# Patient Record
Sex: MALE | Race: WHITE | Employment: OTHER | URBAN - NONMETROPOLITAN AREA
[De-identification: names, ages, dates, MRNs, and addresses within clinical notes are randomized per-mention and may not be internally consistent; named-entity substitution may affect disease eponyms.]

---

## 2018-05-11 ENCOUNTER — OFFICE VISIT (OUTPATIENT)
Dept: GASTROENTEROLOGY | Age: 63
End: 2018-05-11
Payer: MEDICAID

## 2018-05-11 VITALS
DIASTOLIC BLOOD PRESSURE: 78 MMHG | BODY MASS INDEX: 27.06 KG/M2 | SYSTOLIC BLOOD PRESSURE: 138 MMHG | HEIGHT: 72 IN | HEART RATE: 59 BPM | WEIGHT: 199.8 LBS | OXYGEN SATURATION: 98 %

## 2018-05-11 DIAGNOSIS — Z12.11 ENCOUNTER FOR SCREENING COLONOSCOPY: Primary | ICD-10-CM

## 2018-05-11 PROCEDURE — 1036F TOBACCO NON-USER: CPT | Performed by: NURSE PRACTITIONER

## 2018-05-11 PROCEDURE — 3017F COLORECTAL CA SCREEN DOC REV: CPT | Performed by: NURSE PRACTITIONER

## 2018-05-11 PROCEDURE — G8427 DOCREV CUR MEDS BY ELIG CLIN: HCPCS | Performed by: NURSE PRACTITIONER

## 2018-05-11 PROCEDURE — 99203 OFFICE O/P NEW LOW 30 MIN: CPT | Performed by: NURSE PRACTITIONER

## 2018-05-11 PROCEDURE — G8419 CALC BMI OUT NRM PARAM NOF/U: HCPCS | Performed by: NURSE PRACTITIONER

## 2018-05-11 ASSESSMENT — ENCOUNTER SYMPTOMS
SHORTNESS OF BREATH: 0
CONSTIPATION: 0
BACK PAIN: 0
ABDOMINAL PAIN: 0
RECTAL PAIN: 0
TROUBLE SWALLOWING: 0
DIARRHEA: 0
BLOOD IN STOOL: 0
NAUSEA: 0
VOICE CHANGE: 0
ANAL BLEEDING: 0
VOMITING: 0
COUGH: 0
SORE THROAT: 0
ABDOMINAL DISTENTION: 0

## 2018-06-10 PROBLEM — Z12.11 ENCOUNTER FOR SCREENING COLONOSCOPY: Status: RESOLVED | Noted: 2018-05-11 | Resolved: 2018-06-10

## 2018-06-26 ENCOUNTER — ANESTHESIA EVENT (OUTPATIENT)
Dept: ENDOSCOPY | Age: 63
End: 2018-06-26
Payer: MEDICAID

## 2018-06-26 ENCOUNTER — ANESTHESIA (OUTPATIENT)
Dept: ENDOSCOPY | Age: 63
End: 2018-06-26
Payer: MEDICAID

## 2018-06-26 ENCOUNTER — HOSPITAL ENCOUNTER (OUTPATIENT)
Age: 63
Setting detail: OUTPATIENT SURGERY
Discharge: HOME | End: 2018-06-26
Attending: INTERNAL MEDICINE | Admitting: INTERNAL MEDICINE
Payer: MEDICAID

## 2018-06-26 VITALS
HEART RATE: 42 BPM | OXYGEN SATURATION: 100 % | SYSTOLIC BLOOD PRESSURE: 100 MMHG | DIASTOLIC BLOOD PRESSURE: 71 MMHG | RESPIRATION RATE: 16 BRPM | TEMPERATURE: 98.4 F | WEIGHT: 215 LBS | BODY MASS INDEX: 28.49 KG/M2 | HEIGHT: 73 IN

## 2018-06-26 VITALS
SYSTOLIC BLOOD PRESSURE: 89 MMHG | RESPIRATION RATE: 19 BRPM | OXYGEN SATURATION: 98 % | DIASTOLIC BLOOD PRESSURE: 45 MMHG

## 2018-06-26 LAB
GLUCOSE BLD-MCNC: 99 MG/DL (ref 70–99)
PERFORMED ON: NORMAL

## 2018-06-26 PROCEDURE — 3700000001 HC ADD 15 MINUTES (ANESTHESIA): Performed by: INTERNAL MEDICINE

## 2018-06-26 PROCEDURE — 7100000011 HC PHASE II RECOVERY - ADDTL 15 MIN: Performed by: INTERNAL MEDICINE

## 2018-06-26 PROCEDURE — 3609009500 HC COLONOSCOPY DIAGNOSTIC OR SCREENING: Performed by: INTERNAL MEDICINE

## 2018-06-26 PROCEDURE — 6360000002 HC RX W HCPCS: Performed by: NURSE ANESTHETIST, CERTIFIED REGISTERED

## 2018-06-26 PROCEDURE — 2500000003 HC RX 250 WO HCPCS: Performed by: NURSE ANESTHETIST, CERTIFIED REGISTERED

## 2018-06-26 PROCEDURE — 2580000003 HC RX 258: Performed by: INTERNAL MEDICINE

## 2018-06-26 PROCEDURE — 7100000010 HC PHASE II RECOVERY - FIRST 15 MIN: Performed by: INTERNAL MEDICINE

## 2018-06-26 PROCEDURE — 45378 DIAGNOSTIC COLONOSCOPY: CPT | Performed by: INTERNAL MEDICINE

## 2018-06-26 PROCEDURE — 93005 ELECTROCARDIOGRAM TRACING: CPT

## 2018-06-26 PROCEDURE — 3700000000 HC ANESTHESIA ATTENDED CARE: Performed by: INTERNAL MEDICINE

## 2018-06-26 PROCEDURE — 82948 REAGENT STRIP/BLOOD GLUCOSE: CPT

## 2018-06-26 RX ORDER — LIDOCAINE HYDROCHLORIDE 10 MG/ML
1 INJECTION, SOLUTION EPIDURAL; INFILTRATION; INTRACAUDAL; PERINEURAL ONCE
Status: DISCONTINUED | OUTPATIENT
Start: 2018-06-26 | End: 2018-06-26 | Stop reason: HOSPADM

## 2018-06-26 RX ORDER — PROMETHAZINE HYDROCHLORIDE 25 MG/ML
6.25 INJECTION, SOLUTION INTRAMUSCULAR; INTRAVENOUS
Status: DISCONTINUED | OUTPATIENT
Start: 2018-06-26 | End: 2018-06-26 | Stop reason: HOSPADM

## 2018-06-26 RX ORDER — DIPHENHYDRAMINE HYDROCHLORIDE 50 MG/ML
12.5 INJECTION INTRAMUSCULAR; INTRAVENOUS
Status: DISCONTINUED | OUTPATIENT
Start: 2018-06-26 | End: 2018-06-26 | Stop reason: HOSPADM

## 2018-06-26 RX ORDER — BISOPROLOL FUMARATE AND HYDROCHLOROTHIAZIDE 5; 6.25 MG/1; MG/1
1 TABLET ORAL DAILY
Status: ON HOLD | COMMUNITY
End: 2020-01-21

## 2018-06-26 RX ORDER — ONDANSETRON 2 MG/ML
4 INJECTION INTRAMUSCULAR; INTRAVENOUS
Status: DISCONTINUED | OUTPATIENT
Start: 2018-06-26 | End: 2018-06-26 | Stop reason: HOSPADM

## 2018-06-26 RX ORDER — LIDOCAINE HYDROCHLORIDE 10 MG/ML
INJECTION, SOLUTION INFILTRATION; PERINEURAL PRN
Status: DISCONTINUED | OUTPATIENT
Start: 2018-06-26 | End: 2018-06-26 | Stop reason: SDUPTHER

## 2018-06-26 RX ORDER — SODIUM CHLORIDE, SODIUM LACTATE, POTASSIUM CHLORIDE, CALCIUM CHLORIDE 600; 310; 30; 20 MG/100ML; MG/100ML; MG/100ML; MG/100ML
INJECTION, SOLUTION INTRAVENOUS CONTINUOUS
Status: DISCONTINUED | OUTPATIENT
Start: 2018-06-26 | End: 2018-06-26 | Stop reason: HOSPADM

## 2018-06-26 RX ORDER — PROPOFOL 10 MG/ML
INJECTION, EMULSION INTRAVENOUS PRN
Status: DISCONTINUED | OUTPATIENT
Start: 2018-06-26 | End: 2018-06-26 | Stop reason: SDUPTHER

## 2018-06-26 RX ADMIN — LIDOCAINE HYDROCHLORIDE 50 MG: 10 INJECTION, SOLUTION INFILTRATION; PERINEURAL at 11:44

## 2018-06-26 RX ADMIN — PROPOFOL 50 MG: 10 INJECTION, EMULSION INTRAVENOUS at 11:48

## 2018-06-26 RX ADMIN — PROPOFOL 100 MG: 10 INJECTION, EMULSION INTRAVENOUS at 11:44

## 2018-06-26 RX ADMIN — PROPOFOL 50 MG: 10 INJECTION, EMULSION INTRAVENOUS at 11:59

## 2018-06-26 RX ADMIN — SODIUM CHLORIDE, SODIUM LACTATE, POTASSIUM CHLORIDE, AND CALCIUM CHLORIDE: 600; 310; 30; 20 INJECTION, SOLUTION INTRAVENOUS at 11:40

## 2018-06-26 ASSESSMENT — COPD QUESTIONNAIRES: CAT_SEVERITY: SEVERE

## 2018-06-26 ASSESSMENT — ENCOUNTER SYMPTOMS: SHORTNESS OF BREATH: 1

## 2018-06-27 LAB
EKG P AXIS: 54 DEGREES
EKG P-R INTERVAL: 154 MS
EKG Q-T INTERVAL: 478 MS
EKG QRS DURATION: 90 MS
EKG QTC CALCULATION (BAZETT): 457 MS
EKG T AXIS: 53 DEGREES

## 2018-07-26 PROBLEM — Z12.11 SCREENING FOR COLON CANCER: Status: RESOLVED | Noted: 2018-05-11 | Resolved: 2018-07-26

## 2018-08-15 ENCOUNTER — HOSPITAL ENCOUNTER (INPATIENT)
Facility: HOSPITAL | Age: 63
LOS: 6 days | Discharge: HOME | End: 2018-08-21
Attending: EMERGENCY MEDICINE | Admitting: FAMILY MEDICINE

## 2018-08-15 ENCOUNTER — APPOINTMENT (OUTPATIENT)
Dept: GENERAL RADIOLOGY | Facility: HOSPITAL | Age: 63
End: 2018-08-15

## 2018-08-15 DIAGNOSIS — Z74.09 IMPAIRED MOBILITY AND ADLS: ICD-10-CM

## 2018-08-15 DIAGNOSIS — J18.9 PNEUMONIA OF RIGHT UPPER LOBE DUE TO INFECTIOUS ORGANISM: Primary | ICD-10-CM

## 2018-08-15 DIAGNOSIS — Z78.9 IMPAIRED MOBILITY AND ADLS: ICD-10-CM

## 2018-08-15 DIAGNOSIS — J44.1 CHRONIC OBSTRUCTIVE PULMONARY DISEASE WITH ACUTE EXACERBATION (HCC): ICD-10-CM

## 2018-08-15 LAB
ALBUMIN SERPL-MCNC: 4.8 G/DL (ref 3.5–5)
ALBUMIN/GLOB SERPL: 1.3 G/DL (ref 1.1–2.5)
ALP SERPL-CCNC: 61 U/L (ref 24–120)
ALT SERPL W P-5'-P-CCNC: 53 U/L (ref 0–54)
ANION GAP SERPL CALCULATED.3IONS-SCNC: 16 MMOL/L (ref 4–13)
ARTERIAL PATENCY WRIST A: POSITIVE
ARTERIAL PATENCY WRIST A: POSITIVE
AST SERPL-CCNC: 54 U/L (ref 7–45)
ATMOSPHERIC PRESS: 749 MMHG
ATMOSPHERIC PRESS: 750 MMHG
BASE EXCESS BLDA CALC-SCNC: -3.7 MMOL/L (ref 0–2)
BASE EXCESS BLDA CALC-SCNC: -4 MMOL/L (ref 0–2)
BASOPHILS # BLD AUTO: 0.11 10*3/MM3 (ref 0–0.2)
BASOPHILS NFR BLD AUTO: 1.1 % (ref 0–2)
BDY SITE: ABNORMAL
BDY SITE: ABNORMAL
BILIRUB SERPL-MCNC: 0.6 MG/DL (ref 0.1–1)
BODY TEMPERATURE: 37 C
BODY TEMPERATURE: 37 C
BUN BLD-MCNC: 17 MG/DL (ref 5–21)
BUN/CREAT SERPL: 13.8 (ref 7–25)
CALCIUM SPEC-SCNC: 9.2 MG/DL (ref 8.4–10.4)
CHLORIDE SERPL-SCNC: 106 MMOL/L (ref 98–110)
CO2 SERPL-SCNC: 21 MMOL/L (ref 24–31)
COHGB MFR BLD: 1 % (ref 0–5)
CREAT BLD-MCNC: 1.23 MG/DL (ref 0.5–1.4)
D DIMER PPP FEU-MCNC: 0.39 MG/L (FEU) (ref 0–0.5)
D-LACTATE SERPL-SCNC: 1.5 MMOL/L (ref 0.5–2)
D-LACTATE SERPL-SCNC: 2.1 MMOL/L (ref 0.5–2)
DEPRECATED RDW RBC AUTO: 47.6 FL (ref 40–54)
EOSINOPHIL # BLD AUTO: 0.29 10*3/MM3 (ref 0–0.7)
EOSINOPHIL NFR BLD AUTO: 2.8 % (ref 0–4)
ERYTHROCYTE [DISTWIDTH] IN BLOOD BY AUTOMATED COUNT: 14.5 % (ref 12–15)
GAS FLOW AIRWAY: 5 LPM
GFR SERPL CREATININE-BSD FRML MDRD: 60 ML/MIN/1.73
GLOBULIN UR ELPH-MCNC: 3.8 GM/DL
GLUCOSE BLD-MCNC: 84 MG/DL (ref 70–100)
HCO3 BLDA-SCNC: 20.2 MMOL/L (ref 20–26)
HCO3 BLDA-SCNC: 20.4 MMOL/L (ref 20–26)
HCT VFR BLD AUTO: 53.6 % (ref 40–52)
HCT VFR BLD CALC: 56.2 % (ref 38–51)
HGB BLD-MCNC: 18.3 G/DL (ref 14–18)
HGB BLDA-MCNC: 18.3 G/DL (ref 14–18)
HOLD SPECIMEN: NORMAL
IMM GRANULOCYTES # BLD: 0.04 10*3/MM3 (ref 0–0.03)
IMM GRANULOCYTES NFR BLD: 0.4 % (ref 0–5)
LYMPHOCYTES # BLD AUTO: 2.45 10*3/MM3 (ref 0.72–4.86)
LYMPHOCYTES NFR BLD AUTO: 23.7 % (ref 15–45)
Lab: ABNORMAL
Lab: ABNORMAL
MCH RBC QN AUTO: 30.8 PG (ref 28–32)
MCHC RBC AUTO-ENTMCNC: 34.1 G/DL (ref 33–36)
MCV RBC AUTO: 90.2 FL (ref 82–95)
METHGB BLD QL: 0.6 % (ref 0–3)
MODALITY: ABNORMAL
MODALITY: ABNORMAL
MONOCYTES # BLD AUTO: 1.87 10*3/MM3 (ref 0.19–1.3)
MONOCYTES NFR BLD AUTO: 18.1 % (ref 4–12)
NEUTROPHILS # BLD AUTO: 5.59 10*3/MM3 (ref 1.87–8.4)
NEUTROPHILS NFR BLD AUTO: 53.9 % (ref 39–78)
NRBC BLD MANUAL-RTO: 0 /100 WBC (ref 0–0)
NT-PROBNP SERPL-MCNC: 76.9 PG/ML (ref 0–900)
OXYHGB MFR BLDV: 91.7 % (ref 94–99)
PCO2 BLDA: 33.6 MM HG (ref 35–45)
PCO2 BLDA: 35.1 MM HG (ref 35–45)
PCO2 TEMP ADJ BLD: ABNORMAL MM HG (ref 35–45)
PH BLDA: 7.37 PH UNITS (ref 7.35–7.45)
PH BLDA: 7.39 PH UNITS (ref 7.35–7.45)
PH, TEMP CORRECTED: ABNORMAL PH UNITS (ref 7.35–7.45)
PLATELET # BLD AUTO: 324 10*3/MM3 (ref 130–400)
PMV BLD AUTO: 9.7 FL (ref 6–12)
PO2 BLDA: 64.4 MM HG (ref 83–108)
PO2 BLDA: 77.4 MM HG (ref 83–108)
PO2 TEMP ADJ BLD: ABNORMAL MM HG (ref 83–108)
POTASSIUM BLD-SCNC: 4.2 MMOL/L (ref 3.5–5.3)
POTASSIUM BLDA-SCNC: 4 MMOL/L (ref 3.5–5.2)
PROT SERPL-MCNC: 8.6 G/DL (ref 6.3–8.7)
RBC # BLD AUTO: 5.94 10*6/MM3 (ref 4.8–5.9)
SAO2 % BLDCOA: 93.2 % (ref 94–99)
SAO2 % BLDCOA: 95.7 % (ref 94–99)
SODIUM BLD-SCNC: 143 MMOL/L (ref 135–145)
SODIUM BLDA-SCNC: 142 MMOL/L (ref 136–145)
TROPONIN I SERPL-MCNC: <0.012 NG/ML (ref 0–0.03)
VENTILATOR MODE: ABNORMAL
VENTILATOR MODE: ABNORMAL
WBC NRBC COR # BLD: 10.35 10*3/MM3 (ref 4.8–10.8)
WHOLE BLOOD HOLD SPECIMEN: NORMAL
WHOLE BLOOD HOLD SPECIMEN: NORMAL

## 2018-08-15 PROCEDURE — 94799 UNLISTED PULMONARY SVC/PX: CPT

## 2018-08-15 PROCEDURE — 25010000002 MORPHINE PER 10 MG: Performed by: EMERGENCY MEDICINE

## 2018-08-15 PROCEDURE — 36600 WITHDRAWAL OF ARTERIAL BLOOD: CPT

## 2018-08-15 PROCEDURE — 71045 X-RAY EXAM CHEST 1 VIEW: CPT

## 2018-08-15 PROCEDURE — 80053 COMPREHEN METABOLIC PANEL: CPT | Performed by: EMERGENCY MEDICINE

## 2018-08-15 PROCEDURE — 83880 ASSAY OF NATRIURETIC PEPTIDE: CPT | Performed by: EMERGENCY MEDICINE

## 2018-08-15 PROCEDURE — 82375 ASSAY CARBOXYHB QUANT: CPT

## 2018-08-15 PROCEDURE — G0480 DRUG TEST DEF 1-7 CLASSES: HCPCS | Performed by: FAMILY MEDICINE

## 2018-08-15 PROCEDURE — 25010000002 METHYLPREDNISOLONE PER 125 MG: Performed by: EMERGENCY MEDICINE

## 2018-08-15 PROCEDURE — 84484 ASSAY OF TROPONIN QUANT: CPT | Performed by: EMERGENCY MEDICINE

## 2018-08-15 PROCEDURE — 25010000002 ENOXAPARIN PER 10 MG: Performed by: FAMILY MEDICINE

## 2018-08-15 PROCEDURE — 25010000002 METHYLPREDNISOLONE PER 125 MG: Performed by: FAMILY MEDICINE

## 2018-08-15 PROCEDURE — 87899 AGENT NOS ASSAY W/OPTIC: CPT | Performed by: FAMILY MEDICINE

## 2018-08-15 PROCEDURE — 85379 FIBRIN DEGRADATION QUANT: CPT | Performed by: EMERGENCY MEDICINE

## 2018-08-15 PROCEDURE — 83050 HGB METHEMOGLOBIN QUAN: CPT

## 2018-08-15 PROCEDURE — 85025 COMPLETE CBC W/AUTO DIFF WBC: CPT | Performed by: EMERGENCY MEDICINE

## 2018-08-15 PROCEDURE — 25010000002 LORAZEPAM PER 2 MG: Performed by: EMERGENCY MEDICINE

## 2018-08-15 PROCEDURE — 25010000002 CEFTRIAXONE PER 250 MG: Performed by: EMERGENCY MEDICINE

## 2018-08-15 PROCEDURE — 83605 ASSAY OF LACTIC ACID: CPT | Performed by: EMERGENCY MEDICINE

## 2018-08-15 PROCEDURE — 87040 BLOOD CULTURE FOR BACTERIA: CPT | Performed by: EMERGENCY MEDICINE

## 2018-08-15 PROCEDURE — 94660 CPAP INITIATION&MGMT: CPT

## 2018-08-15 PROCEDURE — 99285 EMERGENCY DEPT VISIT HI MDM: CPT

## 2018-08-15 PROCEDURE — 25010000002 ONDANSETRON PER 1 MG: Performed by: EMERGENCY MEDICINE

## 2018-08-15 PROCEDURE — 82805 BLOOD GASES W/O2 SATURATION: CPT

## 2018-08-15 PROCEDURE — 25010000002 MAGNESIUM SULFATE IN D5W 1G/100ML (PREMIX) 1-5 GM/100ML-% SOLUTION: Performed by: FAMILY MEDICINE

## 2018-08-15 PROCEDURE — 82803 BLOOD GASES ANY COMBINATION: CPT

## 2018-08-15 PROCEDURE — 25010000002 HYDRALAZINE PER 20 MG: Performed by: FAMILY MEDICINE

## 2018-08-15 PROCEDURE — 93005 ELECTROCARDIOGRAM TRACING: CPT | Performed by: EMERGENCY MEDICINE

## 2018-08-15 PROCEDURE — 94640 AIRWAY INHALATION TREATMENT: CPT

## 2018-08-15 PROCEDURE — 93010 ELECTROCARDIOGRAM REPORT: CPT | Performed by: INTERNAL MEDICINE

## 2018-08-15 PROCEDURE — 25010000002 AZITHROMYCIN PER 500 MG: Performed by: EMERGENCY MEDICINE

## 2018-08-15 RX ORDER — PREDNISONE 10 MG/1
10 TABLET ORAL DAILY
COMMUNITY
End: 2018-12-11 | Stop reason: SDUPTHER

## 2018-08-15 RX ORDER — SODIUM CHLORIDE 0.9 % (FLUSH) 0.9 %
1-10 SYRINGE (ML) INJECTION AS NEEDED
Status: DISCONTINUED | OUTPATIENT
Start: 2018-08-15 | End: 2018-08-21 | Stop reason: HOSPADM

## 2018-08-15 RX ORDER — IPRATROPIUM BROMIDE AND ALBUTEROL SULFATE 2.5; .5 MG/3ML; MG/3ML
3 SOLUTION RESPIRATORY (INHALATION)
Status: DISCONTINUED | OUTPATIENT
Start: 2018-08-15 | End: 2018-08-18

## 2018-08-15 RX ORDER — ONDANSETRON 2 MG/ML
4 INJECTION INTRAMUSCULAR; INTRAVENOUS EVERY 6 HOURS PRN
Status: DISCONTINUED | OUTPATIENT
Start: 2018-08-15 | End: 2018-08-21 | Stop reason: HOSPADM

## 2018-08-15 RX ORDER — LORAZEPAM 2 MG/ML
1 INJECTION INTRAMUSCULAR ONCE
Status: COMPLETED | OUTPATIENT
Start: 2018-08-15 | End: 2018-08-15

## 2018-08-15 RX ORDER — MAGNESIUM SULFATE 1 G/100ML
1 INJECTION INTRAVENOUS ONCE
Status: COMPLETED | OUTPATIENT
Start: 2018-08-15 | End: 2018-08-15

## 2018-08-15 RX ORDER — LEVOTHYROXINE SODIUM 0.12 MG/1
250 TABLET ORAL DAILY
COMMUNITY
End: 2020-01-21 | Stop reason: HOSPADM

## 2018-08-15 RX ORDER — HYDRALAZINE HYDROCHLORIDE 20 MG/ML
20 INJECTION INTRAMUSCULAR; INTRAVENOUS ONCE
Status: COMPLETED | OUTPATIENT
Start: 2018-08-15 | End: 2018-08-15

## 2018-08-15 RX ORDER — IPRATROPIUM BROMIDE AND ALBUTEROL SULFATE 2.5; .5 MG/3ML; MG/3ML
SOLUTION RESPIRATORY (INHALATION)
Status: COMPLETED
Start: 2018-08-15 | End: 2018-08-15

## 2018-08-15 RX ORDER — BENZONATATE 100 MG/1
100 CAPSULE ORAL 3 TIMES DAILY
COMMUNITY
End: 2018-08-21 | Stop reason: HOSPADM

## 2018-08-15 RX ORDER — HYDROCODONE BITARTRATE AND ACETAMINOPHEN 5; 325 MG/1; MG/1
1 TABLET ORAL EVERY 6 HOURS PRN
Status: DISCONTINUED | OUTPATIENT
Start: 2018-08-15 | End: 2018-08-20

## 2018-08-15 RX ORDER — MONTELUKAST SODIUM 10 MG/1
10 TABLET ORAL DAILY
Status: ON HOLD | COMMUNITY
End: 2020-01-15

## 2018-08-15 RX ORDER — BISOPROLOL FUMARATE AND HYDROCHLOROTHIAZIDE 5; 6.25 MG/1; MG/1
1 TABLET ORAL DAILY
COMMUNITY
End: 2020-01-21 | Stop reason: HOSPADM

## 2018-08-15 RX ORDER — METHYLPREDNISOLONE SODIUM SUCCINATE 125 MG/2ML
125 INJECTION, POWDER, LYOPHILIZED, FOR SOLUTION INTRAMUSCULAR; INTRAVENOUS ONCE
Status: COMPLETED | OUTPATIENT
Start: 2018-08-15 | End: 2018-08-15

## 2018-08-15 RX ORDER — METHYLPREDNISOLONE SODIUM SUCCINATE 125 MG/2ML
125 INJECTION, POWDER, LYOPHILIZED, FOR SOLUTION INTRAMUSCULAR; INTRAVENOUS ONCE
Status: DISCONTINUED | OUTPATIENT
Start: 2018-08-15 | End: 2018-08-15

## 2018-08-15 RX ORDER — SODIUM CHLORIDE 0.9 % (FLUSH) 0.9 %
10 SYRINGE (ML) INJECTION AS NEEDED
Status: DISCONTINUED | OUTPATIENT
Start: 2018-08-15 | End: 2018-08-21 | Stop reason: HOSPADM

## 2018-08-15 RX ORDER — IPRATROPIUM BROMIDE AND ALBUTEROL SULFATE 2.5; .5 MG/3ML; MG/3ML
3 SOLUTION RESPIRATORY (INHALATION) ONCE
Status: DISCONTINUED | OUTPATIENT
Start: 2018-08-15 | End: 2018-08-15

## 2018-08-15 RX ORDER — ONDANSETRON 2 MG/ML
4 INJECTION INTRAMUSCULAR; INTRAVENOUS ONCE
Status: COMPLETED | OUTPATIENT
Start: 2018-08-15 | End: 2018-08-15

## 2018-08-15 RX ORDER — AMLODIPINE BESYLATE 5 MG/1
5 TABLET ORAL DAILY
COMMUNITY
End: 2020-01-21 | Stop reason: HOSPADM

## 2018-08-15 RX ORDER — BUDESONIDE AND FORMOTEROL FUMARATE DIHYDRATE 160; 4.5 UG/1; UG/1
2 AEROSOL RESPIRATORY (INHALATION)
COMMUNITY
End: 2019-04-29 | Stop reason: SDUPTHER

## 2018-08-15 RX ORDER — GUAIFENESIN 600 MG/1
1200 TABLET, EXTENDED RELEASE ORAL EVERY 12 HOURS SCHEDULED
Status: DISCONTINUED | OUTPATIENT
Start: 2018-08-15 | End: 2018-08-21 | Stop reason: HOSPADM

## 2018-08-15 RX ORDER — ALBUTEROL SULFATE 90 UG/1
2 AEROSOL, METERED RESPIRATORY (INHALATION) 4 TIMES DAILY PRN
COMMUNITY
End: 2019-04-29 | Stop reason: SDUPTHER

## 2018-08-15 RX ORDER — METHYLPREDNISOLONE SODIUM SUCCINATE 125 MG/2ML
80 INJECTION, POWDER, LYOPHILIZED, FOR SOLUTION INTRAMUSCULAR; INTRAVENOUS EVERY 6 HOURS
Status: DISCONTINUED | OUTPATIENT
Start: 2018-08-15 | End: 2018-08-17

## 2018-08-15 RX ORDER — IPRATROPIUM BROMIDE AND ALBUTEROL SULFATE 2.5; .5 MG/3ML; MG/3ML
3 SOLUTION RESPIRATORY (INHALATION) ONCE
Status: COMPLETED | OUTPATIENT
Start: 2018-08-15 | End: 2018-08-15

## 2018-08-15 RX ORDER — TAMSULOSIN HYDROCHLORIDE 0.4 MG/1
1 CAPSULE ORAL DAILY
COMMUNITY
End: 2020-01-21 | Stop reason: HOSPADM

## 2018-08-15 RX ORDER — MORPHINE SULFATE 4 MG/ML
4 INJECTION, SOLUTION INTRAMUSCULAR; INTRAVENOUS ONCE
Status: COMPLETED | OUTPATIENT
Start: 2018-08-15 | End: 2018-08-15

## 2018-08-15 RX ADMIN — IPRATROPIUM BROMIDE AND ALBUTEROL SULFATE 3 ML: 2.5; .5 SOLUTION RESPIRATORY (INHALATION) at 15:48

## 2018-08-15 RX ADMIN — HYDRALAZINE HYDROCHLORIDE 20 MG: 20 INJECTION INTRAMUSCULAR; INTRAVENOUS at 16:20

## 2018-08-15 RX ADMIN — Medication 10 ML: at 21:52

## 2018-08-15 RX ADMIN — IPRATROPIUM BROMIDE AND ALBUTEROL SULFATE: 2.5; .5 SOLUTION RESPIRATORY (INHALATION) at 15:19

## 2018-08-15 RX ADMIN — METHYLPREDNISOLONE SODIUM SUCCINATE 125 MG: 125 INJECTION, POWDER, FOR SOLUTION INTRAMUSCULAR; INTRAVENOUS at 15:53

## 2018-08-15 RX ADMIN — ONDANSETRON 4 MG: 2 INJECTION INTRAMUSCULAR; INTRAVENOUS at 16:11

## 2018-08-15 RX ADMIN — LORAZEPAM 1 MG: 2 INJECTION INTRAMUSCULAR; INTRAVENOUS at 15:41

## 2018-08-15 RX ADMIN — CEFTRIAXONE SODIUM 1 G: 1 INJECTION, POWDER, FOR SOLUTION INTRAMUSCULAR; INTRAVENOUS at 16:48

## 2018-08-15 RX ADMIN — MAGNESIUM SULFATE HEPTAHYDRATE 1 G: 1 INJECTION, SOLUTION INTRAVENOUS at 20:06

## 2018-08-15 RX ADMIN — ENOXAPARIN SODIUM 40 MG: 40 INJECTION SUBCUTANEOUS at 18:33

## 2018-08-15 RX ADMIN — METHYLPREDNISOLONE SODIUM SUCCINATE 125 MG: 125 INJECTION, POWDER, FOR SOLUTION INTRAMUSCULAR; INTRAVENOUS at 15:26

## 2018-08-15 RX ADMIN — HYDROCODONE BITARTRATE AND ACETAMINOPHEN 1 TABLET: 5; 325 TABLET ORAL at 19:35

## 2018-08-15 RX ADMIN — IPRATROPIUM BROMIDE AND ALBUTEROL SULFATE 3 ML: 2.5; .5 SOLUTION RESPIRATORY (INHALATION) at 20:30

## 2018-08-15 RX ADMIN — GUAIFENESIN 1200 MG: 600 TABLET, EXTENDED RELEASE ORAL at 20:06

## 2018-08-15 RX ADMIN — AZITHROMYCIN MONOHYDRATE 500 MG: 500 INJECTION, POWDER, LYOPHILIZED, FOR SOLUTION INTRAVENOUS at 16:48

## 2018-08-15 RX ADMIN — METHYLPREDNISOLONE SODIUM SUCCINATE 80 MG: 125 INJECTION, POWDER, FOR SOLUTION INTRAMUSCULAR; INTRAVENOUS at 21:51

## 2018-08-15 RX ADMIN — MORPHINE SULFATE 4 MG: 4 INJECTION INTRAVENOUS at 16:11

## 2018-08-15 NOTE — H&P
[facility] Medicine Services  HISTORY AND PHYSICAL    Date of Admission: 8/15/2018  Primary Care Physician: Lobito Trevino Jr., MD    Subjective     Chief Complaint: SOA    History of Present Illness  The patient is a 63 year old gentleman with a history of COPD and tobacco abuse.  He presents with 3-4 days of worsening coughing, wheezing and shortness of breath.  His cough is non-productive.  He feels like he has sputum that won't come up.  He is no longer smoking.  He denies fevers or chills.  He says that this finally got to the point he couldn't stand it.        Review of Systems   Constitutional: Positive for fatigue. Negative for fever.   HENT: Negative for congestion and ear pain.    Eyes: Negative for redness and visual disturbance.   Respiratory: Positive for cough, shortness of breath and wheezing.    Cardiovascular: Negative for chest pain and palpitations.   Gastrointestinal: Negative for abdominal pain, diarrhea, nausea and vomiting.   Endocrine: Negative for cold intolerance and heat intolerance.   Genitourinary: Negative for dysuria and frequency.   Musculoskeletal: Negative for arthralgias and back pain.   Skin: Negative for rash and wound.   Neurological: Negative for dizziness and headaches.   Psychiatric/Behavioral: Negative for confusion. The patient is not nervous/anxious.         Otherwise complete ROS reviewed and negative except as mentioned in the HPI.    Past Medical History: COPD  Past Surgical History:No prior surgeries  Social History:  Former smoker, no alcohol or drug use    Family History: Cancer in his mother and father  Allergies:  Allergies   Allergen Reactions   • Codeine Hives     Medications:  Prior to Admission medications    Not on File     Objective     Vital Signs: BP (!) 215/114   Pulse 107   Temp 98.1 °F (36.7 °C) (Oral)   Resp (!) 44   Wt 83 kg (183 lb)   SpO2 95%   Physical Exam   Constitutional: He is oriented to person,  place, and time. He appears well-developed and well-nourished. He appears distressed. Nasal cannula in place.   HENT:   Head: Normocephalic and atraumatic.   Right Ear: External ear normal.   Left Ear: External ear normal.   Nose: Nose normal.   Mouth/Throat: Oropharynx is clear and moist.   Eyes: Pupils are equal, round, and reactive to light. Conjunctivae and EOM are normal. Right eye exhibits no discharge. Left eye exhibits no discharge. No scleral icterus.   Neck: Normal range of motion. Neck supple. No tracheal deviation present. No thyromegaly present.   Cardiovascular: Normal rate, regular rhythm, normal heart sounds and intact distal pulses.  Exam reveals no gallop and no friction rub.    No murmur heard.  Pulmonary/Chest: Accessory muscle usage present. No stridor. Tachypnea noted. He is in respiratory distress. He has decreased breath sounds. He has wheezes. He has rhonchi. He has no rales. He exhibits no tenderness.   Abdominal: Soft. Bowel sounds are normal. He exhibits no distension and no mass. There is no tenderness. There is no rebound and no guarding. No hernia.   Musculoskeletal: Normal range of motion. He exhibits no edema or deformity.   Lymphadenopathy:     He has no cervical adenopathy.   Neurological: He is alert and oriented to person, place, and time. He has normal reflexes. He displays normal reflexes. No cranial nerve deficit. He exhibits normal muscle tone. Coordination normal.   Skin: Skin is warm and dry. No rash noted. No erythema. No pallor.   Psychiatric: He has a normal mood and affect. His behavior is normal. Judgment and thought content normal.   Vitals reviewed.        Results Reviewed:  Lab Results (last 24 hours)     Procedure Component Value Units Date/Time    BNP [[id number]]  (Normal) Collected:  08/15/18 1527    Specimen:  Blood Updated:  08/15/18 1559     proBNP 76.9 pg/mL     Troponin [[id number]]  (Normal) Collected:  08/15/18 1527    Specimen:  Blood Updated:  08/15/18  1559     Troponin I <0.012 ng/mL     Lactic Acid, Plasma [[id number]]  (Abnormal) Collected:  08/15/18 1527    Specimen:  Blood Updated:  08/15/18 1550     Lactate 2.1 (C) mmol/L     Lactic Acid, Reflex Timer (This will reflex a repeat order 3-3:15 hours after ordered.) [[id number]] Collected:  08/15/18 1527    Specimen:  Blood Updated:  08/15/18 1550    Comprehensive Metabolic Panel [[id number]]  (Abnormal) Collected:  08/15/18 1527    Specimen:  Blood Updated:  08/15/18 1547     Glucose 84 mg/dL      BUN 17 mg/dL      Creatinine 1.23 mg/dL      Sodium 143 mmol/L      Potassium 4.2 mmol/L      Chloride 106 mmol/L      CO2 21.0 (L) mmol/L      Calcium 9.2 mg/dL      Total Protein 8.6 g/dL      Albumin 4.80 g/dL      ALT (SGPT) 53 U/L      AST (SGOT) 54 (H) U/L      Alkaline Phosphatase 61 U/L      Total Bilirubin 0.6 mg/dL      eGFR Non African Amer 60 (L) mL/min/1.73      Globulin 3.8 gm/dL      A/G Ratio 1.3 g/dL      BUN/Creatinine Ratio 13.8     Anion Gap 16.0 (H) mmol/L     D-dimer, Quantitative [[id number]]  (Normal) Collected:  08/15/18 1527    Specimen:  Blood Updated:  08/15/18 1545     D-Dimer, Quantitative 0.39 mg/L (FEU)     Narrative:       Reference Range is 0-0.50 mg/L FEU. However, results <0.50 mg/L FEU tends to rule out DVT or PE. Results >0.50 mg/L FEU are not useful in predicting absence or presence of DVT or PE.    Blood Culture - Blood, [[id number]] Collected:  08/15/18 1527    Specimen:  Blood from Arm, Left Updated:  08/15/18 1537    CBC & Differential [[id number]] Collected:  08/15/18 1527    Specimen:  Blood Updated:  08/15/18 1536    Narrative:       The following orders were created for panel order CBC & Differential.  Procedure                               Abnormality         Status                     ---------                               -----------         ------                     CBC Auto Differential[[id number]]        Abnormal            Final result                 Please view results  for these tests on the individual orders.    CBC Auto Differential [[id number]]  (Abnormal) Collected:  08/15/18 1527    Specimen:  Blood Updated:  08/15/18 1536     WBC 10.35 10*3/mm3      RBC 5.94 (H) 10*6/mm3      Hemoglobin 18.3 (H) g/dL      Hematocrit 53.6 (H) %      MCV 90.2 fL      MCH 30.8 pg      MCHC 34.1 g/dL      RDW 14.5 %      RDW-SD 47.6 fl      MPV 9.7 fL      Platelets 324 10*3/mm3      Neutrophil % 53.9 %      Lymphocyte % 23.7 %      Monocyte % 18.1 (H) %      Eosinophil % 2.8 %      Basophil % 1.1 %      Immature Grans % 0.4 %      Neutrophils, Absolute 5.59 10*3/mm3      Lymphocytes, Absolute 2.45 10*3/mm3      Monocytes, Absolute 1.87 (H) 10*3/mm3      Eosinophils, Absolute 0.29 10*3/mm3      Basophils, Absolute 0.11 10*3/mm3      Immature Grans, Absolute 0.04 (H) 10*3/mm3      nRBC 0.0 /100 WBC     Blood Gas, Arterial With Co-Ox [[id number]]  (Abnormal) Collected:  08/15/18 1510    Specimen:  Arterial Blood Updated:  08/15/18 1533     Site Left Radial     Luke's Test Positive     pH, Arterial 7.388 pH units      pCO2, Arterial 33.6 (L) mm Hg      pO2, Arterial 64.4 (L) mm Hg      HCO3, Arterial 20.2 mmol/L      Base Excess, Arterial -3.7 (L) mmol/L      O2 Saturation, Arterial 93.2 (L) %      Hemoglobin, Blood Gas 18.3 (H) g/dL      Hematocrit, Blood Gas 56.2 (H) %      Oxyhemoglobin 91.7 (L) %      Methemoglobin 0.60 %      Carboxyhemoglobin 1.0 %      Temperature 37.0 C      Sodium, Arterial 142 mmol/L      Potassium, Arterial 4.0 mmol/L      Barometric Pressure for Blood Gas 750 mmHg      Modality Room Air     Ventilator Mode NA     Collected by [id number]     pH, Temp Corrected -- pH Units      pCO2, Temperature Corrected -- mm Hg      pO2, Temperature Corrected -- mm Hg     Lavender Top [[id number]] Collected:  08/15/18 1527    Specimen:  Blood Updated:  08/15/18 1532    Light Blue Top [[id number]] Collected:  08/15/18 1527    Specimen:  Blood Updated:  08/15/18 1532    Red Top [[id number]]  Collected:  08/15/18 1527    Specimen:  Blood Updated:  08/15/18 1532    [place] Draw [[id number]] Collected:  08/15/18 1527    Specimen:  Blood Updated:  08/15/18 1531    Narrative:       The following orders were created for panel order [place] Draw.  Procedure                               Abnormality         Status                     ---------                               -----------         ------                     Light Blue Top[[id number]]                                   In process                 Green Top (Gel)[[id number]]                                  In process                 Lavender Top[[id number]]                                     In process                 Red Top[[id number]]                                          In process                   Please view results for these tests on the individual orders.    Green Top (Gel) [[id number]] Collected:  08/15/18 1527    Specimen:  Blood Updated:  08/15/18 1531        Imaging Results (last 24 hours)     Procedure Component Value Units Date/Time    XR Chest 1 View [[id number]] Collected:  08/15/18 1644     Updated:  08/15/18 1550    Narrative:       EXAMINATION: XR CHEST 1 VW-     8/15/2018 3:30 PM CDT     HISTORY: SOB, cough.     One view chest x-ray compared to 06/27/2016.     Nodular opacity at the right upper lobe measures approximately 2 cm.  Nodular infiltrate/pneumonia is favored though short-term follow-up  after treatment will be necessary to rule out an underlying neoplasm.     Lungs are otherwise fully expanded and clear.     Normal heart and mediastinum.     Incidental note is made of left eighth and ninth rib fractures which are  healed or partially healed.     Summary:  1. Right upper lobe infiltrate compatible with pneumonia.  2. Follow-up to document complete resolution will be needed.  This report was finalized on [id number] by Dr. Saeid Singh MD.        I have personally reviewed and interpreted the radiology studies and ECG  obtained at time of admission.     Assessment / Plan     Assessment:   Hospital Problem List     Pneumonia of right upper lobe due to infectious organism (CMS/HCC)          1.  COPD AE  -IV Steroids  -IV Mg  -Nebs  -Bipap  -Mucinex  -Flutter    2. Sepsis  -Rocephin/Zithromax  -Blood/sputum cultures    3.  Pneumonia  -Rocephin/Zithromax  -Blood/sputum cultures    4.  Tobacco abuse  -Quit years ago    5.  Hypertensive Urgency  -IV hydralazine      Code Status: Full     I discussed the patient's findings and my recommendations with the patient, ED Physician    Estimated length of stay 3-4 days    Vinicius Ellsworth MD   08/15/18   4:22 PM

## 2018-08-16 LAB
ALBUMIN SERPL-MCNC: 3.9 G/DL (ref 3.5–5)
ALBUMIN/GLOB SERPL: 1.3 G/DL (ref 1.1–2.5)
ALP SERPL-CCNC: 45 U/L (ref 24–120)
ALT SERPL W P-5'-P-CCNC: 47 U/L (ref 0–54)
ANION GAP SERPL CALCULATED.3IONS-SCNC: 11 MMOL/L (ref 4–13)
ARTERIAL PATENCY WRIST A: POSITIVE
AST SERPL-CCNC: 32 U/L (ref 7–45)
ATMOSPHERIC PRESS: 749 MMHG
BASE EXCESS BLDA CALC-SCNC: -2.5 MMOL/L (ref 0–2)
BASOPHILS # BLD AUTO: 0.01 10*3/MM3 (ref 0–0.2)
BASOPHILS NFR BLD AUTO: 0.1 % (ref 0–2)
BDY SITE: ABNORMAL
BILIRUB SERPL-MCNC: 0.4 MG/DL (ref 0.1–1)
BODY TEMPERATURE: 37 C
BUN BLD-MCNC: 21 MG/DL (ref 5–21)
BUN/CREAT SERPL: 20.8 (ref 7–25)
CALCIUM SPEC-SCNC: 9 MG/DL (ref 8.4–10.4)
CHLORIDE SERPL-SCNC: 103 MMOL/L (ref 98–110)
CO2 SERPL-SCNC: 23 MMOL/L (ref 24–31)
CREAT BLD-MCNC: 1.01 MG/DL (ref 0.5–1.4)
DEPRECATED RDW RBC AUTO: 47.7 FL (ref 40–54)
EOSINOPHIL # BLD AUTO: 0 10*3/MM3 (ref 0–0.7)
EOSINOPHIL NFR BLD AUTO: 0 % (ref 0–4)
ERYTHROCYTE [DISTWIDTH] IN BLOOD BY AUTOMATED COUNT: 14.3 % (ref 12–15)
GAS FLOW AIRWAY: 5 LPM
GFR SERPL CREATININE-BSD FRML MDRD: 75 ML/MIN/1.73
GLOBULIN UR ELPH-MCNC: 3 GM/DL
GLUCOSE BLD-MCNC: 183 MG/DL (ref 70–100)
HCO3 BLDA-SCNC: 21.6 MMOL/L (ref 20–26)
HCT VFR BLD AUTO: 48.8 % (ref 40–52)
HGB BLD-MCNC: 16.8 G/DL (ref 14–18)
IMM GRANULOCYTES # BLD: 0.04 10*3/MM3 (ref 0–0.03)
IMM GRANULOCYTES NFR BLD: 0.4 % (ref 0–5)
L PNEUMO1 AG UR QL IA: NEGATIVE
LYMPHOCYTES # BLD AUTO: 0.56 10*3/MM3 (ref 0.72–4.86)
LYMPHOCYTES NFR BLD AUTO: 6.1 % (ref 15–45)
Lab: ABNORMAL
MCH RBC QN AUTO: 31.3 PG (ref 28–32)
MCHC RBC AUTO-ENTMCNC: 34.4 G/DL (ref 33–36)
MCV RBC AUTO: 91 FL (ref 82–95)
MODALITY: ABNORMAL
MONOCYTES # BLD AUTO: 0.48 10*3/MM3 (ref 0.19–1.3)
MONOCYTES NFR BLD AUTO: 5.2 % (ref 4–12)
NEUTROPHILS # BLD AUTO: 8.14 10*3/MM3 (ref 1.87–8.4)
NEUTROPHILS NFR BLD AUTO: 88.2 % (ref 39–78)
NRBC BLD MANUAL-RTO: 0 /100 WBC (ref 0–0)
PCO2 BLDA: 35.2 MM HG (ref 35–45)
PH BLDA: 7.4 PH UNITS (ref 7.35–7.45)
PLATELET # BLD AUTO: 306 10*3/MM3 (ref 130–400)
PMV BLD AUTO: 9.9 FL (ref 6–12)
PO2 BLDA: 117 MM HG (ref 83–108)
POTASSIUM BLD-SCNC: 4.6 MMOL/L (ref 3.5–5.3)
PROT SERPL-MCNC: 6.9 G/DL (ref 6.3–8.7)
RBC # BLD AUTO: 5.36 10*6/MM3 (ref 4.8–5.9)
S PNEUM AG SPEC QL LA: NEGATIVE
SAO2 % BLDCOA: 98.8 % (ref 94–99)
SODIUM BLD-SCNC: 137 MMOL/L (ref 135–145)
VENTILATOR MODE: ABNORMAL
WBC NRBC COR # BLD: 9.23 10*3/MM3 (ref 4.8–10.8)

## 2018-08-16 PROCEDURE — 85025 COMPLETE CBC W/AUTO DIFF WBC: CPT | Performed by: FAMILY MEDICINE

## 2018-08-16 PROCEDURE — 25010000002 METHYLPREDNISOLONE PER 125 MG: Performed by: FAMILY MEDICINE

## 2018-08-16 PROCEDURE — 25010000002 CEFTRIAXONE PER 250 MG: Performed by: FAMILY MEDICINE

## 2018-08-16 PROCEDURE — 94760 N-INVAS EAR/PLS OXIMETRY 1: CPT

## 2018-08-16 PROCEDURE — 94799 UNLISTED PULMONARY SVC/PX: CPT

## 2018-08-16 PROCEDURE — 94660 CPAP INITIATION&MGMT: CPT

## 2018-08-16 PROCEDURE — 25010000002 AZITHROMYCIN PER 500 MG: Performed by: FAMILY MEDICINE

## 2018-08-16 PROCEDURE — 25010000002 ENOXAPARIN PER 10 MG: Performed by: FAMILY MEDICINE

## 2018-08-16 PROCEDURE — 36415 COLL VENOUS BLD VENIPUNCTURE: CPT | Performed by: FAMILY MEDICINE

## 2018-08-16 PROCEDURE — 80053 COMPREHEN METABOLIC PANEL: CPT | Performed by: FAMILY MEDICINE

## 2018-08-16 PROCEDURE — 82803 BLOOD GASES ANY COMBINATION: CPT

## 2018-08-16 PROCEDURE — 36600 WITHDRAWAL OF ARTERIAL BLOOD: CPT

## 2018-08-16 RX ADMIN — METHYLPREDNISOLONE SODIUM SUCCINATE 80 MG: 125 INJECTION, POWDER, FOR SOLUTION INTRAMUSCULAR; INTRAVENOUS at 16:41

## 2018-08-16 RX ADMIN — HYDROCODONE BITARTRATE AND ACETAMINOPHEN 1 TABLET: 5; 325 TABLET ORAL at 14:07

## 2018-08-16 RX ADMIN — GUAIFENESIN 1200 MG: 600 TABLET, EXTENDED RELEASE ORAL at 20:01

## 2018-08-16 RX ADMIN — IPRATROPIUM BROMIDE AND ALBUTEROL SULFATE 3 ML: 2.5; .5 SOLUTION RESPIRATORY (INHALATION) at 07:29

## 2018-08-16 RX ADMIN — IPRATROPIUM BROMIDE AND ALBUTEROL SULFATE 3 ML: 2.5; .5 SOLUTION RESPIRATORY (INHALATION) at 19:37

## 2018-08-16 RX ADMIN — IPRATROPIUM BROMIDE AND ALBUTEROL SULFATE 3 ML: 2.5; .5 SOLUTION RESPIRATORY (INHALATION) at 04:29

## 2018-08-16 RX ADMIN — HYDROCODONE BITARTRATE AND ACETAMINOPHEN 1 TABLET: 5; 325 TABLET ORAL at 01:34

## 2018-08-16 RX ADMIN — IPRATROPIUM BROMIDE AND ALBUTEROL SULFATE 3 ML: 2.5; .5 SOLUTION RESPIRATORY (INHALATION) at 22:31

## 2018-08-16 RX ADMIN — GUAIFENESIN 1200 MG: 600 TABLET, EXTENDED RELEASE ORAL at 09:47

## 2018-08-16 RX ADMIN — AZITHROMYCIN MONOHYDRATE 500 MG: 500 INJECTION, POWDER, LYOPHILIZED, FOR SOLUTION INTRAVENOUS at 16:42

## 2018-08-16 RX ADMIN — IPRATROPIUM BROMIDE AND ALBUTEROL SULFATE 3 ML: 2.5; .5 SOLUTION RESPIRATORY (INHALATION) at 11:19

## 2018-08-16 RX ADMIN — METHYLPREDNISOLONE SODIUM SUCCINATE 80 MG: 125 INJECTION, POWDER, FOR SOLUTION INTRAMUSCULAR; INTRAVENOUS at 09:47

## 2018-08-16 RX ADMIN — ENOXAPARIN SODIUM 40 MG: 40 INJECTION SUBCUTANEOUS at 17:03

## 2018-08-16 RX ADMIN — HYDROCODONE BITARTRATE AND ACETAMINOPHEN 1 TABLET: 5; 325 TABLET ORAL at 20:01

## 2018-08-16 RX ADMIN — IPRATROPIUM BROMIDE AND ALBUTEROL SULFATE 3 ML: 2.5; .5 SOLUTION RESPIRATORY (INHALATION) at 14:56

## 2018-08-16 RX ADMIN — CEFTRIAXONE SODIUM 1 G: 1 INJECTION, POWDER, FOR SOLUTION INTRAMUSCULAR; INTRAVENOUS at 16:42

## 2018-08-16 RX ADMIN — METHYLPREDNISOLONE SODIUM SUCCINATE 80 MG: 125 INJECTION, POWDER, FOR SOLUTION INTRAMUSCULAR; INTRAVENOUS at 03:42

## 2018-08-16 RX ADMIN — HYDROCODONE BITARTRATE AND ACETAMINOPHEN 1 TABLET: 5; 325 TABLET ORAL at 07:44

## 2018-08-16 RX ADMIN — METHYLPREDNISOLONE SODIUM SUCCINATE 80 MG: 125 INJECTION, POWDER, FOR SOLUTION INTRAMUSCULAR; INTRAVENOUS at 22:16

## 2018-08-16 NOTE — PAYOR COMM NOTE
"FROM: CARL MORAN  PHONE: [phone]  FAX: [phone]    ID: [id number]    Reza Cardenas  (63 y.o. Male)     Date of Birth Social Security Number Address Home Phone MRN    1955  [address] [place] [address]  [address]  [facility] [address] [phone] [id number]    [employer] Marital Status          Evangelical        Admission Date Admission Type Admitting Provider Attending Provider Department, Room/Bed    8/15/18 Emergency Vinicius Ellsworth MD Moore, Jonathan Scott, MD [facility] CARDIAC CARE, C008/1    Discharge Date Discharge Disposition Discharge Destination                       Attending Provider:  Vinicius Ellsworth MD    Allergies:  Codeine    Isolation:  None   Infection:  None   Code Status:  CPR    Ht:  182.9 cm (72\")   Wt:  84.1 kg (185 lb 5 oz)    Admission Cmt:  None   Principal Problem:  None                Active Insurance as of 8/15/2018     Primary Coverage     Payor Plan Insurance Group Employer/Plan Group    [employer] [place] [employer] [place]      Payor Plan Address Payor Plan Phone Number Effective From Effective To    [address] [address] [phone] 8/15/2018     [facility] [id number]       Subscriber Name Subscriber Birth Date Member ID       REZA CARDENAS 1955 [id number]                 Emergency Contacts      (Rel.) Home Phone Work Phone Mobile Phone    Rufina Urbina (Sister) -- -- [phone]               History & Physical      Vinicius Ellsworth MD at 8/15/2018  4:22 PM              [facility] Medicine Services  HISTORY AND PHYSICAL    Date of Admission: 8/15/2018  Primary Care Physician: Lobito Trevino Jr., MD    Subjective     Chief Complaint: SOA    History of Present Illness  The patient is a 63 year old gentleman with a history of COPD and tobacco abuse.  He presents with 3-4 days of worsening coughing, wheezing and shortness of breath.  His cough is non-productive.  He feels like he has sputum that won't come up.  " He is no longer smoking.  He denies fevers or chills.  He says that this finally got to the point he couldn't stand it.        Review of Systems   Constitutional: Positive for fatigue. Negative for fever.   HENT: Negative for congestion and ear pain.    Eyes: Negative for redness and visual disturbance.   Respiratory: Positive for cough, shortness of breath and wheezing.    Cardiovascular: Negative for chest pain and palpitations.   Gastrointestinal: Negative for abdominal pain, diarrhea, nausea and vomiting.   Endocrine: Negative for cold intolerance and heat intolerance.   Genitourinary: Negative for dysuria and frequency.   Musculoskeletal: Negative for arthralgias and back pain.   Skin: Negative for rash and wound.   Neurological: Negative for dizziness and headaches.   Psychiatric/Behavioral: Negative for confusion. The patient is not nervous/anxious.         Otherwise complete ROS reviewed and negative except as mentioned in the HPI.    Past Medical History: COPD  Past Surgical History:No prior surgeries  Social History:  Former smoker, no alcohol or drug use    Family History: Cancer in his mother and father  Allergies:  Allergies   Allergen Reactions   • Codeine Hives     Medications:  Prior to Admission medications    Not on File     Objective     Vital Signs: BP (!) 215/114   Pulse 107   Temp 98.1 °F (36.7 °C) (Oral)   Resp (!) 44   Wt 83 kg (183 lb)   SpO2 95%   Physical Exam   Constitutional: He is oriented to person, place, and time. He appears well-developed and well-nourished. He appears distressed. Nasal cannula in place.   HENT:   Head: Normocephalic and atraumatic.   Right Ear: External ear normal.   Left Ear: External ear normal.   Nose: Nose normal.   Mouth/Throat: Oropharynx is clear and moist.   Eyes: Pupils are equal, round, and reactive to light. Conjunctivae and EOM are normal. Right eye exhibits no discharge. Left eye exhibits no discharge. No scleral icterus.   Neck: Normal range of  motion. Neck supple. No tracheal deviation present. No thyromegaly present.   Cardiovascular: Normal rate, regular rhythm, normal heart sounds and intact distal pulses.  Exam reveals no gallop and no friction rub.    No murmur heard.  Pulmonary/Chest: Accessory muscle usage present. No stridor. Tachypnea noted. He is in respiratory distress. He has decreased breath sounds. He has wheezes. He has rhonchi. He has no rales. He exhibits no tenderness.   Abdominal: Soft. Bowel sounds are normal. He exhibits no distension and no mass. There is no tenderness. There is no rebound and no guarding. No hernia.   Musculoskeletal: Normal range of motion. He exhibits no edema or deformity.   Lymphadenopathy:     He has no cervical adenopathy.   Neurological: He is alert and oriented to person, place, and time. He has normal reflexes. He displays normal reflexes. No cranial nerve deficit. He exhibits normal muscle tone. Coordination normal.   Skin: Skin is warm and dry. No rash noted. No erythema. No pallor.   Psychiatric: He has a normal mood and affect. His behavior is normal. Judgment and thought content normal.   Vitals reviewed.        Results Reviewed:  Lab Results (last 24 hours)     Procedure Component Value Units Date/Time    BNP [[id number]]  (Normal) Collected:  08/15/18 1527    Specimen:  Blood Updated:  08/15/18 1559     proBNP 76.9 pg/mL     Troponin [[id number]]  (Normal) Collected:  08/15/18 1527    Specimen:  Blood Updated:  08/15/18 1559     Troponin I <0.012 ng/mL     Lactic Acid, Plasma [[id number]]  (Abnormal) Collected:  08/15/18 1527    Specimen:  Blood Updated:  08/15/18 1550     Lactate 2.1 (C) mmol/L     Lactic Acid, Reflex Timer (This will reflex a repeat order 3-3:15 hours after ordered.) [[id number]] Collected:  08/15/18 1527    Specimen:  Blood Updated:  08/15/18 1550    Comprehensive Metabolic Panel [[id number]]  (Abnormal) Collected:  08/15/18 1527    Specimen:  Blood Updated:  08/15/18 1547     Glucose 84  mg/dL      BUN 17 mg/dL      Creatinine 1.23 mg/dL      Sodium 143 mmol/L      Potassium 4.2 mmol/L      Chloride 106 mmol/L      CO2 21.0 (L) mmol/L      Calcium 9.2 mg/dL      Total Protein 8.6 g/dL      Albumin 4.80 g/dL      ALT (SGPT) 53 U/L      AST (SGOT) 54 (H) U/L      Alkaline Phosphatase 61 U/L      Total Bilirubin 0.6 mg/dL      eGFR Non African Amer 60 (L) mL/min/1.73      Globulin 3.8 gm/dL      A/G Ratio 1.3 g/dL      BUN/Creatinine Ratio 13.8     Anion Gap 16.0 (H) mmol/L     D-dimer, Quantitative [[id number]]  (Normal) Collected:  08/15/18 1527    Specimen:  Blood Updated:  08/15/18 1545     D-Dimer, Quantitative 0.39 mg/L (FEU)     Narrative:       Reference Range is 0-0.50 mg/L FEU. However, results <0.50 mg/L FEU tends to rule out DVT or PE. Results >0.50 mg/L FEU are not useful in predicting absence or presence of DVT or PE.    Blood Culture - Blood, [[id number]] Collected:  08/15/18 1527    Specimen:  Blood from Arm, Left Updated:  08/15/18 1537    CBC & Differential [[id number]] Collected:  08/15/18 1527    Specimen:  Blood Updated:  08/15/18 1536    Narrative:       The following orders were created for panel order CBC & Differential.  Procedure                               Abnormality         Status                     ---------                               -----------         ------                     CBC Auto Differential[[id number]]        Abnormal            Final result                 Please view results for these tests on the individual orders.    CBC Auto Differential [[id number]]  (Abnormal) Collected:  08/15/18 1527    Specimen:  Blood Updated:  08/15/18 1536     WBC 10.35 10*3/mm3      RBC 5.94 (H) 10*6/mm3      Hemoglobin 18.3 (H) g/dL      Hematocrit 53.6 (H) %      MCV 90.2 fL      MCH 30.8 pg      MCHC 34.1 g/dL      RDW 14.5 %      RDW-SD 47.6 fl      MPV 9.7 fL      Platelets 324 10*3/mm3      Neutrophil % 53.9 %      Lymphocyte % 23.7 %      Monocyte % 18.1 (H) %       Eosinophil % 2.8 %      Basophil % 1.1 %      Immature Grans % 0.4 %      Neutrophils, Absolute 5.59 10*3/mm3      Lymphocytes, Absolute 2.45 10*3/mm3      Monocytes, Absolute 1.87 (H) 10*3/mm3      Eosinophils, Absolute 0.29 10*3/mm3      Basophils, Absolute 0.11 10*3/mm3      Immature Grans, Absolute 0.04 (H) 10*3/mm3      nRBC 0.0 /100 WBC     Blood Gas, Arterial With Co-Ox [[id number]]  (Abnormal) Collected:  08/15/18 1510    Specimen:  Arterial Blood Updated:  08/15/18 1533     Site Left Radial     Luke's Test Positive     pH, Arterial 7.388 pH units      pCO2, Arterial 33.6 (L) mm Hg      pO2, Arterial 64.4 (L) mm Hg      HCO3, Arterial 20.2 mmol/L      Base Excess, Arterial -3.7 (L) mmol/L      O2 Saturation, Arterial 93.2 (L) %      Hemoglobin, Blood Gas 18.3 (H) g/dL      Hematocrit, Blood Gas 56.2 (H) %      Oxyhemoglobin 91.7 (L) %      Methemoglobin 0.60 %      Carboxyhemoglobin 1.0 %      Temperature 37.0 C      Sodium, Arterial 142 mmol/L      Potassium, Arterial 4.0 mmol/L      Barometric Pressure for Blood Gas 750 mmHg      Modality Room Air     Ventilator Mode NA     Collected by [id number]     pH, Temp Corrected -- pH Units      pCO2, Temperature Corrected -- mm Hg      pO2, Temperature Corrected -- mm Hg     Lavender Top [[id number]] Collected:  08/15/18 1527    Specimen:  Blood Updated:  08/15/18 1532    Light Blue Top [[id number]] Collected:  08/15/18 1527    Specimen:  Blood Updated:  08/15/18 1532    Red Top [[id number]] Collected:  08/15/18 1527    Specimen:  Blood Updated:  08/15/18 1532    Loving Draw [[id number]] Collected:  08/15/18 1527    Specimen:  Blood Updated:  08/15/18 1531    Narrative:       The following orders were created for panel order Loving Draw.  Procedure                               Abnormality         Status                     ---------                               -----------         ------                     Light Blue Top[[id number]]                                   In  process                 Green Top (Gel)[[id number]]                                  In process                 Lavender Top[[id number]]                                     In process                 Red Top[[id number]]                                          In process                   Please view results for these tests on the individual orders.    Green Top (Gel) [[id number]] Collected:  08/15/18 1527    Specimen:  Blood Updated:  08/15/18 1531        Imaging Results (last 24 hours)     Procedure Component Value Units Date/Time    XR Chest 1 View [[id number]] Collected:  08/15/18 1644     Updated:  08/15/18 1550    Narrative:       EXAMINATION: XR CHEST 1 VW-     8/15/2018 3:30 PM CDT     HISTORY: SOB, cough.     One view chest x-ray compared to 06/27/2016.     Nodular opacity at the right upper lobe measures approximately 2 cm.  Nodular infiltrate/pneumonia is favored though short-term follow-up  after treatment will be necessary to rule out an underlying neoplasm.     Lungs are otherwise fully expanded and clear.     Normal heart and mediastinum.     Incidental note is made of left eighth and ninth rib fractures which are  healed or partially healed.     Summary:  1. Right upper lobe infiltrate compatible with pneumonia.  2. Follow-up to document complete resolution will be needed.  This report was finalized on [id number] by Dr. Saeid Singh MD.        I have personally reviewed and interpreted the radiology studies and ECG obtained at time of admission.     Assessment / Plan     Assessment:   Hospital Problem List     Pneumonia of right upper lobe due to infectious organism (CMS/HCC)          1.  COPD AE  -IV Steroids  -IV Mg  -Nebs  -Bipap  -Mucinex  -Flutter    2. Sepsis  -Rocephin/Zithromax  -Blood/sputum cultures    3.  Pneumonia  -Rocephin/Zithromax  -Blood/sputum cultures    4.  Tobacco abuse  -Quit years ago    5.  Hypertensive Urgency  -IV hydralazine      Code Status: Full     I discussed the  patient's findings and my recommendations with the patient, ED Physician    Estimated length of stay 3-4 days    Vinicius Ellsworth MD   08/15/18   4:22 PM              Electronically signed by Vinicius Ellsworth MD at 8/15/2018  4:33 PM          Emergency Department Notes      Ronald Quick Jr., MD at 8/15/2018  3:46 PM          Subjective   Patient complains of extreme shortness of breath that he says is been getting worse for the past 3 days but much worse this afternoon.  He was picked up by ambulance service.  Distress and brought here.  He has had some increased cough and hurts very deeply in his left lower lateral ribs whenever he breathes or coughs.  He does have history of COPD but does not normally use oxygen.        History provided by:  Patient   used: No    Shortness of Breath   Severity:  Severe  Onset quality:  Gradual  Duration:  3 days  Timing:  Constant  Progression:  Worsening  Chronicity:  Recurrent  Context: not activity, not animal exposure, not emotional upset, not fumes, not known allergens, not occupational exposure, not pollens, not smoke exposure, not strong odors, not URI and not weather changes    Relieved by:  Nothing  Worsened by:  Nothing  Ineffective treatments:  None tried  Associated symptoms: chest pain, cough and wheezing    Associated symptoms: no abdominal pain, no claudication, no diaphoresis, no ear pain, no fever, no headaches, no hemoptysis, no neck pain, no PND, no rash, no sore throat, no sputum production, no syncope, no swollen glands and no vomiting    Risk factors: no recent alcohol use, no family hx of DVT, no hx of cancer, no hx of PE/DVT, no obesity, no oral contraceptive use, no prolonged immobilization, no recent surgery and no tobacco use        Review of Systems   Constitutional: Negative.  Negative for diaphoresis and fever.   HENT: Negative.  Negative for ear pain and sore throat.    Respiratory: Positive for cough, shortness of  breath and wheezing. Negative for hemoptysis and sputum production.    Cardiovascular: Positive for chest pain. Negative for claudication, syncope and PND.   Gastrointestinal: Negative.  Negative for abdominal pain and vomiting.   Genitourinary: Negative.    Musculoskeletal: Negative.  Negative for neck pain.   Skin: Negative.  Negative for rash.   Neurological: Negative.  Negative for headaches.   Hematological: Negative.    Psychiatric/Behavioral: Negative.    All other systems reviewed and are negative.      No past medical history on file.    Allergies   Allergen Reactions   • Codeine Hives       No past surgical history on file.    No family history on file.    Social History     Social History   • Marital status:      Social History Main Topics   • Drug use: Unknown     Other Topics Concern   • Not on file       Prior to Admission medications    Not on File       Medications   sodium chloride 0.9 % flush 10 mL (not administered)   sodium chloride 0.9 % flush 10 mL (not administered)   cefTRIAXone (ROCEPHIN) 1 g/10mL IV PUSH syringe (not administered)   AZITHROMYCIN 500 MG/250 ML 0.9% NS IVPB (vial-mate) (not administered)   ipratropium-albuterol (DUO-NEB) nebulizer solution 3 mL ( Nebulization Given 8/15/18 1519)   methylPREDNISolone sodium succinate (SOLU-Medrol) injection 125 mg (125 mg Intravenous Given 8/15/18 1526)   LORazepam (ATIVAN) injection 1 mg (1 mg Intravenous Given 8/15/18 1541)   ipratropium-albuterol (DUO-NEB) 0.5-2.5 mg/3 ml nebulizer solution  - ADS Override Pull (3 mL  Given 8/15/18 1548)   methylPREDNISolone sodium succinate (SOLU-Medrol) injection 125 mg (125 mg Intravenous Given 8/15/18 1553)   ondansetron (ZOFRAN) injection 4 mg (4 mg Intravenous Given 8/15/18 1611)   Morphine sulfate (PF) injection 4 mg (4 mg Intravenous Given 8/15/18 1611)       Vitals:    08/15/18 1550   BP:    Pulse: 113   Resp:    Temp:    SpO2: 95%         Objective   Physical Exam   Constitutional: He is  oriented to person, place, and time. He appears well-developed and well-nourished.   HENT:   Head: Normocephalic and atraumatic.   Eyes: EOM are normal.   Neck: Normal range of motion. Neck supple.   Cardiovascular: Regular rhythm.    Patient is tachycardic but has a regular rhythm.   Pulmonary/Chest: He is in respiratory distress. He has wheezes.   Patient is in marked respiratory distress with diffuse wheezes and rhonchi bilaterally.  He has marked tachypnea and retractions.   Abdominal: Soft. Bowel sounds are normal.   Musculoskeletal: Normal range of motion.   Neurological: He is alert and oriented to person, place, and time.   Skin: Skin is warm.   Patient is diaphoretic.   Psychiatric: He has a normal mood and affect. His behavior is normal.   Nursing note and vitals reviewed.      Procedures        Lab Results (last 24 hours)     Procedure Component Value Units Date/Time    Blood Gas, Arterial With Co-Ox [[id number]]  (Abnormal) Collected:  08/15/18 1510    Specimen:  Arterial Blood Updated:  08/15/18 1533     Site Left Radial     Luke's Test Positive     pH, Arterial 7.388 pH units      pCO2, Arterial 33.6 (L) mm Hg      pO2, Arterial 64.4 (L) mm Hg      HCO3, Arterial 20.2 mmol/L      Base Excess, Arterial -3.7 (L) mmol/L      O2 Saturation, Arterial 93.2 (L) %      Hemoglobin, Blood Gas 18.3 (H) g/dL      Hematocrit, Blood Gas 56.2 (H) %      Oxyhemoglobin 91.7 (L) %      Methemoglobin 0.60 %      Carboxyhemoglobin 1.0 %      Temperature 37.0 C      Sodium, Arterial 142 mmol/L      Potassium, Arterial 4.0 mmol/L      Barometric Pressure for Blood Gas 750 mmHg      Modality Room Air     Ventilator Mode NA     Collected by [id number]     pH, Temp Corrected -- pH Units      pCO2, Temperature Corrected -- mm Hg      pO2, Temperature Corrected -- mm Hg     CBC & Differential [[id number]] Collected:  08/15/18 1527    Specimen:  Blood Updated:  08/15/18 1536    Narrative:       The following orders were created for  panel order CBC & Differential.  Procedure                               Abnormality         Status                     ---------                               -----------         ------                     CBC Auto Differential[[id number]]        Abnormal            Final result                 Please view results for these tests on the individual orders.    Comprehensive Metabolic Panel [[id number]]  (Abnormal) Collected:  08/15/18 1527    Specimen:  Blood Updated:  08/15/18 1547     Glucose 84 mg/dL      BUN 17 mg/dL      Creatinine 1.23 mg/dL      Sodium 143 mmol/L      Potassium 4.2 mmol/L      Chloride 106 mmol/L      CO2 21.0 (L) mmol/L      Calcium 9.2 mg/dL      Total Protein 8.6 g/dL      Albumin 4.80 g/dL      ALT (SGPT) 53 U/L      AST (SGOT) 54 (H) U/L      Alkaline Phosphatase 61 U/L      Total Bilirubin 0.6 mg/dL      eGFR Non African Amer 60 (L) mL/min/1.73      Globulin 3.8 gm/dL      A/G Ratio 1.3 g/dL      BUN/Creatinine Ratio 13.8     Anion Gap 16.0 (H) mmol/L     Lactic Acid, Plasma [[id number]]  (Abnormal) Collected:  08/15/18 1527    Specimen:  Blood Updated:  08/15/18 1550     Lactate 2.1 (C) mmol/L     Blood Culture - Blood, [[id number]] Collected:  08/15/18 1527    Specimen:  Blood from Arm, Left Updated:  08/15/18 1537    BNP [[id number]]  (Normal) Collected:  08/15/18 1527    Specimen:  Blood Updated:  08/15/18 1559     proBNP 76.9 pg/mL     D-dimer, Quantitative [[id number]]  (Normal) Collected:  08/15/18 1527    Specimen:  Blood Updated:  08/15/18 1545     D-Dimer, Quantitative 0.39 mg/L (FEU)     Narrative:       Reference Range is 0-0.50 mg/L FEU. However, results <0.50 mg/L FEU tends to rule out DVT or PE. Results >0.50 mg/L FEU are not useful in predicting absence or presence of DVT or PE.    Troponin [[id number]]  (Normal) Collected:  08/15/18 1527    Specimen:  Blood Updated:  08/15/18 1559     Troponin I <0.012 ng/mL     CBC Auto Differential [[id number]]  (Abnormal) Collected:  08/15/18  1527    Specimen:  Blood Updated:  08/15/18 1536     WBC 10.35 10*3/mm3      RBC 5.94 (H) 10*6/mm3      Hemoglobin 18.3 (H) g/dL      Hematocrit 53.6 (H) %      MCV 90.2 fL      MCH 30.8 pg      MCHC 34.1 g/dL      RDW 14.5 %      RDW-SD 47.6 fl      MPV 9.7 fL      Platelets 324 10*3/mm3      Neutrophil % 53.9 %      Lymphocyte % 23.7 %      Monocyte % 18.1 (H) %      Eosinophil % 2.8 %      Basophil % 1.1 %      Immature Grans % 0.4 %      Neutrophils, Absolute 5.59 10*3/mm3      Lymphocytes, Absolute 2.45 10*3/mm3      Monocytes, Absolute 1.87 (H) 10*3/mm3      Eosinophils, Absolute 0.29 10*3/mm3      Basophils, Absolute 0.11 10*3/mm3      Immature Grans, Absolute 0.04 (H) 10*3/mm3      nRBC 0.0 /100 WBC     Lactic Acid, Reflex Timer (This will reflex a repeat order 3-3:15 hours after ordered.) [[id number]] Collected:  08/15/18 1527    Specimen:  Blood Updated:  08/15/18 1550          XR Chest 1 View   Final Result          ED Course  ED Course as of Aug 15 1612   Wed Aug 15, 2018   1611 I told the patient the findings on chest x-ray.  He appears to be some little better but still working pretty hard with his breathing say has a long way to clear.  I spoke with Dr. Ellswroth will admit for further treatment.  [TR]      ED Course User Index  [TR] Ronald Quick Jr., MD          MDM  Number of Diagnoses or Management Options  Chronic obstructive pulmonary disease with acute exacerbation (CMS/HCC): established and worsening  Pneumonia of right upper lobe due to infectious organism (CMS/HCC): new and requires workup     Amount and/or Complexity of Data Reviewed  Clinical lab tests: ordered and reviewed  Tests in the radiology section of CPT®:  reviewed and ordered  Tests in the medicine section of CPT®:  ordered and reviewed  Discuss the patient with other providers: yes    Risk of Complications, Morbidity, and/or Mortality  Presenting problems: high  Diagnostic procedures: moderate  Management options:  moderate    Patient Progress  Patient progress: stable      Final diagnoses:   Pneumonia of right upper lobe due to infectious organism (CMS/HCC)   Chronic obstructive pulmonary disease with acute exacerbation (CMS/HCC)          Ronald Quick Jr., MD  08/15/18 1612      Electronically signed by Ronald Quick Jr., MD at 8/15/2018  4:12 PM             ICU Vital Signs     Row Name 08/16/18 0735 08/16/18 0729 08/16/18 0728 08/16/18 0600 08/16/18 0535       Vitals    Pulse 54 60 54  -- (!)  44    Heart Rate Source Monitor  -- Monitor Monitor Monitor    Resp 20  -- 12 18 18    Resp Rate Source  --  -- Monitor Monitor Monitor    /96  --  -- 152/95 140/83    Noninvasive MAP (mmHg) 110  --  --  -- 101    BP Location  --  --  -- Right arm Right arm    BP Method  --  --  -- Automatic Automatic    Patient Position  --  --  -- Lying Lying       Oxygen Therapy    SpO2 100 % (!)  89 % 97 %  -- 100 %    Pulse Oximetry Type Continuous  -- Continuous Continuous Continuous    Device (Oxygen Therapy) humidified;nasal cannula  -- nasal cannula;humidified nasal cannula nasal cannula    Flow (L/min) 5  -- 5 5 5    Row Name 08/16/18 0505 08/16/18 0455 08/16/18 0439 08/16/18 0435 08/16/18 0430       Vitals    Temp  --  --  --  -- 98.2 °F (36.8 °C)    Temp src  --  --  --  -- Temporal Artery     Pulse  --  -- 54 (!)  49  --    Heart Rate Source  -- Monitor Monitor  --  --    Resp  -- 18 15  --  --    Resp Rate Source  -- Monitor Monitor  --  --    BP  --  --  -- (!)  148/102  --    Noninvasive MAP (mmHg)  --  --  -- 116  --    BP Location Right arm Right arm  --  --  --    BP Method Automatic Automatic  --  --  --       Oxygen Therapy    SpO2  --  -- 99 % 100 %  --    Pulse Oximetry Type Continuous Continuous Continuous  --  --    Device (Oxygen Therapy) nasal cannula nasal cannula nasal cannula;humidified  --  --    Flow (L/min) 5 5 5  --  --    Row Name 08/16/18 0429 08/16/18 0405 08/16/18 0305 08/16/18 0205 08/16/18 0135        Vitals    Pulse (!)  42  --  --  -- 52    Heart Rate Source Monitor Monitor Monitor Monitor  --    Resp 15 20 20 20  --    Resp Rate Source Monitor Monitor Monitor Monitor  --    BP  --  --  --  -- 129/85    Noninvasive MAP (mmHg)  --  --  --  -- 99    BP Location  -- Right arm Right arm Right arm  --    BP Method  -- Automatic Automatic Automatic  --    Patient Position  -- Lying Lying Lying  --       Oxygen Therapy    SpO2 100 %  --  --  -- 96 %    Pulse Oximetry Type Continuous Continuous Continuous Continuous  --    Device (Oxygen Therapy) nasal cannula;humidified nasal cannula nasal cannula nasal cannula  --    Flow (L/min) 5 5 5 5  --    Row Name 08/16/18 0100 08/16/18 0035 08/15/18 2335 08/15/18 2310 08/15/18 2300       Vitals    Temp  --  -- 98.1 °F (36.7 °C)  --  --    Temp src  --  -- Oral  --  --    Pulse (!)  46 52  -- 61  --    Heart Rate Source Monitor  -- Monitor  -- Monitor    Resp 18  -- 18  -- 20    Resp Rate Source Monitor  -- Monitor  -- Monitor    /75 123/81  --  --  --    Noninvasive MAP (mmHg) 91 93  --  --  --    BP Location Right arm  -- Right arm  -- Right leg    BP Method Automatic  -- Automatic  -- Automatic    Patient Position Lying  -- Lying  -- Lying       Oxygen Therapy    SpO2 95 % 95 % 93 % 93 %  --    Pulse Oximetry Type Continuous  -- Continuous  -- Continuous    Device (Oxygen Therapy) nasal cannula  -- nasal cannula  -- nasal cannula    Flow (L/min) 5  -- 5  -- 5    Row Name 08/15/18 2155 08/15/18 2042 08/15/18 2030 08/15/18 2001 08/15/18 1948       Vitals    Temp  --  --  --  -- 99 °F (37.2 °C)    [facility]  --  --  --  -- Axillary    Pulse  -- 67 78 74 81    Heart Rate Source Monitor Monitor Monitor Monitor  --    Resp 18 22 20 20  --    Resp Rate Source Monitor Visual Visual Visual  --    BP  --  --  -- 129/78 130/74    Noninvasive MAP (mmHg)  --  --  -- 92 88    BP Location Right arm  --  --  --  --    BP Method Automatic  --  --  --  --    Patient Position Lying  --  " --  --  --       Oxygen Therapy    SpO2  -- 97 % 97 % 94 % 95 %    Pulse Oximetry Type Continuous  -- Continuous Continuous  --    Device (Oxygen Therapy) nasal cannula  --  -- nasal cannula nasal cannula    Flow (L/min) 5  --  -- 5 4    Row Name 08/15/18 1901 08/15/18 1850 08/15/18 1835 08/15/18 1820 08/15/18 1810       Height and Weight    Height  --  --  --  -- 182.9 cm (72\")    Height Method  --  --  --  -- Actual    Weight  --  --  --  -- 84.1 kg (185 lb 5 oz)    Weight Method  --  --  --  -- Bed scale    Ideal Body Weight (IBW) (kg)  --  --  --  -- 82.07    BSA (Calculated - sq m)  --  --  --  -- 2.06 sq meters    BMI (Calculated)  --  --  --  -- 25.1    Weight in (lb) to have BMI = 25  --  --  --  -- 183.9       Vitals    Pulse 88 89 92 94  --    /75 136/80 139/74 145/86  --    Noninvasive MAP (mmHg) 90 94 90 103  --       Oxygen Therapy    SpO2 93 % 93 % 95 % 93 %  --    Row Name 08/15/18 1804 08/15/18 1754 08/15/18 1655 08/15/18 1637 08/15/18 1620       Height and Weight    Height  --  -- 182.9 cm (72\")  --  --    Ideal Body Weight (IBW) (kg)  --  -- 82.07  --  --    Weight in (lb) to have BMI = 25  --  -- 183.9  --  --       Vitals    Temp 97.5 °F (36.4 °C)  --  --  --  --    Temp src Axillary  --  --  --  --    Pulse 101 101  --  -- 107    Heart Rate Source  -- Monitor  -- Monitor  --    Resp (!)  32 26  -- (!)  37  --    Resp Rate Source Monitor Monitor  -- Monitor  --    Resp Rate (Observed) Vent  --  --  -- 37  --    /94  --  --  -- (!)  215/114    BP Location Right arm  --  --  --  --       Oxygen Therapy    SpO2 95 % 96 %  --  --  --    Pulse Oximetry Type  -- Continuous  -- Continuous  --    Device (Oxygen Therapy) nasal cannula  --  -- NPPV/NIV  --    Flow (L/min) 4  --  --  --  --    Row Name 08/15/18 1550 08/15/18 1540 08/15/18 1525 08/15/18 1516          Height and Weight    Weight  --  --  -- 83 kg (183 lb)     Weight Method  --  --  -- Bed scale        Vitals    Temp  --  --  -- " "98.1 °F (36.7 °C)     Temp src  --  --  -- Oral     Pulse 113 109 100 102     Heart Rate Source  --  --  -- Monitor     Resp  --  --  -- (!)  44     Resp Rate Source  --  --  -- Monitor     BP  --  -- (!)  196/121  --     Noninvasive MAP (mmHg)  --  -- 137  --        Oxygen Therapy    SpO2 95 % 94 % 97 % 93 %     Device (Oxygen Therapy)  --  --  -- room air         Hospital Medications (all)       Dose Frequency Start End    AZITHROMYCIN 500 MG/250 ML 0.9% NS IVPB (vial-mate) 500 mg Once 8/15/2018 8/15/2018    Sig - Route: Infuse 500 mg into a venous catheter 1 (One) Time. - Intravenous    AZITHROMYCIN 500 MG/250 ML 0.9% NS IVPB (vial-mate) 500 mg Every 24 Hours 8/16/2018 8/20/2018    Sig - Route: Infuse 500 mg into a venous catheter Daily. - Intravenous    Linked Group 1:  \"And\" Linked Group Details        cefTRIAXone (ROCEPHIN) 1 g/10mL IV PUSH syringe 1 g Once 8/15/2018 8/15/2018    Sig - Route: Infuse 10 mL into a venous catheter 1 (One) Time. - Intravenous    cefTRIAXone (ROCEPHIN) 1 g/10mL IV PUSH syringe 1 g Every 24 Hours 8/16/2018 8/22/2018    Sig - Route: Infuse 10 mL into a venous catheter Daily. - Intravenous    Linked Group 1:  \"And\" Linked Group Details        enoxaparin (LOVENOX) syringe 40 mg 40 mg Every 24 Hours 8/15/2018     Sig - Route: Inject 0.4 mL under the skin into the appropriate area as directed Daily. - Subcutaneous    guaiFENesin (MUCINEX) 12 hr tablet 1,200 mg 1,200 mg Every 12 Hours Scheduled 8/15/2018     Sig - Route: Take 2 tablets by mouth Every 12 (Twelve) Hours. - Oral    hydrALAZINE (APRESOLINE) injection 20 mg 20 mg Once 8/15/2018 8/15/2018    Sig - Route: Infuse 1 mL into a venous catheter 1 (One) Time. - Intravenous    Cosign for Ordering: Accepted by Vinicius Ellsworth MD on 8/15/2018  4:45 PM    HYDROcodone-acetaminophen (NORCO) 5-325 MG per tablet 1 tablet 1 tablet Every 6 Hours PRN 8/15/2018 8/25/2018    Sig - Route: Take 1 tablet by mouth Every 6 (Six) Hours As Needed " for Moderate Pain . - Oral    ipratropium-albuterol (DUO-NEB) 0.5-2.5 mg/3 ml nebulizer solution  - ADS Override Pull   8/15/2018 8/15/2018    Notes to Pharmacy: Created by cabinet override    ipratropium-albuterol (DUO-NEB) nebulizer solution 3 mL 3 mL Once 8/15/2018 8/15/2018    Sig - Route: Take 3 mL by nebulization 1 (One) Time. - Nebulization    ipratropium-albuterol (DUO-NEB) nebulizer solution 3 mL 3 mL Every 4 Hours - RT 8/15/2018     Sig - Route: Take 3 mL by nebulization Every 4 (Four) Hours. - Nebulization    LORazepam (ATIVAN) injection 1 mg 1 mg Once 8/15/2018 8/15/2018    Sig - Route: Infuse 0.5 mL into a venous catheter 1 (One) Time. - Intravenous    magnesium sulfate in D5W 1g/100mL (PREMIX) 1 g Once 8/15/2018 8/15/2018    Sig - Route: Infuse 100 mL into a venous catheter 1 (One) Time. - Intravenous    methylPREDNISolone sodium succinate (SOLU-Medrol) injection 125 mg 125 mg Once 8/15/2018 8/15/2018    Sig - Route: Infuse 2 mL into a venous catheter 1 (One) Time. - Intravenous    methylPREDNISolone sodium succinate (SOLU-Medrol) injection 125 mg 125 mg Once 8/15/2018 8/15/2018    Sig - Route: Infuse 2 mL into a venous catheter 1 (One) Time. - Intravenous    methylPREDNISolone sodium succinate (SOLU-Medrol) injection 80 mg 80 mg Every 6 Hours 8/15/2018     Sig - Route: Infuse 1.28 mL into a venous catheter Every 6 (Six) Hours. - Intravenous    Morphine sulfate (PF) injection 4 mg 4 mg Once 8/15/2018 8/15/2018    Sig - Route: Infuse 1 mL into a venous catheter 1 (One) Time. - Intravenous    ondansetron (ZOFRAN) injection 4 mg 4 mg Once 8/15/2018 8/15/2018    Sig - Route: Infuse 2 mL into a venous catheter 1 (One) Time. - Intravenous    ondansetron (ZOFRAN) injection 4 mg 4 mg Every 6 Hours PRN 8/15/2018     Sig - Route: Infuse 2 mL into a venous catheter Every 6 (Six) Hours As Needed for Nausea or Vomiting. - Intravenous    sodium chloride 0.9 % flush 1-10 mL 1-10 mL As Needed 8/15/2018     Sig -  "Route: Infuse 1-10 mL into a venous catheter As Needed for Line Care. - Intravenous    sodium chloride 0.9 % flush 10 mL 10 mL As Needed 8/15/2018     Sig - Route: Infuse 10 mL into a venous catheter As Needed for Line Care. - Intravenous    Cosign for Ordering: Accepted by Ronald Quick Jr., MD on 8/15/2018  3:56 PM    sodium chloride 0.9 % flush 10 mL 10 mL As Needed 8/15/2018     Sig - Route: Infuse 10 mL into a venous catheter As Needed for Line Care. - Intravenous    Linked Group 2:  \"And\" Linked Group Details        ipratropium-albuterol (DUO-NEB) nebulizer solution 3 mL (Discontinued) 3 mL Once 8/15/2018 8/15/2018    Sig - Route: Take 3 mL by nebulization 1 (One) Time. - Nebulization    methylPREDNISolone sodium succinate (SOLU-Medrol) injection 125 mg (Discontinued) 125 mg Once 8/15/2018 8/15/2018    Sig - Route: Infuse 2 mL into a venous catheter 1 (One) Time. - Intravenous            Lab Results (last 24 hours)     Procedure Component Value Units Date/Time    Comprehensive Metabolic Panel [[id number]]  (Abnormal) Collected:  08/16/18 0713    Specimen:  Blood Updated:  08/16/18 0731     Glucose 183 (H) mg/dL      BUN 21 mg/dL      Creatinine 1.01 mg/dL      Sodium 137 mmol/L      Potassium 4.6 mmol/L      Chloride 103 mmol/L      CO2 23.0 (L) mmol/L      Calcium 9.0 mg/dL      Total Protein 6.9 g/dL      Albumin 3.90 g/dL      ALT (SGPT) 47 U/L      AST (SGOT) 32 U/L      Alkaline Phosphatase 45 U/L      Total Bilirubin 0.4 mg/dL      eGFR Non African Amer 75 mL/min/1.73      Globulin 3.0 gm/dL      A/G Ratio 1.3 g/dL      BUN/Creatinine Ratio 20.8     Anion Gap 11.0 mmol/L     CBC & Differential [[id number]] Collected:  08/16/18 0713    Specimen:  Blood Updated:  08/16/18 0720    Narrative:       The following orders were created for panel order CBC & Differential.  Procedure                               Abnormality         Status                     ---------                               -----------   "       ------                     CBC Auto Differential[[id number]]        Abnormal            Final result                 Please view results for these tests on the individual orders.    CBC Auto Differential [[id number]]  (Abnormal) Collected:  08/16/18 0713    Specimen:  Blood Updated:  08/16/18 0720     WBC 9.23 10*3/mm3      RBC 5.36 10*6/mm3      Hemoglobin 16.8 g/dL      Hematocrit 48.8 %      MCV 91.0 fL      MCH 31.3 pg      MCHC 34.4 g/dL      RDW 14.3 %      RDW-SD 47.7 fl      MPV 9.9 fL      Platelets 306 10*3/mm3      Neutrophil % 88.2 (H) %      Lymphocyte % 6.1 (L) %      Monocyte % 5.2 %      Eosinophil % 0.0 %      Basophil % 0.1 %      Immature Grans % 0.4 %      Neutrophils, Absolute 8.14 10*3/mm3      Lymphocytes, Absolute 0.56 (L) 10*3/mm3      Monocytes, Absolute 0.48 10*3/mm3      Eosinophils, Absolute 0.00 10*3/mm3      Basophils, Absolute 0.01 10*3/mm3      Immature Grans, Absolute 0.04 (H) 10*3/mm3      nRBC 0.0 /100 WBC     Blood Culture - Blood, [[id number]]  (Normal) Collected:  08/15/18 1643    Specimen:  Blood from Hand, Left Updated:  08/16/18 0500     Blood Culture No growth at less than 24 hours    Blood Culture - Blood, [[id number]]  (Normal) Collected:  08/15/18 1527    Specimen:  Blood from Arm, Left Updated:  08/16/18 0345     Blood Culture No growth at less than 24 hours    Blood Gas, Arterial [[id number]]  (Abnormal) Collected:  08/16/18 0210    Specimen:  Arterial Blood Updated:  08/16/18 0220     Site Left Radial     Luke's Test Positive     pH, Arterial 7.397 pH units      pCO2, Arterial 35.2 mm Hg      pO2, Arterial 117.0 (H) mm Hg      HCO3, Arterial 21.6 mmol/L      Base Excess, Arterial -2.5 (L) mmol/L      O2 Saturation, Arterial 98.8 %      Temperature 37.0 C      Barometric Pressure for Blood Gas 749 mmHg      Modality Nasal Cannula     Flow Rate 5.0 lpm      Ventilator Mode NA     Collected by [id number]    Legionella Antigen, Urine - Urine, Urine, Clean Catch [[id number]]   (Normal) Collected:  08/15/18 2345    Specimen:  Urine from Urine, Clean Catch Updated:  08/16/18 0023     LEGIONELLA ANTIGEN, URINE Negative    S. Pneumo Ag Urine or CSF - Urine, Urine, Clean Catch [[id number]]  (Normal) Collected:  08/15/18 2345    Specimen:  Urine from Urine, Clean Catch Updated:  08/16/18 0021     Strep Pneumo Ag Negative    Lactic Acid, Reflex [[id number]]  (Normal) Collected:  08/15/18 1924    Specimen:  Blood Updated:  08/15/18 1943     Lactate 1.5 mmol/L     Clonazepam Level [[id number]] Collected:  08/15/18 1924    Specimen:  Blood Updated:  08/15/18 1928    Lactic Acid, Reflex Timer (This will reflex a repeat order 3-3:15 hours after ordered.) [[id number]] Collected:  08/15/18 1527    Specimen:  Blood Updated:  08/15/18 1900     Extra Tube Hold for add-ons.     Comment: Auto resulted.       Blood Gas, Arterial [[id number]]  (Abnormal) Collected:  08/15/18 1836    Specimen:  Arterial Blood Updated:  08/15/18 1844     Site Right Radial     Luke's Test Positive     pH, Arterial 7.372 pH units      pCO2, Arterial 35.1 mm Hg      pO2, Arterial 77.4 (L) mm Hg      HCO3, Arterial 20.4 mmol/L      Base Excess, Arterial -4.0 (L) mmol/L      O2 Saturation, Arterial 95.7 %      Temperature 37.0 C      Barometric Pressure for Blood Gas 749 mmHg      Modality Nasal Cannula     Flow Rate 5.0 lpm      Ventilator Mode NA     Collected by [id number]    [place] Draw [[id number]] Collected:  08/15/18 1527    Specimen:  Blood Updated:  08/15/18 1631    Narrative:       The following orders were created for panel order [place] Draw.  Procedure                               Abnormality         Status                     ---------                               -----------         ------                     Light Blue Top[[id number]]                                   Final result               Green Top (Gel)[[id number]]                                  Final result               Lavender Top[[id number]]                                      Final result               Red Top[[id number]]                                          Final result                 Please view results for these tests on the individual orders.    Light Blue Top [[id number]] Collected:  08/15/18 1527    Specimen:  Blood Updated:  08/15/18 1631     Extra Tube hold for add-on     Comment: Auto resulted       Green Top (Gel) [[id number]] Collected:  08/15/18 1527    Specimen:  Blood Updated:  08/15/18 1631     Extra Tube Hold for add-ons.     Comment: Auto resulted.       Lavender Top [[id number]] Collected:  08/15/18 1527    Specimen:  Blood Updated:  08/15/18 1631     Extra Tube hold for add-on     Comment: Auto resulted       Red Top [[id number]] Collected:  08/15/18 1527    Specimen:  Blood Updated:  08/15/18 1631     Extra Tube Hold for add-ons.     Comment: Auto resulted.       BNP [[id number]]  (Normal) Collected:  08/15/18 1527    Specimen:  Blood Updated:  08/15/18 1559     proBNP 76.9 pg/mL     Troponin [[id number]]  (Normal) Collected:  08/15/18 1527    Specimen:  Blood Updated:  08/15/18 1559     Troponin I <0.012 ng/mL     Lactic Acid, Plasma [[id number]]  (Abnormal) Collected:  08/15/18 1527    Specimen:  Blood Updated:  08/15/18 1550     Lactate 2.1 (C) mmol/L     Comprehensive Metabolic Panel [[id number]]  (Abnormal) Collected:  08/15/18 1527    Specimen:  Blood Updated:  08/15/18 1547     Glucose 84 mg/dL      BUN 17 mg/dL      Creatinine 1.23 mg/dL      Sodium 143 mmol/L      Potassium 4.2 mmol/L      Chloride 106 mmol/L      CO2 21.0 (L) mmol/L      Calcium 9.2 mg/dL      Total Protein 8.6 g/dL      Albumin 4.80 g/dL      ALT (SGPT) 53 U/L      AST (SGOT) 54 (H) U/L      Alkaline Phosphatase 61 U/L      Total Bilirubin 0.6 mg/dL      eGFR Non African Amer 60 (L) mL/min/1.73      Globulin 3.8 gm/dL      A/G Ratio 1.3 g/dL      BUN/Creatinine Ratio 13.8     Anion Gap 16.0 (H) mmol/L     D-dimer, Quantitative [[id number]]  (Normal) Collected:  08/15/18 1525     Specimen:  Blood Updated:  08/15/18 1545     D-Dimer, Quantitative 0.39 mg/L (FEU)     Narrative:       Reference Range is 0-0.50 mg/L FEU. However, results <0.50 mg/L FEU tends to rule out DVT or PE. Results >0.50 mg/L FEU are not useful in predicting absence or presence of DVT or PE.    CBC & Differential [[id number]] Collected:  08/15/18 1527    Specimen:  Blood Updated:  08/15/18 1536    Narrative:       The following orders were created for panel order CBC & Differential.  Procedure                               Abnormality         Status                     ---------                               -----------         ------                     CBC Auto Differential[[id number]]        Abnormal            Final result                 Please view results for these tests on the individual orders.    CBC Auto Differential [[id number]]  (Abnormal) Collected:  08/15/18 1527    Specimen:  Blood Updated:  08/15/18 1536     WBC 10.35 10*3/mm3      RBC 5.94 (H) 10*6/mm3      Hemoglobin 18.3 (H) g/dL      Hematocrit 53.6 (H) %      MCV 90.2 fL      MCH 30.8 pg      MCHC 34.1 g/dL      RDW 14.5 %      RDW-SD 47.6 fl      MPV 9.7 fL      Platelets 324 10*3/mm3      Neutrophil % 53.9 %      Lymphocyte % 23.7 %      Monocyte % 18.1 (H) %      Eosinophil % 2.8 %      Basophil % 1.1 %      Immature Grans % 0.4 %      Neutrophils, Absolute 5.59 10*3/mm3      Lymphocytes, Absolute 2.45 10*3/mm3      Monocytes, Absolute 1.87 (H) 10*3/mm3      Eosinophils, Absolute 0.29 10*3/mm3      Basophils, Absolute 0.11 10*3/mm3      Immature Grans, Absolute 0.04 (H) 10*3/mm3      nRBC 0.0 /100 WBC     Blood Gas, Arterial With Co-Ox [[id number]]  (Abnormal) Collected:  08/15/18 1510    Specimen:  Arterial Blood Updated:  08/15/18 1533     Site Left Radial     Luke's Test Positive     pH, Arterial 7.388 pH units      pCO2, Arterial 33.6 (L) mm Hg      pO2, Arterial 64.4 (L) mm Hg      HCO3, Arterial 20.2 mmol/L      Base Excess, Arterial -3.7 (L)  mmol/L      O2 Saturation, Arterial 93.2 (L) %      Hemoglobin, Blood Gas 18.3 (H) g/dL      Hematocrit, Blood Gas 56.2 (H) %      Oxyhemoglobin 91.7 (L) %      Methemoglobin 0.60 %      Carboxyhemoglobin 1.0 %      Temperature 37.0 C      Sodium, Arterial 142 mmol/L      Potassium, Arterial 4.0 mmol/L      Barometric Pressure for Blood Gas 750 mmHg      Modality Room Air     Ventilator Mode NA     Collected by [id number]     pH, Temp Corrected -- pH Units      pCO2, Temperature Corrected -- mm Hg      pO2, Temperature Corrected -- mm Hg         Imaging Results (last 24 hours)     Procedure Component Value Units Date/Time    XR Chest 1 View [[id number]] Collected:  08/15/18 1644     Updated:  08/15/18 1550    Narrative:       EXAMINATION: XR CHEST 1 VW-     8/15/2018 3:30 PM CDT     HISTORY: SOB, cough.     One view chest x-ray compared to 06/27/2016.     Nodular opacity at the right upper lobe measures approximately 2 cm.  Nodular infiltrate/pneumonia is favored though short-term follow-up  after treatment will be necessary to rule out an underlying neoplasm.     Lungs are otherwise fully expanded and clear.     Normal heart and mediastinum.     Incidental note is made of left eighth and ninth rib fractures which are  healed or partially healed.     Summary:  1. Right upper lobe infiltrate compatible with pneumonia.  2. Follow-up to document complete resolution will be needed.  This report was finalized on [id number] by Dr. Saeid Singh MD.        ECG/EMG Results (last 24 hours)     Procedure Component Value Units Date/Time    ECG 12 Lead [[id number]] Collected:  08/15/18 1518     Updated:  08/15/18 1518          Orders (last 24 hrs)     Start     Ordered    08/16/18 1700  cefTRIAXone (ROCEPHIN) 1 g/10mL IV PUSH syringe  Every 24 Hours      08/15/18 1802 08/16/18 1700  AZITHROMYCIN 500 MG/250 ML 0.9% NS IVPB (vial-mate)  Every 24 Hours      08/15/18 1802    08/16/18 0600  CBC & Differential  Daily      08/15/18  1802    08/16/18 0600  Comprehensive Metabolic Panel  Daily      08/15/18 1802    08/16/18 0600  CBC Auto Differential  PROCEDURE ONCE      08/16/18 0001    08/16/18 0221  Blood Gas, Arterial  Once      08/16/18 0220    08/15/18 2200  methylPREDNISolone sodium succinate (SOLU-Medrol) injection 80 mg  Every 6 Hours      08/15/18 1802    08/15/18 2101  Diet Regular  Diet Effective Now      08/15/18 2101    08/15/18 2100  guaiFENesin (MUCINEX) 12 hr tablet 1,200 mg  Every 12 Hours Scheduled      08/15/18 1802    08/15/18 2000  Vital Signs  Every 4 Hours      08/15/18 1802    08/15/18 2000  magnesium sulfate in D5W 1g/100mL (PREMIX)  Once      08/15/18 1903    08/15/18 1930  ipratropium-albuterol (DUO-NEB) nebulizer solution 3 mL  Every 4 Hours - RT      08/15/18 1843    08/15/18 1904  Clonazepam Level  Once      08/15/18 1903    08/15/18 1902  HYDROcodone-acetaminophen (NORCO) 5-325 MG per tablet 1 tablet  Every 6 Hours PRN      08/15/18 1902    08/15/18 1901  Lactic Acid, Reflex  STAT      08/15/18 1900    08/15/18 1845  Blood Gas, Arterial  Once      08/15/18 1844    08/15/18 1844  Oscillating Positive Expiratory Pressure (OPEP)  Once      08/15/18 1843    08/15/18 1803  enoxaparin (LOVENOX) syringe 40 mg  Every 24 Hours      08/15/18 1802    08/15/18 1803  Blood Gas, Arterial  Daily      08/15/18 1802    08/15/18 1802  Intake & Output  Every Shift      08/15/18 1802    08/15/18 1802  Weigh Patient  Once      08/15/18 1802    08/15/18 1802  Pulse Oximetry on Admit  Once      08/15/18 1802    08/15/18 1802  Tobacco Cessation Education  Once     Comments:  As needed    08/15/18 1802    08/15/18 1802  Pneumonia Education  Once     Comments:  As needed    08/15/18 1802    08/15/18 1802  Notify Physician  Until Discontinued      08/15/18 1802    08/15/18 1802  Blood Culture - Blood,  Once,   Status:  Canceled      08/15/18 1802    08/15/18 1802  Blood Culture - Blood,  Once,   Status:  Canceled     Comments:  Minimum 30  Minutes After 1st Blood Culture or From Different Site      08/15/18 1802    08/15/18 1802  Insert Peripheral IV  Once      08/15/18 1802    08/15/18 1802  Saline Lock & Maintain IV Access  Continuous      08/15/18 1802    08/15/18 1802  NIPPV (CPAP or BIPAP)  Until Discontinued      08/15/18 1802    08/15/18 1802  NPO Diet  Diet Effective Now,   Status:  Canceled      08/15/18 1802    08/15/18 1802  Respiratory Culture - Sputum, Cough  Once      08/15/18 1802    08/15/18 1802  Mycoplasma Pneumoniae PCR - Sputum, Nasopharynx  Once      08/15/18 1802    08/15/18 1802  S. Pneumo Ag Urine or CSF - Urine, Urine, Clean Catch  Once      08/15/18 1802    08/15/18 1802  Legionella Antigen, Urine - Urine, Urine, Clean Catch  Once      08/15/18 1802    08/15/18 1801  sodium chloride 0.9 % flush 1-10 mL  As Needed      08/15/18 1802    08/15/18 1801  ondansetron (ZOFRAN) injection 4 mg  Every 6 Hours PRN      08/15/18 1802    08/15/18 1630  Code Status and Medical Interventions:  Continuous      08/15/18 1633    08/15/18 1620  hydrALAZINE (APRESOLINE) injection 20 mg  Once      08/15/18 1618    08/15/18 1619  ICU / CCU Bed Request  Once      08/15/18 1618    08/15/18 1612  Inpatient Admission  Once      08/15/18 1611    08/15/18 1607  ondansetron (ZOFRAN) injection 4 mg  Once      08/15/18 1605    08/15/18 1607  Morphine sulfate (PF) injection 4 mg  Once      08/15/18 1605    08/15/18 1605  cefTRIAXone (ROCEPHIN) 1 g/10mL IV PUSH syringe  Once      08/15/18 1603    08/15/18 1605  AZITHROMYCIN 500 MG/250 ML 0.9% NS IVPB (vial-mate)  Once      08/15/18 1603    08/15/18 1554  methylPREDNISolone sodium succinate (SOLU-Medrol) injection 125 mg  Once      08/15/18 1552    08/15/18 1551  Lactic Acid, Reflex Timer (This will reflex a repeat order 3-3:15 hours after ordered.)  Once      08/15/18 1550    08/15/18 1541  ipratropium-albuterol (DUO-NEB) 0.5-2.5 mg/3 ml nebulizer solution  - ADS Override Pull     Comments:  Created by  cabinet override    08/15/18 1541    08/15/18 1540  LORazepam (ATIVAN) injection 1 mg  Once      08/15/18 1538    08/15/18 1534  Blood Gas, Arterial With Co-Ox  Once      08/15/18 1533    08/15/18 1523  ipratropium-albuterol (DUO-NEB) nebulizer solution 3 mL  Once,   Status:  Discontinued      08/15/18 1521    08/15/18 1523  methylPREDNISolone sodium succinate (SOLU-Medrol) injection 125 mg  Once,   Status:  Discontinued      08/15/18 1521    08/15/18 1523  ipratropium-albuterol (DUO-NEB) nebulizer solution 3 mL  Once      08/15/18 1521    08/15/18 1523  methylPREDNISolone sodium succinate (SOLU-Medrol) injection 125 mg  Once      08/15/18 1521    08/15/18 1522  Blood Gas, Arterial With Co-Ox  STAT      08/15/18 1521    08/15/18 1521  Undress and Gown  Once      08/15/18 1520    08/15/18 1521  Continuous Pulse Oximetry  Per Hospital Policy,   Status:  Canceled      08/15/18 1520    08/15/18 1521  Vital Signs  Every 30 Minutes      08/15/18 1520    08/15/18 1521  Oxygen Therapy- Nasal Cannula; 2 LPM; Titrate for SPO2: 92%, Greater Than or Equal To  Continuous      08/15/18 1520    08/15/18 1521  Insert peripheral IV  Once,   Status:  Canceled      08/15/18 1520    08/15/18 1521  CBC & Differential  Once      08/15/18 1520    08/15/18 1521  Comprehensive Metabolic Panel  Once      08/15/18 1520    08/15/18 1521  Lactic Acid, Plasma  Once      08/15/18 1520    08/15/18 1521  [place] Draw  Once      08/15/18 1520    08/15/18 1521  Blood Culture - Blood,  Once      08/15/18 1520    08/15/18 1521  Blood Culture - Blood,  Once      08/15/18 1520    08/15/18 1521  Insert peripheral IV  Once      08/15/18 1521    08/15/18 1521  CBC Auto Differential  PROCEDURE ONCE      08/15/18 1521    08/15/18 1521  Light Blue Top  PROCEDURE ONCE      08/15/18 1521    08/15/18 1521  Green Top (Gel)  PROCEDURE ONCE      08/15/18 1521    08/15/18 1521  Lavender Top  PROCEDURE ONCE      08/15/18 1521    08/15/18 1521  Red Top  PROCEDURE ONCE       08/15/18 1521    08/15/18 1520  sodium chloride 0.9 % flush 10 mL  As Needed      08/15/18 1520    08/15/18 1520  sodium chloride 0.9 % flush 10 mL  As Needed      08/15/18 1521    08/15/18 1520  XR Chest 1 View  1 Time Imaging      08/15/18 1521    08/15/18 1520  Blood Gas, Arterial  Once,   Status:  Canceled      08/15/18 1521    08/15/18 1520  BNP  Once      08/15/18 1521    08/15/18 1520  D-dimer, Quantitative  Once      08/15/18 1521    08/15/18 1520  Troponin  Once      08/15/18 1521    08/15/18 1520  Pulse Oximetry, Continuous  Per Hospital Policy      08/15/18 1521    08/15/18 1520  Cardiac Monitoring  Once      08/15/18 1521    08/15/18 1516  ECG 12 Lead  Once      08/15/18 1516    Unscheduled  Blood Gas, Arterial  As Needed     Comments:  Respiratory Distress      08/15/18 1802    --  ipratropium-albuterol (COMBIVENT RESPIMAT)  MCG/ACT inhaler  4 Times Daily - RT      08/15/18 1733    --  budesonide-formoterol (SYMBICORT) 160-4.5 MCG/ACT inhaler  2 Times Daily - RT      08/15/18 1733    --  albuterol (PROVENTIL HFA;VENTOLIN HFA) 108 (90 Base) MCG/ACT inhaler  4 Times Daily PRN      08/15/18 1733    --  benzonatate (TESSALON) 100 MG capsule  3 Times Daily      08/15/18 1733    --  predniSONE (DELTASONE) 10 MG tablet  Daily      08/15/18 1733    --  amLODIPine (NORVASC) 5 MG tablet  Daily      08/15/18 1733    --  bisoprolol-hydrochlorothiazide (ZIAC) 5-6.25 MG per tablet  Daily      08/15/18 1733    --  tamsulosin (FLOMAX) 0.4 MG capsule 24 hr capsule  Daily      08/15/18 1733    --  montelukast (SINGULAIR) 10 MG tablet  Daily      08/15/18 1733    --  levothyroxine (SYNTHROID, LEVOTHROID) 175 MCG tablet  Daily      08/15/18 1733          Physician Progress Notes (last 24 hours) (Notes from 8/15/2018  8:42 AM through 8/16/2018  8:42 AM)     No notes of this type exist for this encounter.        Consult Notes (last 24 hours) (Notes from 8/15/2018  8:42 AM through 8/16/2018  8:42 AM)     No notes of  this type exist for this encounter.

## 2018-08-16 NOTE — PLAN OF CARE
Problem: Fall Risk (Adult)  Goal: Identify Related Risk Factors and Signs and Symptoms  Outcome: Ongoing (interventions implemented as appropriate)    Goal: Absence of Fall  Outcome: Ongoing (interventions implemented as appropriate)      Problem: Patient Care Overview  Goal: Plan of Care Review  Outcome: Ongoing (interventions implemented as appropriate)   08/16/18 2423   OTHER   Outcome Summary Pt has not rested well tonight. Complaints of left upper chest discomfort with movement or cough. Pt has frequent, loose cough and becomes SOA quickly during episodes. O2 at 4 liters per nasal cannula. Sat 93-95%. Pt has been sinus felix on monitor mid 40's-50 bpm .      Goal: Individualization and Mutuality  Outcome: Ongoing (interventions implemented as appropriate)    Goal: Discharge Needs Assessment  Outcome: Ongoing (interventions implemented as appropriate)    Goal: Interprofessional Rounds/Family Conf  Outcome: Ongoing (interventions implemented as appropriate)

## 2018-08-16 NOTE — PLAN OF CARE
Problem: Fall Risk (Adult)  Goal: Identify Related Risk Factors and Signs and Symptoms  Outcome: Ongoing (interventions implemented as appropriate)    Goal: Absence of Fall  Outcome: Ongoing (interventions implemented as appropriate)      Problem: Patient Care Overview  Goal: Plan of Care Review  Outcome: Ongoing (interventions implemented as appropriate)   08/16/18 0762   OTHER   Outcome Summary Pt transfer to  this shift from unit. VSS. 02 stable on 5L. IV abx continue. Safety maintaned. Will continue to monitor.    Plan of Care Review   Progress no change   Coping/Psychosocial   Plan of Care Reviewed With patient     Goal: Individualization and Mutuality  Outcome: Ongoing (interventions implemented as appropriate)    Goal: Discharge Needs Assessment  Outcome: Ongoing (interventions implemented as appropriate)    Goal: Interprofessional Rounds/Family Conf  Outcome: Ongoing (interventions implemented as appropriate)

## 2018-08-16 NOTE — NURSING NOTE
Call placed to Dr. Garcia regarding chest pain and nausea. No EKG changes noted. Orders received to titrate Tridil.

## 2018-08-16 NOTE — PLAN OF CARE
Problem: Patient Care Overview  Goal: Plan of Care Review  Outcome: Ongoing (interventions implemented as appropriate)   08/16/18 1522   OTHER   Outcome Summary RD nutrition assessment completed. Pt with average po intake of 100% thus far. Pt remains in the unit at this time, therefore d/c needs are unknown at this time. Will continue to monitor po intake and d/c needs   Plan of Care Review   Progress no change

## 2018-08-16 NOTE — PROGRESS NOTES
[facility] Medicine Services  INPATIENT PROGRESS NOTE    Patient Name: Reza Cruz  Date of Admission: 8/15/2018  Today's Date: 08/16/18  Length of Stay: 1  Primary Care Physician: Lobito Trevino Jr., MD    Subjective   Chief Complaint: SOA  HPI   Doing much better.  His breathing is now much less labored.  He is still coughing an wheezing a lot.  He is not coughing anything up.  He denies fevers.  He has been bradycardic at times, however he denies light-headedness or dizziness.        Review of Systems   Constitutional: Negative for fatigue and fever.   HENT: Negative for congestion and ear pain.    Eyes: Negative for redness and visual disturbance.   Respiratory: Positive for cough, shortness of breath and wheezing.    Cardiovascular: Negative for chest pain and palpitations.   Gastrointestinal: Negative for abdominal pain, diarrhea, nausea and vomiting.   Endocrine: Negative for cold intolerance and heat intolerance.   Genitourinary: Negative for dysuria and frequency.   Musculoskeletal: Negative for arthralgias and back pain.   Skin: Negative for rash and wound.   Neurological: Negative for dizziness and headaches.   Psychiatric/Behavioral: Negative for confusion. The patient is not nervous/anxious.       All pertinent negatives and positives are as above. All other systems have been reviewed and are negative unless otherwise stated.     Objective    Temp:  [97.5 °F (36.4 °C)-99 °F (37.2 °C)] 98.2 °F (36.8 °C)  Heart Rate:  [] 46  Resp:  [11-44] 11  BP: (123-215)/() 131/86  FiO2 (%):  [40 %] 40 %  Physical Exam   Constitutional: He is oriented to person, place, and time. He appears well-developed and well-nourished.   HENT:   Head: Normocephalic and atraumatic.   Right Ear: External ear normal.   Left Ear: External ear normal.   Nose: Nose normal.   Mouth/Throat: Oropharynx is clear and moist.   Eyes: Pupils are equal, round, and reactive to light. Conjunctivae  and EOM are normal. Right eye exhibits no discharge. Left eye exhibits no discharge. No scleral icterus.   Neck: Normal range of motion. Neck supple. No tracheal deviation present. No thyromegaly present.   Cardiovascular: Regular rhythm, normal heart sounds and intact distal pulses.  Bradycardia present.  Exam reveals no gallop and no friction rub.    No murmur heard.  Pulmonary/Chest: Effort normal. No stridor. No respiratory distress. He has decreased breath sounds. He has wheezes. He has rhonchi. He has no rales. He exhibits no tenderness.   Abdominal: Soft. Bowel sounds are normal. He exhibits no distension and no mass. There is no tenderness. There is no rebound and no guarding. No hernia.   Musculoskeletal: Normal range of motion. He exhibits no edema or deformity.   Lymphadenopathy:     He has no cervical adenopathy.   Neurological: He is alert and oriented to person, place, and time. He has normal reflexes. He displays normal reflexes. No cranial nerve deficit. He exhibits normal muscle tone. Coordination normal.   Skin: Skin is warm and dry. No rash noted. No erythema. No pallor.   Psychiatric: He has a normal mood and affect. His behavior is normal. Judgment and thought content normal.   Vitals reviewed.          Results Review:  I have reviewed the labs, radiology results, and diagnostic studies.    Laboratory Data:     Results from last 7 days  Lab Units 08/16/18  0713 08/15/18  1527   WBC 10*3/mm3 9.23 10.35   HEMOGLOBIN g/dL 16.8 18.3*   HEMATOCRIT % 48.8 53.6*   PLATELETS 10*3/mm3 306 324          Results from last 7 days  Lab Units 08/16/18  0713 08/15/18  1527 08/15/18  1510   SODIUM mmol/L 137 143  --    SODIUM, ARTERIAL mmol/L  --   --  142   POTASSIUM mmol/L 4.6 4.2  --    CHLORIDE mmol/L 103 106  --    CO2 mmol/L 23.0* 21.0*  --    BUN mg/dL 21 17  --    CREATININE mg/dL 1.01 1.23  --    CALCIUM mg/dL 9.0 9.2  --    BILIRUBIN mg/dL 0.4 0.6  --    ALK PHOS U/L 45 61  --    ALT (SGPT) U/L 47 53   --    AST (SGOT) U/L 32 54*  --    GLUCOSE mg/dL 183* 84  --        Culture Data:   Blood Culture   Date Value Ref Range Status   08/15/2018 No growth at less than 24 hours  Preliminary   08/15/2018 No growth at less than 24 hours  Preliminary       Radiology Data:   Imaging Results (last 24 hours)     Procedure Component Value Units Date/Time    XR Chest 1 View [[id number]] Collected:  08/15/18 1644     Updated:  08/15/18 1550    Narrative:       EXAMINATION: XR CHEST 1 VW-     8/15/2018 3:30 PM CDT     HISTORY: SOB, cough.     One view chest x-ray compared to 06/27/2016.     Nodular opacity at the right upper lobe measures approximately 2 cm.  Nodular infiltrate/pneumonia is favored though short-term follow-up  after treatment will be necessary to rule out an underlying neoplasm.     Lungs are otherwise fully expanded and clear.     Normal heart and mediastinum.     Incidental note is made of left eighth and ninth rib fractures which are  healed or partially healed.     Summary:  1. Right upper lobe infiltrate compatible with pneumonia.  2. Follow-up to document complete resolution will be needed.  This report was finalized on [id number] by Dr. Saeid Singh MD.          I have reviewed the patient's current medications.     Assessment/Plan     Hospital Problem List     Pneumonia of right upper lobe due to infectious organism (CMS/HCC)        1.  COPD AE  -IV Steroids  -IV Mg  -Nebs  -Mucinex  -Flutter  -supplemental oxygen, wean as tolerated     2. Sepsis  -Rocephin/Zithromax  -Blood/sputum cultures     3.  Pneumonia  -Rocephin/Zithromax  -Blood/sputum cultures     4.  Tobacco abuse  -Quit years ago     5.  Hypertensive Urgency  -IV hydralazine              Discharge Planning: I expect the patient to be discharged to home in 2-3 days    Vinicius Ellsworth MD   08/16/18   12:35 PM

## 2018-08-16 NOTE — PROGRESS NOTES
Discharge Planning Assessment   [place]     Patient Name: Reza Cruz  MRN: [id number]  Today's Date: 8/16/2018    Admit Date: 8/15/2018          Discharge Needs Assessment     Row Name 08/16/18 0950       Living Environment    Lives With alone    Current Living Arrangements home/apartment/condo    Primary Care Provided by self    Provides Primary Care For no one    Family Caregiver if Needed sibling(s)    Quality of Family Relationships supportive    Able to Return to Prior Arrangements yes       Resource/Environmental Concerns    Resource/Environmental Concerns none    Transportation Concerns other (see comments)   patient does not drive; utilizes public transportation and states his sisters will also help him with rides; patient does not indicate transportation is an issue       Transition Planning    Patient/Family Anticipates Transition to home    Patient/Family Anticipated Services at Transition none    Transportation Anticipated family or friend will provide       Discharge Needs Assessment    Readmission Within the Last 30 Days no previous admission in last 30 days    Concerns to be Addressed denies needs/concerns at this time;no discharge needs identified    Equipment Currently Used at Home nebulizer    Anticipated Changes Related to Illness none    Equipment Needed After Discharge none    Current Discharge Risk chronically ill;substance use/abuse    Discharge Coordination/Progress Spoke to patient regarding discharge plan/needs.  Patient resides alone.  Patient does not drive but states he utilizes public transportation and his sisters take him places.  Patient states transportation is not an issue for him.  Community Health program provided by [facility] department offered to patient as he would qualify and patient declined.  Patient is not homebound and would not be eligible for .  Patient has PCP and RX coverage and denies discharge needs.            Discharge Plan    No documentation.       Destination      No service coordination in this encounter.      Durable Medical Equipment     No service coordination in this encounter.      Dialysis/Infusion     No service coordination in this encounter.      Home Medical Care     No service coordination in this encounter.      Social Care     No service coordination in this encounter.                Demographic Summary    No documentation.           Functional Status    No documentation.           Psychosocial    No documentation.           Abuse/Neglect    No documentation.           Legal    No documentation.           Substance Abuse    No documentation.           Patient Forms    No documentation.         ASHER Massey

## 2018-08-17 LAB
ALBUMIN SERPL-MCNC: 4.1 G/DL (ref 3.5–5)
ALBUMIN/GLOB SERPL: 1.3 G/DL (ref 1.1–2.5)
ALP SERPL-CCNC: 44 U/L (ref 24–120)
ALT SERPL W P-5'-P-CCNC: 37 U/L (ref 0–54)
ANION GAP SERPL CALCULATED.3IONS-SCNC: 11 MMOL/L (ref 4–13)
ARTERIAL PATENCY WRIST A: POSITIVE
AST SERPL-CCNC: 31 U/L (ref 7–45)
ATMOSPHERIC PRESS: 750 MMHG
BASE EXCESS BLDA CALC-SCNC: -1.2 MMOL/L (ref 0–2)
BASOPHILS # BLD AUTO: 0.03 10*3/MM3 (ref 0–0.2)
BASOPHILS NFR BLD AUTO: 0.1 % (ref 0–2)
BDY SITE: ABNORMAL
BILIRUB SERPL-MCNC: 0.5 MG/DL (ref 0.1–1)
BODY TEMPERATURE: 37 C
BUN BLD-MCNC: 21 MG/DL (ref 5–21)
BUN/CREAT SERPL: 20.6 (ref 7–25)
CA-I BLD-MCNC: 4.9 MG/DL (ref 4.6–5.4)
CALCIUM SPEC-SCNC: 9.3 MG/DL (ref 8.4–10.4)
CHLORIDE SERPL-SCNC: 103 MMOL/L (ref 98–110)
CO2 SERPL-SCNC: 26 MMOL/L (ref 24–31)
COHGB MFR BLD: 0.6 % (ref 0–5)
CREAT BLD-MCNC: 1.02 MG/DL (ref 0.5–1.4)
DEPRECATED RDW RBC AUTO: 49 FL (ref 40–54)
EOSINOPHIL # BLD AUTO: 0 10*3/MM3 (ref 0–0.7)
EOSINOPHIL NFR BLD AUTO: 0 % (ref 0–4)
ERYTHROCYTE [DISTWIDTH] IN BLOOD BY AUTOMATED COUNT: 14.4 % (ref 12–15)
GAS FLOW AIRWAY: 5 LPM
GFR SERPL CREATININE-BSD FRML MDRD: 74 ML/MIN/1.73
GLOBULIN UR ELPH-MCNC: 3.2 GM/DL
GLUCOSE BLD-MCNC: 120 MG/DL (ref 70–100)
HCO3 BLDA-SCNC: 23.2 MMOL/L (ref 20–26)
HCT VFR BLD AUTO: 47.6 % (ref 40–52)
HCT VFR BLD CALC: 52.3 % (ref 38–51)
HGB BLD-MCNC: 16.5 G/DL (ref 14–18)
HGB BLDA-MCNC: 17.1 G/DL (ref 14–18)
HOROWITZ INDEX BLD+IHG-RTO: 40 %
IMM GRANULOCYTES # BLD: 0.18 10*3/MM3 (ref 0–0.03)
IMM GRANULOCYTES NFR BLD: 0.8 % (ref 0–5)
LYMPHOCYTES # BLD AUTO: 0.72 10*3/MM3 (ref 0.72–4.86)
LYMPHOCYTES NFR BLD AUTO: 3.1 % (ref 15–45)
Lab: ABNORMAL
MCH RBC QN AUTO: 32 PG (ref 28–32)
MCHC RBC AUTO-ENTMCNC: 34.7 G/DL (ref 33–36)
MCV RBC AUTO: 92.4 FL (ref 82–95)
METHGB BLD QL: 1.1 % (ref 0–3)
MODALITY: ABNORMAL
MONOCYTES # BLD AUTO: 1.18 10*3/MM3 (ref 0.19–1.3)
MONOCYTES NFR BLD AUTO: 5 % (ref 4–12)
NEUTROPHILS # BLD AUTO: 21.37 10*3/MM3 (ref 1.87–8.4)
NEUTROPHILS NFR BLD AUTO: 91 % (ref 39–78)
NRBC BLD MANUAL-RTO: 0 /100 WBC (ref 0–0)
OXYHGB MFR BLDV: 95.5 % (ref 94–99)
PCO2 BLDA: 37.5 MM HG (ref 35–45)
PCO2 TEMP ADJ BLD: ABNORMAL MM HG (ref 35–45)
PH BLDA: 7.4 PH UNITS (ref 7.35–7.45)
PH, TEMP CORRECTED: ABNORMAL PH UNITS (ref 7.35–7.45)
PLATELET # BLD AUTO: 319 10*3/MM3 (ref 130–400)
PMV BLD AUTO: 11.1 FL (ref 6–12)
PO2 BLDA: 87.8 MM HG (ref 83–108)
PO2 TEMP ADJ BLD: ABNORMAL MM HG (ref 83–108)
POTASSIUM BLD-SCNC: 4.2 MMOL/L (ref 3.5–5.3)
POTASSIUM BLDA-SCNC: 4.4 MMOL/L (ref 3.5–5.2)
PROT SERPL-MCNC: 7.3 G/DL (ref 6.3–8.7)
RBC # BLD AUTO: 5.15 10*6/MM3 (ref 4.8–5.9)
SAO2 % BLDCOA: 97.2 % (ref 94–99)
SODIUM BLD-SCNC: 140 MMOL/L (ref 135–145)
SODIUM BLDA-SCNC: 136 MMOL/L (ref 136–145)
VENTILATOR MODE: ABNORMAL
WBC NRBC COR # BLD: 23.48 10*3/MM3 (ref 4.8–10.8)

## 2018-08-17 PROCEDURE — G8989 SELF CARE D/C STATUS: HCPCS

## 2018-08-17 PROCEDURE — 94799 UNLISTED PULMONARY SVC/PX: CPT

## 2018-08-17 PROCEDURE — 80053 COMPREHEN METABOLIC PANEL: CPT | Performed by: FAMILY MEDICINE

## 2018-08-17 PROCEDURE — 36600 WITHDRAWAL OF ARTERIAL BLOOD: CPT

## 2018-08-17 PROCEDURE — 85025 COMPLETE CBC W/AUTO DIFF WBC: CPT | Performed by: FAMILY MEDICINE

## 2018-08-17 PROCEDURE — G8988 SELF CARE GOAL STATUS: HCPCS

## 2018-08-17 PROCEDURE — 25010000002 METHYLPREDNISOLONE PER 125 MG: Performed by: FAMILY MEDICINE

## 2018-08-17 PROCEDURE — 83050 HGB METHEMOGLOBIN QUAN: CPT

## 2018-08-17 PROCEDURE — 25010000002 ENOXAPARIN PER 10 MG: Performed by: FAMILY MEDICINE

## 2018-08-17 PROCEDURE — 82805 BLOOD GASES W/O2 SATURATION: CPT

## 2018-08-17 PROCEDURE — 94760 N-INVAS EAR/PLS OXIMETRY 1: CPT

## 2018-08-17 PROCEDURE — 97165 OT EVAL LOW COMPLEX 30 MIN: CPT

## 2018-08-17 PROCEDURE — 94669 MECHANICAL CHEST WALL OSCILL: CPT

## 2018-08-17 PROCEDURE — 82375 ASSAY CARBOXYHB QUANT: CPT

## 2018-08-17 PROCEDURE — G8987 SELF CARE CURRENT STATUS: HCPCS

## 2018-08-17 PROCEDURE — 25010000002 LEVOFLOXACIN PER 250 MG: Performed by: FAMILY MEDICINE

## 2018-08-17 RX ORDER — LEVOFLOXACIN 5 MG/ML
750 INJECTION, SOLUTION INTRAVENOUS EVERY 24 HOURS
Status: DISCONTINUED | OUTPATIENT
Start: 2018-08-17 | End: 2018-08-20

## 2018-08-17 RX ORDER — METHYLPREDNISOLONE SODIUM SUCCINATE 125 MG/2ML
80 INJECTION, POWDER, LYOPHILIZED, FOR SOLUTION INTRAMUSCULAR; INTRAVENOUS EVERY 8 HOURS
Status: DISCONTINUED | OUTPATIENT
Start: 2018-08-17 | End: 2018-08-18

## 2018-08-17 RX ORDER — CLONAZEPAM 0.5 MG/1
0.5 TABLET ORAL 2 TIMES DAILY
Status: DISCONTINUED | OUTPATIENT
Start: 2018-08-17 | End: 2018-08-21 | Stop reason: HOSPADM

## 2018-08-17 RX ADMIN — IPRATROPIUM BROMIDE AND ALBUTEROL SULFATE 3 ML: 2.5; .5 SOLUTION RESPIRATORY (INHALATION) at 06:49

## 2018-08-17 RX ADMIN — IPRATROPIUM BROMIDE AND ALBUTEROL SULFATE 3 ML: 2.5; .5 SOLUTION RESPIRATORY (INHALATION) at 19:08

## 2018-08-17 RX ADMIN — HYDROCODONE BITARTRATE AND ACETAMINOPHEN 1 TABLET: 5; 325 TABLET ORAL at 09:05

## 2018-08-17 RX ADMIN — IPRATROPIUM BROMIDE AND ALBUTEROL SULFATE 3 ML: 2.5; .5 SOLUTION RESPIRATORY (INHALATION) at 11:13

## 2018-08-17 RX ADMIN — HYDROCODONE BITARTRATE AND ACETAMINOPHEN 1 TABLET: 5; 325 TABLET ORAL at 02:34

## 2018-08-17 RX ADMIN — ENOXAPARIN SODIUM 40 MG: 40 INJECTION SUBCUTANEOUS at 19:24

## 2018-08-17 RX ADMIN — IPRATROPIUM BROMIDE AND ALBUTEROL SULFATE 3 ML: 2.5; .5 SOLUTION RESPIRATORY (INHALATION) at 14:40

## 2018-08-17 RX ADMIN — HYDROCODONE BITARTRATE AND ACETAMINOPHEN 1 TABLET: 5; 325 TABLET ORAL at 15:22

## 2018-08-17 RX ADMIN — METHYLPREDNISOLONE SODIUM SUCCINATE 80 MG: 125 INJECTION, POWDER, FOR SOLUTION INTRAMUSCULAR; INTRAVENOUS at 04:16

## 2018-08-17 RX ADMIN — IPRATROPIUM BROMIDE AND ALBUTEROL SULFATE 3 ML: 2.5; .5 SOLUTION RESPIRATORY (INHALATION) at 23:22

## 2018-08-17 RX ADMIN — HYDROCODONE BITARTRATE AND ACETAMINOPHEN 1 TABLET: 5; 325 TABLET ORAL at 21:26

## 2018-08-17 RX ADMIN — METHYLPREDNISOLONE SODIUM SUCCINATE 80 MG: 125 INJECTION, POWDER, FOR SOLUTION INTRAMUSCULAR; INTRAVENOUS at 21:26

## 2018-08-17 RX ADMIN — GUAIFENESIN 1200 MG: 600 TABLET, EXTENDED RELEASE ORAL at 21:26

## 2018-08-17 RX ADMIN — GUAIFENESIN 1200 MG: 600 TABLET, EXTENDED RELEASE ORAL at 09:05

## 2018-08-17 RX ADMIN — CLONAZEPAM 0.5 MG: 0.5 TABLET ORAL at 21:26

## 2018-08-17 RX ADMIN — IPRATROPIUM BROMIDE AND ALBUTEROL SULFATE 3 ML: 2.5; .5 SOLUTION RESPIRATORY (INHALATION) at 03:42

## 2018-08-17 RX ADMIN — CLONAZEPAM 0.5 MG: 0.5 TABLET ORAL at 11:51

## 2018-08-17 RX ADMIN — LEVOFLOXACIN 750 MG: 5 INJECTION, SOLUTION INTRAVENOUS at 11:29

## 2018-08-17 NOTE — THERAPY DISCHARGE NOTE
Acute Care - Occupational Therapy Initial Eval/Discharge  [facility]     Patient Name: Reza Cruz  : 1955  MRN: [id number]  Today's Date: 2018  Onset of Illness/Injury or Date of Surgery: 08/15/18  Date of Referral to OT: 18  Referring Physician: Dr. Ellsworth      Admit Date: 8/15/2018       ICD-10-CM ICD-9-CM   1. Pneumonia of right upper lobe due to infectious organism (CMS/HCC) J18.1 486   2. Chronic obstructive pulmonary disease with acute exacerbation (CMS/HCC) J44.1 491.21   3. Impaired mobility and ADLs Z74.09 799.89     Patient Active Problem List   Diagnosis   • Pneumonia of right upper lobe due to infectious organism (CMS/HCC)     Past Medical History:   Diagnosis Date   • COPD (chronic obstructive pulmonary disease) (CMS/[facility])    • Hyperlipidemia    • Hypertension      Past Surgical History:   Procedure Laterality Date   • EYE SURGERY Right    • KNEE SURGERY Right           OT ASSESSMENT FLOWSHEET (last 72 hours)      Occupational Therapy Evaluation     Row Name 18 1300                   OT Evaluation Time/Intention    Subjective Information complains of;dyspnea;pain;weakness   L side pain  -        Document Type evaluation  -        Mode of Treatment occupational therapy  -        Patient Effort good  -           General Information    Patient Profile Reviewed? yes  -MK        Onset of Illness/Injury or Date of Surgery 08/15/18  -        Referring Physician Dr. Ellsworth  -        Patient Observations alert;cooperative;agree to therapy  -        Patient/Family Observations no family present  -        General Observations of Patient awake and alert in fowlers, continuous pulse ox to forehead  -MK        Prior Level of Function independent:;all household mobility;community mobility;ADL's  -        Equipment Currently Used at Home none  -MK        Pertinent History of Current Functional Problem presented to ED w increased shortening of breath, worsening cough, and pain in  L side of chest, dx: pneumonia and COPD exacerbation  -        Existing Precautions/Restrictions fall  -MK        Risks Reviewed patient:;LOB;nausea/vomiting;dizziness;increased discomfort;change in vital signs;increased drainage;lines disloged  -        Benefits Reviewed patient:;improve function;increase independence;increase strength;decrease pain;increase balance;decrease risk of DVT;improve skin integrity;increase knowledge  -        Barriers to Rehab none identified  -           Relationship/Environment    Primary Source of Support/Comfort sibling(s)  -        Lives With alone  -           Resource/Environmental Concerns    Current Living Arrangements home/apartment/condo  -           Cognitive Assessment/Interventions    Additional Documentation Cognitive Assessment/Intervention (Group)  -           Cognitive Assessment/Intervention- PT/OT    Affect/Mental Status (Cognitive) WFL  -        Orientation Status (Cognition) oriented x 4  -        Follows Commands (Cognition) WFL  -        Cognitive Function (Cognitive) [facility]  -        Personal Safety Interventions fall prevention program maintained;gait belt;nonskid shoes/slippers when out of bed;supervised activity  -           Safety Issues, Functional Mobility    Impairments Affecting Function (Mobility) shortness of breath  -           Bed Mobility Assessment/Treatment    Bed Mobility Assessment/Treatment bed mobility (all) activities  -        [place] Level (Bed Mobility) independent  -        Assistive Device (Bed Mobility) head of bed elevated  -           Functional Mobility    Functional Mobility- Ind. Level supervision required  -        Functional Mobility- Comment pt amb to nurses station and back to bed, minimally increased WOB w return to bed, no desat w activity  -           Transfer Assessment/Treatment    Transfer Assessment/Treatment sit-stand transfer;stand-sit transfer  -           Sit-Stand Transfer     Sit-Stand [place] (Transfers) supervision  -           Stand-Sit Transfer    Stand-Sit [place] (Transfers) supervision  -           ADL Assessment/Intervention    BADL Assessment/Intervention lower body dressing  -           Lower Body Dressing Assessment/Training    Lower Body Dressing [place] Level don;doff;socks;independent  -        Lower Body Dressing Position edge of bed sitting  -           BADL Safety/Performance    Impairments, BADL Safety/Performance shortness of breath  -           General ROM    GENERAL ROM COMMENTS AROM WFL BUE  -           General Assessment (Manual Muscle Testing)    Comment, General Manual Muscle Testing (MMT) Assessment BU 5/5  -           Motor Assessment/Interventions    Additional Documentation Balance (Group)  -           Balance    Balance static sitting balance;static standing balance;dynamic sitting balance;dynamic standing balance  -           Static Sitting Balance    Level of [place] (Unsupported Sitting, Static Balance) independent  -        Sitting Position (Unsupported Sitting, Static Balance) sitting on edge of bed  -           Dynamic Sitting Balance    Level of [place], Reaches Outside Midline (Sitting, Dynamic Balance) independent  -        Sitting Position, Reaches Outside Midline (Sitting, Dynamic Balance) sitting on edge of bed  -           Static Standing Balance    Level of [place] (Supported Standing, Static Balance) independent  -           Dynamic Standing Balance    Level of [place], Reaches Outside Midline (Standing, Dynamic Balance) independent  -           Sensory Assessment/Intervention    Sensory General Assessment no sensation deficits identified   per pt  -           Positioning and Restraints    Pre-Treatment Position in bed  -        Post Treatment Position bed  -        In Bed fowlers;call light within reach;encouraged to call for assist;side rails up x2  -            Pain Assessment    Additional Documentation Pain Scale: Numbers Pre/Post-Treatment (Group)  -           Pain Scale: Numbers Pre/Post-Treatment    Pain Scale: Numbers, Pretreatment 10/10  -        Pain Scale: Numbers, Post-Treatment 10/10  -MK        Pain Location - Side Left  -        Pain Location chest  -        Pre/Post Treatment Pain Comment pt rating of pain does not match obs   -        Pain Intervention(s) Repositioned;Ambulation/increased activity  -           Plan of Care Review    Plan of Care Reviewed With patient  -           Clinical Impression (OT)    Date of Referral to OT 08/17/18  -        OT Diagnosis decreased ADL  -        Prognosis (OT Eval) good  -        Patient/Family Goals Statement (OT Eval) return home  -        Criteria for Skilled Therapeutic Interventions Met (OT Eval) yes;treatment indicated  -        Therapy Frequency (OT Eval) evaluation only  -        Care Plan Review (OT) evaluation/treatment results reviewed;care plan/treatment goals reviewed;risks/benefits reviewed;current/potential barriers reviewed;patient/other agree to care plan  -        Anticipated Discharge Disposition (OT) home;home with assist  -           Vital Signs    Pre SpO2 (%) 100  -MK        O2 Delivery Pre Treatment supplemental O2   2L  -MK        Intra SpO2 (%) 98  -MK        O2 Delivery Intra Treatment room air  -MK        Post SpO2 (%) 100  -MK        O2 Delivery Post Treatment supplemental O2  -MK        Pre Patient Position Supine  -MK        Intra Patient Position Standing  -MK        Post Patient Position Supine  -MK           Living Environment    Home Accessibility wheelchair accessible;tub/shower is not walk in  -          User Key  (r) = Recorded By, (t) = Taken By, (c) = Cosigned By    Initials Name Effective Dates    Maribeth Cabrera OTR/MELANIE 08/10/18 -           Occupational Therapy Education     Title: PT OT SLP Therapies (Done)     Topic: Occupational Therapy (Done)      Point: ADL training (Done)     Description: Instruct learner(s) on proper safety adaptation and remediation techniques during self care or transfers.   Instruct in proper use of assistive devices.   Learning Progress Summary     Learner Status Readiness Method Response Comment Documented by    Patient Done Acceptance E VU energy conservation and benefit of continued activity, benefit of up in chair, ADL, OT POC  08/17/18 1347                      User Key     Initials Effective Dates Name Provider Type Discipline     08/10/18 -  Maribeth Wilson, OTR/L Occupational Therapist OT                OT Recommendation and Plan  Outcome Summary/Treatment Plan (OT)  Anticipated Discharge Disposition (OT): home, home with assist  Therapy Frequency (OT Eval): evaluation only  Plan of Care Review  Plan of Care Reviewed With: patient  Plan of Care Reviewed With: patient  Outcome Summary: OT eval completed. Pt on 2L nc w O2 sat 100%. Pt completes all bed mobility and transfer I. Completes LB dressing at EOB I. Amb in shi w S, minimally increased WOB on RA, O2 sat 98%. Pt educated to walk in halls w nursing and benefit of sitting up in chair. He reports he is at baseline function and has no obs impairments affecting ADL at this time. OT recommends d/c home w assist.               Outcome Measures     Row Name 08/17/18 1300             How much help from another is currently needed...    Putting on and taking off regular lower body clothing? 4  -MK      Bathing (including washing, rinsing, and drying) 4  -MK      Toileting (which includes using toilet bed pan or urinal) 4  -MK      Putting on and taking off regular upper body clothing 4  -MK      Taking care of personal grooming (such as brushing teeth) 4  -MK      Eating meals 4  -MK      Score 24  -MK         Functional Assessment    Outcome Measure Options AM-PAC 6 Clicks Daily Activity (OT)  -MK        User Key  (r) = Recorded By, (t) = Taken By, (c) = Cosigned By    Initials  Name Provider Type    ZO Maribeth Wilson OTR/MELANIE Occupational Therapist          Time Calculation:         Time Calculation- OT     Row Name 08/17/18 1348             Time Calculation- OT    OT Start Time 1300  -MK      OT Stop Time 1340  -      OT Time Calculation (min) 40 min  -      OT Received On 08/17/18  -        User Key  (r) = Recorded By, (t) = Taken By, (c) = Cosigned By    Initials Name Provider Type     Maribeth Wilson OTR/MELANIE Occupational Therapist        Therapy Suggested Charges     Code   Minutes Charges    None           Therapy Charges for Today     Code Description Service Date Service Provider Modifiers Qty    10225198126 HC OT SELFCARE CURRENT 8/17/2018 Maribeth Wilson OTR/L GO, CH 1    64363636376 HC OT SELFCARE PROJECTED 8/17/2018 Maribeth Wilson OTR/L GO, CH 1    93435359486 HC OT SELFCARE DISCHARGE 8/17/2018 Maribeth Wilson OTR/L GO, CH 1    26693679696 HC OT EVAL LOW COMPLEXITY 3 8/17/2018 Maribeth Wilson OTR/L GO, KX 1          OT G-codes  OT Professional Judgement Used?: Yes  OT Functional Scales Options: AM-PAC 6 Clicks Daily Activity (OT)  Score: 24  Functional Limitation: Self care  Self Care Current Status (): 0 percent impaired, limited or restricted  Self Care Goal Status (): 0 percent impaired, limited or restricted  Self Care Discharge Status (): 0 percent impaired, limited or restricted    OT Discharge Summary  Anticipated Discharge Disposition (OT): home, home with assist    RACIEL Vazquez/MELANIE  8/17/2018

## 2018-08-17 NOTE — PROGRESS NOTES
Continued Stay Note  [facility]     Patient Name: Reza Cruz  MRN: [id number]  Today's Date: 8/17/2018    Admit Date: 8/15/2018          Discharge Plan     Row Name 08/17/18 0937       Plan    Plan Home    Patient/Family in Agreement with Plan yes    Plan Comments Pt lives alone and plans same.  Pt denied needs when visited with SW yesterday.  Will follow.               Discharge Codes    No documentation.           TAMIKA Rodriguez

## 2018-08-17 NOTE — PROGRESS NOTES
[facility] Medicine Services  INPATIENT PROGRESS NOTE    Patient Name: Reza Cruz  Date of Admission: 8/15/2018  Today's Date: 08/17/18  Length of Stay: 2  Primary Care Physician: Lobito Trevino Jr., MD    Subjective   Chief Complaint: SOA  HPI   Doing well, still very SOA, still wheezing and coughing.  He says he isn't coughing anything up.    His white count has shot up, but he has been on high dose IV steroids.    He is ambulating ok, but gets short of breath quickly with it.    He is very anxious.        Review of Systems   Constitutional: Negative for fatigue and fever.   HENT: Negative for congestion and ear pain.    Eyes: Negative for redness and visual disturbance.   Respiratory: Positive for cough, shortness of breath and wheezing.    Cardiovascular: Negative for chest pain and palpitations.   Gastrointestinal: Negative for abdominal pain, diarrhea, nausea and vomiting.   Endocrine: Negative for cold intolerance and heat intolerance.   Genitourinary: Negative for dysuria and frequency.   Musculoskeletal: Negative for arthralgias and back pain.   Skin: Negative for rash and wound.   Neurological: Negative for dizziness and headaches.   Psychiatric/Behavioral: Negative for confusion. The patient is not nervous/anxious.         All pertinent negatives and positives are as above. All other systems have been reviewed and are negative unless otherwise stated.     Objective    Temp:  [96.9 °F (36.1 °C)-98.6 °F (37 °C)] 97.1 °F (36.2 °C)  Heart Rate:  [45-66] 52  Resp:  [11-24] 16  BP: (130-151)/(83-91) 148/89  Physical Exam   Constitutional: He is oriented to person, place, and time. He appears well-developed and well-nourished.   HENT:   Head: Normocephalic and atraumatic.   Right Ear: External ear normal.   Left Ear: External ear normal.   Nose: Nose normal.   Mouth/Throat: Oropharynx is clear and moist.   Eyes: Pupils are equal, round, and reactive to light. Conjunctivae  and EOM are normal. Right eye exhibits no discharge. Left eye exhibits no discharge. No scleral icterus.   Neck: Normal range of motion. Neck supple. No tracheal deviation present. No thyromegaly present.   Cardiovascular: Normal rate, regular rhythm, normal heart sounds and intact distal pulses.  Exam reveals no gallop and no friction rub.    No murmur heard.  Pulmonary/Chest: Effort normal. No stridor. No respiratory distress. He has decreased breath sounds. He has wheezes. He has rhonchi. He has no rales. He exhibits no tenderness.   Abdominal: Soft. Bowel sounds are normal. He exhibits no distension and no mass. There is no tenderness. There is no rebound and no guarding. No hernia.   Musculoskeletal: Normal range of motion. He exhibits no edema or deformity.   Lymphadenopathy:     He has no cervical adenopathy.   Neurological: He is alert and oriented to person, place, and time. He has normal reflexes. He displays normal reflexes. No cranial nerve deficit. He exhibits normal muscle tone. Coordination normal.   Skin: Skin is warm and dry. No rash noted. No erythema. No pallor.   Psychiatric: He has a normal mood and affect. His behavior is normal. Judgment and thought content normal.   Vitals reviewed.        Results Review:  I have reviewed the labs, radiology results, and diagnostic studies.    Laboratory Data:     Results from last 7 days  Lab Units 08/17/18  0500 08/16/18  0713 08/15/18  1527   WBC 10*3/mm3 23.48* 9.23 10.35   HEMOGLOBIN g/dL 16.5 16.8 18.3*   HEMATOCRIT % 47.6 48.8 53.6*   PLATELETS 10*3/mm3 319 306 324          Results from last 7 days  Lab Units 08/17/18  0500 08/17/18  0402 08/16/18  0713 08/15/18  1527   SODIUM mmol/L 140  --  137 143   SODIUM, ARTERIAL mmol/L  --  136  --   --    POTASSIUM mmol/L 4.2  --  4.6 4.2   CHLORIDE mmol/L 103  --  103 106   CO2 mmol/L 26.0  --  23.0* 21.0*   BUN mg/dL 21  --  21 17   CREATININE mg/dL 1.02  --  1.01 1.23   CALCIUM mg/dL 9.3  --  9.0 9.2    BILIRUBIN mg/dL 0.5  --  0.4 0.6   ALK PHOS U/L 44  --  45 61   ALT (SGPT) U/L 37  --  47 53   AST (SGOT) U/L 31  --  32 54*   GLUCOSE mg/dL 120*  --  183* 84       Culture Data:   Blood Culture   Date Value Ref Range Status   08/15/2018 No growth at 24 hours  Preliminary   08/15/2018 No growth at 24 hours  Preliminary       Radiology Data:   Imaging Results (last 24 hours)     ** No results found for the last 24 hours. **          I have reviewed the patient's current medications.     Assessment/Plan     Hospital Problem List     Pneumonia of right upper lobe due to infectious organism (CMS/[facility])              1.  COPD AE  -IV Steroids  -IV Mg  -Nebs  -Mucinex  -Flutter  -will add CPT  -supplemental oxygen, wean as tolerated  -IV rocephin/zithromax changed to Levaquin     2. Sepsis  -Levaquin  -Blood/sputum cultures     3.  Pneumonia  -Rocephin/Zithromax changed to Levaquin  -Blood/sputum cultures     4.  Tobacco abuse  -Quit years ago     5.  Hypertensive Urgency  -IV hydralazine PRN    6.  Anxiety  -Klonopin prn                   Discharge Planning: I expect the patient to be discharged to home in 3-4 days    Vinicius Ellsworth MD   08/17/18   10:26 AM

## 2018-08-17 NOTE — PLAN OF CARE
Problem: Fall Risk (Adult)  Goal: Identify Related Risk Factors and Signs and Symptoms  Outcome: Ongoing (interventions implemented as appropriate)    Goal: Absence of Fall  Outcome: Ongoing (interventions implemented as appropriate)      Problem: Patient Care Overview  Goal: Plan of Care Review  Outcome: Ongoing (interventions implemented as appropriate)   08/17/18 1366   OTHER   Outcome Summary pt c/o pain. meds admin with relief. HR in 40's. In 30's for a few seconds at one point during shift. cont to monitor   Plan of Care Review   Progress no change   Coping/Psychosocial   Plan of Care Reviewed With patient     Goal: Individualization and Mutuality  Outcome: Ongoing (interventions implemented as appropriate)

## 2018-08-17 NOTE — PLAN OF CARE
Problem: Patient Care Overview  Goal: Plan of Care Review  Outcome: Ongoing (interventions implemented as appropriate)   08/17/18 1344   OTHER   Outcome Summary OT eval completed. Pt on 2L nc w O2 sat 100%. Pt completes all bed mobility and transfer I. Completes LB dressing at EOB I. Amb in shi w S, minimally increased WOB on RA, O2 sat 98%. Pt educated to walk in halls w nursing and benefit of sitting up in chair. He reports he is at baseline function and has no obs impairments affecting ADL at this time. OT recommends d/c home w assist.   Plan of Care Review   Progress no change   Coping/Psychosocial   Plan of Care Reviewed With patient

## 2018-08-18 LAB
ALBUMIN SERPL-MCNC: 3.4 G/DL (ref 3.5–5)
ALBUMIN/GLOB SERPL: 1.2 G/DL (ref 1.1–2.5)
ALP SERPL-CCNC: 40 U/L (ref 24–120)
ALT SERPL W P-5'-P-CCNC: 57 U/L (ref 0–54)
ANION GAP SERPL CALCULATED.3IONS-SCNC: 7 MMOL/L (ref 4–13)
ARTERIAL PATENCY WRIST A: POSITIVE
AST SERPL-CCNC: 61 U/L (ref 7–45)
ATMOSPHERIC PRESS: 750 MMHG
BASE EXCESS BLDA CALC-SCNC: 0.2 MMOL/L (ref 0–2)
BASOPHILS # BLD AUTO: 0.04 10*3/MM3 (ref 0–0.2)
BASOPHILS NFR BLD AUTO: 0.2 % (ref 0–2)
BDY SITE: ABNORMAL
BILIRUB SERPL-MCNC: 0.3 MG/DL (ref 0.1–1)
BODY TEMPERATURE: 37 C
BUN BLD-MCNC: 23 MG/DL (ref 5–21)
BUN/CREAT SERPL: 23.2 (ref 7–25)
CALCIUM SPEC-SCNC: 9.6 MG/DL (ref 8.4–10.4)
CHLORIDE SERPL-SCNC: 107 MMOL/L (ref 98–110)
CO2 SERPL-SCNC: 26 MMOL/L (ref 24–31)
CREAT BLD-MCNC: 0.99 MG/DL (ref 0.5–1.4)
DEPRECATED RDW RBC AUTO: 49.4 FL (ref 40–54)
EOSINOPHIL # BLD AUTO: 0 10*3/MM3 (ref 0–0.7)
EOSINOPHIL NFR BLD AUTO: 0 % (ref 0–4)
ERYTHROCYTE [DISTWIDTH] IN BLOOD BY AUTOMATED COUNT: 14.6 % (ref 12–15)
GAS FLOW AIRWAY: 2 LPM
GFR SERPL CREATININE-BSD FRML MDRD: 77 ML/MIN/1.73
GLOBULIN UR ELPH-MCNC: 2.8 GM/DL
GLUCOSE BLD-MCNC: 136 MG/DL (ref 70–100)
HCO3 BLDA-SCNC: 24.3 MMOL/L (ref 20–26)
HCT VFR BLD AUTO: 48.4 % (ref 40–52)
HGB BLD-MCNC: 16.3 G/DL (ref 14–18)
HOROWITZ INDEX BLD+IHG-RTO: 28 %
IMM GRANULOCYTES # BLD: 0.23 10*3/MM3 (ref 0–0.03)
IMM GRANULOCYTES NFR BLD: 1.4 % (ref 0–5)
LYMPHOCYTES # BLD AUTO: 0.48 10*3/MM3 (ref 0.72–4.86)
LYMPHOCYTES NFR BLD AUTO: 2.9 % (ref 15–45)
Lab: ABNORMAL
MCH RBC QN AUTO: 31 PG (ref 28–32)
MCHC RBC AUTO-ENTMCNC: 33.7 G/DL (ref 33–36)
MCV RBC AUTO: 92 FL (ref 82–95)
MODALITY: ABNORMAL
MONOCYTES # BLD AUTO: 0.53 10*3/MM3 (ref 0.19–1.3)
MONOCYTES NFR BLD AUTO: 3.2 % (ref 4–12)
NEUTROPHILS # BLD AUTO: 15.24 10*3/MM3 (ref 1.87–8.4)
NEUTROPHILS NFR BLD AUTO: 92.3 % (ref 39–78)
NRBC BLD MANUAL-RTO: 0 /100 WBC (ref 0–0)
PCO2 BLDA: 37.1 MM HG (ref 35–45)
PH BLDA: 7.42 PH UNITS (ref 7.35–7.45)
PLATELET # BLD AUTO: 376 10*3/MM3 (ref 130–400)
PMV BLD AUTO: 10.8 FL (ref 6–12)
PO2 BLDA: 68.9 MM HG (ref 83–108)
POTASSIUM BLD-SCNC: 5.3 MMOL/L (ref 3.5–5.3)
PROT SERPL-MCNC: 6.2 G/DL (ref 6.3–8.7)
RBC # BLD AUTO: 5.26 10*6/MM3 (ref 4.8–5.9)
SAO2 % BLDCOA: 95.1 % (ref 94–99)
SODIUM BLD-SCNC: 140 MMOL/L (ref 135–145)
VENTILATOR MODE: ABNORMAL
WBC NRBC COR # BLD: 16.52 10*3/MM3 (ref 4.8–10.8)

## 2018-08-18 PROCEDURE — 94799 UNLISTED PULMONARY SVC/PX: CPT

## 2018-08-18 PROCEDURE — 63710000001 PREDNISONE PER 1 MG: Performed by: FAMILY MEDICINE

## 2018-08-18 PROCEDURE — 85025 COMPLETE CBC W/AUTO DIFF WBC: CPT | Performed by: FAMILY MEDICINE

## 2018-08-18 PROCEDURE — 82803 BLOOD GASES ANY COMBINATION: CPT

## 2018-08-18 PROCEDURE — 25010000002 LEVOFLOXACIN PER 250 MG: Performed by: FAMILY MEDICINE

## 2018-08-18 PROCEDURE — 94669 MECHANICAL CHEST WALL OSCILL: CPT

## 2018-08-18 PROCEDURE — 36600 WITHDRAWAL OF ARTERIAL BLOOD: CPT

## 2018-08-18 PROCEDURE — 94760 N-INVAS EAR/PLS OXIMETRY 1: CPT

## 2018-08-18 PROCEDURE — 25010000002 ENOXAPARIN PER 10 MG: Performed by: FAMILY MEDICINE

## 2018-08-18 PROCEDURE — 25010000002 METHYLPREDNISOLONE PER 125 MG: Performed by: FAMILY MEDICINE

## 2018-08-18 PROCEDURE — 80053 COMPREHEN METABOLIC PANEL: CPT | Performed by: FAMILY MEDICINE

## 2018-08-18 RX ORDER — IPRATROPIUM BROMIDE AND ALBUTEROL SULFATE 2.5; .5 MG/3ML; MG/3ML
3 SOLUTION RESPIRATORY (INHALATION)
Status: DISCONTINUED | OUTPATIENT
Start: 2018-08-19 | End: 2018-08-21 | Stop reason: HOSPADM

## 2018-08-18 RX ORDER — PREDNISONE 20 MG/1
20 TABLET ORAL 2 TIMES DAILY WITH MEALS
Status: DISCONTINUED | OUTPATIENT
Start: 2018-08-18 | End: 2018-08-19

## 2018-08-18 RX ORDER — IPRATROPIUM BROMIDE AND ALBUTEROL SULFATE 2.5; .5 MG/3ML; MG/3ML
3 SOLUTION RESPIRATORY (INHALATION) EVERY 4 HOURS PRN
Status: DISCONTINUED | OUTPATIENT
Start: 2018-08-18 | End: 2018-08-21 | Stop reason: HOSPADM

## 2018-08-18 RX ORDER — HYDRALAZINE HYDROCHLORIDE 20 MG/ML
20 INJECTION INTRAMUSCULAR; INTRAVENOUS EVERY 6 HOURS PRN
Status: DISCONTINUED | OUTPATIENT
Start: 2018-08-18 | End: 2018-08-21 | Stop reason: HOSPADM

## 2018-08-18 RX ADMIN — IPRATROPIUM BROMIDE AND ALBUTEROL SULFATE 3 ML: 2.5; .5 SOLUTION RESPIRATORY (INHALATION) at 11:50

## 2018-08-18 RX ADMIN — HYDROCODONE BITARTRATE AND ACETAMINOPHEN 1 TABLET: 5; 325 TABLET ORAL at 10:20

## 2018-08-18 RX ADMIN — PREDNISONE 20 MG: 20 TABLET ORAL at 11:00

## 2018-08-18 RX ADMIN — HYDROCODONE BITARTRATE AND ACETAMINOPHEN 1 TABLET: 5; 325 TABLET ORAL at 04:09

## 2018-08-18 RX ADMIN — CLONAZEPAM 0.5 MG: 0.5 TABLET ORAL at 08:47

## 2018-08-18 RX ADMIN — CLONAZEPAM 0.5 MG: 0.5 TABLET ORAL at 21:47

## 2018-08-18 RX ADMIN — PREDNISONE 20 MG: 20 TABLET ORAL at 16:59

## 2018-08-18 RX ADMIN — GUAIFENESIN 1200 MG: 600 TABLET, EXTENDED RELEASE ORAL at 08:47

## 2018-08-18 RX ADMIN — ENOXAPARIN SODIUM 40 MG: 40 INJECTION SUBCUTANEOUS at 16:59

## 2018-08-18 RX ADMIN — LEVOFLOXACIN 750 MG: 5 INJECTION, SOLUTION INTRAVENOUS at 08:48

## 2018-08-18 RX ADMIN — METHYLPREDNISOLONE SODIUM SUCCINATE 80 MG: 125 INJECTION, POWDER, FOR SOLUTION INTRAMUSCULAR; INTRAVENOUS at 04:09

## 2018-08-18 RX ADMIN — IPRATROPIUM BROMIDE AND ALBUTEROL SULFATE 3 ML: 2.5; .5 SOLUTION RESPIRATORY (INHALATION) at 18:50

## 2018-08-18 RX ADMIN — IPRATROPIUM BROMIDE AND ALBUTEROL SULFATE 3 ML: 2.5; .5 SOLUTION RESPIRATORY (INHALATION) at 15:21

## 2018-08-18 RX ADMIN — GUAIFENESIN 1200 MG: 600 TABLET, EXTENDED RELEASE ORAL at 21:47

## 2018-08-18 RX ADMIN — IPRATROPIUM BROMIDE AND ALBUTEROL SULFATE 3 ML: 2.5; .5 SOLUTION RESPIRATORY (INHALATION) at 08:08

## 2018-08-18 RX ADMIN — HYDROCODONE BITARTRATE AND ACETAMINOPHEN 1 TABLET: 5; 325 TABLET ORAL at 16:59

## 2018-08-18 NOTE — PLAN OF CARE
Problem: Fall Risk (Adult)  Goal: Absence of Fall  Outcome: Ongoing (interventions implemented as appropriate)      Problem: Patient Care Overview  Goal: Plan of Care Review  Outcome: Ongoing (interventions implemented as appropriate)   08/18/18 1001   Plan of Care Review   Progress improving   Coping/Psychosocial   Plan of Care Reviewed With patient       Problem: Pneumonia (Adult)  Goal: Signs and Symptoms of Listed Potential Problems Will be Absent, Minimized or Managed (Pneumonia)  Outcome: Outcome(s) achieved Date Met: 08/18/18

## 2018-08-18 NOTE — PLAN OF CARE
Problem: Fall Risk (Adult)  Goal: Identify Related Risk Factors and Signs and Symptoms  Outcome: Ongoing (interventions implemented as appropriate)    Goal: Absence of Fall  Outcome: Ongoing (interventions implemented as appropriate)      Problem: Patient Care Overview  Goal: Plan of Care Review  Outcome: Ongoing (interventions implemented as appropriate)   08/18/18 8500   OTHER   Outcome Summary C/o of pain x2, prn given with relief. VSS. Will continue to monitor.    Plan of Care Review   Progress no change   Coping/Psychosocial   Plan of Care Reviewed With patient     Goal: Individualization and Mutuality  Outcome: Ongoing (interventions implemented as appropriate)    Goal: Discharge Needs Assessment  Outcome: Ongoing (interventions implemented as appropriate)      Problem: Pneumonia (Adult)  Goal: Signs and Symptoms of Listed Potential Problems Will be Absent, Minimized or Managed (Pneumonia)  Outcome: Ongoing (interventions implemented as appropriate)

## 2018-08-18 NOTE — PROGRESS NOTES
[facility] Medicine Services  INPATIENT PROGRESS NOTE    Patient Name: Reza Cruz  Date of Admission: 8/15/2018  Today's Date: 08/18/18  Length of Stay: 3  Primary Care Physician: Lobito Trevino Jr., MD    Subjective   Chief Complaint: NANCY EDOUARD   Feels much better.  He is wheezing much less.  He is now coughing up phlegm.  He is tolerating room air.  He has been walking.  He is eating and drinking well.          Review of Systems   Constitutional: Positive for fatigue. Negative for fever.   HENT: Negative for congestion and ear pain.    Eyes: Negative for redness and visual disturbance.   Respiratory: Positive for cough and wheezing.    Cardiovascular: Negative for chest pain and palpitations.   Gastrointestinal: Negative for abdominal pain, diarrhea, nausea and vomiting.   Endocrine: Negative for cold intolerance and heat intolerance.   Genitourinary: Negative for dysuria and frequency.   Musculoskeletal: Negative for arthralgias and back pain.   Skin: Negative for rash and wound.   Neurological: Negative for dizziness and headaches.   Psychiatric/Behavioral: Negative for confusion. The patient is not nervous/anxious.         All pertinent negatives and positives are as above. All other systems have been reviewed and are negative unless otherwise stated.     Objective    Temp:  [97 °F (36.1 °C)-98.6 °F (37 °C)] 98.4 °F (36.9 °C)  Heart Rate:  [51-62] 62  Resp:  [12-18] 12  BP: (130-159)/(79-97) 159/97  Physical Exam   Constitutional: He is oriented to person, place, and time. He appears well-developed and well-nourished.   HENT:   Head: Normocephalic and atraumatic.   Right Ear: External ear normal.   Left Ear: External ear normal.   Nose: Nose normal.   Mouth/Throat: Oropharynx is clear and moist.   Eyes: Pupils are equal, round, and reactive to light. Conjunctivae and EOM are normal. Right eye exhibits no discharge. Left eye exhibits no discharge. No scleral icterus.    Neck: Normal range of motion. Neck supple. No tracheal deviation present. No thyromegaly present.   Cardiovascular: Normal rate, regular rhythm, normal heart sounds and intact distal pulses.  Exam reveals no gallop and no friction rub.    No murmur heard.  Pulmonary/Chest: Effort normal. No stridor. No respiratory distress. He has decreased breath sounds. He has no wheezes. He has rhonchi. He has rales. He exhibits no tenderness.   Abdominal: Soft. Bowel sounds are normal. He exhibits no distension and no mass. There is no tenderness. There is no rebound and no guarding. No hernia.   Musculoskeletal: Normal range of motion. He exhibits no edema or deformity.   Lymphadenopathy:     He has no cervical adenopathy.   Neurological: He is alert and oriented to person, place, and time. He has normal reflexes. He displays normal reflexes. No cranial nerve deficit. He exhibits normal muscle tone. Coordination normal.   Skin: Skin is warm and dry. No rash noted. No erythema. No pallor.   Psychiatric: He has a normal mood and affect. His behavior is normal. Judgment and thought content normal.   Vitals reviewed.          Results Review:  I have reviewed the labs, radiology results, and diagnostic studies.    Laboratory Data:     Results from last 7 days  Lab Units 08/18/18  0553 08/17/18  0500 08/16/18  0713   WBC 10*3/mm3 16.52* 23.48* 9.23   HEMOGLOBIN g/dL 16.3 16.5 16.8   HEMATOCRIT % 48.4 47.6 48.8   PLATELETS 10*3/mm3 376 319 306          Results from last 7 days  Lab Units 08/18/18  0553 08/17/18  0500 08/17/18  0402 08/16/18  0713   SODIUM mmol/L 140 140  --  137   SODIUM, ARTERIAL mmol/L  --   --  136  --    POTASSIUM mmol/L 5.3 4.2  --  4.6   CHLORIDE mmol/L 107 103  --  103   CO2 mmol/L 26.0 26.0  --  23.0*   BUN mg/dL 23* 21  --  21   CREATININE mg/dL 0.99 1.02  --  1.01   CALCIUM mg/dL 9.6 9.3  --  9.0   BILIRUBIN mg/dL 0.3 0.5  --  0.4   ALK PHOS U/L 40 44  --  45   ALT (SGPT) U/L 57* 37  --  47   AST (SGOT)  U/L 61* 31  --  32   GLUCOSE mg/dL 136* 120*  --  183*       Culture Data:   Blood Culture   Date Value Ref Range Status   08/15/2018 No growth at 2 days  Preliminary   08/15/2018 No growth at 2 days  Preliminary       Radiology Data:   Imaging Results (last 24 hours)     ** No results found for the last 24 hours. **          I have reviewed the patient's current medications.     Assessment/Plan     Hospital Problem List     Pneumonia of right upper lobe due to infectious organism (CMS/[facility])                 1.  COPD AE  -IV Steroids weaned to PO  -Nebs  -Mucinex  -Flutter  -will add CPT  -supplemental oxygen, wean as tolerated  -IV rocephin/zithromax changed to Levaquin     2. Sepsis  -Levaquin  -Blood/sputum cultures     3.  Pneumonia  -Rocephin/Zithromax changed to Levaquin  -Blood/sputum cultures     4.  Tobacco abuse  -Quit years ago     5.  Hypertensive Urgency  -IV hydralazine PRN     6.  Anxiety  -Klonopin prn                             Discharge Planning: I expect the patient to be discharged to home in 1 day     Vinicius Ellsworth MD   08/18/18   9:59 AM

## 2018-08-19 ENCOUNTER — APPOINTMENT (OUTPATIENT)
Dept: GENERAL RADIOLOGY | Facility: HOSPITAL | Age: 63
End: 2018-08-19

## 2018-08-19 ENCOUNTER — APPOINTMENT (OUTPATIENT)
Dept: CT IMAGING | Facility: HOSPITAL | Age: 63
End: 2018-08-19

## 2018-08-19 LAB
ALBUMIN SERPL-MCNC: 3.3 G/DL (ref 3.5–5)
ALBUMIN/GLOB SERPL: 1.1 G/DL (ref 1.1–2.5)
ALP SERPL-CCNC: 38 U/L (ref 24–120)
ALT SERPL W P-5'-P-CCNC: 79 U/L (ref 0–54)
ANION GAP SERPL CALCULATED.3IONS-SCNC: 8 MMOL/L (ref 4–13)
ARTERIAL PATENCY WRIST A: POSITIVE
AST SERPL-CCNC: 70 U/L (ref 7–45)
ATMOSPHERIC PRESS: 749 MMHG
BASE EXCESS BLDA CALC-SCNC: 0.9 MMOL/L (ref 0–2)
BASOPHILS # BLD AUTO: 0.06 10*3/MM3 (ref 0–0.2)
BASOPHILS NFR BLD AUTO: 0.4 % (ref 0–2)
BDY SITE: ABNORMAL
BILIRUB SERPL-MCNC: 0.3 MG/DL (ref 0.1–1)
BODY TEMPERATURE: 37 C
BUN BLD-MCNC: 22 MG/DL (ref 5–21)
BUN/CREAT SERPL: 22 (ref 7–25)
CALCIUM SPEC-SCNC: 9 MG/DL (ref 8.4–10.4)
CHLORIDE SERPL-SCNC: 104 MMOL/L (ref 98–110)
CO2 SERPL-SCNC: 28 MMOL/L (ref 24–31)
CREAT BLD-MCNC: 1 MG/DL (ref 0.5–1.4)
DEPRECATED RDW RBC AUTO: 50.1 FL (ref 40–54)
EOSINOPHIL # BLD AUTO: 0 10*3/MM3 (ref 0–0.7)
EOSINOPHIL NFR BLD AUTO: 0 % (ref 0–4)
ERYTHROCYTE [DISTWIDTH] IN BLOOD BY AUTOMATED COUNT: 14.6 % (ref 12–15)
GAS FLOW AIRWAY: 3 LPM
GFR SERPL CREATININE-BSD FRML MDRD: 76 ML/MIN/1.73
GLOBULIN UR ELPH-MCNC: 2.9 GM/DL
GLUCOSE BLD-MCNC: 96 MG/DL (ref 70–100)
HCO3 BLDA-SCNC: 25.2 MMOL/L (ref 20–26)
HCT VFR BLD AUTO: 49.1 % (ref 40–52)
HGB BLD-MCNC: 16.4 G/DL (ref 14–18)
IMM GRANULOCYTES # BLD: 0.3 10*3/MM3 (ref 0–0.03)
IMM GRANULOCYTES NFR BLD: 1.8 % (ref 0–5)
LYMPHOCYTES # BLD AUTO: 1 10*3/MM3 (ref 0.72–4.86)
LYMPHOCYTES NFR BLD AUTO: 6 % (ref 15–45)
Lab: ABNORMAL
MCH RBC QN AUTO: 30.9 PG (ref 28–32)
MCHC RBC AUTO-ENTMCNC: 33.4 G/DL (ref 33–36)
MCV RBC AUTO: 92.6 FL (ref 82–95)
MODALITY: ABNORMAL
MONOCYTES # BLD AUTO: 1.34 10*3/MM3 (ref 0.19–1.3)
MONOCYTES NFR BLD AUTO: 8 % (ref 4–12)
NEUTROPHILS # BLD AUTO: 13.96 10*3/MM3 (ref 1.87–8.4)
NEUTROPHILS NFR BLD AUTO: 83.8 % (ref 39–78)
NRBC BLD MANUAL-RTO: 0 /100 WBC (ref 0–0)
PCO2 BLDA: 38.2 MM HG (ref 35–45)
PH BLDA: 7.43 PH UNITS (ref 7.35–7.45)
PLATELET # BLD AUTO: 392 10*3/MM3 (ref 130–400)
PMV BLD AUTO: 10.7 FL (ref 6–12)
PO2 BLDA: 68.2 MM HG (ref 83–108)
POTASSIUM BLD-SCNC: 4.2 MMOL/L (ref 3.5–5.3)
PROT SERPL-MCNC: 6.2 G/DL (ref 6.3–8.7)
RBC # BLD AUTO: 5.3 10*6/MM3 (ref 4.8–5.9)
SAO2 % BLDCOA: 94.8 % (ref 94–99)
SODIUM BLD-SCNC: 140 MMOL/L (ref 135–145)
VENTILATOR MODE: ABNORMAL
WBC NRBC COR # BLD: 16.66 10*3/MM3 (ref 4.8–10.8)

## 2018-08-19 PROCEDURE — 80053 COMPREHEN METABOLIC PANEL: CPT | Performed by: FAMILY MEDICINE

## 2018-08-19 PROCEDURE — 82803 BLOOD GASES ANY COMBINATION: CPT

## 2018-08-19 PROCEDURE — 25010000002 ENOXAPARIN PER 10 MG: Performed by: FAMILY MEDICINE

## 2018-08-19 PROCEDURE — 36600 WITHDRAWAL OF ARTERIAL BLOOD: CPT

## 2018-08-19 PROCEDURE — 70450 CT HEAD/BRAIN W/O DYE: CPT

## 2018-08-19 PROCEDURE — 94799 UNLISTED PULMONARY SVC/PX: CPT

## 2018-08-19 PROCEDURE — 36415 COLL VENOUS BLD VENIPUNCTURE: CPT | Performed by: FAMILY MEDICINE

## 2018-08-19 PROCEDURE — 94669 MECHANICAL CHEST WALL OSCILL: CPT

## 2018-08-19 PROCEDURE — 71046 X-RAY EXAM CHEST 2 VIEWS: CPT

## 2018-08-19 PROCEDURE — 25010000002 LEVOFLOXACIN PER 250 MG: Performed by: FAMILY MEDICINE

## 2018-08-19 PROCEDURE — 94760 N-INVAS EAR/PLS OXIMETRY 1: CPT

## 2018-08-19 PROCEDURE — 25010000002 METHYLPREDNISOLONE PER 40 MG: Performed by: FAMILY MEDICINE

## 2018-08-19 PROCEDURE — 63710000001 PREDNISONE PER 1 MG: Performed by: FAMILY MEDICINE

## 2018-08-19 PROCEDURE — 85025 COMPLETE CBC W/AUTO DIFF WBC: CPT | Performed by: FAMILY MEDICINE

## 2018-08-19 PROCEDURE — 25010000002 HYDRALAZINE PER 20 MG: Performed by: FAMILY MEDICINE

## 2018-08-19 RX ORDER — METHYLPREDNISOLONE SODIUM SUCCINATE 40 MG/ML
40 INJECTION, POWDER, LYOPHILIZED, FOR SOLUTION INTRAMUSCULAR; INTRAVENOUS EVERY 8 HOURS
Status: DISCONTINUED | OUTPATIENT
Start: 2018-08-19 | End: 2018-08-21 | Stop reason: HOSPADM

## 2018-08-19 RX ORDER — HYDRALAZINE HYDROCHLORIDE 20 MG/ML
10 INJECTION INTRAMUSCULAR; INTRAVENOUS ONCE
Status: COMPLETED | OUTPATIENT
Start: 2018-08-19 | End: 2018-08-19

## 2018-08-19 RX ADMIN — HYDRALAZINE HYDROCHLORIDE 20 MG: 20 INJECTION INTRAMUSCULAR; INTRAVENOUS at 08:16

## 2018-08-19 RX ADMIN — CLONAZEPAM 0.5 MG: 0.5 TABLET ORAL at 20:55

## 2018-08-19 RX ADMIN — LEVOFLOXACIN 750 MG: 5 INJECTION, SOLUTION INTRAVENOUS at 08:17

## 2018-08-19 RX ADMIN — IPRATROPIUM BROMIDE AND ALBUTEROL SULFATE 3 ML: 2.5; .5 SOLUTION RESPIRATORY (INHALATION) at 07:35

## 2018-08-19 RX ADMIN — HYDROCODONE BITARTRATE AND ACETAMINOPHEN 1 TABLET: 5; 325 TABLET ORAL at 08:16

## 2018-08-19 RX ADMIN — ENOXAPARIN SODIUM 40 MG: 40 INJECTION SUBCUTANEOUS at 15:48

## 2018-08-19 RX ADMIN — PREDNISONE 20 MG: 20 TABLET ORAL at 08:17

## 2018-08-19 RX ADMIN — METHYLPREDNISOLONE SODIUM SUCCINATE 40 MG: 40 INJECTION, POWDER, FOR SOLUTION INTRAMUSCULAR; INTRAVENOUS at 15:47

## 2018-08-19 RX ADMIN — HYDROCODONE BITARTRATE AND ACETAMINOPHEN 1 TABLET: 5; 325 TABLET ORAL at 15:47

## 2018-08-19 RX ADMIN — GUAIFENESIN 1200 MG: 600 TABLET, EXTENDED RELEASE ORAL at 08:17

## 2018-08-19 RX ADMIN — GUAIFENESIN 1200 MG: 600 TABLET, EXTENDED RELEASE ORAL at 20:55

## 2018-08-19 RX ADMIN — IPRATROPIUM BROMIDE AND ALBUTEROL SULFATE 3 ML: 2.5; .5 SOLUTION RESPIRATORY (INHALATION) at 15:23

## 2018-08-19 RX ADMIN — HYDROCODONE BITARTRATE AND ACETAMINOPHEN 1 TABLET: 5; 325 TABLET ORAL at 21:49

## 2018-08-19 RX ADMIN — CLONAZEPAM 0.5 MG: 0.5 TABLET ORAL at 08:17

## 2018-08-19 RX ADMIN — IPRATROPIUM BROMIDE AND ALBUTEROL SULFATE 3 ML: 2.5; .5 SOLUTION RESPIRATORY (INHALATION) at 20:40

## 2018-08-19 RX ADMIN — HYDRALAZINE HYDROCHLORIDE 10 MG: 20 INJECTION INTRAMUSCULAR; INTRAVENOUS at 20:55

## 2018-08-19 RX ADMIN — HYDROCODONE BITARTRATE AND ACETAMINOPHEN 1 TABLET: 5; 325 TABLET ORAL at 00:19

## 2018-08-19 NOTE — PLAN OF CARE
Problem: Fall Risk (Adult)  Goal: Absence of Fall  Outcome: Ongoing (interventions implemented as appropriate)      Problem: Patient Care Overview  Goal: Plan of Care Review  Outcome: Ongoing (interventions implemented as appropriate)   08/19/18 9939   Plan of Care Review   Progress improving   Coping/Psychosocial   Plan of Care Reviewed With patient

## 2018-08-19 NOTE — PLAN OF CARE
Pt up to chair for breakfast, up and ambulating in the room. Ambulated out to the shi. Went down for chest xray today. Started back on IV stds,

## 2018-08-19 NOTE — PROGRESS NOTES
[facility] Medicine Services  INPATIENT PROGRESS NOTE    Patient Name: Reza Cruz  Date of Admission: 8/15/2018  Today's Date: 08/19/18  Length of Stay: 4  Primary Care Physician: Lobito Trevino Jr., MD    Subjective   Chief Complaint: SOA  HPI   [place] more SOA overnight  Coughing and wheezing more today  Afebrile        Review of Systems   Constitutional: Negative for fatigue and fever.   HENT: Negative for congestion and ear pain.    Eyes: Negative for redness and visual disturbance.   Respiratory: Positive for cough, shortness of breath and wheezing.    Cardiovascular: Negative for chest pain and palpitations.   Gastrointestinal: Negative for abdominal pain, diarrhea, nausea and vomiting.   Endocrine: Negative for cold intolerance and heat intolerance.   Genitourinary: Negative for dysuria and frequency.   Musculoskeletal: Negative for arthralgias and back pain.   Skin: Negative for rash and wound.   Neurological: Negative for dizziness and headaches.   Psychiatric/Behavioral: Negative for confusion. The patient is not nervous/anxious.       All pertinent negatives and positives are as above. All other systems have been reviewed and are negative unless otherwise stated.     Objective    Temp:  [97.1 °F (36.2 °C)-98.2 °F (36.8 °C)] 97.6 °F (36.4 °C)  Heart Rate:  [51-73] 73  Resp:  [12-16] 16  BP: (124-176)/(68-98) 124/68  Physical Exam   Constitutional: He is oriented to person, place, and time. He appears well-developed and well-nourished.   HENT:   Head: Normocephalic and atraumatic.   Right Ear: External ear normal.   Left Ear: External ear normal.   Nose: Nose normal.   Mouth/Throat: Oropharynx is clear and moist.   Eyes: Pupils are equal, round, and reactive to light. Conjunctivae and EOM are normal. Right eye exhibits no discharge. Left eye exhibits no discharge. No scleral icterus.   Neck: Normal range of motion. Neck supple. No tracheal deviation present. No  thyromegaly present.   Cardiovascular: Normal rate, regular rhythm, normal heart sounds and intact distal pulses.  Exam reveals no gallop and no friction rub.    No murmur heard.  Pulmonary/Chest: Effort normal. No stridor. No respiratory distress. He has decreased breath sounds. He has wheezes. He has rhonchi. He has no rales. He exhibits no tenderness.   Abdominal: Soft. Bowel sounds are normal. He exhibits no distension and no mass. There is no tenderness. There is no rebound and no guarding. No hernia.   Musculoskeletal: Normal range of motion. He exhibits no edema or deformity.   Lymphadenopathy:     He has no cervical adenopathy.   Neurological: He is alert and oriented to person, place, and time. He has normal reflexes. He displays normal reflexes. No cranial nerve deficit. He exhibits normal muscle tone. Coordination normal.   Skin: Skin is warm and dry. No rash noted. No erythema. No pallor.   Psychiatric: He has a normal mood and affect. His behavior is normal. Judgment and thought content normal.   Vitals reviewed.          Results Review:  I have reviewed the labs, radiology results, and diagnostic studies.    Laboratory Data:     Results from last 7 days  Lab Units 08/19/18  0551 08/18/18  0553 08/17/18  0500   WBC 10*3/mm3 16.66* 16.52* 23.48*   HEMOGLOBIN g/dL 16.4 16.3 16.5   HEMATOCRIT % 49.1 48.4 47.6   PLATELETS 10*3/mm3 392 376 319          Results from last 7 days  Lab Units 08/19/18  0551 08/18/18  0553 08/17/18  0500   SODIUM mmol/L 140 140 140   POTASSIUM mmol/L 4.2 5.3 4.2   CHLORIDE mmol/L 104 107 103   CO2 mmol/L 28.0 26.0 26.0   BUN mg/dL 22* 23* 21   CREATININE mg/dL 1.00 0.99 1.02   CALCIUM mg/dL 9.0 9.6 9.3   BILIRUBIN mg/dL 0.3 0.3 0.5   ALK PHOS U/L 38 40 44   ALT (SGPT) U/L 79* 57* 37   AST (SGOT) U/L 70* 61* 31   GLUCOSE mg/dL 96 136* 120*       Culture Data:   Blood Culture   Date Value Ref Range Status   08/15/2018 No growth at 3 days  Preliminary   08/15/2018 No growth at 3 days   Preliminary       Radiology Data:   Imaging Results (last 24 hours)     ** No results found for the last 24 hours. **          I have reviewed the patient's current medications.     Assessment/Plan     Hospital Problem List     Pneumonia of right upper lobe due to infectious organism (CMS/[facility])          1.  COPD AE  -IV Steroids  -Nebs  -Mucinex  -Flutter  -will add CPT  -supplemental oxygen, wean as tolerated  -IV rocephin/zithromax changed to Levaquin     2. Sepsis  -Levaquin  -Blood/sputum cultures     3.  Pneumonia  -Rocephin/Zithromax changed to Levaquin  -Blood/sputum cultures     4.  Tobacco abuse  -Quit years ago     5.  Hypertensive Urgency  -IV hydralazine PRN     6.  Anxiety  -Klonopin prn                 Discharge Planning: I expect the patient to be discharged to home in 1-2 days    Vinicius Ellsworth MD   08/19/18   11:14 AM

## 2018-08-19 NOTE — PLAN OF CARE
Problem: Patient Care Overview  Goal: Plan of Care Review  Outcome: Ongoing (interventions implemented as appropriate)   08/19/18 8453   OTHER   Outcome Summary C/o of pain x1, prn given with relief. VSS. Will continue to monitor.    Plan of Care Review   Progress improving   Coping/Psychosocial   Plan of Care Reviewed With patient     Goal: Individualization and Mutuality  Outcome: Ongoing (interventions implemented as appropriate)    Goal: Discharge Needs Assessment  Outcome: Ongoing (interventions implemented as appropriate)

## 2018-08-20 LAB
ALBUMIN SERPL-MCNC: 3.8 G/DL (ref 3.5–5)
ALBUMIN/GLOB SERPL: 1.2 G/DL (ref 1.1–2.5)
ALP SERPL-CCNC: 41 U/L (ref 24–120)
ALT SERPL W P-5'-P-CCNC: 200 U/L (ref 0–54)
ANION GAP SERPL CALCULATED.3IONS-SCNC: 7 MMOL/L (ref 4–13)
AST SERPL-CCNC: 154 U/L (ref 7–45)
BACTERIA SPEC AEROBE CULT: NORMAL
BACTERIA SPEC AEROBE CULT: NORMAL
BASOPHILS # BLD AUTO: 0.09 10*3/MM3 (ref 0–0.2)
BASOPHILS NFR BLD AUTO: 0.5 % (ref 0–2)
BILIRUB SERPL-MCNC: 0.5 MG/DL (ref 0.1–1)
BUN BLD-MCNC: 23 MG/DL (ref 5–21)
BUN/CREAT SERPL: 23.7 (ref 7–25)
CALCIUM SPEC-SCNC: 9.7 MG/DL (ref 8.4–10.4)
CHLORIDE SERPL-SCNC: 105 MMOL/L (ref 98–110)
CLONAZEPAM SERPL-MCNC: NORMAL NG/ML (ref 20–70)
CO2 SERPL-SCNC: 25 MMOL/L (ref 24–31)
CREAT BLD-MCNC: 0.97 MG/DL (ref 0.5–1.4)
DEPRECATED RDW RBC AUTO: 48 FL (ref 40–54)
EOSINOPHIL # BLD AUTO: 0 10*3/MM3 (ref 0–0.7)
EOSINOPHIL NFR BLD AUTO: 0 % (ref 0–4)
ERYTHROCYTE [DISTWIDTH] IN BLOOD BY AUTOMATED COUNT: 14.4 % (ref 12–15)
GFR SERPL CREATININE-BSD FRML MDRD: 79 ML/MIN/1.73
GLOBULIN UR ELPH-MCNC: 3.1 GM/DL
GLUCOSE BLD-MCNC: 132 MG/DL (ref 70–100)
HAV IGM SERPL QL IA: NEGATIVE
HBV CORE IGM SERPL QL IA: NEGATIVE
HBV SURFACE AG SERPL QL IA: NEGATIVE
HCT VFR BLD AUTO: 50.1 % (ref 40–52)
HCV AB SER DONR QL: REACTIVE
HCV S/C RATIO: 31.7 (ref 0–0.99)
HGB BLD-MCNC: 17.6 G/DL (ref 14–18)
IMM GRANULOCYTES # BLD: 0.75 10*3/MM3 (ref 0–0.03)
IMM GRANULOCYTES NFR BLD: 4.2 % (ref 0–5)
LYMPHOCYTES # BLD AUTO: 0.98 10*3/MM3 (ref 0.72–4.86)
LYMPHOCYTES NFR BLD AUTO: 5.5 % (ref 15–45)
MCH RBC QN AUTO: 31.9 PG (ref 28–32)
MCHC RBC AUTO-ENTMCNC: 35.1 G/DL (ref 33–36)
MCV RBC AUTO: 90.8 FL (ref 82–95)
MONOCYTES # BLD AUTO: 1.16 10*3/MM3 (ref 0.19–1.3)
MONOCYTES NFR BLD AUTO: 6.5 % (ref 4–12)
NEUTROPHILS # BLD AUTO: 14.88 10*3/MM3 (ref 1.87–8.4)
NEUTROPHILS NFR BLD AUTO: 83.3 % (ref 39–78)
NRBC BLD MANUAL-RTO: 0 /100 WBC (ref 0–0)
PLATELET # BLD AUTO: 387 10*3/MM3 (ref 130–400)
PMV BLD AUTO: 10.3 FL (ref 6–12)
POTASSIUM BLD-SCNC: 4.1 MMOL/L (ref 3.5–5.3)
PROT SERPL-MCNC: 6.9 G/DL (ref 6.3–8.7)
RBC # BLD AUTO: 5.52 10*6/MM3 (ref 4.8–5.9)
SODIUM BLD-SCNC: 137 MMOL/L (ref 135–145)
WBC NRBC COR # BLD: 17.86 10*3/MM3 (ref 4.8–10.8)

## 2018-08-20 PROCEDURE — 25010000002 ENOXAPARIN PER 10 MG: Performed by: FAMILY MEDICINE

## 2018-08-20 PROCEDURE — 94618 PULMONARY STRESS TESTING: CPT

## 2018-08-20 PROCEDURE — 25010000002 METHYLPREDNISOLONE PER 40 MG: Performed by: FAMILY MEDICINE

## 2018-08-20 PROCEDURE — 80053 COMPREHEN METABOLIC PANEL: CPT | Performed by: FAMILY MEDICINE

## 2018-08-20 PROCEDURE — 94760 N-INVAS EAR/PLS OXIMETRY 1: CPT

## 2018-08-20 PROCEDURE — 25010000002 LEVOFLOXACIN PER 250 MG: Performed by: FAMILY MEDICINE

## 2018-08-20 PROCEDURE — 80074 ACUTE HEPATITIS PANEL: CPT | Performed by: FAMILY MEDICINE

## 2018-08-20 PROCEDURE — 94669 MECHANICAL CHEST WALL OSCILL: CPT

## 2018-08-20 PROCEDURE — 85025 COMPLETE CBC W/AUTO DIFF WBC: CPT | Performed by: FAMILY MEDICINE

## 2018-08-20 PROCEDURE — 94799 UNLISTED PULMONARY SVC/PX: CPT

## 2018-08-20 RX ORDER — AMLODIPINE BESYLATE 10 MG/1
10 TABLET ORAL DAILY
Status: DISCONTINUED | OUTPATIENT
Start: 2018-08-20 | End: 2018-08-21 | Stop reason: HOSPADM

## 2018-08-20 RX ORDER — LEVOFLOXACIN 5 MG/ML
500 INJECTION, SOLUTION INTRAVENOUS EVERY 24 HOURS
Status: DISCONTINUED | OUTPATIENT
Start: 2018-08-21 | End: 2018-08-21 | Stop reason: HOSPADM

## 2018-08-20 RX ADMIN — GUAIFENESIN 1200 MG: 600 TABLET, EXTENDED RELEASE ORAL at 21:19

## 2018-08-20 RX ADMIN — CLONAZEPAM 0.5 MG: 0.5 TABLET ORAL at 07:59

## 2018-08-20 RX ADMIN — IPRATROPIUM BROMIDE AND ALBUTEROL SULFATE 3 ML: 2.5; .5 SOLUTION RESPIRATORY (INHALATION) at 18:53

## 2018-08-20 RX ADMIN — LEVOFLOXACIN 750 MG: 5 INJECTION, SOLUTION INTRAVENOUS at 10:02

## 2018-08-20 RX ADMIN — IPRATROPIUM BROMIDE AND ALBUTEROL SULFATE 3 ML: 2.5; .5 SOLUTION RESPIRATORY (INHALATION) at 14:31

## 2018-08-20 RX ADMIN — AMLODIPINE BESYLATE 10 MG: 10 TABLET ORAL at 11:38

## 2018-08-20 RX ADMIN — BISOPROLOL FUMARATE: 5 TABLET ORAL at 11:38

## 2018-08-20 RX ADMIN — METHYLPREDNISOLONE SODIUM SUCCINATE 40 MG: 40 INJECTION, POWDER, FOR SOLUTION INTRAMUSCULAR; INTRAVENOUS at 00:36

## 2018-08-20 RX ADMIN — ENOXAPARIN SODIUM 40 MG: 40 INJECTION SUBCUTANEOUS at 17:19

## 2018-08-20 RX ADMIN — CLONAZEPAM 0.5 MG: 0.5 TABLET ORAL at 21:19

## 2018-08-20 RX ADMIN — METHYLPREDNISOLONE SODIUM SUCCINATE 40 MG: 40 INJECTION, POWDER, FOR SOLUTION INTRAMUSCULAR; INTRAVENOUS at 16:03

## 2018-08-20 RX ADMIN — GUAIFENESIN 1200 MG: 600 TABLET, EXTENDED RELEASE ORAL at 07:59

## 2018-08-20 RX ADMIN — METHYLPREDNISOLONE SODIUM SUCCINATE 40 MG: 40 INJECTION, POWDER, FOR SOLUTION INTRAMUSCULAR; INTRAVENOUS at 23:52

## 2018-08-20 RX ADMIN — METHYLPREDNISOLONE SODIUM SUCCINATE 40 MG: 40 INJECTION, POWDER, FOR SOLUTION INTRAMUSCULAR; INTRAVENOUS at 07:59

## 2018-08-20 RX ADMIN — IPRATROPIUM BROMIDE AND ALBUTEROL SULFATE 3 ML: 2.5; .5 SOLUTION RESPIRATORY (INHALATION) at 07:50

## 2018-08-20 RX ADMIN — HYDROCODONE BITARTRATE AND ACETAMINOPHEN 1 TABLET: 5; 325 TABLET ORAL at 06:11

## 2018-08-20 NOTE — PLAN OF CARE
Problem: Fall Risk (Adult)  Goal: Identify Related Risk Factors and Signs and Symptoms  Outcome: Ongoing (interventions implemented as appropriate)    Goal: Absence of Fall  Outcome: Ongoing (interventions implemented as appropriate)      Problem: Patient Care Overview  Goal: Plan of Care Review   08/20/18 0500   OTHER   Outcome Summary C/o of pain x1, prn given with relief. VSS. Will continue to monitor.    Plan of Care Review   Progress improving   Coping/Psychosocial   Plan of Care Reviewed With patient     Goal: Individualization and Mutuality  Outcome: Ongoing (interventions implemented as appropriate)    Goal: Discharge Needs Assessment  Outcome: Ongoing (interventions implemented as appropriate)

## 2018-08-20 NOTE — PROGRESS NOTES
[facility] Medicine Services  INPATIENT PROGRESS NOTE    Length of Stay: 5  Date of Admission: 8/15/2018  Primary Care Physician: Lobito Trevino Jr., MD    Subjective   Chief Complaint: Shortness of breath.    HPI   Patient breathing better.  Patient denies any chest pain.  Patient denies any abdomen pain.  Liver enzyme has been increasing elevated.    Review of Systems   Constitutional: Positive for activity change and fatigue. Negative for appetite change, chills and fever.   HENT: Negative for hearing loss, nosebleeds, tinnitus and trouble swallowing.    Eyes: Negative for visual disturbance.   Respiratory: Positive for shortness of breath and wheezing. Negative for cough and chest tightness.    Cardiovascular: Negative for chest pain, palpitations and leg swelling.   Gastrointestinal: Negative for abdominal distention, abdominal pain, blood in stool, constipation, diarrhea, nausea and vomiting.   Endocrine: Negative for cold intolerance, heat intolerance, polydipsia, polyphagia and polyuria.   Genitourinary: Negative for decreased urine volume, difficulty urinating, dysuria, flank pain, frequency and hematuria.   Musculoskeletal: Negative for arthralgias, joint swelling and myalgias.   Skin: Negative for rash.   Allergic/Immunologic: Negative for immunocompromised state.   Neurological: Positive for weakness. Negative for dizziness, syncope, light-headedness and headaches.   Hematological: Negative for adenopathy. Does not bruise/bleed easily.   Psychiatric/Behavioral: Negative for confusion and sleep disturbance. The patient is not nervous/anxious.           All pertinent negatives and positives are as above. All other systems have been reviewed and are negative unless otherwise stated.     Objective    Temp:  [97.3 °F (36.3 °C)-98.7 °F (37.1 °C)] 97.4 °F (36.3 °C)  Heart Rate:  [54-92] 92  Resp:  [16-18] 18  BP: (117-158)/() 158/99    Intake/Output Summary (Last 24  hours) at 08/20/18 1220  Last data filed at 08/20/18 1159   Gross per 24 hour   Intake              630 ml   Output                0 ml   Net              630 ml     Physical Exam   Constitutional: He is oriented to person, place, and time. He appears well-developed and well-nourished.   HENT:   Head: Normocephalic and atraumatic.   Eyes: Pupils are equal, round, and reactive to light. Conjunctivae and EOM are normal. No scleral icterus.   Neck: Normal range of motion. Neck supple. No JVD present. Carotid bruit is not present. No tracheal deviation present. No thyromegaly present.   Cardiovascular: Normal rate, regular rhythm, normal heart sounds and intact distal pulses.  Exam reveals no gallop and no friction rub.    No murmur heard.  Pulmonary/Chest: Effort normal. No respiratory distress. He has wheezes. He has no rales. He exhibits no tenderness.   Rhonchi bilateral   Abdominal: Soft. Bowel sounds are normal. He exhibits no distension. There is no tenderness. There is no rebound and no guarding.   Musculoskeletal: Normal range of motion. He exhibits no edema, tenderness or deformity.   Lymphadenopathy:     He has no cervical adenopathy.   Neurological: He is alert and oriented to person, place, and time. He displays normal reflexes. No cranial nerve deficit. He exhibits normal muscle tone.   Skin: Skin is warm and dry. No rash noted.   Psychiatric: He has a normal mood and affect. His behavior is normal. Judgment and thought content normal.   Nursing note and vitals reviewed.      Results Review:  Lab Results (last 24 hours)     Procedure Component Value Units Date/Time    Comprehensive Metabolic Panel [[id number]]  (Abnormal) Collected:  08/20/18 0410    Specimen:  Blood Updated:  08/20/18 0439     Glucose 132 (H) mg/dL      BUN 23 (H) mg/dL      Creatinine 0.97 mg/dL      Sodium 137 mmol/L      Potassium 4.1 mmol/L      Chloride 105 mmol/L      CO2 25.0 mmol/L      Calcium 9.7 mg/dL      Total Protein 6.9 g/dL       Albumin 3.80 g/dL      ALT (SGPT) 200 (H) U/L      AST (SGOT) 154 (H) U/L      Alkaline Phosphatase 41 U/L      Total Bilirubin 0.5 mg/dL      eGFR Non African Amer 79 mL/min/1.73      Globulin 3.1 gm/dL      A/G Ratio 1.2 g/dL      BUN/Creatinine Ratio 23.7     Anion Gap 7.0 mmol/L     CBC & Differential [[id number]] Collected:  08/20/18 0410    Specimen:  Blood Updated:  08/20/18 0429    Narrative:       The following orders were created for panel order CBC & Differential.  Procedure                               Abnormality         Status                     ---------                               -----------         ------                     CBC Auto Differential[[id number]]        Abnormal            Final result                 Please view results for these tests on the individual orders.    CBC Auto Differential [[id number]]  (Abnormal) Collected:  08/20/18 0410    Specimen:  Blood Updated:  08/20/18 0429     WBC 17.86 (H) 10*3/mm3      RBC 5.52 10*6/mm3      Hemoglobin 17.6 g/dL      Hematocrit 50.1 %      MCV 90.8 fL      MCH 31.9 pg      MCHC 35.1 g/dL      RDW 14.4 %      RDW-SD 48.0 fl      MPV 10.3 fL      Platelets 387 10*3/mm3      Neutrophil % 83.3 (H) %      Lymphocyte % 5.5 (L) %      Monocyte % 6.5 %      Eosinophil % 0.0 %      Basophil % 0.5 %      Immature Grans % 4.2 %      Neutrophils, Absolute 14.88 (H) 10*3/mm3      Lymphocytes, Absolute 0.98 10*3/mm3      Monocytes, Absolute 1.16 10*3/mm3      Eosinophils, Absolute 0.00 10*3/mm3      Basophils, Absolute 0.09 10*3/mm3      Immature Grans, Absolute 0.75 (H) 10*3/mm3      nRBC 0.0 /100 WBC     Blood Culture - Blood, [[id number]]  (Normal) Collected:  08/15/18 1643    Specimen:  Blood from Hand, Left Updated:  08/19/18 1700     Blood Culture No growth at 4 days    Blood Culture - Blood, [[id number]]  (Normal) Collected:  08/15/18 1527    Specimen:  Blood from Arm, Left Updated:  08/19/18 1545     Blood Culture No growth at 4 days            Cultures:  Blood Culture   Date Value Ref Range Status   08/15/2018 No growth at 4 days  Preliminary   08/15/2018 No growth at 4 days  Preliminary       Radiology Data:    Imaging Results (last 24 hours)     Procedure Component Value Units Date/Time    CT Head Without Contrast [[id number]] Collected:  08/19/18 1539     Updated:  08/19/18 1544    Narrative:       EXAMINATION: CT HEAD WO CONTRAST- 8/19/2018 3:39 PM CDT     HISTORY: Dilated pupil, elevated BP; J18.1-Lobar pneumonia, unspecified  organism; J44.1-Chronic obstructive pulmonary disease with (acute)  exacerbation; Z74.09-Other reduced mobility. Headaches with dizziness,  hypertension.     Axial CT of the head was performed without contrast, reconstructed  coronal and sagittal images are also reviewed.     Comparison: CT of the head without contrast 6/27/2016.     No intracranial hemorrhage, mass or mass effect is identified. The  ventricles and basal cisterns are within normal limits. There is mildly  decreased attenuation in the periventricular white matter tracts  compatible with chronic small vessel white matter ischemic disease. The  extracranial structures appear within normal limits.       Impression:       No acute intracranial abnormality.  This report was finalized on 08/19/2018 15:41 by Dr. Justin Bruner MD.    XR Chest PA & Lateral [[id number]] Collected:  08/19/18 1413     Updated:  08/19/18 1419    Narrative:       EXAMINATION: XR CHEST PA AND LATERAL- 8/19/2018 2:13 PM CDT     HISTORY: SOA; J18.1-Lobar pneumonia, unspecified organism; J44.1-Chronic  obstructive pulmonary disease with (acute) exacerbation; Z74.09-Other  reduced mobility.     REPORT: Comparison is made with the study from 8/15/2018.     The right upper lobe infiltrate has nearly completely resolved, there is  mild interstitial prominence as before. Heart size is normal. No  pneumothorax or effusion is identified. The osseous structures show  nothing acute.       Impression:        Near complete resolution of right upper lobe pneumonia.  This report was finalized on 2018 14:16 by Dr. Justin Bruner MD.          Allergies   Allergen Reactions   • Codeine Itching and Rash       Scheduled meds:     amLODIPine 10 mg Oral Daily   bisoprolol-hydroCHLOROthiazide (ZIAC) 5-6.25 mg combo tab  Oral Q24H   clonazePAM 0.5 mg Oral BID   enoxaparin 40 mg Subcutaneous Q24H   guaiFENesin 1,200 mg Oral Q12H   ipratropium-albuterol 3 mL Nebulization TID - RT   levoFLOXacin 750 mg Intravenous Q24H   methylPREDNISolone sodium succinate 40 mg Intravenous Q8H       PRN meds:  hydrALAZINE  •  HYDROcodone-acetaminophen  •  ipratropium-albuterol  •  ondansetron  •  sodium chloride  •  sodium chloride  •  Insert peripheral IV **AND** sodium chloride    Assessment/Plan     Active Problems:    Pneumonia of right upper lobe due to infectious organism (CMS/HCC)      Plan:  Sepsis/pneumonia.  Sepsis resolved.  Rocephin and azithromycin has been changed to Levaquin.  Chest x-ray- near complete resolution of right upper lobe pneumonia.    CBC exacerbation.  Solu-Medrol.  Duo nebs.  Mucomyst.  Flutter.  Chest PT.  Supplementary oxygen.    Elevated liver enzyme.  Ultrasound the liver.  Acute hepatitis panel.  Lipase and amylase.  Lipid profile.  Hemoglobin A1c.    History of tobacco abuse.  Quit a year ago.    Hypertension urgency.  IV hydralazine when necessary.  Start patient back on home medication of Ziac and Norvasc.    Anxiety. Klonopin when necessary.    Blood culture no growth in 4 days.    Discharge Plannin-2 days.    Hipolito Casey MD   18   12:20 PM

## 2018-08-20 NOTE — PROGRESS NOTES
Exercise Oximetry    Patient Name:Reza Cruz   MRN: [id number]   Date: 08/20/18             ROOM AIR BASELINE   SpO2% 97   Heart Rate 78   Blood Pressure n/a     EXERCISE ON ROOM AIR SpO2% EXERCISE ON O2 @  LPM SpO2%   1 MINUTE 97 1 MINUTE    2 MINUTES 97 2 MINUTES    3 MINUTES 97 3 MINUTES    4 MINUTES 97 4 MINUTES    5 MINUTES 97 5 MINUTES    6 MINUTES 97 6 MINUTES               Distance Walked n/a Distance Walked   Dyspnea (Vicki Scale)  20 Dyspnea (Vicki Scale)   Fatigue (Vicki Scale)  20 Fatigue (Vicki Scale)   SpO2% Post Exercise  98 SpO2% Post Exercise   HR Post Exercise  78 HR Post Exercise   Time to Recovery n/a Time to Recovery     Comments: Patient passed walk-ox. Safety measures were taken via R.T. Assist and use of non-slip socks. Patient tolerated test very well. No c/o  S.O.A.. No c/o weakness. No c/o dizziness.

## 2018-08-20 NOTE — PROGRESS NOTES
Continued Stay Note  FRANK Rand     Patient Name: Reza Cruz  MRN: [id number]  Today's Date: 8/20/2018    Admit Date: 8/15/2018          Discharge Plan     Row Name 08/20/18 0948       Plan    Plan Home    Patient/Family in Agreement with Plan yes    Plan Comments Pt plans to return home alone as before.  No home needs identified. Will follow.               Discharge Codes    No documentation.           TAMIKA Rodriguez

## 2018-08-20 NOTE — PLAN OF CARE
Problem: Fall Risk (Adult)  Goal: Identify Related Risk Factors and Signs and Symptoms  Outcome: Ongoing (interventions implemented as appropriate)    Goal: Absence of Fall  Outcome: Ongoing (interventions implemented as appropriate)      Problem: Patient Care Overview  Goal: Plan of Care Review  Outcome: Ongoing (interventions implemented as appropriate)   08/20/18 8189   Plan of Care Review   Progress improving   Coping/Psychosocial   Plan of Care Reviewed With patient

## 2018-08-21 ENCOUNTER — APPOINTMENT (OUTPATIENT)
Dept: ULTRASOUND IMAGING | Facility: HOSPITAL | Age: 63
End: 2018-08-21

## 2018-08-21 VITALS
SYSTOLIC BLOOD PRESSURE: 128 MMHG | HEIGHT: 72 IN | WEIGHT: 196.4 LBS | BODY MASS INDEX: 26.6 KG/M2 | RESPIRATION RATE: 20 BRPM | TEMPERATURE: 97.6 F | HEART RATE: 55 BPM | OXYGEN SATURATION: 97 % | DIASTOLIC BLOOD PRESSURE: 81 MMHG

## 2018-08-21 LAB
ALBUMIN SERPL-MCNC: 3.4 G/DL (ref 3.5–5)
ALBUMIN/GLOB SERPL: 1.2 G/DL (ref 1.1–2.5)
ALP SERPL-CCNC: 43 U/L (ref 24–120)
ALT SERPL W P-5'-P-CCNC: 242 U/L (ref 0–54)
AMYLASE SERPL-CCNC: 85 U/L (ref 30–110)
ANION GAP SERPL CALCULATED.3IONS-SCNC: 8 MMOL/L (ref 4–13)
ARTICHOKE IGE QN: 85 MG/DL (ref 0–99)
AST SERPL-CCNC: 141 U/L (ref 7–45)
BASOPHILS # BLD AUTO: 0.08 10*3/MM3 (ref 0–0.2)
BASOPHILS NFR BLD AUTO: 0.5 % (ref 0–2)
BILIRUB SERPL-MCNC: 0.4 MG/DL (ref 0.1–1)
BUN BLD-MCNC: 30 MG/DL (ref 5–21)
BUN/CREAT SERPL: 28.8 (ref 7–25)
CALCIUM SPEC-SCNC: 9.5 MG/DL (ref 8.4–10.4)
CHLORIDE SERPL-SCNC: 101 MMOL/L (ref 98–110)
CHOLEST SERPL-MCNC: 195 MG/DL (ref 130–200)
CO2 SERPL-SCNC: 29 MMOL/L (ref 24–31)
CREAT BLD-MCNC: 1.04 MG/DL (ref 0.5–1.4)
DEPRECATED RDW RBC AUTO: 48.5 FL (ref 40–54)
EOSINOPHIL # BLD AUTO: 0 10*3/MM3 (ref 0–0.7)
EOSINOPHIL NFR BLD AUTO: 0 % (ref 0–4)
ERYTHROCYTE [DISTWIDTH] IN BLOOD BY AUTOMATED COUNT: 14.5 % (ref 12–15)
GFR SERPL CREATININE-BSD FRML MDRD: 73 ML/MIN/1.73
GLOBULIN UR ELPH-MCNC: 2.9 GM/DL
GLUCOSE BLD-MCNC: 163 MG/DL (ref 70–100)
HBA1C MFR BLD: 5.4 %
HCT VFR BLD AUTO: 48.1 % (ref 40–52)
HDLC SERPL-MCNC: 79 MG/DL
HGB BLD-MCNC: 16.8 G/DL (ref 14–18)
IMM GRANULOCYTES # BLD: 0.68 10*3/MM3 (ref 0–0.03)
IMM GRANULOCYTES NFR BLD: 4 % (ref 0–5)
LDLC/HDLC SERPL: 1.11 {RATIO}
LIPASE SERPL-CCNC: 163 U/L (ref 23–203)
LYMPHOCYTES # BLD AUTO: 0.89 10*3/MM3 (ref 0.72–4.86)
LYMPHOCYTES NFR BLD AUTO: 5.3 % (ref 15–45)
MCH RBC QN AUTO: 31.9 PG (ref 28–32)
MCHC RBC AUTO-ENTMCNC: 34.9 G/DL (ref 33–36)
MCV RBC AUTO: 91.4 FL (ref 82–95)
MONOCYTES # BLD AUTO: 1.26 10*3/MM3 (ref 0.19–1.3)
MONOCYTES NFR BLD AUTO: 7.5 % (ref 4–12)
NEUTROPHILS # BLD AUTO: 13.99 10*3/MM3 (ref 1.87–8.4)
NEUTROPHILS NFR BLD AUTO: 82.7 % (ref 39–78)
NRBC BLD MANUAL-RTO: 0 /100 WBC (ref 0–0)
PLATELET # BLD AUTO: 377 10*3/MM3 (ref 130–400)
PMV BLD AUTO: 10.6 FL (ref 6–12)
POTASSIUM BLD-SCNC: 4.3 MMOL/L (ref 3.5–5.3)
PROT SERPL-MCNC: 6.3 G/DL (ref 6.3–8.7)
RBC # BLD AUTO: 5.26 10*6/MM3 (ref 4.8–5.9)
SODIUM BLD-SCNC: 138 MMOL/L (ref 135–145)
TRIGL SERPL-MCNC: 141 MG/DL (ref 0–149)
TSH SERPL DL<=0.05 MIU/L-ACNC: 48.2 MIU/ML (ref 0.47–4.68)
WBC NRBC COR # BLD: 16.9 10*3/MM3 (ref 4.8–10.8)

## 2018-08-21 PROCEDURE — 94669 MECHANICAL CHEST WALL OSCILL: CPT

## 2018-08-21 PROCEDURE — 80050 GENERAL HEALTH PANEL: CPT | Performed by: FAMILY MEDICINE

## 2018-08-21 PROCEDURE — 80061 LIPID PANEL: CPT | Performed by: FAMILY MEDICINE

## 2018-08-21 PROCEDURE — 94799 UNLISTED PULMONARY SVC/PX: CPT

## 2018-08-21 PROCEDURE — 82150 ASSAY OF AMYLASE: CPT | Performed by: FAMILY MEDICINE

## 2018-08-21 PROCEDURE — 76705 ECHO EXAM OF ABDOMEN: CPT

## 2018-08-21 PROCEDURE — 25010000002 METHYLPREDNISOLONE PER 40 MG: Performed by: FAMILY MEDICINE

## 2018-08-21 PROCEDURE — 25010000002 LEVOFLOXACIN PER 250 MG: Performed by: FAMILY MEDICINE

## 2018-08-21 PROCEDURE — 83036 HEMOGLOBIN GLYCOSYLATED A1C: CPT | Performed by: FAMILY MEDICINE

## 2018-08-21 PROCEDURE — 36415 COLL VENOUS BLD VENIPUNCTURE: CPT | Performed by: FAMILY MEDICINE

## 2018-08-21 PROCEDURE — 83690 ASSAY OF LIPASE: CPT | Performed by: FAMILY MEDICINE

## 2018-08-21 PROCEDURE — 94760 N-INVAS EAR/PLS OXIMETRY 1: CPT

## 2018-08-21 RX ORDER — LEVOFLOXACIN 500 MG/1
500 TABLET, FILM COATED ORAL DAILY
Qty: 4 TABLET | Refills: 0 | Status: SHIPPED | OUTPATIENT
Start: 2018-08-21 | End: 2018-08-25

## 2018-08-21 RX ORDER — IPRATROPIUM BROMIDE AND ALBUTEROL SULFATE 2.5; .5 MG/3ML; MG/3ML
3 SOLUTION RESPIRATORY (INHALATION)
Qty: 360 ML | Refills: 2 | Status: SHIPPED | OUTPATIENT
Start: 2018-08-21 | End: 2019-04-29 | Stop reason: SDUPTHER

## 2018-08-21 RX ADMIN — LEVOFLOXACIN 500 MG: 5 INJECTION, SOLUTION INTRAVENOUS at 09:58

## 2018-08-21 RX ADMIN — GUAIFENESIN 1200 MG: 600 TABLET, EXTENDED RELEASE ORAL at 09:49

## 2018-08-21 RX ADMIN — AMLODIPINE BESYLATE 10 MG: 10 TABLET ORAL at 09:49

## 2018-08-21 RX ADMIN — CLONAZEPAM 0.5 MG: 0.5 TABLET ORAL at 09:49

## 2018-08-21 RX ADMIN — IPRATROPIUM BROMIDE AND ALBUTEROL SULFATE 3 ML: 2.5; .5 SOLUTION RESPIRATORY (INHALATION) at 07:16

## 2018-08-21 RX ADMIN — METHYLPREDNISOLONE SODIUM SUCCINATE 40 MG: 40 INJECTION, POWDER, FOR SOLUTION INTRAMUSCULAR; INTRAVENOUS at 09:49

## 2018-08-21 RX ADMIN — BISOPROLOL FUMARATE: 5 TABLET ORAL at 09:48

## 2018-08-21 NOTE — PLAN OF CARE
Problem: Fall Risk (Adult)  Goal: Identify Related Risk Factors and Signs and Symptoms  Outcome: Outcome(s) achieved Date Met: 08/21/18    Goal: Absence of Fall  Outcome: Ongoing (interventions implemented as appropriate)      Problem: Patient Care Overview  Goal: Plan of Care Review  Outcome: Ongoing (interventions implemented as appropriate)   08/21/18 0328   OTHER   Outcome Summary PT denies SOA. Lung sounds coarse with wheezes, RA. NPO for liver US today.    Plan of Care Review   Progress no change   Coping/Psychosocial   Plan of Care Reviewed With patient       Problem: Pneumonia (Adult)  Goal: Signs and Symptoms of Listed Potential Problems Will be Absent, Minimized or Managed (Pneumonia)  Outcome: Ongoing (interventions implemented as appropriate)

## 2018-08-21 NOTE — PROGRESS NOTES
Continued Stay Note   [place]     Patient Name: Reza Cruz  MRN: [id number]  Today's Date: 8/21/2018    Admit Date: 8/15/2018          Discharge Plan     Row Name 08/21/18 1402       Plan    Plan Home    Patient/Family in Agreement with Plan yes    Final Discharge Disposition Code 01 - home or self-care    Final Note Pt is discharged home.  Pt has nebulizer ordered, pt already has one at home.  No needs identified.               Discharge Codes    No documentation.       Expected Discharge Date and Time     Expected Discharge Date Expected Discharge Time    Aug 21, 2018             TAMIKA Rodriguez

## 2018-08-21 NOTE — DISCHARGE SUMMARY
[facility] Medicine [facility]  DISCHARGE SUMMARY       Date of Admission: 8/15/2018  Date of Discharge:  8/21/2018  Primary Care Physician: Lobito Trevino Jr., MD    Presenting Problem/History of Present Illness:  Pneumonia of right upper lobe due to infectious organism (CMS/HCC) [J18.1]     Final Discharge Diagnoses:  Hospital Problem List     Pneumonia of right upper lobe due to infectious organism (CMS/HCC)          Pertinent Test Results:   IMPRESSION: Ultrasound the liver  Questionable mild fatty liver. Otherwise unremarkable.    IMPRESSION: CT scan ahead  No acute intracranial abnormality.    IMPRESSION: Chest x-ray  Near complete resolution of right upper lobe pneumonia.    Chief Complaint on Day of Discharge: none    History of Present Illness on Day of Discharge:   The patient is a 63 year old gentleman with a history of COPD and tobacco abuse.  He presents with 3-4 days of worsening coughing, wheezing and shortness of breath.  His cough is non-productive.  He feels like he has sputum that won't come up.  He is no longer smoking.  He denies fevers or chills.  He says that this finally got to the point he couldn't stand it.    Hospital Course:  The patient is a 63 y.o. male who presented to [facility] with sepsis/pneumonia/COPD exacerbation.  Patient initially was starting Rocephin and azithromycin.  This is later changed to Levaquin antibiotics.  Patient was also started Solu-Medrol and duo nebs.  The most recent chest x-ray shows near complete resolution of right upper lobe pneumonia.  Patient continue duo nebs and Levaquin at home.  Patient was started back on his by mouth steroid medication at home.    COPD.  Continue duo nebs.  Patient states instructed continue steroid from cigarette.     Hypertension urgency.  Patient was initially started  IV hydralazine when necessary.   Once blood pressure stabilized, patient back on home medication of Ziac and  "Norvasc.  Patient blood pressure is been stable.    Hepatitis C/elevated liver enzyme.  Patient would eat follow-up appointment with GI as outpatient for hepatitis C.     Vital signs stable, afebrile.  Patient follow up primary care doctor 1 week.  Patient will follow with GI specialist for hepatitis C, newly diagnosed.  Patient follow up with pulmonary, Dr. Kowalski    Condition on Discharge:  stable    Physical Exam on Discharge:  /81 (BP Location: Right arm, Patient Position: Lying)   Pulse 55   Temp 97.6 °F (36.4 °C) (Temporal Artery )   Resp 20   Ht 182.9 cm (72\")   Wt 89.1 kg (196 lb 6.4 oz)   SpO2 97%   BMI 26.64 kg/m²   Physical Exam   Constitutional: He is oriented to person, place, and time. He appears well-developed and well-nourished.   HENT:   Head: Normocephalic and atraumatic.   Eyes: Pupils are equal, round, and reactive to light. Conjunctivae and EOM are normal.   Neck: Neck supple. No JVD present. No thyromegaly present.   Cardiovascular: Normal rate, regular rhythm, normal heart sounds and intact distal pulses.  Exam reveals no gallop and no friction rub.    No murmur heard.  Pulmonary/Chest: Effort normal. No respiratory distress. He has wheezes. He has no rales. He exhibits no tenderness.   Abdominal: Soft. Bowel sounds are normal. He exhibits no distension. There is no tenderness. There is no rebound and no guarding.   Musculoskeletal: Normal range of motion. He exhibits no edema, tenderness or deformity.   Lymphadenopathy:     He has no cervical adenopathy.   Neurological: He is alert and oriented to person, place, and time. He displays normal reflexes. No cranial nerve deficit. He exhibits normal muscle tone.   Skin: Skin is warm and dry. No rash noted.   Psychiatric: He has a normal mood and affect. His behavior is normal. Judgment and thought content normal.   Nursing note and vitals reviewed.        Discharge Disposition:  Home or Self Care    Discharge Medications:     Discharge " Medications      New Medications      Instructions Start Date   ipratropium-albuterol 0.5-2.5 mg/3 ml nebulizer  Commonly known as:  DUO-NEB  Replaces:  ipratropium-albuterol  MCG/ACT inhaler   3 mL, Nebulization, 4 Times Daily - RT      levoFLOXacin 500 MG tablet  Commonly known as:  LEVAQUIN   500 mg, Oral, Daily         Continue These Medications      Instructions Start Date   albuterol 108 (90 Base) MCG/ACT inhaler  Commonly known as:  PROVENTIL HFA;VENTOLIN HFA   2 puffs, Inhalation, 4 Times Daily PRN      amLODIPine 5 MG tablet  Commonly known as:  NORVASC   10 mg, Oral, Daily      bisoprolol-hydrochlorothiazide 5-6.25 MG per tablet  Commonly known as:  ZIAC   1 tablet, Oral, Daily      budesonide-formoterol 160-4.5 MCG/ACT inhaler  Commonly known as:  SYMBICORT   2 puffs, Inhalation, 2 Times Daily - RT      levothyroxine 175 MCG tablet  Commonly known as:  SYNTHROID, LEVOTHROID   175 mcg, Oral, Daily      montelukast 10 MG tablet  Commonly known as:  SINGULAIR   10 mg, Oral, Daily      predniSONE 10 MG tablet  Commonly known as:  DELTASONE   10 mg, Oral, Daily      tamsulosin 0.4 MG capsule 24 hr capsule  Commonly known as:  FLOMAX   1 capsule, Oral, Daily         Stop These Medications    benzonatate 100 MG capsule  Commonly known as:  TESSALON     ipratropium-albuterol  MCG/ACT inhaler  Commonly known as:  COMBIVENT RESPIMAT  Replaced by:  ipratropium-albuterol 0.5-2.5 mg/3 ml nebulizer            Discharge Diet:   Diet Instructions     Advance Diet As Tolerated             Activity at Discharge:   Activity Instructions     Activity as Tolerated             Discharge Care Plan/Instructions:     Follow-up Appointments:   Follow-up with PCP 1 week.  Patient to keep his appointment see Dr. Kowalski, pulmonary.  Outpatient follow-up with GI for newly diagnosed hepatitis C.    Hipolito Casey MD  08/21/18  1:30 PM    Time: Greater than 30 minutes

## 2018-08-22 NOTE — PAYOR COMM NOTE
"DC HOME 8-21-18  [id number]    Reza Cardenas  (63 y.o. Male)     Date of Birth Social Security Number Address Home Phone MRN    1955  [address] [place] [id number] [phone] [id number]    [employer] Marital Status          [employer]        Admission Date Admission Type Admitting Provider Attending Provider Department, Room/Bed    8/15/18 Emergency Hipolito Casey MD  [facility] 4C, 494/1    Discharge Date Discharge Disposition Discharge Destination        8/21/2018 Home or Self Care              Attending Provider:  (none)   Allergies:  Codeine    Isolation:  None   Infection:  None   Code Status:  Prior    Ht:  182.9 cm (72\")   Wt:  89.1 kg (196 lb 6.4 oz)    Admission Cmt:  None   Principal Problem:  None                Active Insurance as of 8/15/2018     Primary Coverage     Payor Plan Insurance Group Employer/Plan Group    [employer] [place] [employer]      Payor Plan Address Payor Plan Phone Number Effective From Effective To    [address] [address] [phone] 8/15/2018     [facility] [id number]       Subscriber Name Subscriber Birth Date Member ID       REZA CARDENAS 1955 [id number]                 Emergency Contacts      (Rel.) Home Phone Work Phone Mobile Phone    Rufina Urbina (Sister) -- -- [phone]               Discharge Summary      Hipolito Casey MD at 8/21/2018  1:30 PM              [facility] Medicine Services  DISCHARGE SUMMARY       Date of Admission: 8/15/2018  Date of Discharge:  8/21/2018  Primary Care Physician: Lobito Trevino Jr., MD    Presenting Problem/History of Present Illness:  Pneumonia of right upper lobe due to infectious organism (CMS/HCC) [J18.1]     Final Discharge Diagnoses:  Hospital Problem List     Pneumonia of right upper lobe due to infectious organism (CMS/HCC)          Pertinent Test Results:   IMPRESSION: Ultrasound the liver  Questionable mild fatty liver. Otherwise " unremarkable.    IMPRESSION: CT scan ahead  No acute intracranial abnormality.    IMPRESSION: Chest x-ray  Near complete resolution of right upper lobe pneumonia.    Chief Complaint on Day of Discharge: none    History of Present Illness on Day of Discharge:   The patient is a 63 year old gentleman with a history of COPD and tobacco abuse.  He presents with 3-4 days of worsening coughing, wheezing and shortness of breath.  His cough is non-productive.  He feels like he has sputum that won't come up.  He is no longer smoking.  He denies fevers or chills.  He says that this finally got to the point he couldn't stand it.    Hospital Course:  The patient is a 63 y.o. male who presented to [facility] with sepsis/pneumonia/COPD exacerbation.  Patient initially was starting Rocephin and azithromycin.  This is later changed to Levaquin antibiotics.  Patient was also started Solu-Medrol and duo nebs.  The most recent chest x-ray shows near complete resolution of right upper lobe pneumonia.  Patient continue duo nebs and Levaquin at home.  Patient was started back on his by mouth steroid medication at home.    COPD.  Continue duo nebs.  Patient states instructed continue steroid from cigarette.     Hypertension urgency.  Patient was initially started  IV hydralazine when necessary.   Once blood pressure stabilized, patient back on home medication of Ziac and Norvasc.  Patient blood pressure is been stable.    Hepatitis C/elevated liver enzyme.  Patient would eat follow-up appointment with GI as outpatient for hepatitis C.     Vital signs stable, afebrile.  Patient follow up primary care doctor 1 week.  Patient will follow with GI specialist for hepatitis C, newly diagnosed.  Patient follow up with pulmonary, Dr. Kowalski    Condition on Discharge:  stable    Physical Exam on Discharge:  /81 (BP Location: Right arm, Patient Position: Lying)   Pulse 55   Temp 97.6 °F (36.4 °C) (Temporal Artery )   Resp 20    "Ht 182.9 cm (72\")   Wt 89.1 kg (196 lb 6.4 oz)   SpO2 97%   BMI 26.64 kg/m²    Physical Exam   Constitutional: He is oriented to person, place, and time. He appears well-developed and well-nourished.   HENT:   Head: Normocephalic and atraumatic.   Eyes: Pupils are equal, round, and reactive to light. Conjunctivae and EOM are normal.   Neck: Neck supple. No JVD present. No thyromegaly present.   Cardiovascular: Normal rate, regular rhythm, normal heart sounds and intact distal pulses.  Exam reveals no gallop and no friction rub.    No murmur heard.  Pulmonary/Chest: Effort normal. No respiratory distress. He has wheezes. He has no rales. He exhibits no tenderness.   Abdominal: Soft. Bowel sounds are normal. He exhibits no distension. There is no tenderness. There is no rebound and no guarding.   Musculoskeletal: Normal range of motion. He exhibits no edema, tenderness or deformity.   Lymphadenopathy:     He has no cervical adenopathy.   Neurological: He is alert and oriented to person, place, and time. He displays normal reflexes. No cranial nerve deficit. He exhibits normal muscle tone.   Skin: Skin is warm and dry. No rash noted.   Psychiatric: He has a normal mood and affect. His behavior is normal. Judgment and thought content normal.   Nursing note and vitals reviewed.        Discharge Disposition:  Home or Self Care    Discharge Medications:     Discharge Medications      New Medications      Instructions Start Date   ipratropium-albuterol 0.5-2.5 mg/3 ml nebulizer  Commonly known as:  DUO-NEB  Replaces:  ipratropium-albuterol  MCG/ACT inhaler   3 mL, Nebulization, 4 Times Daily - RT      levoFLOXacin 500 MG tablet  Commonly known as:  LEVAQUIN   500 mg, Oral, Daily         Continue These Medications      Instructions Start Date   albuterol 108 (90 Base) MCG/ACT inhaler  Commonly known as:  PROVENTIL HFA;VENTOLIN HFA   2 puffs, Inhalation, 4 Times Daily PRN      amLODIPine 5 MG tablet  Commonly known " as:  NORVASC   10 mg, Oral, Daily      bisoprolol-hydrochlorothiazide 5-6.25 MG per tablet  Commonly known as:  ZIAC   1 tablet, Oral, Daily      budesonide-formoterol 160-4.5 MCG/ACT inhaler  Commonly known as:  SYMBICORT   2 puffs, Inhalation, 2 Times Daily - RT      levothyroxine 175 MCG tablet  Commonly known as:  SYNTHROID, LEVOTHROID   175 mcg, Oral, Daily      montelukast 10 MG tablet  Commonly known as:  SINGULAIR   10 mg, Oral, Daily      predniSONE 10 MG tablet  Commonly known as:  DELTASONE   10 mg, Oral, Daily      tamsulosin 0.4 MG capsule 24 hr capsule  Commonly known as:  FLOMAX   1 capsule, Oral, Daily         Stop These Medications    benzonatate 100 MG capsule  Commonly known as:  TESSALON     ipratropium-albuterol  MCG/ACT inhaler  Commonly known as:  COMBIVENT RESPIMAT  Replaced by:  ipratropium-albuterol 0.5-2.5 mg/3 ml nebulizer            Discharge Diet:   Diet Instructions     Advance Diet As Tolerated             Activity at Discharge:   Activity Instructions     Activity as Tolerated             Discharge Care Plan/Instructions:     Follow-up Appointments:   Follow-up with PCP 1 week.  Patient to keep his appointment see Dr. Kowalski, pulmonary.  Outpatient follow-up with GI for newly diagnosed hepatitis C.    Hipolito Casey MD  08/21/18  1:30 PM    Time: Greater than 30 minutes        Electronically signed by Hipolito Casey MD at 8/21/2018  1:38 PM

## 2018-10-08 ENCOUNTER — HOSPITAL ENCOUNTER (OUTPATIENT)
Dept: GENERAL RADIOLOGY | Facility: HOSPITAL | Age: 63
Discharge: HOME | End: 2018-10-08
Attending: INTERNAL MEDICINE | Admitting: INTERNAL MEDICINE

## 2018-10-08 ENCOUNTER — TRANSCRIBE ORDERS (OUTPATIENT)
Dept: ADMINISTRATIVE | Facility: HOSPITAL | Age: 63
End: 2018-10-08

## 2018-10-08 DIAGNOSIS — J18.9 PNEUMONIA DUE TO INFECTIOUS ORGANISM, UNSPECIFIED LATERALITY, UNSPECIFIED PART OF LUNG: Primary | ICD-10-CM

## 2018-10-08 PROCEDURE — 71046 X-RAY EXAM CHEST 2 VIEWS: CPT

## 2018-12-11 RX ORDER — PREDNISONE 10 MG/1
TABLET ORAL
Qty: 30 TABLET | Refills: 2 | Status: SHIPPED | OUTPATIENT
Start: 2018-12-11 | End: 2019-06-26 | Stop reason: SDUPTHER

## 2019-04-15 RX ORDER — MONTELUKAST SODIUM 10 MG/1
TABLET ORAL
Qty: 1 TABLET | Refills: 11 | OUTPATIENT
Start: 2019-04-15

## 2019-04-15 NOTE — TELEPHONE ENCOUNTER
I do not see that he has a follow-up appointment and he has not been seen since last September and missed some subsequent appointments..  He will need to make a follow-up appointment if he wants us to send in any prescriptions.  Otherwise he can go through his family doctor.

## 2019-04-29 DIAGNOSIS — J44.9 CHRONIC OBSTRUCTIVE PULMONARY DISEASE, UNSPECIFIED COPD TYPE (HCC): Primary | ICD-10-CM

## 2019-05-05 NOTE — TELEPHONE ENCOUNTER
He really does not need to be using albuterol and Combivent both and if the patient is using his DuoNeb's routinely then he should only be using either albuterol or Combivent as needed and not routinely as well.  I will check with him and see how he is utilizing these medications before we do any refills.

## 2019-05-07 RX ORDER — BUDESONIDE AND FORMOTEROL FUMARATE DIHYDRATE 160; 4.5 UG/1; UG/1
2 AEROSOL RESPIRATORY (INHALATION)
Qty: 1 INHALER | Refills: 3 | Status: SHIPPED | OUTPATIENT
Start: 2019-05-07 | End: 2019-08-12 | Stop reason: SDUPTHER

## 2019-05-07 RX ORDER — ALBUTEROL SULFATE 90 UG/1
2 AEROSOL, METERED RESPIRATORY (INHALATION) 4 TIMES DAILY PRN
Qty: 1 INHALER | Refills: 3 | Status: SHIPPED | OUTPATIENT
Start: 2019-05-07 | End: 2020-01-21 | Stop reason: HOSPADM

## 2019-05-07 RX ORDER — IPRATROPIUM BROMIDE AND ALBUTEROL SULFATE 2.5; .5 MG/3ML; MG/3ML
3 SOLUTION RESPIRATORY (INHALATION)
Qty: 360 ML | Refills: 3 | Status: SHIPPED | OUTPATIENT
Start: 2019-05-07

## 2019-05-07 NOTE — TELEPHONE ENCOUNTER
Patient says he does use the Duoneb and Symbicort routinely. And he uses the Albuterol when he is away from home.

## 2019-06-26 RX ORDER — PREDNISONE 10 MG/1
TABLET ORAL
Qty: 30 TABLET | Refills: 2 | Status: ON HOLD | OUTPATIENT
Start: 2019-06-26 | End: 2020-01-15

## 2019-08-12 DIAGNOSIS — J44.9 CHRONIC OBSTRUCTIVE PULMONARY DISEASE, UNSPECIFIED COPD TYPE (HCC): ICD-10-CM

## 2019-08-12 RX ORDER — BUDESONIDE AND FORMOTEROL FUMARATE DIHYDRATE 160; 4.5 UG/1; UG/1
2 AEROSOL RESPIRATORY (INHALATION)
Qty: 1 INHALER | Refills: 0 | Status: SHIPPED | OUTPATIENT
Start: 2019-08-12 | End: 2019-09-06 | Stop reason: SDUPTHER

## 2019-08-12 NOTE — TELEPHONE ENCOUNTER
Insurance will pay for the generic of advair.  Please order and sign and it will go through to pharmacy

## 2019-08-12 NOTE — TELEPHONE ENCOUNTER
Symbicort is not covered on insurance and is requiring a PA do you want me to do the PA or wait until they come in for appt on 9/6/19

## 2019-08-13 NOTE — TELEPHONE ENCOUNTER
That will not work.  The generic Advair is a dry powder and he needs a metered-dose inhaler not a dry powder.  The only 2 alternatives to the Symbicort will be Advair HFA or Dulera again generic Advair which is Wixela is a dry powder and is not acceptable.

## 2019-09-06 ENCOUNTER — TELEPHONE (OUTPATIENT)
Dept: PULMONOLOGY | Facility: CLINIC | Age: 64
End: 2019-09-06

## 2019-09-06 ENCOUNTER — OFFICE VISIT (OUTPATIENT)
Dept: PULMONOLOGY | Facility: CLINIC | Age: 64
End: 2019-09-06

## 2019-09-06 VITALS
HEART RATE: 54 BPM | SYSTOLIC BLOOD PRESSURE: 122 MMHG | DIASTOLIC BLOOD PRESSURE: 74 MMHG | OXYGEN SATURATION: 98 % | HEIGHT: 72 IN | WEIGHT: 182 LBS | BODY MASS INDEX: 24.65 KG/M2

## 2019-09-06 DIAGNOSIS — R05.9 COUGH: ICD-10-CM

## 2019-09-06 DIAGNOSIS — J41.0 SIMPLE CHRONIC BRONCHITIS (HCC): Primary | ICD-10-CM

## 2019-09-06 DIAGNOSIS — Z87.891 PERSONAL HISTORY OF NICOTINE DEPENDENCE: ICD-10-CM

## 2019-09-06 DIAGNOSIS — J44.9 CHRONIC OBSTRUCTIVE PULMONARY DISEASE, UNSPECIFIED COPD TYPE (HCC): ICD-10-CM

## 2019-09-06 PROCEDURE — 99214 OFFICE O/P EST MOD 30 MIN: CPT | Performed by: INTERNAL MEDICINE

## 2019-09-06 PROCEDURE — 94664 DEMO&/EVAL PT USE INHALER: CPT | Performed by: INTERNAL MEDICINE

## 2019-09-06 RX ORDER — BUDESONIDE AND FORMOTEROL FUMARATE DIHYDRATE 160; 4.5 UG/1; UG/1
2 AEROSOL RESPIRATORY (INHALATION)
Qty: 3 INHALER | Refills: 3 | Status: SHIPPED | OUTPATIENT
Start: 2019-09-06 | End: 2019-09-06 | Stop reason: ALTCHOICE

## 2019-09-06 RX ORDER — PREDNISONE 10 MG/1
10 TABLET ORAL 2 TIMES DAILY
Qty: 60 TABLET | Refills: 5 | Status: SHIPPED | OUTPATIENT
Start: 2019-09-06 | End: 2020-01-21 | Stop reason: HOSPADM

## 2019-09-06 RX ORDER — BENZONATATE 200 MG/1
200 CAPSULE ORAL 3 TIMES DAILY PRN
Qty: 90 CAPSULE | Refills: 5 | Status: ON HOLD | OUTPATIENT
Start: 2019-09-06 | End: 2020-01-15

## 2019-09-06 NOTE — TELEPHONE ENCOUNTER
Sent Dulera 200 in and if that doesn't go through then we will try the Advair.    I got word that qvar is covered. May try this if neither one of these go through.

## 2019-09-06 NOTE — TELEPHONE ENCOUNTER
Symbicort is not covered on [employer] insurance.    Pharmacy did not get any alternatives on what they would pay for.   What do you want to switch him to?

## 2019-09-06 NOTE — PROGRESS NOTES
Subjective   Reza Cruz is a 64 y.o. male.     Chief Complaint   Patient presents with   • Cough      No My Sticky Note on file.    History of Present Illness   The patient with a history of COPD comes in today for follow-up.  He has been out of his Symbicort and said he uses prednisone more frequently.  He wondered if we have anything he could use today short-term I like it was a Spiriva Respimat 2.5 mcg.  I gave him a sample and instructed on its use.  I did E prescribe refills of his Symbicort along with his benzonatate 200 mg 3 times a day as needed and his prednisone.  Can hopefully as he gets back on the Symbicort he will not require oral prednisone as frequently.    Medical/Family/Social History   has a past medical history of COPD (chronic obstructive pulmonary disease) (CMS/HCC), Hyperlipidemia, and Hypertension.   has a past surgical history that includes Eye surgery (Right) and Knee surgery (Right).  family history includes Cancer in his mother; Heart attack in his father; Peripheral vascular disease in his father; Stroke in his mother.   reports that he quit smoking about 9 years ago. He has never used smokeless tobacco. He reports that he does not drink alcohol or use drugs.  Allergies   Allergen Reactions   • Codeine Itching and Rash     Medications    Current Outpatient Medications:   •  albuterol sulfate  (90 Base) MCG/ACT inhaler, Inhale 2 puffs 4 (Four) Times a Day As Needed for Wheezing., Disp: 1 inhaler, Rfl: 3  •  amLODIPine (NORVASC) 5 MG tablet, Take 10 mg by mouth Daily., Disp: , Rfl:   •  bisoprolol-hydrochlorothiazide (ZIAC) 5-6.25 MG per tablet, Take 1 tablet by mouth Daily., Disp: , Rfl:   •  budesonide-formoterol (SYMBICORT) 160-4.5 MCG/ACT inhaler, Inhale 2 puffs 2 (Two) Times a Day., Disp: 3 inhaler, Rfl: 3  •  fluticasone-salmeterol (ADVAIR HFA) 115-21 MCG/ACT inhaler, Inhale 2 puffs 2 (Two) Times a Day., Disp: 1 inhaler, Rfl: 11  •  ipratropium-albuterol (DUO-NEB) 0.5-2.5 mg/3  "ml nebulizer, Take 3 mL by nebulization 4 (Four) Times a Day., Disp: 360 mL, Rfl: 3  •  levothyroxine (SYNTHROID, LEVOTHROID) 175 MCG tablet, Take 175 mcg by mouth Daily., Disp: , Rfl:   •  montelukast (SINGULAIR) 10 MG tablet, Take 10 mg by mouth Daily., Disp: , Rfl:   •  predniSONE (DELTASONE) 10 MG tablet, TAKE ONE TABLET EVERY DAY, Disp: 30 tablet, Rfl: 2  •  tamsulosin (FLOMAX) 0.4 MG capsule 24 hr capsule, Take 1 capsule by mouth Daily., Disp: , Rfl:   •  benzonatate (TESSALON) 200 MG capsule, Take 1 capsule by mouth 3 (Three) Times a Day As Needed for Cough., Disp: 90 capsule, Rfl: 5  •  predniSONE (DELTASONE) 10 MG tablet, Take 1 tablet by mouth 2 (Two) Times a Day., Disp: 60 tablet, Rfl: 5  •  tiotropium bromide monohydrate (SPIRIVA RESPIMAT) 2.5 MCG/ACT aerosol solution inhaler, Inhale 2 puffs Daily., Disp: 1 inhaler, Rfl: 0    Review of Systems   Constitutional: Negative for chills and fever.   HENT: Negative for congestion.    Eyes: Negative for visual disturbance.   Respiratory: Positive for cough and shortness of breath.    Cardiovascular: Negative for chest pain.   Gastrointestinal: Negative for diarrhea, nausea and vomiting.   Genitourinary: Negative for difficulty urinating.   Musculoskeletal: Negative for arthralgias.   Skin: Negative for rash.   Neurological: Negative for dizziness and speech difficulty.   Hematological: Bruises/bleeds easily.   Psychiatric/Behavioral: The patient is not nervous/anxious.      ------------------------------------  Objective   /74   Pulse 54   Ht 182.9 cm (72\")   Wt 82.6 kg (182 lb)   SpO2 98% Comment: RA  BMI 24.68 kg/m²   Physical Exam   Constitutional: He is oriented to person, place, and time. He appears well-developed and well-nourished.   HENT:   Head: Normocephalic and atraumatic.   Eyes: EOM are normal. Pupils are equal, round, and reactive to light.   Neck: Normal range of motion. Neck supple.   Cardiovascular: Normal rate, regular rhythm and " normal heart sounds.   Pulmonary/Chest: Effort normal.   He has a few faint and expiratory wheezes on chest exam.   Abdominal: Soft.   Musculoskeletal: Normal range of motion.   Neurological: He is alert and oriented to person, place, and time.   Skin: Skin is warm and dry.   He has some ecchymoses on extremities likely related to his use of oral prednisone.   Psychiatric: He has a normal mood and affect.   Nursing note and vitals reviewed.          Pulmonary Functions Testing Results:  No results found for: FEV1, FVC, DSQ0WWE, TLC, DLCO     Assessment/Plan   Reza was seen today for cough.    Diagnoses and all orders for this visit:    Simple chronic bronchitis (CMS/HCC)    Chronic obstructive pulmonary disease, unspecified COPD type (CMS/[facility])  -     budesonide-formoterol (SYMBICORT) 160-4.5 MCG/ACT inhaler; Inhale 2 puffs 2 (Two) Times a Day.  -     tiotropium bromide monohydrate (SPIRIVA RESPIMAT) 2.5 MCG/ACT aerosol solution inhaler; Inhale 2 puffs Daily.  -     predniSONE (DELTASONE) 10 MG tablet; Take 1 tablet by mouth 2 (Two) Times a Day.  -     benzonatate (TESSALON) 200 MG capsule; Take 1 capsule by mouth 3 (Three) Times a Day As Needed for Cough.    Cough    Personal history of nicotine dependence      Patient's Body mass index is 24.68 kg/m². BMI is within normal parameters. No follow-up required..      Again prescriptions and samples were provided as stated.  I discussed lung cancer screening with him as he did smoke up to 2 packs a day for about 20 years at least.  He quit smoking 2010 so he would meet criteria as he smoked over 30 pack years and he is less than age 79 and it is less than 15 years since he quit smoking.  He wants to hold off on this for now but might consider it in the future.  We will see him back in several months with complete pulmonary functions on return.  Again samples of Spiriva Respimat were provided and I instructed him on the use of the Spiriva.

## 2019-09-06 NOTE — TELEPHONE ENCOUNTER
The option to be Dulera the 200/5 size 2 puffs twice daily rinse and spit after using the Advair HFA the 115 mcg size 2 puffs twice daily rinse and spit after using.

## 2019-09-06 NOTE — PATIENT INSTRUCTIONS
The patient is run out of his Symbicort inhaler and has had to use his oral prednisone more frequently.  I did E prescribe refills of his prednisone and his benzonatate and also his Symbicort.  In the interim he can try Spiriva Respimat 2.5 mcg 2 puffs daily and I gave him samples.  He is used Spiriva in the past but prefers Symbicort.  Otherwise I will see him back in about 3 months with complete pulmonary functions on return.

## 2019-09-10 NOTE — TELEPHONE ENCOUNTER
Advair HFA is not covered on insurance either.   Do you want to try something else?   [facility]   Procedure Discharge Instructions    Today you saw:    Dr. John Enciso      You had a:   Cervical Facet Injections, Left C7-T1    Medications used:  Lidocaine  Depo-medrol              Be cautious when walking. Numbness and/or weakness in the lower extremities may occur for up to 6-8 hours after the procedure due to effect of the local anesthetic    Do not drive for 6 hours. The effect of the local anesthetic could slow your reflexes.     You may resume your regular activities after 24 hours    Avoid strenuous activity for the first 24 hours    You may shower, however avoid swimming, tub baths or hot tubs for 24 hours following your procedure    You may have a mild to moderate increase in pain for several days following the injection.    It may take up to 14 days for the steroid medication to start working although you may feel the effect as early as a few days after the procedure.       You may use ice packs for 10-15 minutes, 3 to 4 times a day at the injection site for comfort    Do not use heat to painful areas for 6 to 8 hours. This will give the local anesthetic time to wear off and prevent you from accidentally burning your skin.     Unless you have been directed to avoid the use of anti-inflammatory medications (NSAIDS), you may use medications such as ibuprofen, Aleve or Tylenol for pain control if needed.     If you were fasting, you may resume your normal diet and medications after the procedure    Possible side effects of steroids that you may experience include flushing, elevated blood pressure, increased appetite, mild headaches and restlessness.  All of these symptoms will get better with time.    If you experience any of the following, call the [facility] during work hours at [phone] or the Provider Line after hours at [phone]:  -Fever over 100 degree F  -Swelling, bleeding, redness, drainage, warmth at the injection site  -Progressive weakness or  numbness in your legs or arms  -Unusual headache that is not relieved by Tylenol or other pain reliever  -Unusual new onset of pain that is not improving

## 2019-09-10 NOTE — TELEPHONE ENCOUNTER
Unless the patient wants to try diet dry powder which should be that Wixela or the old Advair Diskus, there is nothing else.

## 2019-09-11 NOTE — TELEPHONE ENCOUNTER
Patient called stating that he is really needing something bad and that he wants to try the Wixela or the old Advair Diskus inhaler.

## 2020-01-13 ENCOUNTER — APPOINTMENT (OUTPATIENT)
Dept: MRI IMAGING | Facility: HOSPITAL | Age: 65
End: 2020-01-13

## 2020-01-13 ENCOUNTER — APPOINTMENT (OUTPATIENT)
Dept: CARDIOLOGY | Facility: HOSPITAL | Age: 65
End: 2020-01-13

## 2020-01-13 ENCOUNTER — HOSPITAL ENCOUNTER (INPATIENT)
Facility: HOSPITAL | Age: 65
LOS: 8 days | Discharge: NURSING, REHAB OR LONG-TERM CARE | End: 2020-01-21
Attending: INTERNAL MEDICINE | Admitting: FAMILY MEDICINE

## 2020-01-13 ENCOUNTER — APPOINTMENT (OUTPATIENT)
Dept: CT IMAGING | Facility: HOSPITAL | Age: 65
End: 2020-01-13

## 2020-01-13 ENCOUNTER — APPOINTMENT (OUTPATIENT)
Dept: GENERAL RADIOLOGY | Facility: HOSPITAL | Age: 65
End: 2020-01-13

## 2020-01-13 DIAGNOSIS — Z74.09 IMPAIRED MOBILITY: ICD-10-CM

## 2020-01-13 DIAGNOSIS — I63.9 CEREBROVASCULAR ACCIDENT (CVA), UNSPECIFIED MECHANISM (HCC): ICD-10-CM

## 2020-01-13 DIAGNOSIS — R13.10 DYSPHAGIA, UNSPECIFIED TYPE: Primary | ICD-10-CM

## 2020-01-13 DIAGNOSIS — Z72.0 TOBACCO ABUSE: ICD-10-CM

## 2020-01-13 DIAGNOSIS — J10.1 INFLUENZA A: ICD-10-CM

## 2020-01-13 DIAGNOSIS — Z74.09 IMPAIRED MOBILITY AND ADLS: ICD-10-CM

## 2020-01-13 DIAGNOSIS — F09 COGNITIVE AND NEUROBEHAVIORAL DYSFUNCTION: ICD-10-CM

## 2020-01-13 DIAGNOSIS — R79.89 ELEVATED TSH: ICD-10-CM

## 2020-01-13 DIAGNOSIS — Z78.9 IMPAIRED MOBILITY AND ADLS: ICD-10-CM

## 2020-01-13 PROBLEM — R00.1 BRADYCARDIA: Status: ACTIVE | Noted: 2020-01-13

## 2020-01-13 PROBLEM — F17.200 TOBACCO DEPENDENCE: Status: ACTIVE | Noted: 2020-01-13

## 2020-01-13 PROBLEM — E03.9 HYPOTHYROIDISM: Status: ACTIVE | Noted: 2020-01-13

## 2020-01-13 PROBLEM — G93.40 ACUTE ENCEPHALOPATHY: Status: ACTIVE | Noted: 2020-01-13

## 2020-01-13 PROBLEM — Z91.199 MEDICAL NON-COMPLIANCE: Status: ACTIVE | Noted: 2020-01-13

## 2020-01-13 LAB
ALBUMIN SERPL-MCNC: 4.2 G/DL (ref 3.5–5.2)
ALBUMIN/GLOB SERPL: 1.1 G/DL
ALP SERPL-CCNC: 48 U/L (ref 39–117)
ALT SERPL W P-5'-P-CCNC: 24 U/L (ref 1–41)
AMMONIA BLD-SCNC: 31 UMOL/L (ref 16–60)
AMPHET+METHAMPHET UR QL: NEGATIVE
AMPHETAMINES UR QL: NEGATIVE
AMYLASE SERPL-CCNC: 126 U/L (ref 28–100)
ANION GAP SERPL CALCULATED.3IONS-SCNC: 11 MMOL/L (ref 5–15)
ARTERIAL PATENCY WRIST A: ABNORMAL
AST SERPL-CCNC: 24 U/L (ref 1–40)
ATMOSPHERIC PRESS: 760 MMHG
BACTERIA UR QL AUTO: ABNORMAL /HPF
BARBITURATES UR QL SCN: NEGATIVE
BASE EXCESS BLDA CALC-SCNC: -0.7 MMOL/L (ref 0–2)
BASOPHILS # BLD AUTO: 0.06 10*3/MM3 (ref 0–0.2)
BASOPHILS NFR BLD AUTO: 0.6 % (ref 0–1.5)
BDY SITE: ABNORMAL
BENZODIAZ UR QL SCN: NEGATIVE
BH CV ECHO MEAS - AO MAX PG (FULL): 5.9 MMHG
BH CV ECHO MEAS - AO MAX PG: 9.6 MMHG
BH CV ECHO MEAS - AO MEAN PG (FULL): 3 MMHG
BH CV ECHO MEAS - AO MEAN PG: 5 MMHG
BH CV ECHO MEAS - AO ROOT AREA (BSA CORRECTED): 1.4
BH CV ECHO MEAS - AO ROOT AREA: 6.6 CM^2
BH CV ECHO MEAS - AO ROOT DIAM: 2.9 CM
BH CV ECHO MEAS - AO V2 MAX: 155 CM/SEC
BH CV ECHO MEAS - AO V2 MEAN: 108 CM/SEC
BH CV ECHO MEAS - AO V2 VTI: 37.9 CM
BH CV ECHO MEAS - AVA(I,A): 1.7 CM^2
BH CV ECHO MEAS - AVA(I,D): 1.7 CM^2
BH CV ECHO MEAS - AVA(V,A): 1.8 CM^2
BH CV ECHO MEAS - AVA(V,D): 1.8 CM^2
BH CV ECHO MEAS - BSA(HAYCOCK): 2 M^2
BH CV ECHO MEAS - BSA: 2.1 M^2
BH CV ECHO MEAS - BZI_BMI: 23.6 KILOGRAMS/M^2
BH CV ECHO MEAS - BZI_METRIC_HEIGHT: 185.4 CM
BH CV ECHO MEAS - BZI_METRIC_WEIGHT: 81.2 KG
BH CV ECHO MEAS - EDV(CUBED): 110.6 ML
BH CV ECHO MEAS - EDV(MOD-SP4): 44.6 ML
BH CV ECHO MEAS - EDV(TEICH): 107.5 ML
BH CV ECHO MEAS - EF(CUBED): 82 %
BH CV ECHO MEAS - EF(MOD-SP4): 65.5 %
BH CV ECHO MEAS - EF(TEICH): 74.6 %
BH CV ECHO MEAS - ESV(CUBED): 19.9 ML
BH CV ECHO MEAS - ESV(MOD-SP4): 15.4 ML
BH CV ECHO MEAS - ESV(TEICH): 27.3 ML
BH CV ECHO MEAS - FS: 43.5 %
BH CV ECHO MEAS - IVS/LVPW: 0.93
BH CV ECHO MEAS - IVSD: 0.71 CM
BH CV ECHO MEAS - LA DIMENSION: 3.1 CM
BH CV ECHO MEAS - LA/AO: 1.1
BH CV ECHO MEAS - LAT PEAK E' VEL: 8.6 CM/SEC
BH CV ECHO MEAS - LV DIASTOLIC VOL/BSA (35-75): 21.7 ML/M^2
BH CV ECHO MEAS - LV MASS(C)D: 113.8 GRAMS
BH CV ECHO MEAS - LV MASS(C)DI: 55.4 GRAMS/M^2
BH CV ECHO MEAS - LV MAX PG: 3.7 MMHG
BH CV ECHO MEAS - LV MEAN PG: 2 MMHG
BH CV ECHO MEAS - LV SYSTOLIC VOL/BSA (12-30): 7.5 ML/M^2
BH CV ECHO MEAS - LV V1 MAX: 96.1 CM/SEC
BH CV ECHO MEAS - LV V1 MEAN: 65.8 CM/SEC
BH CV ECHO MEAS - LV V1 VTI: 22.8 CM
BH CV ECHO MEAS - LVIDD: 4.8 CM
BH CV ECHO MEAS - LVIDS: 2.7 CM
BH CV ECHO MEAS - LVLD AP4: 7.4 CM
BH CV ECHO MEAS - LVLS AP4: 5.5 CM
BH CV ECHO MEAS - LVOT AREA (M): 2.8 CM^2
BH CV ECHO MEAS - LVOT AREA: 2.8 CM^2
BH CV ECHO MEAS - LVOT DIAM: 1.9 CM
BH CV ECHO MEAS - LVPWD: 0.76 CM
BH CV ECHO MEAS - MED PEAK E' VEL: 5.11 CM/SEC
BH CV ECHO MEAS - MV A MAX VEL: 53.8 CM/SEC
BH CV ECHO MEAS - MV DEC TIME: 0.21 SEC
BH CV ECHO MEAS - MV E MAX VEL: 58.7 CM/SEC
BH CV ECHO MEAS - MV E/A: 1.1
BH CV ECHO MEAS - SI(AO): 122 ML/M^2
BH CV ECHO MEAS - SI(CUBED): 44.2 ML/M^2
BH CV ECHO MEAS - SI(LVOT): 31.5 ML/M^2
BH CV ECHO MEAS - SI(MOD-SP4): 14.2 ML/M^2
BH CV ECHO MEAS - SI(TEICH): 39.1 ML/M^2
BH CV ECHO MEAS - SV(AO): 250.3 ML
BH CV ECHO MEAS - SV(CUBED): 90.7 ML
BH CV ECHO MEAS - SV(LVOT): 64.6 ML
BH CV ECHO MEAS - SV(MOD-SP4): 29.2 ML
BH CV ECHO MEAS - SV(TEICH): 80.3 ML
BH CV ECHO MEASUREMENTS AVERAGE E/E' RATIO: 8.56
BILIRUB SERPL-MCNC: 0.8 MG/DL (ref 0.2–1.2)
BILIRUB UR QL STRIP: NEGATIVE
BODY TEMPERATURE: 37 C
BUN BLD-MCNC: 15 MG/DL (ref 8–23)
BUN/CREAT SERPL: 14.6 (ref 7–25)
BUPRENORPHINE SERPL-MCNC: NEGATIVE NG/ML
CALCIUM SPEC-SCNC: 9.7 MG/DL (ref 8.6–10.5)
CANNABINOIDS SERPL QL: NEGATIVE
CHLORIDE SERPL-SCNC: 102 MMOL/L (ref 98–107)
CK SERPL-CCNC: 21 U/L (ref 20–200)
CLARITY UR: CLEAR
CO2 SERPL-SCNC: 25 MMOL/L (ref 22–29)
COCAINE UR QL: NEGATIVE
COLOR UR: ABNORMAL
CREAT BLD-MCNC: 1.03 MG/DL (ref 0.76–1.27)
CREAT BLDA-MCNC: 1.1 MG/DL (ref 0.6–1.3)
D-LACTATE SERPL-SCNC: 1.8 MMOL/L (ref 0.5–2)
DEPRECATED RDW RBC AUTO: 46.3 FL (ref 37–54)
EOSINOPHIL # BLD AUTO: 0.23 10*3/MM3 (ref 0–0.4)
EOSINOPHIL NFR BLD AUTO: 2.1 % (ref 0.3–6.2)
ERYTHROCYTE [DISTWIDTH] IN BLOOD BY AUTOMATED COUNT: 14.2 % (ref 12.3–15.4)
ETHANOL UR QL: <0.01 %
FLUAV AG NPH QL: POSITIVE
FLUBV AG NPH QL IA: NEGATIVE
GFR SERPL CREATININE-BSD FRML MDRD: 73 ML/MIN/1.73
GLOBULIN UR ELPH-MCNC: 3.9 GM/DL
GLUCOSE BLD-MCNC: 130 MG/DL (ref 65–99)
GLUCOSE BLDC GLUCOMTR-MCNC: 130 MG/DL (ref 70–130)
GLUCOSE UR STRIP-MCNC: NEGATIVE MG/DL
HCO3 BLDA-SCNC: 20.3 MMOL/L (ref 20–26)
HCT VFR BLD AUTO: 55.4 % (ref 37.5–51)
HGB BLD-MCNC: 19.3 G/DL (ref 13–17.7)
HGB UR QL STRIP.AUTO: NEGATIVE
HOLD SPECIMEN: NORMAL
HYALINE CASTS UR QL AUTO: ABNORMAL /LPF
IMM GRANULOCYTES # BLD AUTO: 0.05 10*3/MM3 (ref 0–0.05)
IMM GRANULOCYTES NFR BLD AUTO: 0.5 % (ref 0–0.5)
INR PPP: 0.89 (ref 0.91–1.09)
KETONES UR QL STRIP: NEGATIVE
LEFT ATRIUM VOLUME INDEX: 20.9 ML/M2
LEFT ATRIUM VOLUME: 42.9 CM3
LEUKOCYTE ESTERASE UR QL STRIP.AUTO: ABNORMAL
LIPASE SERPL-CCNC: 145 U/L (ref 13–60)
LV EF 2D ECHO EST: 65 %
LYMPHOCYTES # BLD AUTO: 3.52 10*3/MM3 (ref 0.7–3.1)
LYMPHOCYTES NFR BLD AUTO: 32.4 % (ref 19.6–45.3)
Lab: ABNORMAL
MCH RBC QN AUTO: 31.8 PG (ref 26.6–33)
MCHC RBC AUTO-ENTMCNC: 34.8 G/DL (ref 31.5–35.7)
MCV RBC AUTO: 91.3 FL (ref 79–97)
METHADONE UR QL SCN: NEGATIVE
MODALITY: ABNORMAL
MONOCYTES # BLD AUTO: 1.12 10*3/MM3 (ref 0.1–0.9)
MONOCYTES NFR BLD AUTO: 10.3 % (ref 5–12)
NEUTROPHILS # BLD AUTO: 5.89 10*3/MM3 (ref 1.7–7)
NEUTROPHILS NFR BLD AUTO: 54.1 % (ref 42.7–76)
NITRITE UR QL STRIP: NEGATIVE
NRBC BLD AUTO-RTO: 0 /100 WBC (ref 0–0.2)
OPIATES UR QL: NEGATIVE
OXYCODONE UR QL SCN: NEGATIVE
PCO2 BLDA: 25.9 MM HG (ref 35–45)
PCP UR QL SCN: NEGATIVE
PH BLDA: 7.5 PH UNITS (ref 7.35–7.45)
PH UR STRIP.AUTO: <=5 [PH] (ref 5–8)
PLATELET # BLD AUTO: 280 10*3/MM3 (ref 140–450)
PMV BLD AUTO: 9.9 FL (ref 6–12)
PO2 BLDA: 105 MM HG (ref 83–108)
POTASSIUM BLD-SCNC: 3.7 MMOL/L (ref 3.5–5.2)
PROPOXYPH UR QL: NEGATIVE
PROT SERPL-MCNC: 8.1 G/DL (ref 6–8.5)
PROT UR QL STRIP: NEGATIVE
PROTHROMBIN TIME: 12.3 SECONDS (ref 11.9–14.6)
RBC # BLD AUTO: 6.07 10*6/MM3 (ref 4.14–5.8)
RBC # UR: ABNORMAL /HPF
REF LAB TEST METHOD: ABNORMAL
SAO2 % BLDCOA: 99.1 % (ref 94–99)
SODIUM BLD-SCNC: 138 MMOL/L (ref 136–145)
SP GR UR STRIP: >1.03 (ref 1–1.03)
SQUAMOUS #/AREA URNS HPF: ABNORMAL /HPF
T4 FREE SERPL-MCNC: 0.89 NG/DL (ref 0.93–1.7)
TRICYCLICS UR QL SCN: NEGATIVE
TROPONIN T SERPL-MCNC: <0.01 NG/ML (ref 0–0.03)
TSH SERPL DL<=0.05 MIU/L-ACNC: 94.58 UIU/ML (ref 0.27–4.2)
UROBILINOGEN UR QL STRIP: ABNORMAL
VENTILATOR MODE: ABNORMAL
WBC NRBC COR # BLD: 10.87 10*3/MM3 (ref 3.4–10.8)
WBC UR QL AUTO: ABNORMAL /HPF
WHOLE BLOOD HOLD SPECIMEN: NORMAL
WHOLE BLOOD HOLD SPECIMEN: NORMAL

## 2020-01-13 PROCEDURE — 25010000002 METHYLPREDNISOLONE PER 125 MG: Performed by: NURSE PRACTITIONER

## 2020-01-13 PROCEDURE — 70551 MRI BRAIN STEM W/O DYE: CPT

## 2020-01-13 PROCEDURE — 87804 INFLUENZA ASSAY W/OPTIC: CPT | Performed by: NURSE PRACTITIONER

## 2020-01-13 PROCEDURE — 70450 CT HEAD/BRAIN W/O DYE: CPT

## 2020-01-13 PROCEDURE — 82803 BLOOD GASES ANY COMBINATION: CPT

## 2020-01-13 PROCEDURE — 82962 GLUCOSE BLOOD TEST: CPT

## 2020-01-13 PROCEDURE — 87040 BLOOD CULTURE FOR BACTERIA: CPT | Performed by: NURSE PRACTITIONER

## 2020-01-13 PROCEDURE — 93306 TTE W/DOPPLER COMPLETE: CPT

## 2020-01-13 PROCEDURE — P9612 CATHETERIZE FOR URINE SPEC: HCPCS

## 2020-01-13 PROCEDURE — 83605 ASSAY OF LACTIC ACID: CPT | Performed by: NURSE PRACTITIONER

## 2020-01-13 PROCEDURE — 81001 URINALYSIS AUTO W/SCOPE: CPT | Performed by: INTERNAL MEDICINE

## 2020-01-13 PROCEDURE — 80050 GENERAL HEALTH PANEL: CPT | Performed by: NURSE PRACTITIONER

## 2020-01-13 PROCEDURE — 71045 X-RAY EXAM CHEST 1 VIEW: CPT

## 2020-01-13 PROCEDURE — 92610 EVALUATE SWALLOWING FUNCTION: CPT | Performed by: SPEECH-LANGUAGE PATHOLOGIST

## 2020-01-13 PROCEDURE — 82140 ASSAY OF AMMONIA: CPT | Performed by: NURSE PRACTITIONER

## 2020-01-13 PROCEDURE — 93005 ELECTROCARDIOGRAM TRACING: CPT

## 2020-01-13 PROCEDURE — 25010000002 ONDANSETRON PER 1 MG: Performed by: NURSE PRACTITIONER

## 2020-01-13 PROCEDURE — 85610 PROTHROMBIN TIME: CPT | Performed by: NURSE PRACTITIONER

## 2020-01-13 PROCEDURE — 80307 DRUG TEST PRSMV CHEM ANLYZR: CPT | Performed by: NURSE PRACTITIONER

## 2020-01-13 PROCEDURE — 0 IOPAMIDOL PER 1 ML: Performed by: NURSE PRACTITIONER

## 2020-01-13 PROCEDURE — 93306 TTE W/DOPPLER COMPLETE: CPT | Performed by: INTERNAL MEDICINE

## 2020-01-13 PROCEDURE — 0 IOPAMIDOL PER 1 ML: Performed by: INTERNAL MEDICINE

## 2020-01-13 PROCEDURE — 70498 CT ANGIOGRAPHY NECK: CPT

## 2020-01-13 PROCEDURE — 93005 ELECTROCARDIOGRAM TRACING: CPT | Performed by: INTERNAL MEDICINE

## 2020-01-13 PROCEDURE — 83690 ASSAY OF LIPASE: CPT | Performed by: NURSE PRACTITIONER

## 2020-01-13 PROCEDURE — 82150 ASSAY OF AMYLASE: CPT | Performed by: NURSE PRACTITIONER

## 2020-01-13 PROCEDURE — 99285 EMERGENCY DEPT VISIT HI MDM: CPT

## 2020-01-13 PROCEDURE — 82550 ASSAY OF CK (CPK): CPT | Performed by: NURSE PRACTITIONER

## 2020-01-13 PROCEDURE — 36600 WITHDRAWAL OF ARTERIAL BLOOD: CPT

## 2020-01-13 PROCEDURE — 70496 CT ANGIOGRAPHY HEAD: CPT

## 2020-01-13 PROCEDURE — 82565 ASSAY OF CREATININE: CPT

## 2020-01-13 PROCEDURE — 99291 CRITICAL CARE FIRST HOUR: CPT | Performed by: PSYCHIATRY & NEUROLOGY

## 2020-01-13 PROCEDURE — 93010 ELECTROCARDIOGRAM REPORT: CPT | Performed by: INTERNAL MEDICINE

## 2020-01-13 PROCEDURE — 94640 AIRWAY INHALATION TREATMENT: CPT

## 2020-01-13 PROCEDURE — 84484 ASSAY OF TROPONIN QUANT: CPT | Performed by: NURSE PRACTITIONER

## 2020-01-13 PROCEDURE — 87086 URINE CULTURE/COLONY COUNT: CPT | Performed by: INTERNAL MEDICINE

## 2020-01-13 PROCEDURE — 71275 CT ANGIOGRAPHY CHEST: CPT

## 2020-01-13 PROCEDURE — 84439 ASSAY OF FREE THYROXINE: CPT | Performed by: INTERNAL MEDICINE

## 2020-01-13 RX ORDER — ONDANSETRON 2 MG/ML
8 INJECTION INTRAMUSCULAR; INTRAVENOUS ONCE
Status: COMPLETED | OUTPATIENT
Start: 2020-01-13 | End: 2020-01-13

## 2020-01-13 RX ORDER — IPRATROPIUM BROMIDE AND ALBUTEROL SULFATE 2.5; .5 MG/3ML; MG/3ML
3 SOLUTION RESPIRATORY (INHALATION) ONCE
Status: COMPLETED | OUTPATIENT
Start: 2020-01-13 | End: 2020-01-13

## 2020-01-13 RX ORDER — ONDANSETRON 2 MG/ML
4 INJECTION INTRAMUSCULAR; INTRAVENOUS EVERY 6 HOURS PRN
Status: DISCONTINUED | OUTPATIENT
Start: 2020-01-13 | End: 2020-01-21 | Stop reason: HOSPADM

## 2020-01-13 RX ORDER — METHYLPREDNISOLONE SODIUM SUCCINATE 125 MG/2ML
125 INJECTION, POWDER, LYOPHILIZED, FOR SOLUTION INTRAMUSCULAR; INTRAVENOUS ONCE
Status: COMPLETED | OUTPATIENT
Start: 2020-01-13 | End: 2020-01-13

## 2020-01-13 RX ORDER — SODIUM CHLORIDE 0.9 % (FLUSH) 0.9 %
10 SYRINGE (ML) INJECTION AS NEEDED
Status: DISCONTINUED | OUTPATIENT
Start: 2020-01-13 | End: 2020-01-21 | Stop reason: HOSPADM

## 2020-01-13 RX ORDER — ASPIRIN 81 MG/1
81 TABLET, CHEWABLE ORAL DAILY
Status: DISCONTINUED | OUTPATIENT
Start: 2020-01-13 | End: 2020-01-21 | Stop reason: HOSPADM

## 2020-01-13 RX ORDER — ACETAMINOPHEN 325 MG/1
650 TABLET ORAL EVERY 6 HOURS PRN
Status: DISCONTINUED | OUTPATIENT
Start: 2020-01-13 | End: 2020-01-21 | Stop reason: HOSPADM

## 2020-01-13 RX ORDER — SODIUM CHLORIDE 0.9 % (FLUSH) 0.9 %
10 SYRINGE (ML) INJECTION EVERY 12 HOURS SCHEDULED
Status: DISCONTINUED | OUTPATIENT
Start: 2020-01-13 | End: 2020-01-21 | Stop reason: HOSPADM

## 2020-01-13 RX ORDER — LEVOTHYROXINE SODIUM ANHYDROUS 100 UG/5ML
50 INJECTION, POWDER, LYOPHILIZED, FOR SOLUTION INTRAVENOUS ONCE
Status: COMPLETED | OUTPATIENT
Start: 2020-01-13 | End: 2020-01-13

## 2020-01-13 RX ORDER — LEVOTHYROXINE SODIUM 0.12 MG/1
125 TABLET ORAL
Status: DISCONTINUED | OUTPATIENT
Start: 2020-01-14 | End: 2020-01-21 | Stop reason: HOSPADM

## 2020-01-13 RX ORDER — ASPIRIN 300 MG/1
300 SUPPOSITORY RECTAL DAILY
Status: DISCONTINUED | OUTPATIENT
Start: 2020-01-13 | End: 2020-01-17

## 2020-01-13 RX ORDER — ATORVASTATIN CALCIUM 40 MG/1
80 TABLET, FILM COATED ORAL NIGHTLY
Status: DISCONTINUED | OUTPATIENT
Start: 2020-01-13 | End: 2020-01-21 | Stop reason: HOSPADM

## 2020-01-13 RX ORDER — OSELTAMIVIR PHOSPHATE 75 MG/1
75 CAPSULE ORAL EVERY 12 HOURS SCHEDULED
Status: COMPLETED | OUTPATIENT
Start: 2020-01-13 | End: 2020-01-18

## 2020-01-13 RX ORDER — IPRATROPIUM BROMIDE AND ALBUTEROL SULFATE 2.5; .5 MG/3ML; MG/3ML
3 SOLUTION RESPIRATORY (INHALATION) EVERY 4 HOURS PRN
Status: DISCONTINUED | OUTPATIENT
Start: 2020-01-13 | End: 2020-01-14

## 2020-01-13 RX ORDER — ONDANSETRON 4 MG/1
4 TABLET, FILM COATED ORAL EVERY 6 HOURS PRN
Status: DISCONTINUED | OUTPATIENT
Start: 2020-01-13 | End: 2020-01-21 | Stop reason: HOSPADM

## 2020-01-13 RX ADMIN — ATORVASTATIN CALCIUM 80 MG: 40 TABLET, FILM COATED ORAL at 20:15

## 2020-01-13 RX ADMIN — ACETAMINOPHEN 650 MG: 325 TABLET, FILM COATED ORAL at 17:38

## 2020-01-13 RX ADMIN — IOPAMIDOL 125 ML: 755 INJECTION, SOLUTION INTRAVENOUS at 08:08

## 2020-01-13 RX ADMIN — LEVOTHYROXINE SODIUM ANHYDROUS 50 MCG: 100 INJECTION, POWDER, LYOPHILIZED, FOR SOLUTION INTRAVENOUS at 17:40

## 2020-01-13 RX ADMIN — SODIUM CHLORIDE, PRESERVATIVE FREE 10 ML: 5 INJECTION INTRAVENOUS at 20:16

## 2020-01-13 RX ADMIN — METHYLPREDNISOLONE SODIUM SUCCINATE 125 MG: 125 INJECTION, POWDER, FOR SOLUTION INTRAMUSCULAR; INTRAVENOUS at 09:33

## 2020-01-13 RX ADMIN — OSELTAMIVIR PHOSPHATE 75 MG: 75 CAPSULE ORAL at 20:15

## 2020-01-13 RX ADMIN — ASPIRIN 81 MG: 81 TABLET, CHEWABLE ORAL at 17:38

## 2020-01-13 RX ADMIN — IPRATROPIUM BROMIDE AND ALBUTEROL SULFATE 3 ML: 2.5; .5 SOLUTION RESPIRATORY (INHALATION) at 08:25

## 2020-01-13 RX ADMIN — SODIUM CHLORIDE 500 ML: 0.9 INJECTION, SOLUTION INTRAVENOUS at 12:20

## 2020-01-13 RX ADMIN — IOPAMIDOL 100 ML: 755 INJECTION, SOLUTION INTRAVENOUS at 18:52

## 2020-01-13 RX ADMIN — ONDANSETRON HYDROCHLORIDE 8 MG: 2 SOLUTION INTRAMUSCULAR; INTRAVENOUS at 08:25

## 2020-01-13 NOTE — CONSULTS
Neurology Consult Note    Patient:  Reza Cruz   YOB: 1955  MRN:  [id number]  Date of Admission:  1/13/2020  7:38 AM    Date: 1/13/2020    Referring Provider: Hira Montenegro MD  Reason for Consultation: Stroke    History of present illness:     This is a 64 y.o. right handed male.with H/O hyperlipidemia, hypertension, COPD, who was last known well about 4 days ago evaluated for stroke    Patient presented to ED with c/o chest pain but apparently neighbor had called EMS because patient was not walking right and appeared confused and neighbor thought he had a stroke. However when  EMS arrived they called in with chest pain and vomiting. He is unable to answer questions He was ataxic and had issues with vision    Head CT in ED did not reveal any acute changes. MRI of the brain showed an acute right PICA but in addition showed a left SDH.    At [facility] in progress note of 8/5/2016 indicates a h/o seizures but patient ws not on any anticonvulsants  He was on thyroid meds then that is levothyroxine 125 mcg daily    Past Medical History:   Diagnosis Date   • COPD (chronic obstructive pulmonary disease) (CMS/Formerly [facility])    • Hyperlipidemia    • Hypertension      Per note of 8/2016:  Past Medical History   Diagnosis Date   • Arthritis   • Asthma   • COPD (chronic obstructive pulmonary disease) (Formerly [facility])   • Diabetes mellitus (Formerly [facility])   • Hypertension   • Seizures (Formerly [facility])   Pt states last seizure was in December 2015   • Thyroid disease     Past Surgical History   Procedure Laterality Date   • Arm surgery Left   • Mandible surgery   • Knee surgery Right   • Eye surgery Right     Past Surgical History:   Procedure Laterality Date   • EYE SURGERY Right    • KNEE SURGERY Right        Prior to Admission medications    Medication Sig Start Date End Date Taking? Authorizing Provider   albuterol sulfate  (90 Base) MCG/ACT inhaler Inhale 2 puffs 4 (Four) Times a Day As Needed for Wheezing. 5/7/19  Yes Salomon Vang  MD Charli   predniSONE (DELTASONE) 10 MG tablet TAKE ONE TABLET EVERY DAY 6/26/19  Yes Tad Dugan APRN   tamsulosin (FLOMAX) 0.4 MG capsule 24 hr capsule Take 1 capsule by mouth Daily.   Yes Caroline Fu MD   amLODIPine (NORVASC) 5 MG tablet Take 10 mg by mouth Daily.    ProviderCaroline MD   benzonatate (TESSALON) 200 MG capsule Take 1 capsule by mouth 3 (Three) Times a Day As Needed for Cough. 9/6/19   Salomon Vang MD   bisoprolol-hydrochlorothiazide (ZIAC) 5-6.25 MG per tablet Take 1 tablet by mouth Daily.    ProviderCaroline MD   fluticasone-salmeterol (WIXELA INHUB) 250-50 MCG/DOSE DISKUS Inhale 1 puff 2 (Two) Times a Day. 9/11/19   Salmoon Vang MD   ipratropium-albuterol (DUO-NEB) 0.5-2.5 mg/3 ml nebulizer Take 3 mL by nebulization 4 (Four) Times a Day. 5/7/19   Salomon Vang MD   levothyroxine (SYNTHROID, LEVOTHROID) 175 MCG tablet Take 175 mcg by mouth Daily.    ProviderCaroline MD   mometasone-formoterol (DULERA) 200-5 MCG/ACT inhaler Inhale 2 puffs 2 (Two) Times a Day. 9/6/19   Salomon Vang MD   montelukast (SINGULAIR) 10 MG tablet Take 10 mg by mouth Daily.    ProviderCaroline MD   predniSONE (DELTASONE) 10 MG tablet Take 1 tablet by mouth 2 (Two) Times a Day.  Patient not taking: Reported on 1/13/2020 9/6/19   Salomon Vang MD   tiotropium bromide monohydrate (SPIRIVA RESPIMAT) 2.5 MCG/ACT aerosol solution inhaler Inhale 2 puffs Daily. 9/6/19   Salomon Vang MD       Hospital scheduled medications:     aspirin 81 mg Oral Daily   Or      aspirin 300 mg Rectal Daily   atorvastatin 80 mg Oral Nightly   [START ON 1/14/2020] levothyroxine 125 mcg Oral Q AM   oseltamivir 75 mg Oral Q12H   sodium chloride 10 mL Intravenous Q12H     Hospital PRN medications:  •  acetaminophen  •  ipratropium-albuterol  •  ondansetron **OR** ondansetron  •  [COMPLETED] Insert peripheral IV **AND** sodium chloride  •  sodium  chloride    Allergies   Allergen Reactions   • Codeine Itching and Rash       Social History     Socioeconomic History   • Marital status:      Spouse name: Not on file   • Number of children: Not on file   • Years of education: Not on file   • Highest education level: Not on file   Tobacco Use   • Smoking status: Former Smoker     Last attempt to quit: 2010     Years since quitting: 10.0   • Smokeless tobacco: Never Used   Substance and Sexual Activity   • Alcohol use: No   • Drug use: No   • Sexual activity: Defer     Family History   Problem Relation Age of Onset   • Cancer Mother    • Stroke Mother    • Heart attack Father    • Peripheral vascular disease Father        Review of Systems  A 14 point review of systems was reviewed and was negative except for mild headache earlier     Vital Signs   Temp:  [97.2 °F (36.2 °C)-97.6 °F (36.4 °C)] 97.2 °F (36.2 °C)  Heart Rate:  [48-67] 55  Resp:  [17-26] 18  BP: (129-168)/() 159/105    General Exam:  Head:  Normal cephalic, atraumatic  HEENT:  Neck supple  Fundoscopic Exam:  No signs of disc edema  CVS:  Regular rate and rhythm.  No murmurs  Carotid Examination:  No bruits  Lungs:  Clear to auscultation  Abdomen:  Non-tender, Non-distended  Extremities:  No signs of peripheral edema  Skin:  No rashes    Neurologic Exam:    Mental Status:    -Awake, Alert, Oriented   -No word finding difficulties  -No aphasia  -No dysarthria  -Follows simple and complex commands    CN II:  Visual fields full.except right eye superior   Pupils equally reactive to light  CN III, IV, VI:  Extraocular Muscles full except on occasion right eye not conjugate with no signs of nystagmus  CN V:  Facial sensory is symmetric   CN VII:  Facial motor symmetric  CN VIII:  Gross hearing intact bilaterally  CN IX/X:  Palate elevates symmetrically  CN XI:  Shoulder shrug symmetric  CN XII:  Tongue is midline on protrusion    Motor: (strength out of 5:  1= minimal movement, 2 = movement in  plane of gravity, 3 = movement against gravity, 4 = movement against some resistance, 5 = full strength)    -Right Upper Ext: Proximal: 5 Distal: 5  -Left Upper Ext: Proximal: 5 Distal: 5    -Right Lower Ext: Proximal: 5 Distal: 5  -Left Lower Ext: Proximal: 5 Distal: 5    DTR:  -Right   Bicep: 2+ Triceps: 2+ Brachioradialis: 2+   Patella: 2+ Ankle: 2+ Neg Babinski  -Left   Bicep: 2+ Triceps: 2+ Brachioradialis: 2+   Patella: 2+ Ankle: 2+ Neg Babinski    Sensory:  -Intact to light touch, pinprick, temperature, pain, and proprioception    Coordination:  -Finger to nose intact on the left and dysmetria on the right  -Heel to shin intact  -No ataxia    Gait  -No signs of ataxia  -ambulates unassisted      Results Review:  Lab Results (last 7 days)     Procedure Component Value Units Date/Time    CK [[id number]]  (Normal) Collected:  01/13/20 1502    Specimen:  Blood Updated:  01/13/20 1527     Creatine Kinase 21 U/L     POC Creatinine [[id number]]  (Normal) Collected:  01/13/20 0815    Specimen:  Blood Updated:  01/13/20 1526     Creatinine 1.10 mg/dL      Comment: Serial Number: [id number]:  [id number]       T4, Free [[id number]]  (Abnormal) Collected:  01/13/20 0747    Specimen:  Blood Updated:  01/13/20 1501     Free T4 0.89 ng/dL     Narrative:       Results may be falsely increased if patient taking Biotin.      TSH [[id number]]  (Abnormal) Collected:  01/13/20 0747    Specimen:  Blood Updated:  01/13/20 1128     TSH 94.580 uIU/mL     Ammonia [[id number]]  (Normal) Collected:  01/13/20 0843    Specimen:  Blood Updated:  01/13/20 0935     Ammonia 31 umol/L     Blood Culture - Blood, Arm, Right [[id number]] Collected:  01/13/20 0824    Specimen:  Blood from Arm, Right Updated:  01/13/20 0934    Influenza Antigen, Rapid - Swab, Nasopharynx [[id number]]  (Abnormal) Collected:  01/13/20 0842    Specimen:  Swab from Nasopharynx Updated:  01/13/20 0915     Influenza A Ag, EIA Positive     Influenza B Ag, EIA Negative     Narrative:       Recommend confirmation of negative results by viral culture or molecular assay.    [place] Draw [[id number]] Collected:  01/13/20 0747    Specimen:  Blood Updated:  01/13/20 0904    Narrative:       The following orders were created for panel order [place] Draw.  Procedure                               Abnormality         Status                     ---------                               -----------         ------                     Light Blue Top[[id number]]                                   Final result               Green Top (Gel)[[id number]]                                  Final result               Lavender Top[[id number]]                                     Final result               Red Top[[id number]]                                          Final result               [place] Blood Culture Khanh...[[id number]]                      Final result                 Please view results for these tests on the individual orders.    Light Blue Top [[id number]] Collected:  01/13/20 0747    Specimen:  Blood Updated:  01/13/20 0904     Extra Tube hold for add-on     Comment: Auto resulted       Green Top (Gel) [[id number]] Collected:  01/13/20 0747    Specimen:  Blood Updated:  01/13/20 0904     Extra Tube Hold for add-ons.     Comment: Auto resulted.       Lavender Top [[id number]] Collected:  01/13/20 0747    Specimen:  Blood Updated:  01/13/20 0904     Extra Tube hold for add-on     Comment: Auto resulted       Red Top [[id number]] Collected:  01/13/20 0747    Specimen:  Blood Updated:  01/13/20 0904     Extra Tube Hold for add-ons.     Comment: Auto resulted.       [place] Blood Culture Bottle Set [[id number]] Collected:  01/13/20 0747    Specimen:  Blood from Arm, Right Updated:  01/13/20 0904     Extra Tube Hold for add-ons.     Comment: Auto resulted.       Lactic Acid, Plasma [[id number]]  (Normal) Collected:  01/13/20 0747    Specimen:  Blood from Arm, Right Updated:  01/13/20 0835     Lactate 1.8 mmol/L      Blood Gas, Arterial [[id number]]  (Abnormal) Collected:  01/13/20 0825    Specimen:  Arterial Blood Updated:  01/13/20 0831     Site Right Radial     Luke's Test N/A     pH, Arterial 7.504 pH units      Comment: 83 Value above reference range        pCO2, Arterial 25.9 mm Hg      Comment: 84 Value below reference range        pO2, Arterial 105.0 mm Hg      HCO3, Arterial 20.3 mmol/L      Base Excess, Arterial -0.7 mmol/L      Comment: 84 Value below reference range        O2 Saturation, Arterial 99.1 %      Comment: 83 Value above reference range        Temperature 37.0 C      Barometric Pressure for Blood Gas 760 mmHg      Modality Room Air     Ventilator Mode NA     Collected by NAI     Comment: Meter: [id number]     :  [id number]       CBC & Differential [[id number]] Collected:  01/13/20 0747    Specimen:  Blood Updated:  01/13/20 0828    Narrative:       The following orders were created for panel order CBC & Differential.  Procedure                               Abnormality         Status                     ---------                               -----------         ------                     CBC Auto Differential[[id number]]        Abnormal            Final result                 Please view results for these tests on the individual orders.    CBC Auto Differential [[id number]]  (Abnormal) Collected:  01/13/20 0747    Specimen:  Blood Updated:  01/13/20 0828     WBC 10.87 10*3/mm3      RBC 6.07 10*6/mm3      Hemoglobin 19.3 g/dL      Hematocrit 55.4 %      MCV 91.3 fL      MCH 31.8 pg      MCHC 34.8 g/dL      RDW 14.2 %      RDW-SD 46.3 fl      MPV 9.9 fL      Platelets 280 10*3/mm3      Neutrophil % 54.1 %      Lymphocyte % 32.4 %      Monocyte % 10.3 %      Eosinophil % 2.1 %      Basophil % 0.6 %      Immature Grans % 0.5 %      Neutrophils, Absolute 5.89 10*3/mm3      Lymphocytes, Absolute 3.52 10*3/mm3      Monocytes, Absolute 1.12 10*3/mm3      Eosinophils, Absolute 0.23 10*3/mm3       Basophils, Absolute 0.06 10*3/mm3      Immature Grans, Absolute 0.05 10*3/mm3      nRBC 0.0 /100 WBC     Blood Culture - Blood, Arm, Right [[id number]] Collected:  01/13/20 0747    Specimen:  Blood from Arm, Right Updated:  01/13/20 0827    Comprehensive Metabolic Panel [[id number]]  (Abnormal) Collected:  01/13/20 0747    Specimen:  Blood Updated:  01/13/20 0817     Glucose 130 mg/dL      BUN 15 mg/dL      Creatinine 1.03 mg/dL      Sodium 138 mmol/L      Potassium 3.7 mmol/L      Chloride 102 mmol/L      CO2 25.0 mmol/L      Calcium 9.7 mg/dL      Total Protein 8.1 g/dL      Albumin 4.20 g/dL      ALT (SGPT) 24 U/L      AST (SGOT) 24 U/L      Alkaline Phosphatase 48 U/L      Total Bilirubin 0.8 mg/dL      eGFR Non African Amer 73 mL/min/1.73      Globulin 3.9 gm/dL      A/G Ratio 1.1 g/dL      BUN/Creatinine Ratio 14.6     Anion Gap 11.0 mmol/L     Narrative:       GFR Normal >60  Chronic Kidney Disease <60  Kidney Failure <15      Lipase [[id number]]  (Abnormal) Collected:  01/13/20 0747    Specimen:  Blood Updated:  01/13/20 0817     Lipase 145 U/L     Amylase [[id number]]  (Abnormal) Collected:  01/13/20 0747    Specimen:  Blood Updated:  01/13/20 0817     Amylase 126 U/L     Troponin [[id number]]  (Normal) Collected:  01/13/20 0747    Specimen:  Blood Updated:  01/13/20 0817     Troponin T <0.010 ng/mL     Narrative:       Troponin T Reference Range:  <= 0.03 ng/mL-   Negative for AMI  >0.03 ng/mL-     Abnormal for myocardial necrosis.  Clinicians would have to utilize clinical acumen, EKG, Troponin and serial changes to determine if it is an Acute Myocardial Infarction or myocardial injury due to an underlying chronic condition.       Results may be falsely decreased if patient taking Biotin.      Ethanol [[id number]] Collected:  01/13/20 0747    Specimen:  Blood Updated:  01/13/20 0817     Ethanol % <0.010 %     Narrative:       Not for legal purposes. Chain of Custody not followed.     Protime-INR [[id number]]   (Abnormal) Collected:  01/13/20 0747    Specimen:  Blood Updated:  01/13/20 0809     Protime 12.3 Seconds      INR 0.89          .  Imaging Results (Last 24 Hours)     Procedure Component Value Units Date/Time    CT Angiogram Head [[id number]] Collected:  01/13/20 1934     Updated:  01/13/20 1956    Narrative:       CT angiography with 3D MIP images head with IV contrast  CT angiography with 3D MIP images neck with IV contrast     Indication: Stroke     Comparison: Brain MR 01/13/2020     DOSE LENGTH PRODUCT: 449 mGy cm. Automated exposure control was also  utilized to decrease patient radiation dose.     Findings:     Three-vessel aortic arch with widely patent origins. Mild  atherosclerotic narrowing of the left proximal subclavian artery. Right  subclavian artery is widely patent. The right vertebral artery is  completely occluded proximally with some reconstitution along the mid to  distal course. The left vertebral artery is widely patent. Both common  carotid arteries are widely patent. Both extracranial internal carotid  arteries are widely patent.     Both intracranial internal carotid arteries are widely patent. Both MCAs  are widely patent. Both ACAs are widely patent. Basilar artery and tip  are unremarkable. Both PCAs are widely patent. Both superior cerebellar  arteries are widely patent. No dural venous sinus filling defect.     Soft tissues: No abnormal intracranial enhancement. Orbits appear  unremarkable. Parapharyngeal fat planes are maintained. Parotid and  submandibular glands are unremarkable. No focal asymmetry of the  aerodigestive tract. Mastoid air cells are clear. Mild maxillary sinus  mucosal thickening. No enlarged cervical lymph nodes. Emphysema in the  lung apices. No acute osseous findings.       Impression:          Complete occlusion of the right vertebral artery with reconstitution at  C1. Basilar artery and tip appear unremarkable. Both PCAs and superior  cerebellar arteries are  widely patent.  This report was finalized on 01/13/2020 19:53 by Dr. Charli Dowell MD.    CT Angiogram Neck [[id number]] Collected:  01/13/20 1934     Updated:  01/13/20 1956    Narrative:       CT angiography with 3D MIP images head with IV contrast  CT angiography with 3D MIP images neck with IV contrast     Indication: Stroke     Comparison: Brain MR 01/13/2020     DOSE LENGTH PRODUCT: 449 mGy cm. Automated exposure control was also  utilized to decrease patient radiation dose.     Findings:     Three-vessel aortic arch with widely patent origins. Mild  atherosclerotic narrowing of the left proximal subclavian artery. Right  subclavian artery is widely patent. The right vertebral artery is  completely occluded proximally with some reconstitution along the mid to  distal course. The left vertebral artery is widely patent. Both common  carotid arteries are widely patent. Both extracranial internal carotid  arteries are widely patent.     Both intracranial internal carotid arteries are widely patent. Both MCAs  are widely patent. Both ACAs are widely patent. Basilar artery and tip  are unremarkable. Both PCAs are widely patent. Both superior cerebellar  arteries are widely patent. No dural venous sinus filling defect.     Soft tissues: No abnormal intracranial enhancement. Orbits appear  unremarkable. Parapharyngeal fat planes are maintained. Parotid and  submandibular glands are unremarkable. No focal asymmetry of the  aerodigestive tract. Mastoid air cells are clear. Mild maxillary sinus  mucosal thickening. No enlarged cervical lymph nodes. Emphysema in the  lung apices. No acute osseous findings.       Impression:          Complete occlusion of the right vertebral artery with reconstitution at  C1. Basilar artery and tip appear unremarkable. Both PCAs and superior  cerebellar arteries are widely patent.  This report was finalized on 01/13/2020 19:53 by Dr. Charli Dowell MD.    MRI Brain Without Contrast  [[id number]] Collected:  01/13/20 1203     Updated:  01/13/20 1221    Narrative:       EXAM: MR BRAIN WITHOUT IV CONTRAST 01/13/2020     COMPARISON: Head CT dated earlier today      HISTORY: 64 years-old Male. Altered mental status      TECHNIQUE:   Routine pulse sequences of the brain were obtained without IV contrast.      REPORT:     There is diffusion restriction within the right inferior cerebellar  hemisphere and posterior brachium pontis, with associated T2/FLAIR  abnormal signal, consistent with acute right PICA infarct. There is a 9  mm focal area of blooming susceptibility artifact within the area of  infarct, consistent with blood products. An additional area of 2.4 x 1.6  cm of the mesentery artifact within the right cerebellar folia, also  reflecting blood products. There is a trace left subdural hemorrhage.     No evidence of acute intracranial hemorrhage at this time. Mass effect  is local without evidence of acute hydrocephalus. No suspicious  extra-axial fluid collection.     Chronic microvascular changes.          Impression:       1.  Large area of acute infarct in the right PICA territory.  2.  Associated blood products in the infarcted brain parenchyma.  3.  Trace left cerebral convexity subdural hemorrhage  This report was finalized on 01/13/2020 12:18 by Dr. aKrey Ennis MD.    CT Angiogram Chest [[id number]] Collected:  01/13/20 0824     Updated:  01/13/20 0905    Narrative:       EXAMINATION: CT ANGIOGRAM CHEST- 1/13/2020 8:24 AM CST     HISTORY: Stroke like symptoms, shortness of breath, dyspnea, chest pain     COMPARISON: CTA chest 06/27/2016     DOSE: 550 mGy-cm     TECHNIQUE: Sequential imaging was performed from the thoracic inlet  through the upper abdomen following the administration of IV contrast.   Sagittal and coronal reformations were made from the original source  data and reviewed. Additionally, 3-D MIPS reconstructions of the vessels  were made per CTA protocol. Automated  exposure control was also utilized  to decrease patient radiation dose.     FINDINGS:   The visualized thyroid gland is unremarkable. The trachea and main  bronchi appear widely patent and in normal anatomic position. The  esophagus is grossly normal in appearance. A small hiatal hernia is  present.     No pathologically enlarged axillary, mediastinal, or hilar lymph nodes  are identified.     The heart appears normal in size. There is no pericardial or pleural  effusion. Coronary artery calcifications are present. Atherosclerotic  calcifications are seen within the aorta and its branch vessels.     The thoracic aorta appears normal in caliber. Main pulmonary artery  appears normal in caliber. Contrast opacification is suboptimal for  evaluation of the pulmonary arteries. No central or proximal segmental  filling defects are identified. There is no evidence of right heart  strain.     There is mild diffuse bronchial wall thickening. The lungs otherwise  appear clear.     Review of the visualized portion of the upper abdomen demonstrates no  acute abnormalities.     Review of the visualized osseous structures demonstrates no acute or  aggressive lesions. Degenerative changes are noted in the spine. Old  left-sided rib fractures are evident.       Impression:       1. Suboptimal contrast bolus timing for evaluation of the pulmonary  arteries. No central PE identified. No evidence of right heart strain.     2. Atherosclerosis of the aorta and coronary arteries.     3. Small hiatal hernia.           This report was finalized on 01/13/2020 08:33 by Dr. Som Mckenzie MD.    CT Head Without Contrast [[id number]] Collected:  01/13/20 0814     Updated:  01/13/20 0905    Narrative:       EXAMINATION: CT HEAD WO CONTRAST-      1/13/2020 8:04 AM CST     HISTORY: Altered mental status.     In order to have a CT radiation dose as low as reasonably achievable  Automated Exposure Control was utilized for adjustment of the mA  and/or  KV according to patient size.     DLP in mGycm= 657.     Noncontrast head CT compared with 08/19/2018.     Axial, sagittal, and coronal noncontrast CT imaging of the head.     The visualized paranasal sinuses are clear.     The brain and ventricles have an age appropriate appearance.   There is no hemorrhage or mass-effect.   No acute infarction is seen.     No calvarial abnormality.       Impression:       1. No acute intracranial abnormality is seen.                                         This report was finalized on 01/13/2020 08:27 by Dr. Saeid Singh MD.    XR Chest 1 View [[id number]] Collected:  01/13/20 0856     Updated:  01/13/20 0904    Narrative:       EXAMINATION: XR CHEST 1 VW- 1/13/2020 8:56 AM CST     HISTORY: Chest pain, altered mental status     COMPARISON: 10/08/2018     FINDINGS:  Heart and mediastinal contours appear normal. There is mild bronchial  wall thickening. Lungs otherwise appear clear. There is no appreciable  pneumothorax or pleural effusion. The pulmonary vasculature appears  grossly normal.       Impression:       Mild bronchial wall thickening.  This report was finalized on 01/13/2020 08:57 by Dr. Som Mckenzie MD.              Impression    1, Acute right PICA  2. Size of stroke is concerning for risk of herniation and will need close monitoring as well as serial Head CT's  3. Small SDH and some hemorrhagic conversion in the right cerebellar stroke  4. Hypertension  5. Hyperlipidemia  6. Marked elevation of TSH to suggest hypothyroid and with low free T4 to suggest hypothyroid  7. Influenza A +  8. Hemoglobin and HCT are quite high.  This is often seen in nocturnal hypoxia as from sleep apnea  9. Reported H/O seizures  10. 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    Plan    · Serial Head CT's to monitor edema of right cerebellum and need for decompression as well as looking for hemorrhage as he has blood products in his stroke  · Patient  started on levothyroxine  · No TPA since last known well 4 days ago and has SDH  · Agree with lipid profile, hemoglobin A1C, CTA, echo with bubble, SCD's, cardiac monitor  · Agree with Lipitor  · Agree with PT/OT/speech    I discussed the patients findings and my recommendations with patient and nursing staff     45 minutes of critical care time was performed ,during this time I consulted with the nursing staff and also with other providers in regard to the patient's care, examined the patient reviewed the neuroimages and spoke with the patient  about test results,       Janelle Ramos MD  01/13/20  8:04 PM

## 2020-01-13 NOTE — PLAN OF CARE
"  Problem: Patient Care Overview  Goal: Plan of Care Review  Outcome: Ongoing (interventions implemented as appropriate)  Flowsheets (Taken 1/13/2020 9856)  Progress: no change (Initial Evaluation)  Plan of Care Reviewed With: patient; other (see comments) (ED RN)  Outcome Summary: Clinical bedside swallow evaluation completed. Patient is alert and cooperative. Some difficulty with communicating thoughts timely with mild word finding difficulty. Primary problem: Complaining of chest pain, vomitting for 4 days, R hand weakness, AMS. 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 Medical hx: COPD, HTN, hyperlipidema. Oral motor assessment was unremarkable. The patient completed a full range of consistencies except for mech soft. 1x delayed throat clear with nectar thick liquids. No other overt s/s of aspiration were observed. Vocal quality remained unchanged. Some facial grimacing with PO intake.  Functional rotary chew with regular solids. Desat to 80 with chewing and functional mobility in bed. Patient reports he has COPD and often becomes \"winded\" with exertion. Unable to fully rule out aspiration at bedside. SLP recommends: 1) Regular diet 2) Thin liquids 3) Meds whole with thin liquids 4) Oral care and standard swallow precautions. SLP will follow up to ensure diet tolerance and to complete speech language evaluation if warranted.     "

## 2020-01-13 NOTE — THERAPY EVALUATION
"Acute Care - Speech Language Pathology   Swallow Initial Evaluation [facility]     Patient Name: Reza Cruz  : 1955  MRN: [id number]  Today's Date: 2020               Admit Date: 2020  Clinical bedside swallow evaluation completed. Patient is alert and cooperative. Some difficulty with communicating thoughts timely with mild word finding difficulty.     Primary problem: Complaining of chest pain, vomitting for 4 days, R hand weakness, AMS.   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   Medical hx: COPD, HTN, hyperlipidema.     Oral motor assessment was unremarkable. The patient completed a full range of consistencies except for mech soft. 1x delayed throat clear with nectar thick liquids. No other overt s/s of aspiration were observed. Vocal quality remained unchanged. Some facial grimacing with PO intake.  Functional rotary chew with regular solids. Desat to 80 with chewing and functional mobility in bed. Patient reports he has COPD and often becomes \"winded\" with exertion. Unable to fully rule out aspiration at bedside.     SLP recommends:   1) Regular diet   2) Thin liquids   3) Meds whole with thin liquids   4) Oral care and standard swallow precautions.     SLP will follow up to ensure diet tolerance and to complete speech language evaluation if warranted.   Christina Wright MS CCC-SLP 2020 1:37 PM      Visit Dx:     ICD-10-CM ICD-9-CM   1. Dysphagia, unspecified type R13.10 787.20     Patient Active Problem List   Diagnosis   • Pneumonia of right upper lobe due to infectious organism (CMS/HCC)   • Impetigo   • Simple chronic bronchitis (CMS/HCC)   • Chronic obstructive pulmonary disease (CMS/HCC)   • Cough   • Personal history of nicotine dependence     Past Medical History:   Diagnosis Date   • COPD (chronic obstructive pulmonary disease) (CMS/HCC)    • Hyperlipidemia  "   • Hypertension      Past Surgical History:   Procedure Laterality Date   • EYE SURGERY Right    • KNEE SURGERY Right         SWALLOW EVALUATION (last 72 hours)      SLP Adult Swallow Evaluation     Row Name 01/13/20 1225                   Rehab Evaluation    Document Type  evaluation  -MM        Subjective Information  complains of numbness R side of body   -MM        Patient Observations  alert;cooperative;agree to therapy  -MM        Patient/Family Observations  No family present.  -MM        Patient Effort  good  -MM        Symptoms Noted During/After Treatment  none  -MM           General Information    Patient Profile Reviewed  yes  -MM        Pertinent History Of Current Problem  Primary problem: Complaining of chest pain, vomitting for 4 days, R hand weakness, AMS. 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 Medical hx: COPD, HTN, hyperlipidema.   -MM        Current Method of Nutrition  NPO  -MM        Precautions/Limitations, Vision  WFL;for purposes of eval  -MM        Precautions/Limitations, Hearing  WFL;for purposes of eval  -MM        Prior Level of Function-Communication  WFL  -MM        Prior Level of Function-Swallowing  no diet consistency restrictions  -MM        Plans/Goals Discussed with  patient;other (see comments);agreed upon ED RN   -MM        Barriers to Rehab  none identified  -MM        Patient's Goals for Discharge  patient did not state  -MM           Pain Assessment    Additional Documentation  Pain Scale: FACES Pre/Post-Treatment (Group)  -MM           Pain Scale: FACES Pre/Post-Treatment    Pain: FACES Scale, Pretreatment  0-->no hurt  -MM        Pain: FACES Scale, Post-Treatment  0-->no hurt  -MM           Oral Motor and Function    Dentition Assessment  -- lower teeth own, uppers not in place  -MM        Secretion Management  WNL/WFL  -MM        Mucosal Quality  moist,  "healthy  -MM        Volitional Swallow  WFL  -MM        Volitional Cough  WFL  -MM           Oral Musculature and Cranial Nerve Assessment    Oral Motor General Assessment  generalized oral motor weakness  -MM        Oral Motor, Comment  Complains of numbess on left side of body   -MM           General Eating/Swallowing Observations    Respiratory Support Currently in Use  room air  -MM        Eating/Swallowing Skills  fed by SLP;self-fed  -MM        Positioning During Eating  upright in bed  -MM        Utensils Used  spoon;straw  -MM        Consistencies Trialed  regular textures;honey-thick liquids;nectar/syrup-thick liquids;thin liquids;pureed  -MM           Clinical Swallow Eval    Oral Prep Phase  WFL  -MM        Oral Transit  WFL  -MM        Oral Residue  WFL  -MM        Pharyngeal Phase  no overt signs/symptoms of pharyngeal impairment  -MM        Esophageal Phase  unremarkable  -MM        Clinical Swallow Evaluation Summary  Clinical bedside swallow evaluation completed. Patient is alert and cooperative. Some difficulty with communicating thoughts timely with mild word finding difficulty. Primary problem: Complaining of chest pain, vomitting for 4 days, R hand weakness, AMS. 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 Medical hx: COPD, HTN, hyperlipidema. Oral motor assessment was unremarkable. The patient completed a full range of consistencies except for mech soft. 1x delayed throat clear with nectar thick liquids. No other overt s/s of aspiration were observed. Vocal quality remained unchanged. Some facial grimacing with PO intake.  Functional rotary chew with regular solids. Desat to 80 with chewing and functional mobility in bed. Patient reports he has COPD and often becomes \"winded\" with exertion. Unable to fully rule out aspiration at bedside. SLP recommends: 1) Regular diet 2) Thin " liquids 3) Meds whole with thin liquids 4) Oral care and standard swallow precautions. SLP will follow up to ensure diet tolerance and to complete speech language evaluation if warranted.   -MM           Clinical Impression    SLP Swallowing Diagnosis  functional oral phase  -MM        Functional Impact  risk of aspiration/pneumonia  -MM        Rehab Potential/Prognosis, Swallowing  good, to achieve stated therapy goals  -MM        Swallow Criteria for Skilled Therapeutic Interventions Met  demonstrates skilled criteria  -MM           Recommendations    Therapy Frequency (Swallow)  at least;3 days per week  -MM        Predicted Duration Therapy Intervention (Days)  until discharge  -MM        SLP Diet Recommendation  regular textures;thin liquids  -MM        Recommended Diagnostics  other (see comments) Speech/Language Evaluation   -MM        Recommended Precautions and Strategies  upright posture during/after eating;small bites of food and sips of liquid;alternate between small bites of food and sips of liquid  -MM        SLP Rec. for Method of Medication Administration  meds whole;with thin liquids;as tolerated  -MM        Monitor for Signs of Aspiration  yes;notify SLP if any concerns  -MM        Anticipated Dischage Disposition  unknown  -MM           Swallow Goals (SLP)    Oral Nutrition/Hydration Goal Selection (SLP)  oral nutrition/hydration, SLP goal 1  -MM           Oral Nutrition/Hydration Goal 1 (SLP)    Oral Nutrition/Hydration Goal 1, SLP  LTG: Patient will tolerate LRD without s/s of aspiration.  -MM        Time Frame (Oral Nutrition/Hydration Goal 1, SLP)  by discharge  -MM        Barriers (Oral Nutrition/Hydration Goal 1, SLP)  n/a  -MM        Progress/Outcomes (Oral Nutrition/Hydration Goal 1, SLP)  goal ongoing  -MM          User Key  (r) = Recorded By, (t) = Taken By, (c) = Cosigned By    Initials Name Effective Dates    MM Christina Wright MS CCC-SLP 05/24/19 -           EDUCATION  The  patient has been educated in the following areas:   Dysphagia (Swallowing Impairment) Oral Care/Hydration.    SLP Recommendation and Plan  SLP Swallowing Diagnosis: functional oral phase  SLP Diet Recommendation: regular textures, thin liquids  Recommended Precautions and Strategies: upright posture during/after eating, small bites of food and sips of liquid, alternate between small bites of food and sips of liquid  SLP Rec. for Method of Medication Administration: meds whole, with thin liquids, as tolerated     Monitor for Signs of Aspiration: yes, notify SLP if any concerns  Recommended Diagnostics: other (see comments)(Speech/Language Evaluation )  Swallow Criteria for Skilled Therapeutic Interventions Met: demonstrates skilled criteria  Anticipated Dischage Disposition: unknown  Rehab Potential/Prognosis, Swallowing: good, to achieve stated therapy goals  Therapy Frequency (Swallow): at least, 3 days per week  Predicted Duration Therapy Intervention (Days): until discharge       Plan of Care Reviewed With: patient, other (see comments)(ED RN)  Progress: no change(Initial Evaluation)  Outcome Summary: Clinical bedside swallow evaluation completed. Patient is alert and cooperative. Some difficulty with communicating thoughts timely with mild word finding difficulty. Primary problem: Complaining of chest pain, vomitting for 4 days, R hand weakness, AMS. 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 Medical hx: COPD, HTN, hyperlipidema. Oral motor assessment was unremarkable. The patient completed a full range of consistencies except for mech soft. 1x delayed throat clear with nectar thick liquids. No other overt s/s of aspiration were observed. Vocal quality remained unchanged. Some facial grimacing with PO intake.  Functional rotary chew with regular solids. Desat to 80 with chewing and functional  "mobility in bed. Patient reports he has COPD and often becomes \"winded\" with exertion. Unable to fully rule out aspiration at bedside. SLP recommends: 1) Regular diet 2) Thin liquids 3) Meds whole with thin liquids 4) Oral care and standard swallow precautions. SLP will follow up to ensure diet tolerance and to complete speech language evaluation if warranted.    SLP GOALS     Row Name 01/13/20 1225             Oral Nutrition/Hydration Goal 1 (SLP)    Oral Nutrition/Hydration Goal 1, SLP  LTG: Patient will tolerate LRD without s/s of aspiration.  -MM      Time Frame (Oral Nutrition/Hydration Goal 1, SLP)  by discharge  -MM      Barriers (Oral Nutrition/Hydration Goal 1, SLP)  n/a  -MM      Progress/Outcomes (Oral Nutrition/Hydration Goal 1, SLP)  goal ongoing  -MM        User Key  (r) = Recorded By, (t) = Taken By, (c) = Cosigned By    Initials Name Provider Type    Christina Joseph MS CCC-SLP Speech and Language Pathologist             Time Calculation:   Time Calculation- SLP     Row Name 01/13/20 1334             Time Calculation- SLP    SLP Start Time  1225  -MM      SLP Stop Time  1334  -MM      SLP Time Calculation (min)  69 min  -MM      SLP Received On  01/13/20  -MM      SLP Goal Re-Cert Due Date  01/23/20  -MM        User Key  (r) = Recorded By, (t) = Taken By, (c) = Cosigned By    Initials Name Provider Type    Christina Joseph MS CCC-SLP Speech and Language Pathologist          Therapy Charges for Today     Code Description Service Date Service Provider Modifiers Qty    [id number] HC ST EVAL ORAL PHARYNG SWALLOW 5 1/13/2020 Christina Wright MS CCC-SLP GN 1               Christina Wright MS CCC-SLP  1/13/2020  "

## 2020-01-13 NOTE — PROGRESS NOTES
CT of the head and MRI of the brain from 1/13/2020 is reviewed.  No evidence of acute subdural hematoma on CT scan.  Suggested based on the MRI.  Large right cerebellar infarct that could potentially require decompressive craniotomy.  Patient is cleared for anticoagulation use per neurology.  Full consult note to follow.    Mitchel Dahl MD

## 2020-01-13 NOTE — ED NOTES
PT C/O HA & CONTS TO EXPERIENCE RIGHT HAND NUMBNESS.  PT IS ALERT & INTERACTIVE, MUCH IMPROVED COGNITIVELY.   PROVIDER AWARE.   Michelle Cooper, JORGE  01/13/20 1221       Michelle Cooper RN  01/13/20 1323

## 2020-01-13 NOTE — ED PROVIDER NOTES
"Subjective   Patient is a 64-year-old white male presents per EMS evidently from home with initial plan complaints of chest pain and vomiting for the past 4 days.  Evidently per nursing report the initial complaint was a neighbor called and stated that he was having \"strokelike symptoms.\"  EMS advised that patient did not appear to be having a stroke and called report in as chest pain and vomiting.  Patient is unable to give any history at all due to his altered mental status.  He does respond to verbal stimuli but is very altered and unable to answer questions appropriately.  It is not certain of onset of symptoms       History provided by:  EMS personnel  History limited by:  Mental status change   used: No        Review of Systems   Neurological:        Patient is a 64-year-old white male presents per EMS evidently from home with initial plan complaints of chest pain and vomiting for the past 4 days.  Evidently per nursing report the initial complaint was a neighbor called and stated that he was having \"strokelike symptoms.\"  EMS advised that patient did not appear to be having a stroke and called report in as chest pain and vomiting.  Patient is unable to give any history at all due to his altered mental status.  He does respond to verbal stimuli but is very altered and unable to answer questions appropriately.  It is not certain of onset of symptoms          Past Medical History:   Diagnosis Date   • COPD (chronic obstructive pulmonary disease) (CMS/HCC)    • Hyperlipidemia    • Hypertension        Allergies   Allergen Reactions   • Codeine Itching and Rash       Past Surgical History:   Procedure Laterality Date   • EYE SURGERY Right    • KNEE SURGERY Right        Family History   Problem Relation Age of Onset   • Cancer Mother    • Stroke Mother    • Heart attack Father    • Peripheral vascular disease Father        Social History     Socioeconomic History   • Marital status:      " Spouse name: Not on file   • Number of children: Not on file   • Years of education: Not on file   • Highest education level: Not on file   Tobacco Use   • Smoking status: Former Smoker     Last attempt to quit: 2010     Years since quitting: 10.0   • Smokeless tobacco: Never Used   Substance and Sexual Activity   • Alcohol use: No   • Drug use: No   • Sexual activity: Defer       Prior to Admission medications    Medication Sig Start Date End Date Taking? Authorizing Provider   albuterol sulfate  (90 Base) MCG/ACT inhaler Inhale 2 puffs 4 (Four) Times a Day As Needed for Wheezing. 5/7/19   Salomon Vang MD   amLODIPine (NORVASC) 5 MG tablet Take 10 mg by mouth Daily.    ProviderCaroline MD   benzonatate (TESSALON) 200 MG capsule Take 1 capsule by mouth 3 (Three) Times a Day As Needed for Cough. 9/6/19   Salomon Vang MD   bisoprolol-hydrochlorothiazide (ZIAC) 5-6.25 MG per tablet Take 1 tablet by mouth Daily.    ProviderCaroline MD   fluticasone-salmeterol (WIXELA INHUB) 250-50 MCG/DOSE DISKUS Inhale 1 puff 2 (Two) Times a Day. 9/11/19   Salomon Vang MD   ipratropium-albuterol (DUO-NEB) 0.5-2.5 mg/3 ml nebulizer Take 3 mL by nebulization 4 (Four) Times a Day. 5/7/19   Salomon Vang MD   levothyroxine (SYNTHROID, LEVOTHROID) 175 MCG tablet Take 175 mcg by mouth Daily.    ProviderCaroline MD   mometasone-formoterol (DULERA) 200-5 MCG/ACT inhaler Inhale 2 puffs 2 (Two) Times a Day. 9/6/19   Salomon Vang MD   montelukast (SINGULAIR) 10 MG tablet Take 10 mg by mouth Daily.    ProviderCaroline MD   predniSONE (DELTASONE) 10 MG tablet TAKE ONE TABLET EVERY DAY 6/26/19   Tad Dugan APRN   predniSONE (DELTASONE) 10 MG tablet Take 1 tablet by mouth 2 (Two) Times a Day. 9/6/19   Salomon Vang MD   tamsulosin (FLOMAX) 0.4 MG capsule 24 hr capsule Take 1 capsule by mouth Daily.    Provider, MD Caroline   tiotropium bromide  "monohydrate (SPIRIVA RESPIMAT) 2.5 MCG/ACT aerosol solution inhaler Inhale 2 puffs Daily. 9/6/19   Salomon Vang MD       /93   Pulse (!) 47   Temp 97.3 °F (36.3 °C) (Oral)   Resp 20   Ht 182.9 cm (72.01\")   Wt 78.2 kg (172 lb 6.4 oz)   SpO2 93%   BMI 23.38 kg/m²     Objective   Physical Exam   Constitutional: He appears well-developed and well-nourished.   Confused. Responds to verbal stimuli but does not answer questions appropriately. Is alert to person. di shelved    HENT:   Head: Normocephalic and atraumatic.   Eyes: Pupils are equal, round, and reactive to light. Conjunctivae are normal.   Rotary nystagmus noted.    Neck: Normal range of motion. Neck supple.   Cardiovascular: Normal rate, regular rhythm, normal heart sounds and intact distal pulses.   Pulmonary/Chest:   Mild sob noted. Exp wheezing noted throughout    Abdominal: Soft. Bowel sounds are normal.   Musculoskeletal:   Moves all extremities. Unable to determine strength. Pt does not follow commands   Neurological: He has normal reflexes.   Oriented to person. Rotary nystagmus noted. responds to verbal stimuli but does not answer questions appropriately. Unable to determine onset of symptoms. Pt is unable to give adequate medical history    Skin: Skin is warm and dry.   Mild pallor noted   Psychiatric: Judgment and thought content normal.   Nursing note and vitals reviewed.      Procedures         Lab Results (last 24 hours)     Procedure Component Value Units Date/Time    [facility] Blood Culture Bottle Set [[id number]] Collected:  01/13/20 0747    Specimen:  Blood from Arm, Right Updated:  01/13/20 0904     Extra Tube Hold for add-ons.     Comment: Auto resulted.       CBC & Differential [[id number]] Collected:  01/13/20 0747    Specimen:  Blood Updated:  01/13/20 0828    Narrative:       The following orders were created for panel order CBC & Differential.  Procedure                               Abnormality         Status         "             ---------                               -----------         ------                     CBC Auto Differential[[id number]]        Abnormal            Final result                 Please view results for these tests on the individual orders.    Comprehensive Metabolic Panel [[id number]]  (Abnormal) Collected:  01/13/20 0747    Specimen:  Blood Updated:  01/13/20 0817     Glucose 130 mg/dL      BUN 15 mg/dL      Creatinine 1.03 mg/dL      Sodium 138 mmol/L      Potassium 3.7 mmol/L      Chloride 102 mmol/L      CO2 25.0 mmol/L      Calcium 9.7 mg/dL      Total Protein 8.1 g/dL      Albumin 4.20 g/dL      ALT (SGPT) 24 U/L      AST (SGOT) 24 U/L      Alkaline Phosphatase 48 U/L      Total Bilirubin 0.8 mg/dL      eGFR Non African Amer 73 mL/min/1.73      Globulin 3.9 gm/dL      A/G Ratio 1.1 g/dL      BUN/Creatinine Ratio 14.6     Anion Gap 11.0 mmol/L     Narrative:       GFR Normal >60  Chronic Kidney Disease <60  Kidney Failure <15      Protime-INR [[id number]]  (Abnormal) Collected:  01/13/20 0747    Specimen:  Blood Updated:  01/13/20 0809     Protime 12.3 Seconds      INR 0.89    Lipase [[id number]]  (Abnormal) Collected:  01/13/20 0747    Specimen:  Blood Updated:  01/13/20 0817     Lipase 145 U/L     Amylase [[id number]]  (Abnormal) Collected:  01/13/20 0747    Specimen:  Blood Updated:  01/13/20 0817     Amylase 126 U/L     Troponin [[id number]]  (Normal) Collected:  01/13/20 0747    Specimen:  Blood Updated:  01/13/20 0817     Troponin T <0.010 ng/mL     Narrative:       Troponin T Reference Range:  <= 0.03 ng/mL-   Negative for AMI  >0.03 ng/mL-     Abnormal for myocardial necrosis.  Clinicians would have to utilize clinical acumen, EKG, Troponin and serial changes to determine if it is an Acute Myocardial Infarction or myocardial injury due to an underlying chronic condition.       Results may be falsely decreased if patient taking Biotin.      Lactic Acid, Plasma [[id number]]  (Normal) Collected:   01/13/20 0747    Specimen:  Blood from Arm, Right Updated:  01/13/20 0835     Lactate 1.8 mmol/L     Blood Culture - Blood, Arm, Right [[id number]] Collected:  01/13/20 0747    Specimen:  Blood from Arm, Right Updated:  01/13/20 0827    Ethanol [[id number]] Collected:  01/13/20 0747    Specimen:  Blood Updated:  01/13/20 0817     Ethanol % <0.010 %     Narrative:       Not for legal purposes. Chain of Custody not followed.     CBC Auto Differential [[id number]]  (Abnormal) Collected:  01/13/20 0747    Specimen:  Blood Updated:  01/13/20 0828     WBC 10.87 10*3/mm3      RBC 6.07 10*6/mm3      Hemoglobin 19.3 g/dL      Hematocrit 55.4 %      MCV 91.3 fL      MCH 31.8 pg      MCHC 34.8 g/dL      RDW 14.2 %      RDW-SD 46.3 fl      MPV 9.9 fL      Platelets 280 10*3/mm3      Neutrophil % 54.1 %      Lymphocyte % 32.4 %      Monocyte % 10.3 %      Eosinophil % 2.1 %      Basophil % 0.6 %      Immature Grans % 0.5 %      Neutrophils, Absolute 5.89 10*3/mm3      Lymphocytes, Absolute 3.52 10*3/mm3      Monocytes, Absolute 1.12 10*3/mm3      Eosinophils, Absolute 0.23 10*3/mm3      Basophils, Absolute 0.06 10*3/mm3      Immature Grans, Absolute 0.05 10*3/mm3      nRBC 0.0 /100 WBC     TSH [[id number]]  (Abnormal) Collected:  01/13/20 0747    Specimen:  Blood Updated:  01/13/20 1128     TSH 94.580 uIU/mL     T4, Free [[id number]]  (Abnormal) Collected:  01/13/20 0747    Specimen:  Blood Updated:  01/13/20 1501     Free T4 0.89 ng/dL     Narrative:       Results may be falsely increased if patient taking Biotin.      POC Creatinine [[id number]]  (Normal) Collected:  01/13/20 0815    Specimen:  Blood Updated:  01/13/20 1526     Creatinine 1.10 mg/dL      Comment: Serial Number: [id number]:  [id number]       Blood Culture - Blood, Arm, Right [[id number]] Collected:  01/13/20 0824    Specimen:  Blood from Arm, Right Updated:  01/13/20 0934    Blood Gas, Arterial [[id number]]  (Abnormal) Collected:  01/13/20 0825    Specimen:  Arterial  Blood Updated:  01/13/20 0831     Site Right Radial     Luke's Test N/A     pH, Arterial 7.504 pH units      Comment: 83 Value above reference range        pCO2, Arterial 25.9 mm Hg      Comment: 84 Value below reference range        pO2, Arterial 105.0 mm Hg      HCO3, Arterial 20.3 mmol/L      Base Excess, Arterial -0.7 mmol/L      Comment: 84 Value below reference range        O2 Saturation, Arterial 99.1 %      Comment: 83 Value above reference range        Temperature 37.0 C      Barometric Pressure for Blood Gas 760 mmHg      Modality Room Air     Ventilator Mode NA     Collected by NAI     Comment: Meter: [id number]     :  [id number]       Influenza Antigen, Rapid - Swab, Nasopharynx [[id number]]  (Abnormal) Collected:  01/13/20 0842    Specimen:  Swab from Nasopharynx Updated:  01/13/20 0915     Influenza A Ag, EIA Positive     Influenza B Ag, EIA Negative    Narrative:       Recommend confirmation of negative results by viral culture or molecular assay.    Ammonia [[id number]]  (Normal) Collected:  01/13/20 0843    Specimen:  Blood Updated:  01/13/20 0935     Ammonia 31 umol/L     CK [[id number]]  (Normal) Collected:  01/13/20 1502    Specimen:  Blood Updated:  01/13/20 1527     Creatine Kinase 21 U/L     Urinalysis With Culture If Indicated - Urine, Catheter In/Out [[id number]]  (Abnormal) Collected:  01/13/20 1838    Specimen:  Urine, Catheter In/Out Updated:  01/13/20 1855     Color, UA Dark Yellow     Appearance, UA Clear     pH, UA <=5.0     Specific Gravity, UA >1.030     Glucose, UA Negative     Ketones, UA Negative     Bilirubin, UA Negative     Blood, UA Negative     Protein, UA Negative     Leuk Esterase, UA Trace     Nitrite, UA Negative     Urobilinogen, UA 1.0 E.U./dL    Urine Drug Screen - Urine, Catheter [[id number]]  (Normal) Collected:  01/13/20 1838    Specimen:  Urine, Catheter Updated:  01/13/20 1856     THC, Screen, Urine Negative     Phencyclidine (PCP), Urine Negative      Cocaine Screen, Urine Negative     Methamphetamine, Ur Negative     Opiate Screen Negative     Amphetamine Screen, Urine Negative     Benzodiazepine Screen, Urine Negative     Tricyclic Antidepressants Screen Negative     Methadone Screen, Urine Negative     Barbiturates Screen, Urine Negative     Oxycodone Screen, Urine Negative     Propoxyphene Screen Negative     Buprenorphine, Screen, Urine Negative    Narrative:       Cutoff For Drugs Screened:    Amphetamines               500 ng/ml  Barbiturates               200 ng/ml  Benzodiazepines            150 ng/ml  Cocaine                    150 ng/ml  Methadone                  200 ng/ml  Opiates                    100 ng/ml  Phencyclidine               25 ng/ml  THC                            50 ng/ml  Methamphetamine            500 ng/ml  Tricyclic Antidepressants  300 ng/ml  Oxycodone                  100 ng/ml  Propoxyphene               300 ng/ml  Buprenorphine               10 ng/ml    The normal value for all drugs tested is negative. This report includes unconfirmed screening results, with the cutoff values listed, to be used for medical treatment purposes only.  Unconfirmed results must not be used for non-medical purposes such as employment or legal testing.  Clinical consideration should be applied to any drug of abuse test, particularly when unconfirmed results are used.      Urinalysis, Microscopic Only - Urine, Catheter In/Out [[id number]]  (Abnormal) Collected:  01/13/20 1838    Specimen:  Urine, Catheter In/Out Updated:  01/13/20 1855     RBC, UA 0-2 /HPF      WBC, UA 6-12 /HPF      Bacteria, UA None Seen /HPF      Squamous Epithelial Cells, UA 0-2 /HPF      Hyaline Casts, UA 0-2 /LPF      Methodology Automated Microscopy    Urine Culture - Urine, Urine, Catheter In/Out [[id number]] Collected:  01/13/20 1838    Specimen:  Urine, Catheter In/Out Updated:  01/13/20 1854    POC Glucose Once [[id number]]  (Normal) Collected:  01/13/20 1906    Specimen:   Blood Updated:  01/13/20 1928     Glucose 130 mg/dL      Comment: : [id number] Mitchell AnnaMeter ID: [id number]       POC Glucose Once [[id number]]  (Abnormal) Collected:  01/13/20 2354    Specimen:  Blood Updated:  01/14/20 0013     Glucose 176 mg/dL      Comment: : [id number] Mitchell AnnaMeter ID: [id number]       Hemoglobin A1c [[id number]]  (Normal) Collected:  01/14/20 0247    Specimen:  Blood Updated:  01/14/20 0328     Hemoglobin A1C 5.40 %     Narrative:       Hemoglobin A1C Ranges:    Increased Risk for Diabetes  5.7% to 6.4%  Diabetes                     >= 6.5%  Diabetic Goal                < 7.0%    Lipid Panel [[id number]] Collected:  01/14/20 0247    Specimen:  Blood Updated:  01/14/20 0334     Total Cholesterol 171 mg/dL      Triglycerides 136 mg/dL      HDL Cholesterol 53 mg/dL      LDL Cholesterol  91 mg/dL      VLDL Cholesterol 27.2 mg/dL      LDL/HDL Ratio 1.71    Narrative:       Cholesterol Reference Ranges  (U.S. Department of Health and Human Services ATP III Classifications)    Desirable          <200 mg/dL  Borderline High    200-239 mg/dL  High Risk          >240 mg/dL      Triglyceride Reference Ranges  (U.S. Department of Health and Human Services ATP III Classifications)    Normal           <150 mg/dL  Borderline High  150-199 mg/dL  High             200-499 mg/dL  Very High        >500 mg/dL    HDL Reference Ranges  (U.S. Department of Health and Human Services ATP III Classifcations)    Low     <40 mg/dl (major risk factor for CHD)  High    >60 mg/dl ('negative' risk factor for CHD)        LDL Reference Ranges  (U.S. Department of Health and Human Services ATP III Classifcations)    Optimal          <100 mg/dL  Near Optimal     100-129 mg/dL  Borderline High  130-159 mg/dL  High             160-189 mg/dL  Very High        >189 mg/dL    CBC (No Diff) [[id number]]  (Abnormal) Collected:  01/14/20 0247    Specimen:  Blood Updated:  01/14/20 0334     WBC 16.57 10*3/mm3      RBC 5.68  10*6/mm3      Hemoglobin 17.9 g/dL      Hematocrit 52.8 %      MCV 93.0 fL      MCH 31.5 pg      MCHC 33.9 g/dL      RDW 14.0 %      RDW-SD 47.2 fl      MPV 10.4 fL      Platelets 253 10*3/mm3     Comprehensive Metabolic Panel [[id number]]  (Abnormal) Collected:  01/14/20 0247    Specimen:  Blood Updated:  01/14/20 0336     Glucose 131 mg/dL      BUN 22 mg/dL      Creatinine 1.15 mg/dL      Sodium 142 mmol/L      Potassium 4.0 mmol/L      Chloride 104 mmol/L      CO2 26.0 mmol/L      Calcium 9.6 mg/dL      Total Protein 7.1 g/dL      Albumin 3.90 g/dL      ALT (SGPT) 22 U/L      AST (SGOT) 22 U/L      Comment: Specimen hemolyzed.  Results may be affected.        Alkaline Phosphatase 41 U/L      Total Bilirubin 0.6 mg/dL      eGFR Non African Amer 64 mL/min/1.73      Globulin 3.2 gm/dL      A/G Ratio 1.2 g/dL      BUN/Creatinine Ratio 19.1     Anion Gap 12.0 mmol/L     Narrative:       GFR Normal >60  Chronic Kidney Disease <60  Kidney Failure <15      Testosterone, Free, Total [[id number]] Collected:  01/14/20 0247    Specimen:  Blood Updated:  01/14/20 0302    POC Glucose Once [[id number]]  (Normal) Collected:  01/14/20 0551    Specimen:  Blood Updated:  01/14/20 0602     Glucose 111 mg/dL      Comment: : [id number] Stephen AnnaMeter ID: [id number]             CT Angiogram Head   Final Result       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 Basilar artery and tip appear unremarkable. Both PCAs and superior   cerebellar arteries are widely patent.   This report was finalized on 01/13/2020 19:53 by Dr. Charli Dowell MD.      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 Basilar artery and tip appear unremarkable. Both PCAs and superior   cerebellar arteries are widely patent.   This report was finalized on 01/13/2020 19:53 by Dr. Charli Dowell MD.      MRI Brain Without Contrast   Final Result   1.  Large area  of acute infarct in the right PICA territory.   2.  Associated blood products in the infarcted brain parenchyma.   3.  Trace left cerebral convexity subdural hemorrhage   This report was finalized on 01/13/2020 12:18 by Dr. Karey Ennis MD.      XR Chest 1 View   ED Interpretation   Mild bronchial wall thickening.   This report was finalized on 01/13/2020 08:57 by Dr. Som Mckenzie MD.      Final Result   Mild bronchial wall thickening.   This report was finalized on 01/13/2020 08:57 by Dr. Som Mckenzie MD.      CT Angiogram Chest   ED Interpretation   1. Suboptimal contrast bolus timing for evaluation of the pulmonary   arteries. No central PE identified. No evidence of right heart strain.       2. Atherosclerosis of the aorta and coronary arteries.       3. Small hiatal hernia.               This report was finalized on 01/13/2020 08:33 by Dr. Som Mckenzie MD.      Final Result   1. Suboptimal contrast bolus timing for evaluation of the pulmonary   arteries. No central PE identified. No evidence of right heart strain.       2. Atherosclerosis of the aorta and coronary arteries.       3. Small hiatal hernia.               This report was finalized on 01/13/2020 08:33 by Dr. Som Mckenzie MD.      CT Head Without Contrast   ED Interpretation   1. No acute intracranial abnormality is seen.                                                       This report was finalized on 01/13/2020 08:27 by Dr. Saeid Singh MD.      Final Result   1. No acute intracranial abnormality is seen.                                                       This report was finalized on 01/13/2020 08:27 by Dr. Saeid Singh MD.          ED Course  ED Course as of Jan 14 0718   Mon Jan 13, 2020   0818 Spoke with pt's sister, Chanell Welch. She states that she has not spoken with him within the last week. She states that their friend saw him last 4 days ago. She states that he has a hx of htn and that he drinks alcohol daily and smokes.  "She states that she is unsure of the quantity. She states that over the past several months the pt has been having \"blacking out episodes often\" she states that she is not sure when his last episode occurred.     [CW]   0934 Pt is more alert at this time. He is able to answer some questions. He states that he has been dizzy for the past 4-5 days. He states that he has had numbness \"all over for the past 4-5 days\" he states that when he stands, he becomes so weak that he cannot walk. He states that he has had to borrow a wheelchair because he has been unable to walk. Pending urinalysis at this time. Will call neurology for further at this time     [CW]   0948 Spoke with dr leonard- advised to go ahead and proceed with mri of brain at this time     [CW]   1039 Spoke with sister again- she advises that pt also has problems with thyroid and states that he does not take medication as he should    [CW]   1241 Reviewed results with dr kwok- in agreement with care plan. Call placed neurology at this time     [CW]   1341 Dr kwok spoke with dr leonard- advised to contact neurosurgery and admit to hospitalist and she would consult as well. Call placed to dr irvin at this time for further     [CW]   1348 Spoke with dr irvin- advised that he would see in consultation and that pt can be placed on anticoagulation as appropriate.     [CW]   1357 Spoke with dr mancera- has accepted for admission. Will be admitted to dr lemus.     [CW]      ED Course User Index  [CW] Mell Roe, APRN          MDM  Number of Diagnoses or Management Options  Cerebrovascular accident (CVA), unspecified mechanism (CMS/HCC): new and requires workup  Elevated TSH: new and requires workup  Influenza A: new and requires workup  Tobacco abuse: minor     Amount and/or Complexity of Data Reviewed  Clinical lab tests: ordered and reviewed  Tests in the radiology section of CPT®: ordered and reviewed  Decide to obtain previous " medical records or to obtain history from someone other than the patient: yes  Independent visualization of images, tracings, or specimens: yes    Patient Progress  Patient progress: stable      Final diagnoses:   Influenza A   Elevated TSH   Cerebrovascular accident (CVA), unspecified mechanism (CMS/HCC)   Tobacco abuse          Mell Roe, APRN  01/14/20 0718

## 2020-01-14 LAB
ALBUMIN SERPL-MCNC: 3.9 G/DL (ref 3.5–5.2)
ALBUMIN/GLOB SERPL: 1.2 G/DL
ALP SERPL-CCNC: 41 U/L (ref 39–117)
ALT SERPL W P-5'-P-CCNC: 22 U/L (ref 1–41)
ANION GAP SERPL CALCULATED.3IONS-SCNC: 12 MMOL/L (ref 5–15)
AST SERPL-CCNC: 22 U/L (ref 1–40)
BILIRUB SERPL-MCNC: 0.6 MG/DL (ref 0.2–1.2)
BUN BLD-MCNC: 22 MG/DL (ref 8–23)
BUN/CREAT SERPL: 19.1 (ref 7–25)
CALCIUM SPEC-SCNC: 9.6 MG/DL (ref 8.6–10.5)
CHLORIDE SERPL-SCNC: 104 MMOL/L (ref 98–107)
CHOLEST SERPL-MCNC: 171 MG/DL (ref 0–200)
CO2 SERPL-SCNC: 26 MMOL/L (ref 22–29)
CREAT BLD-MCNC: 1.15 MG/DL (ref 0.76–1.27)
DEPRECATED RDW RBC AUTO: 47.2 FL (ref 37–54)
ERYTHROCYTE [DISTWIDTH] IN BLOOD BY AUTOMATED COUNT: 14 % (ref 12.3–15.4)
GFR SERPL CREATININE-BSD FRML MDRD: 64 ML/MIN/1.73
GLOBULIN UR ELPH-MCNC: 3.2 GM/DL
GLUCOSE BLD-MCNC: 131 MG/DL (ref 65–99)
GLUCOSE BLDC GLUCOMTR-MCNC: 111 MG/DL (ref 70–130)
GLUCOSE BLDC GLUCOMTR-MCNC: 124 MG/DL (ref 70–130)
GLUCOSE BLDC GLUCOMTR-MCNC: 161 MG/DL (ref 70–130)
GLUCOSE BLDC GLUCOMTR-MCNC: 176 MG/DL (ref 70–130)
HBA1C MFR BLD: 5.4 % (ref 4.8–5.6)
HCT VFR BLD AUTO: 52.8 % (ref 37.5–51)
HDLC SERPL-MCNC: 53 MG/DL (ref 40–60)
HGB BLD-MCNC: 17.9 G/DL (ref 13–17.7)
LDLC SERPL CALC-MCNC: 91 MG/DL (ref 0–100)
LDLC/HDLC SERPL: 1.71 {RATIO}
MCH RBC QN AUTO: 31.5 PG (ref 26.6–33)
MCHC RBC AUTO-ENTMCNC: 33.9 G/DL (ref 31.5–35.7)
MCV RBC AUTO: 93 FL (ref 79–97)
PLATELET # BLD AUTO: 253 10*3/MM3 (ref 140–450)
PMV BLD AUTO: 10.4 FL (ref 6–12)
POTASSIUM BLD-SCNC: 4 MMOL/L (ref 3.5–5.2)
PROT SERPL-MCNC: 7.1 G/DL (ref 6–8.5)
RBC # BLD AUTO: 5.68 10*6/MM3 (ref 4.14–5.8)
SODIUM BLD-SCNC: 142 MMOL/L (ref 136–145)
TRIGL SERPL-MCNC: 136 MG/DL (ref 0–150)
VLDLC SERPL-MCNC: 27.2 MG/DL
WBC NRBC COR # BLD: 16.57 10*3/MM3 (ref 3.4–10.8)

## 2020-01-14 PROCEDURE — 92523 SPEECH SOUND LANG COMPREHEN: CPT

## 2020-01-14 PROCEDURE — 94799 UNLISTED PULMONARY SVC/PX: CPT

## 2020-01-14 PROCEDURE — 82962 GLUCOSE BLOOD TEST: CPT

## 2020-01-14 PROCEDURE — 99233 SBSQ HOSP IP/OBS HIGH 50: CPT | Performed by: PSYCHIATRY & NEUROLOGY

## 2020-01-14 PROCEDURE — 84402 ASSAY OF FREE TESTOSTERONE: CPT | Performed by: PSYCHIATRY & NEUROLOGY

## 2020-01-14 PROCEDURE — 85027 COMPLETE CBC AUTOMATED: CPT | Performed by: INTERNAL MEDICINE

## 2020-01-14 PROCEDURE — 97166 OT EVAL MOD COMPLEX 45 MIN: CPT | Performed by: OCCUPATIONAL THERAPIST

## 2020-01-14 PROCEDURE — 80053 COMPREHEN METABOLIC PANEL: CPT | Performed by: INTERNAL MEDICINE

## 2020-01-14 PROCEDURE — 80061 LIPID PANEL: CPT | Performed by: INTERNAL MEDICINE

## 2020-01-14 PROCEDURE — 25010000002 METHYLPREDNISOLONE PER 125 MG: Performed by: INTERNAL MEDICINE

## 2020-01-14 PROCEDURE — 99222 1ST HOSP IP/OBS MODERATE 55: CPT | Performed by: NEUROLOGICAL SURGERY

## 2020-01-14 PROCEDURE — 83036 HEMOGLOBIN GLYCOSYLATED A1C: CPT | Performed by: INTERNAL MEDICINE

## 2020-01-14 PROCEDURE — 25010000002 ONDANSETRON PER 1 MG: Performed by: INTERNAL MEDICINE

## 2020-01-14 PROCEDURE — 84403 ASSAY OF TOTAL TESTOSTERONE: CPT | Performed by: PSYCHIATRY & NEUROLOGY

## 2020-01-14 PROCEDURE — 97162 PT EVAL MOD COMPLEX 30 MIN: CPT

## 2020-01-14 RX ORDER — METHYLPREDNISOLONE SODIUM SUCCINATE 125 MG/2ML
60 INJECTION, POWDER, LYOPHILIZED, FOR SOLUTION INTRAMUSCULAR; INTRAVENOUS ONCE
Status: COMPLETED | OUTPATIENT
Start: 2020-01-14 | End: 2020-01-14

## 2020-01-14 RX ORDER — IPRATROPIUM BROMIDE AND ALBUTEROL SULFATE 2.5; .5 MG/3ML; MG/3ML
3 SOLUTION RESPIRATORY (INHALATION)
Status: DISCONTINUED | OUTPATIENT
Start: 2020-01-14 | End: 2020-01-15

## 2020-01-14 RX ADMIN — ACETAMINOPHEN 650 MG: 325 TABLET, FILM COATED ORAL at 20:35

## 2020-01-14 RX ADMIN — SODIUM CHLORIDE, PRESERVATIVE FREE 10 ML: 5 INJECTION INTRAVENOUS at 20:33

## 2020-01-14 RX ADMIN — IPRATROPIUM BROMIDE AND ALBUTEROL SULFATE 3 ML: 2.5; .5 SOLUTION RESPIRATORY (INHALATION) at 23:17

## 2020-01-14 RX ADMIN — ASPIRIN 81 MG: 81 TABLET, CHEWABLE ORAL at 10:24

## 2020-01-14 RX ADMIN — SODIUM CHLORIDE, PRESERVATIVE FREE 10 ML: 5 INJECTION INTRAVENOUS at 10:05

## 2020-01-14 RX ADMIN — ONDANSETRON HYDROCHLORIDE 4 MG: 2 SOLUTION INTRAMUSCULAR; INTRAVENOUS at 10:24

## 2020-01-14 RX ADMIN — METHYLPREDNISOLONE SODIUM SUCCINATE 60 MG: 125 INJECTION, POWDER, FOR SOLUTION INTRAMUSCULAR; INTRAVENOUS at 13:55

## 2020-01-14 RX ADMIN — ATORVASTATIN CALCIUM 80 MG: 40 TABLET, FILM COATED ORAL at 20:32

## 2020-01-14 RX ADMIN — OSELTAMIVIR PHOSPHATE 75 MG: 75 CAPSULE ORAL at 10:24

## 2020-01-14 RX ADMIN — OSELTAMIVIR PHOSPHATE 75 MG: 75 CAPSULE ORAL at 20:32

## 2020-01-14 RX ADMIN — IPRATROPIUM BROMIDE AND ALBUTEROL SULFATE 3 ML: 2.5; .5 SOLUTION RESPIRATORY (INHALATION) at 15:14

## 2020-01-14 RX ADMIN — IPRATROPIUM BROMIDE AND ALBUTEROL SULFATE 3 ML: 2.5; .5 SOLUTION RESPIRATORY (INHALATION) at 11:35

## 2020-01-14 RX ADMIN — LEVOTHYROXINE SODIUM 125 MCG: 125 TABLET ORAL at 05:47

## 2020-01-14 RX ADMIN — IPRATROPIUM BROMIDE AND ALBUTEROL SULFATE 3 ML: 2.5; .5 SOLUTION RESPIRATORY (INHALATION) at 20:04

## 2020-01-14 NOTE — PLAN OF CARE
VSS overnight - SB in the 40s-50s. NIH with a score of 6 now. Neuro checks q2h now. Ataxia present in R arm. Oriented. Speech is slurred and garbled but understandable. Swallow eval passed in ED. Tolerating eating and drinking. Afebrile. Flu A positive - droplet precautions. Patient states he really doesn't feel bad as far as the flu diagnosis. Turns himself. Will continue to monitor.

## 2020-01-14 NOTE — PAYOR COMM NOTE
"ADMIT INPT 1-13-20  UR  [phone]    Reza Cardenas (64 y.o. Male)     Date of Birth Social Security Number Address Home Phone MRN    1955  [address] [place] [id number] [phone] [id number]    Sabianist Marital Status          [facility]        Admission Date Admission Type Admitting Provider Attending Provider Department, Room/Bed    1/13/20 Emergency Hira Montenegro MD Fleming, John Eric, MD [facility] INTENSIVE CARE, I008/1    Discharge Date Discharge Disposition Discharge Destination                       Attending Provider:  Hira Montenegro MD    Allergies:  Codeine    Isolation:  Droplet   Infection:  Influenza (01/13/20)   Code Status:  CPR    Ht:  182.9 cm (72.01\")   Wt:  78.2 kg (172 lb 6.4 oz)    Admission Cmt:  None   Principal Problem:  None                Active Insurance as of 1/13/2020     Primary Coverage     Payor Plan Insurance Group Employer/Plan Group    [employer] [place] [employer] [place]      Payor Plan Address Payor Plan Phone Number Payor Plan Fax Number Effective Dates    [address] [address] [phone]  8/15/2018 - None Entered    [facility] [id number]       Subscriber Name Subscriber Birth Date Member ID       REZA CARDENAS [id number]                 Emergency Contacts      (Rel.) Home Phone Work Phone Mobile Phone    Rufina Urbina (Sister) -- -- [phone]               History & Physical      Hira Montenegro MD at 01/13/20 1820              [facility]  HISTORY AND PHYSICAL    Date of Admission: 1/13/2020  Primary Care Physician: Lobito Trevino Jr., MD    Subjective     Chief Complaint: confusion    History of Present Illness    Mr. Cardenas is a 64-year-old male with a past medical history of COPD, hypothyroidism, hypertension, as well as tobacco abuse.  For greater than 4 days, the patient has indicated that he has felt very dizzy, poor coordination with his right " "arm and his right leg.  Patient also notes that he felt numbness in his hands and feet as well as his face.  Patient was very uncoordinated, and actually ordered a wheelchair the other day to be able to get around his house instead of seeking help.  He indicated that he thought he was going to get better.  Patient reports over this same couple of days, that he also had fever and chills.  His right arm and right leg remain feeling \"numb\" and he has issues with coordination and knowing where his hands are in space.  When he tries to point to his head, he accidentally smacked himself in the face.  Patient reports having sick contacts recently, and not been able to ambulate around his house.  Patient has not been able to cook or perform many of his ADLs.  Patient indicates that her shortness of breath is greatly improved, but still feels very weak.  Patient reports that during this time is also had emesis and nausea and very poor p.o. Intake.    Patient reports that he is not very compliant with his thyroid medication.  He was found to have a TSH of 95, with a free T4 of less than 0.9.  Patient indicates that he still intermittently smokes.  Denies drug use.  Denies alcohol use.        Review of Systems   Constitutional: Positive for activity change, appetite change and fatigue. Negative for chills and fever.   Respiratory: Positive for shortness of breath. Negative for cough.    Cardiovascular: Negative for chest pain and palpitations.   Gastrointestinal: Negative for abdominal distention, abdominal pain, constipation, diarrhea, nausea and vomiting.   Neurological: Positive for facial asymmetry, weakness and numbness.   All other systems reviewed and are negative.       Otherwise complete ROS reviewed and negative except as mentioned in the HPI.    Past Medical History:   Past Medical History:   Diagnosis Date   • COPD (chronic obstructive pulmonary disease) (CMS/[facility])    • Hyperlipidemia    • Hypertension      Past " Surgical History:  Past Surgical History:   Procedure Laterality Date   • EYE SURGERY Right    • KNEE SURGERY Right      Social History:  reports that he quit smoking about 10 years ago. He has never used smokeless tobacco. He reports that he does not drink alcohol or use drugs.    Family History: family history includes Cancer in his mother; Heart attack in his father; Peripheral vascular disease in his father; Stroke in his mother.       Allergies:  Allergies   Allergen Reactions   • Codeine Itching and Rash     Medications:  Prior to Admission medications    Medication Sig Start Date End Date Taking? Authorizing Provider   albuterol sulfate  (90 Base) MCG/ACT inhaler Inhale 2 puffs 4 (Four) Times a Day As Needed for Wheezing. 5/7/19  Yes Salomon Vang MD   predniSONE (DELTASONE) 10 MG tablet TAKE ONE TABLET EVERY DAY 6/26/19  Yes Tad Dugan APRN   tamsulosin (FLOMAX) 0.4 MG capsule 24 hr capsule Take 1 capsule by mouth Daily.   Yes Caroline Fu MD   amLODIPine (NORVASC) 5 MG tablet Take 10 mg by mouth Daily.    ProviderCaroline MD   benzonatate (TESSALON) 200 MG capsule Take 1 capsule by mouth 3 (Three) Times a Day As Needed for Cough. 9/6/19   Salomon Vang MD   bisoprolol-hydrochlorothiazide (ZIAC) 5-6.25 MG per tablet Take 1 tablet by mouth Daily.    ProviderCaroline MD   fluticasone-salmeterol (WIXELA INHUB) 250-50 MCG/DOSE DISKUS Inhale 1 puff 2 (Two) Times a Day. 9/11/19   Salomon Vang MD   ipratropium-albuterol (DUO-NEB) 0.5-2.5 mg/3 ml nebulizer Take 3 mL by nebulization 4 (Four) Times a Day. 5/7/19   Salomon Vang MD   levothyroxine (SYNTHROID, LEVOTHROID) 175 MCG tablet Take 175 mcg by mouth Daily.    ProviderCaroline MD   mometasone-formoterol (DULERA) 200-5 MCG/ACT inhaler Inhale 2 puffs 2 (Two) Times a Day. 9/6/19   Salomon Vang MD   montelukast (SINGULAIR) 10 MG tablet Take 10 mg by mouth Daily.    Provider  "MD Caroline   predniSONE (DELTASONE) 10 MG tablet Take 1 tablet by mouth 2 (Two) Times a Day.  Patient not taking: Reported on 1/13/2020 9/6/19   Salomon Vang MD   tiotropium bromide monohydrate (SPIRIVA RESPIMAT) 2.5 MCG/ACT aerosol solution inhaler Inhale 2 puffs Daily. 9/6/19   Salomon Vang MD     Objective     Vital Signs: /100   Pulse 59   Temp 97.2 °F (36.2 °C) (Axillary)   Resp 18   Ht 182.9 cm (72.01\")   Wt 78.2 kg (172 lb 6.4 oz)   SpO2 95%   BMI 23.38 kg/m²    Physical Exam   Constitutional: He is oriented to person, place, and time. No distress.   HENT:   Head: Normocephalic and atraumatic.   Eyes: Conjunctivae are normal. No scleral icterus.   Neck: Neck supple. No JVD present.   Cardiovascular: Normal rate, regular rhythm and intact distal pulses. Exam reveals no friction rub.   No murmur heard.  Pulmonary/Chest: Effort normal and breath sounds normal. No stridor. No respiratory distress. He has no wheezes.   Abdominal: Soft. Bowel sounds are normal. He exhibits no distension and no mass. There is no tenderness. There is no guarding.   Musculoskeletal: He exhibits no edema.   Neurological: He is alert and oriented to person, place, and time. A sensory deficit is present. Coordination abnormal.   Skin: Skin is warm and dry. He is not diaphoretic. No erythema.   Psychiatric: He has a normal mood and affect. His behavior is normal.   Nursing note and vitals reviewed.          Results Reviewed:  Lab Results (last 24 hours)     Procedure Component Value Units Date/Time    CK [[id number]]  (Normal) Collected:  01/13/20 1502    Specimen:  Blood Updated:  01/13/20 1527     Creatine Kinase 21 U/L     POC Creatinine [[id number]]  (Normal) Collected:  01/13/20 0815    Specimen:  Blood Updated:  01/13/20 1526     Creatinine 1.10 mg/dL      Comment: Serial Number: [id number]:  [id number]       T4, Free [[id number]]  (Abnormal) Collected:  01/13/20 0747    Specimen:  Blood Updated:  " 01/13/20 1501     Free T4 0.89 ng/dL     Narrative:       Results may be falsely increased if patient taking Biotin.      TSH [[id number]]  (Abnormal) Collected:  01/13/20 0747    Specimen:  Blood Updated:  01/13/20 1128     TSH 94.580 uIU/mL     Ammonia [[id number]]  (Normal) Collected:  01/13/20 0843    Specimen:  Blood Updated:  01/13/20 0935     Ammonia 31 umol/L     Blood Culture - Blood, Arm, Right [[id number]] Collected:  01/13/20 0824    Specimen:  Blood from Arm, Right Updated:  01/13/20 0934    Influenza Antigen, Rapid - Swab, Nasopharynx [[id number]]  (Abnormal) Collected:  01/13/20 0842    Specimen:  Swab from Nasopharynx Updated:  01/13/20 0915     Influenza A Ag, EIA Positive     Influenza B Ag, EIA Negative    Narrative:       Recommend confirmation of negative results by viral culture or molecular assay.    Kamuela Draw [[id number]] Collected:  01/13/20 0747    Specimen:  Blood Updated:  01/13/20 0904    Narrative:       The following orders were created for panel order Kamuela Draw.  Procedure                               Abnormality         Status                     ---------                               -----------         ------                     Light Blue Top[[id number]]                                   Final result               Green Top (Gel)[[id number]]                                  Final result               Lavender Top[[id number]]                                     Final result               Red Top[[id number]]                                          Final result               Kamuela Blood Culture Khanh...[[id number]]                      Final result                 Please view results for these tests on the individual orders.    Light Blue Top [[id number]] Collected:  01/13/20 0747    Specimen:  Blood Updated:  01/13/20 0904     Extra Tube hold for add-on     Comment: Auto resulted       Green Top (Gel) [[id number]] Collected:  01/13/20 0747    Specimen:  Blood Updated:  01/13/20 0904      Extra Tube Hold for add-ons.     Comment: Auto resulted.       Lavender Top [[id number]] Collected:  01/13/20 0747    Specimen:  Blood Updated:  01/13/20 0904     Extra Tube hold for add-on     Comment: Auto resulted       Red Top [[id number]] Collected:  01/13/20 0747    Specimen:  Blood Updated:  01/13/20 0904     Extra Tube Hold for add-ons.     Comment: Auto resulted.       Kent Blood Culture Bottle Set [[id number]] Collected:  01/13/20 0747    Specimen:  Blood from Arm, Right Updated:  01/13/20 0904     Extra Tube Hold for add-ons.     Comment: Auto resulted.       Lactic Acid, Plasma [[id number]]  (Normal) Collected:  01/13/20 0747    Specimen:  Blood from Arm, Right Updated:  01/13/20 0835     Lactate 1.8 mmol/L     Blood Gas, Arterial [[id number]]  (Abnormal) Collected:  01/13/20 0825    Specimen:  Arterial Blood Updated:  01/13/20 0831     Site Right Radial     Luke's Test N/A     pH, Arterial 7.504 pH units      Comment: 83 Value above reference range        pCO2, Arterial 25.9 mm Hg      Comment: 84 Value below reference range        pO2, Arterial 105.0 mm Hg      HCO3, Arterial 20.3 mmol/L      Base Excess, Arterial -0.7 mmol/L      Comment: 84 Value below reference range        O2 Saturation, Arterial 99.1 %      Comment: 83 Value above reference range        Temperature 37.0 C      Barometric Pressure for Blood Gas 760 mmHg      Modality Room Air     Ventilator Mode NA     Collected by NAI     Comment: Meter: [id number]     :  [id number]       CBC & Differential [[id number]] Collected:  01/13/20 0747    Specimen:  Blood Updated:  01/13/20 0828    Narrative:       The following orders were created for panel order CBC & Differential.  Procedure                               Abnormality         Status                     ---------                               -----------         ------                     CBC Auto Differential[[id number]]        Abnormal            Final result                  Please view results for these tests on the individual orders.    CBC Auto Differential [[id number]]  (Abnormal) Collected:  01/13/20 0747    Specimen:  Blood Updated:  01/13/20 0828     WBC 10.87 10*3/mm3      RBC 6.07 10*6/mm3      Hemoglobin 19.3 g/dL      Hematocrit 55.4 %      MCV 91.3 fL      MCH 31.8 pg      MCHC 34.8 g/dL      RDW 14.2 %      RDW-SD 46.3 fl      MPV 9.9 fL      Platelets 280 10*3/mm3      Neutrophil % 54.1 %      Lymphocyte % 32.4 %      Monocyte % 10.3 %      Eosinophil % 2.1 %      Basophil % 0.6 %      Immature Grans % 0.5 %      Neutrophils, Absolute 5.89 10*3/mm3      Lymphocytes, Absolute 3.52 10*3/mm3      Monocytes, Absolute 1.12 10*3/mm3      Eosinophils, Absolute 0.23 10*3/mm3      Basophils, Absolute 0.06 10*3/mm3      Immature Grans, Absolute 0.05 10*3/mm3      nRBC 0.0 /100 WBC     Blood Culture - Blood, Arm, Right [[id number]] Collected:  01/13/20 0747    Specimen:  Blood from Arm, Right Updated:  01/13/20 0827    Comprehensive Metabolic Panel [[id number]]  (Abnormal) Collected:  01/13/20 0747    Specimen:  Blood Updated:  01/13/20 0817     Glucose 130 mg/dL      BUN 15 mg/dL      Creatinine 1.03 mg/dL      Sodium 138 mmol/L      Potassium 3.7 mmol/L      Chloride 102 mmol/L      CO2 25.0 mmol/L      Calcium 9.7 mg/dL      Total Protein 8.1 g/dL      Albumin 4.20 g/dL      ALT (SGPT) 24 U/L      AST (SGOT) 24 U/L      Alkaline Phosphatase 48 U/L      Total Bilirubin 0.8 mg/dL      eGFR Non African Amer 73 mL/min/1.73      Globulin 3.9 gm/dL      A/G Ratio 1.1 g/dL      BUN/Creatinine Ratio 14.6     Anion Gap 11.0 mmol/L     Narrative:       GFR Normal >60  Chronic Kidney Disease <60  Kidney Failure <15      Lipase [[id number]]  (Abnormal) Collected:  01/13/20 0747    Specimen:  Blood Updated:  01/13/20 0817     Lipase 145 U/L     Amylase [[id number]]  (Abnormal) Collected:  01/13/20 0747    Specimen:  Blood Updated:  01/13/20 0817     Amylase 126 U/L     Troponin [[id number]]   (Normal) Collected:  01/13/20 0747    Specimen:  Blood Updated:  01/13/20 0817     Troponin T <0.010 ng/mL     Narrative:       Troponin T Reference Range:  <= 0.03 ng/mL-   Negative for AMI  >0.03 ng/mL-     Abnormal for myocardial necrosis.  Clinicians would have to utilize clinical acumen, EKG, Troponin and serial changes to determine if it is an Acute Myocardial Infarction or myocardial injury due to an underlying chronic condition.       Results may be falsely decreased if patient taking Biotin.      Ethanol [[id number]] Collected:  01/13/20 0747    Specimen:  Blood Updated:  01/13/20 0817     Ethanol % <0.010 %     Narrative:       Not for legal purposes. Chain of Custody not followed.     Protime-INR [[id number]]  (Abnormal) Collected:  01/13/20 0747    Specimen:  Blood Updated:  01/13/20 0809     Protime 12.3 Seconds      INR 0.89        Imaging Results (Last 24 Hours)     Procedure Component Value Units Date/Time    MRI Brain Without Contrast [[id number]] Collected:  01/13/20 1203     Updated:  01/13/20 1221    Narrative:       EXAM: MR BRAIN WITHOUT IV CONTRAST 01/13/2020     COMPARISON: Head CT dated earlier today      HISTORY: 64 years-old Male. Altered mental status      TECHNIQUE:   Routine pulse sequences of the brain were obtained without IV contrast.      REPORT:     There is diffusion restriction within the right inferior cerebellar  hemisphere and posterior brachium pontis, with associated T2/FLAIR  abnormal signal, consistent with acute right PICA infarct. There is a 9  mm focal area of blooming susceptibility artifact within the area of  infarct, consistent with blood products. An additional area of 2.4 x 1.6  cm of the mesentery artifact within the right cerebellar folia, also  reflecting blood products. There is a trace left subdural hemorrhage.     No evidence of acute intracranial hemorrhage at this time. Mass effect  is local without evidence of acute hydrocephalus. No suspicious  extra-axial  fluid collection.     Chronic microvascular changes.          Impression:       1.  Large area of acute infarct in the right PICA territory.  2.  Associated blood products in the infarcted brain parenchyma.  3.  Trace left cerebral convexity subdural hemorrhage  This report was finalized on 01/13/2020 12:18 by Dr. Karey Ennis MD.    CT Angiogram Chest [[id number]] Collected:  01/13/20 0824     Updated:  01/13/20 0905    Narrative:       EXAMINATION: CT ANGIOGRAM CHEST- 1/13/2020 8:24 AM CST     HISTORY: Stroke like symptoms, shortness of breath, dyspnea, chest pain     COMPARISON: CTA chest 06/27/2016     DOSE: 550 mGy-cm     TECHNIQUE: Sequential imaging was performed from the thoracic inlet  through the upper abdomen following the administration of IV contrast.   Sagittal and coronal reformations were made from the original source  data and reviewed. Additionally, 3-D MIPS reconstructions of the vessels  were made per CTA protocol. Automated exposure control was also utilized  to decrease patient radiation dose.     FINDINGS:   The visualized thyroid gland is unremarkable. The trachea and main  bronchi appear widely patent and in normal anatomic position. The  esophagus is grossly normal in appearance. A small hiatal hernia is  present.     No pathologically enlarged axillary, mediastinal, or hilar lymph nodes  are identified.     The heart appears normal in size. There is no pericardial or pleural  effusion. Coronary artery calcifications are present. Atherosclerotic  calcifications are seen within the aorta and its branch vessels.     The thoracic aorta appears normal in caliber. Main pulmonary artery  appears normal in caliber. Contrast opacification is suboptimal for  evaluation of the pulmonary arteries. No central or proximal segmental  filling defects are identified. There is no evidence of right heart  strain.     There is mild diffuse bronchial wall thickening. The lungs otherwise  appear clear.      Review of the visualized portion of the upper abdomen demonstrates no  acute abnormalities.     Review of the visualized osseous structures demonstrates no acute or  aggressive lesions. Degenerative changes are noted in the spine. Old  left-sided rib fractures are evident.       Impression:       1. Suboptimal contrast bolus timing for evaluation of the pulmonary  arteries. No central PE identified. No evidence of right heart strain.     2. Atherosclerosis of the aorta and coronary arteries.     3. Small hiatal hernia.           This report was finalized on 01/13/2020 08:33 by Dr. Som Mckenzie MD.    CT Head Without Contrast [[id number]] Collected:  01/13/20 0814     Updated:  01/13/20 0905    Narrative:       EXAMINATION: CT HEAD WO CONTRAST-      1/13/2020 8:04 AM CST     HISTORY: Altered mental status.     In order to have a CT radiation dose as low as reasonably achievable  Automated Exposure Control was utilized for adjustment of the mA and/or  KV according to patient size.     DLP in mGycm= 657.     Noncontrast head CT compared with 08/19/2018.     Axial, sagittal, and coronal noncontrast CT imaging of the head.     The visualized paranasal sinuses are clear.     The brain and ventricles have an age appropriate appearance.   There is no hemorrhage or mass-effect.   No acute infarction is seen.     No calvarial abnormality.       Impression:       1. No acute intracranial abnormality is seen.                                         This report was finalized on 01/13/2020 08:27 by Dr. Saeid Singh MD.    XR Chest 1 View [[id number]] Collected:  01/13/20 0856     Updated:  01/13/20 0904    Narrative:       EXAMINATION: XR CHEST 1 VW- 1/13/2020 8:56 AM CST     HISTORY: Chest pain, altered mental status     COMPARISON: 10/08/2018     FINDINGS:  Heart and mediastinal contours appear normal. There is mild bronchial  wall thickening. Lungs otherwise appear clear. There is no appreciable  pneumothorax or pleural  effusion. The pulmonary vasculature appears  grossly normal.       Impression:       Mild bronchial wall thickening.  This report was finalized on 01/13/2020 08:57 by Dr. Som Mckenzie MD.        I have personally reviewed and interpreted the radiology studies and ECG obtained at time of admission.     Assessment / Plan     Assessment:   Active Hospital Problems    Diagnosis   • CVA (cerebral vascular accident) (CMS/[facility])   • Influenza A   • Tobacco dependence   • Acute encephalopathy   • Hypothyroidism, non-compliant with medication    • Medical non-compliance   • Bradycardia   • Chronic obstructive pulmonary disease (CMS/[facility])       Plan:   1.  Admit to medicine  2.  Levothyroxine 50 mcg IV   3.  Resume PO levothyroxine, bradycardia may be related   4.  Tamiflu 75 mg BID   5.  Counseled on smoking cessation and medical compliance   6.  Resume home medication as appropriate when medicine reconciliation.   7.  CTA head and neck   8.  Neurology consultation  9.  Mental status returned to normal  10.  ASA and atorvastatin   11.  Echo   12.  PT/OT, SLP  13.  Regular diet   14.  CBC, CMP, A1c, Lipid panel  15.  UDS, UA   16.  Neurochecks and stroke scale       Code Status: Full    Sisters to make decisions if he is unable     I discussed the patient's findings and my recommendations with the patient and bedside RN     Estimated length of stay 2-4    Hira Montenegro MD   01/13/20   6:20 PM            Electronically signed by Hira Montenegro MD at 01/13/20 1843          Emergency Department Notes      Michelle Cooper RN at 01/13/20 1220        PT C/O HA & CONTS TO EXPERIENCE RIGHT HAND NUMBNESS.  PT IS ALERT & INTERACTIVE, MUCH IMPROVED COGNITIVELY.   PROVIDER AWARE.   Michelle Cooper RN  01/13/20 1221       iMchelle Cooper RN  01/13/20 1321      Electronically signed by Michelle Cooper RN at 01/13/20 1321       Intake & Output (last 2 days)       01/12 0701 - 01/13 0700 01/13 0701 - 01/14 0700     P.O.  240    Total Intake(mL/kg)  240 (3.1)    Urine (mL/kg/hr)  730    Total Output  730    Net  -490              Lines, Drains & Airways    Active LDAs     Name:   Placement date:   Placement time:   Site:   Days:    Peripheral IV 01/13/20 0743 Right Hand   01/13/20    0743    Hand   less than 1    Peripheral IV 01/13/20 0808 Right Antecubital   01/13/20    0808    Antecubital   less than 1         Inactive LDAs     None                  Facility-Administered Medications as of 1/13/2020   Medication Dose Route Frequency Provider Last Rate Last Dose   • acetaminophen (TYLENOL) tablet 650 mg  650 mg Oral Q6H PRN Hira Montenegro MD   650 mg at 01/13/20 1738   • aspirin chewable tablet 81 mg  81 mg Oral Daily Hira Montenegro MD   81 mg at 01/13/20 1738    Or   • aspirin suppository 300 mg  300 mg Rectal Daily Hira Montenegro MD       • atorvastatin (LIPITOR) tablet 80 mg  80 mg Oral Nightly Hira Montenegro MD   80 mg at 01/13/20 2015   • [COMPLETED] iopamidol (ISOVUE-370) 76 % injection 100 mL  100 mL Intravenous Once in imaging Hira Montenegro MD   100 mL at 01/13/20 1852   • [COMPLETED] iopamidol (ISOVUE-370) 76 % injection 125 mL  125 mL Intravenous Once in imaging Mell Roe APRN   125 mL at 01/13/20 0808   • [COMPLETED] ipratropium-albuterol (DUO-NEB) nebulizer solution 3 mL  3 mL Nebulization Once Mell Roe APRN   3 mL at 01/13/20 0825   • ipratropium-albuterol (DUO-NEB) nebulizer solution 3 mL  3 mL Nebulization Q4H PRN Hira Montenegro MD       • [START ON 1/14/2020] levothyroxine (SYNTHROID, LEVOTHROID) tablet 125 mcg  125 mcg Oral Q AM iHra Montenegro MD       • [COMPLETED] levothyroxine sodium injection 50 mcg  50 mcg Intravenous Once Hira Montenegro MD   50 mcg at 01/13/20 1740   • [COMPLETED] methylPREDNISolone sodium succinate (SOLU-Medrol) injection 125 mg  125 mg Intravenous Once Mell Roe APRN   125 mg at 01/13/20 0933   •  ondansetron (ZOFRAN) tablet 4 mg  4 mg Oral Q6H PRN Hira Montenegro MD        Or   • ondansetron (ZOFRAN) injection 4 mg  4 mg Intravenous Q6H PRN Hira Montenegro MD       • [COMPLETED] ondansetron (ZOFRAN) injection 8 mg  8 mg Intravenous Once Mell Roe APRN   8 mg at 01/13/20 0825   • oseltamivir (TAMIFLU) capsule 75 mg  75 mg Oral Q12H Hira Montenegro MD   75 mg at 01/13/20 2015   • [COMPLETED] sodium chloride 0.9 % bolus 500 mL  500 mL Intravenous Once Mell Roe APRN   Stopped at 01/13/20 1339   • sodium chloride 0.9 % flush 10 mL  10 mL Intravenous PRN Hira Montenegro MD       • sodium chloride 0.9 % flush 10 mL  10 mL Intravenous Q12H Hira Montenegro MD   10 mL at 01/13/20 2016   • sodium chloride 0.9 % flush 10 mL  10 mL Intravenous PRN Hira Montenegro MD         Lab Results (last 48 hours)     Procedure Component Value Units Date/Time    POC Glucose Once [[id number]]  (Normal) Collected:  01/13/20 1906    Specimen:  Blood Updated:  01/13/20 1928     Glucose 130 mg/dL      Comment: : [id number] Mitchell AnnaMeter ID: [id number]       Urine Drug Screen - Urine, Catheter [[id number]]  (Normal) Collected:  01/13/20 1838    Specimen:  Urine, Catheter Updated:  01/13/20 1856     THC, Screen, Urine Negative     Phencyclidine (PCP), Urine Negative     Cocaine Screen, Urine Negative     Methamphetamine, Ur Negative     Opiate Screen Negative     Amphetamine Screen, Urine Negative     Benzodiazepine Screen, Urine Negative     Tricyclic Antidepressants Screen Negative     Methadone Screen, Urine Negative     Barbiturates Screen, Urine Negative     Oxycodone Screen, Urine Negative     Propoxyphene Screen Negative     Buprenorphine, Screen, Urine Negative    Narrative:       Cutoff For Drugs Screened:    Amphetamines               500 ng/ml  Barbiturates               200 ng/ml  Benzodiazepines            150 ng/ml  Cocaine                    150 ng/ml  Methadone                   200 ng/ml  Opiates                    100 ng/ml  Phencyclidine               25 ng/ml  THC                            50 ng/ml  Methamphetamine            500 ng/ml  Tricyclic Antidepressants  300 ng/ml  Oxycodone                  100 ng/ml  Propoxyphene               300 ng/ml  Buprenorphine               10 ng/ml    The normal value for all drugs tested is negative. This report includes unconfirmed screening results, with the cutoff values listed, to be used for medical treatment purposes only.  Unconfirmed results must not be used for non-medical purposes such as employment or legal testing.  Clinical consideration should be applied to any drug of abuse test, particularly when unconfirmed results are used.      Urinalysis With Culture If Indicated - Urine, Catheter In/Out [[id number]]  (Abnormal) Collected:  01/13/20 1838    Specimen:  Urine, Catheter In/Out Updated:  01/13/20 1855     Color, UA Dark Yellow     Appearance, UA Clear     pH, UA <=5.0     Specific Gravity, UA >1.030     Glucose, UA Negative     Ketones, UA Negative     Bilirubin, UA Negative     Blood, UA Negative     Protein, UA Negative     Leuk Esterase, UA Trace     Nitrite, UA Negative     Urobilinogen, UA 1.0 E.U./dL    Urinalysis, Microscopic Only - Urine, Catheter In/Out [[id number]]  (Abnormal) Collected:  01/13/20 1838    Specimen:  Urine, Catheter In/Out Updated:  01/13/20 1855     RBC, UA 0-2 /HPF      WBC, UA 6-12 /HPF      Bacteria, UA None Seen /HPF      Squamous Epithelial Cells, UA 0-2 /HPF      Hyaline Casts, UA 0-2 /LPF      Methodology Automated Microscopy    Urine Culture - Urine, Urine, Catheter In/Out [[id number]] Collected:  01/13/20 1838    Specimen:  Urine, Catheter In/Out Updated:  01/13/20 1854    CK [[id number]]  (Normal) Collected:  01/13/20 1502    Specimen:  Blood Updated:  01/13/20 1527     Creatine Kinase 21 U/L     POC Creatinine [[id number]]  (Normal) Collected:  01/13/20 0815    Specimen:  Blood Updated:   01/13/20 1526     Creatinine 1.10 mg/dL      Comment: Serial Number: [id number]:  [id number]       T4, Free [[id number]]  (Abnormal) Collected:  01/13/20 0747    Specimen:  Blood Updated:  01/13/20 1501     Free T4 0.89 ng/dL     Narrative:       Results may be falsely increased if patient taking Biotin.      TSH [[id number]]  (Abnormal) Collected:  01/13/20 0747    Specimen:  Blood Updated:  01/13/20 1128     TSH 94.580 uIU/mL     Ammonia [[id number]]  (Normal) Collected:  01/13/20 0843    Specimen:  Blood Updated:  01/13/20 0935     Ammonia 31 umol/L     Blood Culture - Blood, Arm, Right [[id number]] Collected:  01/13/20 0824    Specimen:  Blood from Arm, Right Updated:  01/13/20 0934    Influenza Antigen, Rapid - Swab, Nasopharynx [[id number]]  (Abnormal) Collected:  01/13/20 0842    Specimen:  Swab from Nasopharynx Updated:  01/13/20 0915     Influenza A Ag, EIA Positive     Influenza B Ag, EIA Negative    Narrative:       Recommend confirmation of negative results by viral culture or molecular assay.    [facility] Draw [[id number]] Collected:  01/13/20 0747    Specimen:  Blood Updated:  01/13/20 0904    Narrative:       The following orders were created for panel order [facility] Draw.  Procedure                               Abnormality         Status                     ---------                               -----------         ------                     Light Blue Top[[id number]]                                   Final result               Green Top (Gel)[[id number]]                                  Final result               Lavender Top[[id number]]                                     Final result               Red Top[[id number]]                                          Final result               [facility] Blood Culture [place]...[[id number]]                      Final result                 Please view results for these tests on the individual orders.    Light Blue Top [[id number]] Collected:  01/13/20 0747    Specimen:  Blood  Updated:  01/13/20 0904     Extra Tube hold for add-on     Comment: Auto resulted       Green Top (Gel) [[id number]] Collected:  01/13/20 0747    Specimen:  Blood Updated:  01/13/20 0904     Extra Tube Hold for add-ons.     Comment: Auto resulted.       Lavender Top [[id number]] Collected:  01/13/20 0747    Specimen:  Blood Updated:  01/13/20 0904     Extra Tube hold for add-on     Comment: Auto resulted       Red Top [[id number]] Collected:  01/13/20 0747    Specimen:  Blood Updated:  01/13/20 0904     Extra Tube Hold for add-ons.     Comment: Auto resulted.       [place] Blood Culture Bottle Set [[id number]] Collected:  01/13/20 0747    Specimen:  Blood from Arm, Right Updated:  01/13/20 0904     Extra Tube Hold for add-ons.     Comment: Auto resulted.       Lactic Acid, Plasma [[id number]]  (Normal) Collected:  01/13/20 0747    Specimen:  Blood from Arm, Right Updated:  01/13/20 0835     Lactate 1.8 mmol/L     Blood Gas, Arterial [[id number]]  (Abnormal) Collected:  01/13/20 0825    Specimen:  Arterial Blood Updated:  01/13/20 0831     Site Right Radial     Luke's Test N/A     pH, Arterial 7.504 pH units      Comment: 83 Value above reference range        pCO2, Arterial 25.9 mm Hg      Comment: 84 Value below reference range        pO2, Arterial 105.0 mm Hg      HCO3, Arterial 20.3 mmol/L      Base Excess, Arterial -0.7 mmol/L      Comment: 84 Value below reference range        O2 Saturation, Arterial 99.1 %      Comment: 83 Value above reference range        Temperature 37.0 C      Barometric Pressure for Blood Gas 760 mmHg      Modality Room Air     Ventilator Mode NA     Collected by NAI     Comment: Meter: [id number]     :  [id number]       CBC & Differential [[id number]] Collected:  01/13/20 0747    Specimen:  Blood Updated:  01/13/20 0828    Narrative:       The following orders were created for panel order CBC & Differential.  Procedure                               Abnormality         Status                      ---------                               -----------         ------                     CBC Auto Differential[[id number]]        Abnormal            Final result                 Please view results for these tests on the individual orders.    CBC Auto Differential [[id number]]  (Abnormal) Collected:  01/13/20 0747    Specimen:  Blood Updated:  01/13/20 0828     WBC 10.87 10*3/mm3      RBC 6.07 10*6/mm3      Hemoglobin 19.3 g/dL      Hematocrit 55.4 %      MCV 91.3 fL      MCH 31.8 pg      MCHC 34.8 g/dL      RDW 14.2 %      RDW-SD 46.3 fl      MPV 9.9 fL      Platelets 280 10*3/mm3      Neutrophil % 54.1 %      Lymphocyte % 32.4 %      Monocyte % 10.3 %      Eosinophil % 2.1 %      Basophil % 0.6 %      Immature Grans % 0.5 %      Neutrophils, Absolute 5.89 10*3/mm3      Lymphocytes, Absolute 3.52 10*3/mm3      Monocytes, Absolute 1.12 10*3/mm3      Eosinophils, Absolute 0.23 10*3/mm3      Basophils, Absolute 0.06 10*3/mm3      Immature Grans, Absolute 0.05 10*3/mm3      nRBC 0.0 /100 WBC     Blood Culture - Blood, Arm, Right [[id number]] Collected:  01/13/20 0747    Specimen:  Blood from Arm, Right Updated:  01/13/20 0827    Comprehensive Metabolic Panel [[id number]]  (Abnormal) Collected:  01/13/20 0747    Specimen:  Blood Updated:  01/13/20 0817     Glucose 130 mg/dL      BUN 15 mg/dL      Creatinine 1.03 mg/dL      Sodium 138 mmol/L      Potassium 3.7 mmol/L      Chloride 102 mmol/L      CO2 25.0 mmol/L      Calcium 9.7 mg/dL      Total Protein 8.1 g/dL      Albumin 4.20 g/dL      ALT (SGPT) 24 U/L      AST (SGOT) 24 U/L      Alkaline Phosphatase 48 U/L      Total Bilirubin 0.8 mg/dL      eGFR Non African Amer 73 mL/min/1.73      Globulin 3.9 gm/dL      A/G Ratio 1.1 g/dL      BUN/Creatinine Ratio 14.6     Anion Gap 11.0 mmol/L     Narrative:       GFR Normal >60  Chronic Kidney Disease <60  Kidney Failure <15      Lipase [[id number]]  (Abnormal) Collected:  01/13/20 0747    Specimen:  Blood Updated:   01/13/20 0817     Lipase 145 U/L     Amylase [[id number]]  (Abnormal) Collected:  01/13/20 0747    Specimen:  Blood Updated:  01/13/20 0817     Amylase 126 U/L     Troponin [[id number]]  (Normal) Collected:  01/13/20 0747    Specimen:  Blood Updated:  01/13/20 0817     Troponin T <0.010 ng/mL     Narrative:       Troponin T Reference Range:  <= 0.03 ng/mL-   Negative for AMI  >0.03 ng/mL-     Abnormal for myocardial necrosis.  Clinicians would have to utilize clinical acumen, EKG, Troponin and serial changes to determine if it is an Acute Myocardial Infarction or myocardial injury due to an underlying chronic condition.       Results may be falsely decreased if patient taking Biotin.      Ethanol [[id number]] Collected:  01/13/20 0747    Specimen:  Blood Updated:  01/13/20 0817     Ethanol % <0.010 %     Narrative:       Not for legal purposes. Chain of Custody not followed.     Protime-INR [[id number]]  (Abnormal) Collected:  01/13/20 0747    Specimen:  Blood Updated:  01/13/20 0809     Protime 12.3 Seconds      INR 0.89          Imaging Results (Last 48 Hours)     Procedure Component Value Units Date/Time    CT Angiogram Head [[id number]] Collected:  01/13/20 1934     Updated:  01/13/20 1956    Narrative:       CT angiography with 3D MIP images head with IV contrast  CT angiography with 3D MIP images neck with IV contrast     Indication: Stroke     Comparison: Brain MR 01/13/2020     DOSE LENGTH PRODUCT: 449 mGy cm. Automated exposure control was also  utilized to decrease patient radiation dose.     Findings:     Three-vessel aortic arch with widely patent origins. Mild  atherosclerotic narrowing of the left proximal subclavian artery. Right  subclavian artery is widely patent. The right vertebral artery is  completely occluded proximally with some reconstitution along the mid to  distal course. The left vertebral artery is widely patent. Both common  carotid arteries are widely patent. Both extracranial internal  carotid  arteries are widely patent.     Both intracranial internal carotid arteries are widely patent. Both MCAs  are widely patent. Both ACAs are widely patent. Basilar artery and tip  are unremarkable. Both PCAs are widely patent. Both superior cerebellar  arteries are widely patent. No dural venous sinus filling defect.     Soft tissues: No abnormal intracranial enhancement. Orbits appear  unremarkable. Parapharyngeal fat planes are maintained. Parotid and  submandibular glands are unremarkable. No focal asymmetry of the  aerodigestive tract. Mastoid air cells are clear. Mild maxillary sinus  mucosal thickening. No enlarged cervical lymph nodes. Emphysema in the  lung apices. No acute osseous findings.       Impression:          Complete occlusion of the right vertebral artery with reconstitution at  C1. Basilar artery and tip appear unremarkable. Both PCAs and superior  cerebellar arteries are widely patent.  This report was finalized on 01/13/2020 19:53 by Dr. Charli Dowell MD.    CT Angiogram Neck [[id number]] Collected:  01/13/20 1934     Updated:  01/13/20 1956    Narrative:       CT angiography with 3D MIP images head with IV contrast  CT angiography with 3D MIP images neck with IV contrast     Indication: Stroke     Comparison: Brain MR 01/13/2020     DOSE LENGTH PRODUCT: 449 mGy cm. Automated exposure control was also  utilized to decrease patient radiation dose.     Findings:     Three-vessel aortic arch with widely patent origins. Mild  atherosclerotic narrowing of the left proximal subclavian artery. Right  subclavian artery is widely patent. The right vertebral artery is  completely occluded proximally with some reconstitution along the mid to  distal course. The left vertebral artery is widely patent. Both common  carotid arteries are widely patent. Both extracranial internal carotid  arteries are widely patent.     Both intracranial internal carotid arteries are widely patent. Both MCAs  are widely  patent. Both ACAs are widely patent. Basilar artery and tip  are unremarkable. Both PCAs are widely patent. Both superior cerebellar  arteries are widely patent. No dural venous sinus filling defect.     Soft tissues: No abnormal intracranial enhancement. Orbits appear  unremarkable. Parapharyngeal fat planes are maintained. Parotid and  submandibular glands are unremarkable. No focal asymmetry of the  aerodigestive tract. Mastoid air cells are clear. Mild maxillary sinus  mucosal thickening. No enlarged cervical lymph nodes. Emphysema in the  lung apices. No acute osseous findings.       Impression:          Complete occlusion of the right vertebral artery with reconstitution at  C1. Basilar artery and tip appear unremarkable. Both PCAs and superior  cerebellar arteries are widely patent.  This report was finalized on 01/13/2020 19:53 by Dr. Charli Dowell MD.    MRI Brain Without Contrast [[id number]] Collected:  01/13/20 1203     Updated:  01/13/20 1221    Narrative:       EXAM: MR BRAIN WITHOUT IV CONTRAST 01/13/2020     COMPARISON: Head CT dated earlier today      HISTORY: 64 years-old Male. Altered mental status      TECHNIQUE:   Routine pulse sequences of the brain were obtained without IV contrast.      REPORT:     There is diffusion restriction within the right inferior cerebellar  hemisphere and posterior brachium pontis, with associated T2/FLAIR  abnormal signal, consistent with acute right PICA infarct. There is a 9  mm focal area of blooming susceptibility artifact within the area of  infarct, consistent with blood products. An additional area of 2.4 x 1.6  cm of the mesentery artifact within the right cerebellar folia, also  reflecting blood products. There is a trace left subdural hemorrhage.     No evidence of acute intracranial hemorrhage at this time. Mass effect  is local without evidence of acute hydrocephalus. No suspicious  extra-axial fluid collection.     Chronic microvascular changes.           Impression:       1.  Large area of acute infarct in the right PICA territory.  2.  Associated blood products in the infarcted brain parenchyma.  3.  Trace left cerebral convexity subdural hemorrhage  This report was finalized on 01/13/2020 12:18 by Dr. Karey Ennis MD.    CT Angiogram Chest [[id number]] Collected:  01/13/20 0824     Updated:  01/13/20 0905    Narrative:       EXAMINATION: CT ANGIOGRAM CHEST- 1/13/2020 8:24 AM CST     HISTORY: Stroke like symptoms, shortness of breath, dyspnea, chest pain     COMPARISON: CTA chest 06/27/2016     DOSE: 550 mGy-cm     TECHNIQUE: Sequential imaging was performed from the thoracic inlet  through the upper abdomen following the administration of IV contrast.   Sagittal and coronal reformations were made from the original source  data and reviewed. Additionally, 3-D MIPS reconstructions of the vessels  were made per CTA protocol. Automated exposure control was also utilized  to decrease patient radiation dose.     FINDINGS:   The visualized thyroid gland is unremarkable. The trachea and main  bronchi appear widely patent and in normal anatomic position. The  esophagus is grossly normal in appearance. A small hiatal hernia is  present.     No pathologically enlarged axillary, mediastinal, or hilar lymph nodes  are identified.     The heart appears normal in size. There is no pericardial or pleural  effusion. Coronary artery calcifications are present. Atherosclerotic  calcifications are seen within the aorta and its branch vessels.     The thoracic aorta appears normal in caliber. Main pulmonary artery  appears normal in caliber. Contrast opacification is suboptimal for  evaluation of the pulmonary arteries. No central or proximal segmental  filling defects are identified. There is no evidence of right heart  strain.     There is mild diffuse bronchial wall thickening. The lungs otherwise  appear clear.     Review of the visualized portion of the upper abdomen  demonstrates no  acute abnormalities.     Review of the visualized osseous structures demonstrates no acute or  aggressive lesions. Degenerative changes are noted in the spine. Old  left-sided rib fractures are evident.       Impression:       1. Suboptimal contrast bolus timing for evaluation of the pulmonary  arteries. No central PE identified. No evidence of right heart strain.     2. Atherosclerosis of the aorta and coronary arteries.     3. Small hiatal hernia.           This report was finalized on 01/13/2020 08:33 by Dr. Som Mckenzie MD.    CT Head Without Contrast [[id number]] Collected:  01/13/20 0814     Updated:  01/13/20 0905    Narrative:       EXAMINATION: CT HEAD WO CONTRAST-      1/13/2020 8:04 AM CST     HISTORY: Altered mental status.     In order to have a CT radiation dose as low as reasonably achievable  Automated Exposure Control was utilized for adjustment of the mA and/or  KV according to patient size.     DLP in mGycm= 657.     Noncontrast head CT compared with 08/19/2018.     Axial, sagittal, and coronal noncontrast CT imaging of the head.     The visualized paranasal sinuses are clear.     The brain and ventricles have an age appropriate appearance.   There is no hemorrhage or mass-effect.   No acute infarction is seen.     No calvarial abnormality.       Impression:       1. No acute intracranial abnormality is seen.                                         This report was finalized on 01/13/2020 08:27 by Dr. Saeid Singh MD.    XR Chest 1 View [[id number]] Collected:  01/13/20 0856     Updated:  01/13/20 0904    Narrative:       EXAMINATION: XR CHEST 1 VW- 1/13/2020 8:56 AM CST     HISTORY: Chest pain, altered mental status     COMPARISON: 10/08/2018     FINDINGS:  Heart and mediastinal contours appear normal. There is mild bronchial  wall thickening. Lungs otherwise appear clear. There is no appreciable  pneumothorax or pleural effusion. The pulmonary vasculature appears  grossly  normal.       Impression:       Mild bronchial wall thickening.  This report was finalized on 01/13/2020 08:57 by Dr. Som Mckenzie MD.          Orders (last 48 hrs)      Start     Ordered    01/14/20 0600  Hemoglobin A1c  Morning Draw      01/13/20 1525    01/14/20 0600  Lipid Panel  Morning Draw      01/13/20 1525    01/14/20 0600  CBC (No Diff)  Morning Draw      01/13/20 1705    01/14/20 0600  Comprehensive Metabolic Panel  Morning Draw      01/13/20 1705    01/14/20 0600  levothyroxine (SYNTHROID, LEVOTHROID) tablet 125 mcg  Every Early Morning      01/13/20 1839    01/14/20 0600  Testosterone, Free, Total  Morning Draw      01/13/20 2005 01/13/20 2100  sodium chloride 0.9 % flush 10 mL  Every 12 Hours Scheduled      01/13/20 1525    01/13/20 2100  atorvastatin (LIPITOR) tablet 80 mg  Nightly      01/13/20 1525    01/13/20 2100  oseltamivir (TAMIFLU) capsule 75 mg  Every 12 Hours Scheduled      01/13/20 1525 01/13/20 2015  iopamidol (ISOVUE-370) 76 % injection 100 mL  Once in Imaging      01/13/20 1918    01/13/20 1929  POC Glucose Once  Once      01/13/20 1906    01/13/20 1855  Urine Culture - Urine,  Once      01/13/20 1854    01/13/20 1854  Urinalysis, Microscopic Only - Urine, Clean Catch  Once      01/13/20 1853    01/13/20 1842  ipratropium-albuterol (DUO-NEB) nebulizer solution 3 mL  Every 4 Hours PRN      01/13/20 1842    01/13/20 1800  POC Glucose Q6H  Every 6 Hours     Comments:  May Discontinue After 2 Consecutive Readings Less Than 140Notify Provider if 2 Readings Greater Than 140      01/13/20 1525    01/13/20 1715  levothyroxine sodium injection 50 mcg  Once      01/13/20 1616    01/13/20 1705  ondansetron (ZOFRAN) tablet 4 mg  Every 6 Hours PRN      01/13/20 1705    01/13/20 1705  ondansetron (ZOFRAN) injection 4 mg  Every 6 Hours PRN      01/13/20 1705    01/13/20 1705  acetaminophen (TYLENOL) tablet 650 mg  Every 6 Hours PRN      01/13/20 1705    01/13/20 1659  Diet Regular; Cardiac   Diet Effective Now      01/13/20 1659    01/13/20 1615  aspirin chewable tablet 81 mg  Daily      01/13/20 1525    01/13/20 1615  aspirin suppository 300 mg  Daily      01/13/20 1525    01/13/20 1600  Intake and Output  Every 4 Hours      01/13/20 1525    01/13/20 1526  Assessed for Rehabilitation Services  Once      01/13/20 1525    01/13/20 1526  Reason for Not Administering IV Thrombolytic  Once      01/13/20 1525    01/13/20 1526  Place Sequential Compression Device  Once      01/13/20 1525    01/13/20 1526  Maintain Sequential Compression Device  Continuous      01/13/20 1525    01/13/20 1526  Vital Signs Per Hospital Policy  Per Order Details     Comments:  For ICU Admission: Vital Signs Every 2 Hours  For Telemetry Unit Admission: Vital Signs Every 4 Hours  Keep Systolic Blood Pressure Less Than 220, Diastolic Blood Pressure Less Than 110    01/13/20 1525    01/13/20 1526  Pulse Oximetry, Continuous  Continuous      01/13/20 1525    01/13/20 1526  Cardiac Monitoring  Continuous      01/13/20 1525    01/13/20 1526  Turn Patient  Now Then Every 2 Hours      01/13/20 1525    01/13/20 1526  Neuro Checks  Per Order Details     Comments:  For ICU Admission: Neuro Checks to Include All Stroke Deficits Every Hour x10 Hours Then Every 2 Hours  For Telemetry Unit Admission: Neuro Checks to Include All Stroke Deficits Every 4 Hours    01/13/20 1525    01/13/20 1526  NIHSS Assessment  Every Shift     Comments:  Turn off all sedation medications prior to performing assessment. Assessment to be performed upon admission, transfer to another unit, discharge, and with neurological decline. If NIHSS change is greater than or equal to 4 and/or neurological decline is noted notify physician.    01/13/20 1525    01/13/20 1526  Provide Stroke Education Material  Prior to Discharge     Comments:  Educate patient PRN and daily during hospitalization.    01/13/20 1525    01/13/20 1526  Nursing Dysphagia Screening (Complete Prior to  Giving Anything By Mouth)  Once      01/13/20 1525    01/13/20 1526  RN to Place Order SLP Consult - Eval & Treat Choosing Reason of RN Dysphagia Screen Failed  Per Order Details     Comments:  RN to Place Order SLP Consult - Eval & Treat Choosing Reason of RN Dysphagia Screen Failed    01/13/20 1525    01/13/20 1526  Nurse to Call MD or Nutrition Services for Diet if Patient Passes Dysphagia Screen  Once      01/13/20 1525    01/13/20 1526  Notify Provider  Until Discontinued      01/13/20 1525    01/13/20 1526  NPO Diet  Diet Effective Now,   Status:  Canceled     Comments:  Strict NPO Until Nursing Dysphagia Screen Passed    01/13/20 1525    01/13/20 1526  OT Consult: Eval & Treat  Once      01/13/20 1525    01/13/20 1526  PT Consult: Eval & Treat As Tolerated  Once      01/13/20 1525    01/13/20 1526  SLP Consult: Eval & Treat Communication Disorder  Once     Comments:  Per stroke protocol.    01/13/20 1525    01/13/20 1526  Inpatient Case Management  Consult  Once     Provider:  (Not yet assigned)    01/13/20 1525    01/13/20 1526  Inpatient Diabetes Educator Consult  Once     Provider:  (Not yet assigned)    01/13/20 1525    01/13/20 1526  Insert Peripheral IV  Once      01/13/20 1525    01/13/20 1526  Saline Lock & Maintain IV Access  Continuous      01/13/20 1525    01/13/20 1526  Inpatient Neurology Consult Stroke  Once     Specialty:  Neurology  Provider:  Janelle Ramos MD    01/13/20 1525    01/13/20 1526  Adult Transthoracic Echo Complete W/ Cont if Necessary Per Protocol (With Agitated Saline)  Once      01/13/20 1525    01/13/20 1526  CT Angiogram Head  1 Time Imaging      01/13/20 1525    01/13/20 1526  CT Angiogram Neck  1 Time Imaging      01/13/20 1525    01/13/20 1526  POC Creatinine  Once      01/13/20 0815    01/13/20 1525  sodium chloride 0.9 % flush 10 mL  As Needed      01/13/20 1525    01/13/20 1424  CK  STAT      01/13/20 1423    01/13/20 1409  Inpatient Admission   Once      20 1409    20 1406  Code Status and Medical Interventions:  Continuous      20 1406    20 1406  Inpatient Admission  Once      20 1406    20 1404  T4, Free  Once      20 1404    20 1401  Thyroid Panel With TSH  Once,   Status:  Canceled      20 1400    20 1335  Diet Regular; Thin  Diet Effective Now,   Status:  Canceled     Comments:  Meds whole with thin liquids. Follow all other diet restrictions per MD.    20 1334    20 1334  SLP Plan of Care Cert / Re-Cert  Once     Comments:  Speech Language Pathology Plan of Care  Initial Certification  Certification Period: 2020 - 2020    Patient Name: Reza Cruz  : 1955    (R13.10) Dysphagia, unspecified type  (primary encounter diagnosis)                Assessment  SLP Assessment  Prior Level of Function-Communication: WFL  Prior Level of Function-Swallowing: no diet consistency restrictions  SLP Swallowing Diagnosis: functional oral phase  Plan of Care Reviewed With: patient, other (see comments)(ED RN)  Swallow Criteria for Skilled Therapeutic Interventions Met: demonstrates skilled criteria      IP SLP Goals     Row Name 20 1225             Oral Nutrition/Hydration Goal 1 (SLP)    Oral Nutrition/Hydration Goal 1, SLP  LTG: Patient will tolerate LRD   without s/s of aspiration.  -MM      Time Frame (Oral Nutrition/Hydration Goal 1, SLP)  by discharge  -MM      Barriers (Oral Nutrition/Hydration Goal 1, SLP)  n/a  -MM      Progress/Outcomes (Oral Nutrition/Hydration Goal 1, SLP)  goal ongoing    -MM        User Key  (r) = Recorded By, (t) = Taken By, (c) = Cosigned By    Initials Name Provider Type    Christina Joseph MS CCC-SLP Speech and Language Pathologist                  Plan    SLP Plan  Therapy Frequency (Swallow): at least, 3 days per week  Predicted Duration Therapy Intervention (Days): until discharge        Christina Wright MS  [facility]-SLP  1/13/2020      By cosigning this order, either electronically or physically, I certify that the therapy services are furnished while this patient is under my care, the services outline above are required by this patient, and will be reviewed every 90 days.        M.D.:__________________________________________ Date: ______________    01/13/20 1334    01/13/20 1039  TSH  STAT      01/13/20 1039    01/13/20 1029  sodium chloride 0.9 % bolus 500 mL  Once      01/13/20 1027    01/13/20 1014  NPO Diet  Diet Effective Now,   Status:  Canceled     Comments:  Should remain in effect until a complete SLP evaluation occurs and a superceding MD order is received.    01/13/20 1013    01/13/20 1014  SLP Consult: Eval & Treat RN Dysphagia Screen Failed  Once      01/13/20 1013    01/13/20 0951  MRI Brain Without Contrast  1 Time Imaging      01/13/20 0951    01/13/20 0931  methylPREDNISolone sodium succinate (SOLU-Medrol) injection 125 mg  Once      01/13/20 0929    01/13/20 0832  Ammonia  STAT      01/13/20 0831    01/13/20 0831  Blood Gas, Arterial  Once      01/13/20 0825    01/13/20 0825  ondansetron (ZOFRAN) injection 8 mg  Once      01/13/20 0823    01/13/20 0808  iopamidol (ISOVUE-370) 76 % injection 125 mL  Once in Imaging      01/13/20 0806    01/13/20 0805  ipratropium-albuterol (DUO-NEB) nebulizer solution 3 mL  Once      01/13/20 0803    01/13/20 0804  CT Angiogram Chest  1 Time Imaging      01/13/20 0803    01/13/20 0804  Blood Gas, Arterial  STAT      01/13/20 0803    01/13/20 0804  Influenza Antigen, Rapid - Swab, Nasopharynx  STAT      01/13/20 0803    01/13/20 0803  CBC Auto Differential  Once      01/13/20 0802    01/13/20 0802  CT Head Without Contrast  1 Time Imaging      01/13/20 0802    01/13/20 0801  Lactic Acid, Plasma  STAT      01/13/20 0801 01/13/20 0801  Blood Culture - Blood, Blood, Venous Line  Once      01/13/20 0801    01/13/20 0801  Blood Culture - Blood, Blood, Venous Line  Once      Comments:  30 minutes after first collection, or from a different site      01/13/20 0801    01/13/20 0801  Urine Drug Screen - Urine, Clean Catch  STAT      01/13/20 0801    01/13/20 0801  Ethanol  STAT      01/13/20 0801    01/13/20 0801  POC Glucose STAT  STAT,   Status:  Canceled      01/13/20 0801    01/13/20 0800  CBC & Differential  Once      01/13/20 0801    01/13/20 0800  Comprehensive Metabolic Panel  Once      01/13/20 0801    01/13/20 0800  Protime-INR  Once      01/13/20 0801    01/13/20 0800  Lipase  Once      01/13/20 0801    01/13/20 0800  Amylase  STAT      01/13/20 0801    01/13/20 0800  [place] Draw  Once,   Status:  Canceled      01/13/20 0801    01/13/20 0800  Urinalysis With Culture If Indicated - Urine, Catheter  Once      01/13/20 0801    01/13/20 0800  Troponin  Once      01/13/20 0801    01/13/20 0800  ECG 12 Lead  Once,   Status:  Canceled      01/13/20 0801    01/13/20 0800  XR Chest 1 View  1 Time Imaging      01/13/20 0801    01/13/20 0800  Monitor Blood Pressure  Per Hospital Policy      01/13/20 0801    01/13/20 0800  Cardiac Monitoring  Once,   Status:  Canceled      01/13/20 0801    01/13/20 0800  Pulse Oximetry, Continuous  Continuous,   Status:  Canceled      01/13/20 0801    01/13/20 0800  Insert peripheral IV  Once      01/13/20 0801    01/13/20 0800  sodium chloride 0.9 % flush 10 mL  As Needed      01/13/20 0801    01/13/20 0800  Light Blue Top  PROCEDURE ONCE,   Status:  Canceled      01/13/20 0801    01/13/20 0800  Green Top (Gel)  PROCEDURE ONCE,   Status:  Canceled      01/13/20 0801    01/13/20 0800  Lavender Top  PROCEDURE ONCE,   Status:  Canceled      01/13/20 0801    01/13/20 0800  Red Top  PROCEDURE ONCE,   Status:  Canceled      01/13/20 0801    01/13/20 0758  CT Chest Without Contrast  1 Time Imaging,   Status:  Canceled      01/13/20 0757    01/13/20 0747  [place] Draw  Once      01/13/20 0746    01/13/20 0747  Light Blue Top  PROCEDURE ONCE      01/13/20 0746     01/13/20 0747  Green Top (Gel)  PROCEDURE ONCE      01/13/20 0746    01/13/20 0747  Lavender Top  PROCEDURE ONCE      01/13/20 0746    01/13/20 0747  Red Top  PROCEDURE ONCE      01/13/20 0746    01/13/20 0747  [place] Blood Culture Bottle Set  PROCEDURE ONCE      01/13/20 0746    01/13/20 0741  ECG 12 Lead  Once      01/13/20 0740    Unscheduled  Order CT Head Without Contrast for Neurological Decline  As Needed      01/13/20 1525              Ventilator/Non-Invasive Ventilation Settings (From admission, onward)    None           Physician Progress Notes (last 48 hours) (Notes from 01/11/20 2021 through 01/13/20 2021)      Mitchel Dahl MD at 01/13/20 1347        CT of the head and MRI of the brain from 1/13/2020 is reviewed.  No evidence of acute subdural hematoma on CT scan.  Suggested based on the MRI.  Large right cerebellar infarct that could potentially require decompressive craniotomy.  Patient is cleared for anticoagulation use per neurology.  Full consult note to follow.    Mitchel Dahl MD      Electronically signed by Mitchel Dahl MD at 01/13/20 1348       Consult Notes (last 48 hours) (Notes from 01/11/20 2021 through 01/13/20 2021)    No notes of this type exist for this encounter.

## 2020-01-14 NOTE — THERAPY EVALUATION
"Acute Care - Speech Language Pathology Initial Evaluation  [facility]     Patient Name: Reza Cruz  : 1955  MRN: [id number]  Today's Date: 2020  Onset of Illness/Injury or Date of Surgery: 20     Referring Physician: Dr. Montenegro      Admit Date: 2020     Speech-language/cognitive eval completed. Dysarthric speech characterized by decreased breath support, increased rate of verbalization with decreased articulation which then negatively impacted speech intelligibility at the multi-syllabic level. He was also noted to have irregular prosody and intonation. 1x semantic paraphasia during confrontational naming. No language difficulty with phrase completion, yet 1x difficulty with thought organization with sentence completion with 1x neologism observed during structured tasks. Further neologisms were observed at the conversational level at times. Difficulty with receptive language fx of complex yes/no questions, as well as with following two step commands, especially when temporal orientation of presented commands was altered utilizing \"before\" and \"after\" terminology. Part word repetitions noted when repeating phrase at times, with effortful verbalizations, and imprecise articulation. Mild difficulty with concrete divergent tasks, as well as moderate difficulty with abstract divergent tasks, and concrete and abstract convergent tasks, with 1x semantic paraphasia noted. Pt was also noted to have difficulty with sustained attention, with impulsivity noted at times. Moderate to severe difficulty with delayed recall of unrelated words. Increased frustration was observed as the eval progressed, yet when interviewed per SLP, pt did not feel frustration was originating from difficulty with evaluation tasks, indicating decreased deficit awareness, yet that his frustration was from difficulty understanding how presented tasks were assessing targeted speech-language/cognitive fxs. ST to continue to follow " and tx for observed concerns. Thanks!   Keila Brooks, CCC-SLP 1/14/2020 2:33 PM    Visit Dx:    ICD-10-CM ICD-9-CM   1. Dysphagia, unspecified type R13.10 787.20   2. Influenza A J10.1 487.1   3. Elevated TSH R79.89 794.5   4. Cerebrovascular accident (CVA), unspecified mechanism (CMS/HCC) I63.9 434.91   5. Tobacco abuse Z72.0 305.1   6. Impaired mobility Z74.09 799.89   7. Impaired mobility and ADLs Z74.09 799.89   8. Cognitive and neurobehavioral dysfunction F09 294.9    F07.89 310.1     Patient Active Problem List   Diagnosis   • Pneumonia of right upper lobe due to infectious organism (CMS/HCC)   • Impetigo   • Simple chronic bronchitis (CMS/HCC)   • Chronic obstructive pulmonary disease (CMS/HCC)   • Cough   • Personal history of nicotine dependence   • CVA (cerebral vascular accident) (CMS/HCC)   • Influenza A   • Tobacco dependence   • Acute encephalopathy   • Hypothyroidism, non-compliant with medication    • Medical non-compliance   • Bradycardia     Past Medical History:   Diagnosis Date   • COPD (chronic obstructive pulmonary disease) (CMS/HCC)    • Hyperlipidemia    • Hypertension      Past Surgical History:   Procedure Laterality Date   • EYE SURGERY Right    • KNEE SURGERY Right         SLP EVALUATION (last 72 hours)      SLP SLC Evaluation     Row Name 01/14/20 0955                   Communication Assessment/Intervention    Document Type  evaluation  -TM        Subjective Information  complains of;pain  -TM        Patient Observations  alert;cooperative  -TM        Patient/Family Observations  No family present.  -TM        Patient Effort  good  -TM           General Information    Patient Profile Reviewed  yes  -TM        Pertinent History Of Current Problem  Primary problem: Complaining of chest pain, vomitting for 4 days, R hand weakness, AMS. Imaging: MRI large area of acute infarct in L PICA territory with associated blood product in infarcted area, trace L cerebral convexity subdural  hemorrhage. Medical hx: COPD, HTN, hyperlipidema.  -TM        Precautions/Limitations, Vision  vision impairment, right;other (see comments) From reported injury to R eye 40 years ago, per pt  -TM        Precautions/Limitations, Hearing  hearing impairment, bilaterally;other (see comments) Does not wear hearing aids, favored L ear during eval  -TM        Patient Level of Education  Unknown to SLP   -TM        Prior Level of Function-Communication  WFL  -TM        Plans/Goals Discussed with  patient  -TM        Barriers to Rehab  cognitive status  -TM        Patient's Goals for Discharge  patient did not state  -TM           Pain Assessment    Additional Documentation  Pain Scale: Numbers Pre/Post-Treatment (Group)  -TM           Pain Scale: Numbers Pre/Post-Treatment    Pain Scale: Numbers, Pretreatment  7/10  -TM        Pain Scale: Numbers, Post-Treatment  7/10  -TM        Pain Location  abdomen  -TM        Pain Intervention(s)  Other (Comment) RN notified   -TM           Comprehension Assessment/Intervention    Comprehension Assessment/Intervention  Auditory Comprehension  -TM           Auditory Comprehension Assessment/Intervention    Auditory Comprehension (Communication)  moderate impairment  -TM        Answers Questions (Communication)  simple;WFL;complex;moderate impairment;severe impairment  -TM        Able to Follow Commands (Communication)  1-step;WFL;2-step;mild impairment;moderate impairment  -TM        Successful Auditory Strategies (Communication)  repetition;decrease environmental distractions  -TM           Expression Assessment/Intervention    Expression Assessment/Intervention  verbal expression  -TM           Verbal Expression Assessment/Intervention    Verbal Expression  moderate impairment  -TM        Repetition  phrases;sentences;mild impairment;moderate impairment  -TM        Confrontational Naming  high frequency;WNL;low frequency;mild impairment  -TM        Sentence Formulation  mild  impairment;moderate impairment;neologisms  -TM           Oral Motor Structure and Function    Oral Motor Structure and Function  mild impairment  -TM        Dentition Assessment  natural, present and adequate  -TM           Oral Musculature and Cranial Nerve Assessment    Oral Motor General Assessment  generalized oral motor weakness  -TM           Motor Speech Assessment/Intervention    Motor Speech Function  mild impairment;moderate impairment  -TM        Characteristics Consistent with Dysarthria  increased rate;decreased articulation;hyponasality  -TM        Initiation of Phonation (Communication)  voluntary;involuntary - (e.g. sneezing, laughing, yawning);WNL  -TM        Verbal Repetition (Communication)  monosyllabic words;WFL;polysyllabic words;sentences;moderate impairment  -TM        Phase Completion  mild impairment  -TM           Cognitive Assessment Intervention- SLP    Cognitive Function (Cognition)  moderate impairment  -TM        Orientation Status (Cognition)  time;WFL;awareness of basic personal information;mild impairment  -TM        Memory (Cognitive)  immediate;moderate impairment;delayed;mild impairment  -TM        Attention (Cognitive)  sustained;moderate impairment;severe impairment;quiet environment  -TM        Thought Organization (Cognitive)  concrete divergent;abstract divergent;concrete convergent;abstract convergent;moderate impairment  -TM           SLP Clinical Impressions    SLP Diagnosis  Moderate cognitive impairment with dysarthria   -TM        Rehab Potential/Prognosis  fair;good  -TM        SLC Criteria for Skilled Therapy Interventions Met  yes  -TM        Functional Impact  functional impact in social situations;functional impact in ADLs;need frequent supervision;difficulty communicating in an emergency;difficulty in expressing complex messages;restrictions in personal and social life;decreased ability to respond to situations safely;difficulty completing home management task   -TM           Recommendations    Therapy Frequency (SLP SLC)  at least;3 days per week  -TM        Predicted Duration Therapy Intervention (Days)  until discharge  -TM        Anticipated Dischage Disposition  unknown  -TM           Communication Treatment Objective and Progress Goals (SLP)    Auditory Comprehension Treatment Objectives  Auditory Comprehension Treatment Objectives (Group)  -TM        Verbal Expression Treatment Objectives  Verbal Expression Treatment Objectives (Group)  -TM        Motor Speech/Dysarthria Treatment Objectives  Motor Speech/Dysarthria Treatment Objectives (Group)  -TM        Cognitive Linguistic Treatment Objectives  Cognitive Linguistic Treatment Objectives (Group)  -TM           Auditory Comprehension Treatment Objectives    Comprehend Questions Selection  comprehend questions, SLP goal 1  -TM        Follow Directions Selection  follow directions, SLP goal 1  -TM           Comprehend Questions Goal 1 (SLP)    Improve Ability to Comprehend Questions Goal 1 (SLP)  complex yes/no questions;90%  -TM        Time Frame (Comprehend Questions Goal 1, SLP)  by discharge  -TM        Progress/Outcomes (Comprehend Questions Goal 1, SLP)  goal ongoing  -TM           Follow Directions Goal 2 (SLP)    Improve Ability to Follow Directions Goal 1 (SLP)  2 step commands;90%  -TM        Time Frame (Follow Directions Goal 1, SLP)  by discharge  -TM        Progress/Outcomes (Follow Directions Goal 1, SLP)  goal ongoing  -TM           Verbal Expression Treatment Objectives    Connected Speech Selection  connected speech, SLP goal 1  -TM           Connected Speech to Express Thoughts Goal 1 (SLP)    Improve Narrative Discourse to Express Thoughts By Goal 1 (SLP)  describing a picture;90%  -TM        Time Frame (Connected Speech Goal 1, SLP)  by discharge  -TM        Progress/Outcomes (Connected Speech Goal 1, SLP)  goal ongoing  -TM           Motor Speech/Dysarthria Treatment Objectives    Articulation  Selection  articulation, SLP goal 1  -TM           Articulation Goal 1 (SLP)    Improve Articulation Goal 1 (SLP)  by over-articulating in connected speech;90%  -TM        Time Frame (Articulation Goal 1, SLP)  by discharge  -TM        Progress/Outcomes (Articulation Goal 1, SLP)  goal ongoing  -TM           Cognitive Linguistic Treatment Objectives    Attention Selection  attention, SLP goal 1  -TM        Organizational Skills Selection  organizational skills, SLP goal 1  -TM           Attention Goal 1 (SLP)    Improve Attention by Goal 1 (SLP)  complete selective attention task;90%  -TM        Time Frame (Attention Goal 1, SLP)  by discharge  -TM        Progress/Outcomes (Attention Goal 1, SLP)  goal ongoing  -TM           Organizational Skills Goal 1 (SLP)    Improve Thought Organization Through Goal 1 (SLP)  completing a divergent naming task;completing a convergent naming task;abstract;90%  -TM        Time Frame (Thought Organization Skills Goal 1, SLP)  by discharge  -TM        Progress/Outcomes (Thought Organization Skills Goal 1, SLP)  goal ongoing  -TM          User Key  (r) = Recorded By, (t) = Taken By, (c) = Cosigned By    Initials Name Effective Dates    TM Keila Brooks CCC-SLP 08/02/16 -              EDUCATION  The patient has been educated in the following areas:     Cognitive Impairment.    SLP Recommendation and Plan  SLP Diagnosis: Moderate cognitive impairment with dysarthria      SLC Criteria for Skilled Therapy Interventions Met: yes  Anticipated Dischage Disposition: unknown     Predicted Duration Therapy Intervention (Days): until discharge    Plan of Care Reviewed With: patient  Progress: (Eval)  Outcome Summary: Speech-language/cognitive eval completed. Dysarthric speech characterized by decreased breath support, increased rate of verbalization with decreased articulation which then negatively impacted speech intelligibility at the multi-syllabic level. He was also noted to have  "irregular prosody and intonation. 1x semantic paraphasia during confrontational naming. No language difficulty with phrase completion, yet 1x difficulty with thought organization with sentence completion with 1x neologism observed during structured tasks. Further neologisms were observed at the conversational level at times. Difficulty with receptive language fx of complex yes/no questions, as well as with following two step commands, especially when temporal orientation of presented commands was altered utilizing \"before\" and \"after\" terminology. Part word repetitions noted when repeating phrase at times, with effortful verbalizations, and imprecise articulation. Mild difficulty with concrete divergent tasks, as well as moderate difficulty with abstract divergent tasks, and concrete and abstract convergent tasks, with 1x semantic paraphasia noted. Pt was also noted to have difficulty with sustained attention, with impulsivity noted at times. Moderate to severe difficulty with delayed recall of unrelated words. Increased frustration was observed as the eval progressed, yet when interviewed per SLP, pt did not feel frustration was originating from difficulty with evaluation tasks, indicating decreased deficit awareness, yet that his frustration was from difficulty understanding how presented tasks were assessing targeted speech-language/cognitive fxs. ST to continue to follow and tx for observed concerns. Thanks!      SLP GOALS     Row Name 01/14/20 0955 01/13/20 1225          Oral Nutrition/Hydration Goal 1 (SLP)    Oral Nutrition/Hydration Goal 1, SLP  --  LTG: Patient will tolerate LRD without s/s of aspiration.  -MM     Time Frame (Oral Nutrition/Hydration Goal 1, SLP)  --  by discharge  -MM     Barriers (Oral Nutrition/Hydration Goal 1, SLP)  --  n/a  -MM     Progress/Outcomes (Oral Nutrition/Hydration Goal 1, SLP)  --  goal ongoing  -MM        Comprehend Questions Goal 1 (SLP)    Improve Ability to Comprehend " Questions Goal 1 (SLP)  complex yes/no questions;90%  -TM  --     Time Frame (Comprehend Questions Goal 1, SLP)  by discharge  -TM  --     Progress/Outcomes (Comprehend Questions Goal 1, SLP)  goal ongoing  -TM  --        Follow Directions Goal 2 (SLP)    Improve Ability to Follow Directions Goal 1 (SLP)  2 step commands;90%  -TM  --     Time Frame (Follow Directions Goal 1, SLP)  by discharge  -TM  --     Progress/Outcomes (Follow Directions Goal 1, SLP)  goal ongoing  -TM  --        Connected Speech to Express Thoughts Goal 1 (SLP)    Improve Narrative Discourse to Express Thoughts By Goal 1 (SLP)  describing a picture;90%  -TM  --     Time Frame (Connected Speech Goal 1, SLP)  by discharge  -TM  --     Progress/Outcomes (Connected Speech Goal 1, SLP)  goal ongoing  -TM  --        Articulation Goal 1 (SLP)    Improve Articulation Goal 1 (SLP)  by over-articulating in connected speech;90%  -TM  --     Time Frame (Articulation Goal 1, SLP)  by discharge  -TM  --     Progress/Outcomes (Articulation Goal 1, SLP)  goal ongoing  -TM  --        Attention Goal 1 (SLP)    Improve Attention by Goal 1 (SLP)  complete selective attention task;90%  -TM  --     Time Frame (Attention Goal 1, SLP)  by discharge  -TM  --     Progress/Outcomes (Attention Goal 1, SLP)  goal ongoing  -TM  --        Organizational Skills Goal 1 (SLP)    Improve Thought Organization Through Goal 1 (SLP)  completing a divergent naming task;completing a convergent naming task;abstract;90%  -TM  --     Time Frame (Thought Organization Skills Goal 1, SLP)  by discharge  -TM  --     Progress/Outcomes (Thought Organization Skills Goal 1, SLP)  goal ongoing  -TM  --       User Key  (r) = Recorded By, (t) = Taken By, (c) = Cosigned By    Initials Name Provider Type    TM Keila Brooks CCC-SLP Speech and Language Pathologist    MM Christina Wright, MS CCC-SLP Speech and Language Pathologist                  Time Calculation:     Time Calculation- SLP      Row Name 01/14/20 1431             Time Calculation- SLP    SLP Start Time  0955  -TM      SLP Stop Time  1120  -TM      SLP Time Calculation (min)  85 min  -TM      SLP Received On  01/14/20  -      SLP Goal Re-Cert Due Date  01/24/20  -        User Key  (r) = Recorded By, (t) = Taken By, (c) = Cosigned By    Initials Name Provider Type     Keila Brooks CCC-SLP Speech and Language Pathologist          Therapy Charges for Today     Code Description Service Date Service Provider Modifiers Qty    [id number] [facility] EVAL SPEECH AND PROD W LANG  6 1/14/2020 Keila Brooks CCC-SLP GN 1                     TAM De La Paz  1/14/2020

## 2020-01-14 NOTE — PROGRESS NOTES
[facility] Medicine Services  INPATIENT PROGRESS NOTE    Patient Name: Reza Cruz  Date of Admission: 1/13/2020  Today's Date: 01/14/20  Length of Stay: 1  Primary Care Physician: Lobito Trevino Jr., MD    Subjective   Chief Complaint: feeling better  HPI     Patient seen and examined at bedside.  Patient feeling better but has audible wheezing.  Patient feels as though his sensory deficits are improving.          Review of Systems   Constitutional: Positive for fatigue. Negative for activity change, appetite change, chills, diaphoresis and fever.   Respiratory: Positive for cough. Negative for shortness of breath.    Cardiovascular: Negative for chest pain and palpitations.   Gastrointestinal: Negative for abdominal distention, abdominal pain, constipation, diarrhea, nausea and vomiting.   Neurological: Positive for numbness.        All pertinent negatives and positives are as above. All other systems have been reviewed and are negative unless otherwise stated.     Objective    Temp:  [97.2 °F (36.2 °C)-97.8 °F (36.6 °C)] 97.3 °F (36.3 °C)  Heart Rate:  [43-85] 47  Resp:  [16-20] 20  BP: (106-168)/() 123/93  Physical Exam   Constitutional: He is oriented to person, place, and time. No distress.   HENT:   Head: Normocephalic and atraumatic.   Eyes: Conjunctivae are normal. No scleral icterus.   Neck: Neck supple. No JVD present.   Cardiovascular: Normal rate and regular rhythm.   No murmur heard.  Pulmonary/Chest: Effort normal and breath sounds normal. No stridor. No respiratory distress. He has no wheezes.   Abdominal: Soft. Bowel sounds are normal. He exhibits no distension. There is no tenderness. There is no guarding.   Musculoskeletal: He exhibits no edema.   Neurological: He is alert and oriented to person, place, and time. Coordination abnormal.   Skin: Skin is warm and dry. He is not diaphoretic. No erythema.   Psychiatric: He has a normal mood and affect. His  behavior is normal.   Nursing note and vitals reviewed.          Results Review:  I have reviewed the labs, radiology results, and diagnostic studies.    Laboratory Data:   Results from last 7 days   Lab Units 01/14/20  0247 01/13/20  0747   WBC 10*3/mm3 16.57* 10.87*   HEMOGLOBIN g/dL 17.9* 19.3*   HEMATOCRIT % 52.8* 55.4*   PLATELETS 10*3/mm3 253 280        Results from last 7 days   Lab Units 01/14/20  0247 01/13/20  0815 01/13/20  0747   SODIUM mmol/L 142  --  138   POTASSIUM mmol/L 4.0  --  3.7   CHLORIDE mmol/L 104  --  102   CO2 mmol/L 26.0  --  25.0   BUN mg/dL 22  --  15   CREATININE mg/dL 1.15 1.10 1.03   CALCIUM mg/dL 9.6  --  9.7   BILIRUBIN mg/dL 0.6  --  0.8   ALK PHOS U/L 41  --  48   ALT (SGPT) U/L 22  --  24   AST (SGOT) U/L 22  --  24   GLUCOSE mg/dL 131*  --  130*       Culture Data:   Blood Culture   Date Value Ref Range Status   01/13/2020 No growth at less than 24 hours  Preliminary   01/13/2020 No growth at 24 hours  Preliminary       Radiology Data:   Imaging Results (Last 24 Hours)     Procedure Component Value Units Date/Time    CT Angiogram Head [[id number]] Collected:  01/13/20 1934     Updated:  01/13/20 1956    Narrative:       CT angiography with 3D MIP images head with IV contrast  CT angiography with 3D MIP images neck with IV contrast     Indication: Stroke     Comparison: Brain MR 01/13/2020     DOSE LENGTH PRODUCT: 449 mGy cm. Automated exposure control was also  utilized to decrease patient radiation dose.     Findings:     Three-vessel aortic arch with widely patent origins. Mild  atherosclerotic narrowing of the left proximal subclavian artery. Right  subclavian artery is widely patent. The right vertebral artery is  completely occluded proximally with some reconstitution along the mid to  distal course. The left vertebral artery is widely patent. Both common  carotid arteries are widely patent. Both extracranial internal carotid  arteries are widely patent.     Both  intracranial internal carotid arteries are widely patent. Both MCAs  are widely patent. Both ACAs are widely patent. Basilar artery and tip  are unremarkable. Both PCAs are widely patent. Both superior cerebellar  arteries are widely patent. No dural venous sinus filling defect.     Soft tissues: No abnormal intracranial enhancement. Orbits appear  unremarkable. Parapharyngeal fat planes are maintained. Parotid and  submandibular glands are unremarkable. No focal asymmetry of the  aerodigestive tract. Mastoid air cells are clear. Mild maxillary sinus  mucosal thickening. No enlarged cervical lymph nodes. Emphysema in the  lung apices. No acute osseous findings.       Impression:          Complete occlusion of the right vertebral artery with reconstitution at  C1. Basilar artery and tip appear unremarkable. Both PCAs and superior  cerebellar arteries are widely patent.  This report was finalized on 01/13/2020 19:53 by Dr. Charli Dowell MD.    CT Angiogram Neck [[id number]] Collected:  01/13/20 1934     Updated:  01/13/20 1956    Narrative:       CT angiography with 3D MIP images head with IV contrast  CT angiography with 3D MIP images neck with IV contrast     Indication: Stroke     Comparison: Brain MR 01/13/2020     DOSE LENGTH PRODUCT: 449 mGy cm. Automated exposure control was also  utilized to decrease patient radiation dose.     Findings:     Three-vessel aortic arch with widely patent origins. Mild  atherosclerotic narrowing of the left proximal subclavian artery. Right  subclavian artery is widely patent. The right vertebral artery is  completely occluded proximally with some reconstitution along the mid to  distal course. The left vertebral artery is widely patent. Both common  carotid arteries are widely patent. Both extracranial internal carotid  arteries are widely patent.     Both intracranial internal carotid arteries are widely patent. Both MCAs  are widely patent. Both ACAs are widely patent. Basilar  artery and tip  are unremarkable. Both PCAs are widely patent. Both superior cerebellar  arteries are widely patent. No dural venous sinus filling defect.     Soft tissues: No abnormal intracranial enhancement. Orbits appear  unremarkable. Parapharyngeal fat planes are maintained. Parotid and  submandibular glands are unremarkable. No focal asymmetry of the  aerodigestive tract. Mastoid air cells are clear. Mild maxillary sinus  mucosal thickening. No enlarged cervical lymph nodes. Emphysema in the  lung apices. No acute osseous findings.       Impression:          Complete occlusion of the right vertebral artery with reconstitution at  C1. Basilar artery and tip appear unremarkable. Both PCAs and superior  cerebellar arteries are widely patent.  This report was finalized on 01/13/2020 19:53 by Dr. Charli Dowell MD.    MRI Brain Without Contrast [[id number]] Collected:  01/13/20 1203     Updated:  01/13/20 1221    Narrative:       EXAM: MR BRAIN WITHOUT IV CONTRAST 01/13/2020     COMPARISON: Head CT dated earlier today      HISTORY: 64 years-old Male. Altered mental status      TECHNIQUE:   Routine pulse sequences of the brain were obtained without IV contrast.      REPORT:     There is diffusion restriction within the right inferior cerebellar  hemisphere and posterior brachium pontis, with associated T2/FLAIR  abnormal signal, consistent with acute right PICA infarct. There is a 9  mm focal area of blooming susceptibility artifact within the area of  infarct, consistent with blood products. An additional area of 2.4 x 1.6  cm of the mesentery artifact within the right cerebellar folia, also  reflecting blood products. There is a trace left subdural hemorrhage.     No evidence of acute intracranial hemorrhage at this time. Mass effect  is local without evidence of acute hydrocephalus. No suspicious  extra-axial fluid collection.     Chronic microvascular changes.          Impression:       1.  Large area of acute  "infarct in the right PICA territory.  2.  Associated blood products in the infarcted brain parenchyma.  3.  Trace left cerebral convexity subdural hemorrhage  This report was finalized on 01/13/2020 12:18 by Dr. Karey Ennis MD.          I have reviewed the patient's current medications.     Assessment/Plan     Active Hospital Problems    Diagnosis   • CVA (cerebral vascular accident) (CMS/HCC)   • Influenza A   • Tobacco dependence   • Acute encephalopathy   • Hypothyroidism, non-compliant with medication    • Medical non-compliance   • Bradycardia   • Chronic obstructive pulmonary disease (CMS/[facility])       Plan:  1.  CTA head and neck reviewed, \"Complete occlusion of the right vertebral artery with reconstitution at C1. Basilar artery and tip appear unremarkable. Both PCAs and superior cerebellar arteries are widely patent.\"  2.  MRI reviewed  3.  Echo reviewed,   Results for orders placed during the hospital encounter of 01/13/20   Adult Transthoracic Echo Complete W/ Cont if Necessary Per Protocol (With Agitated Saline)    Narrative · Left ventricular systolic function is normal. Estimated EF = 65%.  · Normal right ventricular cavity size and systolic function noted.  · There is no significant (greater than mild) valvular dysfunction.         4.  ASA and atorvastatin  5.  A1c and lipid profile reviewed  6.  Lifestyle modifcations recommended including smoking cessation   7.  Neurology and neurosurgery following, appreciate assistance  8. PT/OT/ SLP              Discharge Planning: I expect the patient to be discharged to acute rehab in 1-2 days.    Hira Montenegro MD   01/14/20   9:12 AM  "

## 2020-01-14 NOTE — PROGRESS NOTES
Discharge Planning Assessment  [facility]     Patient Name: Reza Cruz  MRN: [id number]  Today's Date: 1/14/2020    Admit Date: 1/13/2020    Discharge Needs Assessment     Row Name 01/14/20 1117       Living Environment    Lives With  alone    Current Living Arrangements  home/apartment/condo    Primary Care Provided by  self    Provides Primary Care For  no one    Quality of Family Relationships  unable to assess    Able to Return to Prior Arrangements  yes       Resource/Environmental Concerns    Resource/Environmental Concerns  none       Transition Planning    Patient/Family Anticipates Transition to  inpatient rehabilitation facility    Patient/Family Anticipated Services at Transition  ;rehabilitation services    Transportation Anticipated  family or friend will provide;public transportation       Discharge Needs Assessment    Readmission Within the Last 30 Days  no previous admission in last 30 days    Concerns to be Addressed  discharge planning    Equipment Currently Used at Home  wheelchair Patient states he has borrowed a wheelchair.    Equipment Needed After Discharge  walker, rolling Patient states therapy has recommended he has a walker upon discharge.    Outpatient/Agency/Support Group Needs  inpatient rehabilitation facility    Discharge Facility/Level of Care Needs  acute rehab    Current Discharge Risk  chronically ill;lives alone    Discharge Coordination/Progress  SW spoke with patient regarding discharge plan/needs.  Patient states he resides alone.  Patient states he usually functions independently, however, very recently he had to borrow a wheelchair as he was having difficulty with ambulation.  Patient is agreeable to referral to [facility].  Referral will be made to [facility] when patient is ready to move to a regular room.  Patient's insurance will require precert for [facility].  Patient has a PCP and RX coverage.  PDHD for COPD management was offered and patient  declined.  Again, plan is to proceed with referral to [facility] rehab when patient is ready to move to regular room.        Discharge Plan    No documentation.       Destination      Coordination has not been started for this encounter.      Durable Medical Equipment      Coordination has not been started for this encounter.      Dialysis/Infusion      Coordination has not been started for this encounter.      Home Medical Care      Coordination has not been started for this encounter.      Therapy      Coordination has not been started for this encounter.      Community Resources      Coordination has not been started for this encounter.          Demographic Summary    No documentation.       Functional Status    No documentation.       Psychosocial    No documentation.       Abuse/Neglect    No documentation.       Legal    No documentation.       Substance Abuse    No documentation.       Patient Forms    No documentation.           ASHER Massey

## 2020-01-14 NOTE — PROGRESS NOTES
NEUROSURGERY DAILY PROGRESS NOTE    ASSESSMENT:   Reza Cruz is a 64 y.o. with a significant comorbidity hypertension, hyperlipidemia.  He presents with a new problem of difficulty with gait and coordination of the right upper extremity and right hemibody numbness. Physical exam findings of right-sided upper and lower extremity ataxia and scanning speech.  Their imaging shows right cerebellar stroke.    DDX:     Chronic obstructive pulmonary disease (CMS/HCC)    CVA (cerebral vascular accident) (CMS/HCC)    Influenza A    Tobacco dependence    Acute encephalopathy    Hypothyroidism, non-compliant with medication     Medical non-compliance    Bradycardia    Patient Active Problem List   Diagnosis   • Pneumonia of right upper lobe due to infectious organism (CMS/HCC)   • Impetigo   • Simple chronic bronchitis (CMS/HCC)   • Chronic obstructive pulmonary disease (CMS/HCC)   • Cough   • Personal history of nicotine dependence   • CVA (cerebral vascular accident) (CMS/HCC)   • Influenza A   • Tobacco dependence   • Acute encephalopathy   • Hypothyroidism, non-compliant with medication    • Medical non-compliance   • Bradycardia     HPI: Resting comfortably.  No complaints of pain.  No acute events overnight.    PLAN:   Neuro: Stable.    Recommendations:  Reza presents with a large cerebellar stroke.  While typically large cerebellar strokes are at risk for cerebral herniation and need for decompressive craniotomy, he is more than 6 days out.  His risk of need for decompression is extremely low.  Additionally his CT scan shows hypodense tissue in the right cerebellum confirming the timeline from the patient.  I would continue to watch him and avoid hyponatremia.  Keep sodium is greater than 140.  I do not see any point for mannitol at this time.  If the patient declines please inform neurosurgery and we will be continuing to follow.     While initial reports on the MRI did suggest a subdural hematoma this is not  "appreciated on the noncontrast CT which is actually a more sensitive test.  Agree with anticoagulation per neurology's recommendation.  No acute recommendations on systolic blood pressure management.  Again defer to neurology.    CHIEF COMPLAINT:   Difficulty with gait and coordination of the right upper extremity and right hemibody numbness    Subjective  Symptoms stable.  Doing well    Temp:  [97.2 °F (36.2 °C)-97.8 °F (36.6 °C)] 97.3 °F (36.3 °C)  Heart Rate:  [43-85] 47  Resp:  [16-26] 20  BP: (106-168)/() 123/93    Objective:  General Appearance:  Comfortable, well-appearing, in no acute distress and not in pain.    Vital signs: (most recent): Blood pressure 123/93, pulse (!) 47, temperature 97.3 °F (36.3 °C), temperature source Oral, resp. rate 20, height 182.9 cm (72.01\"), weight 78.2 kg (172 lb 6.4 oz), SpO2 93 %.      Neurologic Exam     Mental Status   Oriented to person, place, and time.   Attention: normal. Concentration: normal.   Speech: speech is normal   Level of consciousness: alert    Bright and awake.  Follows commands.  Shows thumbs up and 2 fingers bilaterally.     Cranial Nerves     CN II   Visual fields full to confrontation.     CN III, IV, VI   Pupils are equal, round, and reactive to light.  Extraocular motions are normal.   Nystagmus: none   Diplopia: none    CN V   Right facial sensation deficit: complete    CN VII   Right facial weakness: none  Left facial weakness: none    CN VIII   CN VIII normal.     CN IX, X   CN IX normal.     CN XI   CN XI normal.     CN XII   CN XII normal.     Motor Exam   Muscle bulk: normal  Overall muscle tone: normal  Right arm pronator drift: absent  Left arm pronator drift: absent    Strength   Strength 5/5 throughout.   Moves all extremities  No pronator drift or dysmetria     Sensory Exam   Right arm light touch: decreased from elbow  Left arm light touch: normal  Right leg light touch: decreased from knee  Left leg light touch: normal    Gait, " Coordination, and Reflexes     Tremor   Resting tremor: absent  Intention tremor: absent    Drains: * No LDAs found *    Imaging Results (Last 24 Hours)     Procedure Component Value Units Date/Time    CT Angiogram Head [[id number]] Collected:  01/13/20 1934     Updated:  01/13/20 1956    Narrative:       CT angiography with 3D MIP images head with IV contrast  CT angiography with 3D MIP images neck with IV contrast     Indication: Stroke     Comparison: Brain MR 01/13/2020     DOSE LENGTH PRODUCT: 449 mGy cm. Automated exposure control was also  utilized to decrease patient radiation dose.     Findings:     Three-vessel aortic arch with widely patent origins. Mild  atherosclerotic narrowing of the left proximal subclavian artery. Right  subclavian artery is widely patent. The right vertebral artery is  completely occluded proximally with some reconstitution along the mid to  distal course. The left vertebral artery is widely patent. Both common  carotid arteries are widely patent. Both extracranial internal carotid  arteries are widely patent.     Both intracranial internal carotid arteries are widely patent. Both MCAs  are widely patent. Both ACAs are widely patent. Basilar artery and tip  are unremarkable. Both PCAs are widely patent. Both superior cerebellar  arteries are widely patent. No dural venous sinus filling defect.     Soft tissues: No abnormal intracranial enhancement. Orbits appear  unremarkable. Parapharyngeal fat planes are maintained. Parotid and  submandibular glands are unremarkable. No focal asymmetry of the  aerodigestive tract. Mastoid air cells are clear. Mild maxillary sinus  mucosal thickening. No enlarged cervical lymph nodes. Emphysema in the  lung apices. No acute osseous findings.       Impression:          Complete occlusion of the right vertebral artery with reconstitution at  C1. Basilar artery and tip appear unremarkable. Both PCAs and superior  cerebellar arteries are widely  patent.  This report was finalized on 01/13/2020 19:53 by Dr. Charli Dowell MD.    CT Angiogram Neck [[id number]] Collected:  01/13/20 1934     Updated:  01/13/20 1956    Narrative:       CT angiography with 3D MIP images head with IV contrast  CT angiography with 3D MIP images neck with IV contrast     Indication: Stroke     Comparison: Brain MR 01/13/2020     DOSE LENGTH PRODUCT: 449 mGy cm. Automated exposure control was also  utilized to decrease patient radiation dose.     Findings:     Three-vessel aortic arch with widely patent origins. Mild  atherosclerotic narrowing of the left proximal subclavian artery. Right  subclavian artery is widely patent. The right vertebral artery is  completely occluded proximally with some reconstitution along the mid to  distal course. The left vertebral artery is widely patent. Both common  carotid arteries are widely patent. Both extracranial internal carotid  arteries are widely patent.     Both intracranial internal carotid arteries are widely patent. Both MCAs  are widely patent. Both ACAs are widely patent. Basilar artery and tip  are unremarkable. Both PCAs are widely patent. Both superior cerebellar  arteries are widely patent. No dural venous sinus filling defect.     Soft tissues: No abnormal intracranial enhancement. Orbits appear  unremarkable. Parapharyngeal fat planes are maintained. Parotid and  submandibular glands are unremarkable. No focal asymmetry of the  aerodigestive tract. Mastoid air cells are clear. Mild maxillary sinus  mucosal thickening. No enlarged cervical lymph nodes. Emphysema in the  lung apices. No acute osseous findings.       Impression:          Complete occlusion of the right vertebral artery with reconstitution at  C1. Basilar artery and tip appear unremarkable. Both PCAs and superior  cerebellar arteries are widely patent.  This report was finalized on 01/13/2020 19:53 by Dr. Charli Dowell MD.    MRI Brain Without Contrast [[id number]]  Collected:  01/13/20 1203     Updated:  01/13/20 1221    Narrative:       EXAM: MR BRAIN WITHOUT IV CONTRAST 01/13/2020     COMPARISON: Head CT dated earlier today      HISTORY: 64 years-old Male. Altered mental status      TECHNIQUE:   Routine pulse sequences of the brain were obtained without IV contrast.      REPORT:     There is diffusion restriction within the right inferior cerebellar  hemisphere and posterior brachium pontis, with associated T2/FLAIR  abnormal signal, consistent with acute right PICA infarct. There is a 9  mm focal area of blooming susceptibility artifact within the area of  infarct, consistent with blood products. An additional area of 2.4 x 1.6  cm of the mesentery artifact within the right cerebellar folia, also  reflecting blood products. There is a trace left subdural hemorrhage.     No evidence of acute intracranial hemorrhage at this time. Mass effect  is local without evidence of acute hydrocephalus. No suspicious  extra-axial fluid collection.     Chronic microvascular changes.          Impression:       1.  Large area of acute infarct in the right PICA territory.  2.  Associated blood products in the infarcted brain parenchyma.  3.  Trace left cerebral convexity subdural hemorrhage  This report was finalized on 01/13/2020 12:18 by Dr. Karey Ennis MD.    CT Angiogram Chest [[id number]] Collected:  01/13/20 0824     Updated:  01/13/20 0905    Narrative:       EXAMINATION: CT ANGIOGRAM CHEST- 1/13/2020 8:24 AM CST     HISTORY: Stroke like symptoms, shortness of breath, dyspnea, chest pain     COMPARISON: CTA chest 06/27/2016     DOSE: 550 mGy-cm     TECHNIQUE: Sequential imaging was performed from the thoracic inlet  through the upper abdomen following the administration of IV contrast.   Sagittal and coronal reformations were made from the original source  data and reviewed. Additionally, 3-D MIPS reconstructions of the vessels  were made per CTA protocol. Automated exposure  control was also utilized  to decrease patient radiation dose.     FINDINGS:   The visualized thyroid gland is unremarkable. The trachea and main  bronchi appear widely patent and in normal anatomic position. The  esophagus is grossly normal in appearance. A small hiatal hernia is  present.     No pathologically enlarged axillary, mediastinal, or hilar lymph nodes  are identified.     The heart appears normal in size. There is no pericardial or pleural  effusion. Coronary artery calcifications are present. Atherosclerotic  calcifications are seen within the aorta and its branch vessels.     The thoracic aorta appears normal in caliber. Main pulmonary artery  appears normal in caliber. Contrast opacification is suboptimal for  evaluation of the pulmonary arteries. No central or proximal segmental  filling defects are identified. There is no evidence of right heart  strain.     There is mild diffuse bronchial wall thickening. The lungs otherwise  appear clear.     Review of the visualized portion of the upper abdomen demonstrates no  acute abnormalities.     Review of the visualized osseous structures demonstrates no acute or  aggressive lesions. Degenerative changes are noted in the spine. Old  left-sided rib fractures are evident.       Impression:       1. Suboptimal contrast bolus timing for evaluation of the pulmonary  arteries. No central PE identified. No evidence of right heart strain.     2. Atherosclerosis of the aorta and coronary arteries.     3. Small hiatal hernia.           This report was finalized on 01/13/2020 08:33 by Dr. Som Mckenzie MD.    CT Head Without Contrast [[id number]] Collected:  01/13/20 0814     Updated:  01/13/20 0905    Narrative:       EXAMINATION: CT HEAD WO CONTRAST-      1/13/2020 8:04 AM CST     HISTORY: Altered mental status.     In order to have a CT radiation dose as low as reasonably achievable  Automated Exposure Control was utilized for adjustment of the mA and/or  KV  according to patient size.     DLP in mGycm= 657.     Noncontrast head CT compared with 08/19/2018.     Axial, sagittal, and coronal noncontrast CT imaging of the head.     The visualized paranasal sinuses are clear.     The brain and ventricles have an age appropriate appearance.   There is no hemorrhage or mass-effect.   No acute infarction is seen.     No calvarial abnormality.       Impression:       1. No acute intracranial abnormality is seen.                                         This report was finalized on 01/13/2020 08:27 by Dr. Saeid Singh MD.    XR Chest 1 View [[id number]] Collected:  01/13/20 0856     Updated:  01/13/20 0904    Narrative:       EXAMINATION: XR CHEST 1 VW- 1/13/2020 8:56 AM CST     HISTORY: Chest pain, altered mental status     COMPARISON: 10/08/2018     FINDINGS:  Heart and mediastinal contours appear normal. There is mild bronchial  wall thickening. Lungs otherwise appear clear. There is no appreciable  pneumothorax or pleural effusion. The pulmonary vasculature appears  grossly normal.       Impression:       Mild bronchial wall thickening.  This report was finalized on 01/13/2020 08:57 by Dr. Som Mckenzie MD.        Lab Results (last 24 hours)     Procedure Component Value Units Date/Time    Blood Culture - Blood, Arm, Right [[id number]] Collected:  01/13/20 0824    Specimen:  Blood from Arm, Right Updated:  01/14/20 0747     Blood Culture No growth at less than 24 hours    Blood Culture - Blood, Arm, Right [[id number]] Collected:  01/13/20 0747    Specimen:  Blood from Arm, Right Updated:  01/14/20 0746     Blood Culture No growth at less than 24 hours    POC Glucose Once [[id number]]  (Normal) Collected:  01/14/20 0551    Specimen:  Blood Updated:  01/14/20 0602     Glucose 111 mg/dL      Comment: : [id number] Stephen AnnaMeter ID: [id number]       Comprehensive Metabolic Panel [[id number]]  (Abnormal) Collected:  01/14/20 0247    Specimen:  Blood Updated:  01/14/20 0336      Glucose 131 mg/dL      BUN 22 mg/dL      Creatinine 1.15 mg/dL      Sodium 142 mmol/L      Potassium 4.0 mmol/L      Chloride 104 mmol/L      CO2 26.0 mmol/L      Calcium 9.6 mg/dL      Total Protein 7.1 g/dL      Albumin 3.90 g/dL      ALT (SGPT) 22 U/L      AST (SGOT) 22 U/L      Comment: Specimen hemolyzed.  Results may be affected.        Alkaline Phosphatase 41 U/L      Total Bilirubin 0.6 mg/dL      eGFR Non African Amer 64 mL/min/1.73      Globulin 3.2 gm/dL      A/G Ratio 1.2 g/dL      BUN/Creatinine Ratio 19.1     Anion Gap 12.0 mmol/L     Narrative:       GFR Normal >60  Chronic Kidney Disease <60  Kidney Failure <15      CBC (No Diff) [[id number]]  (Abnormal) Collected:  01/14/20 0247    Specimen:  Blood Updated:  01/14/20 0334     WBC 16.57 10*3/mm3      RBC 5.68 10*6/mm3      Hemoglobin 17.9 g/dL      Hematocrit 52.8 %      MCV 93.0 fL      MCH 31.5 pg      MCHC 33.9 g/dL      RDW 14.0 %      RDW-SD 47.2 fl      MPV 10.4 fL      Platelets 253 10*3/mm3     Lipid Panel [[id number]] Collected:  01/14/20 0247    Specimen:  Blood Updated:  01/14/20 0334     Total Cholesterol 171 mg/dL      Triglycerides 136 mg/dL      HDL Cholesterol 53 mg/dL      LDL Cholesterol  91 mg/dL      VLDL Cholesterol 27.2 mg/dL      LDL/HDL Ratio 1.71    Narrative:       Cholesterol Reference Ranges  (U.S. Department of Health and Human Services ATP III Classifications)    Desirable          <200 mg/dL  Borderline High    200-239 mg/dL  High Risk          >240 mg/dL      Triglyceride Reference Ranges  (U.S. Department of Health and Human Services ATP III Classifications)    Normal           <150 mg/dL  Borderline High  150-199 mg/dL  High             200-499 mg/dL  Very High        >500 mg/dL    HDL Reference Ranges  (U.S. Department of Health and Human Services ATP III Classifcations)    Low     <40 mg/dl (major risk factor for CHD)  High    >60 mg/dl ('negative' risk factor for CHD)        LDL Reference Ranges  (U.S. Department of  Health and Human Services ATP III Classifcations)    Optimal          <100 mg/dL  Near Optimal     100-129 mg/dL  Borderline High  130-159 mg/dL  High             160-189 mg/dL  Very High        >189 mg/dL    Hemoglobin A1c [[id number]]  (Normal) Collected:  01/14/20 0247    Specimen:  Blood Updated:  01/14/20 0328     Hemoglobin A1C 5.40 %     Narrative:       Hemoglobin A1C Ranges:    Increased Risk for Diabetes  5.7% to 6.4%  Diabetes                     >= 6.5%  Diabetic Goal                < 7.0%    Testosterone, Free, Total [[id number]] Collected:  01/14/20 0247    Specimen:  Blood Updated:  01/14/20 0302    POC Glucose Once [[id number]]  (Abnormal) Collected:  01/13/20 2354    Specimen:  Blood Updated:  01/14/20 0013     Glucose 176 mg/dL      Comment: : [id number] Mitchell AnnaMeter ID: [id number]       POC Glucose Once [[id number]]  (Normal) Collected:  01/13/20 1906    Specimen:  Blood Updated:  01/13/20 1928     Glucose 130 mg/dL      Comment: : [id number] Mitchell AnnaMeter ID: [id number]       Urine Drug Screen - Urine, Catheter [[id number]]  (Normal) Collected:  01/13/20 1838    Specimen:  Urine, Catheter Updated:  01/13/20 1856     THC, Screen, Urine Negative     Phencyclidine (PCP), Urine Negative     Cocaine Screen, Urine Negative     Methamphetamine, Ur Negative     Opiate Screen Negative     Amphetamine Screen, Urine Negative     Benzodiazepine Screen, Urine Negative     Tricyclic Antidepressants Screen Negative     Methadone Screen, Urine Negative     Barbiturates Screen, Urine Negative     Oxycodone Screen, Urine Negative     Propoxyphene Screen Negative     Buprenorphine, Screen, Urine Negative    Narrative:       Cutoff For Drugs Screened:    Amphetamines               500 ng/ml  Barbiturates               200 ng/ml  Benzodiazepines            150 ng/ml  Cocaine                    150 ng/ml  Methadone                  200 ng/ml  Opiates                    100 ng/ml  Phencyclidine                25 ng/ml  THC                            50 ng/ml  Methamphetamine            500 ng/ml  Tricyclic Antidepressants  300 ng/ml  Oxycodone                  100 ng/ml  Propoxyphene               300 ng/ml  Buprenorphine               10 ng/ml    The normal value for all drugs tested is negative. This report includes unconfirmed screening results, with the cutoff values listed, to be used for medical treatment purposes only.  Unconfirmed results must not be used for non-medical purposes such as employment or legal testing.  Clinical consideration should be applied to any drug of abuse test, particularly when unconfirmed results are used.      Urinalysis With Culture If Indicated - Urine, Catheter In/Out [[id number]]  (Abnormal) Collected:  01/13/20 1838    Specimen:  Urine, Catheter In/Out Updated:  01/13/20 1855     Color, UA Dark Yellow     Appearance, UA Clear     pH, UA <=5.0     Specific Gravity, UA >1.030     Glucose, UA Negative     Ketones, UA Negative     Bilirubin, UA Negative     Blood, UA Negative     Protein, UA Negative     Leuk Esterase, UA Trace     Nitrite, UA Negative     Urobilinogen, UA 1.0 E.U./dL    Urinalysis, Microscopic Only - Urine, Catheter In/Out [[id number]]  (Abnormal) Collected:  01/13/20 1838    Specimen:  Urine, Catheter In/Out Updated:  01/13/20 1855     RBC, UA 0-2 /HPF      WBC, UA 6-12 /HPF      Bacteria, UA None Seen /HPF      Squamous Epithelial Cells, UA 0-2 /HPF      Hyaline Casts, UA 0-2 /LPF      Methodology Automated Microscopy    Urine Culture - Urine, Urine, Catheter In/Out [[id number]] Collected:  01/13/20 1838    Specimen:  Urine, Catheter In/Out Updated:  01/13/20 1854    CK [[id number]]  (Normal) Collected:  01/13/20 1502    Specimen:  Blood Updated:  01/13/20 1527     Creatine Kinase 21 U/L     POC Creatinine [[id number]]  (Normal) Collected:  01/13/20 0815    Specimen:  Blood Updated:  01/13/20 1526     Creatinine 1.10 mg/dL      Comment: Serial Number: [id number]:   [id number]       T4, Free [[id number]]  (Abnormal) Collected:  01/13/20 0747    Specimen:  Blood Updated:  01/13/20 1501     Free T4 0.89 ng/dL     Narrative:       Results may be falsely increased if patient taking Biotin.      TSH [[id number]]  (Abnormal) Collected:  01/13/20 0747    Specimen:  Blood Updated:  01/13/20 1128     TSH 94.580 uIU/mL     Ammonia [[id number]]  (Normal) Collected:  01/13/20 0843    Specimen:  Blood Updated:  01/13/20 0935     Ammonia 31 umol/L     Influenza Antigen, Rapid - Swab, Nasopharynx [[id number]]  (Abnormal) Collected:  01/13/20 0842    Specimen:  Swab from Nasopharynx Updated:  01/13/20 0915     Influenza A Ag, EIA Positive     Influenza B Ag, EIA Negative    Narrative:       Recommend confirmation of negative results by viral culture or molecular assay.    [place] Draw [[id number]] Collected:  01/13/20 0747    Specimen:  Blood Updated:  01/13/20 0904    Narrative:       The following orders were created for panel order [place] Draw.  Procedure                               Abnormality         Status                     ---------                               -----------         ------                     Light Blue Top[[id number]]                                   Final result               Green Top (Gel)[[id number]]                                  Final result               Lavender Top[[id number]]                                     Final result               Red Top[[id number]]                                          Final result               [place] Blood Culture [place]...[[id number]]                      Final result                 Please view results for these tests on the individual orders.    Light Blue Top [[id number]] Collected:  01/13/20 0747    Specimen:  Blood Updated:  01/13/20 0904     Extra Tube hold for add-on     Comment: Auto resulted       Green Top (Gel) [[id number]] Collected:  01/13/20 0747    Specimen:  Blood Updated:  01/13/20 0904     Extra Tube Hold for add-ons.      Comment: Auto resulted.       Lavender Top [[id number]] Collected:  01/13/20 0747    Specimen:  Blood Updated:  01/13/20 0904     Extra Tube hold for add-on     Comment: Auto resulted       Red Top [[id number]] Collected:  01/13/20 0747    Specimen:  Blood Updated:  01/13/20 0904     Extra Tube Hold for add-ons.     Comment: Auto resulted.       [place] Blood Culture Bottle Set [[id number]] Collected:  01/13/20 0747    Specimen:  Blood from Arm, Right Updated:  01/13/20 0904     Extra Tube Hold for add-ons.     Comment: Auto resulted.       Lactic Acid, Plasma [[id number]]  (Normal) Collected:  01/13/20 0747    Specimen:  Blood from Arm, Right Updated:  01/13/20 0835     Lactate 1.8 mmol/L     Blood Gas, Arterial [[id number]]  (Abnormal) Collected:  01/13/20 0825    Specimen:  Arterial Blood Updated:  01/13/20 0831     Site Right Radial     Luke's Test N/A     pH, Arterial 7.504 pH units      Comment: 83 Value above reference range        pCO2, Arterial 25.9 mm Hg      Comment: 84 Value below reference range        pO2, Arterial 105.0 mm Hg      HCO3, Arterial 20.3 mmol/L      Base Excess, Arterial -0.7 mmol/L      Comment: 84 Value below reference range        O2 Saturation, Arterial 99.1 %      Comment: 83 Value above reference range        Temperature 37.0 C      Barometric Pressure for Blood Gas 760 mmHg      Modality Room Air     Ventilator Mode NA     Collected by NAI     Comment: Meter: [id number]     :  [id number]       CBC & Differential [[id number]] Collected:  01/13/20 0747    Specimen:  Blood Updated:  01/13/20 0828    Narrative:       The following orders were created for panel order CBC & Differential.  Procedure                               Abnormality         Status                     ---------                               -----------         ------                     CBC Auto Differential[[id number]]        Abnormal            Final result                 Please view results for these  tests on the individual orders.    CBC Auto Differential [[id number]]  (Abnormal) Collected:  01/13/20 0747    Specimen:  Blood Updated:  01/13/20 0828     WBC 10.87 10*3/mm3      RBC 6.07 10*6/mm3      Hemoglobin 19.3 g/dL      Hematocrit 55.4 %      MCV 91.3 fL      MCH 31.8 pg      MCHC 34.8 g/dL      RDW 14.2 %      RDW-SD 46.3 fl      MPV 9.9 fL      Platelets 280 10*3/mm3      Neutrophil % 54.1 %      Lymphocyte % 32.4 %      Monocyte % 10.3 %      Eosinophil % 2.1 %      Basophil % 0.6 %      Immature Grans % 0.5 %      Neutrophils, Absolute 5.89 10*3/mm3      Lymphocytes, Absolute 3.52 10*3/mm3      Monocytes, Absolute 1.12 10*3/mm3      Eosinophils, Absolute 0.23 10*3/mm3      Basophils, Absolute 0.06 10*3/mm3      Immature Grans, Absolute 0.05 10*3/mm3      nRBC 0.0 /100 WBC         [id number]  Julian Virk, APRN

## 2020-01-14 NOTE — PLAN OF CARE
Problem: Patient Care Overview  Goal: Plan of Care Review  Flowsheets (Taken 1/14/2020 3185)  Outcome Summary: PT eval completed. He presents alert, oriented and with slurred/garbled speech but mostly understandable. He demos equal B LE strength of 4+/5. He demos poor R LE coordination with gait. He needed mod assist x 2 for gait of 50 ft. He was very ataxic in his R LE with gait with a hard lean to the right side. He is a high risk for falls. PT will continue to progress coordination, balance and gait safety. He would benefit from acute rehab for continued PT, OT and speech therapy.

## 2020-01-14 NOTE — THERAPY EVALUATION
Patient Name: Reza Cruz  : 1955    MRN: [id number]                              Today's Date: 2020       Admit Date: 2020    Visit Dx:     ICD-10-CM ICD-9-CM   1. Dysphagia, unspecified type R13.10 787.20   2. Influenza A J10.1 487.1   3. Elevated TSH R79.89 794.5   4. Cerebrovascular accident (CVA), unspecified mechanism (CMS/[facility]) I63.9 434.91   5. Tobacco abuse Z72.0 305.1   6. Impaired mobility Z74.09 799.89     Patient Active Problem List   Diagnosis   • Pneumonia of right upper lobe due to infectious organism (CMS/[facility])   • Impetigo   • Simple chronic bronchitis (CMS/[facility])   • Chronic obstructive pulmonary disease (CMS/[facility])   • Cough   • Personal history of nicotine dependence   • CVA (cerebral vascular accident) (CMS/[facility])   • Influenza A   • Tobacco dependence   • Acute encephalopathy   • Hypothyroidism, non-compliant with medication    • Medical non-compliance   • Bradycardia     Past Medical History:   Diagnosis Date   • COPD (chronic obstructive pulmonary disease) (CMS/[facility])    • Hyperlipidemia    • Hypertension      Past Surgical History:   Procedure Laterality Date   • EYE SURGERY Right    • KNEE SURGERY Right      General Information     Row Name 20 0745          PT Evaluation Time/Intention    Document Type  evaluation 4 days he felt dizzy, poor coordination with his R arm/R leg with numbness, gait difficulty. Dx: Large acute infarct R PICA territory, trace left cerebral convexity subdural hemorrhage, influenza A  -KENNEDI     Mode of Treatment  physical therapy  -KENNEDI     Row Name 20 0745          General Information    Patient Profile Reviewed?  yes  -KENNEDI     Prior Level of Function  independent:;all household mobility;community mobility;ADL's;dependent:;driving  -KENNEDI     Existing Precautions/Restrictions  fall  -KENNEDI     Barriers to Rehab  medically complex  -KENNEDI     Row Name 20 0745          Relationship/Environment    Lives With  alone  -KENNEDI     Row Name 20 0745           Resource/Environmental Concerns    Current Living Arrangements  home/apartment/condo walk in shower  -KENNEDI     Row Name 01/14/20 0745          Cognitive Assessment/Intervention- PT/OT    Orientation Status (Cognition)  oriented x 4  -KENNEDI     Cognitive Assessment/Intervention Comment  garbled speech but mostly understandable  -KENNEDI     Row Name 01/14/20 0745          Safety Issues, Functional Mobility    Impairments Affecting Function (Mobility)  balance;coordination  -KENNEDI       User Key  (r) = Recorded By, (t) = Taken By, (c) = Cosigned By    Initials Name Provider Type    Micky Londono, PT DPT Physical Therapist        Mobility     Row Name 01/14/20 0745          Bed Mobility Assessment/Treatment    Bed Mobility Assessment/Treatment  supine-sit  -KENNEDI     Supine-Sit [place] (Bed Mobility)  contact guard  -KENNEDI     Assistive Device (Bed Mobility)  head of bed elevated  -KENNEDI     Row Name 01/14/20 0745          Sit-Stand Transfer    Sit-Stand [place] (Transfers)  minimum assist (75% patient effort);2 person assist  -KENNEDI     Row Name 01/14/20 0745          Gait/Stairs Assessment/Training    [place] Level (Gait)  moderate assist (50% patient effort);2 person assist  -KENNEDI     Distance in Feet (Gait)  50  -KENNEDI     Pattern (Gait)  swing-to  -KENNEDI     Deviations/Abnormal Patterns (Gait)  (S) ataxic;anoop decreased;gait speed decreased  -KENNEDI     Comment (Gait/Stairs)  (S) hard right lean, ataxic R LE, high risk for falls.   -KENNEDI       User Key  (r) = Recorded By, (t) = Taken By, (c) = Cosigned By    Initials Name Provider Type    Micky Londono PT DPT Physical Therapist        Obj/Interventions     Row Name 01/14/20 0745          General ROM    GENERAL ROM COMMENTS  B LE AROM WFL  -KENNEDI     Row Name 01/14/20 0745          MMT (Manual Muscle Testing)    General MMT Comments  B LE 4+/5  -KENNEDI     Row Name 01/14/20 0745          Static Sitting Balance    Level of [place] (Unsupported Sitting, Static Balance)   contact guard assist  -KENNEDI     Sitting Position (Unsupported Sitting, Static Balance)  sitting on edge of bed  -KENNEDI     Row Name 01/14/20 0745          Dynamic Sitting Balance    Level of [place], Reaches Outside Midline (Sitting, Dynamic Balance)  contact guard assist  -KENNEDI     Sitting Position, Reaches Outside Midline (Sitting, Dynamic Balance)  sitting on edge of bed  -KENNEDI     Row Name 01/14/20 0745          Static Standing Balance    Level of [place] (Supported Standing, Static Balance)  minimal assist, 75% patient effort;2 person assist  -KENNEDI     Row Name 01/14/20 0745          Dynamic Standing Balance    Level of [place], Reaches Outside Midline (Standing, Dynamic Balance)  moderate assist, 50 to 74% patient effort;2 person assist  -KENNEDI     Row Name 01/14/20 0745          Sensory Assessment/Intervention    Sensory General Assessment  no sensation deficits identified intact to light touch in B LE  -KENNEDI       User Key  (r) = Recorded By, (t) = Taken By, (c) = Cosigned By    Initials Name Provider Type    Micky Londono, PT DPT Physical Therapist        Goals/Plan     [facility] Name 01/14/20 0745          Transfer Goal 1 (PT)    Activity/Assistive Device (Transfer Goal 1, PT)  sit-to-stand/stand-to-sit;bed-to-chair/chair-to-bed  -KENNEDI     [place] Level/Cues Needed (Transfer Goal 1, PT)  contact guard assist  -KENNEDI     Time Frame (Transfer Goal 1, PT)  long term goal (LTG);10 days  -KENNEDI     Progress/Outcome (Transfer Goal 1, PT)  goal ongoing  -KENNEDI     Row Name 01/14/20 0745          Gait Training Goal 1 (PT)    Activity/Assistive Device (Gait Training Goal 1, PT)  gait (walking locomotion);decrease fall risk;improve balance and speed;increase endurance/gait distance  -KENNEDI     [place] Level (Gait Training Goal 1, PT)  contact guard assist  -KENNEDI     Distance (Gait Goal 1, PT)  75  -KENNEDI     Time Frame (Gait Training Goal 1, PT)  long term goal (LTG);10 days  -KENNEDI     Progress/Outcome (Gait Training Goal 1,  PT)  goal ongoing  -KENNEDI       User Key  (r) = Recorded By, (t) = Taken By, (c) = Cosigned By    Initials Name Provider Type    Micky Londono, PT DPT Physical Therapist        Clinical Impression     [facility] Name 01/14/20 0745          Pain Assessment    Additional Documentation  Pain Scale: Numbers Pre/Post-Treatment (Group)  -KENNEDI     Row Name 01/14/20 0745          Pain Scale: Numbers Pre/Post-Treatment    Pain Scale: Numbers, Pretreatment  0/10 - no pain  -KENNEDI     Pre/Post Treatment Pain Comment  c/o decreased coordination in R UE/LE  -KENNEDI     [facility] Name 01/14/20 0745          Plan of Care Review    Plan of Care Reviewed With  patient  -KENNEDI     [facility] Name 01/14/20 0745          Physical Therapy Clinical Impression    Patient/Family Goals Statement (PT Clinical Impression)  increase mobility  -KENNEDI     Criteria for Skilled Interventions Met (PT Clinical Impression)  yes;treatment indicated  -KENNEDI     Rehab Potential (PT Clinical Summary)  good, to achieve stated therapy goals  -KENNEDI     Predicted Duration of Therapy (PT)  until d/c  -KENNEDI     [facility] Name 01/14/20 0745          Vital Signs    Pre Patient Position  Supine  -KENNEDI     Intra Patient Position  Standing  -KENNEDI     Post Patient Position  Sitting  -KENNEDI     [facility] Name 01/14/20 0745          Positioning and Restraints    Pre-Treatment Position  in bed  -KENNEDI     Post Treatment Position  chair  -KENNEDI     In Chair  notified nsg;reclined;call light within reach;encouraged to call for assist;patient within staff view  -KENNEDI       User Key  (r) = Recorded By, (t) = Taken By, (c) = Cosigned By    Initials Name Provider Type    Micky Londono, PT DPT Physical Therapist        Outcome Measures     [facility] Name 01/14/20 0745          How much help from another person do you currently need...    Turning from your back to your side while in flat bed without using bedrails?  3  -KENNEDI     Moving from lying on back to sitting on the side of a flat bed without bedrails?  3  -KENNEDI     Moving to and from a  bed to a chair (including a wheelchair)?  2  -KENNEDI     Standing up from a chair using your arms (e.g., wheelchair, bedside chair)?  2  -KENNEDI     Climbing 3-5 steps with a railing?  1  -KENNEDI     To walk in hospital room?  2  -KENNEDI     AM-PAC 6 Clicks Score (PT)  13  -KENNEDI     Row Name 01/14/20 0745          Functional Assessment    Outcome Measure Options  AM-PAC 6 Clicks Basic Mobility (PT)  -KENNEDI       User Key  (r) = Recorded By, (t) = Taken By, (c) = Cosigned By    Initials Name Provider Type    Micky Londono PT DPT Physical Therapist          PT Recommendation and Plan  Planned Therapy Interventions (PT Eval): bed mobility training, transfer training, gait training, balance training, home exercise program, patient/family education, postural re-education, motor coordination training, strengthening  Outcome Summary/Treatment Plan (PT)  Anticipated Discharge Disposition (PT): inpatient rehabilitation facility  Plan of Care Reviewed With: patient  Outcome Summary: PT eval completed. He presents alert, oriented and with slurred/garbled speech but mostly understandable. He demos equal B LE strength of 4+/5. He demos poor R LE coordination with gait. He needed mod assist x 2 for gait of 50 ft. He was very ataxic in his R LE with gait with a hard lean to the right side. He is a high risk for falls. PT will continue to progress coordination, balance and gait safety. He would benefit from acute rehab for continued PT, OT and speech therapy.     Time Calculation:   PT Charges     Row Name 01/14/20 0846             Time Calculation    Start Time  0745  -KENNEDI      Stop Time  0838  -KENNEDI      Time Calculation (min)  53 min  -KENNEDI      PT Received On  01/14/20  -KENNEDI      PT Goal Re-Cert Due Date  01/24/20  -KENNEDI        User Key  (r) = Recorded By, (t) = Taken By, (c) = Cosigned By    Initials Name Provider Type    Micky Londono PT DPT Physical Therapist        Therapy Charges for Today     Code Description Service Date Service  Provider Modifiers Qty    40303007876 HC PT EVAL MOD COMPLEXITY 4 1/14/2020 Micky Sauceda, PT DPT GP 1          PT G-Codes  Outcome Measure Options: AM-PAC 6 Clicks Daily Activity (OT)  AM-PAC 6 Clicks Score (PT): 13  AM-PAC 6 Clicks Score (OT): 19    Micky Sauceda, PT DPT  1/14/2020

## 2020-01-14 NOTE — PLAN OF CARE
"  Problem: Patient Care Overview  Goal: Plan of Care Review  Outcome: Ongoing (interventions implemented as appropriate)  Flowsheets (Taken 1/14/2020 1403)  Progress: -- (Eval)  Plan of Care Reviewed With: patient  Outcome Summary: Speech-language/cognitive eval completed. Dysarthric speech characterized by decreased breath support, increased rate of verbalization with decreased articulation which then negatively impacted speech intelligibility at the multi-syllabic level. He was also noted to have irregular prosody and intonation. 1x semantic paraphasia during confrontational naming. No language difficulty with phrase completion, yet 1x difficulty with thought organization with sentence completion with 1x neologism observed during structured tasks. Further neologisms were observed at the conversational level at times. Difficulty with receptive language fx of complex yes/no questions, as well as with following two step commands, especially when temporal orientation of presented commands was altered utilizing \"before\" and \"after\" terminology. Part word repetitions noted when repeating phrase at times, with effortful verbalizations, and imprecise articulation. Mild difficulty with concrete divergent tasks, as well as moderate difficulty with abstract divergent tasks, and concrete and abstract convergent tasks, with 1x semantic paraphasia noted. Pt was also noted to have difficulty with sustained attention, with impulsivity noted at times. Moderate to severe difficulty with delayed recall of unrelated words. Increased frustration was observed as the eval progressed, yet when interviewed per SLP, pt did not feel frustration was originating from difficulty with evaluation tasks, indicating decreased deficit awareness, yet that his frustration was from difficulty understanding how presented tasks were assessing targeted speech-language/cognitive fxs. ST to continue to follow and tx for observed concerns. Thanks!       "

## 2020-01-14 NOTE — H&P
"    [facility] Medicine Services  HISTORY AND PHYSICAL    Date of Admission: 1/13/2020  Primary Care Physician: Lobito Trevino Jr., MD    Subjective     Chief Complaint: confusion    History of Present Illness    Mr. Cruz is a 64-year-old male with a past medical history of COPD, hypothyroidism, hypertension, as well as tobacco abuse.  For greater than 4 days, the patient has indicated that he has felt very dizzy, poor coordination with his right arm and his right leg.  Patient also notes that he felt numbness in his hands and feet as well as his face.  Patient was very uncoordinated, and actually ordered a wheelchair the other day to be able to get around his house instead of seeking help.  He indicated that he thought he was going to get better.  Patient reports over this same couple of days, that he also had fever and chills.  His right arm and right leg remain feeling \"numb\" and he has issues with coordination and knowing where his hands are in space.  When he tries to point to his head, he accidentally smacked himself in the face.  Patient reports having sick contacts recently, and not been able to ambulate around his house.  Patient has not been able to cook or perform many of his ADLs.  Patient indicates that her shortness of breath is greatly improved, but still feels very weak.  Patient reports that during this time is also had emesis and nausea and very poor p.o. Intake.    Patient reports that he is not very compliant with his thyroid medication.  He was found to have a TSH of 95, with a free T4 of less than 0.9.  Patient indicates that he still intermittently smokes.  Denies drug use.  Denies alcohol use.        Review of Systems   Constitutional: Positive for activity change, appetite change and fatigue. Negative for chills and fever.   Respiratory: Positive for shortness of breath. Negative for cough.    Cardiovascular: Negative for chest pain and palpitations. "   Gastrointestinal: Negative for abdominal distention, abdominal pain, constipation, diarrhea, nausea and vomiting.   Neurological: Positive for facial asymmetry, weakness and numbness.   All other systems reviewed and are negative.       Otherwise complete ROS reviewed and negative except as mentioned in the HPI.    Past Medical History:   Past Medical History:   Diagnosis Date   • COPD (chronic obstructive pulmonary disease) (CMS/HCC)    • Hyperlipidemia    • Hypertension      Past Surgical History:  Past Surgical History:   Procedure Laterality Date   • EYE SURGERY Right    • KNEE SURGERY Right      Social History:  reports that he quit smoking about 10 years ago. He has never used smokeless tobacco. He reports that he does not drink alcohol or use drugs.    Family History: family history includes Cancer in his mother; Heart attack in his father; Peripheral vascular disease in his father; Stroke in his mother.       Allergies:  Allergies   Allergen Reactions   • Codeine Itching and Rash     Medications:  Prior to Admission medications    Medication Sig Start Date End Date Taking? Authorizing Provider   albuterol sulfate  (90 Base) MCG/ACT inhaler Inhale 2 puffs 4 (Four) Times a Day As Needed for Wheezing. 5/7/19  Yes Salomon Vang MD   predniSONE (DELTASONE) 10 MG tablet TAKE ONE TABLET EVERY DAY 6/26/19  Yes Tad Dugan APRN   tamsulosin (FLOMAX) 0.4 MG capsule 24 hr capsule Take 1 capsule by mouth Daily.   Yes Caroline Fu MD   amLODIPine (NORVASC) 5 MG tablet Take 10 mg by mouth Daily.    Caroline Fu MD   benzonatate (TESSALON) 200 MG capsule Take 1 capsule by mouth 3 (Three) Times a Day As Needed for Cough. 9/6/19   Salomon Vang MD   bisoprolol-hydrochlorothiazide (ZIAC) 5-6.25 MG per tablet Take 1 tablet by mouth Daily.    Caroline Fu MD   fluticasone-salmeterol (WIXELA INHUB) 250-50 MCG/DOSE DISKUS Inhale 1 puff 2 (Two) Times a Day.  "9/11/19   Salomon Vang MD   ipratropium-albuterol (DUO-NEB) 0.5-2.5 mg/3 ml nebulizer Take 3 mL by nebulization 4 (Four) Times a Day. 5/7/19   Salomon Vang MD   levothyroxine (SYNTHROID, LEVOTHROID) 175 MCG tablet Take 175 mcg by mouth Daily.    Provider, MD Caroline   mometasone-formoterol (DULERA) 200-5 MCG/ACT inhaler Inhale 2 puffs 2 (Two) Times a Day. 9/6/19   Salomon Vang MD   montelukast (SINGULAIR) 10 MG tablet Take 10 mg by mouth Daily.    Provider, MD Caroline   predniSONE (DELTASONE) 10 MG tablet Take 1 tablet by mouth 2 (Two) Times a Day.  Patient not taking: Reported on 1/13/2020 9/6/19   Salomon Vang MD   tiotropium bromide monohydrate (SPIRIVA RESPIMAT) 2.5 MCG/ACT aerosol solution inhaler Inhale 2 puffs Daily. 9/6/19   Salomon Vang MD     Objective     Vital Signs: /100   Pulse 59   Temp 97.2 °F (36.2 °C) (Axillary)   Resp 18   Ht 182.9 cm (72.01\")   Wt 78.2 kg (172 lb 6.4 oz)   SpO2 95%   BMI 23.38 kg/m²   Physical Exam   Constitutional: He is oriented to person, place, and time. No distress.   HENT:   Head: Normocephalic and atraumatic.   Eyes: Conjunctivae are normal. No scleral icterus.   Neck: Neck supple. No JVD present.   Cardiovascular: Normal rate, regular rhythm and intact distal pulses. Exam reveals no friction rub.   No murmur heard.  Pulmonary/Chest: Effort normal and breath sounds normal. No stridor. No respiratory distress. He has no wheezes.   Abdominal: Soft. Bowel sounds are normal. He exhibits no distension and no mass. There is no tenderness. There is no guarding.   Musculoskeletal: He exhibits no edema.   Neurological: He is alert and oriented to person, place, and time. A sensory deficit is present. Coordination abnormal.   Skin: Skin is warm and dry. He is not diaphoretic. No erythema.   Psychiatric: He has a normal mood and affect. His behavior is normal.   Nursing note and vitals reviewed.          Results " Reviewed:  Lab Results (last 24 hours)     Procedure Component Value Units Date/Time    CK [[id number]]  (Normal) Collected:  01/13/20 1502    Specimen:  Blood Updated:  01/13/20 1527     Creatine Kinase 21 U/L     POC Creatinine [[id number]]  (Normal) Collected:  01/13/20 0815    Specimen:  Blood Updated:  01/13/20 1526     Creatinine 1.10 mg/dL      Comment: Serial Number: [id number]:  [id number]       T4, Free [[id number]]  (Abnormal) Collected:  01/13/20 0747    Specimen:  Blood Updated:  01/13/20 1501     Free T4 0.89 ng/dL     Narrative:       Results may be falsely increased if patient taking Biotin.      TSH [[id number]]  (Abnormal) Collected:  01/13/20 0747    Specimen:  Blood Updated:  01/13/20 1128     TSH 94.580 uIU/mL     Ammonia [[id number]]  (Normal) Collected:  01/13/20 0843    Specimen:  Blood Updated:  01/13/20 0935     Ammonia 31 umol/L     Blood Culture - Blood, Arm, Right [[id number]] Collected:  01/13/20 0824    Specimen:  Blood from Arm, Right Updated:  01/13/20 0934    Influenza Antigen, Rapid - Swab, Nasopharynx [[id number]]  (Abnormal) Collected:  01/13/20 0842    Specimen:  Swab from Nasopharynx Updated:  01/13/20 0915     Influenza A Ag, EIA Positive     Influenza B Ag, EIA Negative    Narrative:       Recommend confirmation of negative results by viral culture or molecular assay.    Jber Draw [[id number]] Collected:  01/13/20 0747    Specimen:  Blood Updated:  01/13/20 0904    Narrative:       The following orders were created for panel order Jber Draw.  Procedure                               Abnormality         Status                     ---------                               -----------         ------                     Light Blue Top[[id number]]                                   Final result               Green Top (Gel)[[id number]]                                  Final result               Lavender Top[[id number]]                                     Final result               Red  Top[[id number]]                                          Final result               [place] [facility]...[[id number]]                      Final result                 Please view results for these tests on the individual orders.    Light Blue Top [[id number]] Collected:  01/13/20 0747    Specimen:  Blood Updated:  01/13/20 0904     Extra Tube hold for add-on     Comment: Auto resulted       Green Top (Gel) [[id number]] Collected:  01/13/20 0747    Specimen:  Blood Updated:  01/13/20 0904     Extra Tube Hold for add-ons.     Comment: Auto resulted.       Lavender Top [[id number]] Collected:  01/13/20 0747    Specimen:  Blood Updated:  01/13/20 0904     Extra Tube hold for add-on     Comment: Auto resulted       Red Top [[id number]] Collected:  01/13/20 0747    Specimen:  Blood Updated:  01/13/20 0904     Extra Tube Hold for add-ons.     Comment: Auto resulted.       [place] [facility] Bottle Set [[id number]] Collected:  01/13/20 0747    Specimen:  Blood from Arm, Right Updated:  01/13/20 0904     Extra Tube Hold for add-ons.     Comment: Auto resulted.       Lactic Acid, Plasma [[id number]]  (Normal) Collected:  01/13/20 0747    Specimen:  Blood from Arm, Right Updated:  01/13/20 0835     Lactate 1.8 mmol/L     Blood Gas, Arterial [[id number]]  (Abnormal) Collected:  01/13/20 0825    Specimen:  Arterial Blood Updated:  01/13/20 0831     Site Right Radial     Luke's Test N/A     pH, Arterial 7.504 pH units      Comment: 83 Value above reference range        pCO2, Arterial 25.9 mm Hg      Comment: 84 Value below reference range        pO2, Arterial 105.0 mm Hg      HCO3, Arterial 20.3 mmol/L      Base Excess, Arterial -0.7 mmol/L      Comment: 84 Value below reference range        O2 Saturation, Arterial 99.1 %      Comment: 83 Value above reference range        Temperature 37.0 C      Barometric Pressure for Blood Gas 760 mmHg      Modality Room Air     Ventilator Mode NA     Collected by NAI     Comment: Meter:  [id number]     :  [id number]       CBC & Differential [[id number]] Collected:  01/13/20 0747    Specimen:  Blood Updated:  01/13/20 0828    Narrative:       The following orders were created for panel order CBC & Differential.  Procedure                               Abnormality         Status                     ---------                               -----------         ------                     CBC Auto Differential[[id number]]        Abnormal            Final result                 Please view results for these tests on the individual orders.    CBC Auto Differential [[id number]]  (Abnormal) Collected:  01/13/20 0747    Specimen:  Blood Updated:  01/13/20 0828     WBC 10.87 10*3/mm3      RBC 6.07 10*6/mm3      Hemoglobin 19.3 g/dL      Hematocrit 55.4 %      MCV 91.3 fL      MCH 31.8 pg      MCHC 34.8 g/dL      RDW 14.2 %      RDW-SD 46.3 fl      MPV 9.9 fL      Platelets 280 10*3/mm3      Neutrophil % 54.1 %      Lymphocyte % 32.4 %      Monocyte % 10.3 %      Eosinophil % 2.1 %      Basophil % 0.6 %      Immature Grans % 0.5 %      Neutrophils, Absolute 5.89 10*3/mm3      Lymphocytes, Absolute 3.52 10*3/mm3      Monocytes, Absolute 1.12 10*3/mm3      Eosinophils, Absolute 0.23 10*3/mm3      Basophils, Absolute 0.06 10*3/mm3      Immature Grans, Absolute 0.05 10*3/mm3      nRBC 0.0 /100 WBC     Blood Culture - Blood, Arm, Right [[id number]] Collected:  01/13/20 0747    Specimen:  Blood from Arm, Right Updated:  01/13/20 0827    Comprehensive Metabolic Panel [[id number]]  (Abnormal) Collected:  01/13/20 0747    Specimen:  Blood Updated:  01/13/20 0817     Glucose 130 mg/dL      BUN 15 mg/dL      Creatinine 1.03 mg/dL      Sodium 138 mmol/L      Potassium 3.7 mmol/L      Chloride 102 mmol/L      CO2 25.0 mmol/L      Calcium 9.7 mg/dL      Total Protein 8.1 g/dL      Albumin 4.20 g/dL      ALT (SGPT) 24 U/L      AST (SGOT) 24 U/L      Alkaline Phosphatase 48 U/L      Total Bilirubin 0.8 mg/dL      eGFR Non   Amer 73 mL/min/1.73      Globulin 3.9 gm/dL      A/G Ratio 1.1 g/dL      BUN/Creatinine Ratio 14.6     Anion Gap 11.0 mmol/L     Narrative:       GFR Normal >60  Chronic Kidney Disease <60  Kidney Failure <15      Lipase [[id number]]  (Abnormal) Collected:  01/13/20 0747    Specimen:  Blood Updated:  01/13/20 0817     Lipase 145 U/L     Amylase [[id number]]  (Abnormal) Collected:  01/13/20 0747    Specimen:  Blood Updated:  01/13/20 0817     Amylase 126 U/L     Troponin [[id number]]  (Normal) Collected:  01/13/20 0747    Specimen:  Blood Updated:  01/13/20 0817     Troponin T <0.010 ng/mL     Narrative:       Troponin T Reference Range:  <= 0.03 ng/mL-   Negative for AMI  >0.03 ng/mL-     Abnormal for myocardial necrosis.  Clinicians would have to utilize clinical acumen, EKG, Troponin and serial changes to determine if it is an Acute Myocardial Infarction or myocardial injury due to an underlying chronic condition.       Results may be falsely decreased if patient taking Biotin.      Ethanol [[id number]] Collected:  01/13/20 0747    Specimen:  Blood Updated:  01/13/20 0817     Ethanol % <0.010 %     Narrative:       Not for legal purposes. Chain of Custody not followed.     Protime-INR [[id number]]  (Abnormal) Collected:  01/13/20 0747    Specimen:  Blood Updated:  01/13/20 0809     Protime 12.3 Seconds      INR 0.89        Imaging Results (Last 24 Hours)     Procedure Component Value Units Date/Time    MRI Brain Without Contrast [[id number]] Collected:  01/13/20 1203     Updated:  01/13/20 1221    Narrative:       EXAM: MR BRAIN WITHOUT IV CONTRAST 01/13/2020     COMPARISON: Head CT dated earlier today      HISTORY: 64 years-old Male. Altered mental status      TECHNIQUE:   Routine pulse sequences of the brain were obtained without IV contrast.      REPORT:     There is diffusion restriction within the right inferior cerebellar  hemisphere and posterior brachium pontis, with associated T2/FLAIR  abnormal signal,  consistent with acute right PICA infarct. There is a 9  mm focal area of blooming susceptibility artifact within the area of  infarct, consistent with blood products. An additional area of 2.4 x 1.6  cm of the mesentery artifact within the right cerebellar folia, also  reflecting blood products. There is a trace left subdural hemorrhage.     No evidence of acute intracranial hemorrhage at this time. Mass effect  is local without evidence of acute hydrocephalus. No suspicious  extra-axial fluid collection.     Chronic microvascular changes.          Impression:       1.  Large area of acute infarct in the right PICA territory.  2.  Associated blood products in the infarcted brain parenchyma.  3.  Trace left cerebral convexity subdural hemorrhage  This report was finalized on 01/13/2020 12:18 by Dr. Karey Ennis MD.    CT Angiogram Chest [[id number]] Collected:  01/13/20 0824     Updated:  01/13/20 0905    Narrative:       EXAMINATION: CT ANGIOGRAM CHEST- 1/13/2020 8:24 AM CST     HISTORY: Stroke like symptoms, shortness of breath, dyspnea, chest pain     COMPARISON: CTA chest 06/27/2016     DOSE: 550 mGy-cm     TECHNIQUE: Sequential imaging was performed from the thoracic inlet  through the upper abdomen following the administration of IV contrast.   Sagittal and coronal reformations were made from the original source  data and reviewed. Additionally, 3-D MIPS reconstructions of the vessels  were made per CTA protocol. Automated exposure control was also utilized  to decrease patient radiation dose.     FINDINGS:   The visualized thyroid gland is unremarkable. The trachea and main  bronchi appear widely patent and in normal anatomic position. The  esophagus is grossly normal in appearance. A small hiatal hernia is  present.     No pathologically enlarged axillary, mediastinal, or hilar lymph nodes  are identified.     The heart appears normal in size. There is no pericardial or pleural  effusion. Coronary artery  calcifications are present. Atherosclerotic  calcifications are seen within the aorta and its branch vessels.     The thoracic aorta appears normal in caliber. Main pulmonary artery  appears normal in caliber. Contrast opacification is suboptimal for  evaluation of the pulmonary arteries. No central or proximal segmental  filling defects are identified. There is no evidence of right heart  strain.     There is mild diffuse bronchial wall thickening. The lungs otherwise  appear clear.     Review of the visualized portion of the upper abdomen demonstrates no  acute abnormalities.     Review of the visualized osseous structures demonstrates no acute or  aggressive lesions. Degenerative changes are noted in the spine. Old  left-sided rib fractures are evident.       Impression:       1. Suboptimal contrast bolus timing for evaluation of the pulmonary  arteries. No central PE identified. No evidence of right heart strain.     2. Atherosclerosis of the aorta and coronary arteries.     3. Small hiatal hernia.           This report was finalized on 01/13/2020 08:33 by Dr. Som Mckenzie MD.    CT Head Without Contrast [[id number]] Collected:  01/13/20 0814     Updated:  01/13/20 0905    Narrative:       EXAMINATION: CT HEAD WO CONTRAST-      1/13/2020 8:04 AM CST     HISTORY: Altered mental status.     In order to have a CT radiation dose as low as reasonably achievable  Automated Exposure Control was utilized for adjustment of the mA and/or  KV according to patient size.     DLP in mGycm= 657.     Noncontrast head CT compared with 08/19/2018.     Axial, sagittal, and coronal noncontrast CT imaging of the head.     The visualized paranasal sinuses are clear.     The brain and ventricles have an age appropriate appearance.   There is no hemorrhage or mass-effect.   No acute infarction is seen.     No calvarial abnormality.       Impression:       1. No acute intracranial abnormality is seen.                                          This report was finalized on 01/13/2020 08:27 by Dr. Saeid Singh MD.    XR Chest 1 View [[id number]] Collected:  01/13/20 0856     Updated:  01/13/20 0904    Narrative:       EXAMINATION: XR CHEST 1 VW- 1/13/2020 8:56 AM CST     HISTORY: Chest pain, altered mental status     COMPARISON: 10/08/2018     FINDINGS:  Heart and mediastinal contours appear normal. There is mild bronchial  wall thickening. Lungs otherwise appear clear. There is no appreciable  pneumothorax or pleural effusion. The pulmonary vasculature appears  grossly normal.       Impression:       Mild bronchial wall thickening.  This report was finalized on 01/13/2020 08:57 by Dr. Som Mckenzie MD.        I have personally reviewed and interpreted the radiology studies and ECG obtained at time of admission.     Assessment / Plan     Assessment:   Active Hospital Problems    Diagnosis   • CVA (cerebral vascular accident) (CMS/HCC)   • Influenza A   • Tobacco dependence   • Acute encephalopathy   • Hypothyroidism, non-compliant with medication    • Medical non-compliance   • Bradycardia   • Chronic obstructive pulmonary disease (CMS/[facility])       Plan:   1.  Admit to medicine  2.  Levothyroxine 50 mcg IV   3.  Resume PO levothyroxine, bradycardia may be related   4.  Tamiflu 75 mg BID   5.  Counseled on smoking cessation and medical compliance   6.  Resume home medication as appropriate when medicine reconciliation.   7.  CTA head and neck   8.  Neurology consultation  9.  Mental status returned to normal  10.  ASA and atorvastatin   11.  Echo   12.  PT/OT, SLP  13.  Regular diet   14.  CBC, CMP, A1c, Lipid panel  15.  UDS, UA   16.  Neurochecks and stroke scale       Code Status: Full    Sisters to make decisions if he is unable     I discussed the patient's findings and my recommendations with the patient and bedside RN     Estimated length of stay 2-4    Hira Montenegro MD   01/13/20   6:20 PM

## 2020-01-14 NOTE — THERAPY EVALUATION
Acute Care - Occupational Therapy Initial Evaluation  [facility]     Patient Name: Reza Cruz  : 1955  MRN: [id number]  Today's Date: 2020  Onset of Illness/Injury or Date of Surgery: 20  Date of Referral to OT: 20  Referring Physician: Dr. Montenegro    Admit Date: 2020       ICD-10-CM ICD-9-CM   1. Dysphagia, unspecified type R13.10 787.20   2. Influenza A J10.1 487.1   3. Elevated TSH R79.89 794.5   4. Cerebrovascular accident (CVA), unspecified mechanism (CMS/HCC) I63.9 434.91   5. Tobacco abuse Z72.0 305.1   6. Impaired mobility Z74.09 799.89   7. Impaired mobility and ADLs Z74.09 799.89     Patient Active Problem List   Diagnosis   • Pneumonia of right upper lobe due to infectious organism (CMS/[facility])   • Impetigo   • Simple chronic bronchitis (CMS/[facility])   • Chronic obstructive pulmonary disease (CMS/[facility])   • Cough   • Personal history of nicotine dependence   • CVA (cerebral vascular accident) (CMS/[facility])   • Influenza A   • Tobacco dependence   • Acute encephalopathy   • Hypothyroidism, non-compliant with medication    • Medical non-compliance   • Bradycardia     Past Medical History:   Diagnosis Date   • COPD (chronic obstructive pulmonary disease) (CMS/[facility])    • Hyperlipidemia    • Hypertension      Past Surgical History:   Procedure Laterality Date   • EYE SURGERY Right    • KNEE SURGERY Right           OT ASSESSMENT FLOWSHEET (last 12 hours)      Occupational Therapy Evaluation     Row Name 20 0800                   OT Evaluation Time/Intention    Subjective Information  complains of  -JJ        Document Type  evaluation see MAR  -JJ        Mode of Treatment  occupational therapy  -JJ        Patient Effort  good  -JJ           General Information    Patient Profile Reviewed?  yes  -JJ        Onset of Illness/Injury or Date of Surgery  20  -JJ        Referring Physician  Dr. Montenegro  -JJ        Patient Observations  alert;cooperative;agree to therapy  -JJ         Patient/Family Observations  no family present in room   -JJ        General Observations of Patient  fowlers in bed, IV infusing, alert  -JJ        Prior Level of Function  independent:;all household mobility;community mobility;transfer;bed mobility;ADL's  -JJ        Equipment Currently Used at Home  none  -JJ        Existing Precautions/Restrictions  fall  -JJ        Risks Reviewed  patient:;LOB;nausea/vomiting;dizziness;increased discomfort;change in vital signs;increased drainage;lines disloged  -JJ        Benefits Reviewed  patient:;increase independence;improve function;increase strength;increase balance;decrease pain;decrease risk of DVT;improve skin integrity;increase knowledge  -JJ           Relationship/Environment    Primary Source of Support/Comfort  sibling(s)  -JJ        Lives With  alone  -J           Resource/Environmental Concerns    Current Living Arrangements  home/apartment/condo walk-in shower  -JJ           Cognitive Assessment/Interventions    Additional Documentation  Cognitive Assessment/Intervention (Group)  -JJ           Cognitive Assessment/Intervention- PT/OT    Affect/Mental Status (Cognitive)  WFL  -JJ        Orientation Status (Cognition)  oriented x 4  -JJ        Follows Commands (Cognition)  WFL  -JJ        Cognitive Function (Cognitive)  WFL  -JJ        Personal Safety Interventions  fall prevention program maintained;gait belt;muscle strengthening facilitated;nonskid shoes/slippers when out of bed;supervised activity  -JJ           Safety Issues, Functional Mobility    Safety Issues Affecting Function (Mobility)  impulsivity  -JJ        Impairments Affecting Function (Mobility)  balance;coordination;endurance/activity tolerance;postural/trunk control  -JJ           Bed Mobility Assessment/Treatment    Bed Mobility Assessment/Treatment  supine-sit  -JJ        Supine-Sit [place] (Bed Mobility)  contact guard;verbal cues  -JJ        Assistive Device (Bed Mobility)  bed rails   -           Functional Mobility    Functional Mobility- Ind. Level  moderate assist (50% patient effort);2 person assist required;verbal cues required  -        Functional Mobility- Device  -- HHAx2  -        Functional Mobility- Safety Issues  -- R lateral lean, ataxic RLE, decreased balance.   -        Functional Mobility- Comment  Bed, into hallway and back to chair. Pt demo's a significant R sided lean in standing that requires significant assist to correct. Pt is able to recognize lean when asked and able to correct approx 30 seconds and then unable to hold erect upright posture.   -           Transfer Assessment/Treatment    Transfer Assessment/Treatment  sit-stand transfer;stand-sit transfer  -           Sit-Stand Transfer    Sit-Stand [place] (Transfers)  contact guard;minimum assist (75% patient effort)  -           Stand-Sit Transfer    Stand-Sit [place] (Transfers)  contact guard;minimum assist (75% patient effort)  -           ADL Assessment/Intervention    BADL Assessment/Intervention  lower body dressing  -           Lower Body Dressing Assessment/Training    Lower Body Dressing [place] Level  don;doff;socks;independent  -        Lower Body Dressing Position  edge of bed sitting  -        Comment (Lower Body Dressing)  decreased coordination in RUE noted during tasks.   -           BADL Safety/Performance    Impairments, BADL Safety/Performance  balance;endurance/activity tolerance;coordination;trunk/postural control  -        Skilled BADL Treatment/Intervention  BADL process/adaptation training  -           General ROM    GENERAL ROM COMMENTS  BUE AROM WFL   -           MMT (Manual Muscle Testing)    General MMT Comments  BUE strength 5/5  -           Motor Assessment/Interventions    Additional Documentation  Balance (Group);Fine Motor Testing & Training (Group);Gross Motor Coordination (Group)  -           Gross Motor Coordination    Gross Motor  Impairments  finger to nose;rapid alternating  -JJ        Gross Motor Skill, Impairments Detail  FTN: R side inaccuries. ANGELIA: slight R sided drag. Pt demo's R side inaccuries with reaching with R hand to touch object.   -JJ           Balance    Balance  static sitting balance;static standing balance  -JJ           Static Sitting Balance    Level of [place] (Unsupported Sitting, Static Balance)  contact guard assist  -JJ        Sitting Position (Unsupported Sitting, Static Balance)  sitting on edge of bed  -JJ           Static Standing Balance    Level of [place] (Supported Standing, Static Balance)  minimal assist, 75% patient effort;2 person assist  -JJ           Sensory Assessment/Intervention    Sensory General Assessment  no sensation deficits identified intact BUEs  -JJ           Positioning and Restraints    Pre-Treatment Position  in bed  -JJ        Post Treatment Position  chair  -JJ        In Chair  call light within reach;encouraged to call for assist;reclined;notified nsg  -JJ           Pain Assessment    Additional Documentation  Pain Scale: Numbers Pre/Post-Treatment (Group)  -JJ           Pain Scale: Numbers Pre/Post-Treatment    Pain Scale: Numbers, Pretreatment  0/10 - no pain  -JJ        Pain Scale: Numbers, Post-Treatment  0/10 - no pain  -JJ        Pain Intervention(s)  Repositioned;Ambulation/increased activity  -JJ           Plan of Care Review    Plan of Care Reviewed With  patient  -JJ        Outcome Summary  OT arn completed. Pt is A&Ox4. Demo's decreased balance, endurance, postural control, and coordination. CGA for bed mobility. Independently able to don socks, but demo'd decreased R GM coordination with task. FM and GM coordination impairments in R UE.  Min A for sit <> stand t/f from EOB. Min/Mod A for static standing balance d/t R sided lateral lean. Pt requires significant assist during mobility d/t R lateral lean, which he is able to recognize but unable to correct. Pt  would benefit from skilled OT serivces to address these deficits. Recommend d/c to acute IP rehab upon d/c from facility.   -JJ           Clinical Impression (OT)    Date of Referral to OT  01/13/20  -J        OT Diagnosis  impaired adls and mobility.   -JJ        Prognosis (OT Eval)  good  -JJ        Patient/Family Goals Statement (OT Eval)  return to [facility]  -J        Criteria for Skilled Therapeutic Interventions Met (OT Eval)  yes;treatment indicated  -JJ        Rehab Potential (OT Eval)  good, to achieve stated therapy goals  -J        Therapy Frequency (OT Eval)  5 times/wk  -J        Predicted Duration of Therapy Intervention (Therapy Eval)  10 days  -J        Care Plan Review (OT)  evaluation/treatment results reviewed;care plan/treatment goals reviewed;risks/benefits reviewed;current/potential barriers reviewed;patient/other agree to care plan  -JJ        Anticipated Discharge Disposition (OT)  inpatient rehabilitation facility  -JJ           Planned OT Interventions    Planned Therapy Interventions (OT Eval)  activity tolerance training;BADL retraining;functional balance retraining;neuromuscular control/coordination retraining;orthotic fabrication/fitting/training;patient/caregiver education/training;transfer/mobility retraining  -JJ           OT Goals    Toileting Goal Selection (OT)  toileting, OT goal 1  -JJ        Balance Goal Selection (OT)  balance, OT goal 1  -JJ        Coordination Goal Selection (OT)  coordination, OT goal 1  -JJ        Additional Documentation  Balance Goal Selection (OT) (Row);Coordination Goal Selection (OT) (Row)  -J           Toileting Goal 1 (OT)    Activity/Device (Toileting Goal 1, OT)  toileting skills, all;commode  -JJ        [place] Level/Cues Needed (Toileting Goal 1, OT)  supervision required  -JJ        Time Frame (Toileting Goal 1, OT)  long term goal (LTG);by discharge  -JJ        Progress/Outcome (Toileting Goal 1, OT)  goal ongoing  -           Balance  Goal 1 (OT)    Activity/Assistive Device (Balance Goal 1, OT)  sitting, static;sitting, dynamic;standing, static;standing, dynamic  -JJ        [place] Level/Cues Needed (Balance Goal 1, OT)  contact guard assist  -JJ        Time Frame (Balance Goal 1, OT)  long term goal (LTG);by discharge  -JJ        Progress/Outcomes (Balance Goal 1, OT)  goal ongoing  -JJ           Coordination Goal 1 (OT)    Activity/Assistive Device (Coordination Goal 1, OT)  FM task;GM task  -JJ        [place] Level/Cues Needed (Coordination Goal 1, OT)  supervision required  -JJ        Time Frame (Coordination Goal 1, OT)  long term goal (LTG);by discharge  -JJ        Progress/Outcomes (Coordination Goal 1, OT)  goal ongoing  -JJ           Living Environment    Home Accessibility  wheelchair accessible;tub/shower is not walk in  -JJ          User Key  (r) = Recorded By, (t) = Taken By, (c) = Cosigned By    Initials Name Effective Dates    Roxie Garvey OTR/L 10/12/18 -                OT Recommendation and Plan  Outcome Summary/Treatment Plan (OT)  Anticipated Discharge Disposition (OT): inpatient rehabilitation facility  Planned Therapy Interventions (OT Eval): activity tolerance training, BADL retraining, functional balance retraining, neuromuscular control/coordination retraining, orthotic fabrication/fitting/training, patient/caregiver education/training, transfer/mobility retraining  Therapy Frequency (OT Eval): 5 times/wk  Plan of Care Review  Plan of Care Reviewed With: patient  Plan of Care Reviewed With: patient  Outcome Summary: OT eval completed. Pt is A&Ox4. Demo's decreased balance, endurance, postural control, and coordination. CGA for bed mobility. Independently able to don socks, but demo'd decreased R GM coordination with task. FM and GM coordination impairments in R UE.  Min A for sit <> stand t/f from EOB. Min/Mod A for static standing balance d/t R sided lateral lean. Pt requires significant assist during  mobility d/t R lateral lean, which he is able to recognize but unable to correct. Pt would benefit from skilled OT serivces to address these deficits. Recommend d/c to acute IP rehab upon d/c from facility.     Outcome Measures     Row Name 01/14/20 0800             How much help from another is currently needed...    Putting on and taking off regular lower body clothing?  4  -JJ      Bathing (including washing, rinsing, and drying)  3  -JJ      Toileting (which includes using toilet bed pan or urinal)  3  -JJ      Putting on and taking off regular upper body clothing  3  -JJ      Taking care of personal grooming (such as brushing teeth)  3  -JJ      Eating meals  3  -J      AM-PAC 6 Clicks Score (OT)  19  -         Functional Assessment    Outcome Measure Options  AM-PAC 6 Clicks Daily Activity (OT)  -        User Key  (r) = Recorded By, (t) = Taken By, (c) = Cosigned By    Initials Name Provider Type    Roxie Garvey OTR/L Occupational Therapist          Time Calculation:   Time Calculation- OT     Row Name 01/14/20 0848             Time Calculation- OT    OT Start Time  0800 add 5 minutes for chart review   -      OT Stop Time  0836  -      OT Time Calculation (min)  36 min  -      OT Received On  01/14/20  -      OT Goal Re-Cert Due Date  01/24/20  -        User Key  (r) = Recorded By, (t) = Taken By, (c) = Cosigned By    Initials Name Provider Type    Roxie Garvey OTR/L Occupational Therapist        Therapy Charges for Today     Code Description Service Date Service Provider Modifiers Qty    [id number]  OT EVAL MOD COMPLEXITY 3 1/14/2020 Roxie Magaña OTR/L GO 1               RACIEL Trejo/MELANIE  1/14/2020

## 2020-01-14 NOTE — CONSULTS
NEUROSURGERY INITIAL HOSPITAL ENCOUNTER    Assessment/Plan:   Reza Cruz is a 64 y.o. male with a significant comorbidity hypertension, hyperlipidemia.  He presents with a new problem of difficulty with gait and coordination of the right upper extremity and right hemibody numbness. Physical exam findings of right-sided upper and lower extremity ataxia and scanning speech.  Their imaging shows right cerebellar stroke.    Differential Diagnosis:   Right cerebellar stroke  PICA occlusion  Dysmetria and ataxia  No evidence of subdural hematoma    Recommendations:  Reza presents with a large cerebellar stroke.  While typically large cerebellar strokes are at risk for cerebral herniation and need for decompressive craniotomy, he is more than 6 days out.  His risk of need for decompression is extremely low.  Additionally his CT scan shows hypodense tissue in the right cerebellum confirming the timeline from the patient.  I would continue to watch him and avoid hyponatremia.  Keep sodium is greater than 140.  I do not see any point for mannitol at this time.  If the patient declines please inform neurosurgery and we will be continuing to follow.    While initial reports on the MRI did suggest a subdural hematoma this is not appreciated on the noncontrast CT which is actually a more sensitive test.  Agree with anticoagulation per neurology's recommendation.  No acute recommendations on systolic blood pressure management.  Again defer to neurology.    I discussed the patients findings and my recommendations with patient, nursing staff and consulting provider    Thank you very much for this interesting consult.     Level of Risk: High due to:  abrupt change in neurological status  MDM: High Complexity  Mod = 20967, High=99223  ___________________________________________________________________    Reason for consult cerebellar stroke  Consult requested by Dr. Quick in the emergency room    Chief Complaint:   Chief Complaint    Patient presents with   • Chest Pain   • Vomiting       HPI: Reza Cruz is a 64 y.o. male with a significant comorbidity COPD, hyperlipidemia, hypertension.  He was last known well approximately 6 days ago.  The patient is awake and while having slurred speech is able to relate his history.  He states that he began having right upper extremity weakness and difficulty controlling his arm associated with right face, arm, and right leg discoordination.  The patient's neighbor activated EMS because the patient was not walking right and appear confused.  The patient's neighbor thought he was having a stroke.  He does have a history of vomiting and some low-grade headache over the last several days.    A CT was performed in the emergency room did did not show any acute changes but did show a hypodensity in the right cerebellum.  An MRI subsequently showed a right PICA.  It is reported to have a left subdural hematoma but I do not appreciate this.  He denies being on any anticoagulation.    Review of Systems   Eyes: Negative.    Respiratory: Positive for chest tightness.    Cardiovascular: Negative.    Gastrointestinal: Positive for vomiting.   Endocrine: Negative.    Genitourinary: Negative.    Musculoskeletal: Positive for gait problem.   Skin: Negative.    Allergic/Immunologic: Negative.    Neurological: Positive for weakness and headaches.   Hematological: Negative.    Psychiatric/Behavioral: Positive for agitation and confusion.        Past Medical History:  has a past medical history of COPD (chronic obstructive pulmonary disease) (CMS/[facility]), Hyperlipidemia, and Hypertension.    Past Surgical History:  has a past surgical history that includes Eye surgery (Right) and Knee surgery (Right).    Family History: family history includes Cancer in his mother; Heart attack in his father; Peripheral vascular disease in his father; Stroke in his mother.    Social History:  reports that he quit smoking about 10 years ago. He  has never used smokeless tobacco. He reports that he does not drink alcohol or use drugs.    Allergies: Codeine    Home Medications:   Current Facility-Administered Medications:   •  acetaminophen (TYLENOL) tablet 650 mg, 650 mg, Oral, Q6H PRN, Hira Montenegro MD, 650 mg at 01/13/20 1738  •  aspirin chewable tablet 81 mg, 81 mg, Oral, Daily, 81 mg at 01/13/20 1738 **OR** aspirin suppository 300 mg, 300 mg, Rectal, Daily, Hira Montenegro MD  •  atorvastatin (LIPITOR) tablet 80 mg, 80 mg, Oral, Nightly, Hira Montenegro MD, 80 mg at 01/13/20 2015  •  ipratropium-albuterol (DUO-NEB) nebulizer solution 3 mL, 3 mL, Nebulization, Q4H PRN, Hira Montenegro MD  •  levothyroxine (SYNTHROID, LEVOTHROID) tablet 125 mcg, 125 mcg, Oral, Q AM, Hira Montenegro MD, 125 mcg at 01/14/20 0547  •  ondansetron (ZOFRAN) tablet 4 mg, 4 mg, Oral, Q6H PRN **OR** ondansetron (ZOFRAN) injection 4 mg, 4 mg, Intravenous, Q6H PRN, Hira Montenegro MD  •  oseltamivir (TAMIFLU) capsule 75 mg, 75 mg, Oral, Q12H, Hira Montenegro MD, 75 mg at 01/13/20 2015  •  [COMPLETED] Insert peripheral IV, , , Once **AND** sodium chloride 0.9 % flush 10 mL, 10 mL, Intravenous, PRN, Hira Montenegro MD  •  sodium chloride 0.9 % flush 10 mL, 10 mL, Intravenous, Q12H, Hira Montenegro MD, 10 mL at 01/13/20 2016  •  sodium chloride 0.9 % flush 10 mL, 10 mL, Intravenous, PRN, Hira Montenegro MD    Medications: Scheduled Meds:  aspirin 81 mg Oral Daily   Or      aspirin 300 mg Rectal Daily   atorvastatin 80 mg Oral Nightly   levothyroxine 125 mcg Oral Q AM   oseltamivir 75 mg Oral Q12H   sodium chloride 10 mL Intravenous Q12H     Continuous Infusions:   PRN Meds:.•  acetaminophen  •  ipratropium-albuterol  •  ondansetron **OR** ondansetron  •  [COMPLETED] Insert peripheral IV **AND** sodium chloride  •  sodium chloride    Vital Signs  Temp:  [97.2 °F (36.2 °C)-97.8 °F (36.6 °C)] 97.3 °F (36.3 °C)  Heart Rate:  [43-85] 47  Resp:   [16-26] 20  BP: (106-168)/() 123/93    Physical Exam  Physical Exam   Constitutional: He is oriented to person, place, and time. He appears well-developed and well-nourished.   HENT:   Head: Normocephalic and atraumatic.   Eyes: Pupils are equal, round, and reactive to light. Conjunctivae and EOM are normal.   Fundoscopic exam:       The right eye shows no exudate, no hemorrhage and no papilledema.        The left eye shows no exudate, no hemorrhage and no papilledema.   Neck: Normal range of motion. Neck supple.   Cardiovascular:   Pulses:       Dorsalis pedis pulses are 2+ on the right side, and 2+ on the left side.        Posterior tibial pulses are 2+ on the right side, and 2+ on the left side.   Pulmonary/Chest: Effort normal. No respiratory distress.     Vascular Status -  His right foot exhibits no edema. His left foot exhibits no edema.  Neurological: He is oriented to person, place, and time. He has an abnormal Finger-Nose-Finger Test (Dysmetria) and an abnormal Heel to Shin Test (Dysmetria). He has a normal Tandem Gait Test.   Skin: Skin is warm. No rash noted. No erythema.   Psychiatric: His speech is slurred.       Neurologic Exam     Mental Status   Oriented to person, place, and time.   Registration: recalls 3 of 3 objects.   Attention: normal. Concentration: normal.   Speech: slurred   Level of consciousness: alert  Knowledge: consistent with education.   Able to name object. Able to read. Able to repeat. Able to write. Normal comprehension.   Scanning speech     Cranial Nerves     CN II   Visual acuity: normal    CN III, IV, VI   Pupils are equal, round, and reactive to light.  Extraocular motions are normal.   Diplopia: none    CN V   Facial sensation intact.   Right facial sensation deficit: complete  Right corneal reflex: normal  Left corneal reflex: normal    CN VII   Right facial weakness: none  Left facial weakness: none    CN VIII   Hearing: intact    CN IX, X   Palate: symmetric  Right  gag reflex: normal  Left gag reflex: normal    CN XI   Right trapezius strength: normal  Left trapezius strength: normal    CN XII   Tongue deviation: none    Motor Exam   Right arm tone: normal  Left arm tone: normal  Right arm pronator drift: absent  Left arm pronator drift: absent  Right leg tone: normal  Left leg tone: normal    Strength   Strength 5/5 except as noted.     Sensory Exam   Right arm light touch: decreased from elbow  Left arm light touch: normal  Right leg light touch: decreased from knee  Left leg light touch: normal  Proprioception normal.     Gait, Coordination, and Reflexes     Gait  Gait: wide-based (Ataxia)    Coordination   Finger to nose coordination: abnormal (Dysmetria)  Heel to shin coordination: abnormal (Dysmetria)  Tandem walking coordination: normal    Reflexes   Reflexes 2+ except as noted.   Right plantar: normal  Left plantar: normal  Right Knapp: absent  Left Knapp: absent      Results Review:   Independent review and interpretation of imaging  Imaging Results (Last 24 Hours)     Procedure Component Value Units Date/Time    CT Angiogram Head [[id number]] Collected:  01/13/20 1934     Updated:  01/13/20 1956    Narrative:       CT angiography with 3D MIP images head with IV contrast  CT angiography with 3D MIP images neck with IV contrast     Indication: Stroke     Comparison: Brain MR 01/13/2020     DOSE LENGTH PRODUCT: 449 mGy cm. Automated exposure control was also  utilized to decrease patient radiation dose.     Findings:     Three-vessel aortic arch with widely patent origins. Mild  atherosclerotic narrowing of the left proximal subclavian artery. Right  subclavian artery is widely patent. The right vertebral artery is  completely occluded proximally with some reconstitution along the mid to  distal course. The left vertebral artery is widely patent. Both common  carotid arteries are widely patent. Both extracranial internal carotid  arteries are widely patent.     Both  intracranial internal carotid arteries are widely patent. Both MCAs  are widely patent. Both ACAs are widely patent. Basilar artery and tip  are unremarkable. Both PCAs are widely patent. Both superior cerebellar  arteries are widely patent. No dural venous sinus filling defect.     Soft tissues: No abnormal intracranial enhancement. Orbits appear  unremarkable. Parapharyngeal fat planes are maintained. Parotid and  submandibular glands are unremarkable. No focal asymmetry of the  aerodigestive tract. Mastoid air cells are clear. Mild maxillary sinus  mucosal thickening. No enlarged cervical lymph nodes. Emphysema in the  lung apices. No acute osseous findings.       Impression:          Complete occlusion of the right vertebral artery with reconstitution at  C1. Basilar artery and tip appear unremarkable. Both PCAs and superior  cerebellar arteries are widely patent.  This report was finalized on 01/13/2020 19:53 by Dr. Charli Dowell MD.    CT Angiogram Neck [[id number]] Collected:  01/13/20 1934     Updated:  01/13/20 1956    Narrative:       CT angiography with 3D MIP images head with IV contrast  CT angiography with 3D MIP images neck with IV contrast     Indication: Stroke     Comparison: Brain MR 01/13/2020     DOSE LENGTH PRODUCT: 449 mGy cm. Automated exposure control was also  utilized to decrease patient radiation dose.     Findings:     Three-vessel aortic arch with widely patent origins. Mild  atherosclerotic narrowing of the left proximal subclavian artery. Right  subclavian artery is widely patent. The right vertebral artery is  completely occluded proximally with some reconstitution along the mid to  distal course. The left vertebral artery is widely patent. Both common  carotid arteries are widely patent. Both extracranial internal carotid  arteries are widely patent.     Both intracranial internal carotid arteries are widely patent. Both MCAs  are widely patent. Both ACAs are widely patent. Basilar  artery and tip  are unremarkable. Both PCAs are widely patent. Both superior cerebellar  arteries are widely patent. No dural venous sinus filling defect.     Soft tissues: No abnormal intracranial enhancement. Orbits appear  unremarkable. Parapharyngeal fat planes are maintained. Parotid and  submandibular glands are unremarkable. No focal asymmetry of the  aerodigestive tract. Mastoid air cells are clear. Mild maxillary sinus  mucosal thickening. No enlarged cervical lymph nodes. Emphysema in the  lung apices. No acute osseous findings.       Impression:          Complete occlusion of the right vertebral artery with reconstitution at  C1. Basilar artery and tip appear unremarkable. Both PCAs and superior  cerebellar arteries are widely patent.  This report was finalized on 01/13/2020 19:53 by Dr. Charli Dowell MD.    MRI Brain Without Contrast [[id number]] Collected:  01/13/20 1203     Updated:  01/13/20 1221    Narrative:       EXAM: MR BRAIN WITHOUT IV CONTRAST 01/13/2020     COMPARISON: Head CT dated earlier today      HISTORY: 64 years-old Male. Altered mental status      TECHNIQUE:   Routine pulse sequences of the brain were obtained without IV contrast.      REPORT:     There is diffusion restriction within the right inferior cerebellar  hemisphere and posterior brachium pontis, with associated T2/FLAIR  abnormal signal, consistent with acute right PICA infarct. There is a 9  mm focal area of blooming susceptibility artifact within the area of  infarct, consistent with blood products. An additional area of 2.4 x 1.6  cm of the mesentery artifact within the right cerebellar folia, also  reflecting blood products. There is a trace left subdural hemorrhage.     No evidence of acute intracranial hemorrhage at this time. Mass effect  is local without evidence of acute hydrocephalus. No suspicious  extra-axial fluid collection.     Chronic microvascular changes.          Impression:       1.  Large area of acute  infarct in the right PICA territory.  2.  Associated blood products in the infarcted brain parenchyma.  3.  Trace left cerebral convexity subdural hemorrhage  This report was finalized on 01/13/2020 12:18 by Dr. Karey Ennis MD.    CT Angiogram Chest [[id number]] Collected:  01/13/20 0824     Updated:  01/13/20 0905    Narrative:       EXAMINATION: CT ANGIOGRAM CHEST- 1/13/2020 8:24 AM CST     HISTORY: Stroke like symptoms, shortness of breath, dyspnea, chest pain     COMPARISON: CTA chest 06/27/2016     DOSE: 550 mGy-cm     TECHNIQUE: Sequential imaging was performed from the thoracic inlet  through the upper abdomen following the administration of IV contrast.   Sagittal and coronal reformations were made from the original source  data and reviewed. Additionally, 3-D MIPS reconstructions of the vessels  were made per CTA protocol. Automated exposure control was also utilized  to decrease patient radiation dose.     FINDINGS:   The visualized thyroid gland is unremarkable. The trachea and main  bronchi appear widely patent and in normal anatomic position. The  esophagus is grossly normal in appearance. A small hiatal hernia is  present.     No pathologically enlarged axillary, mediastinal, or hilar lymph nodes  are identified.     The heart appears normal in size. There is no pericardial or pleural  effusion. Coronary artery calcifications are present. Atherosclerotic  calcifications are seen within the aorta and its branch vessels.     The thoracic aorta appears normal in caliber. Main pulmonary artery  appears normal in caliber. Contrast opacification is suboptimal for  evaluation of the pulmonary arteries. No central or proximal segmental  filling defects are identified. There is no evidence of right heart  strain.     There is mild diffuse bronchial wall thickening. The lungs otherwise  appear clear.     Review of the visualized portion of the upper abdomen demonstrates no  acute abnormalities.     Review of  the visualized osseous structures demonstrates no acute or  aggressive lesions. Degenerative changes are noted in the spine. Old  left-sided rib fractures are evident.       Impression:       1. Suboptimal contrast bolus timing for evaluation of the pulmonary  arteries. No central PE identified. No evidence of right heart strain.     2. Atherosclerosis of the aorta and coronary arteries.     3. Small hiatal hernia.           This report was finalized on 01/13/2020 08:33 by Dr. Som Mckenzie MD.    CT Head Without Contrast [[id number]] Collected:  01/13/20 0814     Updated:  01/13/20 0905    Narrative:       EXAMINATION: CT HEAD WO CONTRAST-      1/13/2020 8:04 AM CST     HISTORY: Altered mental status.     In order to have a CT radiation dose as low as reasonably achievable  Automated Exposure Control was utilized for adjustment of the mA and/or  KV according to patient size.     DLP in mGycm= 657.     Noncontrast head CT compared with 08/19/2018.     Axial, sagittal, and coronal noncontrast CT imaging of the head.     The visualized paranasal sinuses are clear.     The brain and ventricles have an age appropriate appearance.   There is no hemorrhage or mass-effect.   No acute infarction is seen.     No calvarial abnormality.       Impression:       1. No acute intracranial abnormality is seen.                                         This report was finalized on 01/13/2020 08:27 by Dr. Saeid Singh MD.    XR Chest 1 View [[id number]] Collected:  01/13/20 0856     Updated:  01/13/20 0904    Narrative:       EXAMINATION: XR CHEST 1 VW- 1/13/2020 8:56 AM CST     HISTORY: Chest pain, altered mental status     COMPARISON: 10/08/2018     FINDINGS:  Heart and mediastinal contours appear normal. There is mild bronchial  wall thickening. Lungs otherwise appear clear. There is no appreciable  pneumothorax or pleural effusion. The pulmonary vasculature appears  grossly normal.       Impression:       Mild bronchial wall  thickening.  This report was finalized on 01/13/2020 08:57 by Dr. Som Mckenzie MD.        MRI brain:  MRI spine:   CT Head:  CT c-spine:  CT t-spine:  CT l-spine:  X-ray:    I reviewed the patient's new clinical results.  Lab Results (last 24 hours)     Procedure Component Value Units Date/Time    POC Glucose Once [[id number]]  (Normal) Collected:  01/14/20 0551    Specimen:  Blood Updated:  01/14/20 0602     Glucose 111 mg/dL      Comment: : [id number] Mitchell AnnaMeter ID: [id number]       Comprehensive Metabolic Panel [[id number]]  (Abnormal) Collected:  01/14/20 0247    Specimen:  Blood Updated:  01/14/20 0336     Glucose 131 mg/dL      BUN 22 mg/dL      Creatinine 1.15 mg/dL      Sodium 142 mmol/L      Potassium 4.0 mmol/L      Chloride 104 mmol/L      CO2 26.0 mmol/L      Calcium 9.6 mg/dL      Total Protein 7.1 g/dL      Albumin 3.90 g/dL      ALT (SGPT) 22 U/L      AST (SGOT) 22 U/L      Comment: Specimen hemolyzed.  Results may be affected.        Alkaline Phosphatase 41 U/L      Total Bilirubin 0.6 mg/dL      eGFR Non African Amer 64 mL/min/1.73      Globulin 3.2 gm/dL      A/G Ratio 1.2 g/dL      BUN/Creatinine Ratio 19.1     Anion Gap 12.0 mmol/L     Narrative:       GFR Normal >60  Chronic Kidney Disease <60  Kidney Failure <15      CBC (No Diff) [[id number]]  (Abnormal) Collected:  01/14/20 0247    Specimen:  Blood Updated:  01/14/20 0334     WBC 16.57 10*3/mm3      RBC 5.68 10*6/mm3      Hemoglobin 17.9 g/dL      Hematocrit 52.8 %      MCV 93.0 fL      MCH 31.5 pg      MCHC 33.9 g/dL      RDW 14.0 %      RDW-SD 47.2 fl      MPV 10.4 fL      Platelets 253 10*3/mm3     Lipid Panel [[id number]] Collected:  01/14/20 0247    Specimen:  Blood Updated:  01/14/20 0334     Total Cholesterol 171 mg/dL      Triglycerides 136 mg/dL      HDL Cholesterol 53 mg/dL      LDL Cholesterol  91 mg/dL      VLDL Cholesterol 27.2 mg/dL      LDL/HDL Ratio 1.71    Narrative:       Cholesterol Reference Ranges  (U[place]  Department of Health and Human Services ATP III Classifications)    Desirable          <200 mg/dL  Borderline High    200-239 mg/dL  High Risk          >240 mg/dL      Triglyceride Reference Ranges  (U.S. Department of Health and Human Services ATP III Classifications)    Normal           <150 mg/dL  Borderline High  150-199 mg/dL  High             200-499 mg/dL  Very High        >500 mg/dL    HDL Reference Ranges  (U.S. Department of Health and Human Services ATP III Classifcations)    Low     <40 mg/dl (major risk factor for CHD)  High    >60 mg/dl ('negative' risk factor for CHD)        LDL Reference Ranges  (U.S. Department of Health and Human Services ATP III Classifcations)    Optimal          <100 mg/dL  Near Optimal     100-129 mg/dL  Borderline High  130-159 mg/dL  High             160-189 mg/dL  Very High        >189 mg/dL    Hemoglobin A1c [[id number]]  (Normal) Collected:  01/14/20 0247    Specimen:  Blood Updated:  01/14/20 0328     Hemoglobin A1C 5.40 %     Narrative:       Hemoglobin A1C Ranges:    Increased Risk for Diabetes  5.7% to 6.4%  Diabetes                     >= 6.5%  Diabetic Goal                < 7.0%    Testosterone, Free, Total [[id number]] Collected:  01/14/20 0247    Specimen:  Blood Updated:  01/14/20 0302    POC Glucose Once [[id number]]  (Abnormal) Collected:  01/13/20 2354    Specimen:  Blood Updated:  01/14/20 0013     Glucose 176 mg/dL      Comment: : [id number] Stephen AnnaMeter ID: [id number]       POC Glucose Once [[id number]]  (Normal) Collected:  01/13/20 1906    Specimen:  Blood Updated:  01/13/20 1928     Glucose 130 mg/dL      Comment: : [id number] Mitchell AnnaMeter ID: [id number]       Urine Drug Screen - Urine, Catheter [[id number]]  (Normal) Collected:  01/13/20 1838    Specimen:  Urine, Catheter Updated:  01/13/20 1856     THC, Screen, Urine Negative     Phencyclidine (PCP), Urine Negative     Cocaine Screen, Urine Negative     Methamphetamine, Ur Negative     Opiate  Screen Negative     Amphetamine Screen, Urine Negative     Benzodiazepine Screen, Urine Negative     Tricyclic Antidepressants Screen Negative     Methadone Screen, Urine Negative     Barbiturates Screen, Urine Negative     Oxycodone Screen, Urine Negative     Propoxyphene Screen Negative     Buprenorphine, Screen, Urine Negative    Narrative:       Cutoff For Drugs Screened:    Amphetamines               500 ng/ml  Barbiturates               200 ng/ml  Benzodiazepines            150 ng/ml  Cocaine                    150 ng/ml  Methadone                  200 ng/ml  Opiates                    100 ng/ml  Phencyclidine               25 ng/ml  THC                            50 ng/ml  Methamphetamine            500 ng/ml  Tricyclic Antidepressants  300 ng/ml  Oxycodone                  100 ng/ml  Propoxyphene               300 ng/ml  Buprenorphine               10 ng/ml    The normal value for all drugs tested is negative. This report includes unconfirmed screening results, with the cutoff values listed, to be used for medical treatment purposes only.  Unconfirmed results must not be used for non-medical purposes such as employment or legal testing.  Clinical consideration should be applied to any drug of abuse test, particularly when unconfirmed results are used.      Urinalysis With Culture If Indicated - Urine, Catheter In/Out [[id number]]  (Abnormal) Collected:  01/13/20 1838    Specimen:  Urine, Catheter In/Out Updated:  01/13/20 1855     Color, UA Dark Yellow     Appearance, UA Clear     pH, UA <=5.0     Specific Gravity, UA >1.030     Glucose, UA Negative     Ketones, UA Negative     Bilirubin, UA Negative     Blood, UA Negative     Protein, UA Negative     Leuk Esterase, UA Trace     Nitrite, UA Negative     Urobilinogen, UA 1.0 E.U./dL    Urinalysis, Microscopic Only - Urine, Catheter In/Out [[id number]]  (Abnormal) Collected:  01/13/20 1838    Specimen:  Urine, Catheter In/Out Updated:  01/13/20 1855     RBC, UA  0-2 /HPF      WBC, UA 6-12 /HPF      Bacteria, UA None Seen /HPF      Squamous Epithelial Cells, UA 0-2 /HPF      Hyaline Casts, UA 0-2 /LPF      Methodology Automated Microscopy    Urine Culture - Urine, Urine, Catheter In/Out [[id number]] Collected:  01/13/20 1838    Specimen:  Urine, Catheter In/Out Updated:  01/13/20 1854    CK [[id number]]  (Normal) Collected:  01/13/20 1502    Specimen:  Blood Updated:  01/13/20 1527     Creatine Kinase 21 U/L     POC Creatinine [[id number]]  (Normal) Collected:  01/13/20 0815    Specimen:  Blood Updated:  01/13/20 1526     Creatinine 1.10 mg/dL      Comment: Serial Number: [id number]:  [id number]       T4, Free [[id number]]  (Abnormal) Collected:  01/13/20 0747    Specimen:  Blood Updated:  01/13/20 1501     Free T4 0.89 ng/dL     Narrative:       Results may be falsely increased if patient taking Biotin.      TSH [[id number]]  (Abnormal) Collected:  01/13/20 0747    Specimen:  Blood Updated:  01/13/20 1128     TSH 94.580 uIU/mL     Ammonia [[id number]]  (Normal) Collected:  01/13/20 0843    Specimen:  Blood Updated:  01/13/20 0935     Ammonia 31 umol/L     Blood Culture - Blood, Arm, Right [[id number]] Collected:  01/13/20 0824    Specimen:  Blood from Arm, Right Updated:  01/13/20 0934    Influenza Antigen, Rapid - Swab, Nasopharynx [[id number]]  (Abnormal) Collected:  01/13/20 0842    Specimen:  Swab from Nasopharynx Updated:  01/13/20 0915     Influenza A Ag, EIA Positive     Influenza B Ag, EIA Negative    Narrative:       Recommend confirmation of negative results by viral culture or molecular assay.    Clarissa Draw [[id number]] Collected:  01/13/20 0747    Specimen:  Blood Updated:  01/13/20 0904    Narrative:       The following orders were created for panel order Clarissa Draw.  Procedure                               Abnormality         Status                     ---------                               -----------         ------                     Light Blue Top[[id number]]                                    Final result               Green Top (Gel)[[id number]]                                  Final result               Lavender Top[[id number]]                                     Final result               Red Top[[id number]]                                          Final result               [facility]...[[id number]]                      Final result                 Please view results for these tests on the individual orders.    Light Blue Top [[id number]] Collected:  01/13/20 0747    Specimen:  Blood Updated:  01/13/20 0904     Extra Tube hold for add-on     Comment: Auto resulted       Green Top (Gel) [[id number]] Collected:  01/13/20 0747    Specimen:  Blood Updated:  01/13/20 0904     Extra Tube Hold for add-ons.     Comment: Auto resulted.       Lavender Top [[id number]] Collected:  01/13/20 0747    Specimen:  Blood Updated:  01/13/20 0904     Extra Tube hold for add-on     Comment: Auto resulted       Red Top [[id number]] Collected:  01/13/20 0747    Specimen:  Blood Updated:  01/13/20 0904     Extra Tube Hold for add-ons.     Comment: Auto resulted.       [facility] Bottle Set [[id number]] Collected:  01/13/20 0747    Specimen:  Blood from Arm, Right Updated:  01/13/20 0904     Extra Tube Hold for add-ons.     Comment: Auto resulted.       Lactic Acid, Plasma [[id number]]  (Normal) Collected:  01/13/20 0747    Specimen:  Blood from Arm, Right Updated:  01/13/20 0835     Lactate 1.8 mmol/L     Blood Gas, Arterial [[id number]]  (Abnormal) Collected:  01/13/20 0825    Specimen:  Arterial Blood Updated:  01/13/20 0831     Site Right Radial     Luke's Test N/A     pH, Arterial 7.504 pH units      Comment: 83 Value above reference range        pCO2, Arterial 25.9 mm Hg      Comment: 84 Value below reference range        pO2, Arterial 105.0 mm Hg      HCO3, Arterial 20.3 mmol/L      Base Excess, Arterial -0.7 mmol/L      Comment: 84 Value below reference range        O2  Saturation, Arterial 99.1 %      Comment: 83 Value above reference range        Temperature 37.0 C      Barometric Pressure for Blood Gas 760 mmHg      Modality Room Air     Ventilator Mode NA     Collected by NAI     Comment: Meter: [id number]     :  [id number]       CBC & Differential [[id number]] Collected:  01/13/20 0747    Specimen:  Blood Updated:  01/13/20 0828    Narrative:       The following orders were created for panel order CBC & Differential.  Procedure                               Abnormality         Status                     ---------                               -----------         ------                     CBC Auto Differential[[id number]]        Abnormal            Final result                 Please view results for these tests on the individual orders.    CBC Auto Differential [[id number]]  (Abnormal) Collected:  01/13/20 0747    Specimen:  Blood Updated:  01/13/20 0828     WBC 10.87 10*3/mm3      RBC 6.07 10*6/mm3      Hemoglobin 19.3 g/dL      Hematocrit 55.4 %      MCV 91.3 fL      MCH 31.8 pg      MCHC 34.8 g/dL      RDW 14.2 %      RDW-SD 46.3 fl      MPV 9.9 fL      Platelets 280 10*3/mm3      Neutrophil % 54.1 %      Lymphocyte % 32.4 %      Monocyte % 10.3 %      Eosinophil % 2.1 %      Basophil % 0.6 %      Immature Grans % 0.5 %      Neutrophils, Absolute 5.89 10*3/mm3      Lymphocytes, Absolute 3.52 10*3/mm3      Monocytes, Absolute 1.12 10*3/mm3      Eosinophils, Absolute 0.23 10*3/mm3      Basophils, Absolute 0.06 10*3/mm3      Immature Grans, Absolute 0.05 10*3/mm3      nRBC 0.0 /100 WBC     Blood Culture - Blood, Arm, Right [[id number]] Collected:  01/13/20 0747    Specimen:  Blood from Arm, Right Updated:  01/13/20 0827    Comprehensive Metabolic Panel [[id number]]  (Abnormal) Collected:  01/13/20 0747    Specimen:  Blood Updated:  01/13/20 0817     Glucose 130 mg/dL      BUN 15 mg/dL      Creatinine 1.03 mg/dL      Sodium 138 mmol/L      Potassium 3.7 mmol/L       Chloride 102 mmol/L      CO2 25.0 mmol/L      Calcium 9.7 mg/dL      Total Protein 8.1 g/dL      Albumin 4.20 g/dL      ALT (SGPT) 24 U/L      AST (SGOT) 24 U/L      Alkaline Phosphatase 48 U/L      Total Bilirubin 0.8 mg/dL      eGFR Non African Amer 73 mL/min/1.73      Globulin 3.9 gm/dL      A/G Ratio 1.1 g/dL      BUN/Creatinine Ratio 14.6     Anion Gap 11.0 mmol/L     Narrative:       GFR Normal >60  Chronic Kidney Disease <60  Kidney Failure <15      Lipase [[id number]]  (Abnormal) Collected:  01/13/20 0747    Specimen:  Blood Updated:  01/13/20 0817     Lipase 145 U/L     Amylase [[id number]]  (Abnormal) Collected:  01/13/20 0747    Specimen:  Blood Updated:  01/13/20 0817     Amylase 126 U/L     Troponin [[id number]]  (Normal) Collected:  01/13/20 0747    Specimen:  Blood Updated:  01/13/20 0817     Troponin T <0.010 ng/mL     Narrative:       Troponin T Reference Range:  <= 0.03 ng/mL-   Negative for AMI  >0.03 ng/mL-     Abnormal for myocardial necrosis.  Clinicians would have to utilize clinical acumen, EKG, Troponin and serial changes to determine if it is an Acute Myocardial Infarction or myocardial injury due to an underlying chronic condition.       Results may be falsely decreased if patient taking Biotin.      Ethanol [[id number]] Collected:  01/13/20 0747    Specimen:  Blood Updated:  01/13/20 0817     Ethanol % <0.010 %     Narrative:       Not for legal purposes. Chain of Custody not followed.     Protime-INR [[id number]]  (Abnormal) Collected:  01/13/20 0747    Specimen:  Blood Updated:  01/13/20 0809     Protime 12.3 Seconds      INR 0.89          Mitchel Dahl MD

## 2020-01-14 NOTE — PROGRESS NOTES
Neurology Progress Note      Date of admission: 1/13/2020  7:38 AM  Date of visit: 1/14/2020    Chief Complaint:  F/u stroke    Subjective     Subjective:    Patient was not seen for 4 days prior to admission and was normal 4 days prior to admission but patient cannot state when the onset of his symptoms were and when questioned he is not certain when he was last normal.However MRI shows some of the stroke on DWI , dark on ADC map also on FLAIR.    Medications:  Current Facility-Administered Medications   Medication Dose Route Frequency Provider Last Rate Last Dose   • acetaminophen (TYLENOL) tablet 650 mg  650 mg Oral Q6H PRN Hira Montenegro MD   650 mg at 01/13/20 1738   • aspirin chewable tablet 81 mg  81 mg Oral Daily Hira Montenegro MD   81 mg at 01/13/20 1738    Or   • aspirin suppository 300 mg  300 mg Rectal Daily Hira Montenegro MD       • atorvastatin (LIPITOR) tablet 80 mg  80 mg Oral Nightly Hira Montenegro MD   80 mg at 01/13/20 2015   • ipratropium-albuterol (DUO-NEB) nebulizer solution 3 mL  3 mL Nebulization Q4H - RT Hira Montenegro MD       • levothyroxine (SYNTHROID, LEVOTHROID) tablet 125 mcg  125 mcg Oral Q AM Hira Montenegro MD   125 mcg at 01/14/20 0547   • methylPREDNISolone sodium succinate (SOLU-Medrol) injection 60 mg  60 mg Intravenous Once Hira Montenegro MD       • ondansetron (ZOFRAN) tablet 4 mg  4 mg Oral Q6H PRN Hira Montenegro MD        Or   • ondansetron (ZOFRAN) injection 4 mg  4 mg Intravenous Q6H PRN Hira Montenegro MD       • oseltamivir (TAMIFLU) capsule 75 mg  75 mg Oral Q12H Hira Montenegro MD   75 mg at 01/13/20 2015   • sodium chloride 0.9 % flush 10 mL  10 mL Intravenous PRN Hira Montenegro MD       • sodium chloride 0.9 % flush 10 mL  10 mL Intravenous Q12H Hira Montenegro MD   10 mL at 01/13/20 2016   • sodium chloride 0.9 % flush 10 mL  10 mL Intravenous PRN Hira Montenegro MD           Review of Systems:   -A 14  point review of systems is completed and is negative except for difficulty to stand and walk    Objective     Objective      Vital Signs  Temp:  [97.2 °F (36.2 °C)-97.8 °F (36.6 °C)] 97.3 °F (36.3 °C)  Heart Rate:  [43-85] 47  Resp:  [16-20] 20  BP: (106-168)/() 123/93    Physical Exam:    HEENT:  Neck supple  CVS:  Regular rate and rhythm.  No murmurs  Carotid Examination:  No bruits  Lungs:  Clear to auscultation  Abdomen:  Non-tender, Non-distended  Extremities:  No signs of peripheral edema    Neurologic Exam:    -Awake, Alert, Oriented   -No word finding difficulties  -No aphasia  -Some dysarthria  -Follows simple and complex commands    Cranial nerves II through XII intact.  · Pupils react  · EOMI  · No facial motor asymmetry  Motor: (strength out of 5:  1= minimal movement, 2 = movement in plane of gravity, 3 = movement against gravity, 4 = movement against some resistance, 5 = full strength)    -Right Upper Ext: Proximal: 5 Distal: 5  -Left Upper Ext: Proximal: 5 Distal: 5    -Right Lower Ext: Proximal: 5 Distal: 5  -Left Lower Ext: Proximal: 5 Distal: 5    DTR:  2+ throughout in all four extremities    Sensory:  -Intact to light touch,     Coordination/Gait:  -Finger to nose improved on the right and normal on the left   Heel to shin normal bilaterally   When he stands he is off balanced broad based.      Results Review:    I reviewed the patient's new clinical results.    Lab Results (last 24 hours)     Procedure Component Value Units Date/Time    Blood Culture - Blood, Arm, Right [[id number]] Collected:  01/13/20 0824    Specimen:  Blood from Arm, Right Updated:  01/14/20 0943     Blood Culture No growth at 24 hours    Blood Culture - Blood, Arm, Right [[id number]] Collected:  01/13/20 0747    Specimen:  Blood from Arm, Right Updated:  01/14/20 0830     Blood Culture No growth at 24 hours    POC Glucose Once [[id number]]  (Normal) Collected:  01/14/20 0551    Specimen:  Blood Updated:  01/14/20 0602      Glucose 111 mg/dL      Comment: : [id number] Mitchell AnnaMeter ID: [id number]       Comprehensive Metabolic Panel [[id number]]  (Abnormal) Collected:  01/14/20 0247    Specimen:  Blood Updated:  01/14/20 0336     Glucose 131 mg/dL      BUN 22 mg/dL      Creatinine 1.15 mg/dL      Sodium 142 mmol/L      Potassium 4.0 mmol/L      Chloride 104 mmol/L      CO2 26.0 mmol/L      Calcium 9.6 mg/dL      Total Protein 7.1 g/dL      Albumin 3.90 g/dL      ALT (SGPT) 22 U/L      AST (SGOT) 22 U/L      Comment: Specimen hemolyzed.  Results may be affected.        Alkaline Phosphatase 41 U/L      Total Bilirubin 0.6 mg/dL      eGFR Non African Amer 64 mL/min/1.73      Globulin 3.2 gm/dL      A/G Ratio 1.2 g/dL      BUN/Creatinine Ratio 19.1     Anion Gap 12.0 mmol/L     Narrative:       GFR Normal >60  Chronic Kidney Disease <60  Kidney Failure <15      CBC (No Diff) [[id number]]  (Abnormal) Collected:  01/14/20 0247    Specimen:  Blood Updated:  01/14/20 0334     WBC 16.57 10*3/mm3      RBC 5.68 10*6/mm3      Hemoglobin 17.9 g/dL      Hematocrit 52.8 %      MCV 93.0 fL      MCH 31.5 pg      MCHC 33.9 g/dL      RDW 14.0 %      RDW-SD 47.2 fl      MPV 10.4 fL      Platelets 253 10*3/mm3     Lipid Panel [[id number]] Collected:  01/14/20 0247    Specimen:  Blood Updated:  01/14/20 0334     Total Cholesterol 171 mg/dL      Triglycerides 136 mg/dL      HDL Cholesterol 53 mg/dL      LDL Cholesterol  91 mg/dL      VLDL Cholesterol 27.2 mg/dL      LDL/HDL Ratio 1.71    Narrative:       Cholesterol Reference Ranges  (U.S. Department of Health and Human Services ATP III Classifications)    Desirable          <200 mg/dL  Borderline High    200-239 mg/dL  High Risk          >240 mg/dL      Triglyceride Reference Ranges  (U.S. Department of Health and Human Services ATP III Classifications)    Normal           <150 mg/dL  Borderline High  150-199 mg/dL  High             200-499 mg/dL  Very High        >500 mg/dL    HDL Reference  Ranges  (U.S. Department of Health and Human Services ATP III Classifcations)    Low     <40 mg/dl (major risk factor for CHD)  High    >60 mg/dl ('negative' risk factor for CHD)        LDL Reference Ranges  (U.S. Department of Health and Human Services ATP III Classifcations)    Optimal          <100 mg/dL  Near Optimal     100-129 mg/dL  Borderline High  130-159 mg/dL  High             160-189 mg/dL  Very High        >189 mg/dL    Hemoglobin A1c [[id number]]  (Normal) Collected:  01/14/20 0247    Specimen:  Blood Updated:  01/14/20 0328     Hemoglobin A1C 5.40 %     Narrative:       Hemoglobin A1C Ranges:    Increased Risk for Diabetes  5.7% to 6.4%  Diabetes                     >= 6.5%  Diabetic Goal                < 7.0%    Testosterone, Free, Total [[id number]] Collected:  01/14/20 0247    Specimen:  Blood Updated:  01/14/20 0302    POC Glucose Once [[id number]]  (Abnormal) Collected:  01/13/20 2354    Specimen:  Blood Updated:  01/14/20 0013     Glucose 176 mg/dL      Comment: : [id number] Mitchell AnnaMeter ID: [id number]       POC Glucose Once [[id number]]  (Normal) Collected:  01/13/20 1906    Specimen:  Blood Updated:  01/13/20 1928     Glucose 130 mg/dL      Comment: : [id number] Stephen AnnaMeter ID: [id number]       Urine Drug Screen - Urine, Catheter [[id number]]  (Normal) Collected:  01/13/20 1838    Specimen:  Urine, Catheter Updated:  01/13/20 1856     THC, Screen, Urine Negative     Phencyclidine (PCP), Urine Negative     Cocaine Screen, Urine Negative     Methamphetamine, Ur Negative     Opiate Screen Negative     Amphetamine Screen, Urine Negative     Benzodiazepine Screen, Urine Negative     Tricyclic Antidepressants Screen Negative     Methadone Screen, Urine Negative     Barbiturates Screen, Urine Negative     Oxycodone Screen, Urine Negative     Propoxyphene Screen Negative     Buprenorphine, Screen, Urine Negative    Narrative:       Cutoff For Drugs Screened:    Amphetamines                500 ng/ml  Barbiturates               200 ng/ml  Benzodiazepines            150 ng/ml  Cocaine                    150 ng/ml  Methadone                  200 ng/ml  Opiates                    100 ng/ml  Phencyclidine               25 ng/ml  THC                            50 ng/ml  Methamphetamine            500 ng/ml  Tricyclic Antidepressants  300 ng/ml  Oxycodone                  100 ng/ml  Propoxyphene               300 ng/ml  Buprenorphine               10 ng/ml    The normal value for all drugs tested is negative. This report includes unconfirmed screening results, with the cutoff values listed, to be used for medical treatment purposes only.  Unconfirmed results must not be used for non-medical purposes such as employment or legal testing.  Clinical consideration should be applied to any drug of abuse test, particularly when unconfirmed results are used.      Urinalysis With Culture If Indicated - Urine, Catheter In/Out [[id number]]  (Abnormal) Collected:  01/13/20 1838    Specimen:  Urine, Catheter In/Out Updated:  01/13/20 1855     Color, UA Dark Yellow     Appearance, UA Clear     pH, UA <=5.0     Specific Gravity, UA >1.030     Glucose, UA Negative     Ketones, UA Negative     Bilirubin, UA Negative     Blood, UA Negative     Protein, UA Negative     Leuk Esterase, UA Trace     Nitrite, UA Negative     Urobilinogen, UA 1.0 E.U./dL    Urinalysis, Microscopic Only - Urine, Catheter In/Out [[id number]]  (Abnormal) Collected:  01/13/20 1838    Specimen:  Urine, Catheter In/Out Updated:  01/13/20 1855     RBC, UA 0-2 /HPF      WBC, UA 6-12 /HPF      Bacteria, UA None Seen /HPF      Squamous Epithelial Cells, UA 0-2 /HPF      Hyaline Casts, UA 0-2 /LPF      Methodology Automated Microscopy    Urine Culture - Urine, Urine, Catheter In/Out [[id number]] Collected:  01/13/20 1838    Specimen:  Urine, Catheter In/Out Updated:  01/13/20 1854    CK [[id number]]  (Normal) Collected:  01/13/20 1502    Specimen:  Blood Updated:   01/13/20 1527     Creatine Kinase 21 U/L     POC Creatinine [[id number]]  (Normal) Collected:  01/13/20 0815    Specimen:  Blood Updated:  01/13/20 1526     Creatinine 1.10 mg/dL      Comment: Serial Number: [id number]:  [id number]       T4, Free [[id number]]  (Abnormal) Collected:  01/13/20 0747    Specimen:  Blood Updated:  01/13/20 1501     Free T4 0.89 ng/dL     Narrative:       Results may be falsely increased if patient taking Biotin.      TSH [[id number]]  (Abnormal) Collected:  01/13/20 0747    Specimen:  Blood Updated:  01/13/20 1128     TSH 94.580 uIU/mL         Imaging Results (Last 24 Hours)     Procedure Component Value Units Date/Time    CT Angiogram Head [[id number]] Collected:  01/13/20 1934     Updated:  01/13/20 1956    Narrative:       CT angiography with 3D MIP images head with IV contrast  CT angiography with 3D MIP images neck with IV contrast     Indication: Stroke     Comparison: Brain MR 01/13/2020     DOSE LENGTH PRODUCT: 449 mGy cm. Automated exposure control was also  utilized to decrease patient radiation dose.     Findings:     Three-vessel aortic arch with widely patent origins. Mild  atherosclerotic narrowing of the left proximal subclavian artery. Right  subclavian artery is widely patent. The right vertebral artery is  completely occluded proximally with some reconstitution along the mid to  distal course. The left vertebral artery is widely patent. Both common  carotid arteries are widely patent. Both extracranial internal carotid  arteries are widely patent.     Both intracranial internal carotid arteries are widely patent. Both MCAs  are widely patent. Both ACAs are widely patent. Basilar artery and tip  are unremarkable. Both PCAs are widely patent. Both superior cerebellar  arteries are widely patent. No dural venous sinus filling defect.     Soft tissues: No abnormal intracranial enhancement. Orbits appear  unremarkable. Parapharyngeal fat planes are maintained. Parotid  and  submandibular glands are unremarkable. No focal asymmetry of the  aerodigestive tract. Mastoid air cells are clear. Mild maxillary sinus  mucosal thickening. No enlarged cervical lymph nodes. Emphysema in the  lung apices. No acute osseous findings.       Impression:          Complete occlusion of the right vertebral artery with reconstitution at  C1. Basilar artery and tip appear unremarkable. Both PCAs and superior  cerebellar arteries are widely patent.  This report was finalized on 01/13/2020 19:53 by Dr. Charli Dowell MD.    CT Angiogram Neck [[id number]] Collected:  01/13/20 1934     Updated:  01/13/20 1956    Narrative:       CT angiography with 3D MIP images head with IV contrast  CT angiography with 3D MIP images neck with IV contrast     Indication: Stroke     Comparison: Brain MR 01/13/2020     DOSE LENGTH PRODUCT: 449 mGy cm. Automated exposure control was also  utilized to decrease patient radiation dose.     Findings:     Three-vessel aortic arch with widely patent origins. Mild  atherosclerotic narrowing of the left proximal subclavian artery. Right  subclavian artery is widely patent. The right vertebral artery is  completely occluded proximally with some reconstitution along the mid to  distal course. The left vertebral artery is widely patent. Both common  carotid arteries are widely patent. Both extracranial internal carotid  arteries are widely patent.     Both intracranial internal carotid arteries are widely patent. Both MCAs  are widely patent. Both ACAs are widely patent. Basilar artery and tip  are unremarkable. Both PCAs are widely patent. Both superior cerebellar  arteries are widely patent. No dural venous sinus filling defect.     Soft tissues: No abnormal intracranial enhancement. Orbits appear  unremarkable. Parapharyngeal fat planes are maintained. Parotid and  submandibular glands are unremarkable. No focal asymmetry of the  aerodigestive tract. Mastoid air cells are clear. Mild  maxillary sinus  mucosal thickening. No enlarged cervical lymph nodes. Emphysema in the  lung apices. No acute osseous findings.       Impression:          Complete occlusion of the right vertebral artery with reconstitution at  C1. Basilar artery and tip appear unremarkable. Both PCAs and superior  cerebellar arteries are widely patent.  This report was finalized on 01/13/2020 19:53 by Dr. Charli Dowell MD.    MRI Brain Without Contrast [[id number]] Collected:  01/13/20 1203     Updated:  01/13/20 1221    Narrative:       EXAM: MR BRAIN WITHOUT IV CONTRAST 01/13/2020     COMPARISON: Head CT dated earlier today      HISTORY: 64 years-old Male. Altered mental status      TECHNIQUE:   Routine pulse sequences of the brain were obtained without IV contrast.      REPORT:     There is diffusion restriction within the right inferior cerebellar  hemisphere and posterior brachium pontis, with associated T2/FLAIR  abnormal signal, consistent with acute right PICA infarct. There is a 9  mm focal area of blooming susceptibility artifact within the area of  infarct, consistent with blood products. An additional area of 2.4 x 1.6  cm of the mesentery artifact within the right cerebellar folia, also  reflecting blood products. There is a trace left subdural hemorrhage.     No evidence of acute intracranial hemorrhage at this time. Mass effect  is local without evidence of acute hydrocephalus. No suspicious  extra-axial fluid collection.     Chronic microvascular changes.          Impression:       1.  Large area of acute infarct in the right PICA territory.  2.  Associated blood products in the infarcted brain parenchyma.  3.  Trace left cerebral convexity subdural hemorrhage  This report was finalized on 01/13/2020 12:18 by Dr. Karey Ennis MD.        Results for orders placed during the hospital encounter of 01/13/20   Adult Transthoracic Echo Complete W/ Cont if Necessary Per Protocol (With Agitated Saline)    Narrative ·  Left ventricular systolic function is normal. Estimated EF = 65%.  · Normal right ventricular cavity size and systolic function noted.  · There is no significant (greater than mild) valvular dysfunction.          Assessment/Plan     Hospital Problem List      Chronic obstructive pulmonary disease (CMS/HCC)    CVA (cerebral vascular accident) (CMS/HCC)    Influenza A    Tobacco dependence    Acute encephalopathy    Hypothyroidism, non-compliant with medication     Medical non-compliance    Bradycardia    Impression:  1. Acute right PICA stroke     2. Hemorrhage within stroke   3. LDL of 91 and goal of < 70    .   Plan:  Concern for herniation is less given the significant improvement in exam but dating of stroke seems still to be recent   Repeat Head Ct in AM  Monitor for edema and blood products in stroke  Continue aspirin   Continue Lipitor      Janelle Ramos MD  01/14/20  9:55 AM

## 2020-01-14 NOTE — PLAN OF CARE
Problem: Patient Care Overview  Goal: Plan of Care Review  Flowsheets  Taken 1/14/2020 0849  Plan of Care Reviewed With: patient  Taken 1/14/2020 0800  Outcome Summary: OT ran completed. Pt is A&Ox4. Demo's decreased balance, endurance, postural control, and coordination. CGA for bed mobility. Independently able to don socks, but demo'd decreased R GM coordination with task. FM and GM coordination impairments in R UE.  Min A for sit <> stand t/f from EOB. Min/Mod A for static standing balance d/t R sided lateral lean. Pt requires significant assist during mobility d/t R lateral lean, which he is able to recognize but unable to correct. Pt would benefit from skilled OT serivces to address these deficits. Recommend d/c to acute IP rehab upon d/c from facility.

## 2020-01-15 ENCOUNTER — APPOINTMENT (OUTPATIENT)
Dept: CT IMAGING | Facility: HOSPITAL | Age: 65
End: 2020-01-15

## 2020-01-15 LAB
BACTERIA SPEC AEROBE CULT: NORMAL
GLUCOSE BLDC GLUCOMTR-MCNC: 91 MG/DL (ref 70–130)
SODIUM BLD-SCNC: 142 MMOL/L (ref 136–145)
WHOLE BLOOD HOLD SPECIMEN: NORMAL

## 2020-01-15 PROCEDURE — 84295 ASSAY OF SERUM SODIUM: CPT | Performed by: NURSE PRACTITIONER

## 2020-01-15 PROCEDURE — 97110 THERAPEUTIC EXERCISES: CPT

## 2020-01-15 PROCEDURE — 92526 ORAL FUNCTION THERAPY: CPT | Performed by: SPEECH-LANGUAGE PATHOLOGIST

## 2020-01-15 PROCEDURE — 99231 SBSQ HOSP IP/OBS SF/LOW 25: CPT | Performed by: NURSE PRACTITIONER

## 2020-01-15 PROCEDURE — 97535 SELF CARE MNGMENT TRAINING: CPT | Performed by: OCCUPATIONAL THERAPIST

## 2020-01-15 PROCEDURE — 70450 CT HEAD/BRAIN W/O DYE: CPT

## 2020-01-15 PROCEDURE — 94799 UNLISTED PULMONARY SVC/PX: CPT

## 2020-01-15 PROCEDURE — 99291 CRITICAL CARE FIRST HOUR: CPT | Performed by: PSYCHIATRY & NEUROLOGY

## 2020-01-15 PROCEDURE — 82962 GLUCOSE BLOOD TEST: CPT

## 2020-01-15 PROCEDURE — 97116 GAIT TRAINING THERAPY: CPT

## 2020-01-15 PROCEDURE — 97530 THERAPEUTIC ACTIVITIES: CPT | Performed by: OCCUPATIONAL THERAPIST

## 2020-01-15 RX ORDER — LISINOPRIL 10 MG/1
10 TABLET ORAL
Status: DISCONTINUED | OUTPATIENT
Start: 2020-01-15 | End: 2020-01-16

## 2020-01-15 RX ORDER — IPRATROPIUM BROMIDE AND ALBUTEROL SULFATE 2.5; .5 MG/3ML; MG/3ML
3 SOLUTION RESPIRATORY (INHALATION)
Status: DISCONTINUED | OUTPATIENT
Start: 2020-01-16 | End: 2020-01-16

## 2020-01-15 RX ADMIN — ATORVASTATIN CALCIUM 80 MG: 40 TABLET, FILM COATED ORAL at 20:41

## 2020-01-15 RX ADMIN — SODIUM CHLORIDE, PRESERVATIVE FREE 10 ML: 5 INJECTION INTRAVENOUS at 20:42

## 2020-01-15 RX ADMIN — IPRATROPIUM BROMIDE AND ALBUTEROL SULFATE 3 ML: 2.5; .5 SOLUTION RESPIRATORY (INHALATION) at 11:00

## 2020-01-15 RX ADMIN — ASPIRIN 81 MG: 81 TABLET, CHEWABLE ORAL at 11:51

## 2020-01-15 RX ADMIN — IPRATROPIUM BROMIDE AND ALBUTEROL SULFATE 3 ML: 2.5; .5 SOLUTION RESPIRATORY (INHALATION) at 14:43

## 2020-01-15 RX ADMIN — LEVOTHYROXINE SODIUM 125 MCG: 125 TABLET ORAL at 05:46

## 2020-01-15 RX ADMIN — OSELTAMIVIR PHOSPHATE 75 MG: 75 CAPSULE ORAL at 11:51

## 2020-01-15 RX ADMIN — OSELTAMIVIR PHOSPHATE 75 MG: 75 CAPSULE ORAL at 20:41

## 2020-01-15 RX ADMIN — SODIUM CHLORIDE, PRESERVATIVE FREE 10 ML: 5 INJECTION INTRAVENOUS at 11:51

## 2020-01-15 RX ADMIN — LISINOPRIL 10 MG: 10 TABLET ORAL at 17:02

## 2020-01-15 RX ADMIN — IPRATROPIUM BROMIDE AND ALBUTEROL SULFATE 3 ML: 2.5; .5 SOLUTION RESPIRATORY (INHALATION) at 07:12

## 2020-01-15 RX ADMIN — IPRATROPIUM BROMIDE AND ALBUTEROL SULFATE 3 ML: 2.5; .5 SOLUTION RESPIRATORY (INHALATION) at 20:20

## 2020-01-15 NOTE — PLAN OF CARE
Problem: Patient Care Overview  Goal: Plan of Care Review  Flowsheets  Taken 1/15/2020 1217  Plan of Care Reviewed With: patient  Taken 1/15/2020 1120  Outcome Summary: OT tx completed. Pt demo's increased static and dynamic sitting/standing balance this date. Complete GM coordination task in standing with verbal cues to slow down movements for increased accurcy. Grooming and toileting tasks completed independently. Bathing completed with set-up only and Max A for washing back. Pt would continue to benefit from skilled OT services to address coordination and balance deficits which impact his ability to complete adls.

## 2020-01-15 NOTE — PLAN OF CARE
Pt doing well throughout pm shift. 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

## 2020-01-15 NOTE — PLAN OF CARE
Problem: Patient Care Overview  Goal: Plan of Care Review  Flowsheets (Taken 1/15/2020 0921)  Progress: improving  Plan of Care Reviewed With: patient  Note:   Pt continues to improve strength and endurance, as demonstrated during ambulation this date. He is determined to walk independently, stating that he has been doing his exercises throughout the day so that he can walk again. He continues to have R sided numbness, with R sided pain increasing to 7/10 post-exercise from 0/10 pre-exercise. Additionally, the pt continues to require several rest breaks during ambulation and leans to the right, often relying on therapist for support; however, leaning has improved since last session. Furthermore, he is able to properly WS to the L with verbal cues which significantly improves his gait pattern. Continued skilled therapeutic intervention is indicated to continue improving strength and endurance to facilitate independent and safe ambulation.

## 2020-01-15 NOTE — PROGRESS NOTES
Neurology Progress Note      Date of admission: 1/13/2020  7:38 AM  Date of visit: 1/15/2020    Chief Complaint:  F/u stroke     Subjective     Subjective:    Patient continues to do well but c/o headache Coordination is a little better to being the same he is awake and alert  Medications:  Current Facility-Administered Medications   Medication Dose Route Frequency Provider Last Rate Last Dose   • acetaminophen (TYLENOL) tablet 650 mg  650 mg Oral Q6H PRN Hira Montenegro MD   650 mg at 01/14/20 2035   • aspirin chewable tablet 81 mg  81 mg Oral Daily Hira Montenegro MD   81 mg at 01/14/20 1024    Or   • aspirin suppository 300 mg  300 mg Rectal Daily Hira Montenegro MD       • atorvastatin (LIPITOR) tablet 80 mg  80 mg Oral Nightly Hira Montenegro MD   80 mg at 01/14/20 2032   • ipratropium-albuterol (DUO-NEB) nebulizer solution 3 mL  3 mL Nebulization Q4H - RT Hira Montenegro MD   3 mL at 01/15/20 0712   • levothyroxine (SYNTHROID, LEVOTHROID) tablet 125 mcg  125 mcg Oral Q AM Hira Montenegro MD   125 mcg at 01/15/20 0546   • ondansetron (ZOFRAN) tablet 4 mg  4 mg Oral Q6H PRN Hira Montenegro MD        Or   • ondansetron (ZOFRAN) injection 4 mg  4 mg Intravenous Q6H PRN Hira Montenegro MD   4 mg at 01/14/20 1024   • oseltamivir (TAMIFLU) capsule 75 mg  75 mg Oral Q12H Hira Montenegro MD   75 mg at 01/14/20 2032   • sodium chloride 0.9 % flush 10 mL  10 mL Intravenous PRN Hira Montenegro MD       • sodium chloride 0.9 % flush 10 mL  10 mL Intravenous Q12H Hira Montenegro MD   10 mL at 01/14/20 2033   • sodium chloride 0.9 % flush 10 mL  10 mL Intravenous PRN Hira Montenegro MD           Review of Systems:   -A 14 point review of systems is completed and is negative except for headache    Objective     Objective      Vital Signs  Temp:  [97 °F (36.1 °C)-97.9 °F (36.6 °C)] 97.1 °F (36.2 °C)  Heart Rate:  [44-81] 44  Resp:  [14-20] 16  BP: (113-172)/()  172/112    Physical Exam:    HEENT:  Neck supple  CVS:  Regular rate and rhythm.  No murmurs  Carotid Examination:  No bruits  Lungs:  Clear to auscultation  Abdomen:  Non-tender, Non-distended  Extremities:  No signs of peripheral edema    Neurologic Exam:    -Awake, Alert, Some cognitive slowing--unclear if this is his baseline  -No word finding difficulties  -No aphasia  -No dysarthria  -Follows simple and complex commands    Cranial nerves II through XII intact.  EOMI  No facial weakness    Motor: (strength out of 5:  1= minimal movement, 2 = movement in plane of gravity, 3 = movement against gravity, 4 = movement against some resistance, 5 = full strength)    -Right Upper Ext: Proximal: 5 Distal: 5  -Left Upper Ext: Proximal: 5 Distal: 5    -Right Lower Ext: Proximal: 5 Distal: 5  -Left Lower Ext: Proximal: 5 Distal: 5    DTR:  2+ throughout in all four extremities    Sensory:  -Intact to light touch, pinprick, temperature, pain, and proprioception    Coordination/Gait:  -Finger nose is dysmetric on the right and normal on the left and fine finger movement is slow and incoordinated on the right.  Heel to shin has some ataxia on right and is normal on the left  Results Review:    I reviewed the patient's new clinical results.    Lab Results (last 24 hours)     Procedure Component Value Units Date/Time    Blood Culture - Blood, Arm, Right [[id number]] Collected:  01/13/20 0824    Specimen:  Blood from Arm, Right Updated:  01/15/20 0959     Blood Culture No growth at 2 days    Extra Tubes [[id number]] Collected:  01/15/20 0810    Specimen:  Blood, Venous Line Updated:  01/15/20 0911    Narrative:       The following orders were created for panel order Extra Tubes.  Procedure                               Abnormality         Status                     ---------                               -----------         ------                     Lavender Top[[id number]]                                     In process                    Please view results for these tests on the individual orders.    Lavender Top [[id number]] Collected:  01/15/20 0810    Specimen:  Blood Updated:  01/15/20 0911    Sodium [[id number]]  (Normal) Collected:  01/15/20 0809    Specimen:  Blood Updated:  01/15/20 0836     Sodium 142 mmol/L     Blood Culture - Blood, Arm, Right [[id number]] Collected:  01/13/20 0747    Specimen:  Blood from Arm, Right Updated:  01/15/20 0831     Blood Culture No growth at 2 days    POC Glucose Once [[id number]]  (Normal) Collected:  01/15/20 0516    Specimen:  Blood Updated:  01/15/20 0528     Glucose 91 mg/dL      Comment: : [id number] [place]  KMeter ID: [id number]       POC Glucose Once [[id number]]  (Abnormal) Collected:  01/14/20 2317    Specimen:  Blood Updated:  01/14/20 2328     Glucose 161 mg/dL      Comment: : [id number] [place]  KMeter ID: [id number]       POC Glucose Once [[id number]]  (Normal) Collected:  01/14/20 1715    Specimen:  Blood Updated:  01/14/20 1726     Glucose 124 mg/dL      Comment: : JACKIE ButleriaMeter ID: [id number]       Urine Culture - Urine, Urine, Catheter In/Out [[id number]]  (Normal) Collected:  01/13/20 1838    Specimen:  Urine, Catheter In/Out Updated:  01/14/20 1233     Urine Culture No growth        Imaging Results (Last 24 Hours)     Procedure Component Value Units Date/Time    CT Head Without Contrast [[id number]] Collected:  01/15/20 0734     Updated:  01/15/20 0742    Narrative:       EXAMINATION: CT HEAD WO CONTRAST- 1/15/2020 7:34 AM CST     HISTORY: Altered mental status, follow-up stroke     COMPARISON: CT head without contrast 01/13/2020, MRI brain without  contrast 01/13/2020     DOSE: 609 mGy-cm     TECHNIQUE: Sequential imaging was performed from the vertex through the  base of the skull without the use of IV contrast.  Sagittal and coronal  reformations were made from the original source data and reviewed.  Automated exposure control was also  utilized to decrease patient  radiation dose.     FINDINGS:   An evolving hypodense areas noted in the right cerebellar hemisphere  compatible with known PICA infarct. There is no evidence of hemorrhagic  transformation. Trace left convexity subdural hemorrhage measures  approximately 2 mm in thickness. There is some associated edema in the  right cerebellar hemisphere associated with the area of evolving  ischemic change. This results in some effacement of the basilar  cisterns, which remain patent. There are periventricular and subcortical  white matter changes, a nonspecific finding most often seen as sequela  of chronic microvascular ischemia. Ventricles appear normal in  configuration. The calvarium is intact. There is ethmoid sinus mucosal  thickening. The mastoid air cells appear clear.       Impression:          1. Evolving ischemic change in the right cerebellar hemisphere  compatible with known PICA infarct. No evidence of hemorrhagic  transformation. There is some associated mass effect upon the basilar  cisterns.     2. No change in left subdural hemorrhage.     This report was finalized on 01/15/2020 07:38 by Dr. Som Mckenzie MD.        Personal review of repeat Head Ct and comparing it to the head Ct from 1/13/20 shows increased edema of right cerebellar stroke and compression of 4 th ventricle (it is much smaller) This is an evolving stroke  Still can see SDH  Head CT 1/15/2019            Compare to Head CT 1/13/2020:      Assessment/Plan     Hospital Problem List      Chronic obstructive pulmonary disease (CMS/HCC)    CVA (cerebral vascular accident) (CMS/HCC)    Influenza A    Tobacco dependence    Acute encephalopathy    Hypothyroidism, non-compliant with medication     Medical non-compliance    Bradycardia    Impression:  1. Acute right cerebellar stroke which is evolving and showing greater edema compared to study 1/13/20  No hemorrhagic component seen on Head CT as was seen on MRI  2.SDH is  still present and appears fairly acute or recent but is not worsening  3. Still a risk for requiring decompression and will need close Neuro monitoring  4. Hypothyroid  5. Cognitive slowing since admission  Unknown baseline  6. Normal hemoglobin A1C  7. LDL of 93 and goal of < 70  8. Headache--may be due to stroke    Plan:  Continue close Neuro monitoring in ICU  Will need serial Head CT's  Continue Lipitor  Will need continued PT/OT for his considerable incoordination  And may benefit from inpatient rehab depending on how well he improves    30 minutes of critical care time was performed ,during this time I consulted with the nursing staff and also with other providers in regard to the patient's care. I examined the patient and talked with the patient about test results and personally reviewed head CT and repeated review of MRI this time with the radiologist who also believes SDH is present   On head CT it could have been artifact.     Janelle Ramos MD  01/15/20  10:00 AM

## 2020-01-15 NOTE — THERAPY TREATMENT NOTE
Acute Care - Speech Language Pathology   Swallow Treatment Note [facility]     Patient Name: Reza Cruz  : 1955  MRN: [id number]  Today's Date: 1/15/2020  Onset of Illness/Injury or Date of Surgery: 20     Referring Physician: Dr. Montenegro      Admit Date: 2020  Diet toleration completed. Patient is alert and cooperative. RN reports no concern with toleration of PO. The patient completed regular solids and thin liquids. No overt s/s of aspiration are observed. Functional rotary chew with regular solid. OK to continue regular diet with thin liqiuds. SLP to sign off of swallow. Will continue to follow for cognition. If concerns arise with diet toleration please re-consult for swallowing.  Christina Wright, MS CCC-SLP 1/15/2020 1:45 PM    Visit Dx:      ICD-10-CM ICD-9-CM   1. Dysphagia, unspecified type R13.10 787.20   2. Influenza A J10.1 487.1   3. Elevated TSH R79.89 794.5   4. Cerebrovascular accident (CVA), unspecified mechanism (CMS/HCC) I63.9 434.91   5. Tobacco abuse Z72.0 305.1   6. Impaired mobility Z74.09 799.89   7. Impaired mobility and ADLs Z74.09 799.89   8. Cognitive and neurobehavioral dysfunction F09 294.9    F07.89 310.1     Patient Active Problem List   Diagnosis   • Pneumonia of right upper lobe due to infectious organism (CMS/HCC)   • Impetigo   • Simple chronic bronchitis (CMS/HCC)   • Chronic obstructive pulmonary disease (CMS/HCC)   • Cough   • Personal history of nicotine dependence   • CVA (cerebral vascular accident) (CMS/HCC)   • Influenza A   • Tobacco dependence   • Acute encephalopathy   • Hypothyroidism, non-compliant with medication    • Medical non-compliance   • Bradycardia       Therapy Treatment  Rehabilitation Treatment Summary     Row Name 01/15/20 1328 01/15/20 1120 01/15/20 0850       Treatment Time/Intention    Discipline  speech language pathologist  -MM  occupational therapist  -JJ  physical therapy assistant  -AE    Document Type  therapy note (daily  note)  -MM  therapy note (daily note)  -JJ  therapy note (daily note)  -AE    Subjective Information  no complaints  -MM  complains of;numbness  -JJ2  complains of;numbness on R side  -AE    Mode of Treatment  individual therapy;speech-language pathology  -MM  occupational therapy  -JJ  --    Patient/Family Observations  No family present.  -MM  --  --    Therapy Frequency (OT Eval)  --  5 times/wk  -JJ  --    Therapy Frequency (Swallow)  at least;3 days per week  -MM  --  --    Patient Effort  good  -MM  good  -JJ  --    Existing Precautions/Restrictions  --  fall  -JJ  fall  -AE    Recorded by [MM] Christina Wright MS CCC-SLP 01/15/20 1341 [JJ] Roxie Magaña OTR/L 01/15/20 1133  [JJ2] Roxie Magaña OTR/L 01/15/20 1215 [AE] Anuja Nazario, PTA 01/15/20 0920    Row Name 01/15/20 0850             Bed Mobility Assessment/Treatment    Comment (Bed Mobility)  up in chair  -AE      Recorded by [AE] Anuja Nazario, PTA 01/15/20 0920      Row Name 01/15/20 1120             Transfer Assessment/Treatment    Transfer Assessment/Treatment  sit-stand transfer;stand-sit transfer  -      Recorded by [JJ] Roxie Magaña OTR/L 01/15/20 1215      Row Name 01/15/20 1120 01/15/20 0850          Sit-Stand Transfer    Sit-Stand [place] (Transfers)  contact guard  -JJ  contact guard;2 person assist  -AE     Recorded by [JJ] Roxie Magaña OTR/L 01/15/20 1215 [AE] Anuja Nazario, PTA 01/15/20 0920     Row Name 01/15/20 1120 01/15/20 0850          Stand-Sit Transfer    Stand-Sit [place] (Transfers)  contact guard  -JJ  contact guard;2 person assist  -AE     Recorded by [JJ] Roxie Magaña OTR/L 01/15/20 1215 [AE] Anuja Nazario, PTA 01/15/20 0920     Row Name 01/15/20 0850             Gait/Stairs Assessment/Training    [place] Level (Gait)  minimum assist (75% patient effort);2 person assist;verbal cues  -AE      Pattern (Gait)  swing-to  -AE      Deviations/Abnormal Patterns (Gait)  (S)  base of support, narrow;anoop decreased;ataxic  -AE      Comment (Gait/Stairs)  (S) Pt leaned only slightly to R and had difficulty weight shifting  -AE      Recorded by [AE] Anuja Nazario PTA 01/15/20 0920      Row Name 01/15/20 1120             ADL Assessment/Intervention    BADL Assessment/Intervention  toileting;grooming;bathing  -JJ      Recorded by [JJ] Roxie Magaña OTR/L 01/15/20 1215      Row Name 01/15/20 1120             Bathing Assessment/Intervention    Bathing [place] Level  lower body;upper body;perineal area;set up;verbal cues  -JJ      Bathing Position  edge of bed sitting  -JJ      Comment (Bathing)  Max A for back   -JJ      Recorded by [JJ] Roxie Magaña OTR/L 01/15/20 1215      Row Name 01/15/20 1120             Grooming Assessment/Training    [place] Level (Grooming)  wash face, hands;hair care, combing/brushing;set up  -JJ      Grooming Position  unsupported sitting  -JJ      Recorded by [JJ] Roxie Magaña OTR/L 01/15/20 1215      Row Name 01/15/20 1120             Toileting Assessment/Training    [place] Level (Toileting)  independent  -JJ      Assistive Devices (Toileting)  urinal  -JJ      Toileting Position  unsupported sitting  -JJ      Recorded by [JJ] Roxie Magaña OTR/L 01/15/20 1133      Row Name 01/15/20 1120             Motor Skills Assessment/Interventions    Additional Documentation  Therapeutic Exercise (Group);Motor Coordination Assessment/Training (Group)  -JJ      Recorded by [JJ] Roxie Magaña OTR/L 01/15/20 1133      Row Name 01/15/20 1120             Therapeutic Exercise    Upper Extremity Range of Motion (Therapeutic Exercise)  shoulder flexion/extension, bilateral;shoulder abduction/adduction, bilateral;elbow flexion/extension, bilateral;forearm supination/pronation, bilateral;wrist flexion/extension, bilateral  -JJ      Exercise Type (Therapeutic Exercise)  AROM (active range of motion)  -JJ      Position (Therapeutic  Exercise)  seated  -JJ      Sets/Reps (Therapeutic Exercise)  20  -JJ      Continuous Passive Motion (Therapeutic Exercise)  ataxic movements with R UE during exercises.   -JJ      Recorded by [JJ] Roxie Magaña OTR/L 01/15/20 1133      Row Name 01/15/20 1120             Gross Motor Coordination    Gross Motor Skill, Impairments Detail  Pt completed GM coordination task with moving objects from one hand to the other, picking up objects and placing in container over head with Min A for static standing balance. Ataxic movements in R UE, pt required cues to slow down task for increased accuries.   -JJ      Recorded by [JJ] Roxie Magaña OTR/L 01/15/20 1133      Row Name 01/15/20 1120             Balance    Balance  static sitting balance;static standing balance;dynamic standing balance;dynamic sitting balance  -JJ      Recorded by [JJ] Roxie Magaña OTR/L 01/15/20 1133      Row Name 01/15/20 1120 01/15/20 0850          Static Sitting Balance    Level of [place] (Unsupported Sitting, Static Balance)  independent  -JJ  independent  -AE     Sitting Position (Unsupported Sitting, Static Balance)  sitting in chair  -JJ  sitting in chair  -AE     Recorded by [JJ] Roxie Magaña OTR/MELANIE 01/15/20 1215 [AE] Anuja Nazario PTA 01/15/20 0920     Row Name 01/15/20 1120             Dynamic Sitting Balance    Level of [place], Reaches Outside Midline (Sitting, Dynamic Balance)  contact guard assist  -JJ      Sitting Position, Reaches Outside Midline (Sitting, Dynamic Balance)  sitting on edge of bed  -JJ      Recorded by [JJ] Roxie Magaña OTR/L 01/15/20 1215      Row Name 01/15/20 1120 01/15/20 0850          Static Standing Balance    Level of [place] (Supported Standing, Static Balance)  contact guard assist;minimal assist, 75% patient effort  -JJ  2 person assist;minimal assist, 75% patient effort  -AE     Recorded by [JJ] Roxie Magaña OTR/MELANIE 01/15/20 1215 [AE] Anuja Nazario,  PTA 01/15/20 0920     Row Name 01/15/20 1120             Dynamic Standing Balance    Level of [place], Reaches Outside Midline (Standing, Dynamic Balance)  minimal assist, 75% patient effort  -JJ      Time Able to Maintain Position, Reaches Outside Midline (Standing, Dynamic Balance)  1 to 2 minutes  -JJ      Recorded by [JJ] Roxie Magaña OTR/L 01/15/20 1215      Row Name 01/15/20 1120 01/15/20 0850          Positioning and Restraints    Pre-Treatment Position  sitting in chair/recliner  -JJ  sitting in chair/recliner  -AE     Post Treatment Position  chair  -JJ  chair  -AE     In Chair  sitting;call light within reach;encouraged to call for assist;with nsg  -JJ  sitting;call light within reach;encouraged to call for assist  -AE     Recorded by [JJ] Roxie Magaña OTR/MELANIE 01/15/20 1215 [AE] Anuja Nazario, PTA 01/15/20 0920     Row Name 01/15/20 1328 01/15/20 1120          Pain Assessment    Additional Documentation  Pain Scale: FACES Pre/Post-Treatment (Group)  -MM  Pain Scale: Numbers Pre/Post-Treatment (Group)  -JJ     Recorded by [MM] Christina Wright MS CCC-SLP 01/15/20 1341 [JJ] Roxie Magaña OTR/L 01/15/20 1215     Row Name 01/15/20 1120 01/15/20 0850          Pain Scale: Numbers Pre/Post-Treatment    Pain Scale: Numbers, Pretreatment  0/10 - no pain  -JJ  0/10 - no pain no pain, c/o numbness on R side  -AE     Pain Scale: Numbers, Post-Treatment  0/10 - no pain  -JJ  7/10  -AE     Pain Location  --  other (see comments) R side (liyah LE and UE)  -AE     Pre/Post Treatment Pain Comment  --  pt c/o numbness pre-tx, with c/o pain post-tx, stating that exercise makes him hurt  -AE     Pain Intervention(s)  --  Emotional support;Rest  -AE     Recorded by [JJ] Roxie Magaña OTR/MELANIE 01/15/20 1215 [AE] Anuja Nazario, PTA 01/15/20 0920     Row Name 01/15/20 1328             Pain Scale: FACES Pre/Post-Treatment    Pain: FACES Scale, Pretreatment  0-->no hurt  -MM      Pain: FACES Scale,  Post-Treatment  0-->no hurt  -MM      Recorded by [MM] Christina Wright, MS CCC-SLP 01/15/20 1341      Row Name 01/15/20 1120             Plan of Care Review    Plan of Care Reviewed With  patient  -JJ      Outcome Summary  OT tx completed. Pt demo's increased static and dynamic sitting/standing balance this date. Complete GM coordination task in standing with verbal cues to slow down movements for increased accurcy. Grooming and toileting tasks completed independently. Bathing completed with set-up only and Max A for washing back. Pt would continue to benefit from skilled OT services to address coordination and balance deficits which impact his ability to complete adls.   -JJ      Recorded by [JJ] Roxie Magaña OTR/L 01/15/20 1215      Row Name 01/15/20 1120             Outcome Summary/Treatment Plan (OT)    Daily Summary of Progress (OT)  progress toward functional goals is good  -JJ      Plan for Continued Treatment (OT)  continue OT POC  -JJ      Anticipated Discharge Disposition (OT)  inpatient rehabilitation facility  -JJ      Recorded by [JJ] Roxie Magaña OTR/L 01/15/20 1215      Row Name 01/15/20 1328             Outcome Summary/Treatment Plan (SLP)    Daily Summary of Progress (SLP)  progress toward functional goals as expected  -MM      Barriers to Overall Progress (SLP)  n/a  -MM      Plan for Continued Treatment (SLP)  Continue to follow. Sign off swallow. Continue cog tx.  -MM      Anticipated Dischage Disposition  unknown  -MM      Recorded by [MM] Christina Wright MS CCC-SLP 01/15/20 1341        User Key  (r) = Recorded By, (t) = Taken By, (c) = Cosigned By    Initials Name Effective Dates Discipline    AE Anuja Nazario, PTA 06/22/15 -  PT    MM Christina Wright, MS CCC-SLP 05/24/19 -  SLP    JJ Roxie Magaña OTR/L 10/12/18 -  OT          Outcome Summary  Outcome Summary/Treatment Plan (SLP)  Daily Summary of Progress (SLP): progress toward functional goals as expected  (01/15/20 1328 : Christina Wright, MS CCC-SLP)  Barriers to Overall Progress (SLP): n/a (01/15/20 1328 : Christina Wright, MS CCC-SLP)  Plan for Continued Treatment (SLP): Continue to follow. Sign off swallow. Continue cog tx. (01/15/20 1328 : Christina Wright, MS CCC-SLP)  Anticipated Dischage Disposition: unknown (01/15/20 1328 : Christina Wright, [facility])      SLP GOALS     Row Name 01/15/20 1328 01/14/20 0955 01/13/20 1225       Oral Nutrition/Hydration Goal 1 (SLP)    Oral Nutrition/Hydration Goal 1, SLP  LTG: Patient will tolerate LRD without s/s of aspiration.  -MM  --  LTG: Patient will tolerate LRD without s/s of aspiration.  -MM    Time Frame (Oral Nutrition/Hydration Goal 1, SLP)  by discharge  -MM  --  by discharge  -MM    Barriers (Oral Nutrition/Hydration Goal 1, SLP)  n/a  -MM  --  n/a  -MM    Progress/Outcomes (Oral Nutrition/Hydration Goal 1, SLP)  goal met  -MM  --  goal ongoing  -MM       Comprehend Questions Goal 1 (SLP)    Improve Ability to Comprehend Questions Goal 1 (SLP)  --  complex yes/no questions;90%  -TM  --    Time Frame (Comprehend Questions Goal 1, SLP)  --  by discharge  -TM  --    Progress/Outcomes (Comprehend Questions Goal 1, SLP)  --  goal ongoing  -TM  --       Follow Directions Goal 2 (SLP)    Improve Ability to Follow Directions Goal 1 (SLP)  --  2 step commands;90%  -TM  --    Time Frame (Follow Directions Goal 1, SLP)  --  by discharge  -TM  --    Progress/Outcomes (Follow Directions Goal 1, SLP)  --  goal ongoing  -TM  --       Connected Speech to Express Thoughts Goal 1 (SLP)    Improve Narrative Discourse to Express Thoughts By Goal 1 (SLP)  --  describing a picture;90%  -TM  --    Time Frame (Connected Speech Goal 1, SLP)  --  by discharge  -TM  --    Progress/Outcomes (Connected Speech Goal 1, SLP)  --  goal ongoing  -TM  --       Articulation Goal 1 (SLP)    Improve Articulation Goal 1 (SLP)  --  by over-articulating in connected speech;90%  -TM  --     Time Frame (Articulation Goal 1, SLP)  --  by discharge  -TM  --    Progress/Outcomes (Articulation Goal 1, SLP)  --  goal ongoing  -TM  --       Attention Goal 1 (SLP)    Improve Attention by Goal 1 (SLP)  --  complete selective attention task;90%  -TM  --    Time Frame (Attention Goal 1, SLP)  --  by discharge  -TM  --    Progress/Outcomes (Attention Goal 1, SLP)  --  goal ongoing  -TM  --       Organizational Skills Goal 1 (SLP)    Improve Thought Organization Through Goal 1 (SLP)  --  completing a divergent naming task;completing a convergent naming task;abstract;90%  -TM  --    Time Frame (Thought Organization Skills Goal 1, SLP)  --  by discharge  -TM  --    Progress/Outcomes (Thought Organization Skills Goal 1, SLP)  --  goal ongoing  -TM  --      User Key  (r) = Recorded By, (t) = Taken By, (c) = Cosigned By    Initials Name Provider Type    Keila Soriano CCC-SLP Speech and Language Pathologist    Christina Joseph, MS CCC-SLP Speech and Language Pathologist          EDUCATION  The patient has been educated in the following areas:   Dysphagia (Swallowing Impairment).    SLP Recommendation and Plan  Daily Summary of Progress (SLP): progress toward functional goals as expected  Barriers to Overall Progress (SLP): n/a  Plan for Continued Treatment (SLP): Continue to follow. Sign off swallow. Continue cog tx.  Anticipated Dischage Disposition: unknown                    Time Calculation:   Time Calculation- SLP     Row Name 01/15/20 1344             Time Calculation- SLP    SLP Start Time  1328  -MM      SLP Stop Time  1337  -MM      SLP Time Calculation (min)  9 min  -MM      SLP Received On  01/15/20  -MM        User Key  (r) = Recorded By, (t) = Taken By, (c) = Cosigned By    Initials Name Provider Type    Christina Joseph, MS CCC-SLP Speech and Language Pathologist          Therapy Charges for Today     Code Description Service Date Service Provider Modifiers Qty    [id number] [facility]  TREATMENT SWALLOW 1 1/15/2020 Christina Wright, MS CCC-SLP GN 1                 Christina Wright, MS NDIAYE-SLP  1/15/2020

## 2020-01-15 NOTE — PLAN OF CARE
Problem: Patient Care Overview  Goal: Plan of Care Review  Outcome: Ongoing (interventions implemented as appropriate)  Flowsheets (Taken 1/15/2020 1342)  Progress: improving  Plan of Care Reviewed With: patient; other (see comments) (JORGE Bae)  Outcome Summary: Diet toleration completed. Patient is alert and cooperative. RN reports no concern with toleration of PO. The patient completed regular solids and thin liquids. No overt s/s of aspiration are observed. Functional rotary chew with regular solid. OK to continue regular diet with thin liqiuds. SLP to sign off of swallow. Will continue to follow for cognition. If concerns arise with diet toleration please re-consult for swallowing.

## 2020-01-15 NOTE — THERAPY TREATMENT NOTE
Acute Care - Physical Therapy Treatment Note  [facility]     Patient Name: Reza Cruz  : 1955  MRN: [id number]  Today's Date: 1/15/2020  Onset of Illness/Injury or Date of Surgery: 20     Referring Physician: Dr. Montenegro    Admit Date: 2020    Visit Dx:    ICD-10-CM ICD-9-CM   1. Dysphagia, unspecified type R13.10 787.20   2. Influenza A J10.1 487.1   3. Elevated TSH R79.89 794.5   4. Cerebrovascular accident (CVA), unspecified mechanism (CMS/[facility]) I63.9 434.91   5. Tobacco abuse Z72.0 305.1   6. Impaired mobility Z74.09 799.89   7. Impaired mobility and ADLs Z74.09 799.89   8. Cognitive and neurobehavioral dysfunction F09 294.9    F07.89 310.1     Patient Active Problem List   Diagnosis   • Pneumonia of right upper lobe due to infectious organism (CMS/[facility])   • Impetigo   • Simple chronic bronchitis (CMS/[facility])   • Chronic obstructive pulmonary disease (CMS/[facility])   • Cough   • Personal history of nicotine dependence   • CVA (cerebral vascular accident) (CMS/[facility])   • Influenza A   • Tobacco dependence   • Acute encephalopathy   • Hypothyroidism, non-compliant with medication    • Medical non-compliance   • Bradycardia       Therapy Treatment    Rehabilitation Treatment Summary     Row Name 01/15/20 0879             Treatment Time/Intention    Discipline  physical therapy assistant  -AE      Document Type  therapy note (daily note)  -AE      Subjective Information  complains of;numbness on R side  -AE      Existing Precautions/Restrictions  fall  -AE      Recorded by [AE] Anuja Nazario PTA 01/15/20 0920      Row Name 01/15/20 0832             Bed Mobility Assessment/Treatment    Comment (Bed Mobility)  up in chair  -AE      Recorded by [AE] Anuja Nazario PTA 01/15/20 0920      Row Name 01/15/20 0858             Sit-Stand Transfer    Sit-Stand [place] (Transfers)  contact guard;2 person assist  -AE      Recorded by [AE] Anuja Nazario PTA 01/15/20 0920      Row Name 01/15/20 0826              Stand-Sit Transfer    Stand-Sit [place] (Transfers)  contact guard;2 person assist  -AE      Recorded by [AE] Anuja Nazario, PTA 01/15/20 0920      Row Name 01/15/20 0850             Gait/Stairs Assessment/Training    [place] Level (Gait)  minimum assist (75% patient effort);2 person assist;verbal cues  -AE      Pattern (Gait)  swing-to  -AE      Deviations/Abnormal Patterns (Gait)  (S) base of support, narrow;anoop decreased;ataxic  -AE      Comment (Gait/Stairs)  (S) Pt leaned only slightly to R and had difficulty weight shifting  -AE      Recorded by [AE] Anuja Nazario, PTA 01/15/20 0920      Row Name 01/15/20 0850             Static Sitting Balance    Level of [place] (Unsupported Sitting, Static Balance)  independent  -AE      Sitting Position (Unsupported Sitting, Static Balance)  sitting in chair  -AE      Recorded by [AE] Anuja Nazario, PTA 01/15/20 0920      Row Name 01/15/20 0850             Static Standing Balance    Level of [place] (Supported Standing, Static Balance)  2 person assist;minimal assist, 75% patient effort  -AE      Recorded by [AE] Anuja Nazario, PTA 01/15/20 0920      Row Name 01/15/20 0850             Positioning and Restraints    Pre-Treatment Position  sitting in chair/recliner  -AE      Post Treatment Position  chair  -AE      In Chair  sitting;call light within reach;encouraged to call for assist  -AE      Recorded by [AE] Anuja Nazario, PTA 01/15/20 0920      Row Name 01/15/20 0850             Pain Scale: Numbers Pre/Post-Treatment    Pain Scale: Numbers, Pretreatment  0/10 - no pain no pain, c/o numbness on R side  -AE      Pain Scale: Numbers, Post-Treatment  7/10  -AE      Pain Location  other (see comments) R side (liyah LE and UE)  -AE      Pre/Post Treatment Pain Comment  pt c/o numbness pre-tx, with c/o pain post-tx, stating that exercise makes him hurt  -AE      Pain Intervention(s)  Emotional support;Rest  -AE      Recorded by [AE] Aravind  Anuja ARGUETA PTA 01/15/20 0920        User Key  (r) = Recorded By, (t) = Taken By, (c) = Cosigned By    Initials Name Effective Dates Discipline    AE Anuja Nazario PTA 06/22/15 -  PT                       PT Recommendation and Plan     Plan of Care Reviewed With: patient  Progress: improving  Outcome Measures     Row Name 01/14/20 0800             How much help from another is currently needed...    Putting on and taking off regular lower body clothing?  4  -JJ      Bathing (including washing, rinsing, and drying)  3  -JJ      Toileting (which includes using toilet bed pan or urinal)  3  -JJ      Putting on and taking off regular upper body clothing  3  -JJ      Taking care of personal grooming (such as brushing teeth)  3  -JJ      Eating meals  3  -JJ      AM-PAC 6 Clicks Score (OT)  19  -JJ         Functional Assessment    Outcome Measure Options  AM-PAC 6 Clicks Daily Activity (OT)  -        User Key  (r) = Recorded By, (t) = Taken By, (c) = Cosigned By    Initials Name Provider Type    Roxie Garvey OTR/L Occupational Therapist         Time Calculation:   PT Charges     Row Name 01/15/20 0850             Time Calculation    Start Time  0850  -AE      Stop Time  0916  -AE      Time Calculation (min)  26 min  -AE         Time Calculation- PT    Total Timed Code Minutes- PT  26 minute(s)  -AE         Timed Charges    52284 - PT Therapeutic Exercise Minutes  8  -AE      58332 - Gait Training Minutes   18  -AE        User Key  (r) = Recorded By, (t) = Taken By, (c) = Cosigned By    Initials Name Provider Type    AE Anuja Nazario PTA Physical Therapy Assistant        Therapy Charges for Today     Code Description Service Date Service Provider Modifiers Qty    92568080932 HC PT THER PROC EA 15 MIN 1/15/2020 Anuja Nazario PTA GP 1    02721801062 HC GAIT TRAINING EA 15 MIN 1/15/2020 Anuja Nazario PTA GP 1          PT G-Codes  Outcome Measure Options: AM-PAC 6 Clicks Daily Activity (OT)  AM-PAC 6  Clicks Score (PT): 13  AM-PAC 6 Clicks Score (OT): 19    Anuja Nazario, PTA  1/15/2020

## 2020-01-15 NOTE — DISCHARGE PLACEMENT REQUEST
"To: [facility]  (Attn: Gomez)    From: Jazmin Jake [phone]        Reza Cardenas (64 y.o. Male)     Date of Birth Social Security Number Address Home Phone MRN    1955  [address] [place] [id number] [phone] [id number]    Islam Marital Status          Gnosticism        Admission Date Admission Type Admitting Provider Attending Provider Department, Room/Bed    1/13/20 Emergency Hira Montenegro MD Fleming, John Eric, MD [facility] INTENSIVE CARE, I008/1    Discharge Date Discharge Disposition Discharge Destination                       Attending Provider:  Hira Montenegro MD    Allergies:  Codeine    Isolation:  Droplet   Infection:  Influenza (01/13/20)   Code Status:  CPR    Ht:  182.9 cm (72.01\")   Wt:  78.2 kg (172 lb 6.4 oz)    Admission Cmt:  None   Principal Problem:  None                Active Insurance as of 1/13/2020     Primary Coverage     Payor Plan Insurance Group Employer/Plan Group    [employer] [place] [employer] [place]ICA[place]      Payor Plan Address Payor Plan Phone Number Payor Plan Fax Number Effective Dates    [address] [address] [phone]  8/15/2018 - None Entered    [facility] [address]       Subscriber Name Subscriber Birth Date Member ID       REZA CARDENAS 1955 [id number]                 Emergency Contacts      (Rel.) Home Phone Work Phone Mobile Phone    Rufina Urbina (Sister) -- -- [phone]            Insurance Information                [facility] MEDICAID Phone: [phone]    Subscriber: Anthony Reza Subscriber#: [id number]    Group#:  Precert#:           "

## 2020-01-15 NOTE — PROGRESS NOTES
Continued Stay Note   [place]     Patient Name: Reza Cruz  MRN: [id number]  Today's Date: 1/15/2020    Admit Date: 1/13/2020    Discharge Plan     Row Name 01/15/20 1002       Plan    Plan  Referral to [facility]    Patient/Family in Agreement with Plan  yes    Plan Comments  Referral to [facility] now that patient has orders to move to regular floor.        Discharge Codes    No documentation.             ASHER Massey

## 2020-01-15 NOTE — THERAPY TREATMENT NOTE
Acute Care - Occupational Therapy Treatment Note  [facility]     Patient Name: Reza Cruz  : 1955  MRN: [id number]  Today's Date: 1/15/2020  Onset of Illness/Injury or Date of Surgery: 20  Date of Referral to OT: 20  Referring Physician: Dr. Montenegro    Admit Date: 2020       ICD-10-CM ICD-9-CM   1. Dysphagia, unspecified type R13.10 787.20   2. Influenza A J10.1 487.1   3. Elevated TSH R79.89 794.5   4. Cerebrovascular accident (CVA), unspecified mechanism (CMS/HCC) I63.9 434.91   5. Tobacco abuse Z72.0 305.1   6. Impaired mobility Z74.09 799.89   7. Impaired mobility and ADLs Z74.09 799.89   8. Cognitive and neurobehavioral dysfunction F09 294.9    F07.89 310.1     Patient Active Problem List   Diagnosis   • Pneumonia of right upper lobe due to infectious organism (CMS/[facility])   • Impetigo   • Simple chronic bronchitis (CMS/[facility])   • Chronic obstructive pulmonary disease (CMS/[facility])   • Cough   • Personal history of nicotine dependence   • CVA (cerebral vascular accident) (CMS/[facility])   • Influenza A   • Tobacco dependence   • Acute encephalopathy   • Hypothyroidism, non-compliant with medication    • Medical non-compliance   • Bradycardia     Past Medical History:   Diagnosis Date   • COPD (chronic obstructive pulmonary disease) (CMS/[facility])    • Hyperlipidemia    • Hypertension      Past Surgical History:   Procedure Laterality Date   • EYE SURGERY Right    • KNEE SURGERY Right        Therapy Treatment    Rehabilitation Treatment Summary     Row Name 01/15/20 1120 01/15/20 0850          Treatment Time/Intention    Discipline  occupational therapist  -JJ  physical therapy assistant  -AE     Document Type  therapy note (daily note)  -JJ  therapy note (daily note)  -AE     Subjective Information  complains of;numbness  -JJ2  complains of;numbness on R side  -AE     Mode of Treatment  occupational therapy  -JJ  --     Therapy Frequency (OT Eval)  5 times/wk  -JJ  --     Patient Effort  good  -EDWARDOJ  --      Existing Precautions/Restrictions  fall  -JJ  fall  -AE     Recorded by [JJ] Roxie Magaña OTR/L 01/15/20 1133  [JJ2] Roxie Magaña OTR/MELANIE 01/15/20 1215 [AE] Anuja Nazario, PTA 01/15/20 0920     Row Name 01/15/20 0850             Bed Mobility Assessment/Treatment    Comment (Bed Mobility)  up in chair  -AE      Recorded by [AE] Anuja Nazario, PTA 01/15/20 0920      Row Name 01/15/20 1120             Transfer Assessment/Treatment    Transfer Assessment/Treatment  sit-stand transfer;stand-sit transfer  -JJ      Recorded by [JJ] Roxie Magaña OTR/L 01/15/20 1215      Row Name 01/15/20 1120 01/15/20 0850          Sit-Stand Transfer    Sit-Stand [place] (Transfers)  contact guard  -JJ  contact guard;2 person assist  -AE     Recorded by [JJ] Roxie Magaña OTR/MELANIE 01/15/20 1215 [AE] Anuja Nazario, PTA 01/15/20 0920     Row Name 01/15/20 1120 01/15/20 0850          Stand-Sit Transfer    Stand-Sit [place] (Transfers)  contact guard  -JJ  contact guard;2 person assist  -AE     Recorded by [JJ] Roxie Magaña OTR/MELANIE 01/15/20 1215 [AE] Anuja Nazario, PTA 01/15/20 0920     Row Name 01/15/20 0850             Gait/Stairs Assessment/Training    [place] Level (Gait)  minimum assist (75% patient effort);2 person assist;verbal cues  -AE      Pattern (Gait)  swing-to  -AE      Deviations/Abnormal Patterns (Gait)  (S) base of support, narrow;anoop decreased;ataxic  -AE      Comment (Gait/Stairs)  (S) Pt leaned only slightly to R and had difficulty weight shifting  -AE      Recorded by [AE] Anuja Nazario, PTA 01/15/20 0920      Row Name 01/15/20 1120             ADL Assessment/Intervention    BADL Assessment/Intervention  toileting;grooming;bathing  -JJ      Recorded by [JJ] Roxie Magaña OTR/L 01/15/20 1215      Row Name 01/15/20 1120             Bathing Assessment/Intervention    Bathing [place]aney Level  lower body;upper body;perineal area;set up;verbal cues  -JJ      Bathing  Position  edge of bed sitting  -JJ      Comment (Bathing)  Max A for back   -JJ      Recorded by [JJ] Roxie Magaña OTR/L 01/15/20 1215      Row Name 01/15/20 1120             Grooming Assessment/Training    [place] Level (Grooming)  wash face, hands;hair care, combing/brushing;set up  -JJ      Grooming Position  unsupported sitting  -JJ      Recorded by [JJ] Roxie Magaña OTR/L 01/15/20 1215      Row Name 01/15/20 1120             Toileting Assessment/Training    [place] Level (Toileting)  independent  -JJ      Assistive Devices (Toileting)  urinal  -JJ      Toileting Position  unsupported sitting  -JJ      Recorded by [JJ] Roxie Magaña OTR/L 01/15/20 1133      Row Name 01/15/20 1120             Motor Skills Assessment/Interventions    Additional Documentation  Therapeutic Exercise (Group);Motor Coordination Assessment/Training (Group)  -JJ      Recorded by [JJ] Roxie Magaña OTR/L 01/15/20 1133      Row Name 01/15/20 1120             Therapeutic Exercise    Upper Extremity Range of Motion (Therapeutic Exercise)  shoulder flexion/extension, bilateral;shoulder abduction/adduction, bilateral;elbow flexion/extension, bilateral;forearm supination/pronation, bilateral;wrist flexion/extension, bilateral  -JJ      Exercise Type (Therapeutic Exercise)  AROM (active range of motion)  -JJ      Position (Therapeutic Exercise)  seated  -JJ      Sets/Reps (Therapeutic Exercise)  20  -JJ      Continuous Passive Motion (Therapeutic Exercise)  ataxic movements with R UE during exercises.   -JJ      Recorded by [JJ] Roxie Magaña OTR/L 01/15/20 1133      Row Name 01/15/20 1120             Gross Motor Coordination    Gross Motor Skill, Impairments Detail  Pt completed GM coordination task with moving objects from one hand to the other, picking up objects and placing in container over head with Min A for static standing balance. Ataxic movements in R UE, pt required cues to slow down task for  increased accuries.   -JJ      Recorded by [JJ] Roxie Magaña OTR/L 01/15/20 1133      Row Name 01/15/20 1120             Balance    Balance  static sitting balance;static standing balance;dynamic standing balance;dynamic sitting balance  -JJ      Recorded by [JJ] Roxie Magaña OTR/L 01/15/20 1133      Row Name 01/15/20 1120 01/15/20 0850          Static Sitting Balance    Level of [place] (Unsupported Sitting, Static Balance)  independent  -JJ  independent  -AE     Sitting Position (Unsupported Sitting, Static Balance)  sitting in chair  -JJ  sitting in chair  -AE     Recorded by [JJ] Roxie Magaña OTR/MELANIE 01/15/20 1215 [AE] Anuja Nazario, PTA 01/15/20 0920     Row Name 01/15/20 1120             Dynamic Sitting Balance    Level of [place], Reaches Outside Midline (Sitting, Dynamic Balance)  contact guard assist  -JJ      Sitting Position, Reaches Outside Midline (Sitting, Dynamic Balance)  sitting on edge of bed  -JJ      Recorded by [JJ] Roxie Magaña OTR/L 01/15/20 1215      Row Name 01/15/20 1120 01/15/20 0850          Static Standing Balance    Level of [place] (Supported Standing, Static Balance)  contact guard assist;minimal assist, 75% patient effort  -JJ  2 person assist;minimal assist, 75% patient effort  -AE     Recorded by [JJ] Roxie Magaña OTR/MELANIE 01/15/20 1215 [AE] Anuja Nazario, PTA 01/15/20 0920     Row Name 01/15/20 1120             Dynamic Standing Balance    Level of [place], Reaches Outside Midline (Standing, Dynamic Balance)  minimal assist, 75% patient effort  -JJ      Time Able to Maintain Position, Reaches Outside Midline (Standing, Dynamic Balance)  1 to 2 minutes  -JJ      Recorded by [JJ] Roxie Magaña OTR/L 01/15/20 1215      Row Name 01/15/20 1120 01/15/20 0850          Positioning and Restraints    Pre-Treatment Position  sitting in chair/recliner  -JJ  sitting in chair/recliner  -AE     Post Treatment Position  chair  -JJ  chair   -AE     In Chair  sitting;call light within reach;encouraged to call for assist;with nsg  -JJ  sitting;call light within reach;encouraged to call for assist  -AE     Recorded by [JJ] Roxie Magaña OTR/L 01/15/20 1215 [AE] Anuja Nazario, PTA 01/15/20 0920     Row Name 01/15/20 1120             Pain Assessment    Additional Documentation  Pain Scale: Numbers Pre/Post-Treatment (Group)  -JJ      Recorded by [JJ] Roxie Magaña OTR/L 01/15/20 1215      Row Name 01/15/20 1120 01/15/20 0850          Pain Scale: Numbers Pre/Post-Treatment    Pain Scale: Numbers, Pretreatment  0/10 - no pain  -JJ  0/10 - no pain no pain, c/o numbness on R side  -AE     Pain Scale: Numbers, Post-Treatment  0/10 - no pain  -JJ  7/10  -AE     Pain Location  --  other (see comments) R side (liyah LE and UE)  -AE     Pre/Post Treatment Pain Comment  --  pt c/o numbness pre-tx, with c/o pain post-tx, stating that exercise makes him hurt  -AE     Pain Intervention(s)  --  Emotional support;Rest  -AE     Recorded by [JJ] Roxie Magaña OTR/L 01/15/20 1215 [AE] Anuja Nazario, PTA 01/15/20 0920     Row Name 01/15/20 1120             Plan of Care Review    Plan of Care Reviewed With  patient  -JJ      Outcome Summary  OT tx completed. Pt demo's increased static and dynamic sitting/standing balance this date. Complete GM coordination task in standing with verbal cues to slow down movements for increased accurcy. Grooming and toileting tasks completed independently. Bathing completed with set-up only and Max A for washing back. Pt would continue to benefit from skilled OT services to address coordination and balance deficits which impact his ability to complete adls.   -JJ      Recorded by [JEDWARDO] Roxie Magaña OTR/L 01/15/20 1215      Row Name 01/15/20 1120             Outcome Summary/Treatment Plan (OT)    Daily Summary of Progress (OT)  progress toward functional goals is good  -JJ      Plan for Continued Treatment (OT)  continue  OT POC  -JJ      Anticipated Discharge Disposition (OT)  inpatient rehabilitation facility  -J      Recorded by [JJ] Roxie Magaña, OTR/L 01/15/20 3973        User Key  (r) = Recorded By, (t) = Taken By, (c) = Cosigned By    Initials Name Effective Dates Discipline    AE Anuja Nazario, PTA 06/22/15 -  PT    JRoxie Crenshaw, OTR/L 10/12/18 -  OT                 OT Recommendation and Plan  Outcome Summary/Treatment Plan (OT)  Daily Summary of Progress (OT): progress toward functional goals is good  Plan for Continued Treatment (OT): continue OT POC  Anticipated Discharge Disposition (OT): inpatient rehabilitation facility  Planned Therapy Interventions (OT Eval): activity tolerance training, BADL retraining, functional balance retraining, neuromuscular control/coordination retraining, orthotic fabrication/fitting/training, patient/caregiver education/training, transfer/mobility retraining  Therapy Frequency (OT Eval): 5 times/wk  Daily Summary of Progress (OT): progress toward functional goals is good  Plan of Care Review  Plan of Care Reviewed With: patient  Plan of Care Reviewed With: patient  Outcome Summary: OT tx completed. Pt demo's increased static and dynamic sitting/standing balance this date. Complete GM coordination task in standing with verbal cues to slow down movements for increased accurcy. Grooming and toileting tasks completed independently. Bathing completed with set-up only and Max A for washing back. Pt would continue to benefit from skilled OT services to address coordination and balance deficits which impact his ability to complete adls.   Outcome Measures     Row Name 01/15/20 1200 01/14/20 0800          How much help from another is currently needed...    Putting on and taking off regular lower body clothing?  4  -JJ  4  -JJ     Bathing (including washing, rinsing, and drying)  3  -JJ  3  -JJ     Toileting (which includes using toilet bed pan or urinal)  4  -JJ  3  -JJ     Putting  on and taking off regular upper body clothing  3  -JJ  3  -JJ     Taking care of personal grooming (such as brushing teeth)  4  -JJ  3  -JJ     Eating meals  4  -JJ  3  -JJ     AM-PAC 6 Clicks Score (OT)  22  -JJ  19  -JJ        Functional Assessment    Outcome Measure Options  AM-PAC 6 Clicks Daily Activity (OT)  -JJ  AM-PAC 6 Clicks Daily Activity (OT)  -J       User Key  (r) = Recorded By, (t) = Taken By, (c) = Cosigned By    Initials Name Provider Type    Roxie Garvey OTR/L Occupational Therapist           Time Calculation:   Time Calculation- OT     Row Name 01/15/20 1215 01/15/20 0850          Time Calculation- OT    OT Start Time  1120  -JJ  --     OT Stop Time  1205  -JJ  --     OT Time Calculation (min)  45 min  -  --     Total Timed Code Minutes- OT  45 minute(s)  -  --     OT Received On  01/15/20  -  --        Timed Charges    07480 - Gait Training Minutes   --  18  -AE       User Key  (r) = Recorded By, (t) = Taken By, (c) = Cosigned By    Initials Name Provider Type    Anuja Elizabeth PTA Physical Therapy Assistant    Roxie Garvey OTR/L Occupational Therapist        Therapy Charges for Today     Code Description Service Date Service Provider Modifiers Qty    84142568324 HC OT EVAL MOD COMPLEXITY 3 1/14/2020 Roxie Magaña OTR/L GO 1    88286441004 HC OT THERAPEUTIC ACT EA 15 MIN 1/15/2020 Roxie Magaña OTR/L GO 1    54546203047 HC OT SELF CARE/MGMT/TRAIN EA 15 MIN 1/15/2020 Roxie Magaña OTR/L GO 2               RACIEL Trejo/MELANIE  1/15/2020

## 2020-01-15 NOTE — PROGRESS NOTES
NEUROSURGERY DAILY PROGRESS NOTE    ASSESSMENT:   Reza Cruz is a 64 y.o. with a significant comorbidity hypertension, hyperlipidemia.  He presents with a new problem of difficulty with gait and coordination of the right upper extremity and right hemibody numbness. Physical exam findings of right-sided upper and lower extremity ataxia and scanning speech.  Their imaging shows right cerebellar stroke.    DDX:     Chronic obstructive pulmonary disease (CMS/HCC)    CVA (cerebral vascular accident) (CMS/HCC)    Influenza A    Tobacco dependence    Acute encephalopathy    Hypothyroidism, non-compliant with medication     Medical non-compliance    Bradycardia    Patient Active Problem List   Diagnosis   • Pneumonia of right upper lobe due to infectious organism (CMS/HCC)   • Impetigo   • Simple chronic bronchitis (CMS/HCC)   • Chronic obstructive pulmonary disease (CMS/HCC)   • Cough   • Personal history of nicotine dependence   • CVA (cerebral vascular accident) (CMS/HCC)   • Influenza A   • Tobacco dependence   • Acute encephalopathy   • Hypothyroidism, non-compliant with medication    • Medical non-compliance   • Bradycardia     HPI: Resting comfortably in recliner.  No complaints of pain.  No acute events overnight.    PLAN:   Neuro: Stable.  Relatively unchanged    Recommendations per Dr. Dahl:   Based on most recent CT of the head, Mr. Cruz should remain in ICU for at least 2 additional days for close neurologic monitoring.   Avoid hyponatremia, keep sodium > 140   Agree with anticoagulation per neurology's recommendation.   No surgical intervention required at this time.   We will continue to follow as needed.    CHIEF COMPLAINT:   Difficulty with gait and coordination of the right upper extremity and right hemibody numbness    Subjective  Symptoms stable.  Doing well    Temp:  [97 °F (36.1 °C)-97.9 °F (36.6 °C)] 97.1 °F (36.2 °C)  Heart Rate:  [44-81] 44  Resp:  [14-20] 16  BP: (113-172)/()  "172/112    Objective:  General Appearance:  Comfortable, well-appearing, in no acute distress and not in pain.    Vital signs: (most recent): Blood pressure (!) 172/112, pulse (!) 44, temperature 97.1 °F (36.2 °C), temperature source Oral, resp. rate 16, height 182.9 cm (72.01\"), weight 78.2 kg (172 lb 6.4 oz), SpO2 99 %.      Neurologic Exam     Mental Status   Oriented to person, place, and time.   Attention: normal. Concentration: normal.   Level of consciousness: alert    Bright and awake.  Follows commands.  Shows thumbs up and 2 fingers bilaterally.    Halted speech     Cranial Nerves     CN II   Visual fields full to confrontation.     CN III, IV, VI   Pupils are equal, round, and reactive to light.  Extraocular motions are normal.   Nystagmus: none   Diplopia: none    CN V   Right facial sensation deficit: complete    CN VII   Right facial weakness: none  Left facial weakness: none    CN VIII   CN VIII normal.     CN IX, X   CN IX normal.     CN XI   CN XI normal.     CN XII   CN XII normal.     Motor Exam   Muscle bulk: normal  Overall muscle tone: normal  Right arm pronator drift: absent  Left arm pronator drift: absent    Strength   Strength 5/5 throughout.   Moves all extremities  No pronator drift or dysmetria     Sensory Exam   Right arm light touch: decreased from elbow  Left arm light touch: normal  Right leg light touch: decreased from knee  Left leg light touch: normal    Gait, Coordination, and Reflexes     Tremor   Resting tremor: absent  Intention tremor: absent    Drains: * No LDAs found *    Imaging Results (Last 24 Hours)     Procedure Component Value Units Date/Time    CT Head Without Contrast [[id number]] Collected:  01/15/20 0734     Updated:  01/15/20 0742    Narrative:       EXAMINATION: CT HEAD WO CONTRAST- 1/15/2020 7:34 AM CST     HISTORY: Altered mental status, follow-up stroke     COMPARISON: CT head without contrast 01/13/2020, MRI brain without  contrast 01/13/2020     DOSE: 609 " mGy-cm     TECHNIQUE: Sequential imaging was performed from the vertex through the  base of the skull without the use of IV contrast.  Sagittal and coronal  reformations were made from the original source data and reviewed.  Automated exposure control was also utilized to decrease patient  radiation dose.     FINDINGS:   An evolving hypodense areas noted in the right cerebellar hemisphere  compatible with known PICA infarct. There is no evidence of hemorrhagic  transformation. Trace left convexity subdural hemorrhage measures  approximately 2 mm in thickness. There is some associated edema in the  right cerebellar hemisphere associated with the area of evolving  ischemic change. This results in some effacement of the basilar  cisterns, which remain patent. There are periventricular and subcortical  white matter changes, a nonspecific finding most often seen as sequela  of chronic microvascular ischemia. Ventricles appear normal in  configuration. The calvarium is intact. There is ethmoid sinus mucosal  thickening. The mastoid air cells appear clear.       Impression:          1. Evolving ischemic change in the right cerebellar hemisphere  compatible with known PICA infarct. No evidence of hemorrhagic  transformation. There is some associated mass effect upon the basilar  cisterns.     2. No change in left subdural hemorrhage.     This report was finalized on 01/15/2020 07:38 by Dr. Som Mckenzie MD.        Lab Results (last 24 hours)     Procedure Component Value Units Date/Time    Extra Tubes [[id number]] Collected:  01/15/20 0810    Specimen:  Blood, Venous Line Updated:  01/15/20 0911    Narrative:       The following orders were created for panel order Extra Tubes.  Procedure                               Abnormality         Status                     ---------                               -----------         ------                     Lavender Top[[id number]]                                     In process                    Please view results for these tests on the individual orders.    Lavender Top [[id number]] Collected:  01/15/20 0810    Specimen:  Blood Updated:  01/15/20 0911    Sodium [[id number]]  (Normal) Collected:  01/15/20 0809    Specimen:  Blood Updated:  01/15/20 0836     Sodium 142 mmol/L     Blood Culture - Blood, Arm, Right [[id number]] Collected:  01/13/20 0747    Specimen:  Blood from Arm, Right Updated:  01/15/20 0831     Blood Culture No growth at 2 days    POC Glucose Once [[id number]]  (Normal) Collected:  01/15/20 0516    Specimen:  Blood Updated:  01/15/20 0528     Glucose 91 mg/dL      Comment: : [id number] Shawnee HOWELLeter ID: [id number]       POC Glucose Once [[id number]]  (Abnormal) Collected:  01/14/20 2317    Specimen:  Blood Updated:  01/14/20 2328     Glucose 161 mg/dL      Comment: : [id number] aSlbadorshadeSinan  Zuvvueter ID: [id number]       POC Glucose Once [[id number]]  (Normal) Collected:  01/14/20 1715    Specimen:  Blood Updated:  01/14/20 1726     Glucose 124 mg/dL      Comment: : JACKIE Hernández ID: [id number]       Urine Culture - Urine, Urine, Catheter In/Out [[id number]]  (Normal) Collected:  01/13/20 1838    Specimen:  Urine, Catheter In/Out Updated:  01/14/20 1233     Urine Culture No growth    Blood Culture - Blood, Arm, Right [[id number]] Collected:  01/13/20 0824    Specimen:  Blood from Arm, Right Updated:  01/14/20 0943     Blood Culture No growth at 24 hours        [id number]  Julian Virk, APRN

## 2020-01-15 NOTE — PROGRESS NOTES
[facility] Medicine Services  INPATIENT PROGRESS NOTE    Patient Name: Reza Cruz  Date of Admission: 1/13/2020  Today's Date: 01/15/20  Length of Stay: 2  Primary Care Physician: Lobito Trevino Jr., MD    Subjective   Chief Complaint: feeling better  HPI     Patient seen and examined at bedside.  Patient gets he is having no more shortness of breath or wheezing.  Patient indicates that he feels much better.  Is still very ataxic, with poor coordination.  He is continue to work with physical therapy and Occupational Therapy.  Neurosurgery would like to keep him in the ICU for a couple more days to evaluate and make sure that he does not need decompressive craniotomy.        Review of Systems   Constitutional: Positive for fatigue. Negative for activity change, appetite change, chills and fever.   Respiratory: Negative for cough, shortness of breath and wheezing.    Cardiovascular: Negative for chest pain and palpitations.   Gastrointestinal: Negative for abdominal distention and constipation.        All pertinent negatives and positives are as above. All other systems have been reviewed and are negative unless otherwise stated.     Objective    Temp:  [97 °F (36.1 °C)-97.9 °F (36.6 °C)] 97.1 °F (36.2 °C)  Heart Rate:  [44-81] 44  Resp:  [14-20] 16  BP: (113-172)/() 172/112  Physical Exam   Constitutional: He is oriented to person, place, and time. No distress.   HENT:   Head: Normocephalic and atraumatic.   Eyes: Conjunctivae are normal. No scleral icterus.   Neck: Neck supple. No JVD present.   Cardiovascular: Normal rate and regular rhythm.   No murmur heard.  Pulmonary/Chest: Effort normal and breath sounds normal. No stridor. No respiratory distress. He has no wheezes.   Abdominal: Soft. Bowel sounds are normal. He exhibits no distension. There is no tenderness. There is no guarding.   Musculoskeletal: He exhibits no edema.   Neurological: He is alert and oriented to  person, place, and time. Coordination abnormal.   Skin: Skin is warm and dry. He is not diaphoretic. No erythema.   Psychiatric: He has a normal mood and affect. His behavior is normal.   Nursing note and vitals reviewed.          Results Review:  I have reviewed the labs, radiology results, and diagnostic studies.    Laboratory Data:   Results from last 7 days   Lab Units 01/14/20  0247 01/13/20  0747   WBC 10*3/mm3 16.57* 10.87*   HEMOGLOBIN g/dL 17.9* 19.3*   HEMATOCRIT % 52.8* 55.4*   PLATELETS 10*3/mm3 253 280        Results from last 7 days   Lab Units 01/15/20  0809 01/14/20  0247 01/13/20  0815 01/13/20  0747   SODIUM mmol/L 142 142  --  138   POTASSIUM mmol/L  --  4.0  --  3.7   CHLORIDE mmol/L  --  104  --  102   CO2 mmol/L  --  26.0  --  25.0   BUN mg/dL  --  22  --  15   CREATININE mg/dL  --  1.15 1.10 1.03   CALCIUM mg/dL  --  9.6  --  9.7   BILIRUBIN mg/dL  --  0.6  --  0.8   ALK PHOS U/L  --  41  --  48   ALT (SGPT) U/L  --  22  --  24   AST (SGOT) U/L  --  22  --  24   GLUCOSE mg/dL  --  131*  --  130*       Culture Data:   Blood Culture   Date Value Ref Range Status   01/13/2020 No growth at less than 24 hours  Preliminary   01/13/2020 No growth at 24 hours  Preliminary       Radiology Data:   Imaging Results (Last 24 Hours)     Procedure Component Value Units Date/Time    CT Head Without Contrast [[id number]] Collected:  01/15/20 0734     Updated:  01/15/20 0742    Narrative:       EXAMINATION: CT HEAD WO CONTRAST- 1/15/2020 7:34 AM CST     HISTORY: Altered mental status, follow-up stroke     COMPARISON: CT head without contrast 01/13/2020, MRI brain without  contrast 01/13/2020     DOSE: 609 mGy-cm     TECHNIQUE: Sequential imaging was performed from the vertex through the  base of the skull without the use of IV contrast.  Sagittal and coronal  reformations were made from the original source data and reviewed.  Automated exposure control was also utilized to decrease patient  radiation dose.    "  FINDINGS:   An evolving hypodense areas noted in the right cerebellar hemisphere  compatible with known PICA infarct. There is no evidence of hemorrhagic  transformation. Trace left convexity subdural hemorrhage measures  approximately 2 mm in thickness. There is some associated edema in the  right cerebellar hemisphere associated with the area of evolving  ischemic change. This results in some effacement of the basilar  cisterns, which remain patent. There are periventricular and subcortical  white matter changes, a nonspecific finding most often seen as sequela  of chronic microvascular ischemia. Ventricles appear normal in  configuration. The calvarium is intact. There is ethmoid sinus mucosal  thickening. The mastoid air cells appear clear.       Impression:          1. Evolving ischemic change in the right cerebellar hemisphere  compatible with known PICA infarct. No evidence of hemorrhagic  transformation. There is some associated mass effect upon the basilar  cisterns.     2. No change in left subdural hemorrhage.     This report was finalized on 01/15/2020 07:38 by Dr. Som Mckenzie MD.          I have reviewed the patient's current medications.     Assessment/Plan     Active Hospital Problems    Diagnosis   • CVA (cerebral vascular accident) (CMS/HCC)   • Influenza A   • Tobacco dependence   • Acute encephalopathy   • Hypothyroidism, non-compliant with medication    • Medical non-compliance   • Bradycardia   • Chronic obstructive pulmonary disease (CMS/HCC)       Plan:  1.  CTA head and neck reviewed, \"Complete occlusion of the right vertebral artery with reconstitution at C1. Basilar artery and tip appear unremarkable. Both PCAs and superior cerebellar arteries are widely patent.\"  2.  MRI reviewed  3.  Echo reviewed,   Results for orders placed during the hospital encounter of 01/13/20   Adult Transthoracic Echo Complete W/ Cont if Necessary Per Protocol (With Agitated Saline)    Narrative · Left " ventricular systolic function is normal. Estimated EF = 65%.  · Normal right ventricular cavity size and systolic function noted.  · There is no significant (greater than mild) valvular dysfunction.         4.  ASA and atorvastatin  5.  A1c and lipid profile reviewed  6.  Lifestyle modifcations recommended including smoking cessation   7.  Neurology and neurosurgery following, appreciate assistance  8.  PT/OT/ SLP  9.  Acute rehab in a few days  10.  Continue ICU care for closer monitoring as he may require emergent decompressive craniotomy.  11.  Will resume home antihypertensives once medicine reconciliation complete as patient not reliable in helping me figure out his medicines            Discharge Planning: I expect the patient to be discharged to acute rehab in 3-4 days.    Hira Montenegro MD   01/15/20   10:01 AM

## 2020-01-16 ENCOUNTER — APPOINTMENT (OUTPATIENT)
Dept: CT IMAGING | Facility: HOSPITAL | Age: 65
End: 2020-01-16

## 2020-01-16 LAB
SODIUM BLD-SCNC: 144 MMOL/L (ref 136–145)
TESTOST FREE SERPL-MCNC: 2.7 PG/ML (ref 6.6–18.1)
TESTOST SERPL-MCNC: 389 NG/DL (ref 264–916)

## 2020-01-16 PROCEDURE — 94799 UNLISTED PULMONARY SVC/PX: CPT

## 2020-01-16 PROCEDURE — 70450 CT HEAD/BRAIN W/O DYE: CPT

## 2020-01-16 PROCEDURE — 99231 SBSQ HOSP IP/OBS SF/LOW 25: CPT | Performed by: NURSE PRACTITIONER

## 2020-01-16 PROCEDURE — 97530 THERAPEUTIC ACTIVITIES: CPT | Performed by: OCCUPATIONAL THERAPIST

## 2020-01-16 PROCEDURE — 92507 TX SP LANG VOICE COMM INDIV: CPT

## 2020-01-16 PROCEDURE — 97116 GAIT TRAINING THERAPY: CPT

## 2020-01-16 PROCEDURE — 99232 SBSQ HOSP IP/OBS MODERATE 35: CPT | Performed by: PSYCHIATRY & NEUROLOGY

## 2020-01-16 PROCEDURE — 97110 THERAPEUTIC EXERCISES: CPT

## 2020-01-16 PROCEDURE — 84295 ASSAY OF SERUM SODIUM: CPT | Performed by: NURSE PRACTITIONER

## 2020-01-16 RX ORDER — LISINOPRIL 20 MG/1
20 TABLET ORAL
Status: DISCONTINUED | OUTPATIENT
Start: 2020-01-16 | End: 2020-01-21 | Stop reason: HOSPADM

## 2020-01-16 RX ORDER — IPRATROPIUM BROMIDE AND ALBUTEROL SULFATE 2.5; .5 MG/3ML; MG/3ML
3 SOLUTION RESPIRATORY (INHALATION) EVERY 4 HOURS PRN
Status: DISCONTINUED | OUTPATIENT
Start: 2020-01-16 | End: 2020-01-18

## 2020-01-16 RX ORDER — AMLODIPINE BESYLATE 5 MG/1
5 TABLET ORAL
Status: DISCONTINUED | OUTPATIENT
Start: 2020-01-16 | End: 2020-01-17

## 2020-01-16 RX ADMIN — LEVOTHYROXINE SODIUM 125 MCG: 125 TABLET ORAL at 07:00

## 2020-01-16 RX ADMIN — LISINOPRIL 20 MG: 20 TABLET ORAL at 09:01

## 2020-01-16 RX ADMIN — AMLODIPINE BESYLATE 5 MG: 5 TABLET ORAL at 09:01

## 2020-01-16 RX ADMIN — IPRATROPIUM BROMIDE AND ALBUTEROL SULFATE 3 ML: 2.5; .5 SOLUTION RESPIRATORY (INHALATION) at 07:10

## 2020-01-16 RX ADMIN — ATORVASTATIN CALCIUM 80 MG: 40 TABLET, FILM COATED ORAL at 20:00

## 2020-01-16 RX ADMIN — ACETAMINOPHEN 650 MG: 325 TABLET, FILM COATED ORAL at 07:02

## 2020-01-16 RX ADMIN — OSELTAMIVIR PHOSPHATE 75 MG: 75 CAPSULE ORAL at 09:01

## 2020-01-16 RX ADMIN — ASPIRIN 81 MG: 81 TABLET, CHEWABLE ORAL at 09:01

## 2020-01-16 RX ADMIN — ACETAMINOPHEN 650 MG: 325 TABLET, FILM COATED ORAL at 19:39

## 2020-01-16 RX ADMIN — SODIUM CHLORIDE, PRESERVATIVE FREE 10 ML: 5 INJECTION INTRAVENOUS at 09:01

## 2020-01-16 RX ADMIN — SODIUM CHLORIDE, PRESERVATIVE FREE 10 ML: 5 INJECTION INTRAVENOUS at 20:00

## 2020-01-16 RX ADMIN — OSELTAMIVIR PHOSPHATE 75 MG: 75 CAPSULE ORAL at 21:24

## 2020-01-16 NOTE — PROGRESS NOTES
[facility] Medicine Services  INPATIENT PROGRESS NOTE    Patient Name: Reza Cruz  Date of Admission: 1/13/2020  Today's Date: 01/16/20  Length of Stay: 3  Primary Care Physician: Lobito Trevino Jr., MD    Subjective   Chief Complaint: feeling better  HPI     Patient seen and examined at bedside.  Patient overall continues to remain stable.  His speech a little bit worse this morning, but he has a mild speech pattern at baseline.  Patient's coordination remains very abnormal and he is very ataxic.  He is having some upper lobe wheezing.    Patient had poor appetite this morning he indicates, although he has eaten >80% of his meal.     Review of Systems   Constitutional: Positive for appetite change. Negative for activity change, fatigue and unexpected weight change.   Respiratory: Negative for shortness of breath and wheezing.    Cardiovascular: Negative for chest pain and palpitations.   Gastrointestinal: Negative for abdominal distention, abdominal pain, blood in stool, constipation, diarrhea, nausea and vomiting.        All pertinent negatives and positives are as above. All other systems have been reviewed and are negative unless otherwise stated.     Objective    Temp:  [98.4 °F (36.9 °C)-98.6 °F (37 °C)] 98.5 °F (36.9 °C)  Heart Rate:  [42-67] 64  Resp:  [14-28] 20  BP: (104-200)/() 200/116  Physical Exam   Constitutional: He is oriented to person, place, and time. No distress.   HENT:   Head: Normocephalic and atraumatic.   Eyes: Conjunctivae are normal. No scleral icterus.   Neck: Neck supple. No JVD present.   Cardiovascular: Normal rate and regular rhythm.   No murmur heard.  Pulmonary/Chest: Effort normal. No stridor. No respiratory distress. He has wheezes.   Abdominal: Soft. Bowel sounds are normal. He exhibits no distension. There is no tenderness. There is no guarding.   Musculoskeletal: He exhibits no edema.   Neurological: He is alert and oriented to  "person, place, and time. Coordination abnormal.   Skin: Skin is warm and dry. He is not diaphoretic. No erythema.   Psychiatric: He has a normal mood and affect. His behavior is normal.   Nursing note and vitals reviewed.          Results Review:  I have reviewed the labs, radiology results, and diagnostic studies.    Laboratory Data:   Results from last 7 days   Lab Units 01/14/20  0247 01/13/20  0747   WBC 10*3/mm3 16.57* 10.87*   HEMOGLOBIN g/dL 17.9* 19.3*   HEMATOCRIT % 52.8* 55.4*   PLATELETS 10*3/mm3 253 280        Results from last 7 days   Lab Units 01/16/20  0317 01/15/20  0809 01/14/20  0247 01/13/20  0815 01/13/20  0747   SODIUM mmol/L 144 142 142  --  138   POTASSIUM mmol/L  --   --  4.0  --  3.7   CHLORIDE mmol/L  --   --  104  --  102   CO2 mmol/L  --   --  26.0  --  25.0   BUN mg/dL  --   --  22  --  15   CREATININE mg/dL  --   --  1.15 1.10 1.03   CALCIUM mg/dL  --   --  9.6  --  9.7   BILIRUBIN mg/dL  --   --  0.6  --  0.8   ALK PHOS U/L  --   --  41  --  48   ALT (SGPT) U/L  --   --  22  --  24   AST (SGOT) U/L  --   --  22  --  24   GLUCOSE mg/dL  --   --  131*  --  130*       Culture Data:   Blood Culture   Date Value Ref Range Status   01/13/2020 No growth at less than 24 hours  Preliminary   01/13/2020 No growth at 24 hours  Preliminary       Radiology Data:   Imaging Results (Last 24 Hours)     ** No results found for the last 24 hours. **          I have reviewed the patient's current medications.     Assessment/Plan     Active Hospital Problems    Diagnosis   • CVA (cerebral vascular accident) (CMS/HCC)   • Influenza A   • Tobacco dependence   • Acute encephalopathy   • Hypothyroidism, non-compliant with medication    • Medical non-compliance   • Bradycardia   • Chronic obstructive pulmonary disease (CMS/HCC)       Plan:  1.  CTA head and neck reviewed, \"Complete occlusion of the right vertebral artery with reconstitution at C1. Basilar artery and tip appear unremarkable. Both PCAs and " "superior cerebellar arteries are widely patent.\"  2.  MRI reviewed  3.  Echo reviewed,   Results for orders placed during the hospital encounter of 01/13/20   Adult Transthoracic Echo Complete W/ Cont if Necessary Per Protocol (With Agitated Saline)    Narrative · Left ventricular systolic function is normal. Estimated EF = 65%.  · Normal right ventricular cavity size and systolic function noted.  · There is no significant (greater than mild) valvular dysfunction.      4.  ASA and atorvastatin  5.  A1c and lipid profile reviewed  6.  Lifestyle modifcations recommended including smoking cessation   7.  Neurology and neurosurgery following, appreciate assistance  8.  PT/OT/ SLP  9.  Acute rehab in a few days  10.  Continue ICU care for closer monitoring as he may require emergent decompressive craniotomy.  11.  Increase lisinopril to 20 mg, start amlodipine 5 mg     Transfer to floor tomorrow if remains stable          Discharge Planning: I expect the patient to be discharged to acute rehab in 3-4 days.    Hira Montenegro MD   01/16/20   8:09 AM  "

## 2020-01-16 NOTE — PROGRESS NOTES
Neurology Progress Note      Date of admission: 1/13/2020  7:38 AM  Date of visit: 1/16/2020    Chief Complaint:  F/u stroke     Subjective     Subjective:    Doing well this morning.  Denies a headache.  Continues to have discoordination of the right arm and leg.  Continues to have some dysarthric speech as well.  His anoop is clearly off.    Medications:  Current Facility-Administered Medications   Medication Dose Route Frequency Provider Last Rate Last Dose   • acetaminophen (TYLENOL) tablet 650 mg  650 mg Oral Q6H PRN Hira Montenegro MD   650 mg at 01/16/20 0702   • aspirin chewable tablet 81 mg  81 mg Oral Daily Hira Montenegro MD   81 mg at 01/15/20 1151    Or   • aspirin suppository 300 mg  300 mg Rectal Daily Hira Montenegro MD       • atorvastatin (LIPITOR) tablet 80 mg  80 mg Oral Nightly Hira Montenegro MD   80 mg at 01/15/20 2041   • ipratropium-albuterol (DUO-NEB) nebulizer solution 3 mL  3 mL Nebulization 4x Daily - RT Nilton Shields MD   3 mL at 01/16/20 0710   • levothyroxine (SYNTHROID, LEVOTHROID) tablet 125 mcg  125 mcg Oral Q AM Hira Montenegro MD   125 mcg at 01/16/20 0700   • lisinopril (PRINIVIL,ZESTRIL) tablet 10 mg  10 mg Oral Q24H Hira Montenegro MD   10 mg at 01/15/20 1702   • ondansetron (ZOFRAN) tablet 4 mg  4 mg Oral Q6H PRN Hira Montenegro MD        Or   • ondansetron (ZOFRAN) injection 4 mg  4 mg Intravenous Q6H PRN Hira Montenegro MD   4 mg at 01/14/20 1024   • oseltamivir (TAMIFLU) capsule 75 mg  75 mg Oral Q12H Hira Montenegro MD   75 mg at 01/15/20 2041   • sodium chloride 0.9 % flush 10 mL  10 mL Intravenous PRN Hira Montenegro MD       • sodium chloride 0.9 % flush 10 mL  10 mL Intravenous Q12H Hira Montenegro MD   10 mL at 01/15/20 2042   • sodium chloride 0.9 % flush 10 mL  10 mL Intravenous PRN Hira Montenegro MD           Review of Systems:   -A 14 point review of systems is completed and is negative except for  headache    Objective     Objective      Vital Signs  Temp:  [98.4 °F (36.9 °C)-98.6 °F (37 °C)] 98.5 °F (36.9 °C)  Heart Rate:  [42-67] 64  Resp:  [14-28] 20  BP: (104-200)/() 200/116    Physical Exam:    HEENT:  Neck supple  CVS:  Regular rate and rhythm.  No murmurs  Carotid Examination:  No bruits  Lungs:  Clear to auscultation  Abdomen:  Non-tender, Non-distended  Extremities:  No signs of peripheral edema    Neurologic Exam:    -Awake, Alert, Some cognitive slowing--unclear if this is his baseline  -No word finding difficulties  -No aphasia  -Atypical anoop of speech.  -Follows simple and complex commands    Cranial nerves II through XII intact.  EOMI  No facial weakness    Motor: (strength out of 5:  1= minimal movement, 2 = movement in plane of gravity, 3 = movement against gravity, 4 = movement against some resistance, 5 = full strength)    -Right Upper Ext: Proximal: 5 Distal: 5  -Left Upper Ext: Proximal: 5 Distal: 5    -Right Lower Ext: Proximal: 5 Distal: 5  -Left Lower Ext: Proximal: 5 Distal: 5    DTR:  2+ throughout in all four extremities    Sensory:  -Intact to light touch, pinprick, temperature, pain, and proprioception    Coordination/Gait:  -Finger nose is dysmetric on the right and normal on the left and fine finger movement is slow and incoordinated on the right.  Heel to shin has some ataxia on right and is normal on the left  Results Review:    I reviewed the patient's new clinical results.    Lab Results (last 24 hours)     Procedure Component Value Units Date/Time    Sodium [[id number]]  (Normal) Collected:  01/16/20 0317    Specimen:  Blood Updated:  01/16/20 0352     Sodium 144 mmol/L     Urine Culture - Urine, Urine, Catheter In/Out [[id number]] Collected:  01/13/20 1838    Specimen:  Urine, Catheter In/Out Updated:  01/15/20 1309     Urine Culture >100,000 CFU/mL Mixed Chikis Isolated    Narrative:       Specimen contains mixed organisms of questionable pathogenicity which  indicates contamination with commensal beatriz.  Further identification is unlikely to provide clinically useful information.  Suggest recollection.    Extra Tubes [[id number]] Collected:  01/15/20 0810    Specimen:  Blood, Venous Line Updated:  01/15/20 1004    Narrative:       The following orders were created for panel order Extra Tubes.  Procedure                               Abnormality         Status                     ---------                               -----------         ------                     Lavender Top[[id number]]                                     Final result                 Please view results for these tests on the individual orders.    Lavender Top [[id number]] Collected:  01/15/20 0810    Specimen:  Blood Updated:  01/15/20 1004     Extra Tube hold for add-on     Comment: Auto resulted       Blood Culture - Blood, Arm, Right [[id number]] Collected:  01/13/20 0824    Specimen:  Blood from Arm, Right Updated:  01/15/20 0959     Blood Culture No growth at 2 days    Sodium [[id number]]  (Normal) Collected:  01/15/20 0809    Specimen:  Blood Updated:  01/15/20 0836     Sodium 142 mmol/L     Blood Culture - Blood, Arm, Right [[id number]] Collected:  01/13/20 0747    Specimen:  Blood from Arm, Right Updated:  01/15/20 0831     Blood Culture No growth at 2 days        Imaging Results (Last 24 Hours)     ** No results found for the last 24 hours. **        Personal review of repeat Head Ct and comparing it to the head Ct from 1/13/20 shows increased edema of right cerebellar stroke and compression of 4 th ventricle (it is much smaller) This is an evolving stroke  Still can see SDH  Head CT 1/15/2019            Compare to Head CT 1/13/2020:      Assessment/Plan     Hospital Problem List      Chronic obstructive pulmonary disease (CMS/HCC)    CVA (cerebral vascular accident) (CMS/[facility])    Influenza A    Tobacco dependence    Acute encephalopathy    Hypothyroidism, non-compliant with medication     Medical  non-compliance    Bradycardia    Impression:  1. Acute right cerebellar stroke which is evolving and showing greater edema compared to study 1/13/20  No hemorrhagic component seen on Head CT as was seen on MRI  2.SDH is still present and appears fairly acute or recent but is not worsening  3. Still a risk for requiring decompression and will need close Neuro monitoring  4. Hypothyroid  5. Cognitive slowing since admission  Unknown baseline  6. Normal hemoglobin A1C  7. LDL of 93 and goal of < 70  8. Headache--improved    Plan:  · Repeat head CT today  · If shows stability or improvement will likely transfer to floor bed with continued every 4 hour neurochecks  · Will likely require acute rehabilitation    Jamari Villasenor MD  01/16/20  8:00 AM

## 2020-01-16 NOTE — PLAN OF CARE
Problem: Patient Care Overview  Goal: Plan of Care Review  Flowsheets  Taken 1/16/2020 1125  Plan of Care Reviewed With: patient  Taken 1/16/2020 1020  Outcome Summary: OT tx completed. Pt provided and completed FM and GM Coordination HEP to address impaired coordination in R UE. Pt educated on use of HEP and importance of inclusion of R UE in adl tasks for neuro-reeducation. Continue OT POC to address deficits. D/c to acute IP rehab.

## 2020-01-16 NOTE — PLAN OF CARE
Problem: Patient Care Overview  Goal: Plan of Care Review  Outcome: Ongoing (interventions implemented as appropriate)  Flowsheets (Taken 1/16/2020 1018)  Plan of Care Reviewed With: patient  Outcome Summary: Pt participated with unstructured cognitive-linguistic treatment. Pt exhibits neologisms and difficulty with word-finding as well as dysarthric speech. He was able to name up to 5 items in a category with divergent thinking, but was sometimes unable to name any, despite max cues. The pt was also able to provide personal information such as his town of residence, surrounding locations, and hobbies, although he did require extra time due to word-finding difficulty. SLP will continue to follow and treat for the above concerns.

## 2020-01-16 NOTE — PLAN OF CARE
Problem: Patient Care Overview  Goal: Plan of Care Review  Flowsheets  Taken 1/15/2020 1342 by Christina Wright MS CCC-SLP  Progress: improving  Taken 1/16/2020 0954 by Kalyn Paz PTA Student  Plan of Care Reviewed With: patient (Pended)  Outcome Summary: Pt continues to be very motivated to improve and walk independently. He completed two sets of walking at 84 ft. each, with a total of 4 rest breaks and one seated rest break with exercise between sets. His BP dropped slightly by end of session from 140/92 to 132/99, stating he was tired and his head hurt. During ambulation, his midline orientation is improved, as demonstrated by reduced frequency of verbal cues and less dependence on lead therapist. However, as he begins to fatigue, dependence on therapist to R increases. Today, the pt demonstrated independence with sit > stand and sit > supine (martin's). He continues to be CGA for stand > sit, which could be d/t fatigue. During stand pivot transfer from chair to bed, he was Karri x 2, which could have also been d/t fatigue. Pt would benefit from continued skilled therapeutic intervention to improve midline orientation and increase strength and endurance to facilitate safety with transfers and independence with ADLs.

## 2020-01-16 NOTE — PLAN OF CARE
VSS at this time, SB 40-50s. Patient alert and oriented X4, NIH remains consistent at 4. Speech is slurred but logical. No neuro changes noted from previous assessments. Patient remains on room air and is being treated for Flu A. Will monitor closely

## 2020-01-16 NOTE — THERAPY TREATMENT NOTE
Acute Care - Occupational Therapy Treatment Note  [facility]     Patient Name: Reza Cruz  : 1955  MRN: [id number]  Today's Date: 2020  Onset of Illness/Injury or Date of Surgery: 20  Date of Referral to OT: 20  Referring Physician: Dr. Montenegro    Admit Date: 2020       ICD-10-CM ICD-9-CM   1. Dysphagia, unspecified type R13.10 787.20   2. Influenza A J10.1 487.1   3. Elevated TSH R79.89 794.5   4. Cerebrovascular accident (CVA), unspecified mechanism (CMS/HCC) I63.9 434.91   5. Tobacco abuse Z72.0 305.1   6. Impaired mobility Z74.09 799.89   7. Impaired mobility and ADLs Z74.09 799.89   8. Cognitive and neurobehavioral dysfunction F09 294.9    F07.89 310.1     Patient Active Problem List   Diagnosis   • Pneumonia of right upper lobe due to infectious organism (CMS/[facility])   • Impetigo   • Simple chronic bronchitis (CMS/HCC)   • Chronic obstructive pulmonary disease (CMS/HCC)   • Cough   • Personal history of nicotine dependence   • CVA (cerebral vascular accident) (CMS/HCC)   • Influenza A   • Tobacco dependence   • Acute encephalopathy   • Hypothyroidism, non-compliant with medication    • Medical non-compliance   • Bradycardia     Past Medical History:   Diagnosis Date   • COPD (chronic obstructive pulmonary disease) (CMS/[facility])    • Hyperlipidemia    • Hypertension      Past Surgical History:   Procedure Laterality Date   • EYE SURGERY Right    • KNEE SURGERY Right        Therapy Treatment    Rehabilitation Treatment Summary     Row Name 20 1020 20 0947          Treatment Time/Intention    Discipline  occupational therapist  -CHARLY  speech language pathologist  -MB     Document Type  --  therapy note (daily note)  -MB     Subjective Information  no complaints  -CHARLY  complains of;pain  -MB     Mode of Treatment  occupational therapy  -CHARLY  speech-language pathology  -MB     Patient/Family Observations  --  No family present  -MB     Therapy Frequency (OT Eval)  5 times/wk  -CHARLY  --      Patient Effort  good  -JJ  adequate  -MB     Existing Precautions/Restrictions  fall  -JJ  --     Recorded by [JJ] Roxie Magaña OTR/L 01/16/20 1120 [MB] Jo Ann Ramirez CCC-SLP 01/16/20 1015     Row Name 01/16/20 1020             ADL Assessment/Intervention    BADL Assessment/Intervention  toileting  -JJ      Recorded by [JJ] Roxie Magaña OTR/L 01/16/20 1120      Row Name 01/16/20 1020             Toileting Assessment/Training    Comment (Toileting)  Pt reports independently able to complete all aspects of toileting this am.   -JJ      Recorded by [JJ] Roxie Magaña OTR/L 01/16/20 1120      Row Name 01/16/20 1020             Motor Skills Assessment/Interventions    Additional Documentation  Motor Coordination Assessment/Training (Group)  -JJ      Recorded by [JJ] Roxie Magaña OTR/L 01/16/20 1120      Row Name 01/16/20 1020             Motor Coordination Assessment/Training    Comment, Fine Motor Coordination Training  Pt provided with FM and GM Coordination HEP. Pt completed GMC task of tossing small ball between R and L hand with 2/5 accurcy. FMC tasks completed of opening twist-top bottles, flip top lids or varying sizes. Pt demo'd increase accurcy with FMC tasks. Pt educated on importance of use of R hand during self-feeding and adl tasks for increasing FM/GM coordination.   -JJ      Recorded by [JJ] Roxie Magaña OTR/L 01/16/20 1120      Row Name 01/16/20 0947             Pain Scale: Numbers Pre/Post-Treatment    Pain Location  head  -MB      Recorded by [MB] Jo Ann Ramirez CCC-SLP 01/16/20 1015      Row Name 01/16/20 0947             Pain Scale: FACES Pre/Post-Treatment    Pain: FACES Scale, Pretreatment  2-->hurts little bit  -MB      Recorded by [MB] Jo Ann Ramirez CCC-SLP 01/16/20 1015      Row Name 01/16/20 1020             Plan of Care Review    Plan of Care Reviewed With  patient  -JJ      Outcome Summary  OT tx completed. Pt provided and completed FM and  GM Coordination HEP to address impaired coordination in R UE. Pt educated on use of HEP and importance of inclusion of R UE in adl tasks for neuro-reeducation. Continue OT POC to address deficits. D/c to acute IP rehab.   -JJ      Recorded by [JJ] Roxie Magaña, OTR/L 01/16/20 1125      Row Name 01/16/20 1020             Outcome Summary/Treatment Plan (OT)    Daily Summary of Progress (OT)  progress toward functional goals is good  -JJ      Barriers to Overall Progress (OT)  coordination  -JJ      Plan for Continued Treatment (OT)  continue OT POC  -JJ      Anticipated Discharge Disposition (OT)  inpatient rehabilitation facility  -JJ      Recorded by [JJ] Roxie Magaña OTR/L 01/16/20 1125      Row Name 01/16/20 0947             Outcome Summary/Treatment Plan (SLP)    Daily Summary of Progress (SLP)  progress towards functional goals is fair  -MB      Barriers to Overall Progress (SLP)  none  -MB      Plan for Continued Treatment (SLP)  Continue to follow  -MB      Anticipated Dischage Disposition  unknown  -MB      Recorded by [MB] Jo Ann Ramirez CCC-SLP 01/16/20 1015        User Key  (r) = Recorded By, (t) = Taken By, (c) = Cosigned By    Initials Name Effective Dates Discipline    Jo Ann Valero CCC-SLP 08/02/16 -  SLP    J Roxie Magaña OTR/L 10/12/18 -  OT           Rehab Goal Summary     Row Name 01/16/20 0947             Comprehend Questions Goal 1 (SLP)    Improve Ability to Comprehend Questions Goal 1 (SLP)  complex yes/no questions;90%  -MB      Time Frame (Comprehend Questions Goal 1, SLP)  by discharge  -MB      Progress/Outcomes (Comprehend Questions Goal 1, SLP)  goal ongoing  -MB         Follow Directions Goal 2 (SLP)    Improve Ability to Follow Directions Goal 1 (SLP)  2 step commands;90%  -MB      Time Frame (Follow Directions Goal 1, SLP)  by discharge  -MB      Progress/Outcomes (Follow Directions Goal 1, SLP)  goal ongoing  -MB         Connected Speech to Express  Thoughts Goal 1 (SLP)    Improve Narrative Discourse to Express Thoughts By Goal 1 (SLP)  describing a picture;90%  -MB      Time Frame (Connected Speech Goal 1, SLP)  by discharge  -MB      Progress/Outcomes (Connected Speech Goal 1, SLP)  goal ongoing  -MB         Articulation Goal 1 (SLP)    Improve Articulation Goal 1 (SLP)  by over-articulating in connected speech;90%  -MB      Time Frame (Articulation Goal 1, SLP)  by discharge  -MB      Progress/Outcomes (Articulation Goal 1, SLP)  goal ongoing  -MB         Attention Goal 1 (SLP)    Improve Attention by Goal 1 (SLP)  complete selective attention task;90%  -MB      Time Frame (Attention Goal 1, SLP)  by discharge  -MB      Progress/Outcomes (Attention Goal 1, SLP)  goal ongoing  -MB         Organizational Skills Goal 1 (SLP)    Improve Thought Organization Through Goal 1 (SLP)  completing a divergent naming task;completing a convergent naming task;abstract;90%  -MB      Time Frame (Thought Organization Skills Goal 1, SLP)  by discharge  -MB      Barriers (Thought Organization Skills Goal 1, SLP)  dysarthria  -MB      Progress (Thought Organization Skills Goal 1, SLP)  50%;with moderate cues (50-74%)  -MB      Progress/Outcomes (Thought Organization Skills Goal 1, SLP)  continuing progress toward goal  -MB        User Key  (r) = Recorded By, (t) = Taken By, (c) = Cosigned By    Initials Name Provider Type Discipline    Jo Ann Valero, CCC-SLP Speech and Language Pathologist SLP            OT Recommendation and Plan  Outcome Summary/Treatment Plan (OT)  Daily Summary of Progress (OT): progress toward functional goals is good  Barriers to Overall Progress (OT): coordination  Plan for Continued Treatment (OT): continue OT POC  Anticipated Discharge Disposition (OT): inpatient rehabilitation facility  Planned Therapy Interventions (OT Eval): activity tolerance training, BADL retraining, functional balance retraining, neuromuscular control/coordination  retraining, orthotic fabrication/fitting/training, patient/caregiver education/training, transfer/mobility retraining  Therapy Frequency (OT Eval): 5 times/wk  Daily Summary of Progress (OT): progress toward functional goals is good  Plan of Care Review  Plan of Care Reviewed With: patient  Plan of Care Reviewed With: patient  Outcome Summary: OT tx completed. Pt provided and completed FM and GM Coordination HEP to address impaired coordination in R UE. Pt educated on use of HEP and importance of inclusion of R UE in adl tasks for neuro-reeducation. Continue OT POC to address deficits. D/c to acute IP rehab.   Outcome Measures     Row Name 01/16/20 1100 01/15/20 1200 01/14/20 0800       How much help from another is currently needed...    Putting on and taking off regular lower body clothing?  4  -JJ  4  -JJ  4  -JJ    Bathing (including washing, rinsing, and drying)  3  -JJ  3  -JJ  3  -JJ    Toileting (which includes using toilet bed pan or urinal)  4  -JJ  4  -JJ  3  -JJ    Putting on and taking off regular upper body clothing  3  -JJ  3  -JJ  3  -JJ    Taking care of personal grooming (such as brushing teeth)  4  -JJ  4  -JJ  3  -JJ    Eating meals  4  -JJ  4  -JJ  3  -JJ    AM-PAC 6 Clicks Score (OT)  22  -JJ  22  -JJ  19  -JJ       Functional Assessment    Outcome Measure Options  AM-PAC 6 Clicks Daily Activity (OT)  -JJ  AM-PAC 6 Clicks Daily Activity (OT)  -JJ  AM-PAC 6 Clicks Daily Activity (OT)  -JJ      User Key  (r) = Recorded By, (t) = Taken By, (c) = Cosigned By    Initials Name Provider Type    Roxie Garvey OTR/L Occupational Therapist           Time Calculation:   Time Calculation- OT     Row Name 01/16/20 1122 01/16/20 0859          Time Calculation- OT    OT Start Time  1020  -JJ  --     OT Stop Time  1044  -JJ  --     OT Time Calculation (min)  24 min  -J  --     Total Timed Code Minutes- OT  24 minute(s)  -  --     OT Received On  01/16/20  -  --        Timed Charges    00482 -  Gait Training Minutes   --  16  (Pended)   -MN       User Key  (r) = Recorded By, (t) = Taken By, (c) = Cosigned By    Initials Name Provider Type    Roxie Garvey OTR/L Occupational Therapist    Kalyn Holland, PHILLY Student PTA Student        Therapy Charges for Today     Code Description Service Date Service Provider Modifiers Qty    83040546475 HC OT THERAPEUTIC ACT EA 15 MIN 1/15/2020 Roxie Magaña OTR/L GO 1    84172603163 HC OT SELF CARE/MGMT/TRAIN EA 15 MIN 1/15/2020 Roxie Magaña OTR/L GO 2    85473834405 HC OT THERAPEUTIC ACT EA 15 MIN 1/16/2020 Roxie Magaña OTR/L GO 2               RACIEL Trejo/MELANIE  1/16/2020

## 2020-01-16 NOTE — PROGRESS NOTES
NEUROSURGERY DAILY PROGRESS NOTE    ASSESSMENT:   Reza Cruz is a 64 y.o. with a significant comorbidity hypertension, hyperlipidemia.  He presents with a new problem of difficulty with gait and coordination of the right upper extremity and right hemibody numbness. Physical exam findings of right-sided upper and lower extremity ataxia and scanning speech.  Their imaging shows right cerebellar stroke.    DDX:     Chronic obstructive pulmonary disease (CMS/HCC)    CVA (cerebral vascular accident) (CMS/HCC)    Influenza A    Tobacco dependence    Acute encephalopathy    Hypothyroidism, non-compliant with medication     Medical non-compliance    Bradycardia    Patient Active Problem List   Diagnosis   • Pneumonia of right upper lobe due to infectious organism (CMS/HCC)   • Impetigo   • Simple chronic bronchitis (CMS/HCC)   • Chronic obstructive pulmonary disease (CMS/HCC)   • Cough   • Personal history of nicotine dependence   • CVA (cerebral vascular accident) (CMS/HCC)   • Influenza A   • Tobacco dependence   • Acute encephalopathy   • Hypothyroidism, non-compliant with medication    • Medical non-compliance   • Bradycardia     HPI: Resting comfortably.  No complaints of pain.  No acute events overnight.    PLAN:   Neuro: Stable.  Relatively unchanged   Dysarthric speech    Follows commands.  Names objects.    Recommendations per Dr. Dahl:   Based on most recent CT of the head, Mr. Cruz should remain in ICU for at least 1 additional day for close neurologic monitoring.   Avoid hyponatremia, keep sodium > 140; Na currently 144   Agree with anticoagulation per neurology's recommendation.   No surgical intervention required at this time.   We will continue to follow as needed.    CHIEF COMPLAINT:   Difficulty with gait and coordination of the right upper extremity and right hemibody numbness    Subjective  Symptoms stable.  Doing well    Temp:  [98.4 °F (36.9 °C)-98.6 °F (37 °C)] 98.5 °F (36.9 °C)  Heart Rate:   "[42-67] 64  Resp:  [14-28] 20  BP: (104-200)/() 200/116    Objective:  General Appearance:  Comfortable, well-appearing, in no acute distress and not in pain.    Vital signs: (most recent): Blood pressure (!) 200/116, pulse 64, temperature 98.5 °F (36.9 °C), temperature source Axillary, resp. rate 20, height 182.9 cm (72.01\"), weight 80.2 kg (176 lb 11.2 oz), SpO2 99 %.      Neurologic Exam     Mental Status   Oriented to person, place, and time.   Attention: normal. Concentration: normal.   Speech: slurred   Level of consciousness: alert    Bright and awake.  Follows commands.  Shows thumbs up and 2 fingers bilaterally.     Cranial Nerves     CN II   Visual fields full to confrontation.     CN III, IV, VI   Pupils are equal, round, and reactive to light.  Extraocular motions are normal.   Nystagmus: none   Diplopia: none    CN V   Right facial sensation deficit: complete    CN VII   Right facial weakness: none  Left facial weakness: none    CN VIII   CN VIII normal.     CN IX, X   CN IX normal.     CN XI   CN XI normal.     CN XII   CN XII normal.     Motor Exam   Muscle bulk: normal  Overall muscle tone: normal  Right arm pronator drift: absent  Left arm pronator drift: absent    Strength   Strength 5/5 throughout.   Moves all extremities     Sensory Exam   Right arm light touch: decreased from elbow  Left arm light touch: normal  Right leg light touch: decreased from knee  Left leg light touch: normal    Gait, Coordination, and Reflexes     Tremor   Resting tremor: absent  Intention tremor: absent    Drains: * No LDAs found *    Imaging Results (Last 24 Hours)     ** No results found for the last 24 hours. **        Lab Results (last 24 hours)     Procedure Component Value Units Date/Time    Blood Culture - Blood, Arm, Right [[id number]] Collected:  01/13/20 0747    Specimen:  Blood from Arm, Right Updated:  01/16/20 0831     Blood Culture No growth at 3 days    Sodium [[id number]]  (Normal) Collected:  " 01/16/20 0317    Specimen:  Blood Updated:  01/16/20 0352     Sodium 144 mmol/L     Urine Culture - Urine, Urine, Catheter In/Out [[id number]] Collected:  01/13/20 1838    Specimen:  Urine, Catheter In/Out Updated:  01/15/20 1309     Urine Culture >100,000 CFU/mL Mixed Beatriz Isolated    Narrative:       Specimen contains mixed organisms of questionable pathogenicity which indicates contamination with commensal beatriz.  Further identification is unlikely to provide clinically useful information.  Suggest recollection.    Extra Tubes [[id number]] Collected:  01/15/20 0810    Specimen:  Blood, Venous Line Updated:  01/15/20 1004    Narrative:       The following orders were created for panel order Extra Tubes.  Procedure                               Abnormality         Status                     ---------                               -----------         ------                     Lavender Top[[id number]]                                     Final result                 Please view results for these tests on the individual orders.    Lavender Top [[id number]] Collected:  01/15/20 0810    Specimen:  Blood Updated:  01/15/20 1004     Extra Tube hold for add-on     Comment: Auto resulted       Blood Culture - Blood, Arm, Right [[id number]] Collected:  01/13/20 0824    Specimen:  Blood from Arm, Right Updated:  01/15/20 0959     Blood Culture No growth at 2 days        [id number]  Julian Virk, APRN

## 2020-01-16 NOTE — THERAPY TREATMENT NOTE
Acute Care - Physical Therapy Treatment Note  [facility]     Patient Name: Reza Cruz  : 1955  MRN: [id number]  Today's Date: 2020  Onset of Illness/Injury or Date of Surgery: 20     Referring Physician: Dr. Montenegro    Admit Date: 2020    Visit Dx:    ICD-10-CM ICD-9-CM   1. Dysphagia, unspecified type R13.10 787.20   2. Influenza A J10.1 487.1   3. Elevated TSH R79.89 794.5   4. Cerebrovascular accident (CVA), unspecified mechanism (CMS/[facility]) I63.9 434.91   5. Tobacco abuse Z72.0 305.1   6. Impaired mobility Z74.09 799.89   7. Impaired mobility and ADLs Z74.09 799.89   8. Cognitive and neurobehavioral dysfunction F09 294.9    F07.89 310.1     Patient Active Problem List   Diagnosis   • Pneumonia of right upper lobe due to infectious organism (CMS/[facility])   • Impetigo   • Simple chronic bronchitis (CMS/[facility])   • Chronic obstructive pulmonary disease (CMS/[facility])   • Cough   • Personal history of nicotine dependence   • CVA (cerebral vascular accident) (CMS/[facility])   • Influenza A   • Tobacco dependence   • Acute encephalopathy   • Hypothyroidism, non-compliant with medication    • Medical non-compliance   • Bradycardia       Therapy Treatment                     PT Recommendation and Plan     Plan of Care Reviewed With: (P) patient  Outcome Summary: (P) Pt continues to be very motivated to improve. He completed two sets of walking at 84 ft. each, with a total of 4 rest breaks. His BP dropped slightly by end of session from 140/92 to 132/99, stating he was tired and his head hurt.  Outcome Measures     Row Name 01/15/20 1200 20 0800          How much help from another is currently needed...    Putting on and taking off regular lower body clothing?  4  -JJ  4  -JJ     Bathing (including washing, rinsing, and drying)  3  -JJ  3  -JJ     Toileting (which includes using toilet bed pan or urinal)  4  -JJ  3  -JJ     Putting on and taking off regular upper body clothing  3  -JJ  3  -JJ     Taking care of  personal grooming (such as brushing teeth)  4  -JJ  3  -JJ     Eating meals  4  -JJ  3  -JJ     AM-PAC 6 Clicks Score (OT)  22  -JJ  19  -JJ        Functional Assessment    Outcome Measure Options  AM-PAC 6 Clicks Daily Activity (OT)  -JJ  AM-PAC 6 Clicks Daily Activity (OT)  -JJ       User Key  (r) = Recorded By, (t) = Taken By, (c) = Cosigned By    Initials Name Provider Type    Roxie Garvey, OTR/L Occupational Therapist         Time Calculation:   PT Charges     Row Name 01/16/20 0859             Time Calculation    Start Time  0859  (Pended)   -MN      Stop Time  0925  (Pended)   -MN      Time Calculation (min)  26 min  (Pended)   -MN      PT Received On  01/16/20  (Pended)   -MN      PT Goal Re-Cert Due Date  01/04/20  (Pended)   -MN         Time Calculation- PT    Total Timed Code Minutes- PT  26 minute(s)  (Pended)   -MN         Timed Charges    31272 - PT Therapeutic Exercise Minutes  10  (Pended)   -MN      52003 - Gait Training Minutes   16  (Pended)   -MN        User Key  (r) = Recorded By, (t) = Taken By, (c) = Cosigned By    Initials Name Provider Type    Kalyn Holland, PTA Student PTA Student            PT G-Codes  Outcome Measure Options: AM-PAC 6 Clicks Daily Activity (OT)  AM-PAC 6 Clicks Score (PT): 13  AM-PAC 6 Clicks Score (OT): 22    Kalyn Paz PTA Student  1/16/2020

## 2020-01-16 NOTE — THERAPY TREATMENT NOTE
Acute Care - Speech Language Pathology Treatment Note  [facility]     Patient Name: Reza Cruz  : 1955  MRN: [id number]  Today's Date: 2020         Admit Date: 2020  Pt participated with unstructured cognitive-linguistic treatment. Pt exhibits neologisms and difficulty with word-finding as well as dysarthric speech. He was able to name up to 5 items in a category with divergent thinking, but was sometimes unable to name any, despite max cues. The pt was also able to provide personal information such as his town of residence, surrounding locations, and hobbies, although he did require extra time due to word-finding difficulty. SLP will continue to follow and treat for the above concerns.  Jo Ann Ramirez, CCC-SLP 2020 10:37 AM    Visit Dx:      ICD-10-CM ICD-9-CM   1. Dysphagia, unspecified type R13.10 787.20   2. Influenza A J10.1 487.1   3. Elevated TSH R79.89 794.5   4. Cerebrovascular accident (CVA), unspecified mechanism (CMS/HCC) I63.9 434.91   5. Tobacco abuse Z72.0 305.1   6. Impaired mobility Z74.09 799.89   7. Impaired mobility and ADLs Z74.09 799.89   8. Cognitive and neurobehavioral dysfunction F09 294.9    F07.89 310.1     Patient Active Problem List   Diagnosis   • Pneumonia of right upper lobe due to infectious organism (CMS/HCC)   • Impetigo   • Simple chronic bronchitis (CMS/HCC)   • Chronic obstructive pulmonary disease (CMS/HCC)   • Cough   • Personal history of nicotine dependence   • CVA (cerebral vascular accident) (CMS/HCC)   • Influenza A   • Tobacco dependence   • Acute encephalopathy   • Hypothyroidism, non-compliant with medication    • Medical non-compliance   • Bradycardia        Therapy Treatment  Rehabilitation Treatment Summary     Row Name 20 0947             Treatment Time/Intention    Discipline  speech language pathologist  -MB      Document Type  therapy note (daily note)  -MB      Subjective Information  complains of;pain  -MB      Mode of  Treatment  speech-language pathology  -MB      Patient/Family Observations  No family present  -MB      Patient Effort  adequate  -MB      Recorded by [MB] Jo Ann Ramirez CCC-SLP 01/16/20 1015      Row Name 01/16/20 0947             Pain Scale: Numbers Pre/Post-Treatment    Pain Location  head  -MB      Recorded by [MB] Jo Ann Ramirez CCC-SLP 01/16/20 1015      Row Name 01/16/20 0947             Pain Scale: FACES Pre/Post-Treatment    Pain: FACES Scale, Pretreatment  2-->hurts little bit  -MB      Recorded by [MB] Jo Ann Ramirez CCC-SLP 01/16/20 1015      Row Name 01/16/20 0947             Outcome Summary/Treatment Plan (SLP)    Daily Summary of Progress (SLP)  progress towards functional goals is fair  -MB      Barriers to Overall Progress (SLP)  none  -MB      Plan for Continued Treatment (SLP)  Continue to follow  -MB      Anticipated Dischage Disposition  unknown  -MB      Recorded by [MB] Jo Ann Ramirez CCC-SLP 01/16/20 1015        User Key  (r) = Recorded By, (t) = Taken By, (c) = Cosigned By    Initials Name Effective Dates Discipline    MB Jo Ann Ramirez, CCC-SLP 08/02/16 -  SLP          EDUCATION  The patient has been educated in the following areas:   Cognitive Impairment Communication Impairment.    SLP Recommendation and Plan  Daily Summary of Progress (SLP): progress towards functional goals is fair  Barriers to Overall Progress (SLP): none  Plan for Continued Treatment (SLP): Continue to follow  Anticipated Dischage Disposition: unknown             SLP GOALS     Row Name 01/16/20 0947 01/15/20 1328 01/14/20 0955       Oral Nutrition/Hydration Goal 1 (SLP)    Oral Nutrition/Hydration Goal 1, SLP  --  LTG: Patient will tolerate LRD without s/s of aspiration.  -MM  --    Time Frame (Oral Nutrition/Hydration Goal 1, SLP)  --  by discharge  -MM  --    Barriers (Oral Nutrition/Hydration Goal 1, SLP)  --  n/a  -MM  --    Progress/Outcomes (Oral Nutrition/Hydration Goal 1, SLP)  --   goal met  -MM  --       Comprehend Questions Goal 1 (SLP)    Improve Ability to Comprehend Questions Goal 1 (SLP)  complex yes/no questions;90%  -MB  --  complex yes/no questions;90%  -TM    Time Frame (Comprehend Questions Goal 1, SLP)  by discharge  -MB  --  by discharge  -TM    Progress/Outcomes (Comprehend Questions Goal 1, SLP)  goal ongoing  -MB  --  goal ongoing  -TM       Follow Directions Goal 2 (SLP)    Improve Ability to Follow Directions Goal 1 (SLP)  2 step commands;90%  -MB  --  2 step commands;90%  -TM    Time Frame (Follow Directions Goal 1, SLP)  by discharge  -MB  --  by discharge  -TM    Progress/Outcomes (Follow Directions Goal 1, SLP)  goal ongoing  -MB  --  goal ongoing  -TM       Connected Speech to Express Thoughts Goal 1 (SLP)    Improve Narrative Discourse to Express Thoughts By Goal 1 (SLP)  describing a picture;90%  -MB  --  describing a picture;90%  -TM    Time Frame (Connected Speech Goal 1, SLP)  by discharge  -MB  --  by discharge  -TM    Progress/Outcomes (Connected Speech Goal 1, SLP)  goal ongoing  -MB  --  goal ongoing  -TM       Articulation Goal 1 (SLP)    Improve Articulation Goal 1 (SLP)  by over-articulating in connected speech;90%  -MB  --  by over-articulating in connected speech;90%  -TM    Time Frame (Articulation Goal 1, SLP)  by discharge  -MB  --  by discharge  -TM    Progress/Outcomes (Articulation Goal 1, SLP)  goal ongoing  -MB  --  goal ongoing  -TM       Attention Goal 1 (SLP)    Improve Attention by Goal 1 (SLP)  complete selective attention task;90%  -MB  --  complete selective attention task;90%  -TM    Time Frame (Attention Goal 1, SLP)  by discharge  -MB  --  by discharge  -TM    Progress/Outcomes (Attention Goal 1, SLP)  goal ongoing  -MB  --  goal ongoing  -TM       Organizational Skills Goal 1 (SLP)    Improve Thought Organization Through Goal 1 (SLP)  completing a divergent naming task;completing a convergent naming task;abstract;90%  -MB  --  completing  a divergent naming task;completing a convergent naming task;abstract;90%  -TM    Time Frame (Thought Organization Skills Goal 1, SLP)  by discharge  -MB  --  by discharge  -TM    Barriers (Thought Organization Skills Goal 1, SLP)  dysarthria  -MB  --  --    Progress (Thought Organization Skills Goal 1, SLP)  50%;with moderate cues (50-74%)  -MB  --  --    Progress/Outcomes (Thought Organization Skills Goal 1, SLP)  continuing progress toward goal  -MB  --  goal ongoing  -TM    Row Name 01/13/20 1225             Oral Nutrition/Hydration Goal 1 (SLP)    Oral Nutrition/Hydration Goal 1, SLP  LTG: Patient will tolerate LRD without s/s of aspiration.  -MM      Time Frame (Oral Nutrition/Hydration Goal 1, SLP)  by discharge  -MM      Barriers (Oral Nutrition/Hydration Goal 1, SLP)  n/a  -MM      Progress/Outcomes (Oral Nutrition/Hydration Goal 1, SLP)  goal ongoing  -MM        User Key  (r) = Recorded By, (t) = Taken By, (c) = Cosigned By    Initials Name Provider Type    Jo Ann Valero CCC-SLP Speech and Language Pathologist     Keila Brooks CCC-SLP Speech and Language Pathologist    MM Christina Wright, MS CCC-SLP Speech and Language Pathologist              Time Calculation:     Time Calculation- SLP     Row Name 01/16/20 1032             Time Calculation- SLP    SLP Start Time  0947  -MB      SLP Stop Time  1005  -MB      SLP Time Calculation (min)  18 min  -MB      SLP Received On  01/16/20  -MB        User Key  (r) = Recorded By, (t) = Taken By, (c) = Cosigned By    Initials Name Provider Type    Jo Ann Valero CCC-SLP Speech and Language Pathologist          Therapy Charges for Today     Code Description Service Date Service Provider Modifiers Qty    35640085662  ST TREATMENT SPEECH 1 1/16/2020 Jo Ann Ramirez CCC-KIZZY GN 1                     Jo Ann LOUIS. TAM Ramirez  1/16/2020

## 2020-01-17 ENCOUNTER — APPOINTMENT (OUTPATIENT)
Dept: CT IMAGING | Facility: HOSPITAL | Age: 65
End: 2020-01-17

## 2020-01-17 LAB
ANION GAP SERPL CALCULATED.3IONS-SCNC: 8 MMOL/L (ref 5–15)
BUN BLD-MCNC: 17 MG/DL (ref 8–23)
BUN/CREAT SERPL: 21 (ref 7–25)
CALCIUM SPEC-SCNC: 9.1 MG/DL (ref 8.6–10.5)
CHLORIDE SERPL-SCNC: 105 MMOL/L (ref 98–107)
CO2 SERPL-SCNC: 26 MMOL/L (ref 22–29)
CREAT BLD-MCNC: 0.81 MG/DL (ref 0.76–1.27)
DEPRECATED RDW RBC AUTO: 47.5 FL (ref 37–54)
ERYTHROCYTE [DISTWIDTH] IN BLOOD BY AUTOMATED COUNT: 14.1 % (ref 12.3–15.4)
GFR SERPL CREATININE-BSD FRML MDRD: 97 ML/MIN/1.73
GLUCOSE BLD-MCNC: 96 MG/DL (ref 65–99)
HCT VFR BLD AUTO: 51.3 % (ref 37.5–51)
HGB BLD-MCNC: 17.6 G/DL (ref 13–17.7)
MCH RBC QN AUTO: 31.4 PG (ref 26.6–33)
MCHC RBC AUTO-ENTMCNC: 34.3 G/DL (ref 31.5–35.7)
MCV RBC AUTO: 91.6 FL (ref 79–97)
PLATELET # BLD AUTO: 254 10*3/MM3 (ref 140–450)
PMV BLD AUTO: 10.4 FL (ref 6–12)
POTASSIUM BLD-SCNC: 4.2 MMOL/L (ref 3.5–5.2)
RBC # BLD AUTO: 5.6 10*6/MM3 (ref 4.14–5.8)
SODIUM BLD-SCNC: 139 MMOL/L (ref 136–145)
TROPONIN T SERPL-MCNC: <0.01 NG/ML (ref 0–0.03)
WBC NRBC COR # BLD: 7.67 10*3/MM3 (ref 3.4–10.8)

## 2020-01-17 PROCEDURE — 93005 ELECTROCARDIOGRAM TRACING: CPT | Performed by: INTERNAL MEDICINE

## 2020-01-17 PROCEDURE — 84484 ASSAY OF TROPONIN QUANT: CPT | Performed by: INTERNAL MEDICINE

## 2020-01-17 PROCEDURE — 85027 COMPLETE CBC AUTOMATED: CPT | Performed by: INTERNAL MEDICINE

## 2020-01-17 PROCEDURE — 70450 CT HEAD/BRAIN W/O DYE: CPT

## 2020-01-17 PROCEDURE — 97530 THERAPEUTIC ACTIVITIES: CPT

## 2020-01-17 PROCEDURE — 99232 SBSQ HOSP IP/OBS MODERATE 35: CPT | Performed by: PSYCHIATRY & NEUROLOGY

## 2020-01-17 PROCEDURE — 93010 ELECTROCARDIOGRAM REPORT: CPT | Performed by: INTERNAL MEDICINE

## 2020-01-17 PROCEDURE — 80048 BASIC METABOLIC PNL TOTAL CA: CPT | Performed by: INTERNAL MEDICINE

## 2020-01-17 PROCEDURE — 97110 THERAPEUTIC EXERCISES: CPT

## 2020-01-17 PROCEDURE — 92507 TX SP LANG VOICE COMM INDIV: CPT

## 2020-01-17 PROCEDURE — 99231 SBSQ HOSP IP/OBS SF/LOW 25: CPT | Performed by: NURSE PRACTITIONER

## 2020-01-17 PROCEDURE — 97116 GAIT TRAINING THERAPY: CPT

## 2020-01-17 RX ORDER — AMLODIPINE BESYLATE 5 MG/1
5 TABLET ORAL ONCE
Status: COMPLETED | OUTPATIENT
Start: 2020-01-17 | End: 2020-01-17

## 2020-01-17 RX ORDER — AMLODIPINE BESYLATE 10 MG/1
10 TABLET ORAL
Status: DISCONTINUED | OUTPATIENT
Start: 2020-01-18 | End: 2020-01-21 | Stop reason: HOSPADM

## 2020-01-17 RX ORDER — HYDROCODONE BITARTRATE AND ACETAMINOPHEN 5; 325 MG/1; MG/1
1 TABLET ORAL EVERY 6 HOURS PRN
Status: DISCONTINUED | OUTPATIENT
Start: 2020-01-17 | End: 2020-01-21 | Stop reason: HOSPADM

## 2020-01-17 RX ADMIN — AMLODIPINE BESYLATE 5 MG: 5 TABLET ORAL at 15:07

## 2020-01-17 RX ADMIN — ATORVASTATIN CALCIUM 80 MG: 40 TABLET, FILM COATED ORAL at 20:33

## 2020-01-17 RX ADMIN — AMLODIPINE BESYLATE 5 MG: 5 TABLET ORAL at 08:47

## 2020-01-17 RX ADMIN — ACETAMINOPHEN 650 MG: 325 TABLET, FILM COATED ORAL at 08:48

## 2020-01-17 RX ADMIN — SODIUM CHLORIDE, PRESERVATIVE FREE 10 ML: 5 INJECTION INTRAVENOUS at 08:48

## 2020-01-17 RX ADMIN — OSELTAMIVIR PHOSPHATE 75 MG: 75 CAPSULE ORAL at 20:35

## 2020-01-17 RX ADMIN — ASPIRIN 81 MG: 81 TABLET, CHEWABLE ORAL at 08:47

## 2020-01-17 RX ADMIN — SODIUM CHLORIDE, PRESERVATIVE FREE 10 ML: 5 INJECTION INTRAVENOUS at 20:34

## 2020-01-17 RX ADMIN — OSELTAMIVIR PHOSPHATE 75 MG: 75 CAPSULE ORAL at 08:47

## 2020-01-17 RX ADMIN — LEVOTHYROXINE SODIUM 125 MCG: 125 TABLET ORAL at 05:44

## 2020-01-17 RX ADMIN — HYDROCODONE BITARTRATE AND ACETAMINOPHEN 1 TABLET: 5; 325 TABLET ORAL at 15:07

## 2020-01-17 RX ADMIN — LISINOPRIL 20 MG: 20 TABLET ORAL at 08:47

## 2020-01-17 NOTE — THERAPY TREATMENT NOTE
Acute Care - Physical Therapy Treatment Note  [facility]     Patient Name: Reza Cruz  : 1955  MRN: [id number]  Today's Date: 2020  Onset of Illness/Injury or Date of Surgery: 20     Referring Physician: Dr. Montenegro    Admit Date: 2020    Visit Dx:    ICD-10-CM ICD-9-CM   1. Dysphagia, unspecified type R13.10 787.20   2. Influenza A J10.1 487.1   3. Elevated TSH R79.89 794.5   4. Cerebrovascular accident (CVA), unspecified mechanism (CMS/HCC) I63.9 434.91   5. Tobacco abuse Z72.0 305.1   6. Impaired mobility Z74.09 799.89   7. Impaired mobility and ADLs Z74.09 799.89   8. Cognitive and neurobehavioral dysfunction F09 294.9    F07.89 310.1     Patient Active Problem List   Diagnosis   • Pneumonia of right upper lobe due to infectious organism (CMS/[facility])   • Impetigo   • Simple chronic bronchitis (CMS/[facility])   • Chronic obstructive pulmonary disease (CMS/[facility])   • Cough   • Personal history of nicotine dependence   • CVA (cerebral vascular accident) (CMS/[facility])   • Influenza A   • Tobacco dependence   • Acute encephalopathy   • Hypothyroidism, non-compliant with medication    • Medical non-compliance   • Bradycardia       Therapy Treatment    Rehabilitation Treatment Summary     Row Name 20             Treatment Time/Intention    Discipline  physical therapy assistant  -      Document Type  therapy note (daily note)  -MF2      Subjective Information  complains of;pain  -MF2      Mode of Treatment  physical therapy  -MF2      Existing Precautions/Restrictions  fall  -MF2      Treatment Considerations/Comments  FLU A, monitor BP  -MF2      Patient Response to Treatment  (S) Pt's BP elevated this morning and he was c/o 10/10 pain in his head and Left arm. Monitored BP. Deferred ambulation due to high BP. Nursing notified during and after treatment.   -MF2      Recorded by [MF] Soledad Celis, PTA 20  [MF2] Soledad Celis, PHILLY 20 0943      Row Name 20              Vital Signs    Pre Systolic BP Rehab  162  -MF      Pre Treatment Diastolic BP  108  -MF      Intra Systolic BP Rehab  166  -MF      Intra Treatment Diastolic BP  109  -MF      Post Systolic BP Rehab  163  -MF      Post Treatment Diastolic BP  114  -MF      Intra SpO2 (%)  98  -MF      O2 Delivery Intra Treatment  room air  -MF      Pre Patient Position  Supine  -MF      Intra Patient Position  Sitting  -MF      Post Patient Position  Supine  -MF      Recorded by [MF] Soledad Celis, PTA 01/17/20 0943      Row Name 01/17/20 0917             Bed Mobility Assessment/Treatment    Supine-Sit [place] (Bed Mobility)  verbal cues;contact guard  -MF      Sit-Supine [place] (Bed Mobility)  contact guard  -      Assistive Device (Bed Mobility)  head of bed elevated;bed rails  -MF      Recorded by [MF] Soledad Celis, PTA 01/17/20 0943      Row Name 01/17/20 0917             Transfer Assessment/Treatment    Comment (Transfers)  Had pt. stand once to take side steps to HOB. Deferred gait due to pt's increased BP  -MF      Recorded by [MF] Soledad Celis, PTA 01/17/20 0943      Row Name 01/17/20 0917             Sit-Stand Transfer    Sit-Stand [place] (Transfers)  verbal cues;contact guard;2 person assist  -      Recorded by [MF] Soledad Celis, PTA 01/17/20 0943      Row Name 01/17/20 0917             Stand-Sit Transfer    Stand-Sit [place] (Transfers)  contact guard  -      Recorded by [MF] Soledad Celis, PTA 01/17/20 0943      Row Name 01/17/20 0917             Static Sitting Balance    Level of [place] (Unsupported Sitting, Static Balance)  standby assist  -      Sitting Position (Unsupported Sitting, Static Balance)  sitting on edge of bed  -MF      Recorded by [MF] Soledad Celis, PTA 01/17/20 0943      Row Name 01/17/20 0917             Positioning and Restraints    Pre-Treatment Position  in bed  -MF      Post Treatment Position  bed  -MF      In  Bed  notified nsg;fowlers;call light within reach;encouraged to call for assist;exit alarm on;side rails up x2  -      Recorded by [] Soledad Celis, PTA 01/17/20 0943      Row Name 01/17/20 0917             Pain Scale: Numbers Pre/Post-Treatment    Pain Scale: Numbers, Pretreatment  10/10  -MF      Pain Scale: Numbers, Post-Treatment  10/10  -MF      Pain Location - Orientation  upper  -MF      Pain Location  extremity and head  -MF      Pain Intervention(s)  Repositioned notified nursing  -      Recorded by [] Soledad Celis, PTA 01/17/20 0943        User Key  (r) = Recorded By, (t) = Taken By, (c) = Cosigned By    Initials Name Effective Dates Discipline     Soledad Celis, PTA 08/02/16 -  PT                       PT Recommendation and Plan     Plan of Care Reviewed With: patient  Progress: declining  Outcome Summary: Upon entering room, pt. c/o 10/10 head and L UE pain. This did not subside while we were present with him. His BP was monitored during session and it was found to be elevated-166/109. Ambulation was deferred due to this reason and nursing was notified. WIll continue to work with pt. and monitor vitals as appropriate.  Outcome Measures     Row Name 01/16/20 1100 01/15/20 1200          How much help from another is currently needed...    Putting on and taking off regular lower body clothing?  4  -JJ  4  -JJ     Bathing (including washing, rinsing, and drying)  3  -JJ  3  -JJ     Toileting (which includes using toilet bed pan or urinal)  4  -JJ  4  -JJ     Putting on and taking off regular upper body clothing  3  -JJ  3  -JJ     Taking care of personal grooming (such as brushing teeth)  4  -JJ  4  -JJ     Eating meals  4  -JJ  4  -JJ     AM-PAC 6 Clicks Score (OT)  22  -JJ  22  -JJ        Functional Assessment    Outcome Measure Options  AM-PAC 6 Clicks Daily Activity (OT)  -JJ  AM-PAC 6 Clicks Daily Activity (OT)  -JJ       User Key  (r) = Recorded By, (t) = Taken By, (c) =  Cosigned By    Initials Name Provider Type    Roxie Garvey, OTR/L Occupational Therapist         Time Calculation:   PT Charges     Row Name 01/17/20 0945             Time Calculation    Start Time  0917  -      Stop Time  0940  -      Time Calculation (min)  23 min  -      PT Received On  01/17/20  -      PT Goal Re-Cert Due Date  01/24/20  -         Time Calculation- PT    Total Timed Code Minutes- PT  23 minute(s)  -         Timed Charges    93081 - PT Therapeutic Activity Minutes  23  -        User Key  (r) = Recorded By, (t) = Taken By, (c) = Cosigned By    Initials Name Provider Type    Soledad Reddy PTA Physical Therapy Assistant        Therapy Charges for Today     Code Description Service Date Service Provider Modifiers Qty    84120831563 HC PT THERAPEUTIC ACT EA 15 MIN 1/17/2020 Soledad Celis PTA GP 2          PT G-Codes  Outcome Measure Options: AM-PAC 6 Clicks Daily Activity (OT)  AM-PAC 6 Clicks Score (PT): 13  AM-PAC 6 Clicks Score (OT): 22    Soledad Celis PTA  1/17/2020

## 2020-01-17 NOTE — THERAPY TREATMENT NOTE
Acute Care - Speech Language Pathology   Swallow Treatment Note [facility]     Patient Name: Reza Cruz  : 1955  MRN: [id number]  Today's Date: 2020  Onset of Illness/Injury or Date of Surgery: 20     Referring Physician: Dr. Montenegro      Admit Date: 2020     Pt was alert and able to participate in conversation; however, utterances were partially unintelligible. Pt attempted cogntiive tasks including describing a picture to improve rate and intelligibility of speech. Pt did not demonstrate awareness of his speech deficit or understanding of the task purpose. Throughout the session, the pt continued to produce short, partially intelligible utterances. Pt's rate of speech and intelligibility did not improve with verbal cues. While the pt did make comments related to the picture, other comments were only partially related to the chosen photo.    Visit Dx:      ICD-10-CM ICD-9-CM   1. Dysphagia, unspecified type R13.10 787.20   2. Influenza A J10.1 487.1   3. Elevated TSH R79.89 794.5   4. Cerebrovascular accident (CVA), unspecified mechanism (CMS/HCC) I63.9 434.91   5. Tobacco abuse Z72.0 305.1   6. Impaired mobility Z74.09 799.89   7. Impaired mobility and ADLs Z74.09 799.89   8. Cognitive and neurobehavioral dysfunction F09 294.9    F07.89 310.1     Patient Active Problem List   Diagnosis   • Pneumonia of right upper lobe due to infectious organism (CMS/HCC)   • Impetigo   • Simple chronic bronchitis (CMS/HCC)   • Chronic obstructive pulmonary disease (CMS/HCC)   • Cough   • Personal history of nicotine dependence   • CVA (cerebral vascular accident) (CMS/HCC)   • Influenza A   • Tobacco dependence   • Acute encephalopathy   • Hypothyroidism, non-compliant with medication    • Medical non-compliance   • Bradycardia       Therapy Treatment  Rehabilitation Treatment Summary     Row Name 20 1452 20 1421 20 1350       Treatment Time/Intention    Discipline  speech language  pathologist  (Pended)   -  physical therapy assistant  -  occupational therapist  -    Document Type  therapy note (daily note)  (Pended)   -SF  therapy note (daily note)  -MF2  therapy note (daily note)  -    Subjective Information  no complaints  (Pended)   -  complains of;pain  -MF2  no complaints  -    Mode of Treatment  individual therapy;speech-language pathology  (Pended)   -  physical therapy  -MF2  occupational therapy  -    Patient/Family Observations  no family present  (Pended)   -SF  --  no family present  -    Patient Effort  fair  (Pended)   -  --  --    Existing Precautions/Restrictions  --  fall  -MF2  fall droplet - flu  -    Treatment Considerations/Comments  --  Flu A, monitor BP  -MF2  --    Recorded by [SF] Clara Shepherd, Speech Therapy Student 01/17/20 1527 [MF] Soledad Celis, PTA 01/17/20 1421  [MF2] Soledad Celis, PTA 01/17/20 1448 [MW] Maribeth Gross, OTR/L 01/17/20 1433    Row Name 01/17/20 0917             Treatment Time/Intention    Discipline  physical therapy assistant  -      Document Type  therapy note (daily note)  -MF2      Subjective Information  complains of;pain  -MF2      Mode of Treatment  physical therapy  -MF2      Existing Precautions/Restrictions  fall  -MF2      Treatment Considerations/Comments  FLU A, monitor BP  -2      Patient Response to Treatment  (S) Pt's BP elevated this morning and he was c/o 10/10 pain in his head and Left arm. Monitored BP. Deferred ambulation due to high BP. Nursing notified during and after treatment.   -MF2      Recorded by [MF] Soledad Celis, PTA 01/17/20 0919  [MF2] Soledad Celis, PTA 01/17/20 0943      Row Name 01/17/20 1421 01/17/20 0917          Vital Signs    Pre Systolic BP Rehab  122  -MF  162  -MF     Pre Treatment Diastolic BP  93  -MF  108  -MF     Intra Systolic BP Rehab  --  166  -MF     Intra Treatment Diastolic BP  --  109  -MF     Post Systolic BP Rehab  140  -MF  163  -MF      Post Treatment Diastolic BP  93  -MF  114  -MF     Intra SpO2 (%)  --  98  -MF     O2 Delivery Intra Treatment  --  room air  -MF     Pre Patient Position  Sitting  -MF  Supine  -MF     Intra Patient Position  Standing  -MF  Sitting  -MF     Post Patient Position  Sitting  -MF  Supine  -MF     Recorded by [MF] Soledad Celis, PTA 01/17/20 1448 [MF] Soledad Celis, PTA 01/17/20 0943     Row Name 01/17/20 1421 01/17/20 0917          Bed Mobility Assessment/Treatment    Supine-Sit [place] (Bed Mobility)  contact guard  -  verbal cues;contact guard  -MF     Sit-Supine [place] (Bed Mobility)  contact guard  -  contact guard  -     Assistive Device (Bed Mobility)  head of bed elevated;bed rails  -MF  head of bed elevated;bed rails  -     Recorded by [MF] Soledad Celis, PTA 01/17/20 1448 [MF] Soledad Celis, PTA 01/17/20 0943     Row Name 01/17/20 0917             Transfer Assessment/Treatment    Comment (Transfers)  Had pt. stand once to take side steps to HOB. Deferred gait due to pt's increased BP  -MF      Recorded by [MF] Soledad Celis, PTA 01/17/20 0943      Row Name 01/17/20 1421 01/17/20 0917          Sit-Stand Transfer    Sit-Stand [place] (Transfers)  verbal cues;contact guard  -  verbal cues;contact guard;2 person assist  -     Recorded by [MF] Soledad Celis, PTA 01/17/20 1448 [MF] Soledad Celis, PTA 01/17/20 0943     Row Name 01/17/20 1421 01/17/20 0917          Stand-Sit Transfer    Stand-Sit [place] (Transfers)  verbal cues;contact guard  -  contact guard  -     Recorded by [] Soledad Celis, PTA 01/17/20 1448 [] Soledad Celis, PTA 01/17/20 0943     Row Name 01/17/20 1421             Gait/Stairs Assessment/Training    [place] Level (Gait)  verbal cues;contact guard;minimum assist (75% patient effort);2 person assist  -      Assistive Device (Gait)  -- HHA  -MF      Distance in Feet (Gait)  80 x 2 with one  standing rest to work on orientation  -MF      Deviations/Abnormal Patterns (Gait)  ataxic;base of support, narrow;stride length decreased walks on the outside of R foot  -MF      Comment (Gait/Stairs)  pt's midline orientation was better this afternoon.   -MF      Recorded by [MF] Soledad Celis, PTA 01/17/20 1448      Row Name 01/17/20 1350             Motor Skills Assessment/Interventions    Additional Documentation  Motor Coordination Assessment/Training (Group)  -MW      Recorded by [MW] Maribeth Gross OTR/L 01/17/20 1433      Row Name 01/17/20 1350             Therapeutic Exercise    Additional Documentation  --  -MW      52389 - OT Therapeutic Activity Minutes  30  -MW      Recorded by [MW] Maribeth Gross OTR/L 01/17/20 1433      Row Name 01/17/20 1421             Therapeutic Exercise    Lower Extremity (Therapeutic Exercise)  LAQ (long arc quad), bilateral  -MF      Lower Extremity Range of Motion (Therapeutic Exercise)  ankle dorsiflexion/plantar flexion, bilateral;hip abduction/adduction, bilateral;hip flexion/extension, bilateral  -MF      Exercise Type (Therapeutic Exercise)  AROM (active range of motion)  -MF      Position (Therapeutic Exercise)  seated  -MF      Sets/Reps (Therapeutic Exercise)  15  -MF      Recorded by [MF] Soledad Celis, PTA 01/17/20 1448      Row Name 01/17/20 0917             Static Sitting Balance    Level of [place] (Unsupported Sitting, Static Balance)  standby assist  -      Sitting Position (Unsupported Sitting, Static Balance)  sitting on edge of bed  -MF      Recorded by [MF] Soledad Celis, PTA 01/17/20 0943      Row Name 01/17/20 1421             Static Standing Balance    Level of [place] (Supported Standing, Static Balance)  contact guard assist;minimal assist, 75% patient effort  -MF      Time Able to Maintain Position (Supported Standing, Static Balance)  30 to 45 seconds  -MF      Recorded by [MF] Soledad Celis, PTA 01/17/20  1448      Row Name 01/17/20 1350             Motor Coordination Assessment/Training    Comment, Fine Motor Coordination Training  Pt performed sorting activity demo good FMC/GMC, x2 instances difficulty picking up sugar packets out of 30 reps. Tossed ball back and forth with OT, 8/10 catches. Demos improved GMC/FMC compared to previous notes.  -MW      Recorded by [MW] Maribeth Gross, OTR/L 01/17/20 1433      Row Name 01/17/20 1421 01/17/20 1350 01/17/20 0917       Positioning and Restraints    Pre-Treatment Position  in bed  -MF  in bed  -MW  in bed  -MF    Post Treatment Position  bed  -MF  bed  -MW  bed  -MF    In Bed  fowlers;notified nsg;call light within reach;encouraged to call for assist;exit alarm on;side rails up x2  -MF  fowlers;call light within reach;encouraged to call for assist;with PT  -MW  notified nsg;fowlers;call light within reach;encouraged to call for assist;exit alarm on;side rails up x2  -MF    Recorded by [MF] Soledad Celis, PTA 01/17/20 1448 [MW] Maribeth Gross, OTR/L 01/17/20 1433 [MF] Soledad Celis, [facility] 01/17/20 0943    Row Name 01/17/20 1452 01/17/20 1350          Pain Assessment    Additional Documentation  Pain Scale: FACES Pre/Post-Treatment (Group)  (Pended)   -  Pain Scale: FACES Pre/Post-Treatment (Group)  -     Recorded by [SF] Clara Shepherd, Speech Therapy Student 01/17/20 1527 [MW] Maribeth Gross, OTR/L 01/17/20 1437     Row Name 01/17/20 0917             Pain Scale: Numbers Pre/Post-Treatment    Pain Scale: Numbers, Pretreatment  10/10  -      Pain Scale: Numbers, Post-Treatment  10/10  -      Pain Location - Orientation  upper  -      Pain Location  extremity and head  -      Pain Intervention(s)  Repositioned notified nursing  -      Recorded by [MF] Soledad Celis, PTA 01/17/20 0943      Row Name 01/17/20 1452 01/17/20 1421 01/17/20 1350       Pain Scale: FACES Pre/Post-Treatment    Pain: FACES Scale, Pretreatment  0-->no hurt  (Pended)    -SF  6-->hurts even more  -MF  2-->hurts little bit  -MW    Pain: FACES Scale, Post-Treatment  --  6-->hurts even more  -MF  2-->hurts little bit  -MW    Pre/Post Treatment Pain Comment  --  L UE-he states this pm that it is more arthritis pain  -MF  --    Recorded by [SF] Clara Shepherd, Speech Therapy Student 01/17/20 1527 [MF] Soledad Celis, PTA 01/17/20 1448 [MW] Maribeth Gross, OTR/L 01/17/20 1437    Row Name 01/17/20 1350             Plan of Care Review    Plan of Care Reviewed With  patient  -MW      Progress  improving  -MW      Outcome Summary  OT txt completed. Pt performed FMC/GMC activity this date. Sorting activity with 2/30 reps objects dropped showing improved FMC and tip pinch. Peg game demo good FM/GM accuracy. Tossed ball back and forth with OT with 8/10 accuracy. Pt shows improved coordination as well as overall demeanor at end of session. Pt very motivated to improve and return to PLOF. Cont OT POC.  -MW2      Recorded by [MW] Maribeth Gross OTR/L 01/17/20 1437  [MW2] Maribeth Gross, OTR/L 01/17/20 1438      Row Name 01/17/20 1350             Outcome Summary/Treatment Plan (OT)    Daily Summary of Progress (OT)  progress toward functional goals is good  -MW      Anticipated Discharge Disposition (OT)  inpatient rehabilitation facility  -MW      Recorded by [MW] Maribeth Gross OTR/L 01/17/20 1438      Row Name 01/17/20 1452             Outcome Summary/Treatment Plan (SLP)    Daily Summary of Progress (SLP)  progress toward functional goals is gradual  (Pended)   -SF      Barriers to Overall Progress (SLP)  Minimal motivation for participation   (Pended)   -SF      Plan for Continued Treatment (SLP)  continue to follow  (Pended)   -SF      Anticipated Dischage Disposition  unknown  (Pended)   -SF      Recorded by [SF] Clara Shepherd, Speech Therapy Student 01/17/20 1527        User Key  (r) = Recorded By, (t) = Taken By, (c) = Cosigned By    Initials Name Effective Dates Discipline     Soledad Reddy, PTA 08/02/16 -  PT    Maribeth Holland, OTR/L 08/28/18 -  OT    SF Clara Shepherd, Speech Therapy Student 11/20/19 -  SLP          Outcome Summary  Outcome Summary/Treatment Plan (SLP)  Daily Summary of Progress (SLP): (P) progress toward functional goals is gradual (01/17/20 1452 : Clara Shepherd, Speech Therapy Student)  Barriers to Overall Progress (SLP): (P) Minimal motivation for participation  (01/17/20 1452 : Clara Shepherd, Speech Therapy Student)  Plan for Continued Treatment (SLP): (P) continue to follow (01/17/20 1452 : Clara Shepherd, Speech Therapy Student)  Anticipated Dischage Disposition: (P) unknown (01/17/20 1452 : Clara Shepherd, Speech Therapy Student)      SLP GOALS     Row Name 01/17/20 1452 01/16/20 0947 01/15/20 1328       Oral Nutrition/Hydration Goal 1 (SLP)    Oral Nutrition/Hydration Goal 1, SLP  --  --  LTG: Patient will tolerate LRD without s/s of aspiration.  -MM    Time Frame (Oral Nutrition/Hydration Goal 1, SLP)  --  --  by discharge  -MM    Barriers (Oral Nutrition/Hydration Goal 1, SLP)  --  --  n/a  -MM    Progress/Outcomes (Oral Nutrition/Hydration Goal 1, SLP)  --  --  goal met  -MM       Comprehend Questions Goal 1 (SLP)    Improve Ability to Comprehend Questions Goal 1 (SLP)  complex yes/no questions;90%  (Pended)   -SF  complex yes/no questions;90%  -MB  --    Time Frame (Comprehend Questions Goal 1, SLP)  by discharge  (Pended)   -SF  by discharge  -MB  --    Progress/Outcomes (Comprehend Questions Goal 1, SLP)  goal ongoing  (Pended)   -SF  goal ongoing  -MB  --       Follow Directions Goal 2 (SLP)    Improve Ability to Follow Directions Goal 1 (SLP)  2 step commands;90%  (Pended)   -SF  2 step commands;90%  -MB  --    Time Frame (Follow Directions Goal 1, SLP)  by discharge  (Pended)   -SF  by discharge  -MB  --    Progress/Outcomes (Follow Directions Goal 1, SLP)  goal ongoing  (Pended)   -SF  goal ongoing  -MB  --       Connected Speech to Express  Thoughts Goal 1 (SLP)    Improve Narrative Discourse to Express Thoughts By Goal 1 (SLP)  describing a picture;90%  (Pended)   -SF  describing a picture;90%  -MB  --    Time Frame (Connected Speech Goal 1, SLP)  by discharge  (Pended)   -SF  by discharge  -MB  --    Progress/Outcomes (Connected Speech Goal 1, SLP)  progress slower than expected  (Pended)   -SF  goal ongoing  -MB  --       Articulation Goal 1 (SLP)    Improve Articulation Goal 1 (SLP)  by over-articulating in connected speech;90%  (Pended)   -SF  by over-articulating in connected speech;90%  -MB  --    Time Frame (Articulation Goal 1, SLP)  by discharge  (Pended)   -SF  by discharge  -MB  --    Progress/Outcomes (Articulation Goal 1, SLP)  goal ongoing  (Pended)   -SF  goal ongoing  -MB  --       Patient Will Implement Strategies Goal 1 (SLP)    Implement Strategies of Goal 1 (SLP)  using external rate control;90%;independently (over 90% accuracy)  -MB  --  --    Time Frame (Strategy Implementation Goal 1, SLP)  short term goal (STG);by discharge  -MB  --  --    Progress/Outcomes (Strategy Implementation Goal 1, SLP)  goal ongoing  (Pended)   -SF  --  --       Attention Goal 1 (SLP)    Improve Attention by Goal 1 (SLP)  complete selective attention task;90%  (Pended)   -SF  complete selective attention task;90%  -MB  --    Time Frame (Attention Goal 1, SLP)  by discharge  (Pended)   -SF  by discharge  -MB  --    Progress/Outcomes (Attention Goal 1, SLP)  goal ongoing  (Pended)   -SF  goal ongoing  -MB  --       Organizational Skills Goal 1 (SLP)    Improve Thought Organization Through Goal 1 (SLP)  completing a divergent naming task;completing a convergent naming task;abstract;90%  (Pended)   -SF  completing a divergent naming task;completing a convergent naming task;abstract;90%  -MB  --    Time Frame (Thought Organization Skills Goal 1, SLP)  by discharge  (Pended)   -SF  by discharge  -MB  --    Barriers (Thought Organization Skills Goal 1, SLP)   dysarthria  (Pended)   -SF  dysarthria  -MB  --    Progress (Thought Organization Skills Goal 1, SLP)  50%;with moderate cues (50-74%)  (Pended)   -SF  50%;with moderate cues (50-74%)  -MB  --    Progress/Outcomes (Thought Organization Skills Goal 1, SLP)  goal ongoing  (Pended)   -SF  continuing progress toward goal  -MB  --      User Key  (r) = Recorded By, (t) = Taken By, (c) = Cosigned By    Initials Name Provider Type    Jo Ann Valero, CCC-SLP Speech and Language Pathologist    MM Christina Wright, MS CCC-SLP Speech and Language Pathologist     Clara Shepherd, Speech Therapy Student Speech Therapy Student          EDUCATION  The patient has been educated in the following areas:   Cognitive Impairment.    SLP Recommendation and Plan  Daily Summary of Progress (SLP): (P) progress toward functional goals is gradual  Barriers to Overall Progress (SLP): (P) Minimal motivation for participation   Plan for Continued Treatment (SLP): (P) continue to follow  Anticipated Dischage Disposition: (P) unknown                    Time Calculation:   Time Calculation- SLP     Row Name 01/17/20 1549             Time Calculation- SLP    SLP Start Time  1452  (Pended)   -SF      SLP Stop Time  1507  (Pended)   -      SLP Time Calculation (min)  15 min  (Pended)   -      SLP Received On  01/17/20  (Pended)   -        User Key  (r) = Recorded By, (t) = Taken By, (c) = Cosigned By    Initials Name Provider Type     Clara Shepherd, Speech Therapy Student Speech Therapy Student          Therapy Charges for Today     Code Description Service Date Service Provider Modifiers Qty    [id number] [facility] TREATMENT SPEECH 1 1/17/2020 Clara Shepherd Speech Therapy Student GN 1                 Clara Joao Speech Therapy Student  1/17/2020

## 2020-01-17 NOTE — PLAN OF CARE
Problem: Patient Care Overview  Goal: Plan of Care Review  Outcome: Ongoing (interventions implemented as appropriate)  Flowsheets (Taken 1/17/2020 0114)  Progress: no change  Plan of Care Reviewed With: patient  Note:   Pt A&O X4. No c/o pain. VSS. . Resting well. NIH 4. Aphasic. Slurred speech. Bed alarm. Will continue to monitor.

## 2020-01-17 NOTE — PROGRESS NOTES
Continued Stay Note   [place]     Patient Name: Reza Cruz  MRN: [id number]  Today's Date: 1/17/2020    Admit Date: 1/13/2020    Discharge Plan     Row Name 01/17/20 1015       Plan    Plan Comments  Geral[facility] from [facility] has offered a bed on pt and will start precert by noon. ROVERTO will most likely not hear from insurance and precert until Tuesday due to Monday being holiday. Shelia states that she will re eval pt on Tuesday as well. SW will follow         Discharge Codes    No documentation.             Ora Cordoba

## 2020-01-17 NOTE — THERAPY TREATMENT NOTE
Acute Care - Occupational Therapy Treatment Note  [facility]     Patient Name: Reza Cruz  : 1955  MRN: [id number]  Today's Date: 2020  Onset of Illness/Injury or Date of Surgery: 20  Date of Referral to OT: 20  Referring Physician: Dr. Montenegro    Admit Date: 2020       ICD-10-CM ICD-9-CM   1. Dysphagia, unspecified type R13.10 787.20   2. Influenza A J10.1 487.1   3. Elevated TSH R79.89 794.5   4. Cerebrovascular accident (CVA), unspecified mechanism (CMS/[facility]) I63.9 434.91   5. Tobacco abuse Z72.0 305.1   6. Impaired mobility Z74.09 799.89   7. Impaired mobility and ADLs Z74.09 799.89   8. Cognitive and neurobehavioral dysfunction F09 294.9    F07.89 310.1     Patient Active Problem List   Diagnosis   • Pneumonia of right upper lobe due to infectious organism (CMS/[facility])   • Impetigo   • Simple chronic bronchitis (CMS/[facility])   • Chronic obstructive pulmonary disease (CMS/[facility])   • Cough   • Personal history of nicotine dependence   • CVA (cerebral vascular accident) (CMS/[facility])   • Influenza A   • Tobacco dependence   • Acute encephalopathy   • Hypothyroidism, non-compliant with medication    • Medical non-compliance   • Bradycardia     Past Medical History:   Diagnosis Date   • COPD (chronic obstructive pulmonary disease) (CMS/[facility])    • Hyperlipidemia    • Hypertension      Past Surgical History:   Procedure Laterality Date   • EYE SURGERY Right    • KNEE SURGERY Right        Therapy Treatment    Rehabilitation Treatment Summary     Row Name 20 1421 20 1350 20 0917       Treatment Time/Intention    Discipline  physical therapy assistant  -MF  occupational therapist  -MW  physical therapy assistant  -    Document Type  --  therapy note (daily note)  -MW  therapy note (daily note)  -MF2    Subjective Information  --  no complaints  -MW  complains of;pain  -MF2    Mode of Treatment  --  occupational therapy  -MW  physical therapy  -2    Patient/Family Observations  --  no  family present  -  --    Existing Precautions/Restrictions  --  fall droplet - flu  -MW  fall  -MF2    Treatment Considerations/Comments  --  --  FLU A, monitor BP  -MF2    Patient Response to Treatment  --  --  (S) Pt's BP elevated this morning and he was c/o 10/10 pain in his head and Left arm. Monitored BP. Deferred ambulation due to high BP. Nursing notified during and after treatment.   -MF2    Recorded by [MF] Soledad Celis, PTA 01/17/20 1421 [MW] Maribeth Gross, OTR/L 01/17/20 1433 [MF] Soledad Celis, PTA 01/17/20 0919  [MF2] Soledad Celis, PTA 01/17/20 0943    Row Name 01/17/20 0917             Vital Signs    Pre Systolic BP Rehab  162  -MF      Pre Treatment Diastolic BP  108  -MF      Intra Systolic BP Rehab  166  -MF      Intra Treatment Diastolic BP  109  -MF      Post Systolic BP Rehab  163  -MF      Post Treatment Diastolic BP  114  -MF      Intra SpO2 (%)  98  -MF      O2 Delivery Intra Treatment  room air  -MF      Pre Patient Position  Supine  -MF      Intra Patient Position  Sitting  -MF      Post Patient Position  Supine  -MF      Recorded by [MF] Soledad Celis, PTA 01/17/20 0943      Row Name 01/17/20 0917             Bed Mobility Assessment/Treatment    Supine-Sit [place] (Bed Mobility)  verbal cues;contact guard  -MF      Sit-Supine [place] (Bed Mobility)  contact guard  -MF      Assistive Device (Bed Mobility)  head of bed elevated;bed rails  -MF      Recorded by [MF] Soledad Celis, PTA 01/17/20 0943      Row Name 01/17/20 0917             Transfer Assessment/Treatment    Comment (Transfers)  Had pt. stand once to take side steps to HOB. Deferred gait due to pt's increased BP  -MF      Recorded by [MF] Soledad Celis, PTA 01/17/20 0943      Row Name 01/17/20 0917             Sit-Stand Transfer    Sit-Stand [place] (Transfers)  verbal cues;contact guard;2 person assist  -MF      Recorded by [MF] Soledad Celis, PTA 01/17/20 0943       Row Name 01/17/20 0917             Stand-Sit Transfer    Stand-Sit [place] (Transfers)  contact guard  -MF      Recorded by [MF] Soledad Celis, PTA 01/17/20 0943      Row Name 01/17/20 1350             Motor Skills Assessment/Interventions    Additional Documentation  Motor Coordination Assessment/Training (Group)  -MW      Recorded by [MW] Maribeth Gross OTR/L 01/17/20 1433      Row Name 01/17/20 1350             Therapeutic Exercise    Additional Documentation  --  -MW      89718 - OT Therapeutic Activity Minutes  30  -MW      Recorded by [MW] Maribeth Gross OTR/L 01/17/20 1433      Row Name 01/17/20 0917             Static Sitting Balance    Level of [place] (Unsupported Sitting, Static Balance)  standby assist  -      Sitting Position (Unsupported Sitting, Static Balance)  sitting on edge of bed  -MF      Recorded by [MF] Soledad Celis, PTA 01/17/20 0943      Row Name 01/17/20 1350             Motor Coordination Assessment/Training    Comment, Fine Motor Coordination Training  Pt performed sorting activity demo good FMC/GMC, x2 instances difficulty picking up sugar packets out of 30 reps. Tossed ball back and forth with OT, 8/10 catches. Demos improved GMC/FMC compared to previous notes.  -MW      Recorded by [MW] Maribeth Gross OTR/L 01/17/20 1433      Row Name 01/17/20 1350 01/17/20 0917          Positioning and Restraints    Pre-Treatment Position  in bed  -MW  in bed  -MF     Post Treatment Position  bed  -  bed  -     In Bed  fowlers;call light within reach;encouraged to call for assist;with PT  -MW  notified nsg;fowlers;call light within reach;encouraged to call for assist;exit alarm on;side rails up x2  -MF     Recorded by [MW] Maribeth Gross OTR/L 01/17/20 1433 [MF] Soledad Celis, PTA 01/17/20 0943     Row Name 01/17/20 1350             Pain Assessment    Additional Documentation  Pain Scale: FACES Pre/Post-Treatment (Group)  -MW      Recorded by [MW]  Maribeth Gross, OTR/L 01/17/20 1437      Row Name 01/17/20 0917             Pain Scale: Numbers Pre/Post-Treatment    Pain Scale: Numbers, Pretreatment  10/10  -MF      Pain Scale: Numbers, Post-Treatment  10/10  -MF      Pain Location - Orientation  upper  -MF      Pain Location  extremity and head  -MF      Pain Intervention(s)  Repositioned notified nursing  -MF      Recorded by [MF] Soledad Celis, PTA 01/17/20 0943      Row Name 01/17/20 1350             Pain Scale: FACES Pre/Post-Treatment    Pain: FACES Scale, Pretreatment  2-->hurts little bit  -MW      Pain: FACES Scale, Post-Treatment  2-->hurts little bit  -MW      Recorded by [MW] Maribeth Gross, OTR/L 01/17/20 1437      Row Name 01/17/20 1350             Plan of Care Review    Plan of Care Reviewed With  patient  -MW      Progress  improving  -MW      Outcome Summary  OT txt completed. Pt performed FMC/GMC activity this date. Sorting activity with 2/30 reps objects dropped showing improved FMC and tip pinch. Peg game demo good FM/GM accuracy. Tossed ball back and forth with OT with 8/10 accuracy. Pt shows improved coordination as well as overall demeanor at end of session. Pt very motivated to improve and return to PLOF. Cont OT POC.  -MW2      Recorded by [MW] Maribeth Gross, OTR/L 01/17/20 1437  [MW2] Maribeth Gross, OTR/L 01/17/20 1438      Row Name 01/17/20 1350             Outcome Summary/Treatment Plan (OT)    Daily Summary of Progress (OT)  progress toward functional goals is good  -MW      Anticipated Discharge Disposition (OT)  inpatient rehabilitation facility  -MW      Recorded by [MW] Maribeth Gross, OTR/L 01/17/20 1438        User Key  (r) = Recorded By, (t) = Taken By, (c) = Cosigned By    Initials Name Effective Dates Discipline     Soledad Celis, PTA 08/02/16 -  PT    MW Maribeth Gross, OTR/L 08/28/18 -  OT                 OT Recommendation and Plan  Outcome Summary/Treatment Plan (OT)  Daily Summary of Progress  (OT): progress toward functional goals is good  Anticipated Discharge Disposition (OT): inpatient rehabilitation facility  Daily Summary of Progress (OT): progress toward functional goals is good  Plan of Care Review  Plan of Care Reviewed With: patient  Plan of Care Reviewed With: patient  Outcome Summary: OT txt completed. Pt performed FMC/GMC activity this date. Sorting activity with 2/30 reps objects dropped showing improved FMC and tip pinch. Peg game demo good FM/GM accuracy. Tossed ball back and forth with OT with 8/10 accuracy. Pt shows improved coordination as well as overall demeanor at end of session. Pt very motivated to improve and return to PLOF. Cont OT POC.  Outcome Measures     Row Name 01/17/20 1400 01/16/20 1100 01/15/20 1200       How much help from another is currently needed...    Putting on and taking off regular lower body clothing?  4  -MW  4  -JJ  4  -JJ    Bathing (including washing, rinsing, and drying)  3  -MW  3  -JJ  3  -JJ    Toileting (which includes using toilet bed pan or urinal)  4  -MW  4  -JJ  4  -JJ    Putting on and taking off regular upper body clothing  3  -MW  3  -JJ  3  -JJ    Taking care of personal grooming (such as brushing teeth)  3  -MW  4  -JJ  4  -JJ    Eating meals  3  -MW  4  -JJ  4  -JJ    AM-PAC 6 Clicks Score (OT)  20  -MW  22  -JJ  22  -JJ       Functional Assessment    Outcome Measure Options  AM-PAC 6 Clicks Daily Activity (OT)  -MW  AM-PAC 6 Clicks Daily Activity (OT)  -JJ  AM-PAC 6 Clicks Daily Activity (OT)  -JJ      User Key  (r) = Recorded By, (t) = Taken By, (c) = Cosigned By    Initials Name Provider Type    Maribeth Holland, OTR/L Occupational Therapist    Roxie Garvey OTR/L Occupational Therapist           Time Calculation:   Time Calculation- OT     Row Name 01/17/20 1439 01/17/20 1350          Time Calculation- OT    OT Start Time  1350  -MW  --     OT Stop Time  1420  -MW  --     OT Time Calculation (min)  30 min  -MW  --     Total  Timed Code Minutes- OT  30 minute(s)  -MW  --     OT Received On  01/17/20  -MW  --        Timed Charges    99864 - OT Therapeutic Activity Minutes  --  30  -MW       User Key  (r) = Recorded By, (t) = Taken By, (c) = Cosigned By    Initials Name Provider Type    Maribeth Holland OTR/L Occupational Therapist        Therapy Charges for Today     Code Description Service Date Service Provider Modifiers Qty    [id number]  OT THERAPEUTIC ACT EA 15 MIN 1/17/2020 Maribeth Gross OTR/L GO 2               RACIEL Landaverde/MELANIE  1/17/2020

## 2020-01-17 NOTE — PLAN OF CARE
Problem: Patient Care Overview  Goal: Plan of Care Review  Outcome: Ongoing (interventions implemented as appropriate)  Flowsheets (Taken 1/17/2020 1532)  Progress: no change  Plan of Care Reviewed With: patient  Outcome Summary: Pt was alert and able to participate in conversation; however, utterances were partially unintelligible. Pt attempted cogntiive tasks including describing a picture to improve rate and intelligibility of speech. Pt did not demonstrate awareness of his speech deficit or understanding of the task purpose. Throughout the session, the pt continued to produce short, partially intelligible utterances. Pt's rate of speech and intelligibility did not improve with verbal cues. While the pt did make comments related to the picture, other comments were only partially related to the chosen photo.

## 2020-01-17 NOTE — PAYOR COMM NOTE
"Ryan Cardenas (64 y.o. Male) [id number]   Cont stay please review clinical  Pt remains in hospital  [facility]  Fax        Date of Birth Social Security Number Address Home Phone MRN    1955  [address] [place] [id number] [phone] [id number]    Christian Marital Status          Protestant        Admission Date Admission Type Admitting Provider Attending Provider Department, Room/Bed    1/13/20 Emergency Hira Montenegro MD Fleming, John Eric, MD [facility] 3A, 351/1    Discharge Date Discharge Disposition Discharge Destination                       Attending Provider:  Hira Montneegro MD    Allergies:  Codeine    Isolation:  Droplet   Infection:  Influenza (01/13/20)   Code Status:  CPR    Ht:  182.9 cm (72\")   Wt:  80.5 kg (177 lb 8 oz)    Admission Cmt:  None   Principal Problem:  None                Active Insurance as of 1/13/2020     Primary Coverage     Payor Plan Insurance Group Employer/Plan Group    [employer] [place] [employer] [place]      Payor Plan Address Payor Plan Phone Number Payor Plan Fax Number Effective Dates    [address] [address] [phone]  8/15/2018 - None Entered    [facility] [id number]       Subscriber Name Subscriber Birth Date Member ID       RYAN CARDENAS 1955 [id number]                 Emergency Contacts      (Rel.) Home Phone Work Phone Mobile Phone    Rufina Urbina (Sister) -- -- [phone]              Current Facility-Administered Medications   Medication Dose Route Frequency Provider Last Rate Last Dose   • acetaminophen (TYLENOL) tablet 650 mg  650 mg Oral Q6H PRN Hira Montenegro MD   650 mg at 01/17/20 0848   • amLODIPine (NORVASC) tablet 5 mg  5 mg Oral Q24H Hira Montenegro MD   5 mg at 01/17/20 0847   • aspirin chewable tablet 81 mg  81 mg Oral Daily Hira Montenegro MD   81 mg at 01/17/20 0847    Or   • aspirin suppository 300 mg  300 mg Rectal Daily Hira Montenegro MD       • " atorvastatin (LIPITOR) tablet 80 mg  80 mg Oral Nightly Hira Montenegro MD   80 mg at 01/16/20 2000   • ipratropium-albuterol (DUO-NEB) nebulizer solution 3 mL  3 mL Nebulization Q4H PRN Hira Montenegro MD       • levothyroxine (SYNTHROID, LEVOTHROID) tablet 125 mcg  125 mcg Oral Q AM Hira Montenegro MD   125 mcg at 01/17/20 0544   • lisinopril (PRINIVIL,ZESTRIL) tablet 20 mg  20 mg Oral Q24H Hira Montenegro MD   20 mg at 01/17/20 0847   • ondansetron (ZOFRAN) tablet 4 mg  4 mg Oral Q6H PRN Hira Montenegro MD        Or   • ondansetron (ZOFRAN) injection 4 mg  4 mg Intravenous Q6H PRN Hira Montenegro MD   4 mg at 01/14/20 1024   • oseltamivir (TAMIFLU) capsule 75 mg  75 mg Oral Q12H Hira Montenegro MD   75 mg at 01/17/20 0847   • sodium chloride 0.9 % flush 10 mL  10 mL Intravenous PRN Hira Montenegro MD       • sodium chloride 0.9 % flush 10 mL  10 mL Intravenous Q12H Hira Montenegro MD   10 mL at 01/17/20 0848   • sodium chloride 0.9 % flush 10 mL  10 mL Intravenous PRN Hira Montenegro MD            Physician Progress Notes (last 24 hours) (Notes from 01/16/20 1130 through 01/17/20 1130)      Julian Virk APRN at 01/17/20 0851          NEUROSURGERY DAILY PROGRESS NOTE    ASSESSMENT:   Reza Cruz is a 64 y.o. with a significant comorbidity hypertension, hyperlipidemia.  He presents with a new problem of difficulty with gait and coordination of the right upper extremity and right hemibody numbness. Physical exam findings of right-sided upper and lower extremity ataxia and scanning speech.  Their imaging shows right cerebellar stroke.    DDX:     Chronic obstructive pulmonary disease (CMS/HCC)    CVA (cerebral vascular accident) (CMS/HCC)    Influenza A    Tobacco dependence    Acute encephalopathy    Hypothyroidism, non-compliant with medication     Medical non-compliance    Bradycardia    Patient Active Problem List   Diagnosis   • Pneumonia of right upper lobe due to  "infectious organism (CMS/[facility])   • Impetigo   • Simple chronic bronchitis (CMS/HCC)   • Chronic obstructive pulmonary disease (CMS/[facility])   • Cough   • Personal history of nicotine dependence   • CVA (cerebral vascular accident) (CMS/[facility])   • Influenza A   • Tobacco dependence   • Acute encephalopathy   • Hypothyroidism, non-compliant with medication    • Medical non-compliance   • Bradycardia     HPI: Resting comfortably on MedSur floor.  No complaints of pain.  No acute events overnight.    PLAN:   Neuro: Stable.  Relatively unchanged   Dysarthric speech, no dysmetria, right pronator drift   Follows commands.  Names objects.    Recommendations per Dr. Dahl:   Avoid hyponatremia, keep sodium > 140; Na currently 139   Agree with anticoagulation per neurology's recommendation.   Repeat CT of the head stable   No surgical intervention required at this time.   We will continue to follow as needed.    CHIEF COMPLAINT:   Difficulty with gait and coordination of the right upper extremity and right hemibody numbness    Subjective  Symptoms stable.  Doing well    Temp:  [97.6 °F (36.4 °C)-98 °F (36.7 °C)] 98 °F (36.7 °C)  Heart Rate:  [51-76] 61  Resp:  [16-18] 16  BP: (120-153)/() 149/97    Objective:  General Appearance:  Comfortable, well-appearing, in no acute distress and not in pain.    Vital signs: (most recent): Blood pressure 149/97, pulse 61, temperature 98 °F (36.7 °C), temperature source Oral, resp. rate 16, height 182.9 cm (72\"), weight 80.5 kg (177 lb 8 oz), SpO2 95 %.      Neurologic Exam     Mental Status   Oriented to person, place, and time.   Attention: normal. Concentration: normal.   Speech: slurred   Level of consciousness: alert    Bright and awake.  Follows commands.  Shows thumbs up and 2 fingers bilaterally.     Cranial Nerves     CN II   Visual fields full to confrontation.     CN III, IV, VI   Pupils are equal, round, and reactive to light.  Extraocular motions are normal.   Nystagmus: " none   Diplopia: none    CN V   Right facial sensation deficit: complete    CN VII   Right facial weakness: none  Left facial weakness: none    CN VIII   CN VIII normal.     CN IX, X   CN IX normal.     CN XI   CN XI normal.     CN XII   CN XII normal.     Motor Exam   Muscle bulk: normal  Overall muscle tone: normal  Right arm pronator drift: absent  Left arm pronator drift: absent    Strength   Strength 5/5 throughout.   Moves all extremities  No pronator drift.  Right dysmetria     Sensory Exam   Right arm light touch: decreased from elbow  Left arm light touch: normal  Right leg light touch: decreased from knee  Left leg light touch: normal    Gait, Coordination, and Reflexes     Tremor   Resting tremor: absent  Intention tremor: absent    Drains: * No LDAs found *    Imaging Results (Last 24 Hours)     Procedure Component Value Units Date/Time    CT Head Without Contrast [[id number]] Collected:  01/16/20 1506     Updated:  01/16/20 1512    Narrative:       EXAMINATION: CT head without contrast 01/16/2020     HISTORY: Stroke symptoms.     DOSE: 702 mGycm. Automated exposure control was utilized to diminish  patient radiation dose..     FINDINGS: Today's exam is compared to previous study dated one day  earlier. Multiple contiguous axial images are obtained from skull base  to the vertex per protocol without intravenous contrast-enhancement with  reformatted images obtained in the sagittal and coronal projections from  the original data set.     There is atrophy of the brain with mild small vessel ischemic disease  involving the periventricular and higher white matter tracts. There is  again evidence of an acute infarct involving the right cerebellar  hemisphere and specifically the superior cerebellar distribution  abutting the tentorium cerebelli. There is some mass effect on the  fourth ventricle unchanged from the previous study. There is no evidence  of hemorrhagic conversion. No additional infarcts are  observed.  Previously noted small left-sided subdural hematoma is no longer  visualized.       Impression:       1.. Stable right cerebellar hemisphere infarct from the previous study  of 01/15/2020. There is some mild mass effect on the fourth ventricle  but this is stable from the previous exam. No evidence of hemorrhagic  conversion.  2. Previously described left-sided subdural hematoma is no longer  visualized  This report was finalized on 01/16/2020 15:09 by Dr. Paul Harper MD.        Lab Results (last 24 hours)     Procedure Component Value Units Date/Time    Blood Culture - Blood, Arm, Right [[id number]] Collected:  01/13/20 0747    Specimen:  Blood from Arm, Right Updated:  01/17/20 0830     Blood Culture No growth at 4 days    Basic Metabolic Panel [[id number]]  (Normal) Collected:  01/17/20 0436    Specimen:  Blood Updated:  01/17/20 0522     Glucose 96 mg/dL      BUN 17 mg/dL      Creatinine 0.81 mg/dL      Sodium 139 mmol/L      Potassium 4.2 mmol/L      Chloride 105 mmol/L      CO2 26.0 mmol/L      Calcium 9.1 mg/dL      eGFR Non African Amer 97 mL/min/1.73      BUN/Creatinine Ratio 21.0     Anion Gap 8.0 mmol/L     Narrative:       GFR Normal >60  Chronic Kidney Disease <60  Kidney Failure <15      CBC (No Diff) [[id number]]  (Abnormal) Collected:  01/17/20 0436    Specimen:  Blood Updated:  01/17/20 0511     WBC 7.67 10*3/mm3      RBC 5.60 10*6/mm3      Hemoglobin 17.6 g/dL      Hematocrit 51.3 %      MCV 91.6 fL      MCH 31.4 pg      MCHC 34.3 g/dL      RDW 14.1 %      RDW-SD 47.5 fl      MPV 10.4 fL      Platelets 254 10*3/mm3     Testosterone, Free, Total [[id number]]  (Abnormal) Collected:  01/14/20 0247    Specimen:  Blood Updated:  01/16/20 1209     Testosterone, Total 389 ng/dL      Comment: Adult male reference interval is based on a population of  healthy nonobese males (BMI <30) between 19 and 39 years old.  Richardson et.al. JCEM 2017,102;3714-7829. PMID: 45689774.        Testosterone,  Free 2.7 pg/mL     Narrative:       Performed at:  01 - [facility]  [address], [place]  [id number]  : Adithya Rowley PhD, Phone:  [phone]  Performed at:  02 - [facility] [address]  [id number]  : Harini Hill MD, Phone:  [phone]    Blood Culture - Blood, Arm, Right [[id number]] Collected:  01/13/20 0824    Specimen:  Blood from Arm, Right Updated:  01/16/20 0940     Blood Culture No growth at 3 days        [id number]  VINAYAK Lobato      Electronically signed by Julian Virk APRN at 01/17/20 0853     Ashley Barraza APRN at 01/17/20 0837            Neurology Progress Note      Chief Complaint:  F/u stroke    Subjective     Subjective:  Lying in bed. No family/friends at bedside. Patient states he lives alone. complaints of HA and states can get relief with tylenol. Continues with some dysarthric speech and disrupted anoop. Stated had some dizziness when ambulating last PM.   CT head done yesterday stable.       Addendum: 1018: RN caring for patient reported elevated /100. She rechecked and improved to 150/93. At that time patient complaining of paresthesia of left arm. NIHSS unchanged. Will get CT head.       Medications:  Current Facility-Administered Medications   Medication Dose Route Frequency Provider Last Rate Last Dose   • acetaminophen (TYLENOL) tablet 650 mg  650 mg Oral Q6H PRN Hira Montenegro MD   650 mg at 01/16/20 1939   • amLODIPine (NORVASC) tablet 5 mg  5 mg Oral Q24H Hira Montenegro MD   5 mg at 01/16/20 0901   • aspirin chewable tablet 81 mg  81 mg Oral Daily Hira Montenegro MD   81 mg at 01/16/20 0901    Or   • aspirin suppository 300 mg  300 mg Rectal Daily Hira Montenegro MD       • atorvastatin (LIPITOR) tablet 80 mg  80 mg Oral Nightly Hira Montenegro MD   80 mg at 01/16/20 2000   • ipratropium-albuterol (DUO-NEB) nebulizer solution 3 mL  3 mL Nebulization Q4H PRN Hira Montenegro, MD        • levothyroxine (SYNTHROID, LEVOTHROID) tablet 125 mcg  125 mcg Oral Q AM Hira Montenegro MD   125 mcg at 01/17/20 0544   • lisinopril (PRINIVIL,ZESTRIL) tablet 20 mg  20 mg Oral Q24H Hira Montenegro MD   20 mg at 01/16/20 0901   • ondansetron (ZOFRAN) tablet 4 mg  4 mg Oral Q6H PRN Hira Montenegro MD        Or   • ondansetron (ZOFRAN) injection 4 mg  4 mg Intravenous Q6H PRN Hira Montenegro MD   4 mg at 01/14/20 1024   • oseltamivir (TAMIFLU) capsule 75 mg  75 mg Oral Q12H Hira Montenegro MD   75 mg at 01/16/20 2124   • sodium chloride 0.9 % flush 10 mL  10 mL Intravenous PRN Hira Montenegro MD       • sodium chloride 0.9 % flush 10 mL  10 mL Intravenous Q12H Hira Montenegro MD   10 mL at 01/16/20 2000   • sodium chloride 0.9 % flush 10 mL  10 mL Intravenous PRN Hira Montenegro MD           Review of Systems:   -A 14 point review of systems is completed and is negative except for frontal headache.       Objective      Vital Signs  Temp:  [97.6 °F (36.4 °C)-98 °F (36.7 °C)] 98 °F (36.7 °C)  Heart Rate:  [51-76] 61  Resp:  [16-18] 16  BP: (120-153)/() 149/97    Physical Exam:  General Exam:  Head:  Normal cephalic, atraumatic  HEENT:  Neck supple  Fundoscopic Exam:  No signs of disc edema  CVS:  Regular rate and rhythm.  No murmurs  Carotid Examination:  No bruits  Lungs:  Clear to auscultation  Abdomen:  Non-tender, Non-distended  Extremities:  No signs of peripheral edema  Skin:  No rashes    Neurologic Exam:    Mental Status:    -Awake, Alert, Oriented X 3 with some cognitive slowing. Unsure of his baseline.   -No word finding difficulties  -No aphasia  -some dysarthria and impaired anoop.   -Follows simple and complex commands    CN II:  Visual fields full.  Pupils equally reactive to light  CN III, IV, VI:  Extraocular Muscles full with no signs of nystagmus  CN V:  Facial sensory is symmetric with no asymmetries.  CN VII:  Facial motor symmetric  CN VIII:  Gross  hearing intact bilaterally  CN IX:  Palate elevates symmetrically  CN X:  Palate elevates symmetrically  CN XI:  Shoulder shrug symmetric  CN XII:  Tongue protrudes to midline  Motor: (strength out of 5:  1= minimal movement, 2 = movement in plane of gravity, 3 = movement against gravity, 4 = movement against some resistance, 5 = full strength)    -Right Upper Ext: Proximal: 5 Distal: 5  -Left Upper Ext: Proximal: 5 Distal: 5    -Right Lower Ext: Proximal: 5 Distal: 5  -Left Lower Ext: Proximal: 5 Distal: 5    DTR:  -Right   Bicep: 2+ Tricep: 2+ Brachoradialis: 2+   Patella: 2+ Ankle: 2+ Neg Babinski  -Left   Bicep: 2+ Tricep: 2+ Brachoradialis: 2+   Patella: 2+ Ankle: 2+ Neg Babinski    Sensory:  -Intact to light touch, pinprick, temperature, pain, and proprioception    Coordination/gait:  -Finger to nose with dysmetria on right and normal on left. Finger tapping appears improved compared to yesterday  -Heel to shin mild ataxia on right compared to left.       Gait  -No signs of ataxia  -ambulates unassisted         Results Review:    I reviewed the patient's new clinical results.    Results from last 7 days   Lab Units 01/17/20  0436 01/14/20  0247 01/13/20  0747   WBC 10*3/mm3 7.67 16.57* 10.87*   HEMOGLOBIN g/dL 17.6 17.9* 19.3*   HEMATOCRIT % 51.3* 52.8* 55.4*   PLATELETS 10*3/mm3 254 253 280        Results from last 7 days   Lab Units 01/17/20  0436 01/16/20  0317 01/15/20  0809 01/14/20  0247 01/13/20  0815 01/13/20  0747   SODIUM mmol/L 139 144 142 142  --  138   POTASSIUM mmol/L 4.2  --   --  4.0  --  3.7   CHLORIDE mmol/L 105  --   --  104  --  102   CO2 mmol/L 26.0  --   --  26.0  --  25.0   BUN mg/dL 17  --   --  22  --  15   CREATININE mg/dL 0.81  --   --  1.15 1.10 1.03   CALCIUM mg/dL 9.1  --   --  9.6  --  9.7   BILIRUBIN mg/dL  --   --   --  0.6  --  0.8   ALK PHOS U/L  --   --   --  41  --  48   ALT (SGPT) U/L  --   --   --  22  --  24   AST (SGOT) U/L  --   --   --  22  --  24   GLUCOSE mg/dL 96   --   --  131*  --  130*        Lab Results   Component Value Date    PROTIME 12.3 01/13/2020    INR 0.89 (L) 01/13/2020     No components found for: POCGLUC  No components found for: A1C  Lab Results   Component Value Date    HDL 53 01/14/2020    LDL 91 01/14/2020     No components found for: B12  Lab Results   Component Value Date    TSH 94.580 (H) 01/13/2020       Imaging Results (Last 24 Hours)     Procedure Component Value Units Date/Time    CT Head Without Contrast [[id number]] Collected:  01/16/20 1506     Updated:  01/16/20 1512    Narrative:       EXAMINATION: CT head without contrast 01/16/2020     HISTORY: Stroke symptoms.     DOSE: 702 mGycm. Automated exposure control was utilized to diminish  patient radiation dose..     FINDINGS: Today's exam is compared to previous study dated one day  earlier. Multiple contiguous axial images are obtained from skull base  to the vertex per protocol without intravenous contrast-enhancement with  reformatted images obtained in the sagittal and coronal projections from  the original data set.     There is atrophy of the brain with mild small vessel ischemic disease  involving the periventricular and higher white matter tracts. There is  again evidence of an acute infarct involving the right cerebellar  hemisphere and specifically the superior cerebellar distribution  abutting the tentorium cerebelli. There is some mass effect on the  fourth ventricle unchanged from the previous study. There is no evidence  of hemorrhagic conversion. No additional infarcts are observed.  Previously noted small left-sided subdural hematoma is no longer  visualized.       Impression:       1.. Stable right cerebellar hemisphere infarct from the previous study  of 01/15/2020. There is some mild mass effect on the fourth ventricle  but this is stable from the previous exam. No evidence of hemorrhagic  conversion.  2. Previously described left-sided subdural hematoma is no  longer  visualized  This report was finalized on 01/16/2020 15:09 by Dr. Paul Harper MD.            Ct head personally reviewed by me and stable right cerebellar stroke, no hemorrhage. Left SDH not visualized.     Assessment/Plan     Hospital Problem List      Chronic obstructive pulmonary disease (CMS/HCC)    CVA (cerebral vascular accident) (CMS/[facility])    Influenza A    Tobacco dependence    Acute encephalopathy    Hypothyroidism, non-compliant with medication     Medical non-compliance    Bradycardia    Impression:  1. Acute right cerebellar stroke which is evolving and showing greater edema compared to study 1/13/20  No hemorrhagic component seen on Head CT as was seen on MRI  2.SDH not visualized on CT head yesterday 1/16/2020.   3. Still a risk for requiring decompression and will need close Neuro monitoring  4. Hypothyroid  5. Cognitive slowing since admission  Unknown baseline  6. Normal hemoglobin A1C  7. LDL of 93 and goal of < 70  8. Headache- can get relief with tylenol.     Plan:  1. Stable CT head and stable neurologic exam with some improvement.  2. Continue ASA 81 mg daily  3. Continue Lipitor 80 mg daily. LDL goal less than 70.  4. Discharge plan to acute rehab. Referral to [facility] has been placed.         VINAYAK Bee  01/17/20  8:38 AM    Electronically signed by Ashley Barraza APRN at 01/17/20 1019       B/P on 1/17   153/105

## 2020-01-17 NOTE — PLAN OF CARE
Problem: Patient Care Overview  Goal: Plan of Care Review  Outcome: Ongoing (interventions implemented as appropriate)  Flowsheets (Taken 1/17/2020 0944)  Progress: declining  Plan of Care Reviewed With: patient  Outcome Summary: Upon entering room, pt. c/o 10/10 head and L UE pain. This did not subside while we were present with him. His BP was monitored during session and it was found to be elevated-166/109. Ambulation was deferred due to this reason and nursing was notified. WIll continue to work with pt. and monitor vitals as appropriate.

## 2020-01-17 NOTE — THERAPY TREATMENT NOTE
Acute Care - Physical Therapy Treatment Note  [facility]     Patient Name: Reza Cruz  : 1955  MRN: [id number]  Today's Date: 2020  Onset of Illness/Injury or Date of Surgery: 20     Referring Physician: Dr. Montenegro    Admit Date: 2020    Visit Dx:    ICD-10-CM ICD-9-CM   1. Dysphagia, unspecified type R13.10 787.20   2. Influenza A J10.1 487.1   3. Elevated TSH R79.89 794.5   4. Cerebrovascular accident (CVA), unspecified mechanism (CMS/HCC) I63.9 434.91   5. Tobacco abuse Z72.0 305.1   6. Impaired mobility Z74.09 799.89   7. Impaired mobility and ADLs Z74.09 799.89   8. Cognitive and neurobehavioral dysfunction F09 294.9    F07.89 310.1     Patient Active Problem List   Diagnosis   • Pneumonia of right upper lobe due to infectious organism (CMS/[facility])   • Impetigo   • Simple chronic bronchitis (CMS/[facility])   • Chronic obstructive pulmonary disease (CMS/[facility])   • Cough   • Personal history of nicotine dependence   • CVA (cerebral vascular accident) (CMS/[facility])   • Influenza A   • Tobacco dependence   • Acute encephalopathy   • Hypothyroidism, non-compliant with medication    • Medical non-compliance   • Bradycardia       Therapy Treatment    Rehabilitation Treatment Summary     Row Name 20 1421 20 1350 20 0917       Treatment Time/Intention    Discipline  physical therapy assistant  -MF  occupational therapist  -MW  physical therapy assistant  -    Document Type  therapy note (daily note)  -[facility]  therapy note (daily note)  -  therapy note (daily note)  -[facility]    Subjective Information  complains of;pain  -[facility]  no complaints  -  complains of;pain  -[facility]    Mode of Treatment  physical therapy  -2  occupational therapy  -MW  physical therapy  -[facility]    Patient/Family Observations  --  no family present  -  --    Existing Precautions/Restrictions  fall  -2  fall droplet - flu  -  fall  -2    Treatment Considerations/Comments  Flu A, monitor BP  -2  --  FLU A, monitor BP   -MF2    Patient Response to Treatment  --  --  (S) Pt's BP elevated this morning and he was c/o 10/10 pain in his head and Left arm. Monitored BP. Deferred ambulation due to high BP. Nursing notified during and after treatment.   -MF2    Recorded by [MF] Soledad Celis, PTA 01/17/20 1421  [MF2] Soledad Celis, PTA 01/17/20 1448 [MW] Maribeth Gross, OTR/L 01/17/20 1433 [MF] Soledad Celis, PTA 01/17/20 0919  [MF2] Soledad Celis, PTA 01/17/20 0943    Row Name 01/17/20 1421 01/17/20 0917          Vital Signs    Pre Systolic BP Rehab  122  -MF  162  -MF     Pre Treatment Diastolic BP  93  -MF  108  -MF     Intra Systolic BP Rehab  --  166  -MF     Intra Treatment Diastolic BP  --  109  -MF     Post Systolic BP Rehab  140  -MF  163  -MF     Post Treatment Diastolic BP  93  -MF  114  -MF     Intra SpO2 (%)  --  98  -MF     O2 Delivery Intra Treatment  --  room air  -MF     Pre Patient Position  Sitting  -MF  Supine  -MF     Intra Patient Position  Standing  -MF  Sitting  -MF     Post Patient Position  Sitting  -MF  Supine  -MF     Recorded by [MF] Soledad Celis, PTA 01/17/20 1448 [MF] Soledad Celis, PTA 01/17/20 0943     Row Name 01/17/20 1421 01/17/20 0917          Bed Mobility Assessment/Treatment    Supine-Sit [place] (Bed Mobility)  contact guard  -MF  verbal cues;contact guard  -MF     Sit-Supine [place] (Bed Mobility)  contact guard  -MF  contact guard  -MF     Assistive Device (Bed Mobility)  head of bed elevated;bed rails  -MF  head of bed elevated;bed rails  -MF     Recorded by [MF] Soledad Celis, PTA 01/17/20 1448 [MF] Soledad Celis, PTA 01/17/20 0943     Row Name 01/17/20 0917             Transfer Assessment/Treatment    Comment (Transfers)  Had pt. stand once to take side steps to HOB. Deferred gait due to pt's increased BP  -MF      Recorded by [MF] Soledad Celis, PTA 01/17/20 0943      Row Name 01/17/20 1421 01/17/20 0917          Sit-Stand  Transfer    Sit-Stand [place] (Transfers)  verbal cues;contact guard  -  verbal cues;contact guard;2 person assist  -     Recorded by [] Soledad Celis, [facility] 01/17/20 1448 [MF] Soledad Celis, PTA 01/17/20 0943     Row Name 01/17/20 1421 01/17/20 0917          Stand-Sit Transfer    Stand-Sit [place] (Transfers)  verbal cues;contact guard  -  contact guard  -MF     Recorded by [MF] Soledad Celis, [facility] 01/17/20 1448 [MF] Soledad Celis, PTA 01/17/20 0943     Row Name 01/17/20 1421             Gait/Stairs Assessment/Training    [place] Level (Gait)  verbal cues;contact guard;minimum assist (75% patient effort);2 person assist  -      Assistive Device (Gait)  -- HHA  -MF      Distance in Feet (Gait)  80 x 2 with one standing rest to work on orientation  -MF      Deviations/Abnormal Patterns (Gait)  ataxic;base of support, narrow;stride length decreased walks on the outside of R foot  -MF      Comment (Gait/Stairs)  pt's midline orientation was better this afternoon.   -MF      Recorded by [MF] Soledad Celis, [facility] 01/17/20 1448      Row Name 01/17/20 1350             Motor Skills Assessment/Interventions    Additional Documentation  Motor Coordination Assessment/Training (Group)  -MW      Recorded by [MW] Maribeth Gross OTR/L 01/17/20 1433      Row Name 01/17/20 1350             Therapeutic Exercise    Additional Documentation  --  -MW      78585 - OT Therapeutic Activity Minutes  30  -MW      Recorded by [MW] Maribeth Gross OTR/L 01/17/20 1433      Row Name 01/17/20 1421             Therapeutic Exercise    Lower Extremity (Therapeutic Exercise)  LAQ (long arc quad), bilateral  -      Lower Extremity Range of Motion (Therapeutic Exercise)  ankle dorsiflexion/plantar flexion, bilateral;hip abduction/adduction, bilateral;hip flexion/extension, bilateral  -      Exercise Type (Therapeutic Exercise)  AROM (active range of motion)  -      Position (Therapeutic  Exercise)  seated  -MF      Sets/Reps (Therapeutic Exercise)  15  -MF      Recorded by [MF] Soledad Celis, PTA 01/17/20 1448      Row Name 01/17/20 0917             Static Sitting Balance    Level of [place] (Unsupported Sitting, Static Balance)  standby assist  -MF      Sitting Position (Unsupported Sitting, Static Balance)  sitting on edge of bed  -MF      Recorded by [MF] Soledad Celis, PTA 01/17/20 0943      Row Name 01/17/20 1421             Static Standing Balance    Level of [place] (Supported Standing, Static Balance)  contact guard assist;minimal assist, 75% patient effort  -MF      Time Able to Maintain Position (Supported Standing, Static Balance)  30 to 45 seconds  -MF      Recorded by [MF] Soledad Celis, PTA 01/17/20 1448      Row Name 01/17/20 1350             Motor Coordination Assessment/Training    Comment, Fine Motor Coordination Training  Pt performed sorting activity demo good FMC/GMC, x2 instances difficulty picking up sugar packets out of 30 reps. Tossed ball back and forth with OT, 8/10 catches. Demos improved GMC/FMC compared to previous notes.  -MW      Recorded by [MW] Maribeth Gross, OTR/L 01/17/20 1433      Row Name 01/17/20 1421 01/17/20 1350 01/17/20 0917       Positioning and Restraints    Pre-Treatment Position  in bed  -MF  in bed  -MW  in bed  -MF    Post Treatment Position  bed  -  bed  -MW  bed  -MF    In Bed  fowlers;notified nsg;call light within reach;encouraged to call for assist;exit alarm on;side rails up x2  -MF  fowlers;call light within reach;encouraged to call for assist;with PT  -MW  notified nsg;fowlers;call light within reach;encouraged to call for assist;exit alarm on;side rails up x2  -MF    Recorded by [MF] Soledad Celis, PTA 01/17/20 1448 [MW] Maribeth Gross, OTR/L 01/17/20 1433 [MF] Soledad Celis, PTA 01/17/20 0943    Row Name 01/17/20 1350             Pain Assessment    Additional Documentation  Pain Scale: FACES  Pre/Post-Treatment (Group)  -MW      Recorded by [MW] Maribeth Gross OTR/L 01/17/20 1437      Row Name 01/17/20 0917             Pain Scale: Numbers Pre/Post-Treatment    Pain Scale: Numbers, Pretreatment  10/10  -MF      Pain Scale: Numbers, Post-Treatment  10/10  -MF      Pain Location - Orientation  upper  -MF      Pain Location  extremity and head  -MF      Pain Intervention(s)  Repositioned notified nursing  -MF      Recorded by [MF] Soledad Celis, PTA 01/17/20 0943      Row Name 01/17/20 1421 01/17/20 1350          Pain Scale: FACES Pre/Post-Treatment    Pain: FACES Scale, Pretreatment  6-->hurts even more  -MF  2-->hurts little bit  -MW     Pain: FACES Scale, Post-Treatment  6-->hurts even more  -MF  2-->hurts little bit  -MW     Pre/Post Treatment Pain Comment  L UE-he states this pm that it is more arthritis pain  -MF  --     Recorded by [MF] Soledad Celis, PTA 01/17/20 1448 [MW] Maribeth Gross, OTR/L 01/17/20 1437     Row Name 01/17/20 1350             Plan of Care Review    Plan of Care Reviewed With  patient  -MW      Progress  improving  -MW      Outcome Summary  OT txt completed. Pt performed FMC/GMC activity this date. Sorting activity with 2/30 reps objects dropped showing improved FMC and tip pinch. Peg game demo good FM/GM accuracy. Tossed ball back and forth with OT with 8/10 accuracy. Pt shows improved coordination as well as overall demeanor at end of session. Pt very motivated to improve and return to PLOF. Cont OT POC.  -MW2      Recorded by [MW] Maribeth Gross OTR/L 01/17/20 1437  [MW2] Maribeth Gross OTR/L 01/17/20 1438      Row Name 01/17/20 1350             Outcome Summary/Treatment Plan (OT)    Daily Summary of Progress (OT)  progress toward functional goals is good  -MW      Anticipated Discharge Disposition (OT)  inpatient rehabilitation facility  -MW      Recorded by [MW] Maribeth Gross OTR/L 01/17/20 1438        User Key  (r) = Recorded By, (t) = Taken By,  (c) = Cosigned By    Initials Name Effective Dates Discipline     Soledad Celis, PTA 08/02/16 -  PT    Maribeth Holland, OTR/L 08/28/18 -  OT                       PT Recommendation and Plan     Plan of Care Reviewed With: patient  Progress: declining  Outcome Summary: Upon entering room, pt. c/o 10/10 head and L UE pain. This did not subside while we were present with him. His BP was monitored during session and it was found to be elevated-166/109. Ambulation was deferred due to this reason and nursing was notified. WIll continue to work with pt. and monitor vitals as appropriate.  Outcome Measures     Row Name 01/17/20 1400 01/16/20 1100 01/15/20 1200       How much help from another is currently needed...    Putting on and taking off regular lower body clothing?  4  -MW  4  -JJ  4  -JJ    Bathing (including washing, rinsing, and drying)  3  -MW  3  -JJ  3  -JJ    Toileting (which includes using toilet bed pan or urinal)  4  -MW  4  -JJ  4  -JJ    Putting on and taking off regular upper body clothing  3  -MW  3  -JJ  3  -JJ    Taking care of personal grooming (such as brushing teeth)  3  -MW  4  -JJ  4  -JJ    Eating meals  3  -MW  4  -JJ  4  -JJ    AM-PAC 6 Clicks Score (OT)  20  -MW  22  -JJ  22  -JJ       Functional Assessment    Outcome Measure Options  AM-PAC 6 Clicks Daily Activity (OT)  -MW  AM-PAC 6 Clicks Daily Activity (OT)  -JJ  AM-PAC 6 Clicks Daily Activity (OT)  -JJ      User Key  (r) = Recorded By, (t) = Taken By, (c) = Cosigned By    Initials Name Provider Type    Maribeth Holland, OTR/L Occupational Therapist    Roxie Garvey OTR/L Occupational Therapist         Time Calculation:   PT Charges     Row Name 01/17/20 1448 01/17/20 0945          Time Calculation    Start Time  1421  -MF  0917  -MF     Stop Time  1444  -MF  0940  -MF     Time Calculation (min)  23 min  -MF  23 min  -     PT Received On  01/17/20  -  01/17/20  -     PT Goal Re-Cert Due Date  01/24/20  -CHRISTOPHER   01/24/20  -        Time Calculation- PT    Total Timed Code Minutes- PT  23 minute(s)  -MF  23 minute(s)  -MF        Timed Charges    90453 - PT Therapeutic Exercise Minutes  12  -MF  --     98638 - Gait Training Minutes   11  -MF  --     70215 - PT Therapeutic Activity Minutes  --  23  -MF       User Key  (r) = Recorded By, (t) = Taken By, (c) = Cosigned By    Initials Name Provider Type     Soledad Celis PTA Physical Therapy Assistant        Therapy Charges for Today     Code Description Service Date Service Provider Modifiers Qty    59972984025 HC PT THERAPEUTIC ACT EA 15 MIN 1/17/2020 Soledad Celis, PTA GP 2    20281522399 HC PT THER PROC EA 15 MIN 1/17/2020 Soledad Celis, PTA GP 1    02745955405 HC GAIT TRAINING EA 15 MIN 1/17/2020 Soledad Celis, PTA GP 1          PT G-Codes  Outcome Measure Options: AM-PAC 6 Clicks Daily Activity (OT)  AM-PAC 6 Clicks Score (PT): 13  AM-PAC 6 Clicks Score (OT): 20    Soledad Celis PTA  1/17/2020

## 2020-01-17 NOTE — PROGRESS NOTES
Continued Stay Note   [place]     Patient Name: Reza Cruz  MRN: [id number]  Today's Date: 1/17/2020    Admit Date: 1/13/2020    Discharge Plan     Row Name 01/17/20 0705       Plan    Plan  Referral to [facility]    Patient/Family in Agreement with Plan  yes    Plan Comments  Proceeding with referral to [facility] now that patient is out of ICU.        Discharge Codes    No documentation.             ASHER Massey

## 2020-01-17 NOTE — PLAN OF CARE
Problem: Patient Care Overview  Goal: Plan of Care Review  Outcome: Ongoing (interventions implemented as appropriate)  Flowsheets (Taken 1/17/2020 1350)  Progress: improving  Plan of Care Reviewed With: patient  Outcome Summary: OT txt completed. Pt performed FMC/GMC activity this date. Sorting activity with 2/30 reps objects dropped showing improved FMC and tip pinch. Peg game demo good FM/GM accuracy. Tossed ball back and forth with OT with 8/10 accuracy. Pt shows improved coordination as well as overall demeanor at end of session. Pt very motivated to improve and return to PLOF. Cont OT POC.

## 2020-01-17 NOTE — THERAPY TREATMENT NOTE
Acute Care - Speech Language Pathology Treatment Note  [facility]     Patient Name: Reza Cruz  : 1955  MRN: [id number]  Today's Date: 2020         Admit Date: 2020     Pt was alert and able to participate in conversation; however, utterances were partially unintelligible. Pt attempted cogntiive tasks including describing a picture to improve rate and intelligibility of speech. Pt did not demonstrate awareness of his speech deficit or understanding of the task purpose. Throughout the session, the pt continued to produce short, partially intelligible utterances. Pt's rate of speech and intelligibility did not improve with verbal cues. While the pt did make comments related to the picture, other comments were only partially related to the chosen photo.  Clara Shepherd, Speech Therapy Student     Visit Dx:      ICD-10-CM ICD-9-CM   1. Dysphagia, unspecified type R13.10 787.20   2. Influenza A J10.1 487.1   3. Elevated TSH R79.89 794.5   4. Cerebrovascular accident (CVA), unspecified mechanism (CMS/HCC) I63.9 434.91   5. Tobacco abuse Z72.0 305.1   6. Impaired mobility Z74.09 799.89   7. Impaired mobility and ADLs Z74.09 799.89   8. Cognitive and neurobehavioral dysfunction F09 294.9    F07.89 310.1     Patient Active Problem List   Diagnosis   • Pneumonia of right upper lobe due to infectious organism (CMS/HCC)   • Impetigo   • Simple chronic bronchitis (CMS/HCC)   • Chronic obstructive pulmonary disease (CMS/HCC)   • Cough   • Personal history of nicotine dependence   • CVA (cerebral vascular accident) (CMS/HCC)   • Influenza A   • Tobacco dependence   • Acute encephalopathy   • Hypothyroidism, non-compliant with medication    • Medical non-compliance   • Bradycardia        Therapy Treatment  Rehabilitation Treatment Summary     Row Name 20 1452 20 1421 20 1350       Treatment Time/Intention    Discipline  speech language pathologist  -MB,SF,MB2  physical therapy assistant  -CHRISTOPHER   occupational therapist  -    Document Type  therapy note (daily note)  -MB,SF,MB2  therapy note (daily note)  -MF2  therapy note (daily note)  -MW    Subjective Information  no complaints  -MB,SF,MB2  complains of;pain  -MF2  no complaints  -    Mode of Treatment  individual therapy;speech-language pathology  -MB,SF,MB2  physical therapy  -MF2  occupational therapy  -    Patient/Family Observations  no family present  -MB,SF,MB2  --  no family present  -MW    Patient Effort  fair  -MB,SF,MB2  --  --    Existing Precautions/Restrictions  --  fall  -MF2  fall droplet - flu  -    Treatment Considerations/Comments  --  Flu A, monitor BP  -MF2  --    Recorded by [MB,SF,MB2] Jo Ann Ramirez, CCC-SLP (r) Clara Shepherd, Speech Therapy Student (t) Jo Ann Ramirez CCC-SLP (c) 01/17/20 1616 [MF] Soledad Celis, PTA 01/17/20 1421  [MF2] Soledad Celis, PTA 01/17/20 1448 [MW] Maribeth Gross, OTR/L 01/17/20 1433    Row Name 01/17/20 0917             Treatment Time/Intention    Discipline  physical therapy assistant  -      Document Type  therapy note (daily note)  -MF2      Subjective Information  complains of;pain  -MF2      Mode of Treatment  physical therapy  -2      Existing Precautions/Restrictions  fall  -MF2      Treatment Considerations/Comments  FLU A, monitor BP  -MF2      Patient Response to Treatment  (S) Pt's BP elevated this morning and he was c/o 10/10 pain in his head and Left arm. Monitored BP. Deferred ambulation due to high BP. Nursing notified during and after treatment.   -MF2      Recorded by [MF] Soledad Celis, PTA 01/17/20 0919  [MF2] Soledad Celis, PTA 01/17/20 0943      Row Name 01/17/20 1421 01/17/20 0917          Vital Signs    Pre Systolic BP Rehab  122  -MF  162  -MF     Pre Treatment Diastolic BP  93  -MF  108  -MF     Intra Systolic BP Rehab  --  166  -MF     Intra Treatment Diastolic BP  --  109  -MF     Post Systolic BP Rehab  140  -MF  163  -MF     Post  Treatment Diastolic BP  93  -MF  114  -MF     Intra SpO2 (%)  --  98  -MF     O2 Delivery Intra Treatment  --  room air  -MF     Pre Patient Position  Sitting  -MF  Supine  -MF     Intra Patient Position  Standing  -MF  Sitting  -MF     Post Patient Position  Sitting  -MF  Supine  -MF     Recorded by [MF] Soledad Celis, PTA 01/17/20 1448 [MF] Soledad Celis, PTA 01/17/20 0943     Row Name 01/17/20 1421 01/17/20 0917          Bed Mobility Assessment/Treatment    Supine-Sit [place] (Bed Mobility)  contact guard  -  verbal cues;contact guard  -MF     Sit-Supine [place] (Bed Mobility)  contact guard  -  contact guard  -     Assistive Device (Bed Mobility)  head of bed elevated;bed rails  -  head of bed elevated;bed rails  -     Recorded by [MF] Soledad Celis, PTA 01/17/20 1448 [MF] Soledad Celis, PTA 01/17/20 0943     Row Name 01/17/20 0917             Transfer Assessment/Treatment    Comment (Transfers)  Had pt. stand once to take side steps to HOB. Deferred gait due to pt's increased BP  -MF      Recorded by [MF] Soledad Celis, PTA 01/17/20 0943      Row Name 01/17/20 1421 01/17/20 0917          Sit-Stand Transfer    Sit-Stand [place] (Transfers)  verbal cues;contact guard  -  verbal cues;contact guard;2 person assist  -     Recorded by [MF] Soledad Celis, PTA 01/17/20 1448 [MF] Soledad Celis, PTA 01/17/20 0943     Row Name 01/17/20 1421 01/17/20 0917          Stand-Sit Transfer    Stand-Sit [place] (Transfers)  verbal cues;contact guard  -  contact guard  -     Recorded by [] Soledad Celis, PTA 01/17/20 1448 [] Soledad Celis, PTA 01/17/20 0943     Row Name 01/17/20 1421             Gait/Stairs Assessment/Training    [place] Level (Gait)  verbal cues;contact guard;minimum assist (75% patient effort);2 person assist  -      Assistive Device (Gait)  -- HHA  -MF      Distance in Feet (Gait)  80 x 2 with one standing rest  to work on orientation  -MF      Deviations/Abnormal Patterns (Gait)  ataxic;base of support, narrow;stride length decreased walks on the outside of R foot  -MF      Comment (Gait/Stairs)  pt's midline orientation was better this afternoon.   -MF      Recorded by [MF] Soledad Celis, PTA 01/17/20 1448      Row Name 01/17/20 1350             Motor Skills Assessment/Interventions    Additional Documentation  Motor Coordination Assessment/Training (Group)  -MW      Recorded by [MW] Maribeth Gross OTR/L 01/17/20 1433      Row Name 01/17/20 1350             Therapeutic Exercise    Additional Documentation  --  -MW      52082 - OT Therapeutic Activity Minutes  30  -MW      Recorded by [MW] Maribeth Gross OTR/L 01/17/20 1433      Row Name 01/17/20 1421             Therapeutic Exercise    Lower Extremity (Therapeutic Exercise)  LAQ (long arc quad), bilateral  -MF      Lower Extremity Range of Motion (Therapeutic Exercise)  ankle dorsiflexion/plantar flexion, bilateral;hip abduction/adduction, bilateral;hip flexion/extension, bilateral  -MF      Exercise Type (Therapeutic Exercise)  AROM (active range of motion)  -MF      Position (Therapeutic Exercise)  seated  -MF      Sets/Reps (Therapeutic Exercise)  15  -MF      Recorded by [MF] Soledad Celis, PTA 01/17/20 1448      Row Name 01/17/20 0917             Static Sitting Balance    Level of [place] (Unsupported Sitting, Static Balance)  standby assist  -      Sitting Position (Unsupported Sitting, Static Balance)  sitting on edge of bed  -MF      Recorded by [MF] Soledad Celis, PTA 01/17/20 0943      Row Name 01/17/20 1421             Static Standing Balance    Level of [place] (Supported Standing, Static Balance)  contact guard assist;minimal assist, 75% patient effort  -MF      Time Able to Maintain Position (Supported Standing, Static Balance)  30 to 45 seconds  -MF      Recorded by [MF] Soledad Celis, PTA 01/17/20 1448      Row  Name 01/17/20 1350             Motor Coordination Assessment/Training    Comment, Fine Motor Coordination Training  Pt performed sorting activity demo good FMC/GMC, x2 instances difficulty picking up sugar packets out of 30 reps. Tossed ball back and forth with OT, 8/10 catches. Demos improved GMC/FMC compared to previous notes.  -MW      Recorded by [MW] Maribeth Gross OTR/L 01/17/20 1433      Row Name 01/17/20 1421 01/17/20 1350 01/17/20 0917       Positioning and Restraints    Pre-Treatment Position  in bed  -MF  in bed  -MW  in bed  -MF    Post Treatment Position  bed  -MF  bed  -MW  bed  -MF    In Bed  fowlers;notified nsg;call light within reach;encouraged to call for assist;exit alarm on;side rails up x2  -MF  fowlers;call light within reach;encouraged to call for assist;with PT  -MW  notified nsg;fowlers;call light within reach;encouraged to call for assist;exit alarm on;side rails up x2  -MF    Recorded by [MF] Soledad Celis, [facility] 01/17/20 1448 [MW] Maribeth Gross, OTR/L 01/17/20 1433 [MF] Soledad Celis, [facility] 01/17/20 0943    Row Name 01/17/20 1452 01/17/20 1350          Pain Assessment    Additional Documentation  Pain Scale: FACES Pre/Post-Treatment (Group)  -MB,SF,MB2  Pain Scale: FACES Pre/Post-Treatment (Group)  -MW     Recorded by [MB,SF,MB2] Jo Ann Ramirez CCC-SLP (r) [facility], Speech Therapy Student (t) Jo Ann Ramirez CCC-SLP (c) 01/17/20 1616 [MW] Maribeth Gross OTR/L 01/17/20 1437     Row Name 01/17/20 0917             Pain Scale: Numbers Pre/Post-Treatment    Pain Scale: Numbers, Pretreatment  10/10  -      Pain Scale: Numbers, Post-Treatment  10/10  -      Pain Location - Orientation  upper  -      Pain Location  extremity and head  -      Pain Intervention(s)  Repositioned notified nursing  -      Recorded by [MF] Soledad Celis, PTA 01/17/20 0943      Row Name 01/17/20 1452 01/17/20 1421 01/17/20 1350       Pain Scale: FACES Pre/Post-Treatment     Pain: FACES Scale, Pretreatment  0-->no hurt  -ALTAGRACIA PEGUERO,MB2  6-->hurts even more  -MF  2-->hurts little bit  -MW    Pain: FACES Scale, Post-Treatment  --  6-->hurts even more  -MF  2-->hurts little bit  -MW    Pre/Post Treatment Pain Comment  --  L UE-he states this pm that it is more arthritis pain  -MF  --    Recorded by [ALTAGRACIA PEGUERO,MB2] Jo Ann Ramirez CCC-SLP (r) Clara Shepherd, Speech Therapy Student (t) Jo Ann Ramirez CCC-SLP (c) 01/17/20 1616 [MF] Soledad Celis, PTA 01/17/20 1448 [MW] Maribeth Gross, OTR/L 01/17/20 1437    Row Name 01/17/20 1350             Plan of Care Review    Plan of Care Reviewed With  patient  -MW      Progress  improving  -MW      Outcome Summary  OT txt completed. Pt performed FMC/GMC activity this date. Sorting activity with 2/30 reps objects dropped showing improved FMC and tip pinch. Peg game demo good FM/GM accuracy. Tossed ball back and forth with OT with 8/10 accuracy. Pt shows improved coordination as well as overall demeanor at end of session. Pt very motivated to improve and return to PLOF. Cont OT POC.  -MW2      Recorded by [MW] Maribeth Gross OTR/L 01/17/20 1437  [MW2] Maribeth Gross OTR/L 01/17/20 1438      Row Name 01/17/20 1350             Outcome Summary/Treatment Plan (OT)    Daily Summary of Progress (OT)  progress toward functional goals is good  -MW      Anticipated Discharge Disposition (OT)  inpatient rehabilitation facility  -MW      Recorded by [MW] Maribeth Gross OTR/L 01/17/20 1438      Row Name 01/17/20 1452             Outcome Summary/Treatment Plan (SLP)    Daily Summary of Progress (SLP)  progress toward functional goals is gradual  -ALTAGRACIA PEGUERO,MB2      Barriers to Overall Progress (SLP)  Minimal motivation for participation   -ALTAGRACIA PEGUERO,MB2      Plan for Continued Treatment (SLP)  continue to follow  -ALTAGRACIA PEGUEROMB2      Anticipated Dischage Disposition  unknown  -MB,SF,MB2      Recorded by [MB,SF,MB2] Jo Ann Ramirez, CCC-SLP (r) Clara Shepherd,  Speech Therapy Student (t) Jo Ann Ramirez, CCC-SLP (c) 01/17/20 1616        User Key  (r) = Recorded By, (t) = Taken By, (c) = Cosigned By    Initials Name Effective Dates Discipline    Jo Ann Valero, CCC-SLP 08/02/16 -  SLP    Soledad Reddy, PTA 08/02/16 -  PT    Maribeth Holland, OTR/L 08/28/18 -  OT    Clara Drake, Speech Therapy Student 11/20/19 -  SLP          EDUCATION  The patient has been educated in the following areas:   Cognitive Impairment.    SLP Recommendation and Plan  Daily Summary of Progress (SLP): progress toward functional goals is gradual  Barriers to Overall Progress (SLP): Minimal motivation for participation   Plan for Continued Treatment (SLP): continue to follow  Anticipated Dischage Disposition: unknown             SLP GOALS     Row Name 01/17/20 1452 01/16/20 0947 01/15/20 1328       Oral Nutrition/Hydration Goal 1 (SLP)    Oral Nutrition/Hydration Goal 1, SLP  --  --  LTG: Patient will tolerate LRD without s/s of aspiration.  -MM    Time Frame (Oral Nutrition/Hydration Goal 1, SLP)  --  --  by discharge  -MM    Barriers (Oral Nutrition/Hydration Goal 1, SLP)  --  --  n/a  -MM    Progress/Outcomes (Oral Nutrition/Hydration Goal 1, SLP)  --  --  goal met  -MM       Comprehend Questions Goal 1 (SLP)    Improve Ability to Comprehend Questions Goal 1 (SLP)  complex yes/no questions;90%  -MB (r) SF (t) MB (c)  complex yes/no questions;90%  -MB  --    Time Frame (Comprehend Questions Goal 1, SLP)  by discharge  -MB (r) SF (t) MB (c)  by discharge  -MB  --    Progress/Outcomes (Comprehend Questions Goal 1, SLP)  goal ongoing  -MB (r) SF (t) MB (c)  goal ongoing  -MB  --       Follow Directions Goal 2 (SLP)    Improve Ability to Follow Directions Goal 1 (SLP)  2 step commands;90%  -MB (r) SF (t) MB (c)  2 step commands;90%  -MB  --    Time Frame (Follow Directions Goal 1, SLP)  by discharge  -MB (r) SF (t) MB (c)  by discharge  -MB  --    Progress/Outcomes (Follow  Directions Goal 1, SLP)  goal ongoing  -MB (r) SF (t) MB (c)  goal ongoing  -MB  --       Connected Speech to Express Thoughts Goal 1 (SLP)    Improve Narrative Discourse to Express Thoughts By Goal 1 (SLP)  describing a picture;90%  -MB (r) SF (t) MB (c)  describing a picture;90%  -MB  --    Time Frame (Connected Speech Goal 1, SLP)  by discharge  -MB (r) SF (t) MB (c)  by discharge  -MB  --    Barriers (Connected Speech Goal 1, SLP)  Low motivation  -MB  --  --    Progress (Connected Speech Goal 1, SLP)  50%;with moderate cues (50-74%)  -MB  --  --    Progress/Outcomes (Connected Speech Goal 1, SLP)  progress slower than expected  -MB (r) SF (t) MB (c)  goal ongoing  -MB  --       Articulation Goal 1 (SLP)    Improve Articulation Goal 1 (SLP)  by over-articulating in connected speech;90%  -MB (r) SF (t) MB (c)  by over-articulating in connected speech;90%  -MB  --    Time Frame (Articulation Goal 1, SLP)  by discharge  -MB (r) SF (t) MB (c)  by discharge  -MB  --    Progress/Outcomes (Articulation Goal 1, SLP)  goal ongoing  -MB (r) SF (t) MB (c)  goal ongoing  -MB  --       Patient Will Implement Strategies Goal 1 (SLP)    Implement Strategies of Goal 1 (SLP)  using external rate control;90%;independently (over 90% accuracy)  -MB  --  --    Time Frame (Strategy Implementation Goal 1, SLP)  short term goal (STG);by discharge  -MB  --  --    Barriers (Strategy Implementation Goal 1, SLP)  Low motivation  -MB  --  --    Progress (Strategy Implementation Goal 1, SLP)  50%;with moderate cues (50-74%)  -MB  --  --    Progress/Outcomes (Strategy Implementation Goal 1, SLP)  progress slower than expected  -MB  --  --       Attention Goal 1 (SLP)    Improve Attention by Goal 1 (SLP)  complete selective attention task;90%  -MB (r) SF (t) MB (c)  complete selective attention task;90%  -MB  --    Time Frame (Attention Goal 1, SLP)  by discharge  -MB (r) SF (t) MB (c)  by discharge  -MB  --    Progress/Outcomes (Attention  Goal 1, SLP)  goal ongoing  -MB (r) SF (t) MB (c)  goal ongoing  -MB  --       Organizational Skills Goal 1 (SLP)    Improve Thought Organization Through Goal 1 (SLP)  completing a divergent naming task;completing a convergent naming task;abstract;90%  -MB (r) SF (t) MB (c)  completing a divergent naming task;completing a convergent naming task;abstract;90%  -MB  --    Time Frame (Thought Organization Skills Goal 1, SLP)  by discharge  -MB (r) SF (t) MB (c)  by discharge  -MB  --    Barriers (Thought Organization Skills Goal 1, SLP)  --  -MB  dysarthria  -MB  --    Progress (Thought Organization Skills Goal 1, SLP)  --  -MB  50%;with moderate cues (50-74%)  -MB  --    Progress/Outcomes (Thought Organization Skills Goal 1, SLP)  goal ongoing  -MB (r) SF (t) MB (c)  continuing progress toward goal  -MB  --      User Key  (r) = Recorded By, (t) = Taken By, (c) = Cosigned By    Initials Name Provider Type    Jo Ann Valero CCC-SLP Speech and Language Pathologist    MM hCristina Wright, MS CCC-SLP Speech and Language Pathologist     Clara Shepherd, Speech Therapy Student Speech Therapy Student              Time Calculation:     Time Calculation- SLP     Row Name 01/17/20 1549             Time Calculation- SLP    SLP Start Time  1452  -MB (r) SF (t) MB (c)      SLP Stop Time  1507  -MB (r) SF (t) MB (c)      SLP Time Calculation (min)  15 min  -MB (r) SF (t)      SLP Received On  01/17/20  -MB (r) SF (t) MB (c)        User Key  (r) = Recorded By, (t) = Taken By, (c) = Cosigned By    Initials Name Provider Type    Jo Ann Valero CCC-SLP Speech and Language Pathologist     Clara Shepherd Speech Therapy Student Speech Therapy Student          Therapy Charges for Today     Code Description Service Date Service Provider Modifiers Qty    [id number]  ST TREATMENT SPEECH 1 1/17/2020 Clara Shepherd Speech Therapy Student GN 1                     Clara Joao Speech Therapy Student  1/17/2020

## 2020-01-17 NOTE — PROGRESS NOTES
[facility] Medicine Services  INPATIENT PROGRESS NOTE    Patient Name: Reza Cruz  Date of Admission: 1/13/2020  Today's Date: 01/17/20  Length of Stay: 4  Primary Care Physician: Lobito Trevino Jr., MD    Subjective   Chief Complaint: feeling better  HPI     Patient seen and examined at bedside.  Patient overall continues to improve.  Has some issues with coordination still but is working bery hard with therapy and is very motivated.  OT Maribeth and myself discussed giving the patient some exercises to do on his own and went to talk with the patient together.      Review of Systems   Constitutional: Negative for activity change, appetite change, chills, diaphoresis, fatigue and unexpected weight change.   Respiratory: Negative for wheezing.    Gastrointestinal: Negative for abdominal distention, blood in stool and constipation.        All pertinent negatives and positives are as above. All other systems have been reviewed and are negative unless otherwise stated.     Objective    Temp:  [97.6 °F (36.4 °C)-98 °F (36.7 °C)] 97.7 °F (36.5 °C)  Heart Rate:  [51-76] 64  Resp:  [16-18] 16  BP: (124-153)/() 138/99  Physical Exam   Constitutional: He is oriented to person, place, and time. No distress.   HENT:   Head: Normocephalic and atraumatic.   Eyes: Conjunctivae are normal. No scleral icterus.   Neck: Neck supple. No JVD present.   Cardiovascular: Normal rate and regular rhythm.   No murmur heard.  Pulmonary/Chest: Effort normal. No stridor. No respiratory distress. He has no wheezes.   Abdominal: Soft. Bowel sounds are normal. He exhibits no distension. There is no tenderness. There is no guarding.   Musculoskeletal: He exhibits no edema.   Neurological: He is alert and oriented to person, place, and time. Coordination abnormal.   Skin: Skin is warm and dry. He is not diaphoretic. No erythema.   Psychiatric: He has a normal mood and affect. His behavior is normal.    Nursing note and vitals reviewed.          Results Review:  I have reviewed the labs, radiology results, and diagnostic studies.    Laboratory Data:   Results from last 7 days   Lab Units 01/17/20  0436 01/14/20  0247 01/13/20  0747   WBC 10*3/mm3 7.67 16.57* 10.87*   HEMOGLOBIN g/dL 17.6 17.9* 19.3*   HEMATOCRIT % 51.3* 52.8* 55.4*   PLATELETS 10*3/mm3 254 253 280        Results from last 7 days   Lab Units 01/17/20  0436 01/16/20  0317 01/15/20  0809 01/14/20  0247 01/13/20  0815 01/13/20  0747   SODIUM mmol/L 139 144 142 142  --  138   POTASSIUM mmol/L 4.2  --   --  4.0  --  3.7   CHLORIDE mmol/L 105  --   --  104  --  102   CO2 mmol/L 26.0  --   --  26.0  --  25.0   BUN mg/dL 17  --   --  22  --  15   CREATININE mg/dL 0.81  --   --  1.15 1.10 1.03   CALCIUM mg/dL 9.1  --   --  9.6  --  9.7   BILIRUBIN mg/dL  --   --   --  0.6  --  0.8   ALK PHOS U/L  --   --   --  41  --  48   ALT (SGPT) U/L  --   --   --  22  --  24   AST (SGOT) U/L  --   --   --  22  --  24   GLUCOSE mg/dL 96  --   --  131*  --  130*       Culture Data:   Blood Culture   Date Value Ref Range Status   01/13/2020 No growth at less than 24 hours  Preliminary   01/13/2020 No growth at 24 hours  Preliminary       Radiology Data:   Imaging Results (Last 24 Hours)     Procedure Component Value Units Date/Time    CT Head Without Contrast [[id number]] Collected:  01/17/20 1127     Updated:  01/17/20 1132    Narrative:       EXAMINATION: CT HEAD WO CONTRAST-      1/17/2020 11:03 AM CST     HISTORY: left arm paresthesia; R13.10-Dysphagia, unspecified;  J10.1-Influenza due to other identified influenza virus with other  respiratory manifestations; R79.89-Other specified abnormal findings of  blood chemistry; I63.9-Cerebral infarction, unspecified; Z72.0-Tobacco  use; Z74.09-Other reduced mobility; Z74.09-Other reduced mobility;  F09-Unspecified mental disorder due to known physiological condition;     In order to have a CT radiation dose as low as  reasonably achievable  Automated Exposure Control was utilized for adjustment of the mA and/or  KV according to patient size.     DLP in mGycm= 657.     Stable nonhemorrhagic 45 mm right cerebellar infarct.     No midline shift.     Ventricle size remains normal.     Mild underlying atrophy and small vessel disease.     No new infarct is seen.     Summary:  1. Stable head CT with large right cerebellar infarct.                                   This report was finalized on 01/17/2020 11:29 by Dr. aSeid Singh MD.    CT Head Without Contrast [[id number]] Collected:  01/16/20 1506     Updated:  01/16/20 1512    Narrative:       EXAMINATION: CT head without contrast 01/16/2020     HISTORY: Stroke symptoms.     DOSE: 702 mGycm. Automated exposure control was utilized to diminish  patient radiation dose..     FINDINGS: Today's exam is compared to previous study dated one day  earlier. Multiple contiguous axial images are obtained from skull base  to the vertex per protocol without intravenous contrast-enhancement with  reformatted images obtained in the sagittal and coronal projections from  the original data set.     There is atrophy of the brain with mild small vessel ischemic disease  involving the periventricular and higher white matter tracts. There is  again evidence of an acute infarct involving the right cerebellar  hemisphere and specifically the superior cerebellar distribution  abutting the tentorium cerebelli. There is some mass effect on the  fourth ventricle unchanged from the previous study. There is no evidence  of hemorrhagic conversion. No additional infarcts are observed.  Previously noted small left-sided subdural hematoma is no longer  visualized.       Impression:       1.. Stable right cerebellar hemisphere infarct from the previous study  of 01/15/2020. There is some mild mass effect on the fourth ventricle  but this is stable from the previous exam. No evidence of hemorrhagic  conversion.  2.  "Previously described left-sided subdural hematoma is no longer  visualized  This report was finalized on 01/16/2020 15:09 by Dr. Paul Harper MD.          I have reviewed the patient's current medications.     Assessment/Plan     Active Hospital Problems    Diagnosis   • CVA (cerebral vascular accident) (CMS/HCC)   • Influenza A   • Tobacco dependence   • Acute encephalopathy   • Hypothyroidism, non-compliant with medication    • Medical non-compliance   • Bradycardia   • Chronic obstructive pulmonary disease (CMS/[facility])       Plan:  1.  CTA head and neck reviewed, \"Complete occlusion of the right vertebral artery with reconstitution at C1. Basilar artery and tip appear unremarkable. Both PCAs and superior cerebellar arteries are widely patent.\"  2.  MRI reviewed  3.  Echo reviewed  4.  ASA and atorvastatin  5.  A1c and lipid profile reviewed  6.  Lifestyle modifcations recommended including smoking cessation   7.  Neurology and neurosurgery following, appreciate assistance.  Notes reviewed  8.  PT/OT/ SLP  9.  Telemetry   10.  Continue tamiflu for 5 days  11.  Continue lisinopril to 20 mg, increased amlodipine 10 mg             Discharge Planning: I expect the patient to be discharged to acute rehab in 3-4 days.  Precertification pending.    Hira Montenegro MD   01/17/20   12:38 PM  "

## 2020-01-17 NOTE — PROGRESS NOTES
NEUROSURGERY DAILY PROGRESS NOTE    ASSESSMENT:   Reza Cruz is a 64 y.o. with a significant comorbidity hypertension, hyperlipidemia.  He presents with a new problem of difficulty with gait and coordination of the right upper extremity and right hemibody numbness. Physical exam findings of right-sided upper and lower extremity ataxia and scanning speech.  Their imaging shows right cerebellar stroke.    DDX:     Chronic obstructive pulmonary disease (CMS/HCC)    CVA (cerebral vascular accident) (CMS/HCC)    Influenza A    Tobacco dependence    Acute encephalopathy    Hypothyroidism, non-compliant with medication     Medical non-compliance    Bradycardia    Patient Active Problem List   Diagnosis   • Pneumonia of right upper lobe due to infectious organism (CMS/HCC)   • Impetigo   • Simple chronic bronchitis (CMS/HCC)   • Chronic obstructive pulmonary disease (CMS/HCC)   • Cough   • Personal history of nicotine dependence   • CVA (cerebral vascular accident) (CMS/HCC)   • Influenza A   • Tobacco dependence   • Acute encephalopathy   • Hypothyroidism, non-compliant with medication    • Medical non-compliance   • Bradycardia     HPI: Resting comfortably on MedSurg floor.  No complaints of pain.  No acute events overnight.    PLAN:   Neuro: Stable.  Relatively unchanged   Dysarthric speech, no dysmetria, right pronator drift   Follows commands.  Names objects.    Recommendations per Dr. Dahl:   Avoid hyponatremia, keep sodium > 140; Na currently 139   Agree with anticoagulation per neurology's recommendation.   Repeat CT of the head stable   No surgical intervention required at this time.   We will continue to follow as needed.    CHIEF COMPLAINT:   Difficulty with gait and coordination of the right upper extremity and right hemibody numbness    Subjective  Symptoms stable.  Doing well    Temp:  [97.6 °F (36.4 °C)-98 °F (36.7 °C)] 98 °F (36.7 °C)  Heart Rate:  [51-76] 61  Resp:  [16-18] 16  BP: (120-153)/()  "149/97    Objective:  General Appearance:  Comfortable, well-appearing, in no acute distress and not in pain.    Vital signs: (most recent): Blood pressure 149/97, pulse 61, temperature 98 °F (36.7 °C), temperature source Oral, resp. rate 16, height 182.9 cm (72\"), weight 80.5 kg (177 lb 8 oz), SpO2 95 %.      Neurologic Exam     Mental Status   Oriented to person, place, and time.   Attention: normal. Concentration: normal.   Speech: slurred   Level of consciousness: alert    Bright and awake.  Follows commands.  Shows thumbs up and 2 fingers bilaterally.     Cranial Nerves     CN II   Visual fields full to confrontation.     CN III, IV, VI   Pupils are equal, round, and reactive to light.  Extraocular motions are normal.   Nystagmus: none   Diplopia: none    CN V   Right facial sensation deficit: complete    CN VII   Right facial weakness: none  Left facial weakness: none    CN VIII   CN VIII normal.     CN IX, X   CN IX normal.     CN XI   CN XI normal.     CN XII   CN XII normal.     Motor Exam   Muscle bulk: normal  Overall muscle tone: normal  Right arm pronator drift: absent  Left arm pronator drift: absent    Strength   Strength 5/5 throughout.   Moves all extremities  No pronator drift.  Right dysmetria     Sensory Exam   Right arm light touch: decreased from elbow  Left arm light touch: normal  Right leg light touch: decreased from knee  Left leg light touch: normal    Gait, Coordination, and Reflexes     Tremor   Resting tremor: absent  Intention tremor: absent    Drains: * No LDAs found *    Imaging Results (Last 24 Hours)     Procedure Component Value Units Date/Time    CT Head Without Contrast [[id number]] Collected:  01/16/20 1506     Updated:  01/16/20 1512    Narrative:       EXAMINATION: CT head without contrast 01/16/2020     HISTORY: Stroke symptoms.     DOSE: 702 mGycm. Automated exposure control was utilized to diminish  patient radiation dose..     FINDINGS: Today's exam is compared to previous " study dated one day  earlier. Multiple contiguous axial images are obtained from skull base  to the vertex per protocol without intravenous contrast-enhancement with  reformatted images obtained in the sagittal and coronal projections from  the original data set.     There is atrophy of the brain with mild small vessel ischemic disease  involving the periventricular and higher white matter tracts. There is  again evidence of an acute infarct involving the right cerebellar  hemisphere and specifically the superior cerebellar distribution  abutting the tentorium cerebelli. There is some mass effect on the  fourth ventricle unchanged from the previous study. There is no evidence  of hemorrhagic conversion. No additional infarcts are observed.  Previously noted small left-sided subdural hematoma is no longer  visualized.       Impression:       1.. Stable right cerebellar hemisphere infarct from the previous study  of 01/15/2020. There is some mild mass effect on the fourth ventricle  but this is stable from the previous exam. No evidence of hemorrhagic  conversion.  2. Previously described left-sided subdural hematoma is no longer  visualized  This report was finalized on 01/16/2020 15:09 by Dr. Paul Harper MD.        Lab Results (last 24 hours)     Procedure Component Value Units Date/Time    Blood Culture - Blood, Arm, Right [[id number]] Collected:  01/13/20 0747    Specimen:  Blood from Arm, Right Updated:  01/17/20 0830     Blood Culture No growth at 4 days    Basic Metabolic Panel [[id number]]  (Normal) Collected:  01/17/20 0436    Specimen:  Blood Updated:  01/17/20 0522     Glucose 96 mg/dL      BUN 17 mg/dL      Creatinine 0.81 mg/dL      Sodium 139 mmol/L      Potassium 4.2 mmol/L      Chloride 105 mmol/L      CO2 26.0 mmol/L      Calcium 9.1 mg/dL      eGFR Non African Amer 97 mL/min/1.73      BUN/Creatinine Ratio 21.0     Anion Gap 8.0 mmol/L     Narrative:       GFR Normal >60  Chronic Kidney Disease  <60  Kidney Failure <15      CBC (No Diff) [[id number]]  (Abnormal) Collected:  01/17/20 0436    Specimen:  Blood Updated:  01/17/20 0511     WBC 7.67 10*3/mm3      RBC 5.60 10*6/mm3      Hemoglobin 17.6 g/dL      Hematocrit 51.3 %      MCV 91.6 fL      MCH 31.4 pg      MCHC 34.3 g/dL      RDW 14.1 %      RDW-SD 47.5 fl      MPV 10.4 fL      Platelets 254 10*3/mm3     Testosterone, Free, Total [[id number]]  (Abnormal) Collected:  01/14/20 0247    Specimen:  Blood Updated:  01/16/20 1209     Testosterone, Total 389 ng/dL      Comment: Adult male reference interval is based on a population of  healthy nonobese males (BMI <30) between 19 and 39 years old.  Richardson et.al. JCEM 2017,102;0776-2394. PMID: 71070813.        Testosterone, Free 2.7 pg/mL     Narrative:       Performed at:  01 - [facility]  [id number]  : Adithya Rowley PhD, Phone:  [phone]  Performed at:  02 - [facility]  [id number]  : Harini Hill MD, Phone:  [phone]    Blood Culture - Blood, Arm, Right [[id number]] Collected:  01/13/20 0824    Specimen:  Blood from Arm, Right Updated:  01/16/20 0940     Blood Culture No growth at 3 days        [id number]  VINAYAK Lobato

## 2020-01-17 NOTE — DISCHARGE PLACEMENT REQUEST
"To: [facility]    From: Jazmin Joseph [phone]      Reza Cardenas (64 y.o. Male)     Date of Birth Social Security Number Address Home Phone MRN    1955  [address] [place] [id number] [phone] [id number]    Confucianist Marital Status          Sikhism        Admission Date Admission Type Admitting Provider Attending Provider Department, Room/Bed    1/13/20 Emergency Hira Montenegro MD [facility], John Eric, MD [facility] 3A, 351/1    Discharge Date Discharge Disposition Discharge Destination                       Attending Provider:  Hira Montenegro MD    Allergies:  Codeine    Isolation:  Droplet   Infection:  Influenza (01/13/20)   Code Status:  CPR    Ht:  182.9 cm (72\")   Wt:  80.5 kg (177 lb 8 oz)    Admission Cmt:  None   Principal Problem:  None                Active Insurance as of 1/13/2020     Primary Coverage     Payor Plan Insurance Group Employer/Plan Group    [employer] [place] [employer] [place]ICAID      Payor Plan Address Payor Plan Phone Number Payor Plan Fax Number Effective Dates    [address] [address] [phone]  8/15/2018 - None Entered    [facility] [id number]       Subscriber Name Subscriber Birth Date Member ID       REZA CARDENAS [id number]                 Emergency Contacts      (Rel.) Home Phone Work Phone Mobile Phone    Rufina Urbina (Sister) -- -- [phone]            Insurance Information                [facility] MEDICAID Phone: [phone]    Subscriber: Reza Cardenas Subscriber#: [id number]    Group#:  Precert#:           "

## 2020-01-18 LAB
BACTERIA SPEC AEROBE CULT: NORMAL
BACTERIA SPEC AEROBE CULT: NORMAL
SODIUM BLD-SCNC: 138 MMOL/L (ref 136–145)
WHOLE BLOOD HOLD SPECIMEN: NORMAL

## 2020-01-18 PROCEDURE — 99232 SBSQ HOSP IP/OBS MODERATE 35: CPT | Performed by: PSYCHIATRY & NEUROLOGY

## 2020-01-18 PROCEDURE — 94760 N-INVAS EAR/PLS OXIMETRY 1: CPT

## 2020-01-18 PROCEDURE — 94799 UNLISTED PULMONARY SVC/PX: CPT

## 2020-01-18 PROCEDURE — 97110 THERAPEUTIC EXERCISES: CPT

## 2020-01-18 PROCEDURE — 84295 ASSAY OF SERUM SODIUM: CPT | Performed by: INTERNAL MEDICINE

## 2020-01-18 PROCEDURE — 97116 GAIT TRAINING THERAPY: CPT

## 2020-01-18 RX ORDER — IPRATROPIUM BROMIDE AND ALBUTEROL SULFATE 2.5; .5 MG/3ML; MG/3ML
3 SOLUTION RESPIRATORY (INHALATION)
Status: DISCONTINUED | OUTPATIENT
Start: 2020-01-18 | End: 2020-01-21 | Stop reason: HOSPADM

## 2020-01-18 RX ORDER — AMOXICILLIN 250 MG
1 CAPSULE ORAL NIGHTLY
Status: DISCONTINUED | OUTPATIENT
Start: 2020-01-18 | End: 2020-01-21 | Stop reason: HOSPADM

## 2020-01-18 RX ORDER — POLYETHYLENE GLYCOL 3350 17 G/17G
17 POWDER, FOR SOLUTION ORAL DAILY
Status: DISCONTINUED | OUTPATIENT
Start: 2020-01-18 | End: 2020-01-21 | Stop reason: HOSPADM

## 2020-01-18 RX ADMIN — SODIUM CHLORIDE, PRESERVATIVE FREE 10 ML: 5 INJECTION INTRAVENOUS at 21:09

## 2020-01-18 RX ADMIN — HYDROCODONE BITARTRATE AND ACETAMINOPHEN 1 TABLET: 5; 325 TABLET ORAL at 06:54

## 2020-01-18 RX ADMIN — SENNOSIDES AND DOCUSATE SODIUM 1 TABLET: 8.6; 5 TABLET ORAL at 12:43

## 2020-01-18 RX ADMIN — IPRATROPIUM BROMIDE AND ALBUTEROL SULFATE 3 ML: 2.5; .5 SOLUTION RESPIRATORY (INHALATION) at 18:43

## 2020-01-18 RX ADMIN — ASPIRIN 81 MG: 81 TABLET, CHEWABLE ORAL at 08:23

## 2020-01-18 RX ADMIN — ACETAMINOPHEN 650 MG: 325 TABLET, FILM COATED ORAL at 21:22

## 2020-01-18 RX ADMIN — SODIUM CHLORIDE, PRESERVATIVE FREE 10 ML: 5 INJECTION INTRAVENOUS at 08:24

## 2020-01-18 RX ADMIN — AMLODIPINE BESYLATE 10 MG: 10 TABLET ORAL at 08:22

## 2020-01-18 RX ADMIN — HYDROCODONE BITARTRATE AND ACETAMINOPHEN 1 TABLET: 5; 325 TABLET ORAL at 18:31

## 2020-01-18 RX ADMIN — LISINOPRIL 20 MG: 20 TABLET ORAL at 08:23

## 2020-01-18 RX ADMIN — OSELTAMIVIR PHOSPHATE 75 MG: 75 CAPSULE ORAL at 08:22

## 2020-01-18 RX ADMIN — LEVOTHYROXINE SODIUM 125 MCG: 125 TABLET ORAL at 05:16

## 2020-01-18 RX ADMIN — POLYETHYLENE GLYCOL (3350) 17 G: 17 POWDER, FOR SOLUTION ORAL at 12:43

## 2020-01-18 RX ADMIN — ATORVASTATIN CALCIUM 80 MG: 40 TABLET, FILM COATED ORAL at 21:05

## 2020-01-18 RX ADMIN — IPRATROPIUM BROMIDE AND ALBUTEROL SULFATE 3 ML: 2.5; .5 SOLUTION RESPIRATORY (INHALATION) at 13:44

## 2020-01-18 RX ADMIN — SENNOSIDES AND DOCUSATE SODIUM 1 TABLET: 8.6; 5 TABLET ORAL at 21:08

## 2020-01-18 NOTE — PROGRESS NOTES
[facility] Medicine Services  INPATIENT PROGRESS NOTE    Patient Name: Reza Curz  Date of Admission: 1/13/2020  Today's Date: 01/18/20  Length of Stay: 5  Primary Care Physician: Lobito Trevino Jr., MD    Subjective   Chief Complaint: feeling better  HPI     Patient was seen and examined at bedside.  Patient has been working with therapy.  Discussed case with physical therapy, indicates that the patient is still very big fall risk and likely still needs two-person assistance at this time.  Patient pending acute rehab.  Patient willing to do exercises in bed, and requested PT and OT given things that he can do on his own.    Review of Systems   Constitutional: Negative for activity change, appetite change, fatigue and unexpected weight change.   Respiratory: Positive for wheezing. Negative for cough and shortness of breath.    Gastrointestinal: Negative for abdominal distention, blood in stool and constipation.   Neurological: Positive for speech difficulty and weakness.        All pertinent negatives and positives are as above. All other systems have been reviewed and are negative unless otherwise stated.     Objective    Temp:  [97.6 °F (36.4 °C)-98.6 °F (37 °C)] 97.6 °F (36.4 °C)  Heart Rate:  [57-78] 66  Resp:  [16] 16  BP: ()/(68-89) 118/68  Physical Exam   Constitutional: He is oriented to person, place, and time. No distress.   HENT:   Head: Normocephalic and atraumatic.   Eyes: Conjunctivae are normal. No scleral icterus.   Neck: Neck supple. No JVD present.   Cardiovascular: Normal rate and regular rhythm.   No murmur heard.  Pulmonary/Chest: Effort normal. No stridor. No respiratory distress. He has wheezes.   Abdominal: Soft. Bowel sounds are normal. He exhibits no distension. There is no tenderness. There is no guarding.   Musculoskeletal: He exhibits no edema.   Neurological: He is alert and oriented to person, place, and time. Coordination abnormal.    Speech difficulty    Skin: Skin is warm and dry. He is not diaphoretic. No erythema.   Psychiatric: He has a normal mood and affect. His behavior is normal.   Nursing note and vitals reviewed.          Results Review:  I have reviewed the labs, radiology results, and diagnostic studies.    Laboratory Data:   Results from last 7 days   Lab Units 01/17/20 0436 01/14/20 0247 01/13/20  0747   WBC 10*3/mm3 7.67 16.57* 10.87*   HEMOGLOBIN g/dL 17.6 17.9* 19.3*   HEMATOCRIT % 51.3* 52.8* 55.4*   PLATELETS 10*3/mm3 254 253 280        Results from last 7 days   Lab Units 01/18/20  0627 01/17/20  0436 01/16/20 0317 01/14/20 0247 01/13/20  0815 01/13/20  0747   SODIUM mmol/L 138 139 144   < > 142  --  138   POTASSIUM mmol/L  --  4.2  --   --  4.0  --  3.7   CHLORIDE mmol/L  --  105  --   --  104  --  102   CO2 mmol/L  --  26.0  --   --  26.0  --  25.0   BUN mg/dL  --  17  --   --  22  --  15   CREATININE mg/dL  --  0.81  --   --  1.15 1.10 1.03   CALCIUM mg/dL  --  9.1  --   --  9.6  --  9.7   BILIRUBIN mg/dL  --   --   --   --  0.6  --  0.8   ALK PHOS U/L  --   --   --   --  41  --  48   ALT (SGPT) U/L  --   --   --   --  22  --  24   AST (SGOT) U/L  --   --   --   --  22  --  24   GLUCOSE mg/dL  --  96  --   --  131*  --  130*    < > = values in this interval not displayed.       Culture Data:   Blood Culture   Date Value Ref Range Status   01/13/2020 No growth at less than 24 hours  Preliminary   01/13/2020 No growth at 24 hours  Preliminary       Radiology Data:   Imaging Results (Last 24 Hours)     ** No results found for the last 24 hours. **          I have reviewed the patient's current medications.     Assessment/Plan     Active Hospital Problems    Diagnosis   • CVA (cerebral vascular accident) (CMS/[facility])   • Influenza A   • Tobacco dependence   • Acute encephalopathy   • Hypothyroidism, non-compliant with medication    • Medical non-compliance   • Bradycardia   • Chronic obstructive pulmonary disease (CMS/[facility])  "      Plan:  1.  CTA head and neck reviewed, \"Complete occlusion of the right vertebral artery with reconstitution at C1. Basilar artery and tip appear unremarkable. Both PCAs and superior cerebellar arteries are widely patent.\"  2.  MRI reviewed  3.  Echo reviewed  4.  ASA and atorvastatin  5.  A1c and lipid profile reviewed  6.  Lifestyle modifcations recommended including smoking cessation   7.  Neurology and neurosurgery following, appreciate assistance.  Notes reviewed  8.  PT/OT/ SLP  9.  Telemetry   10.  Continue tamiflu for 5 days  11.  Continue lisinopril to 20 mg, continue amlodipine 10 mg   12.  Made duonebs scheduled 4x daily for wheezing  13.  Bowel regimen with miralax and docusate-senna            Discharge Planning: I expect the patient to be discharged to acute rehab in 3-4 days.  Precertification pending.    Hira Montenegro MD   01/18/20   12:00 PM  "

## 2020-01-18 NOTE — PROGRESS NOTES
Neurology Progress Note      Chief Complaint:  F/u stroke    Subjective     Subjective:  No complaints.  Ready for D/C to SNF on Monday.  Eating breakfast.        Medications:  Current Facility-Administered Medications   Medication Dose Route Frequency Provider Last Rate Last Dose   • acetaminophen (TYLENOL) tablet 650 mg  650 mg Oral Q6H PRN Hira Montenegro MD   650 mg at 01/17/20 0848   • amLODIPine (NORVASC) tablet 10 mg  10 mg Oral Q24H Hira Montenegro MD   10 mg at 01/18/20 0822   • aspirin chewable tablet 81 mg  81 mg Oral Daily Hira Montenegro MD   81 mg at 01/18/20 0823   • atorvastatin (LIPITOR) tablet 80 mg  80 mg Oral Nightly Hira Montenegro MD   80 mg at 01/17/20 2033   • HYDROcodone-acetaminophen (NORCO) 5-325 MG per tablet 1 tablet  1 tablet Oral Q6H PRN Hira Montenegro MD   1 tablet at 01/18/20 0654   • ipratropium-albuterol (DUO-NEB) nebulizer solution 3 mL  3 mL Nebulization Q4H PRN Hira Montenegro MD       • levothyroxine (SYNTHROID, LEVOTHROID) tablet 125 mcg  125 mcg Oral Q AM Hira Montenegro MD   125 mcg at 01/18/20 0516   • lisinopril (PRINIVIL,ZESTRIL) tablet 20 mg  20 mg Oral Q24H Hira Montenegro MD   20 mg at 01/18/20 0823   • ondansetron (ZOFRAN) tablet 4 mg  4 mg Oral Q6H PRN Hira Montenegro MD        Or   • ondansetron (ZOFRAN) injection 4 mg  4 mg Intravenous Q6H PRN Hira Montenegro MD   4 mg at 01/14/20 1024   • sodium chloride 0.9 % flush 10 mL  10 mL Intravenous PRN Hira Montenegro MD       • sodium chloride 0.9 % flush 10 mL  10 mL Intravenous Q12H Hira Montenegro MD   10 mL at 01/18/20 0824   • sodium chloride 0.9 % flush 10 mL  10 mL Intravenous PRN Hira Montenegro MD           Review of Systems:   -A 14 point review of systems is completed and is negative except for frontal headache.       Objective      Vital Signs  Temp:  [97.7 °F (36.5 °C)-98.6 °F (37 °C)] 98 °F (36.7 °C)  Heart Rate:  [57-78] 57  Resp:  [16] 16  BP:  ()/(68-99) 123/89    Physical Exam:  General Exam:  Head:  Normal cephalic, atraumatic  HEENT:  Neck supple  Fundoscopic Exam:  No signs of disc edema  CVS:  Regular rate and rhythm.  No murmurs  Carotid Examination:  No bruits  Lungs:  Clear to auscultation  Abdomen:  Non-tender, Non-distended  Extremities:  No signs of peripheral edema  Skin:  No rashes    Neurologic Exam:    Mental Status:    -Awake, Alert, Oriented X 3 with some cognitive slowing. Unsure of his baseline.   -No word finding difficulties  -No aphasia  -some dysarthria and impaired anoop.   -Follows simple and complex commands    CN II:  Visual fields full.  Pupils equally reactive to light  CN III, IV, VI:  Extraocular Muscles full with no signs of nystagmus  CN V:  Facial sensory is symmetric with no asymmetries.  CN VII:  Facial motor symmetric  CN VIII:  Gross hearing intact bilaterally  CN IX:  Palate elevates symmetrically  CN X:  Palate elevates symmetrically  CN XI:  Shoulder shrug symmetric  CN XII:  Tongue protrudes to midline  Motor: (strength out of 5:  1= minimal movement, 2 = movement in plane of gravity, 3 = movement against gravity, 4 = movement against some resistance, 5 = full strength)    -Right Upper Ext: Proximal: 5 Distal: 5  -Left Upper Ext: Proximal: 5 Distal: 5    -Right Lower Ext: Proximal: 5 Distal: 5  -Left Lower Ext: Proximal: 5 Distal: 5    DTR:  -Right   Bicep: 2+ Tricep: 2+ Brachoradialis: 2+   Patella: 2+ Ankle: 2+ Neg Babinski  -Left   Bicep: 2+ Tricep: 2+ Brachoradialis: 2+   Patella: 2+ Ankle: 2+ Neg Babinski    Sensory:  -Intact to light touch, pinprick, temperature, pain, and proprioception    Coordination/gait:  -Finger to nose with dysmetria on right and normal on left. Finger tapping appears improved compared to yesterday  -Heel to shin mild ataxia on right compared to left.       Gait  -No signs of ataxia  -ambulates unassisted         Results Review:    I reviewed the patient's new clinical  results.    Results from last 7 days   Lab Units 01/17/20  0436 01/14/20  0247 01/13/20  0747   WBC 10*3/mm3 7.67 16.57* 10.87*   HEMOGLOBIN g/dL 17.6 17.9* 19.3*   HEMATOCRIT % 51.3* 52.8* 55.4*   PLATELETS 10*3/mm3 254 253 280        Results from last 7 days   Lab Units 01/18/20  0627 01/17/20  0436 01/16/20  0317  01/14/20  0247 01/13/20  0815 01/13/20  0747   SODIUM mmol/L 138 139 144   < > 142  --  138   POTASSIUM mmol/L  --  4.2  --   --  4.0  --  3.7   CHLORIDE mmol/L  --  105  --   --  104  --  102   CO2 mmol/L  --  26.0  --   --  26.0  --  25.0   BUN mg/dL  --  17  --   --  22  --  15   CREATININE mg/dL  --  0.81  --   --  1.15 1.10 1.03   CALCIUM mg/dL  --  9.1  --   --  9.6  --  9.7   BILIRUBIN mg/dL  --   --   --   --  0.6  --  0.8   ALK PHOS U/L  --   --   --   --  41  --  48   ALT (SGPT) U/L  --   --   --   --  22  --  24   AST (SGOT) U/L  --   --   --   --  22  --  24   GLUCOSE mg/dL  --  96  --   --  131*  --  130*    < > = values in this interval not displayed.        Lab Results   Component Value Date    PROTIME 12.3 01/13/2020    INR 0.89 (L) 01/13/2020     No components found for: POCGLUC  No components found for: A1C  Lab Results   Component Value Date    HDL 53 01/14/2020    LDL 91 01/14/2020     No components found for: B12  Lab Results   Component Value Date    TSH 94.580 (H) 01/13/2020       Imaging Results (Last 24 Hours)     Procedure Component Value Units Date/Time    CT Head Without Contrast [[id number]] Collected:  01/17/20 1127     Updated:  01/17/20 1132    Narrative:       EXAMINATION: CT HEAD WO CONTRAST-      1/17/2020 11:03 AM CST     HISTORY: left arm paresthesia; R13.10-Dysphagia, unspecified;  J10.1-Influenza due to other identified influenza virus with other  respiratory manifestations; R79.89-Other specified abnormal findings of  blood chemistry; I63.9-Cerebral infarction, unspecified; Z72.0-Tobacco  use; Z74.09-Other reduced mobility; Z74.09-Other reduced  mobility;  F09-Unspecified mental disorder due to known physiological condition;     In order to have a CT radiation dose as low as reasonably achievable  Automated Exposure Control was utilized for adjustment of the mA and/or  KV according to patient size.     DLP in mGycm= 657.     Stable nonhemorrhagic 45 mm right cerebellar infarct.     No midline shift.     Ventricle size remains normal.     Mild underlying atrophy and small vessel disease.     No new infarct is seen.     Summary:  1. Stable head CT with large right cerebellar infarct.                                   This report was finalized on 01/17/2020 11:29 by Dr. Saeid Singh MD.            Ct head personally reviewed by me and stable right cerebellar stroke, no hemorrhage. Left SDH not visualized.     Assessment/Plan     Hospital Problem List      Chronic obstructive pulmonary disease (CMS/HCC)    CVA (cerebral vascular accident) (CMS/HCC)    Influenza A    Tobacco dependence    Acute encephalopathy    Hypothyroidism, non-compliant with medication     Medical non-compliance    Bradycardia    Impression:  1. Acute right cerebellar stroke which is evolving and showing greater edema compared to study 1/13/20  No hemorrhagic component seen on Head CT as was seen on MRI  2.SDH not visualized on CT head yesterday 1/16/2020.   3. Still a risk for requiring decompression and will need close Neuro monitoring  4. Hypothyroid  5. Cognitive slowing since admission  Unknown baseline  6. Normal hemoglobin A1C  7. LDL of 93 and goal of < 70  8. Headache- can get relief with tylenol.     Plan:  1. Stable CT head and stable neurologic exam with some improvement.  2. Continue ASA 81 mg daily  3. Continue Lipitor 80 mg daily. LDL goal less than 70.  4. Discharge plan to acute rehab. Referral to [facility] rehab pending for Monday.  Will observe over weekend.          Jamari Villasenor MD  01/18/20  8:53 AM

## 2020-01-18 NOTE — PLAN OF CARE
Problem: Patient Care Overview  Goal: Plan of Care Review  Outcome: Ongoing (interventions implemented as appropriate)  Flowsheets (Taken 1/18/2020 1023)  Progress: no change  Plan of Care Reviewed With: patient  Outcome Summary: Pt. required supervision for bed mobility, stood w/ CGA, and amb 96' x2 w/ Karri.. Pt. continues to heavily veer to / lean to his R. His gait is very unstable and relied heavily on assist from therapist for balance. Pt. took 3 standing rests during amb. Pt. performed B LE AROM exercises sitting EOB 10x2 and sit <> stands x10. Pt. would benefit from continued therapy to increase safety and balance during amb and also to strengthen B LE.

## 2020-01-18 NOTE — THERAPY TREATMENT NOTE
Acute Care - Physical Therapy Treatment Note  [facility]     Patient Name: Reza Cruz  : 1955  MRN: [id number]  Today's Date: 2020  Onset of Illness/Injury or Date of Surgery: 20     Referring Physician: Dr. Montenegro    Admit Date: 2020    Visit Dx:    ICD-10-CM ICD-9-CM   1. Dysphagia, unspecified type R13.10 787.20   2. Influenza A J10.1 487.1   3. Elevated TSH R79.89 794.5   4. Cerebrovascular accident (CVA), unspecified mechanism (CMS/[facility]) I63.9 434.91   5. Tobacco abuse Z72.0 305.1   6. Impaired mobility Z74.09 799.89   7. Impaired mobility and ADLs Z74.09 799.89   8. Cognitive and neurobehavioral dysfunction F09 294.9    F07.89 310.1     Patient Active Problem List   Diagnosis   • Pneumonia of right upper lobe due to infectious organism (CMS/[facility])   • Impetigo   • Simple chronic bronchitis (CMS/[facility])   • Chronic obstructive pulmonary disease (CMS/[facility])   • Cough   • Personal history of nicotine dependence   • CVA (cerebral vascular accident) (CMS/[facility])   • Influenza A   • Tobacco dependence   • Acute encephalopathy   • Hypothyroidism, non-compliant with medication    • Medical non-compliance   • Bradycardia       Therapy Treatment    Rehabilitation Treatment Summary     Row Name 20 1023             Treatment Time/Intention    Discipline  physical therapy assistant  -      Document Type  therapy note (daily note)  -AH2      Subjective Information  complains of;weakness  -AH2      Mode of Treatment  physical therapy  -2      Existing Precautions/Restrictions  cardiac  -AH2      Recorded by [] Ricarda Heller PTA 20 1023  [AH2] Ricarda Heller PTA 20 1110      Row Name 20 1023             Vital Signs    Pre Systolic BP Rehab  118  -AH      Pre Treatment Diastolic BP  80  -AH      Post Systolic BP Rehab  116  -AH      Post Treatment Diastolic BP  84  -AH      Recorded by [] Ricarda Heller PTA 20 1110      Row Name 20 1023             Bed Mobility  Assessment/Treatment    Supine-Sit [place] (Bed Mobility)  supervision  -      Sit-Supine [place] (Bed Mobility)  supervision  -      Recorded by [] Ricarda Heller [facility] 01/18/20 1110      Row Name 01/18/20 1023             Sit-Stand Transfer    Sit-Stand [place] (Transfers)  verbal cues;contact guard  -      Recorded by [] Ricarda Heller [facility] 01/18/20 1110      Row Name 01/18/20 1023             Stand-Sit Transfer    Stand-Sit [place] (Transfers)  verbal cues;contact guard  -      Recorded by [] Ricarda Heller, [facility] 01/18/20 1110      Row Name 01/18/20 1023             Gait/Stairs Assessment/Training    [place] Level (Gait)  verbal cues;minimum assist (75% patient effort)  -      Assistive Device (Gait)  -- HHA  -      Distance in Feet (Gait)  96' x2; 3 standing rests  -      Deviations/Abnormal Patterns (Gait)  ataxic;base of support, narrow;anoop decreased;stride length decreased  -      Comment (Gait/Stairs)  veers and leans heavily to the R   -      Recorded by [] Ricarda Heller [facility] 01/18/20 1110      Row Name 01/18/20 1023             Therapeutic Exercise    Lower Extremity Range of Motion (Therapeutic Exercise)  hip flexion/extension, bilateral;knee flexion/extension, bilateral;ankle dorsiflexion/plantar flexion, bilateral;other (see comments) sit <> stands x10  -      Exercise Type (Therapeutic Exercise)  AROM (active range of motion)  -      Position (Therapeutic Exercise)  seated  -      Sets/Reps (Therapeutic Exercise)  10x2  -      Recorded by [] Ricarda Heller [facility] 01/18/20 1110      Row Name 01/18/20 1023             Static Sitting Balance    Level of [place] (Unsupported Sitting, Static Balance)  standby assist  -      Sitting Position (Unsupported Sitting, Static Balance)  sitting on edge of bed  -      Time Able to Maintain Position (Unsupported Sitting, Static Balance)  more than 5 minutes  -      Recorded by [] Arron  Ricarda [facility] 01/18/20 1110      Row Name 01/18/20 1023             Dynamic Standing Balance    Level of [place], Reaches Outside Midline (Standing, Dynamic Balance)  contact guard assist  -      Time Able to Maintain Position, Reaches Outside Midline (Standing, Dynamic Balance)  2 to 3 minutes  -      Recorded by [] Ricarda Heller PTA 01/18/20 1110      Row Name 01/18/20 1023             Positioning and Restraints    Pre-Treatment Position  in bed  -      Post Treatment Position  bed  -AH      In Bed  fowlers;call light within reach;encouraged to call for assist;exit alarm on  -      Recorded by [] Ricarda Heller, [facility] 01/18/20 1110      Row Name 01/18/20 1023             Pain Scale: Numbers Pre/Post-Treatment    Pain Scale: Numbers, Pretreatment  0/10 - no pain  -      Pain Scale: Numbers, Post-Treatment  0/10 - no pain  -      Recorded by [] Ricarda Heller, [facility] 01/18/20 1110        User Key  (r) = Recorded By, (t) = Taken By, (c) = Cosigned By    Initials Name Effective Dates Discipline     Ricarda Heller, [facility] 01/06/20 -  PT                       PT Recommendation and Plan     Plan of Care Reviewed With: patient  Progress: no change  Outcome Summary: Pt. required supervision for bed mobility, stood w/ CGA, and amb 96' x2 w/ Karri.. Pt. continues to heavily veer to / lean to his R. His gait is very unstable and relied heavily on assist from therapist for balance. Pt. took 3 standing rests during amb. Pt. performed B LE AROM exercises sitting EOB 10x2 and sit <> stands x10. Pt. would benefit from continued therapy to increase safety and balance during amb and also to strengthen B LE.  Outcome Measures     Row Name 01/18/20 1023 01/17/20 1400 01/16/20 1100       How much help from another person do you currently need...    Turning from your back to your side while in flat bed without using bedrails?  4  -AH  --  --    Moving from lying on back to sitting on the side of a flat bed without  bedrails?  4  -AH  --  --    Moving to and from a bed to a chair (including a wheelchair)?  2  -AH  --  --    Standing up from a chair using your arms (e.g., wheelchair, bedside chair)?  2  -AH  --  --    Climbing 3-5 steps with a railing?  1  -AH  --  --    To walk in hospital room?  2  -AH  --  --    AM-PAC 6 Clicks Score (PT)  15  -AH  --  --       How much help from another is currently needed...    Putting on and taking off regular lower body clothing?  --  4  -MW  4  -JJ    Bathing (including washing, rinsing, and drying)  --  3  -MW  3  -JJ    Toileting (which includes using toilet bed pan or urinal)  --  4  -MW  4  -JJ    Putting on and taking off regular upper body clothing  --  3  -MW  3  -JJ    Taking care of personal grooming (such as brushing teeth)  --  3  -MW  4  -JJ    Eating meals  --  3  -MW  4  -JJ    AM-PAC 6 Clicks Score (OT)  --  20  -  22  -       Functional Assessment    Outcome Measure Options  AM-PAC 6 Clicks Basic Mobility (PT)  -  AM-PAC 6 Clicks Daily Activity (OT)  -[facility]-PAC 6 Clicks Daily Activity (OT)  -    Row Name 01/15/20 1200             How much help from another is currently needed...    Putting on and taking off regular lower body clothing?  4  -JJ      Bathing (including washing, rinsing, and drying)  3  -JJ      Toileting (which includes using toilet bed pan or urinal)  4  -JJ      Putting on and taking off regular upper body clothing  3  -JJ      Taking care of personal grooming (such as brushing teeth)  4  -JJ      Eating meals  4  -JJ      AM-PAC 6 Clicks Score (OT)  22  -         Functional Assessment    Outcome Measure Options  -PAC 6 Clicks Daily Activity (OT)  -        User Key  (r) = Recorded By, (t) = Taken By, (c) = Cosigned By    Initials Name Provider Type     Maribeth Gross, OTR/L Occupational Therapist    Roxie Garvey, OTR/L Occupational Therapist    Ricarda Trammell PTA Physical Therapy Assistant         Time Calculation:   PT  Charges     Row Name 01/18/20 1116             Time Calculation    Start Time  1023  -      Stop Time  1102  -      Time Calculation (min)  39 min  -      PT Non-Billable Time (min)  15 min  -      PT Received On  01/18/20  -      PT Goal Re-Cert Due Date  01/24/20  -         Timed Charges    56076 - PT Therapeutic Exercise Minutes  12  -      50974 - Gait Training Minutes   12  -        User Key  (r) = Recorded By, (t) = Taken By, (c) = Cosigned By    Initials Name Provider Type     Ricarda Heller PTA Physical Therapy Assistant        Therapy Charges for Today     Code Description Service Date Service Provider Modifiers Qty    08276832963 HC PT THER PROC EA 15 MIN 1/18/2020 Ricarda Heller PTA GP 1    01661924815 HC GAIT TRAINING EA 15 MIN 1/18/2020 Ricarda Heller PTA GP 1          PT G-Codes  Outcome Measure Options: AM-PAC 6 Clicks Basic Mobility (PT)  AM-PAC 6 Clicks Score (PT): 15  AM-PAC 6 Clicks Score (OT): 20    Ricarda Heller PTA  1/18/2020

## 2020-01-18 NOTE — PLAN OF CARE
A&O X4. No neuro changes. NIH=5. NSR on tele. , room air. Droplet precautions continued. No c/o pain. Miralax and pericolace given for bowels. Encouraged self turns. Will continue to monitor.     Problem: Patient Care Overview  Goal: Plan of Care Review  Outcome: Ongoing (interventions implemented as appropriate)  Flowsheets  Taken 1/18/2020 1456 by Jenn Cole, RN  Progress: no change  Taken 1/18/2020 1023 by Ricarda Heller PTA  Plan of Care Reviewed With: patient

## 2020-01-18 NOTE — PLAN OF CARE
Problem: Patient Care Overview  Goal: Plan of Care Review  Outcome: Ongoing (interventions implemented as appropriate)  Flowsheets  Taken 1/18/2020 0126 by Jennifer Dozier, RN  Progress: no change  Taken 1/17/2020 1532 by Clara Shepherd, Speech Therapy Student  Plan of Care Reviewed With: patient  Note:   Pt A&O X4. No c/o pain. NIH 5, aphasic, slurred speech. VSS. Droplet precautions maintained. Will continue to monitor.

## 2020-01-18 NOTE — PLAN OF CARE
Problem: Patient Care Overview  Goal: Plan of Care Review  1/17/2020 1816 by Nadine Hartley RN  Outcome: Ongoing (interventions implemented as appropriate)  Flowsheets (Taken 1/17/2020 1814)  Outcome Summary: A&OX4. Pt c/o pain in L arm and head today. EKG and CT were completed and both were WNL. BP slightly elevated, PO medication given. Voiding per urinal. NIH 5. Tolerating diet. Refusing SCDS. Continue to monitor. Safety maintained.     Problem: Patient Care Overview  Goal: Individualization and Mutuality  Outcome: Ongoing (interventions implemented as appropriate)  Flowsheets (Taken 1/17/2020 1814)  Patient Specific Interventions: Pt having aphasia, allow time for pt to answer questions

## 2020-01-19 LAB
SODIUM BLD-SCNC: 141 MMOL/L (ref 136–145)
WHOLE BLOOD HOLD SPECIMEN: NORMAL

## 2020-01-19 PROCEDURE — 97110 THERAPEUTIC EXERCISES: CPT

## 2020-01-19 PROCEDURE — 97535 SELF CARE MNGMENT TRAINING: CPT

## 2020-01-19 PROCEDURE — 94760 N-INVAS EAR/PLS OXIMETRY 1: CPT

## 2020-01-19 PROCEDURE — 84295 ASSAY OF SERUM SODIUM: CPT | Performed by: INTERNAL MEDICINE

## 2020-01-19 PROCEDURE — 94799 UNLISTED PULMONARY SVC/PX: CPT

## 2020-01-19 PROCEDURE — 97116 GAIT TRAINING THERAPY: CPT

## 2020-01-19 RX ADMIN — SODIUM CHLORIDE, PRESERVATIVE FREE 10 ML: 5 INJECTION INTRAVENOUS at 08:24

## 2020-01-19 RX ADMIN — HYDROCODONE BITARTRATE AND ACETAMINOPHEN 1 TABLET: 5; 325 TABLET ORAL at 06:55

## 2020-01-19 RX ADMIN — ATORVASTATIN CALCIUM 80 MG: 40 TABLET, FILM COATED ORAL at 21:11

## 2020-01-19 RX ADMIN — HYDROCODONE BITARTRATE AND ACETAMINOPHEN 1 TABLET: 5; 325 TABLET ORAL at 13:15

## 2020-01-19 RX ADMIN — SODIUM CHLORIDE, PRESERVATIVE FREE 10 ML: 5 INJECTION INTRAVENOUS at 21:11

## 2020-01-19 RX ADMIN — LEVOTHYROXINE SODIUM 125 MCG: 125 TABLET ORAL at 06:55

## 2020-01-19 RX ADMIN — IPRATROPIUM BROMIDE AND ALBUTEROL SULFATE 3 ML: 2.5; .5 SOLUTION RESPIRATORY (INHALATION) at 18:27

## 2020-01-19 RX ADMIN — IPRATROPIUM BROMIDE AND ALBUTEROL SULFATE 3 ML: 2.5; .5 SOLUTION RESPIRATORY (INHALATION) at 07:56

## 2020-01-19 RX ADMIN — HYDROCODONE BITARTRATE AND ACETAMINOPHEN 1 TABLET: 5; 325 TABLET ORAL at 21:11

## 2020-01-19 RX ADMIN — POLYETHYLENE GLYCOL (3350) 17 G: 17 POWDER, FOR SOLUTION ORAL at 08:24

## 2020-01-19 RX ADMIN — SENNOSIDES AND DOCUSATE SODIUM 1 TABLET: 8.6; 5 TABLET ORAL at 21:11

## 2020-01-19 RX ADMIN — AMLODIPINE BESYLATE 10 MG: 10 TABLET ORAL at 08:24

## 2020-01-19 RX ADMIN — IPRATROPIUM BROMIDE AND ALBUTEROL SULFATE 3 ML: 2.5; .5 SOLUTION RESPIRATORY (INHALATION) at 00:21

## 2020-01-19 RX ADMIN — LISINOPRIL 20 MG: 20 TABLET ORAL at 08:24

## 2020-01-19 RX ADMIN — ASPIRIN 81 MG: 81 TABLET, CHEWABLE ORAL at 08:24

## 2020-01-19 RX ADMIN — IPRATROPIUM BROMIDE AND ALBUTEROL SULFATE 3 ML: 2.5; .5 SOLUTION RESPIRATORY (INHALATION) at 12:28

## 2020-01-19 NOTE — PLAN OF CARE
Problem: Patient Care Overview  Goal: Plan of Care Review  Outcome: Ongoing (interventions implemented as appropriate)  Flowsheets (Taken 1/19/2020 9236)  Progress: improving  Plan of Care Reviewed With: patient  Outcome Summary: PT tx completed. Pt supine in bed. C/O increased dizziness with change in position, although ortho BP WNL. Bed mobility S/I. Sit<>stand CG/Chantell. Amb 50'x 2 [place] with r wx. Several LOB due to RLE weakness and decrease balance, requiring [place] to recover. Repeated cueing needed for gait mechanics and postural correction. Performed balance activities and strengthening ex's at rail in [facility] and seated/supine. Pt given HEP and instructed. Recomomend inpatient rehab.

## 2020-01-19 NOTE — PLAN OF CARE
Problem: Patient Care Overview  Goal: Plan of Care Review  Outcome: Ongoing (interventions implemented as appropriate)  Flowsheets (Taken 1/19/2020 1436)  Progress: improving  Plan of Care Reviewed With: patient  Note:   Pt. Transferred to eob with cond. Ind./supervision from this pappas/l to doff/brooks socks/shoes while leaning forward to tie shoes safely with no issues! Pt. Using hand exerciser to increase fine/gross motor control with phalanges for adl tasks! No c/o's with tx!

## 2020-01-19 NOTE — PLAN OF CARE
Problem: Patient Care Overview  Goal: Plan of Care Review  Outcome: Ongoing (interventions implemented as appropriate)  Flowsheets (Taken 1/19/2020 4854)  Progress: no change  Plan of Care Reviewed With: patient  Note:   NIH 4, aphasic, dysarthria, weakness, c/o bilateral arms achy, tylenol given with relief, urinating without difficulty, up with assistance, bed alarm set, safety maintained

## 2020-01-19 NOTE — PROGRESS NOTES
[facility] Medicine Services  INPATIENT PROGRESS NOTE    Patient Name: Reza Cruz  Date of Admission: 1/13/2020  Today's Date: 01/19/20  Length of Stay: 6  Primary Care Physician: Lobito Trevino Jr., MD    Subjective   Chief Complaint: feeling better  HPI     Patient seen and examined at bedside.  Played catch with the patient, his coordination is getting better.  Patient is able to feed himself and doing much better.  Patient walked with therapy today.  Patient denies any complaints.  Indicates that his breathing is much better.    Review of Systems   Constitutional: Negative for activity change, appetite change, fatigue and unexpected weight change.   Respiratory: Negative for cough, shortness of breath and wheezing.    Cardiovascular: Negative for chest pain and palpitations.   Gastrointestinal: Negative for abdominal distention, blood in stool and constipation.   Neurological: Positive for speech difficulty.        All pertinent negatives and positives are as above. All other systems have been reviewed and are negative unless otherwise stated.     Objective    Temp:  [97.7 °F (36.5 °C)-98.9 °F (37.2 °C)] 98.9 °F (37.2 °C)  Heart Rate:  [49-81] 81  Resp:  [14-18] 18  BP: (110-127)/(68-86) 110/68  Physical Exam   Constitutional: He is oriented to person, place, and time. No distress.   HENT:   Head: Normocephalic and atraumatic.   Eyes: Conjunctivae are normal. No scleral icterus.   Neck: Neck supple. No JVD present.   Cardiovascular: Normal rate and regular rhythm.   No murmur heard.  Pulmonary/Chest: Effort normal. No stridor. No respiratory distress. He has no wheezes.   Abdominal: Soft. Bowel sounds are normal. He exhibits no distension. There is no tenderness. There is no guarding.   Musculoskeletal: He exhibits no edema.   Neurological: He is alert and oriented to person, place, and time. Coordination abnormal.   Speech difficulty    Skin: Skin is warm and dry. He is  "not diaphoretic. No erythema.   Psychiatric: He has a normal mood and affect. His behavior is normal.   Nursing note and vitals reviewed.          Results Review:  I have reviewed the labs, radiology results, and diagnostic studies.    Laboratory Data:   Results from last 7 days   Lab Units 01/17/20  0436 01/14/20  0247 01/13/20  0747   WBC 10*3/mm3 7.67 16.57* 10.87*   HEMOGLOBIN g/dL 17.6 17.9* 19.3*   HEMATOCRIT % 51.3* 52.8* 55.4*   PLATELETS 10*3/mm3 254 253 280        Results from last 7 days   Lab Units 01/19/20  0446 01/18/20  0627 01/17/20 0436 01/14/20 0247 01/13/20  0815 01/13/20  0747   SODIUM mmol/L 141 138 139   < > 142  --  138   POTASSIUM mmol/L  --   --  4.2  --  4.0  --  3.7   CHLORIDE mmol/L  --   --  105  --  104  --  102   CO2 mmol/L  --   --  26.0  --  26.0  --  25.0   BUN mg/dL  --   --  17  --  22  --  15   CREATININE mg/dL  --   --  0.81  --  1.15 1.10 1.03   CALCIUM mg/dL  --   --  9.1  --  9.6  --  9.7   BILIRUBIN mg/dL  --   --   --   --  0.6  --  0.8   ALK PHOS U/L  --   --   --   --  41  --  48   ALT (SGPT) U/L  --   --   --   --  22  --  24   AST (SGOT) U/L  --   --   --   --  22  --  24   GLUCOSE mg/dL  --   --  96  --  131*  --  130*    < > = values in this interval not displayed.       Culture Data:   Blood Culture   Date Value Ref Range Status   01/13/2020 No growth at less than 24 hours  Preliminary   01/13/2020 No growth at 24 hours  Preliminary       Radiology Data:   Imaging Results (Last 24 Hours)     ** No results found for the last 24 hours. **          I have reviewed the patient's current medications.     Assessment/Plan     Active Hospital Problems    Diagnosis   • CVA (cerebral vascular accident) (CMS/HCC)   • Influenza A   • Tobacco dependence   • Acute encephalopathy   • Hypothyroidism, non-compliant with medication    • Medical non-compliance   • Bradycardia   • Chronic obstructive pulmonary disease (CMS/HCC)       Plan:  1.  CTA head and neck reviewed, \"Complete " "occlusion of the right vertebral artery with reconstitution at C1. Basilar artery and tip appear unremarkable. Both PCAs and superior cerebellar arteries are widely patent.\"  2.  MRI reviewed  3.  Echo reviewed  4.  ASA and atorvastatin  5.  A1c and lipid profile reviewed  6.  Lifestyle modifcations recommended including smoking cessation   7.  Neurology and neurosurgery following, appreciate assistance.  Notes reviewed  8.  PT/OT/ SLP  9.  Telemetry   10.  Continue tamiflu for 5 days - Therapy completed   11.  Continue lisinopril to 20 mg, continue amlodipine 10 mg   12.  Duonebs scheduled 4x daily for wheezing  13.  Bowel regimen with miralax and docusate-senna            Discharge Planning: I expect the patient to be discharged to acute rehab in 2-3 days.  Precertification pending.    Hira Montenegro MD   01/19/20   2:17 PM  "

## 2020-01-19 NOTE — THERAPY TREATMENT NOTE
Acute Care - Physical Therapy Treatment Note  [facility]     Patient Name: Reza Cruz  : 1955  MRN: [id number]  Today's Date: 2020  Onset of Illness/Injury or Date of Surgery: 20     Referring Physician: Dr. Montenegro    Admit Date: 2020    Visit Dx:    ICD-10-CM ICD-9-CM   1. Dysphagia, unspecified type R13.10 787.20   2. Influenza A J10.1 487.1   3. Elevated TSH R79.89 794.5   4. Cerebrovascular accident (CVA), unspecified mechanism (CMS/HCC) I63.9 434.91   5. Tobacco abuse Z72.0 305.1   6. Impaired mobility Z74.09 799.89   7. Impaired mobility and ADLs Z74.09 799.89   8. Cognitive and neurobehavioral dysfunction F09 294.9    F07.89 310.1     Patient Active Problem List   Diagnosis   • Pneumonia of right upper lobe due to infectious organism (CMS/[facility])   • Impetigo   • Simple chronic bronchitis (CMS/[facility])   • Chronic obstructive pulmonary disease (CMS/[facility])   • Cough   • Personal history of nicotine dependence   • CVA (cerebral vascular accident) (CMS/[facility])   • Influenza A   • Tobacco dependence   • Acute encephalopathy   • Hypothyroidism, non-compliant with medication    • Medical non-compliance   • Bradycardia       Therapy Treatment    Rehabilitation Treatment Summary     Row Name 20 1450 20 0955          Treatment Time/Intention    Discipline  physical therapy assistant  -KJ  occupational therapy assistant  -     Document Type  therapy note (daily note)  -KJ2  therapy note (daily note)  -     Subjective Information  no complaints  -KJ2  complains of;weakness;fatigue  -     Mode of Treatment  physical therapy  -KJ2  occupational therapy  -     Patient Effort  good  -KJ2  good  -     Existing Precautions/Restrictions  fall  -KJ2  cardiac FLU/Fine,gross motor control with phalanges!  -     Treatment Considerations/Comments  flu, monitor BP; right side weakness  -KJ2  --     Recorded by [KJ] Jocelin Rios, PTA 20 1500  [KJ2] Jocelin Rios, PTA 20 1537 [CJ]  Jalen Dozier COTA/L 01/19/20 1433     Row Name 01/19/20 1450             Vital Signs    Pre Systolic BP Rehab  120  -KJ      Pre Treatment Diastolic BP  85  -KJ      Intra Systolic BP Rehab  118  -KJ      Intra Treatment Diastolic BP  82  -KJ      Recorded by [KJ] Jocelin Rios, PTA 01/19/20 1537      Row Name 01/19/20 1450 01/19/20 0955          Bed Mobility Assessment/Treatment    Bed Mobility Assessment/Treatment  --  bed mobility (all) activities  -CJ     [place] Level (Bed Mobility)  --  conditional independence;supervision  -CJ     Supine-Sit [place] (Bed Mobility)  supervision  -KJ  --     Sit-Supine [place] (Bed Mobility)  supervision  -KJ  --     Recorded by [KJ] Jcoelin Rios, PTA 01/19/20 1537 [CJ] Jalen Dozier COTA/L 01/19/20 1433     Row Name 01/19/20 1450             Sit-Stand Transfer    Sit-Stand [place] (Transfers)  verbal cues;contact guard;minimum assist (75% patient effort)  -KJ      Assistive Device (Sit-Stand Transfers)  walker, front-wheeled  -KJ      Recorded by [KJ] Jocelin Rios, PTA 01/19/20 1537      Row Name 01/19/20 1450             Stand-Sit Transfer    Stand-Sit [place] (Transfers)  verbal cues;contact guard  -KJ      Assistive Device (Stand-Sit Transfers)  walker, front-wheeled  -KJ      Recorded by [KJ] Jocelin Rios, PTA 01/19/20 1537      Row Name 01/19/20 1450             Gait/Stairs Assessment/Training    Gait/Stairs Assessment/Training  gait/ambulation independence  -KJ      [place] Level (Gait)  verbal cues;minimum assist (75% patient effort)  -KJ      Assistive Device (Gait)  walker, front-wheeled  -KJ      Distance in Feet (Gait)  50' x 2  -KJ      Pattern (Gait)  step-through  -KJ      Deviations/Abnormal Patterns (Gait)  base of support, narrow;scissoring;stride length decreased  -KJ      Right Sided Gait Deviations  weight shift ability decreased  -KJ      Comment (Gait/Stairs)  leans R, decreased balance due to RLE  weakness and balance  -KJ      Recorded by [KJ] Jocelin Rios, PTA 01/19/20 1537      Row Name 01/19/20 0955             ADL Assessment/Intervention    25045 - OT Self Care/Mgmt Minutes  23  -CJ      BADL Assessment/Intervention  lower body dressing  -CJ      Recorded by [CJ] Jalen Dozier COTA/L 01/19/20 1433      Row Name 01/19/20 0955             Lower Body Dressing Assessment/Training    Lower Body Dressing [place] Level  doff;don;socks;shoes/slippers;set up;supervision  -CJ      Lower Body Dressing Position  edge of bed sitting;unsupported sitting  -CJ      Recorded by [CJ] Jalen Dozier COTA/L 01/19/20 1433      Row Name 01/19/20 0955             Motor Skills Assessment/Interventions    Additional Documentation  Therapeutic Exercise (Group)  -CJ      Recorded by [CJ] Jalen Dozier COTA/L 01/19/20 1433      Row Name 01/19/20 1450 01/19/20 0955          Therapeutic Exercise    Upper Extremity (Therapeutic Exercise)  --  bicep curl, bilateral;wand exercises  -     Upper Extremity Range of Motion (Therapeutic Exercise)  --  shoulder flexion/extension, bilateral;elbow flexion/extension, bilateral;wrist flexion/extension, bilateral  -CJ     Weight/Resistance (Therapeutic Exercise)  --  2 pounds;3 pounds;yellow  -CJ     Exercise Type (Therapeutic Exercise)  AROM (active range of motion)  -KJ  AROM (active range of motion)  -CJ     Position (Therapeutic Exercise)  seated;supine;standing  -KJ  seated  -CJ     Sets/Reps (Therapeutic Exercise)  --  1 set x 20 reps!  -     Equipment (Therapeutic Exercise)  --  dowel mayra/wand;free weight, barbell;resistive bands  -     Expected Outcome (Therapeutic Exercise)  --  facilitate normal movement patterns;improve functional stability;improve functional tolerance, self-care activity;improve motor control;improve neuromuscular control;improve performance, BADLs;improve performance, fine motor coordination skills;improve performance, gait  skills;improve performance, transfer skills;improve postural control;increase active range of motion  -CJ     Comment (Therapeutic Exercise)  standing lateral weightshifts, high marching, abd, squats, forward lunges x 5. Instructed in HEP  -KJ  --     Recorded by [KJ] Jocelin Rios, PTA 01/19/20 1537 [CJ] Jalen Dozier COTA/L 01/19/20 1433     Row Name 01/19/20 1450             Static Sitting Balance    Level of [place] (Unsupported Sitting, Static Balance)  independent  -KJ      Recorded by [KJ] Jocelin Rios, [facility] 01/19/20 1537      Row Name 01/19/20 1450             Dynamic Sitting Balance    Level of [place], Reaches Outside Midline (Sitting, Dynamic Balance)  independent  -KJ      Recorded by [KJ] Jocelin Rios, [facility] 01/19/20 1537      Row Name 01/19/20 1450             Static Standing Balance    Level of [place] (Supported Standing, Static Balance)  minimal assist, 75% patient effort;contact guard assist  -KJ      Recorded by [KJ] Jocelin Rios [facility] 01/19/20 1537      Row Name 01/19/20 1450             Dynamic Standing Balance    Level of [place], Reaches Outside Midline (Standing, Dynamic Balance)  minimal assist, 75% patient effort  -KJ      Recorded by [KJ] Jocelin Rios, [facility] 01/19/20 1537      Row Name 01/19/20 1450 01/19/20 0955          Positioning and Restraints    Pre-Treatment Position  in bed  -KJ  in bed  -CJ     Post Treatment Position  bed  -KJ  bed  -CJ     In Bed  exit alarm on;call light within reach  -KJ  fowlers;call light within reach;encouraged to call for assist;side rails up x2  -CJ     Recorded by [KJ] Jocelin Rios, PTA 01/19/20 1537 [CJ] Jalen Dozier COTA/L 01/19/20 1433     Row Name 01/19/20 0955             Pain Assessment    Additional Documentation  Pain Scale 2: Word Pre/Post-Treatment (Group)  -CJ      Recorded by [CJ] Jalen Dozier COTA/L 01/19/20 1433      Row Name 01/19/20 1450 01/19/20 0955          Pain Scale: Numbers  Pre/Post-Treatment    Pain Scale: Numbers, Pretreatment  0/10 - no pain  -KIKE  0/10 - no pain  -EDGARDO     Pain Scale: Numbers, Post-Treatment  --  0/10 - no pain  -     Recorded by [KJ] Jocelin Rios, PTA 01/19/20 1537 [CJ] Jalen Dozier COTA/L 01/19/20 1433     Row Name 01/19/20 0955             Outcome Summary/Treatment Plan (OT)    Daily Summary of Progress (OT)  progress toward functional goals is good  -CJ      Barriers to Overall Progress (OT)  fine/gross motor control with phalanges/Fall!  -CJ      Plan for Continued Treatment (OT)  continue with ot poc!  -CJ      Recorded by [CJ] Jalen Dozier COTA/MELANIE 01/19/20 1433        User Key  (r) = Recorded By, (t) = Taken By, (c) = Cosigned By    Initials Name Effective Dates Discipline     Jocelin Rios, PHILLY 08/02/16 -  PT    CJ Jalen Dozier COTA/L 08/02/16 -  OT                       PT Recommendation and Plan     Plan of Care Reviewed With: patient  Progress: improving  Outcome Summary: PT tx completed. Pt supine in bed. C/O increased dizziness with change in position, although ortho BP WNL. Bed mobility S/I. Sit<>stand CG/[place]. Amb 50'x 2 [place] with r wx. Several LOB due to RLE weakness and decrease balance, requiring [place] to recover. Repeated cueing needed for gait mechanics and postural correction. Performed balance activities and strengthening ex's at rail in [facility] and seated/supine. Pt given HEP and instructed. Recomomend inpatient rehab.  Outcome Measures     Row Name 01/19/20 0955 01/18/20 1023 01/17/20 1400       How much help from another person do you currently need...    Turning from your back to your side while in flat bed without using bedrails?  --  4  -AH  --    Moving from lying on back to sitting on the side of a flat bed without bedrails?  --  4  -AH  --    Moving to and from a bed to a chair (including a wheelchair)?  --  2  -AH  --    Standing up from a chair using your arms (e.g., wheelchair, bedside chair)?  --  2  -AH   --    Climbing 3-5 steps with a railing?  --  1  -AH  --    To walk in hospital room?  --  2  -AH  --    AM-PAC 6 Clicks Score (PT)  --  15  -AH  --       How much help from another is currently needed...    Putting on and taking off regular lower body clothing?  4  -  --  4  -MW    Bathing (including washing, rinsing, and drying)  3  -  --  3  -MW    Toileting (which includes using toilet bed pan or urinal)  4  -  --  4  -MW    Putting on and taking off regular upper body clothing  3  -  --  3  -MW    Taking care of personal grooming (such as brushing teeth)  3  -  --  3  -MW    Eating meals  3  -  --  3  -MW    AM-PAC 6 Clicks Score (OT)  20  -  --  20  -MW       Functional Assessment    Outcome Measure Options  AM-PAC 6 Clicks Daily Activity (OT)  -  AM-PAC 6 Clicks Basic Mobility (PT)  -  AM-PAC 6 Clicks Daily Activity (OT)  -      User Key  (r) = Recorded By, (t) = Taken By, (c) = Cosigned By    Initials Name Provider Type     Jalen Dozier, ARAYA/L Occupational Therapy Assistant    Maribeth Holland, OTR/L Occupational Therapist     Ricarda Heller PTA Physical Therapy Assistant         Time Calculation:   PT Charges     Row Name 01/19/20 1537             Time Calculation    Start Time  1450  -KJ      Stop Time  1534  -KJ      Time Calculation (min)  44 min  -KJ      PT Received On  01/19/20  -KJ      PT Goal Re-Cert Due Date  01/24/20  -KJ         Time Calculation- PT    Total Timed Code Minutes- PT  44 minute(s)  -KJ        User Key  (r) = Recorded By, (t) = Taken By, (c) = Cosigned By    Initials Name Provider Type    Jocelin Johnson PTA Physical Therapy Assistant        Therapy Charges for Today     Code Description Service Date Service Provider Modifiers Qty    29493123025 HC PT THER PROC EA 15 MIN 1/19/2020 Jocelin Rios PTA GP 2    70308906831 HC GAIT TRAINING EA 15 MIN 1/19/2020 Jocelin Rios PTA GP 1          PT G-Codes  Outcome Measure Options: AM-PAC 6 Clicks  Daily Activity (OT)  AM-PAC 6 Clicks Score (PT): 15  AM-PAC 6 Clicks Score (OT): 20    Jocelin Rios, PTA  1/19/2020

## 2020-01-19 NOTE — PLAN OF CARE
A&O X4. NIH=4. No neuro changes. NSR on tele.  dc'd today. Room air. C/o arm pain, PRN medication given with some relief. Safety maintained. Will continue to monitor.     Problem: Patient Care Overview  Goal: Plan of Care Review  Outcome: Ongoing (interventions implemented as appropriate)  Flowsheets  Taken 1/19/2020 1530 by Jenn Cole RN  Progress: improving  Taken 1/19/2020 1436 by Jalen Dozier COTA/L  Plan of Care Reviewed With: patient

## 2020-01-20 PROBLEM — J41.0 SIMPLE CHRONIC BRONCHITIS (HCC): Status: RESOLVED | Noted: 2019-09-06 | Resolved: 2020-01-20

## 2020-01-20 PROBLEM — J18.9 PNEUMONIA OF RIGHT UPPER LOBE DUE TO INFECTIOUS ORGANISM: Status: RESOLVED | Noted: 2018-08-15 | Resolved: 2020-01-20

## 2020-01-20 LAB — SODIUM BLD-SCNC: 139 MMOL/L (ref 136–145)

## 2020-01-20 PROCEDURE — 99232 SBSQ HOSP IP/OBS MODERATE 35: CPT | Performed by: PSYCHIATRY & NEUROLOGY

## 2020-01-20 PROCEDURE — 92507 TX SP LANG VOICE COMM INDIV: CPT

## 2020-01-20 PROCEDURE — 94799 UNLISTED PULMONARY SVC/PX: CPT

## 2020-01-20 PROCEDURE — 97116 GAIT TRAINING THERAPY: CPT

## 2020-01-20 PROCEDURE — 94760 N-INVAS EAR/PLS OXIMETRY 1: CPT

## 2020-01-20 PROCEDURE — 84295 ASSAY OF SERUM SODIUM: CPT | Performed by: INTERNAL MEDICINE

## 2020-01-20 RX ADMIN — ACETAMINOPHEN 650 MG: 325 TABLET, FILM COATED ORAL at 21:03

## 2020-01-20 RX ADMIN — AMLODIPINE BESYLATE 10 MG: 10 TABLET ORAL at 10:05

## 2020-01-20 RX ADMIN — IPRATROPIUM BROMIDE AND ALBUTEROL SULFATE 3 ML: 2.5; .5 SOLUTION RESPIRATORY (INHALATION) at 15:00

## 2020-01-20 RX ADMIN — ATORVASTATIN CALCIUM 80 MG: 40 TABLET, FILM COATED ORAL at 21:03

## 2020-01-20 RX ADMIN — ASPIRIN 81 MG: 81 TABLET, CHEWABLE ORAL at 10:05

## 2020-01-20 RX ADMIN — IPRATROPIUM BROMIDE AND ALBUTEROL SULFATE 3 ML: 2.5; .5 SOLUTION RESPIRATORY (INHALATION) at 07:31

## 2020-01-20 RX ADMIN — HYDROCODONE BITARTRATE AND ACETAMINOPHEN 1 TABLET: 5; 325 TABLET ORAL at 18:48

## 2020-01-20 RX ADMIN — IPRATROPIUM BROMIDE AND ALBUTEROL SULFATE 3 ML: 2.5; .5 SOLUTION RESPIRATORY (INHALATION) at 01:23

## 2020-01-20 RX ADMIN — SENNOSIDES AND DOCUSATE SODIUM 1 TABLET: 8.6; 5 TABLET ORAL at 21:03

## 2020-01-20 RX ADMIN — IPRATROPIUM BROMIDE AND ALBUTEROL SULFATE 3 ML: 2.5; .5 SOLUTION RESPIRATORY (INHALATION) at 19:09

## 2020-01-20 RX ADMIN — LISINOPRIL 20 MG: 20 TABLET ORAL at 10:05

## 2020-01-20 RX ADMIN — LEVOTHYROXINE SODIUM 125 MCG: 125 TABLET ORAL at 05:58

## 2020-01-20 RX ADMIN — SODIUM CHLORIDE, PRESERVATIVE FREE 10 ML: 5 INJECTION INTRAVENOUS at 21:03

## 2020-01-20 RX ADMIN — HYDROCODONE BITARTRATE AND ACETAMINOPHEN 1 TABLET: 5; 325 TABLET ORAL at 12:24

## 2020-01-20 RX ADMIN — HYDROCODONE BITARTRATE AND ACETAMINOPHEN 1 TABLET: 5; 325 TABLET ORAL at 05:58

## 2020-01-20 RX ADMIN — ACETAMINOPHEN 650 MG: 325 TABLET, FILM COATED ORAL at 01:34

## 2020-01-20 RX ADMIN — IPRATROPIUM BROMIDE AND ALBUTEROL SULFATE 3 ML: 2.5; .5 SOLUTION RESPIRATORY (INHALATION) at 23:48

## 2020-01-20 NOTE — PAYOR COMM NOTE
"1/19 CLINICAL UPDATE  UR  230 [id number]    [id number]  Ryan Cardenas (64 y.o. Male)     Date of Birth Social Security Number Address Home Phone MRN    1955  [address] [place] [phone] [id number]    Congregation Marital Status          Taoism        Admission Date Admission Type Admitting Provider Attending Provider Department, Room/Bed    1/13/20 Emergency Dez March DO Robinson, Maurice S, DO [facility] 3A, 351/1    Discharge Date Discharge Disposition Discharge Destination                       Attending Provider:  Dez March DO    Allergies:  Codeine    Isolation:  Droplet   Infection:  Influenza (01/13/20)   Code Status:  CPR    Ht:  182.9 cm (72\")   Wt:  80.5 kg (177 lb 8 oz)    Admission Cmt:  None   Principal Problem:  None                Active Insurance as of 1/13/2020     Primary Coverage     Payor Plan Insurance Group Employer/Plan Group    [employer] [place] [employer] [place]      Payor Plan Address Payor Plan Phone Number Payor Plan Fax Number Effective Dates    [address] [address] [phone]  8/15/2018 - None Entered    [facility] [id number]       Subscriber Name Subscriber Birth Date Member ID       RYAN CARDENAS 1955 [id number]                 Emergency Contacts      (Rel.) Home Phone Work Phone Mobile Phone    Rufina Urbina (Sister) -- -- [phone]            Vital Signs (last day)     Date/Time   Temp   Temp src   Pulse   Resp   BP   Patient Position   SpO2    01/20/20 0416   97.6 (36.4)   Oral   85   18   131/69   Lying   96    01/20/20 0130   --   --   70   18   --   --   98    01/20/20 0123   --   --   71   18   --   --   97    01/19/20 2336   97.8 (36.6)   Oral   86   16   136/79   Lying   96    01/19/20 2004   97.7 (36.5)   Oral   90   16   127/83   Lying   96    01/19/20 1832   --   --   73   16   --   --   99    01/19/20 1827   --   --   65   18   --   --   95    01/19/20 1643   98.8 (37.1)   Oral   63   18 "   131/87   Lying   99    01/19/20 1233   --   --   81   18   --   --   98    01/19/20 1228   --   --   69   16   --   --   94    01/19/20 1157   98.9 (37.2)   Oral   74   18   110/68   Lying   96    01/19/20 0801   --   --   53   14   --   --   --    01/19/20 0756   --   --   63   14   --   --   97    01/19/20 0729   98 (36.7)   Oral   58   14   123/86   Lying   98    01/19/20 0310   97.7 (36.5)   Oral   52   16   120/81   Lying   95    01/19/20 0038   97.9 (36.6)   Oral   51   16   127/79   Lying   94    01/19/20 0026   --   --   (!) 49   16   --   --   99    01/19/20 0021   --   --   52   16   --   --   97              Oxygen Therapy (last day)     Date/Time   SpO2   Device (Oxygen Therapy)   Flow (L/min)   Oxygen Concentration (%)   ETCO2 (mmHg)    01/20/20 0416   96   room air   --   --   --    01/20/20 0130   98   room air   --   --   --    01/20/20 0123   97   room air   --   --   --    01/19/20 2336   96   room air   --   --   --    01/19/20 2004   96   room air   --   --   --    01/19/20 2000   --   room air   --   --   --    01/19/20 1832   99   room air   --   --   --    01/19/20 1827   95   room air   --   --   --    01/19/20 1643   99   room air   --   --   --    01/19/20 1233   98   room air   --   --   --    01/19/20 1228   94   room air   --   --   --    01/19/20 1157   96   room air   --   --   --    01/19/20 0756   97   room air   --   --   --    01/19/20 0730   --   room air   --   --   --    01/19/20 0729   98   room air   --   --   --    01/19/20 0310   95   --   --   --   --    01/19/20 0038   94   room air   --   --   --    01/19/20 0026   99   room air   --   --   --    01/19/20 0021   97   room air   --   --   --              Intake & Output (last day)       01/19 0701 - 01/20 0700         Urine Unmeasured Occurrence 2 x        Lines, Drains & Airways    Active LDAs     Name:   Placement date:   Placement time:   Site:   Days:    Peripheral IV 01/13/20 0808 Right Antecubital   01/13/20     0808    Antecubital   6                  Current Facility-Administered Medications   Medication Dose Route Frequency Provider Last Rate Last Dose   • acetaminophen (TYLENOL) tablet 650 mg  650 mg Oral Q6H PRN Hira Montenegro MD   650 mg at 01/20/20 0134   • amLODIPine (NORVASC) tablet 10 mg  10 mg Oral Q24H Hira Montenegro MD   10 mg at 01/19/20 0824   • aspirin chewable tablet 81 mg  81 mg Oral Daily Hira Montenegro MD   81 mg at 01/19/20 0824   • atorvastatin (LIPITOR) tablet 80 mg  80 mg Oral Nightly Hira Montenegro MD   80 mg at 01/19/20 2111   • HYDROcodone-acetaminophen (NORCO) 5-325 MG per tablet 1 tablet  1 tablet Oral Q6H PRN Hira Montenegro MD   1 tablet at 01/20/20 0558   • ipratropium-albuterol (DUO-NEB) nebulizer solution 3 mL  3 mL Nebulization Q6H - RT Hira Montenegro MD   3 mL at 01/20/20 0123   • levothyroxine (SYNTHROID, LEVOTHROID) tablet 125 mcg  125 mcg Oral Q AM Hira Montenegro MD   125 mcg at 01/20/20 0558   • lisinopril (PRINIVIL,ZESTRIL) tablet 20 mg  20 mg Oral Q24H Hira Montenegro MD   20 mg at 01/19/20 0824   • ondansetron (ZOFRAN) tablet 4 mg  4 mg Oral Q6H PRN Hira Montenegro MD        Or   • ondansetron (ZOFRAN) injection 4 mg  4 mg Intravenous Q6H PRN Hira Montenegro MD   4 mg at 01/14/20 1024   • polyethylene glycol (MIRALAX) powder 17 g  17 g Oral Daily Hira Montenegro MD   17 g at 01/19/20 0824   • sennosides-docusate (PERICOLACE) 8.6-50 MG per tablet 1 tablet  1 tablet Oral Nightly Hira Montenegro MD   1 tablet at 01/19/20 2111   • sodium chloride 0.9 % flush 10 mL  10 mL Intravenous PRN Hira Montenegro MD       • sodium chloride 0.9 % flush 10 mL  10 mL Intravenous Q12H Hira Montenegro MD   10 mL at 01/19/20 2111   • sodium chloride 0.9 % flush 10 mL  10 mL Intravenous PRN Hira Montenegro MD         Blood Administration Record (From admission, onward)    None          Lab Results (last 24 hours)     Procedure Component  Value Units Date/Time    Sodium [[id number]]  (Normal) Collected:  01/20/20 0510    Specimen:  Blood Updated:  01/20/20 0607     Sodium 139 mmol/L     Extra Tubes [[id number]] Collected:  01/19/20 0447    Specimen:  Blood, Venous Line Updated:  01/19/20 0715    Narrative:       The following orders were created for panel order Extra Tubes.  Procedure                               Abnormality         Status                     ---------                               -----------         ------                     Lavender Top[[id number]]                                     Final result                 Please view results for these tests on the individual orders.    Lavender Top [[id number]] Collected:  01/19/20 0447    Specimen:  Blood Updated:  01/19/20 0715     Extra Tube hold for add-on     Comment: Auto resulted           Imaging Results (Last 24 Hours)     ** No results found for the last 24 hours. **        Orders (last 24 hrs)      Start     Ordered    01/19/20 0711  Extra Tubes  Once      01/19/20 0710    01/19/20 0711  Lavender Top  PROCEDURE ONCE      01/19/20 0710    01/18/20 1300  ipratropium-albuterol (DUO-NEB) nebulizer solution 3 mL  Every 6 Hours - RT      01/18/20 1209    01/18/20 1300  polyethylene glycol (MIRALAX) powder 17 g  Daily      01/18/20 1209    01/18/20 1300  sennosides-docusate (PERICOLACE) 8.6-50 MG per tablet 1 tablet  Nightly      01/18/20 1209    01/18/20 0900  amLODIPine (NORVASC) tablet 10 mg  Every 24 Hours Scheduled      01/17/20 1406    01/17/20 1408  HYDROcodone-acetaminophen (NORCO) 5-325 MG per tablet 1 tablet  Every 6 Hours PRN      01/17/20 1408    01/16/20 0900  lisinopril (PRINIVIL,ZESTRIL) tablet 20 mg  Every 24 Hours Scheduled      01/16/20 0814    01/15/20 0741  Sodium  Daily      01/15/20 0740    01/14/20 0600  levothyroxine (SYNTHROID, LEVOTHROID) tablet 125 mcg  Every Early Morning      01/13/20 1839    01/13/20 2100  sodium chloride 0.9 % flush 10 mL  Every 12 Hours  Scheduled      01/13/20 1525    01/13/20 2100  atorvastatin (LIPITOR) tablet 80 mg  Nightly      01/13/20 1525    01/13/20 1705  ondansetron (ZOFRAN) tablet 4 mg  Every 6 Hours PRN      01/13/20 1705    01/13/20 1705  ondansetron (ZOFRAN) injection 4 mg  Every 6 Hours PRN      01/13/20 1705    01/13/20 1705  acetaminophen (TYLENOL) tablet 650 mg  Every 6 Hours PRN      01/13/20 1705    01/13/20 1615  aspirin chewable tablet 81 mg  Daily      01/13/20 1525    01/13/20 1615  aspirin suppository 300 mg  Daily,   Status:  Discontinued      01/13/20 1525    01/13/20 1525  sodium chloride 0.9 % flush 10 mL  As Needed      01/13/20 1525    01/13/20 0800  sodium chloride 0.9 % flush 10 mL  As Needed      01/13/20 0801    Unscheduled  Order CT Head Without Contrast for Neurological Decline  As Needed      01/13/20 1525    --  SCANNED EKG      01/13/20 0000    --  SCANNED - TELEMETRY        01/13/20 0000    --  SCANNED - TELEMETRY        01/13/20 0000    --  fluticasone-salmeterol (ADVAIR HFA) 115-21 MCG/ACT inhaler  2 Times Daily - RT      01/15/20 1133    --  SCANNED - TELEMETRY        01/13/20 0000    --  SCANNED - TELEMETRY        01/13/20 0000                Ventilator/Non-Invasive Ventilation Settings (From admission, onward)    None        Operative/Procedure Notes (last 72 hours) (Notes from 01/17/20 0647 through 01/20/20 0647)    No notes of this type exist for this encounter.            Physician Progress Notes (last 24 hours) (Notes from 01/19/20 0647 through 01/20/20 0647)      Hira Montenegro MD at 01/19/20 1417              [facility] Medicine Services  INPATIENT PROGRESS NOTE    Patient Name: Reza Cruz  Date of Admission: 1/13/2020  Today's Date: 01/19/20  Length of Stay: 6  Primary Care Physician: Lobito Trevino Jr., MD    Subjective   Chief Complaint: feeling better  HPI     Patient seen and examined at bedside.  Played catch with the patient, his coordination is  getting better.  Patient is able to feed himself and doing much better.  Patient walked with therapy today.  Patient denies any complaints.  Indicates that his breathing is much better.    Review of Systems   Constitutional: Negative for activity change, appetite change, fatigue and unexpected weight change.   Respiratory: Negative for cough, shortness of breath and wheezing.    Cardiovascular: Negative for chest pain and palpitations.   Gastrointestinal: Negative for abdominal distention, blood in stool and constipation.   Neurological: Positive for speech difficulty.        All pertinent negatives and positives are as above. All other systems have been reviewed and are negative unless otherwise stated.     Objective    Temp:  [97.7 °F (36.5 °C)-98.9 °F (37.2 °C)] 98.9 °F (37.2 °C)  Heart Rate:  [49-81] 81  Resp:  [14-18] 18  BP: (110-127)/(68-86) 110/68  Physical Exam   Constitutional: He is oriented to person, place, and time. No distress.   HENT:   Head: Normocephalic and atraumatic.   Eyes: Conjunctivae are normal. No scleral icterus.   Neck: Neck supple. No JVD present.   Cardiovascular: Normal rate and regular rhythm.   No murmur heard.  Pulmonary/Chest: Effort normal. No stridor. No respiratory distress. He has no wheezes.   Abdominal: Soft. Bowel sounds are normal. He exhibits no distension. There is no tenderness. There is no guarding.   Musculoskeletal: He exhibits no edema.   Neurological: He is alert and oriented to person, place, and time. Coordination abnormal.   Speech difficulty    Skin: Skin is warm and dry. He is not diaphoretic. No erythema.   Psychiatric: He has a normal mood and affect. His behavior is normal.   Nursing note and vitals reviewed.          Results Review:  I have reviewed the labs, radiology results, and diagnostic studies.    Laboratory Data:   Results from last 7 days   Lab Units 01/17/20  0436 01/14/20  0247 01/13/20  0747   WBC 10*3/mm3 7.67 16.57* 10.87*   HEMOGLOBIN g/dL  "17.6 17.9* 19.3*   HEMATOCRIT % 51.3* 52.8* 55.4*   PLATELETS 10*3/mm3 254 253 280        Results from last 7 days   Lab Units 01/19/20  0446 01/18/20  0627 01/17/20  0436  01/14/20  0247 01/13/20  0815 01/13/20  0747   SODIUM mmol/L 141 138 139   < > 142  --  138   POTASSIUM mmol/L  --   --  4.2  --  4.0  --  3.7   CHLORIDE mmol/L  --   --  105  --  104  --  102   CO2 mmol/L  --   --  26.0  --  26.0  --  25.0   BUN mg/dL  --   --  17  --  22  --  15   CREATININE mg/dL  --   --  0.81  --  1.15 1.10 1.03   CALCIUM mg/dL  --   --  9.1  --  9.6  --  9.7   BILIRUBIN mg/dL  --   --   --   --  0.6  --  0.8   ALK PHOS U/L  --   --   --   --  41  --  48   ALT (SGPT) U/L  --   --   --   --  22  --  24   AST (SGOT) U/L  --   --   --   --  22  --  24   GLUCOSE mg/dL  --   --  96  --  131*  --  130*    < > = values in this interval not displayed.       Culture Data:   Blood Culture   Date Value Ref Range Status   01/13/2020 No growth at less than 24 hours  Preliminary   01/13/2020 No growth at 24 hours  Preliminary       Radiology Data:   Imaging Results (Last 24 Hours)     ** No results found for the last 24 hours. **          I have reviewed the patient's current medications.     Assessment/Plan     Active Hospital Problems    Diagnosis   • CVA (cerebral vascular accident) (CMS/HCC)   • Influenza A   • Tobacco dependence   • Acute encephalopathy   • Hypothyroidism, non-compliant with medication    • Medical non-compliance   • Bradycardia   • Chronic obstructive pulmonary disease (CMS/HCC)       Plan:  1.  CTA head and neck reviewed, \"Complete occlusion of the right vertebral artery with reconstitution at C1. Basilar artery and tip appear unremarkable. Both PCAs and superior cerebellar arteries are widely patent.\"  2.  MRI reviewed  3.  Echo reviewed  4.  ASA and atorvastatin  5.  A1c and lipid profile reviewed  6.  Lifestyle modifcations recommended including smoking cessation   7.  Neurology and neurosurgery following, " appreciate assistance.  Notes reviewed  8.  PT/OT/ SLP  9.  Telemetry   10.  Continue tamiflu for 5 days - Therapy completed   11.  Continue lisinopril to 20 mg, continue amlodipine 10 mg   12.  Duonebs scheduled 4x daily for wheezing  13.  Bowel regimen with miralax and docusate-senna            Discharge Planning: I expect the patient to be discharged to acute rehab in 2-3 days.  Precertification pending.    Hira Montenegro MD   01/19/20   2:17 PM    Electronically signed by Hira Montenegro MD at 01/19/20 1442       Consult Notes (last 24 hours) (Notes from 01/19/20 0647 through 01/20/20 0647)    No notes of this type exist for this encounter.

## 2020-01-20 NOTE — THERAPY TREATMENT NOTE
Acute Care - Speech Language Pathology Treatment Note    [facility]       Patient Name: Reza Cruz  : 1955  MRN: [id number]    Today's Date: 2020           Admit Date: 2020      Visit Dx:    Cognitive tx complete. Pt cooperative and alert. He was oriented to person, place, time, and situation. Pt was motivated to complete all task. Pt able to answer complex yes/no questions with 70% accuracy with no prompts. He self-correct when appropriate with minimal cues. Pt follows simple two step commands with minimal difficulty; however, following complex two step commands were noted to be more difficulty for pt to complete. Pt presented with severe paraphasia with connected speech as well as an increased rate of speech, causing his speech to become distorted. SLP will continue to follow and treat.    . Maisha Ellsworth Speech Therapy Student 2020 10:17 AM        ICD-10-CM ICD-9-CM   1. Dysphagia, unspecified type R13.10 787.20   2. Influenza A J10.1 487.1   3. Elevated TSH R79.89 794.5   4. Cerebrovascular accident (CVA), unspecified mechanism (CMS/HCC) I63.9 434.91   5. Tobacco abuse Z72.0 305.1   6. Impaired mobility Z74.09 799.89   7. Impaired mobility and ADLs Z74.09 799.89   8. Cognitive and neurobehavioral dysfunction F09 294.9    F07.89 310.1       Patient Active Problem List   Diagnosis   • Pneumonia of right upper lobe due to infectious organism (CMS/HCC)   • Impetigo   • Simple chronic bronchitis (CMS/HCC)   • Chronic obstructive pulmonary disease (CMS/HCC)   • Cough   • Personal history of nicotine dependence   • CVA (cerebral vascular accident) (CMS/HCC)   • Influenza A   • Tobacco dependence   • Acute encephalopathy   • Hypothyroidism, non-compliant with medication    • Medical non-compliance   • Bradycardia          Therapy Treatment    Evaluation/Coping    Evaluation/Treatment Time and Intent  Subjective Information: (P) no complaints (20 0835 : Maisha Ellsworth, Speech Therapy  Student)  Document Type: (P) therapy note (daily note) (01/20/20 0835 : Maisha Ellsworth, Speech Therapy Student)  Mode of Treatment: (P) individual therapy (01/20/20 0835 : Maisha Ellsworth, Speech Therapy Student)  Patient/Family Observations: (P) No family present  (01/20/20 0835 : Maisha Ellsworth, Speech Therapy Student)    Vitals/Pain/Safety    Pain Scale: FACES Pre/Post-Treatment  Pain: FACES Scale, Pretreatment: (P) 0-->no hurt (01/20/20 0835 : Maisha Ellsworth, Speech Therapy Student)  Pain: FACES Scale, Post-Treatment: (P) 0-->no hurt (01/20/20 08 : Maisha Ellsworth, Speech Therapy Student)    Cognition/Communication         Oral Motor/Eating         Mobility/Basic Activities/Instrumental Activities/Motor/Modality                   ROM/MMT                   Sensory/Myotome/Dermatome/Edema               Posture/Balance/Special Tests/Exercise/Transportation/Sexual Function                   Orthotics/Residual Limb/Prosthetic Management              Outcome Summary         EDUCATION    The patient has been educated in the following areas:     Cognitive Impairment.    SLP Recommendation and Plan                        Anticipated Dischage Disposition: (P) unknown                                 SLP GOALS     Row Name 01/20/20 0835 01/17/20 1452          Comprehend Questions Goal 1 (SLP)    Improve Ability to Comprehend Questions Goal 1 (SLP)  complex yes/no questions;90%  (Pended)   -LM  complex yes/no questions;90%  -MB (r) SF (t) MB (c)     Time Frame (Comprehend Questions Goal 1, SLP)  by discharge  (Pended)   -LM  by discharge  -MB (r) SF (t) MB (c)     Progress (Ability to Comprehend Questions Goal 1, SLP)  70%  (Pended)   -LM  --     Progress/Outcomes (Comprehend Questions Goal 1, SLP)  goal ongoing  (Pended)   -LM  goal ongoing  -MB (r) SF (t) MB (c)     Comment (Comprehend Questions Goal 1, SLP)  Pt was able to answer complex yes/no questions with 70% accuracy.   (Pended)   -LM  --        Follow Directions  Goal 2 (SLP)    Improve Ability to Follow Directions Goal 1 (SLP)  2 step commands;90%  (Pended)   -LM  2 step commands;90%  -MB (r) SF (t) MB (c)     Time Frame (Follow Directions Goal 1, SLP)  by discharge  (Pended)   -LM  by discharge  -MB (r) SF (t) MB (c)     Progress (Ability to Follow Directions Goal 1, SLP)  40%  (Pended)   -LM  --     Progress/Outcomes (Follow Directions Goal 1, SLP)  goal ongoing  (Pended)   -LM  goal ongoing  -MB (r) SF (t) MB (c)     Comment (Follow Directions Goal 1, SLP)  Pt was able to follow simple 2 step commands with 40% accuracy; however, pt had difficulty following complex two step commands (e.g., before, first, after).   (Pended)   -LM  --        Connected Speech to Express Thoughts Goal 1 (SLP)    Improve Narrative Discourse to Express Thoughts By Goal 1 (SLP)  describing a picture;90%  (Pended)   -LM  describing a picture;90%  -MB (r) SF (t) MB (c)     Time Frame (Connected Speech Goal 1, SLP)  by discharge  (Pended)   -LM  by discharge  -MB (r) SF (t) MB (c)     Barriers (Connected Speech Goal 1, SLP)  --  Low motivation  -MB     Progress (Connected Speech Goal 1, SLP)  60%;with minimal cues (75-90%)  (Pended)   -LM  50%;with moderate cues (50-74%)  -MB     Progress/Outcomes (Connected Speech Goal 1, SLP)  goal ongoing  (Pended)   -LM  progress slower than expected  -MB (r) SF (t) MB (c)     Comment (Connected Speech Goal 1, SLP)  Pt was able to describe a picture with appropriate information; however, pts paraphasia affected his ability to appropriatly convey his message. He frequently omitted functional and content words. (e.g., has umbrella but not use it)     (Pended)   -LM  --        Articulation Goal 1 (SLP)    Improve Articulation Goal 1 (SLP)  by over-articulating in connected speech;90%  (Pended)   -LM  by over-articulating in connected speech;90%  -MB (r) SF (t) MB (c)     Time Frame (Articulation Goal 1, SLP)  by discharge  (Pended)   -LM  by discharge  -MB (r) SF  (t) MB (c)     Progress (Articulation Goal 1, SLP)  50%;with moderate cues (50-74%)  (Pended)   -LM  --     Progress/Outcomes (Articulation Goal 1, SLP)  goal ongoing  (Pended)   -LM  goal ongoing  -MB (r) SF (t) MB (c)        Patient Will Implement Strategies Goal 1 (SLP)    Implement Strategies of Goal 1 (SLP)  using external rate control;90%;independently (over 90% accuracy)  (Pended)   -LM  using external rate control;90%;independently (over 90% accuracy)  -MB     Time Frame (Strategy Implementation Goal 1, SLP)  short term goal (STG);by discharge  (Pended)   -LM  short term goal (STG);by discharge  -MB     Barriers (Strategy Implementation Goal 1, SLP)  --  Low motivation  -MB     Progress (Strategy Implementation Goal 1, SLP)  70%;with maximum cues (25-49%)  (Pended)   -LM  50%;with moderate cues (50-74%)  -MB     Progress/Outcomes (Strategy Implementation Goal 1, SLP)  goal ongoing  (Pended)   -LM  progress slower than expected  -MB     Comment (Strategy Implementation Goal 1, SLP)  When given a verbal cue pt was able to slow rate of speech.  (Pended)   -LM  --        Attention Goal 1 (SLP)    Improve Attention by Goal 1 (SLP)  --  complete selective attention task;90%  -MB (r) SF (t) MB (c)     Time Frame (Attention Goal 1, SLP)  --  by discharge  -MB (r) SF (t) MB (c)     Progress/Outcomes (Attention Goal 1, SLP)  --  goal ongoing  -MB (r) SF (t) MB (c)        Organizational Skills Goal 1 (SLP)    Improve Thought Organization Through Goal 1 (SLP)  --  completing a divergent naming task;completing a convergent naming task;abstract;90%  -MB (r) SF (t) MB (c)     Time Frame (Thought Organization Skills Goal 1, SLP)  --  by discharge  -MB (r) SF (t) MB (c)     Barriers (Thought Organization Skills Goal 1, SLP)  --  --  -MB     Progress (Thought Organization Skills Goal 1, SLP)  --  --  -MB     Progress/Outcomes (Thought Organization Skills Goal 1, SLP)  --  goal ongoing  -MB (r) SF (t) MB (c)       User Key   (r) = Recorded By, (t) = Taken By, (c) = Cosigned By    Initials Name Provider Type    Jo Ann Valero CCC-SLP Speech and Language Pathologist    Maisha Torres, Speech Therapy Student Speech Therapy Student     Clara Shepherd Speech Therapy Student Speech Therapy Student                  Time Calculation:       Time Calculation- SLP     Row Name 01/20/20 0959             Time Calculation- SLP    SLP Start Time  0835  (Pended)   -LM      SLP Stop Time  0903  (Pended)   -LM      SLP Time Calculation (min)  28 min  (Pended)   -LM      SLP Received On  01/20/20  (Pended)   -        User Key  (r) = Recorded By, (t) = Taken By, (c) = Cosigned By    Initials Name Provider Type    Maisha Torres, Speech Therapy Student Speech Therapy Student            Therapy Charges for Today     Code Description Service Date Service Provider Modifiers Qty    [id number]  ST TREATMENT SPEECH 2 1/20/2020 Maisha Ellsworth, Speech Therapy Student GN 1                           Maisha Ellsworth Speech Therapy Student  1/20/2020

## 2020-01-20 NOTE — PROGRESS NOTES
Continued Stay Note  FRANK Rand     Patient Name: Reza Cruz  MRN: [id number]  Today's Date: 1/20/2020    Admit Date: 1/13/2020    Discharge Plan     Row Name 01/20/20 1636       Plan    Plan Comments  PT INSURANCE COMPANY WAS CLOSED TODAY DUE TO HOLIDAY. POLLO WILL FOLLOW UP WITH INSURANCE IN AM REGARDING PRECERT.         Discharge Codes    No documentation.             RAMU Cadena

## 2020-01-20 NOTE — PROGRESS NOTES
[facility] Medicine Services  INPATIENT PROGRESS NOTE    Length of Stay: 7  Date of Admission: 1/13/2020  Primary Care Physician: Lobito Trevino Jr., MD    Subjective     Chief Complaint:     No specific complaint    HPI     The patient is doing well this morning with no new events noted overnight.  He is hoping that he can be discharged to acute rehabilitation.  It is unlikely that he will be discharged today as this is a major holiday.  Neurology has cleared the patient for discharge to acute rehab today.  He continues to work with physical therapy and occupational therapy.    Review of Systems     All pertinent negatives and positives are as above. All other systems have been reviewed and are negative unless otherwise stated.     Objective    Temp:  [97.2 °F (36.2 °C)-98.9 °F (37.2 °C)] 97.6 °F (36.4 °C)  Heart Rate:  [63-90] 74  Resp:  [16-18] 16  BP: (110-136)/(68-87) 128/71    Lab Results (last 24 hours)     Procedure Component Value Units Date/Time    Sodium [[id number]]  (Normal) Collected:  01/20/20 0510    Specimen:  Blood Updated:  01/20/20 0607     Sodium 139 mmol/L           Imaging Results (Last 24 Hours)     ** No results found for the last 24 hours. **             Intake/Output Summary (Last 24 hours) at 1/20/2020 1156  Last data filed at 1/20/2020 1100  Gross per 24 hour   Intake 480 ml   Output 700 ml   Net -220 ml       Physical Exam    Constitutional: He is oriented to person, place, and time. No distress.   HENT:   Head: Normocephalic and atraumatic.   Eyes: Conjunctivae are normal. No scleral icterus.   Neck: Neck supple. No JVD present.   Cardiovascular: Normal rate and regular rhythm.   No murmur heard.  Pulmonary/Chest: Effort normal. No stridor. No respiratory distress. He has no wheezes.   Abdominal: Soft. Bowel sounds are normal. He exhibits no distension. There is no tenderness. There is no guarding.   Musculoskeletal: He exhibits no edema.    Neurological: He is alert and oriented to person, place, and time. Coordination abnormal.   Speech difficulty    Skin: Skin is warm and dry. He is not diaphoretic. No erythema.   Psychiatric: He has a normal mood and affect. His behavior is normal.     Results Review:  I have reviewed the labs, radiology results, and diagnostic studies since my last progress note and made treatment changes reflective of the results.   I have reviewed the current medications.    Assessment/Plan     Active Hospital Problems    Diagnosis   • **CVA (cerebral vascular accident) (CMS/[facility])   • Influenza A   • Tobacco dependence   • Acute encephalopathy   • Hypothyroidism, non-compliant with medication    • Medical non-compliance   • Bradycardia   • Chronic obstructive pulmonary disease (CMS/[facility])       PLAN:  Continue PT/OT  Continue to pursue acute rehab placement    Dez March DO   01/20/20   11:56 AM

## 2020-01-20 NOTE — THERAPY TREATMENT NOTE
Acute Care - Physical Therapy Treatment Note  [facility]     Patient Name: Reza Cruz  : 1955  MRN: [id number]  Today's Date: 2020  Onset of Illness/Injury or Date of Surgery: 20     Referring Physician: Dr. Montenegro    Admit Date: 2020    Visit Dx:    ICD-10-CM ICD-9-CM   1. Dysphagia, unspecified type R13.10 787.20   2. Influenza A J10.1 487.1   3. Elevated TSH R79.89 794.5   4. Cerebrovascular accident (CVA), unspecified mechanism (CMS/HCC) I63.9 434.91   5. Tobacco abuse Z72.0 305.1   6. Impaired mobility Z74.09 799.89   7. Impaired mobility and ADLs Z74.09 799.89   8. Cognitive and neurobehavioral dysfunction F09 294.9    F07.89 310.1     Patient Active Problem List   Diagnosis   • Chronic obstructive pulmonary disease (CMS/[facility])   • CVA (cerebral vascular accident) (CMS/[facility])   • Influenza A   • Tobacco dependence   • Acute encephalopathy   • Hypothyroidism, non-compliant with medication    • Medical non-compliance   • Bradycardia       Therapy Treatment    Rehabilitation Treatment Summary     Row Name 20 1323 20 0900 20 0854       Treatment Time/Intention    Discipline  physical therapy assistant  -  --  -CS,LM,CS2  physical therapy assistant  -    Document Type  therapy note (daily note)  -[facility]  --  -CS,LM,CS2  --    Subjective Information  complains of;dyspnea  -[facility]  --  -CS,LM,CS2  --    Mode of Treatment  --  --  -CS,LM,CS2  --    Patient/Family Observations  --  --  -CS,LM,CS2  --    Care Plan Review  --  --  -CS,LM,CS2  --    Therapy Frequency (SLP SLC)  --  --  -CS,LM,CS2  --    Comment  --  --  with [facility]  -    Reason Treatment Not Performed  --  --  unavailable for treatment  -    Existing Precautions/Restrictions  fall  -2  --  --    Recorded by [] Negra Sen, PTA 20 1323  [2] Negra Sen, PTA 20 1348 [CS,LM,CS2] Augustine Trevino, MS CCC-SLP (r) Maisha Ellsworth, Speech Therapy Student (t) Augustine Trevino, MS CCC-SLP (c) 20 1045  [] Negra Sen, [facility] [place] 01/20/20 0855    Row Name 01/20/20 0835             Treatment Time/Intention    Discipline  speech language pathologist  -CS,LM,CS2      Document Type  therapy note (daily note)  -CS,LM,CS2      Subjective Information  no complaints  -CS,LM,CS2      Mode of Treatment  individual therapy  -CS,LM,CS2      Patient/Family Observations  No family present   -CS,LM,CS2      Care Plan Review  care plan/treatment goals reviewed  -CS,LM,CS2      Therapy Frequency (SLP SLC)  at least;3 days per week  -CS,LM,CS2      Recorded by [CS,LM,CS2] Augustine Trevino, MS CCC-SLP (r) Maisha Ellsworth, Speech Therapy Student (t) Augustine Trevino MS CCC-SLP (c) 01/20/20 1046      Row Name 01/20/20 1323             Vital Signs    Posttreatment Heart Rate (beats/min)  67  -AH      Pre SpO2 (%)  98  -AH      O2 Delivery Pre Treatment  room air  -AH      Post SpO2 (%)  94  -AH      O2 Delivery Post Treatment  room air  -AH      Recorded by [] Negra Sen, [facility] [place] 01/20/20 1348      Row Name 01/20/20 1323             Bed Mobility Assessment/Treatment    Supine-Sit [place] (Bed Mobility)  supervision  -      Sit-Supine [place] (Bed Mobility)  -- sitting EOB  -      Recorded by [] Negra Sen, [facility] [place] 01/20/20 1348      Row Name 01/20/20 1323             Sit-Stand Transfer    Sit-Stand [place] (Transfers)  contact guard;verbal cues  -      Recorded by [] Negra Sen, [facility] [place] 01/20/20 1348      Row Name 01/20/20 1323             Stand-Sit Transfer    Stand-Sit [place] (Transfers)  contact guard;verbal cues  -      Recorded by [] Negra Sen, [facility] [place] 01/20/20 1348      Row Name 01/20/20 1323             Gait/Stairs Assessment/Training    [place] Level (Gait)  minimum assist (75% patient effort);verbal cues  -      Assistive Device (Gait)  walker, front-wheeled  -      Distance in Feet (Gait)  50 50 x 2  -AH      Deviations/Abnormal Patterns (Gait)  base of support, narrow  -       Bilateral Gait Deviations  leans right  -      Recorded by [] Negra Sen, PTA 01/20/20 1348      Row Name 01/20/20 1323             Therapeutic Exercise    Comment (Therapeutic Exercise)  declined exercises  -      Recorded by [] Negra Sen, PTA 01/20/20 1348      Row Name 01/20/20 1323             Positioning and Restraints    Pre-Treatment Position  in bed  -      Post Treatment Position  bed  -AH      In Bed  notified nsg;sitting EOB;call light within reach;encouraged to call for assist  -      Recorded by [] SenFrancisalexis FRIEDMAN, PTA 01/20/20 1348      Row Name 01/20/20 0900 01/20/20 0835          Pain Assessment    Additional Documentation  --  -CS,LM,CS2  Pain Scale: FACES Pre/Post-Treatment (Group)  -CS,LM,CS2     Recorded by [CS,LM,CS2] Augustine Trevino, MS CCC-SLP (r) Maisha Ellsworth, Speech Therapy Student (t) Augustine Trevino, MS CCC-SLP (c) 01/20/20 1046 [CS,LM,CS2] Augustine Trevino MS CCC-SLP (r) Maisha Ellsworth, Speech Therapy Student (t) Augustine Trevino, MS CCC-SLP (c) 01/20/20 1046     Row Name 01/20/20 1323             Pain Scale: Numbers Pre/Post-Treatment    Pain Scale: Numbers, Pretreatment  0/10 - no pain  -AH      Pain Scale: Numbers, Post-Treatment  0/10 - no pain  -      Recorded by [] FrancyFrancisalexis FRIEDMAN, PTA 01/20/20 1348      Row Name 01/20/20 0900 01/20/20 0835          Pain Scale: FACES Pre/Post-Treatment    Pain: FACES Scale, Pretreatment  --  -CS,LM,CS2  0-->no hurt  -CS,LM,CS2     Pain: FACES Scale, Post-Treatment  --  -CS,LM,CS2  0-->no hurt  -CS,LM,CS2     Recorded by [CS,LM,CS2] Augustine Trevino MS CCC-SLP (r) Maisha Ellsworth, Speech Therapy Student (t) Augustine Trevino, MS CCC-SLP (c) 01/20/20 1046 [CS,LM,CS2] Augustine Trevino, MS CCC-SLP (r) Maisha Ellsworth, Speech Therapy Student (t) Augustine Trevino, MS CCC-SLP (c) 01/20/20 1046     Row Name 01/20/20 0900             Plan of Care Review    Plan of Care Reviewed With  --  -JOE BROWN CS2      Progress  --  -JOE BROWN CS2      Recorded by  [CS,LM,CS2] Augustine Trevino MS CCC-SLP (r) Maisha Ellsworth, Speech Therapy Student (t) Augustine Trevino MS CCC-SLP (c) 01/20/20 1046      Row Name 01/20/20 0900 01/20/20 0835          Outcome Summary/Treatment Plan (SLP)    Daily Summary of Progress (SLP)  --  -CS,LM,CS2  progress toward functional goals is good  -CS,LM,CS2     Barriers to Overall Progress (SLP)  --  -CS,LM,CS2  n/a  -CS,LM,CS2     Plan for Continued Treatment (SLP)  --  -CS,LM,CS2  SLP will contine to follow and treat  -CS,LM,CS2     Anticipated Dischage Disposition  --  -CS,LM,CS2  unknown  -CS,LM,CS2     Recorded by [CS,LM,CS2] Augustine Trevino MS CCC-SLP (r) Maisha Ellsworth, Speech Therapy Student (t) Augustine Trevino MS CCC-SLP (c) 01/20/20 1046 [CS,LM,CS2] Augustine Trevino MS CCC-SLP (r) Maisha Ellsworth, Speech Therapy Student (t) Augustine Trevino MS CCC-SLP (c) 01/20/20 1046       User Key  (r) = Recorded By, (t) = Taken By, (c) = Cosigned By    Initials Name Effective Dates Discipline     Negra Sen, PTA 08/02/16 -  PT    CS Augustine Trevino MS CCC-SLP 04/03/18 -  SLP    Maisha Torres, Speech Therapy Student 12/17/19 -  SLP               Rehab Goal Summary     Row Name 01/20/20 0835             Comprehend Questions Goal 1 (SLP)    Improve Ability to Comprehend Questions Goal 1 (SLP)  complex yes/no questions;90%  -CS (r) LM (t) CS (c)      Time Frame (Comprehend Questions Goal 1, SLP)  by discharge  -CS (r) LM (t) CS (c)      Progress (Ability to Comprehend Questions Goal 1, SLP)  70%  -CS (r) LM (t) CS (c)      Progress/Outcomes (Comprehend Questions Goal 1, SLP)  goal ongoing  -CS (r) LM (t) CS (c)      Comment (Comprehend Questions Goal 1, SLP)  Pt was able to answer complex yes/no questions with 70% accuracy.   -CS (r) LM (t) CS (c)         Follow Directions Goal 2 (SLP)    Improve Ability to Follow Directions Goal 1 (SLP)  2 step commands;90%  -CS (r) LM (t) CS (c)      Time Frame (Follow Directions Goal 1, SLP)  by discharge  -CS (r) LM (t)  CS (c)      Progress (Ability to Follow Directions Goal 1, SLP)  40%  -CS (r) LM (t) CS (c)      Progress/Outcomes (Follow Directions Goal 1, SLP)  goal ongoing  -CS (r) LM (t) CS (c)      Comment (Follow Directions Goal 1, SLP)  Pt was able to follow simple 2 step commands with 40% accuracy; however, pt had difficulty following complex two step commands (e.g., before, first, after).   -CS (r) LM (t) CS (c)         Connected Speech to Express Thoughts Goal 1 (SLP)    Improve Narrative Discourse to Express Thoughts By Goal 1 (SLP)  describing a picture;90%  -CS (r) LM (t) CS (c)      Time Frame (Connected Speech Goal 1, SLP)  by discharge  -CS (r) LM (t) CS (c)      Progress (Connected Speech Goal 1, SLP)  60%;with minimal cues (75-90%)  -CS (r) LM (t) CS (c)      Progress/Outcomes (Connected Speech Goal 1, SLP)  goal ongoing  -CS (r) LM (t) CS (c)      Comment (Connected Speech Goal 1, SLP)  Pt was able to describe a picture with appropriate information; however, pts paraphasia affected his ability to appropriatly convey his message. He frequently omitted functional and content words. (e.g., has umbrella but not use it)     -CS (r) LM (t) CS (c)         Articulation Goal 1 (SLP)    Improve Articulation Goal 1 (SLP)  by over-articulating in connected speech;90%  -CS (r) LM (t) CS (c)      Time Frame (Articulation Goal 1, SLP)  by discharge  -CS (r) LM (t) CS (c)      Progress (Articulation Goal 1, SLP)  50%;with moderate cues (50-74%)  -CS (r) LM (t) CS (c)      Progress/Outcomes (Articulation Goal 1, SLP)  goal ongoing  -CS (r) LM (t) CS (c)         Patient Will Implement Strategies Goal 1 (SLP)    Implement Strategies of Goal 1 (SLP)  using external rate control;90%;independently (over 90% accuracy)  -CS (r) LM (t) CS (c)      Time Frame (Strategy Implementation Goal 1, SLP)  short term goal (STG);by discharge  -CS (r) LM (t) CS (c)      Progress (Strategy Implementation Goal 1, SLP)  70%;with maximum cues (25-49%)   -CS (r) LM (t) CS (c)      Progress/Outcomes (Strategy Implementation Goal 1, SLP)  goal ongoing  -CS (r) LM (t) CS (c)      Comment (Strategy Implementation Goal 1, SLP)  When given a verbal cue pt was able to slow rate of speech.  -CS (r) LM (t) CS (c)        User Key  (r) = Recorded By, (t) = Taken By, (c) = Cosigned By    Initials Name Provider Type Discipline    Augustine Robison, MS CCC-SLP Speech and Language Pathologist SLP    Maisha Torres, Speech Therapy Student Speech Therapy Student SLP              PT Recommendation and Plan     Plan of Care Reviewed With: patient  Progress: no change  Outcome Summary: pt c/o SOA and wheezing, 02 sat 98% in RA, pt trans to EOB SBA, sit-stand cga, pt amb 50 feet at a time with rwx min assist, pt leans to right, cues for correcting posture, pt trans back to EOB, Pt would benefit from rehab  Outcome Measures     Row Name 01/19/20 0955 01/18/20 1023 01/17/20 1400       How much help from another person do you currently need...    Turning from your back to your side while in flat bed without using bedrails?  --  4  -AH  --    Moving from lying on back to sitting on the side of a flat bed without bedrails?  --  4  -AH  --    Moving to and from a bed to a chair (including a wheelchair)?  --  2  -AH  --    Standing up from a chair using your arms (e.g., wheelchair, bedside chair)?  --  2  -AH  --    Climbing 3-5 steps with a railing?  --  1  -AH  --    To walk in hospital room?  --  2  -AH  --    AM-PAC 6 Clicks Score (PT)  --  15  -AH  --       How much help from another is currently needed...    Putting on and taking off regular lower body clothing?  4  -CJ  --  4  -MW    Bathing (including washing, rinsing, and drying)  3  -CJ  --  3  -MW    Toileting (which includes using toilet bed pan or urinal)  4  -CJ  --  4  -MW    Putting on and taking off regular upper body clothing  3  -CJ  --  3  -MW    Taking care of personal grooming (such as brushing teeth)  3  -CJ  --  3   -MW    Eating meals  3  -  --  3  -MW    AM-PAC 6 Clicks Score (OT)  20  -  --  20  -MW       Functional Assessment    Outcome Measure Options  AM-PAC 6 Clicks Daily Activity (OT)  -  AM-PAC 6 Clicks Basic Mobility (PT)  -  AM-PAC 6 Clicks Daily Activity (OT)  -      User Key  (r) = Recorded By, (t) = Taken By, (c) = Cosigned By    Initials Name Provider Type     Jalen Dozier, ARAYA/L Occupational Therapy Assistant    MW Maribeth Gross, OTR/L Occupational Therapist     Ricarda Heller PTA Physical Therapy Assistant         Time Calculation:   PT Charges     Row Name 01/20/20 1350             Time Calculation    Start Time  1323  -      Stop Time  1338  -      Time Calculation (min)  15 min  -      PT Received On  01/20/20  -         Time Calculation- PT    Total Timed Code Minutes- PT  15 minute(s)  -         Timed Charges    07875 - Gait Training Minutes   15  -        User Key  (r) = Recorded By, (t) = Taken By, (c) = Cosigned By    Initials Name Provider Type     Negra Sen PTA Physical Therapy Assistant        Therapy Charges for Today     Code Description Service Date Service Provider Modifiers Qty    [id number] HC GAIT TRAINING EA 15 MIN 1/20/2020 Negra Sen PTA GP 1          PT G-Codes  Outcome Measure Options: AM-PAC 6 Clicks Daily Activity (OT)  AM-PAC 6 Clicks Score (PT): 15  AM-PAC 6 Clicks Score (OT): 20    Negra Sen PTA  1/20/2020

## 2020-01-20 NOTE — PLAN OF CARE
Problem: Patient Care Overview  Goal: Plan of Care Review  Flowsheets (Taken 1/20/2020 5625)  Progress: no change  Plan of Care Reviewed With: patient  Outcome Summary: Pt is A&O. Complaints of occasional pain, treat with PRN meds. Isolation maintained. Worked with therapy. He has some wheezing and SOA with activity. VSS. Safety maintained. Will continue to monitor.

## 2020-01-20 NOTE — PLAN OF CARE
Problem: Patient Care Overview  Goal: Plan of Care Review  Outcome: Ongoing (interventions implemented as appropriate)  Flowsheets (Taken 1/20/2020 9795)  Progress: no change  Plan of Care Reviewed With: patient  Outcome Summary: pt c/o SOA and wheezing, 02 sat 98% in RA, pt trans to EOB SBA, sit-stand cga, pt amb 50 feet at a time with rwx min assist, pt leans to right, cues for correcting posture, pt trans back to EOB, Pt would benefit from rehab

## 2020-01-20 NOTE — PLAN OF CARE
Problem: Patient Care Overview  Goal: Plan of Care Review  Outcome: Ongoing (interventions implemented as appropriate)  Flowsheets (Taken 1/20/2020 9458)  Progress: improving  Plan of Care Reviewed With: patient  Note:   NIH 4, aphasia and dysarthria has improved some, weakness, c/o bilateral arms achy, pain pill and tylenol given with relief, urinating without difficulty, up with assistance, bed alarm set, safety maintained, plan D/C to [facility]

## 2020-01-20 NOTE — PLAN OF CARE
Problem: Patient Care Overview  Goal: Plan of Care Review  Outcome: Ongoing (interventions implemented as appropriate)  Flowsheets (Taken 1/20/2020 0459 by Tonny Robertson RN)  Progress: improving  Plan of Care Reviewed With: patient  Note:   Cognitive tx complete. Pt cooperative and alert. He was oriented to person, place, time, and situation. Pt was motivated to complete all task. Pt able to answer complex yes/no questions with 70% accuracy with no prompts. He self-correct when appropriate with minimal cues. Pt follows simple two step commands with minimal difficulty; however, following complex two step commands were noted to be more difficulty for pt to complete. Pt presented with severe paraphasia with connected speech as well as an increased rate of speech, causing his speech to become distorted. SLP will continue to follow and treat.

## 2020-01-20 NOTE — PROGRESS NOTES
Neurology Progress Note      Chief Complaint:  F/u stroke    Subjective     Subjective:  Doing well this morning.  No complaints.  He has not eaten breakfast yet.  He is ready to be discharged to acute rehabilitation today.  No worsening of neurologic symptoms.  Cleared from neurology standpoint to be discharged to acute rehabilitation today if insurance approves.      Medications:  Current Facility-Administered Medications   Medication Dose Route Frequency Provider Last Rate Last Dose   • acetaminophen (TYLENOL) tablet 650 mg  650 mg Oral Q6H PRN Hira Montenegro MD   650 mg at 01/20/20 0134   • amLODIPine (NORVASC) tablet 10 mg  10 mg Oral Q24H Hira Montenegro MD   10 mg at 01/19/20 0824   • aspirin chewable tablet 81 mg  81 mg Oral Daily Hira Montenegro MD   81 mg at 01/19/20 0824   • atorvastatin (LIPITOR) tablet 80 mg  80 mg Oral Nightly Hira Montenegro MD   80 mg at 01/19/20 2111   • HYDROcodone-acetaminophen (NORCO) 5-325 MG per tablet 1 tablet  1 tablet Oral Q6H PRN Hira Montenegro MD   1 tablet at 01/20/20 0558   • ipratropium-albuterol (DUO-NEB) nebulizer solution 3 mL  3 mL Nebulization Q6H - RT Hira Montenegro MD   3 mL at 01/20/20 0731   • levothyroxine (SYNTHROID, LEVOTHROID) tablet 125 mcg  125 mcg Oral Q AM Hira Montenegro MD   125 mcg at 01/20/20 0558   • lisinopril (PRINIVIL,ZESTRIL) tablet 20 mg  20 mg Oral Q24H Hira Montenegro MD   20 mg at 01/19/20 0824   • ondansetron (ZOFRAN) tablet 4 mg  4 mg Oral Q6H PRN Hira Montenegro MD        Or   • ondansetron (ZOFRAN) injection 4 mg  4 mg Intravenous Q6H PRN Hira Montenegro MD   4 mg at 01/14/20 1024   • polyethylene glycol (MIRALAX) powder 17 g  17 g Oral Daily Hira Montenegro MD   17 g at 01/19/20 0824   • sennosides-docusate (PERICOLACE) 8.6-50 MG per tablet 1 tablet  1 tablet Oral Nightly Hira Montenegro MD   1 tablet at 01/19/20 2113   • sodium chloride 0.9 % flush 10 mL  10 mL Intravenous PRN  Hira Montenegro MD       • sodium chloride 0.9 % flush 10 mL  10 mL Intravenous Q12H Hira Montenegro MD   10 mL at 01/19/20 2111   • sodium chloride 0.9 % flush 10 mL  10 mL Intravenous PRN Hira Montenegro MD           Review of Systems:   -A 14 point review of systems is completed and is negative except for frontal headache.       Objective      Vital Signs  Temp:  [97.2 °F (36.2 °C)-98.9 °F (37.2 °C)] 97.2 °F (36.2 °C)  Heart Rate:  [63-90] 75  Resp:  [16-18] 16  BP: (110-136)/(68-87) 135/73    Physical Exam:  General Exam:  Head:  Normal cephalic, atraumatic  HEENT:  Neck supple  Fundoscopic Exam:  No signs of disc edema  CVS:  Regular rate and rhythm.  No murmurs  Carotid Examination:  No bruits  Lungs:  Clear to auscultation  Abdomen:  Non-tender, Non-distended  Extremities:  No signs of peripheral edema  Skin:  No rashes    Neurologic Exam:    Mental Status:    -Awake, Alert, Oriented X 3 with some cognitive slowing. Unsure of his baseline.   -No word finding difficulties  -No aphasia  -some dysarthria and impaired anoop.   -Follows simple and complex commands    CN II:  Visual fields full.  Pupils equally reactive to light  CN III, IV, VI:  Extraocular Muscles full with no signs of nystagmus  CN V:  Facial sensory is symmetric with no asymmetries.  CN VII:  Facial motor symmetric  CN VIII:  Gross hearing intact bilaterally  CN IX:  Palate elevates symmetrically  CN X:  Palate elevates symmetrically  CN XI:  Shoulder shrug symmetric  CN XII:  Tongue protrudes to midline  Motor: (strength out of 5:  1= minimal movement, 2 = movement in plane of gravity, 3 = movement against gravity, 4 = movement against some resistance, 5 = full strength)    -Right Upper Ext: Proximal: 5 Distal: 5  -Left Upper Ext: Proximal: 5 Distal: 5    -Right Lower Ext: Proximal: 5 Distal: 5  -Left Lower Ext: Proximal: 5 Distal: 5    DTR:  -Right   Bicep: 2+ Tricep: 2+ Brachoradialis: 2+   Patella: 2+ Ankle: 2+ Neg  Babinski  -Left   Bicep: 2+ Tricep: 2+ Brachoradialis: 2+   Patella: 2+ Ankle: 2+ Neg Babinski    Sensory:  -Intact to light touch, pinprick, temperature, pain, and proprioception    Coordination/gait:  -Finger to nose with dysmetria on right and normal on left. Finger tapping appears improved compared to yesterday  -Heel to shin mild ataxia on right compared to left.       Gait  -No signs of ataxia  -ambulates unassisted         Results Review:    I reviewed the patient's new clinical results.    Results from last 7 days   Lab Units 01/17/20  0436 01/14/20  0247   WBC 10*3/mm3 7.67 16.57*   HEMOGLOBIN g/dL 17.6 17.9*   HEMATOCRIT % 51.3* 52.8*   PLATELETS 10*3/mm3 254 253        Results from last 7 days   Lab Units 01/20/20  0510 01/19/20  0446 01/18/20  0627 01/17/20  0436  01/14/20  0247   SODIUM mmol/L 139 141 138 139   < > 142   POTASSIUM mmol/L  --   --   --  4.2  --  4.0   CHLORIDE mmol/L  --   --   --  105  --  104   CO2 mmol/L  --   --   --  26.0  --  26.0   BUN mg/dL  --   --   --  17  --  22   CREATININE mg/dL  --   --   --  0.81  --  1.15   CALCIUM mg/dL  --   --   --  9.1  --  9.6   BILIRUBIN mg/dL  --   --   --   --   --  0.6   ALK PHOS U/L  --   --   --   --   --  41   ALT (SGPT) U/L  --   --   --   --   --  22   AST (SGOT) U/L  --   --   --   --   --  22   GLUCOSE mg/dL  --   --   --  96  --  131*    < > = values in this interval not displayed.        Lab Results   Component Value Date    PROTIME 12.3 01/13/2020    INR 0.89 (L) 01/13/2020     No components found for: POCGLUC  No components found for: A1C  Lab Results   Component Value Date    HDL 53 01/14/2020    LDL 91 01/14/2020     No components found for: B12  Lab Results   Component Value Date    TSH 94.580 (H) 01/13/2020       Imaging Results (Last 24 Hours)     ** No results found for the last 24 hours. **            Ct head personally reviewed by me and stable right cerebellar stroke, no hemorrhage. Left SDH not visualized.      Assessment/Plan     Hospital Problem List      Chronic obstructive pulmonary disease (CMS/[facility])    CVA (cerebral vascular accident) (CMS/[facility])    Influenza A    Tobacco dependence    Acute encephalopathy    Hypothyroidism, non-compliant with medication     Medical non-compliance    Bradycardia    Impression:  1. Acute right cerebellar stroke which is evolving and showing greater edema compared to study 1/13/20  No hemorrhagic component seen on Head CT as was seen on MRI  2.SDH not visualized on CT head yesterday 1/16/2020.   3. Still a risk for requiring decompression and will need close Neuro monitoring  4. Hypothyroid  5. Cognitive slowing since admission  Unknown baseline  6. Normal hemoglobin A1C  7. LDL of 93 and goal of < 70  8. Headache- can get relief with tylenol.     Plan:  1. Stable CT head and stable neurologic exam with some improvement.  2. Continue ASA 81 mg daily  3. Continue Lipitor 80 mg daily. LDL goal less than 70.  4.  Cleared to be discharged to acute rehabilitation today if he we get precertification from insurance.  Otherwise we will have him follow-up in the neurology clinic in approximately 4 weeks time.        Jamari Villasenor MD  01/20/20  9:18 AM

## 2020-01-21 ENCOUNTER — APPOINTMENT (OUTPATIENT)
Dept: CT IMAGING | Facility: HOSPITAL | Age: 65
End: 2020-01-21

## 2020-01-21 ENCOUNTER — HOSPITAL ENCOUNTER (INPATIENT)
Age: 65
LOS: 22 days | Discharge: HOME HEALTH | DRG: 057 | End: 2020-02-12
Attending: PSYCHIATRY & NEUROLOGY | Admitting: PSYCHIATRY & NEUROLOGY
Payer: MEDICAID

## 2020-01-21 VITALS
HEART RATE: 82 BPM | SYSTOLIC BLOOD PRESSURE: 145 MMHG | RESPIRATION RATE: 16 BRPM | OXYGEN SATURATION: 98 % | TEMPERATURE: 98.6 F | WEIGHT: 177.5 LBS | HEIGHT: 72 IN | DIASTOLIC BLOOD PRESSURE: 74 MMHG | BODY MASS INDEX: 24.04 KG/M2

## 2020-01-21 PROBLEM — I63.9 ACUTE ISCHEMIC STROKE (HCC): Status: ACTIVE | Noted: 2020-01-21

## 2020-01-21 LAB
BASOPHILS ABSOLUTE: 0.1 K/UL (ref 0–0.2)
BASOPHILS RELATIVE PERCENT: 0.8 % (ref 0–1)
EOSINOPHILS ABSOLUTE: 0.4 K/UL (ref 0–0.6)
EOSINOPHILS RELATIVE PERCENT: 4 % (ref 0–5)
HCT VFR BLD CALC: 57.6 % (ref 42–52)
HEMOGLOBIN: 18.9 G/DL (ref 14–18)
IMMATURE GRANULOCYTES #: 0 K/UL
LYMPHOCYTES ABSOLUTE: 1.7 K/UL (ref 1.1–4.5)
LYMPHOCYTES RELATIVE PERCENT: 16.9 % (ref 20–40)
MCH RBC QN AUTO: 31.3 PG (ref 27–31)
MCHC RBC AUTO-ENTMCNC: 32.8 G/DL (ref 33–37)
MCV RBC AUTO: 95.5 FL (ref 80–94)
MONOCYTES ABSOLUTE: 1.1 K/UL (ref 0–0.9)
MONOCYTES RELATIVE PERCENT: 11.1 % (ref 0–10)
NEUTROPHILS ABSOLUTE: 6.9 K/UL (ref 1.5–7.5)
NEUTROPHILS RELATIVE PERCENT: 66.8 % (ref 50–65)
PDW BLD-RTO: 13.9 % (ref 11.5–14.5)
PLATELET # BLD: 252 K/UL (ref 130–400)
PMV BLD AUTO: 10.1 FL (ref 9.4–12.4)
PREALBUMIN: 29 MG/DL (ref 20–40)
RBC # BLD: 6.03 M/UL (ref 4.7–6.1)
SODIUM BLD-SCNC: 139 MMOL/L (ref 136–145)
WBC # BLD: 10.3 K/UL (ref 4.8–10.8)
WHOLE BLOOD HOLD SPECIMEN: NORMAL

## 2020-01-21 PROCEDURE — 70450 CT HEAD/BRAIN W/O DYE: CPT

## 2020-01-21 PROCEDURE — 1180000000 HC REHAB R&B

## 2020-01-21 PROCEDURE — 36415 COLL VENOUS BLD VENIPUNCTURE: CPT

## 2020-01-21 PROCEDURE — 92507 TX SP LANG VOICE COMM INDIV: CPT

## 2020-01-21 PROCEDURE — 6370000000 HC RX 637 (ALT 250 FOR IP): Performed by: PSYCHIATRY & NEUROLOGY

## 2020-01-21 PROCEDURE — 94760 N-INVAS EAR/PLS OXIMETRY 1: CPT

## 2020-01-21 PROCEDURE — 25010000002 ONDANSETRON PER 1 MG: Performed by: INTERNAL MEDICINE

## 2020-01-21 PROCEDURE — 94640 AIRWAY INHALATION TREATMENT: CPT

## 2020-01-21 PROCEDURE — 25010000002 PROMETHAZINE PER 50 MG: Performed by: FAMILY MEDICINE

## 2020-01-21 PROCEDURE — 94799 UNLISTED PULMONARY SVC/PX: CPT

## 2020-01-21 PROCEDURE — 84134 ASSAY OF PREALBUMIN: CPT

## 2020-01-21 PROCEDURE — 85025 COMPLETE CBC W/AUTO DIFF WBC: CPT

## 2020-01-21 PROCEDURE — 99233 SBSQ HOSP IP/OBS HIGH 50: CPT | Performed by: CLINICAL NURSE SPECIALIST

## 2020-01-21 PROCEDURE — 84295 ASSAY OF SERUM SODIUM: CPT | Performed by: INTERNAL MEDICINE

## 2020-01-21 RX ORDER — LEVOTHYROXINE SODIUM 0.12 MG/1
125 TABLET ORAL DAILY
Start: 2020-01-21

## 2020-01-21 RX ORDER — AMLODIPINE BESYLATE 10 MG/1
10 TABLET ORAL DAILY
Status: ON HOLD | COMMUNITY
Start: 2020-01-22 | End: 2020-02-12 | Stop reason: SDUPTHER

## 2020-01-21 RX ORDER — ASPIRIN 81 MG/1
81 TABLET, CHEWABLE ORAL DAILY
Start: 2020-01-22

## 2020-01-21 RX ORDER — ATORVASTATIN CALCIUM 80 MG/1
80 TABLET, FILM COATED ORAL NIGHTLY
Start: 2020-01-21

## 2020-01-21 RX ORDER — DOCUSATE SODIUM 100 MG/1
100 CAPSULE, LIQUID FILLED ORAL 2 TIMES DAILY
Status: DISCONTINUED | OUTPATIENT
Start: 2020-01-21 | End: 2020-02-12 | Stop reason: HOSPADM

## 2020-01-21 RX ORDER — PROMETHAZINE HYDROCHLORIDE 25 MG/ML
12.5 INJECTION, SOLUTION INTRAMUSCULAR; INTRAVENOUS ONCE
Status: DISCONTINUED | OUTPATIENT
Start: 2020-01-21 | End: 2020-01-21 | Stop reason: SDUPTHER

## 2020-01-21 RX ORDER — ATORVASTATIN CALCIUM 80 MG/1
80 TABLET, FILM COATED ORAL NIGHTLY
Status: ON HOLD | COMMUNITY
End: 2020-02-12 | Stop reason: SDUPTHER

## 2020-01-21 RX ORDER — POLYETHYLENE GLYCOL 3350 17 G/17G
17 POWDER, FOR SOLUTION ORAL DAILY
Status: DISCONTINUED | OUTPATIENT
Start: 2020-01-22 | End: 2020-02-12 | Stop reason: HOSPADM

## 2020-01-21 RX ORDER — LISINOPRIL 20 MG/1
20 TABLET ORAL DAILY
Status: ON HOLD | COMMUNITY
Start: 2020-01-22 | End: 2020-02-12 | Stop reason: HOSPADM

## 2020-01-21 RX ORDER — ASPIRIN 81 MG/1
81 TABLET, CHEWABLE ORAL DAILY
Status: DISCONTINUED | OUTPATIENT
Start: 2020-01-22 | End: 2020-02-12 | Stop reason: HOSPADM

## 2020-01-21 RX ORDER — BISACODYL 10 MG
10 SUPPOSITORY, RECTAL RECTAL DAILY PRN
Status: DISCONTINUED | OUTPATIENT
Start: 2020-01-21 | End: 2020-02-12 | Stop reason: HOSPADM

## 2020-01-21 RX ORDER — LISINOPRIL 20 MG/1
20 TABLET ORAL DAILY
Status: DISCONTINUED | OUTPATIENT
Start: 2020-01-22 | End: 2020-02-12

## 2020-01-21 RX ORDER — AMOXICILLIN 250 MG
1 CAPSULE ORAL NIGHTLY
Start: 2020-01-21

## 2020-01-21 RX ORDER — POLYETHYLENE GLYCOL 3350 17 G/17G
17 POWDER, FOR SOLUTION ORAL DAILY
Status: DISCONTINUED | OUTPATIENT
Start: 2020-01-21 | End: 2020-01-21 | Stop reason: SDUPTHER

## 2020-01-21 RX ORDER — PROMETHAZINE HYDROCHLORIDE 25 MG/ML
12.5 INJECTION, SOLUTION INTRAMUSCULAR; INTRAVENOUS ONCE
Status: COMPLETED | OUTPATIENT
Start: 2020-01-21 | End: 2020-01-21

## 2020-01-21 RX ORDER — LISINOPRIL 20 MG/1
20 TABLET ORAL
Start: 2020-01-22

## 2020-01-21 RX ORDER — AMLODIPINE BESYLATE 10 MG/1
10 TABLET ORAL DAILY
Status: DISCONTINUED | OUTPATIENT
Start: 2020-01-22 | End: 2020-02-12 | Stop reason: HOSPADM

## 2020-01-21 RX ORDER — SENNA AND DOCUSATE SODIUM 50; 8.6 MG/1; MG/1
1 TABLET, FILM COATED ORAL NIGHTLY
Status: DISCONTINUED | OUTPATIENT
Start: 2020-01-21 | End: 2020-02-12 | Stop reason: HOSPADM

## 2020-01-21 RX ORDER — POLYETHYLENE GLYCOL 3350 17 G/17G
17 POWDER, FOR SOLUTION ORAL DAILY
Start: 2020-01-22

## 2020-01-21 RX ORDER — POLYETHYLENE GLYCOL 3350 17 G/17G
17 POWDER, FOR SOLUTION ORAL DAILY
Status: ON HOLD | COMMUNITY
Start: 2020-01-22 | End: 2020-02-12 | Stop reason: HOSPADM

## 2020-01-21 RX ORDER — AMOXICILLIN 250 MG
1 CAPSULE ORAL NIGHTLY
Status: ON HOLD | COMMUNITY
End: 2020-02-12 | Stop reason: HOSPADM

## 2020-01-21 RX ORDER — ATORVASTATIN CALCIUM 80 MG/1
80 TABLET, FILM COATED ORAL NIGHTLY
Status: DISCONTINUED | OUTPATIENT
Start: 2020-01-21 | End: 2020-02-12 | Stop reason: HOSPADM

## 2020-01-21 RX ORDER — ACETAMINOPHEN 325 MG/1
650 TABLET ORAL EVERY 4 HOURS PRN
Status: DISCONTINUED | OUTPATIENT
Start: 2020-01-21 | End: 2020-02-12 | Stop reason: HOSPADM

## 2020-01-21 RX ORDER — AMLODIPINE BESYLATE 10 MG/1
10 TABLET ORAL
Start: 2020-01-22

## 2020-01-21 RX ORDER — IPRATROPIUM BROMIDE AND ALBUTEROL SULFATE 2.5; .5 MG/3ML; MG/3ML
1 SOLUTION RESPIRATORY (INHALATION) 4 TIMES DAILY
Status: DISCONTINUED | OUTPATIENT
Start: 2020-01-21 | End: 2020-02-12 | Stop reason: HOSPADM

## 2020-01-21 RX ORDER — IPRATROPIUM BROMIDE AND ALBUTEROL SULFATE 2.5; .5 MG/3ML; MG/3ML
1 SOLUTION RESPIRATORY (INHALATION) 4 TIMES DAILY
Status: ON HOLD | COMMUNITY
End: 2020-02-12 | Stop reason: SDUPTHER

## 2020-01-21 RX ORDER — ASPIRIN 81 MG/1
81 TABLET, CHEWABLE ORAL DAILY
Status: ON HOLD | COMMUNITY
Start: 2020-01-22 | End: 2020-02-12 | Stop reason: SDUPTHER

## 2020-01-21 RX ADMIN — HYDROCODONE BITARTRATE AND ACETAMINOPHEN 1 TABLET: 5; 325 TABLET ORAL at 13:21

## 2020-01-21 RX ADMIN — ATORVASTATIN CALCIUM 80 MG: 80 TABLET, FILM COATED ORAL at 20:30

## 2020-01-21 RX ADMIN — IPRATROPIUM BROMIDE AND ALBUTEROL SULFATE 3 ML: 2.5; .5 SOLUTION RESPIRATORY (INHALATION) at 07:56

## 2020-01-21 RX ADMIN — SODIUM CHLORIDE, PRESERVATIVE FREE 10 ML: 5 INJECTION INTRAVENOUS at 09:36

## 2020-01-21 RX ADMIN — LISINOPRIL 20 MG: 20 TABLET ORAL at 09:36

## 2020-01-21 RX ADMIN — IPRATROPIUM BROMIDE AND ALBUTEROL SULFATE 3 ML: 2.5; .5 SOLUTION RESPIRATORY (INHALATION) at 14:03

## 2020-01-21 RX ADMIN — LEVOTHYROXINE SODIUM 125 MCG: 125 TABLET ORAL at 06:52

## 2020-01-21 RX ADMIN — POLYETHYLENE GLYCOL (3350) 17 G: 17 POWDER, FOR SOLUTION ORAL at 09:36

## 2020-01-21 RX ADMIN — HYDROCODONE BITARTRATE AND ACETAMINOPHEN 1 TABLET: 5; 325 TABLET ORAL at 06:52

## 2020-01-21 RX ADMIN — IPRATROPIUM BROMIDE AND ALBUTEROL SULFATE 3 ML: 2.5; .5 SOLUTION RESPIRATORY (INHALATION) at 19:13

## 2020-01-21 RX ADMIN — ASPIRIN 81 MG: 81 TABLET, CHEWABLE ORAL at 09:36

## 2020-01-21 RX ADMIN — ONDANSETRON HYDROCHLORIDE 4 MG: 2 SOLUTION INTRAMUSCULAR; INTRAVENOUS at 00:58

## 2020-01-21 RX ADMIN — ONDANSETRON 4 MG: 4 TABLET, FILM COATED ORAL at 14:40

## 2020-01-21 RX ADMIN — AMLODIPINE BESYLATE 10 MG: 10 TABLET ORAL at 09:36

## 2020-01-21 RX ADMIN — DOCUSATE SODIUM 100 MG: 100 CAPSULE, LIQUID FILLED ORAL at 20:30

## 2020-01-21 RX ADMIN — PROMETHAZINE HYDROCHLORIDE 12.5 MG: 25 INJECTION INTRAMUSCULAR; INTRAVENOUS at 05:08

## 2020-01-21 RX ADMIN — SENNOSIDES AND DOCUSATE SODIUM 1 TABLET: 8.6; 5 TABLET ORAL at 20:30

## 2020-01-21 NOTE — PLAN OF CARE
[address] TREATMENT PLAN      Grace Martínez    : 1955  Acct #: [de-identified]  MRN: [id number]   PHYSICIAN:  Carla Lara MD  Primary Problem    Patient Active Problem List   Diagnosis    Impetigo    Skin tear of left upper arm without complication    Acute ischemic stroke [facility])       Rehabilitation Diagnosis:     Acute ischemic stroke ([facility] [address] [I63.9]  Acute ischemic stroke ([facility] [address] [I63.9]       ADMIT DATE:2020   CARE PLAN     NURSING:  Grace Martínez while on this unit will:     [x] Be continent of bowel and bladder      [x] Have an adequate number of bowel movements   [] Urinate with no urinary retention >300ml in bladder   [] Complete bladder protocol with frederick removal   [x] Maintain O2 SATs at _92__%   [x] Have pain managed while on ARU        [x] Be pain free by discharge    [x] Have no skin breakdown while on ARU   [] Have improved skin integrity via wound measurements   [] Have no signs/symptoms of infection at the wound site  [x] Be free from injury during hospitalization   [x] Complete education with patient/family with understanding demonstrated for:  [x] Adjustment   [x] Other:   Nursing interventions may include bowel/bladder training, education for medical assistive devices, medication education, O2 saturation management, energy conservation, stress management techniques, fall prevention, alarms protocol, seating and positioning, skin/wound care, pressure relief instruction,dressing changes,  infection protection, DVT prophylaxis, and/or assistance with in room safety with transfers to bed, toilet, wheelchair, shower as well as bathroom activities and hygiene.      Patient/caregiver education for:   [x] Disease/sustained injury/management      [x] Medication Use   [] Surgical intervention   [x] Safety   [] Body mechanics and or joint protection   [x] Health maintenance       IRF-CESIA  Bladder and Bowel Continence  Bladder Continence: Always continent  Bowel Continence: Always continent Neuropsychology/Psychology may evaluate and provide necessary support. Medical issues being managed closely and that require 24 hour availability of a physician:   [] Swallowing Precautions  [] Bowel/Bladder Fx  [] Weight bearing precautions   [] Wound Care    [x] Pain Mgmt   [x] Infection Protection   [x] DVT Prophylaxis   [] Fall Precautions  [] Fluid/Electrolyte/Nutrition Balance   [] Voice Protection   [] Respiratory  [] Other:    Medical Prognosis: [] Good  [x] Fair    [] Guarded   Total expected IRF days:  16  Anticipated discharge destination:    [] Home Independently   [x] Home with supervision    []SNF     [] Other                                           Physician anticipated functional outcomes: Independent household ambulation with assistive device  IPOC brief synthesis: Acute inpatient rehabilitation with occupational   physical therapy and SLP, 180 minutes, 5 every 7   days will address basic and advancing mobility with self-care   instruction and adaptive equipment training. Caregiver education will   be offered. Expected length of stay prior to the supervised level of   functional discharge to home with home health services is 16 days. Assessment and Plan:  1. 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 Antiplatelet treatment  2. Hypertension-continue current medications and monitoring  3. Hyperlipidemia-continue statin  4. DVT prophylaxis-SCDs  5. GI-bowel regimen  6.   Hypothyroidism-continue replacement therapy             Case Mgmt: Electronically signed by GABRIELA Silva on 1/22/2020 at 1:48 PM    OT: Electronically signed by Taqueria Knott OT on 1/22/20 at 3:58 PM      PT:Electronically signed by Carlos Allred PT on 1/22/20 at 3:21 PM      RN: Electronically signed by Barbara Velez RN on 1/21/2020 at 4:40 PM    ST: Electronically Signed By:  Yesenia Moran  1/22/2020,2:08 PM.

## 2020-01-21 NOTE — PROGRESS NOTES
Neurology Progress Note      Chief Complaint:  F/u stroke    Subjective     Subjective:  Lying in bed. No family at bedside.  Denies HA at this time. Had N/V last PM. Patient complaints of some dizziness when sitting and standing but not worse since stroke.       Medications:  Current Facility-Administered Medications   Medication Dose Route Frequency Provider Last Rate Last Dose   • acetaminophen (TYLENOL) tablet 650 mg  650 mg Oral Q6H PRN Hira Montenegro MD   650 mg at 01/20/20 2103   • amLODIPine (NORVASC) tablet 10 mg  10 mg Oral Q24H Hira Montenegro MD   10 mg at 01/20/20 1005   • aspirin chewable tablet 81 mg  81 mg Oral Daily Hira Montenegro MD   81 mg at 01/20/20 1005   • atorvastatin (LIPITOR) tablet 80 mg  80 mg Oral Nightly Hira Montenegro MD   80 mg at 01/20/20 2103   • HYDROcodone-acetaminophen (NORCO) 5-325 MG per tablet 1 tablet  1 tablet Oral Q6H PRN Hira Montenegro MD   1 tablet at 01/21/20 0652   • ipratropium-albuterol (DUO-NEB) nebulizer solution 3 mL  3 mL Nebulization Q6H - RT Hira Montenegro MD   3 mL at 01/21/20 0756   • levothyroxine (SYNTHROID, LEVOTHROID) tablet 125 mcg  125 mcg Oral Q AM Hira Montenegro MD   125 mcg at 01/21/20 0652   • lisinopril (PRINIVIL,ZESTRIL) tablet 20 mg  20 mg Oral Q24H Hira Montenegro MD   20 mg at 01/20/20 1005   • ondansetron (ZOFRAN) tablet 4 mg  4 mg Oral Q6H PRN Hira Montenegro MD        Or   • ondansetron (ZOFRAN) injection 4 mg  4 mg Intravenous Q6H PRN Hira Montenegro MD   4 mg at 01/21/20 0058   • polyethylene glycol (MIRALAX) powder 17 g  17 g Oral Daily Hira Montenegro MD   17 g at 01/19/20 0824   • sennosides-docusate (PERICOLACE) 8.6-50 MG per tablet 1 tablet  1 tablet Oral Nightly Hira Montenegro MD   1 tablet at 01/20/20 2103   • sodium chloride 0.9 % flush 10 mL  10 mL Intravenous PRN Hira Montenegro MD       • sodium chloride 0.9 % flush 10 mL  10 mL Intravenous Q12H Hira Montenegro MD    10 mL at 01/20/20 2103   • sodium chloride 0.9 % flush 10 mL  10 mL Intravenous PRN Hira Montenegro MD           Review of Systems:   -A 14 point review of systems is completed and is negative except for some n/v.       Objective      Vital Signs  Temp:  [97.6 °F (36.4 °C)-98.2 °F (36.8 °C)] 97.9 °F (36.6 °C)  Heart Rate:  [67-93] 86  Resp:  [16-20] 16  BP: (118-173)/(68-90) 165/84    Physical Exam:    General Exam:  Head:  Normal cephalic, atraumatic  HEENT:  Neck supple  Fundoscopic Exam:  No signs of disc edema  CVS:  Regular rate and rhythm.  No murmurs  Carotid Examination:  No bruits  Lungs:  Clear to auscultation  Abdomen:  Non-tender, Non-distended  Extremities:  No signs of peripheral edema  Skin:  No rashes    Neurologic Exam:    Mental Status:    -Awake, Alert, Oriented X 3. Some cognitive slowing.   -No word finding difficulties. Named 2/2 objects  -No aphasia  -some dysarthria with impaired anoop.  -Follows simple and complex commands    CN II:  Visual fields full.  Pupils equally reactive to light. Right disconjugate gaze and patient states has been chronic for about 4 years.  CN III, IV, VI:  Extraocular Muscles full with no signs of nystagmus  CN V:  Facial sensory is symmetric with no asymmetries.  CN VII:  Facial motor symmetric  CN VIII:  Gross hearing intact bilaterally  CN IX:  Palate elevates symmetrically  CN X:  Palate elevates symmetrically  CN XI:  Shoulder shrug symmetric  CN XII:  Tongue protrudes to midline  Motor: (strength out of 5:  1= minimal movement, 2 = movement in plane of gravity, 3 = movement against gravity, 4 = movement against some resistance, 5 = full strength)    -Right Upper Ext: Proximal: 5 Distal: 5  -Left Upper Ext: Proximal: 5 Distal: 5    -Right Lower Ext: Proximal: 5 Distal: 5  -Left Lower Ext: Proximal: 5 Distal: 5    DTR:  -Right   Bicep: 2+ Tricep: 2+ Brachoradialis: 2+   Patella: 2+ Ankle: 2+ Neg Babinski  -Left   Bicep: 2+ Tricep: 2+ Brachoradialis:  2+   Patella: 2+ Ankle: 2+ Neg Babinski    Sensory:  -Intact to light touch, pinprick, temperature, pain, and proprioception    Coordination:  -Finger to nose with dysmetria bilateral.  -Heel to shin extremely mild ataxia on right.       Gait  -No signs of ataxia  -needs assistance to ambulate with contact guard with rolling walker, leans to right.     Results Review:    I reviewed the patient's new clinical results.    Results from last 7 days   Lab Units 01/17/20  0436   WBC 10*3/mm3 7.67   HEMOGLOBIN g/dL 17.6   HEMATOCRIT % 51.3*   PLATELETS 10*3/mm3 254        Results from last 7 days   Lab Units 01/21/20  0538 01/20/20  0510 01/19/20  0446  01/17/20  0436   SODIUM mmol/L 139 139 141   < > 139   POTASSIUM mmol/L  --   --   --   --  4.2   CHLORIDE mmol/L  --   --   --   --  105   CO2 mmol/L  --   --   --   --  26.0   BUN mg/dL  --   --   --   --  17   CREATININE mg/dL  --   --   --   --  0.81   CALCIUM mg/dL  --   --   --   --  9.1   GLUCOSE mg/dL  --   --   --   --  96    < > = values in this interval not displayed.        Lab Results   Component Value Date    PROTIME 12.3 01/13/2020    INR 0.89 (L) 01/13/2020     No components found for: POCGLUC  No components found for: A1C  Lab Results   Component Value Date    HDL 53 01/14/2020    LDL 91 01/14/2020     No components found for: B12  Lab Results   Component Value Date    TSH 94.580 (H) 01/13/2020       Assessment/Plan     Hospital Problem List      CVA (cerebral vascular accident) (CMS/[facility])    Chronic obstructive pulmonary disease (CMS/[facility])    Influenza A    Tobacco dependence    Acute encephalopathy    Hypothyroidism, non-compliant with medication     Medical non-compliance    Bradycardia    Impression:  1. Acute right cerebellar stroke which is evolving and showing greater edema compared to study 1/13/20  No hemorrhagic component seen on Head CT as was seen on MRI.   2.SDH not visualized on CT head yesterday 1/16/2020.   3. Hypothyroid  4. Cognitive slowing  since admission  Unknown baseline  5. Normal hemoglobin A1C  6. LDL of 93 and goal of < 70  7. Headache- can get relief with tylenol.   8. Having n/v last PM    Plan:  1.  Continue ASA 81 mg daily  2.. Continue Lipitor 80 mg daily. LDL goal less than 70.  3..  Cleared to be discharged to acute rehabilitation when  pre certification from insurance.    4. Will need  follow-up in the neurology clinic in approximately 4 weeks time.          Ashley Barraza, APRN  01/21/20  8:08 AM

## 2020-01-21 NOTE — PLAN OF CARE
Kelley Steve arrived to room # 8692-7. Presented with: CVA and Pneumonia  Mental Status: Patient is oriented. Vitals:    01/21/20 1622   BP: (!) 148/80   Pulse: 74   Resp: 18   Temp: 97.4 °F (36.3 °C)   SpO2: 93%     Patient safety contract and falls prevention contract reviewed with patient Yes. Oriented Patient to room. Call light within reach. Yes.   Needs, issues or concerns expressed at this time: no.      Electronically signed by Olman Delgado RN on 1/21/2020 at 4:37 PM

## 2020-01-21 NOTE — PROGRESS NOTES
Continued Stay Note   [place]     Patient Name: Reza Cruz  MRN: [id number]  Today's Date: 1/21/2020    Admit Date: 1/13/2020    Discharge Plan     Row Name 01/21/20 1024       Plan    Final Discharge Disposition Code  62 - inpatient rehab facility    Final Note  PT PRECERT IS COMPLETE AND [facility] CALL REPORT NUMBER [phone]        Discharge Codes    No documentation.             RAMU Cadena

## 2020-01-21 NOTE — THERAPY TREATMENT NOTE
Acute Care - Speech Language Pathology Treatment Note    [facility]       Patient Name: Reza Cruz  : 1955  MRN: [id number]    Today's Date: 2020           Admit Date: 2020      Visit Dx:    Cognitive tx complete. Pt was lethargic. He is parietally orientated to his situation. Pt completed simple two step commands with 60% accuracy with verbal cues. There was a noted delay in completing various task. When provided a picture, pt was able to name common objects or people; however, pt had difficulty talking about the details. SLP noted slurred and misarticulated speech more than normal. Rate control was implemented during tx. Pt had difficulty with using rate control techniques. Recommended pt continue receiving cognitive therapy. SLP will continue to follow and treat.  . Maisha Ellsworth Speech Therapy Student 2020 3:03 PM        ICD-10-CM ICD-9-CM   1. Dysphagia, unspecified type R13.10 787.20   2. Influenza A J10.1 487.1   3. Elevated TSH R79.89 794.5   4. Cerebrovascular accident (CVA), unspecified mechanism (CMS/HCC) I63.9 434.91   5. Tobacco abuse Z72.0 305.1   6. Impaired mobility Z74.09 799.89   7. Impaired mobility and ADLs Z74.09 799.89   8. Cognitive and neurobehavioral dysfunction F09 294.9    F07.89 310.1       Patient Active Problem List   Diagnosis   • Chronic obstructive pulmonary disease (CMS/HCC)   • CVA (cerebral vascular accident) (CMS/[facility])   • Influenza A   • Tobacco dependence   • Acute encephalopathy   • Hypothyroidism, non-compliant with medication    • Medical non-compliance   • Bradycardia          Therapy Treatment    Evaluation/Coping    Evaluation/Treatment Time and Intent  Subjective Information: (P) fatigue (20 1350 : Maisha Ellsworth, Speech Therapy Student)  Document Type: (P) therapy note (daily note) (20 1350 : Maisha Ellsworth, Speech Therapy Student)  Mode of Treatment: (P) individual therapy (20 1350 : Maisha Ellswroth, Speech Therapy  Student)  Patient/Family Observations: (P) pt sister (01/21/20 1350 : Maisha Ellsworth, Speech Therapy Student)    Vitals/Pain/Safety    Pain Scale: FACES Pre/Post-Treatment  Pain: FACES Scale, Pretreatment: (P) 2-->hurts little bit (01/21/20 1350 : Maisha Ellsworth, Speech Therapy Student)  Pain: FACES Scale, Post-Treatment: (P) 2-->hurts little bit (01/21/20 1350 : Maisha Ellsworth, Speech Therapy Student)    Cognition/Communication         Oral Motor/Eating         Mobility/Basic Activities/Instrumental Activities/Motor/Modality                   ROM/MMT                   Sensory/Myotome/Dermatome/Edema               Posture/Balance/Special Tests/Exercise/Transportation/Sexual Function                   Orthotics/Residual Limb/Prosthetic Management              Outcome Summary         EDUCATION    The patient has been educated in the following areas:     Cognitive Impairment.    SLP Recommendation and Plan                        Anticipated Dischage Disposition: (P) other (see comments)(Rehab facility)                          Plan of Care Reviewed With: (P) patient, sibling      SLP GOALS     Row Name 01/21/20 1350 01/20/20 0835          Comprehend Questions Goal 1 (SLP)    Improve Ability to Comprehend Questions Goal 1 (SLP)  complex yes/no questions;90%  (Pended)   -LM  complex yes/no questions;90%  -CS (r) LM (t) CS (c)     Time Frame (Comprehend Questions Goal 1, SLP)  by discharge  (Pended)   -LM  by discharge  -CS (r) LM (t) CS (c)     Progress (Ability to Comprehend Questions Goal 1, SLP)  60%;with minimal cues (75-90%)  (Pended)   -LM  70%  -CS (r) LM (t) CS (c)     Progress/Outcomes (Comprehend Questions Goal 1, SLP)  goal ongoing  (Pended)   -LM  goal ongoing  -CS (r) LM (t) CS (c)     Comment (Comprehend Questions Goal 1, SLP)  Pt was able to answer complex yes/no questions with 60%  (Pended)   -LM  Pt was able to answer complex yes/no questions with 70% accuracy.   -CS (r) LM (t) CS (c)        Follow  Directions Goal 2 (SLP)    Improve Ability to Follow Directions Goal 1 (SLP)  2 step commands;90%  (Pended)   -LM  2 step commands;90%  -CS (r) LM (t) CS (c)     Time Frame (Follow Directions Goal 1, SLP)  by discharge  (Pended)   -LM  by discharge  -CS (r) LM (t) CS (c)     Progress (Ability to Follow Directions Goal 1, SLP)  50%;with moderate cues (50-74%)  (Pended)   -LM  40%  -CS (r) LM (t) CS (c)     Progress/Outcomes (Follow Directions Goal 1, SLP)  goal ongoing  (Pended)   -LM  goal ongoing  -CS (r) LM (t) CS (c)     Comment (Follow Directions Goal 1, SLP)  Pt was able to follow 2 step commands with 50% accuracy. Pt required min. to mod. cues to complete task.  (Pended)   -LM  Pt was able to follow simple 2 step commands with 40% accuracy; however, pt had difficulty following complex two step commands (e.g., before, first, after).   -CS (r) LM (t) CS (c)        Connected Speech to Express Thoughts Goal 1 (SLP)    Improve Narrative Discourse to Express Thoughts By Goal 1 (SLP)  describing a picture;90%  (Pended)   -LM  describing a picture;90%  -CS (r) LM (t) CS (c)     Time Frame (Connected Speech Goal 1, SLP)  by discharge  (Pended)   -LM  by discharge  -CS (r) LM (t) CS (c)     Barriers (Connected Speech Goal 1, SLP)  Low motivation  (Pended)   -LM  --     Progress (Connected Speech Goal 1, SLP)  50%;with moderate cues (50-74%)  (Pended)   -LM  60%;with minimal cues (75-90%)  -CS (r) LM (t) CS (c)     Progress/Outcomes (Connected Speech Goal 1, SLP)  progress slower than expected  (Pended)   -LM  goal ongoing  -CS (r) LM (t) CS (c)     Comment (Connected Speech Goal 1, SLP)  Pt was able to describe what he saw but had difficulty describing details about what was going on in the picture.   (Pended)   -LM  Pt was able to describe a picture with appropriate information; however, pts paraphasia affected his ability to appropriatly convey his message. He frequently omitted functional and content words. (e.g.,  has umbrella but not use it)     -CS (r) LM (t) CS (c)        Articulation Goal 1 (SLP)    Improve Articulation Goal 1 (SLP)  by over-articulating in connected speech;90%  (Pended)   -LM  by over-articulating in connected speech;90%  -CS (r) LM (t) CS (c)     Time Frame (Articulation Goal 1, SLP)  by discharge  (Pended)   -LM  by discharge  -CS (r) LM (t) CS (c)     Progress (Articulation Goal 1, SLP)  30%;with moderate cues (50-74%)  (Pended)   -LM  50%;with moderate cues (50-74%)  -CS (r) LM (t) CS (c)     Progress/Outcomes (Articulation Goal 1, SLP)  goal ongoing  (Pended)   -LM  goal ongoing  -CS (r) LM (t) CS (c)     Comment (Articulation Goal 1, SLP)  Pt had greater difficulty with connected speech possible due to tiredness.   (Pended)   -LM  --        Patient Will Implement Strategies Goal 1 (SLP)    Implement Strategies of Goal 1 (SLP)  using external rate control;90%;independently (over 90% accuracy)  (Pended)   -LM  using external rate control;90%;independently (over 90% accuracy)  -CS (r) LM (t) CS (c)     Time Frame (Strategy Implementation Goal 1, SLP)  short term goal (STG);by discharge  (Pended)   -LM  short term goal (STG);by discharge  -CS (r) LM (t) CS (c)     Barriers (Strategy Implementation Goal 1, SLP)  Low motivation  (Pended)   -LM  --     Progress (Strategy Implementation Goal 1, SLP)  50%;with moderate cues (50-74%)  (Pended)   -LM  70%;with maximum cues (25-49%)  -CS (r) LM (t) CS (c)     Progress/Outcomes (Strategy Implementation Goal 1, SLP)  goal ongoing  (Pended)   -LM  goal ongoing  -CS (r) LM (t) CS (c)     Comment (Strategy Implementation Goal 1, SLP)  Pt had difficulty with connected speech which affected his overall ability to control speaking rate.   (Pended)   -LM  When given a verbal cue pt was able to slow rate of speech.  -CS (r) LM (t) CS (c)        Attention Goal 1 (SLP)    Improve Attention by Goal 1 (SLP)  complete selective attention task;90%  (Pended)   -LM  --     Time  Frame (Attention Goal 1, SLP)  by discharge  (Pended)   -LM  --     Progress/Outcomes (Attention Goal 1, SLP)  goal ongoing  (Pended)   -LM  --        Organizational Skills Goal 1 (SLP)    Improve Thought Organization Through Goal 1 (SLP)  completing a divergent naming task;completing a convergent naming task;abstract;90%  (Pended)   -LM  --     Time Frame (Thought Organization Skills Goal 1, SLP)  by discharge  (Pended)   -LM  --     Barriers (Thought Organization Skills Goal 1, SLP)  dysarthria  (Pended)   -LM  --     Progress (Thought Organization Skills Goal 1, SLP)  50%;with moderate cues (50-74%)  (Pended)   -LM  --     Progress/Outcomes (Thought Organization Skills Goal 1, SLP)  goal ongoing  (Pended)   -LM  --       User Key  (r) = Recorded By, (t) = Taken By, (c) = Cosigned By    Initials Name Provider Type    Augustine Robison, MS CCC-SLP Speech and Language Pathologist    Maisha Torres, Speech Therapy Student Speech Therapy Student                  Time Calculation:       Time Calculation- SLP     Row Name 01/21/20 1457             Time Calculation- SLP    SLP Start Time  1350  (Pended)   -LM      SLP Stop Time  1413  (Pended)   -LM      SLP Time Calculation (min)  23 min  (Pended)   -LM      SLP Received On  01/21/20  (Pended)   -        User Key  (r) = Recorded By, (t) = Taken By, (c) = Cosigned By    Initials Name Provider Type     Maisha Ellsworth, Speech Therapy Student Speech Therapy Student            Therapy Charges for Today     Code Description Service Date Service Provider Modifiers Qty    [id number]  ST TREATMENT SPEECH 2 1/20/2020 Maisha Ellsworth Speech Therapy Student GN 1    [id number]  ST TREATMENT SPEECH 2 1/21/2020 Maisha Ellsworth Speech Therapy Student GN 1                           Maisha Ellsworth Speech Therapy Student  1/21/2020

## 2020-01-21 NOTE — PLAN OF CARE
Problem: Patient Care Overview  Goal: Plan of Care Review  Outcome: Ongoing (interventions implemented as appropriate)  Flowsheets (Taken 1/21/2020 0995)  Progress: improving  Plan of Care Reviewed With: patient; family  Outcome Summary: Pt is A&Ox4. VSS. C/o pain this shift. Treated with PRN norco. Patient has rested all morning and afternoon. Refused to eat, SCDs, and . NIH =4. Aphasia and dysarthria still present. Up x 1 with walker. Pt pulled IV out. Preparing for discharge to [facility]. Safety maintained. Will cont to monitor.

## 2020-01-21 NOTE — THERAPY DISCHARGE NOTE
Acute Care - Physical Therapy Discharge Summary  [facility]       Patient Name: Reza Cruz  : 1955  MRN: [id number]    Today's Date: 2020  Onset of Illness/Injury or Date of Surgery: 20       Referring Physician: Dr. Montenegro      Admit Date: 2020      PT Recommendation and Plan    Visit Dx:    ICD-10-CM ICD-9-CM   1. Dysphagia, unspecified type R13.10 787.20   2. Influenza A J10.1 487.1   3. Elevated TSH R79.89 794.5   4. Cerebrovascular accident (CVA), unspecified mechanism (CMS/[facility]) I63.9 434.91   5. Tobacco abuse Z72.0 305.1   6. Impaired mobility Z74.09 799.89   7. Impaired mobility and ADLs Z74.09 799.89   8. Cognitive and neurobehavioral dysfunction F09 294.9    F07.89 310.1       Outcome Measures     Row Name 20 0955             How much help from another is currently needed...    Putting on and taking off regular lower body clothing?  4  -CJ      Bathing (including washing, rinsing, and drying)  3  -CJ      Toileting (which includes using toilet bed pan or urinal)  4  -CJ      Putting on and taking off regular upper body clothing  3  -CJ      Taking care of personal grooming (such as brushing teeth)  3  -CJ      Eating meals  3  -CJ      AM-PAC 6 Clicks Score (OT)  20  -CJ         Functional Assessment    Outcome Measure Options  AM-PAC 6 Clicks Daily Activity (OT)  -        User Key  (r) = Recorded By, (t) = Taken By, (c) = Cosigned By    Initials Name Provider Type     Jalen Dozier COTA/L Occupational Therapy Assistant              Rehab Goal Summary     Row Name 20 1552 20 1350          Transfer Goal 1 (PT)    Activity/Assistive Device (Transfer Goal 1, PT)  sit-to-stand/stand-to-sit;bed-to-chair/chair-to-bed  -NW  --     [place] Level/Cues Needed (Transfer Goal 1, PT)  contact guard assist  -NW  --     Time Frame (Transfer Goal 1, PT)  long term goal (LTG);10 days  -NW  --     Progress/Outcome (Transfer Goal 1, PT)  goal met  -NW  --        Gait  Training Goal 1 (PT)    Activity/Assistive Device (Gait Training Goal 1, PT)  gait (walking locomotion);decrease fall risk;improve balance and speed;increase endurance/gait distance  -NW  --     [place] Level (Gait Training Goal 1, PT)  contact guard assist  -NW  --     Distance (Gait Goal 1, PT)  75  -NW  --     Time Frame (Gait Training Goal 1, PT)  long term goal (LTG);10 days  -NW  --     Progress/Outcome (Gait Training Goal 1, PT)  goal not met  -NW  --        Comprehend Questions Goal 1 (SLP)    Improve Ability to Comprehend Questions Goal 1 (SLP)  --  complex yes/no questions;90%  (Pended)   -LM     Time Frame (Comprehend Questions Goal 1, SLP)  --  by discharge  (Pended)   -LM     Progress (Ability to Comprehend Questions Goal 1, SLP)  --  60%;with minimal cues (75-90%)  (Pended)   -LM     Progress/Outcomes (Comprehend Questions Goal 1, SLP)  --  goal ongoing  (Pended)   -LM     Comment (Comprehend Questions Goal 1, SLP)  --  Pt was able to answer complex yes/no questions with 60%  (Pended)   -LM        Follow Directions Goal 2 (SLP)    Improve Ability to Follow Directions Goal 1 (SLP)  --  2 step commands;90%  (Pended)   -LM     Time Frame (Follow Directions Goal 1, SLP)  --  by discharge  (Pended)   -LM     Progress (Ability to Follow Directions Goal 1, SLP)  --  50%;with moderate cues (50-74%)  (Pended)   -LM     Progress/Outcomes (Follow Directions Goal 1, SLP)  --  goal ongoing  (Pended)   -LM     Comment (Follow Directions Goal 1, SLP)  --  Pt was able to follow 2 step commands with 50% accuracy. Pt required min. to mod. cues to complete task.  (Pended)   -LM        Connected Speech to Express Thoughts Goal 1 (SLP)    Improve Narrative Discourse to Express Thoughts By Goal 1 (SLP)  --  describing a picture;90%  (Pended)   -LM     Time Frame (Connected Speech Goal 1, SLP)  --  by discharge  (Pended)   -LM     Barriers (Connected Speech Goal 1, SLP)  --  Low motivation  (Pended)   -LM     Progress  (Connected Speech Goal 1, SLP)  --  50%;with moderate cues (50-74%)  (Pended)   -LM     Progress/Outcomes (Connected Speech Goal 1, SLP)  --  progress slower than expected  (Pended)   -LM     Comment (Connected Speech Goal 1, SLP)  --  Pt was able to describe what he saw but had difficulty describing details about what was going on in the picture.   (Pended)   -LM        Articulation Goal 1 (SLP)    Improve Articulation Goal 1 (SLP)  --  by over-articulating in connected speech;90%  (Pended)   -LM     Time Frame (Articulation Goal 1, SLP)  --  by discharge  (Pended)   -LM     Progress (Articulation Goal 1, SLP)  --  30%;with moderate cues (50-74%)  (Pended)   -LM     Progress/Outcomes (Articulation Goal 1, SLP)  --  goal ongoing  (Pended)   -LM     Comment (Articulation Goal 1, SLP)  --  Pt had greater difficulty with connected speech possible due to tiredness.   (Pended)   -LM        Patient Will Implement Strategies Goal 1 (SLP)    Implement Strategies of Goal 1 (SLP)  --  using external rate control;90%;independently (over 90% accuracy)  (Pended)   -LM     Time Frame (Strategy Implementation Goal 1, SLP)  --  short term goal (STG);by discharge  (Pended)   -LM     Barriers (Strategy Implementation Goal 1, SLP)  --  Low motivation  (Pended)   -LM     Progress (Strategy Implementation Goal 1, SLP)  --  50%;with moderate cues (50-74%)  (Pended)   -LM     Progress/Outcomes (Strategy Implementation Goal 1, SLP)  --  goal ongoing  (Pended)   -LM     Comment (Strategy Implementation Goal 1, SLP)  --  Pt had difficulty with connected speech which affected his overall ability to control speaking rate.   (Pended)   -LM        Attention Goal 1 (SLP)    Improve Attention by Goal 1 (SLP)  --  complete selective attention task;90%  (Pended)   -LM     Time Frame (Attention Goal 1, SLP)  --  by discharge  (Pended)   -LM     Progress/Outcomes (Attention Goal 1, SLP)  --  goal ongoing  (Pended)   -LM        Organizational Skills  Goal 1 (SLP)    Improve Thought Organization Through Goal 1 (SLP)  --  completing a divergent naming task;completing a convergent naming task;abstract;90%  (Pended)   -LM     Time Frame (Thought Organization Skills Goal 1, SLP)  --  by discharge  (Pended)   -LM     Barriers (Thought Organization Skills Goal 1, SLP)  --  dysarthria  (Pended)   -LM     Progress (Thought Organization Skills Goal 1, SLP)  --  50%;with moderate cues (50-74%)  (Pended)   -LM     Progress/Outcomes (Thought Organization Skills Goal 1, SLP)  --  goal ongoing  (Pended)   -LM       User Key  (r) = Recorded By, (t) = Taken By, (c) = Cosigned By    Initials Name Provider Type Discipline    Petty Loera, PTA Physical Therapy Assistant PT    LM Maisha Ellsworth, Speech Therapy Student Speech Therapy Student SLP              PT Discharge Summary  Anticipated Discharge Disposition (PT): inpatient rehabilitation facility  Reason for Discharge: Discharge from facility  Outcomes Achieved: Refer to plan of care for updates on goals achieved  Discharge Destination: Inpatient rehabilitation facility      Petty Farrell PTA   1/21/2020

## 2020-01-21 NOTE — DISCHARGE SUMMARY
"    [facility] Medicine Services  DISCHARGE SUMMARY       Date of Admission: 1/13/2020  Date of Discharge:  1/21/2020  Primary Care Physician: Lobito Trevino Jr., MD    Discharge Diagnoses:  Patient Active Problem List   Diagnosis   • Chronic obstructive pulmonary disease (CMS/[facility])   • CVA (cerebral vascular accident) (CMS/[facility])   • Influenza A   • Tobacco dependence   • Acute encephalopathy   • Hypothyroidism, non-compliant with medication    • Medical non-compliance   • Bradycardia         Presenting Problem/History of Present Illness:  CVA (cerebral vascular accident) (CMS/[facility]) [I63.9]  Influenza A [J10.1]     Chief Complaint on Day of Discharge:   No complaint    History of Present Illness on Day of Discharge:   No new events noted overnight.  The patient has been precertified and accepted to acute rehab at [facility].  He is appropriate for discharge today.  Neurology has cleared the patient for discharge for acute rehab as well.    Hospital Course  Mr. Cruz is a 64-year-old male with a past medical history of COPD, hypothyroidism, hypertension, as well as tobacco abuse.  For greater than 4 days, the patient has indicated that he has felt very dizzy, poor coordination with his right arm and his right leg.  Patient also notes that he felt numbness in his hands and feet as well as his face.  Patient was very uncoordinated, and actually ordered a wheelchair the other day to be able to get around his house instead of seeking help.  He indicated that he thought he was going to get better.  Patient reports over this same couple of days, that he also had fever and chills.  His right arm and right leg remain feeling \"numb\" and he has issues with coordination and knowing where his hands are in space.  When he tries to point to his head, he accidentally smacked himself in the face.  Patient reports having sick contacts recently, and not been able to ambulate around his house.  Patient has " not been able to cook or perform many of his ADLs.  Patient indicates that her shortness of breath is greatly improved, but still feels very weak.  Patient reports that during this time is also had emesis and nausea and very poor p.o. Intake.     Patient reports that he is not very compliant with his thyroid medication.  He was found to have a TSH of 95, with a free T4 of less than 0.9.  Patient indicates that he still intermittently smokes.  Denies drug use.  Denies alcohol use.  Plan:   1.  Admit to medicine  2.  Levothyroxine 50 mcg IV   3.  Resume PO levothyroxine, bradycardia may be related   4.  Tamiflu 75 mg BID   5.  Counseled on smoking cessation and medical compliance   6.  Resume home medication as appropriate when medicine reconciliation.   7.  CTA head and neck   8.  Neurology consultation  9.  Mental status returned to normal  10.  ASA and atorvastatin   11.  Echo   12.  PT/OT, SLP  13.  Regular diet   14.  CBC, CMP, A1c, Lipid panel  15.  UDS, UA   16.  Neurochecks and stroke scale     Neurology was consulted shortly after admission to the intensive care unit confirming an acute right PICA stroke which was of concern for herniation.  Neurosurgery was subsequently consulted regarding possible decompression craniotomy.  Decision was made to proceed conservatively without surgical intervention.  The patient's TSH was markedly elevated secondary to the patient's noncompliance with oral thyroid medication.  That medication was restarted during his hospital stay.  CT angiogram of the head and neck were reviewed showing complete occlusion of the right vertebral artery with reconstitution at C1.  Both PCAs and superior cerebellar arteries were widely patent.  Echocardiogram was performed showing normal left ventricular systolic function with no other abnormalities.  MRI scan of the brain was consistent with the previously noted right PICA stroke.  He developed some shortness of breath during his  hospitalization which resolved nicely after reinitiation of DuoNeb treatments 4 times daily.  Antihypertensive medications were adjusted on day 3.  On day 4 the patient was felt to be stable to transfer to the floor.  He continued to recover slowly but steadily.  He worked with physical therapy and Occupational Therapy without difficulty.  A referral was made to acute rehab at [facility].  The patient has subsequently been accepted to that facility and can transfer there today.  All services are in agreement with transfer.    Consults:   Neurology:  Impression     1, Acute right PICA  2. Size of stroke is concerning for risk of herniation and will need close monitoring as well as serial Head CT's  3. Small SDH and some hemorrhagic conversion in the right cerebellar stroke  4. Hypertension  5. Hyperlipidemia  6. Marked elevation of TSH to suggest hypothyroid and with low free T4 to suggest hypothyroid  7. Influenza A +  8. Hemoglobin and HCT are quite high.  This is often seen in nocturnal hypoxia as from sleep apnea  9. Reported H/O seizures  10. 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     Plan     · Serial Head CT's to monitor edema of right cerebellum and need for decompression as well as looking for hemorrhage as he has blood products in his stroke  · Patient started on levothyroxine  · No TPA since last known well 4 days ago and has SDH  · Agree with lipid profile, hemoglobin A1C, CTA, echo with bubble, SCD's, cardiac monitor  · Agree with Lipitor  · Agree with PT/OT/speech     I discussed the patients findings and my recommendations with patient and nursing staff      45 minutes of critical care time was performed ,during this time I consulted with the nursing staff and also with other providers in regard to the patient's care, examined the patient reviewed the neuroimages and spoke with the patient  about test results,         Janelle Ramos,  MD    Neurosurgery:  Assessment/Plan:   Reza Cruz is a 64 y.o. male with a significant comorbidity hypertension, hyperlipidemia.  He presents with a new problem of difficulty with gait and coordination of the right upper extremity and right hemibody numbness. Physical exam findings of right-sided upper and lower extremity ataxia and scanning speech.  Their imaging shows right cerebellar stroke.     Differential Diagnosis:   Right cerebellar stroke  PICA occlusion  Dysmetria and ataxia  No evidence of subdural hematoma     Recommendations:  Reza presents with a large cerebellar stroke.  While typically large cerebellar strokes are at risk for cerebral herniation and need for decompressive craniotomy, he is more than 6 days out.  His risk of need for decompression is extremely low.  Additionally his CT scan shows hypodense tissue in the right cerebellum confirming the timeline from the patient.  I would continue to watch him and avoid hyponatremia.  Keep sodium is greater than 140.  I do not see any point for mannitol at this time.  If the patient declines please inform neurosurgery and we will be continuing to follow.     While initial reports on the MRI did suggest a subdural hematoma this is not appreciated on the noncontrast CT which is actually a more sensitive test.  Agree with anticoagulation per neurology's recommendation.  No acute recommendations on systolic blood pressure management.  Again defer to neurology.    Pertinent Test Results:    Sodium [[id number]]  (Normal) Collected:  01/21/20 0538    Specimen:  Blood Updated:  01/21/20 0608     Sodium 139 mmol/L     Sodium [[id number]]  (Normal) Collected:  01/20/20 0510    Specimen:  Blood Updated:  01/20/20 0607     Sodium 139 mmol/L     Sodium [[id number]]  (Normal) Collected:  01/19/20 0446    Specimen:  Blood Updated:  01/19/20 0522     Sodium 141 mmol/L     Blood Culture - Blood, Arm, Right [[id number]] Collected:  01/13/20 0824    Specimen:  Blood from  Arm, Right Updated:  01/18/20 0945     Blood Culture No growth at 5 days    Blood Culture - Blood, Arm, Right [[id number]] Collected:  01/13/20 0747    Specimen:  Blood from Arm, Right Updated:  01/18/20 0830     Blood Culture No growth at 5 days    Sodium [[id number]]  (Normal) Collected:  01/18/20 0627    Specimen:  Blood Updated:  01/18/20 0700     Sodium 138 mmol/L     Troponin [[id number]]  (Normal) Collected:  01/17/20 1414    Specimen:  Blood Updated:  01/17/20 1435     Troponin T <0.010 ng/mL     Basic Metabolic Panel [[id number]]  (Normal) Collected:  01/17/20 0436    Specimen:  Blood Updated:  01/17/20 0522     Glucose 96 mg/dL      BUN 17 mg/dL      Creatinine 0.81 mg/dL      Sodium 139 mmol/L      Potassium 4.2 mmol/L      Chloride 105 mmol/L      CO2 26.0 mmol/L      Calcium 9.1 mg/dL      eGFR Non African Amer 97 mL/min/1.73      BUN/Creatinine Ratio 21.0     Anion Gap 8.0 mmol/L     Narrative:       GFR Normal >60  Chronic Kidney Disease <60  Kidney Failure <15      CBC (No Diff) [[id number]]  (Abnormal) Collected:  01/17/20 0436    Specimen:  Blood Updated:  01/17/20 0511     WBC 7.67 10*3/mm3      RBC 5.60 10*6/mm3      Hemoglobin 17.6 g/dL      Hematocrit 51.3 %      MCV 91.6 fL      MCH 31.4 pg      MCHC 34.3 g/dL      RDW 14.1 %      RDW-SD 47.5 fl      MPV 10.4 fL      Platelets 254 10*3/mm3     Testosterone, Free, Total [[id number]]  (Abnormal) Collected:  01/14/20 0247    Specimen:  Blood Updated:  01/16/20 1209     Testosterone, Total 389 ng/dL      Comment: Adult male reference interval is based on a population of  healthy nonobese males (BMI <30) between 19 and 39 years old.  miguel angel Bai.al. JCEM 2017,102;2093-3294. PMID: [id number].        Testosterone, Free 2.7 pg/mL     Sodium [[id number]]  (Normal) Collected:  01/16/20 0317    Specimen:  Blood Updated:  01/16/20 0352     Sodium 144 mmol/L     Urine Culture - Urine, Urine, Catheter In/Out [[id number]] Collected:  01/13/20 1838    Specimen:   "Urine, Catheter In/Out Updated:  01/15/20 1305     Urine Culture >100,000 CFU/mL Mixed Chikis Isolated         Condition on Discharge:    Stable    Physical Exam on Discharge:  /84 (BP Location: Left arm, Patient Position: Lying)   Pulse 86   Temp 97.9 °F (36.6 °C) (Oral)   Resp 16   Ht 182.9 cm (72\")   Wt 80.5 kg (177 lb 8 oz)   SpO2 96%   BMI 24.07 kg/m²   Physical Exam  Constitutional: He is oriented to person, place, and time. No distress.   HENT:   Head: Normocephalic and atraumatic.   Eyes: Conjunctivae are normal. No scleral icterus.   Neck: Neck supple. No JVD present.   Cardiovascular: Normal rate and regular rhythm.   No murmur heard.  Pulmonary/Chest: Effort normal. No stridor. No respiratory distress. He has no wheezes.   Abdominal: Soft. Bowel sounds are normal. He exhibits no distension. There is no tenderness. There is no guarding.   Musculoskeletal: He exhibits no edema.   Neurological: He is alert and oriented to person, place, and time. Coordination abnormal.   Speech difficulty    Skin: Skin is warm and dry. He is not diaphoretic. No erythema.   Psychiatric: He has a normal mood and affect. His behavior is normal.     Discharge Disposition:  Rehab Facility or Unit (DC - External)    Discharge Medications:     Discharge Medications      New Medications      Instructions Start Date   aspirin 81 MG chewable tablet   81 mg, Oral, Daily   Start Date:  January 22, 2020     atorvastatin 80 MG tablet  Commonly known as:  LIPITOR   80 mg, Oral, Nightly      lisinopril 20 MG tablet  Commonly known as:  PRINIVIL,ZESTRIL   20 mg, Oral, Every 24 Hours Scheduled   Start Date:  January 22, 2020     polyethylene glycol packet  Commonly known as:  MIRALAX   17 g, Oral, Daily   Start Date:  January 22, 2020     sennosides-docusate 8.6-50 MG per tablet  Commonly known as:  PERICOLACE   1 tablet, Oral, Nightly         Changes to Medications      Instructions Start Date   amLODIPine 10 MG tablet  Commonly " known as:  NORVASC  What changed:    · medication strength  · how much to take  · when to take this   10 mg, Oral, Every 24 Hours Scheduled   Start Date:  January 22, 2020     levothyroxine 125 MCG tablet  Commonly known as:  SYNTHROID, LEVOTHROID  What changed:  how much to take   125 mcg, Oral, Daily         Continue These Medications      Instructions Start Date   ipratropium-albuterol 0.5-2.5 mg/3 ml nebulizer  Commonly known as:  DUO-NEB   3 mL, Nebulization, 4 Times Daily - RT         Stop These Medications    albuterol sulfate  (90 Base) MCG/ACT inhaler  Commonly known as:  PROVENTIL HFA;VENTOLIN HFA;PROAIR HFA     bisoprolol-hydrochlorothiazide 5-6.25 MG per tablet  Commonly known as:  ZIAC     fluticasone-salmeterol 115-21 MCG/ACT inhaler  Commonly known as:  ADVAIR HFA     predniSONE 10 MG tablet  Commonly known as:  DELTASONE     tamsulosin 0.4 MG capsule 24 hr capsule  Commonly known as:  FLOMAX            Discharge Diet:   Diet Instructions     Diet: Regular; Thin      Discharge Diet:  Regular    Fluid Consistency:  Thin          Discharge Care Plan / Instructions:   Discharge to [facility] rehab    Activity at Discharge:   Activity Instructions     Activity as Tolerated          Dez March DO  01/21/20  10:39 AM    Time: Discharge over 30 min    Please note that part of this note may be an electronic transcription/translation of spoken language to printed text using the Dragon Dictation System. Efforts were made to edit the dictations, but occasionally words are mistranscribed.

## 2020-01-21 NOTE — PLAN OF CARE
Problem: Patient Care Overview  Goal: Plan of Care Review  Outcome: Ongoing (interventions implemented as appropriate)  Flowsheets (Taken 1/21/2020 4651)  Progress: no change  Plan of Care Reviewed With: patient  Note:   Pt c/o headache. Prn pain meds given as ordered with some relief noted. Pt has been alert and oriented x4. No neuro changes noted. Still having the dysarthria and aphasia. NIH remains 4. Pt has been having some intermittent nausea and vomiting tonight. Has vomited x2. Zofran and Phenergan both given with some relief noted. Droplet precautions maintained. Safety maintained. Will continue to monitor.

## 2020-01-21 NOTE — PLAN OF CARE
Problem: Patient Care Overview  Goal: Plan of Care Review  1/21/2020 1441 by Maisha Ellsworth Speech Therapy Student  Outcome: Ongoing (interventions implemented as appropriate)  Flowsheets  Taken 1/21/2020 1441  Progress: no change (Pended)  Taken 1/21/2020 1350  Plan of Care Reviewed With: patient;sibling (Pended)  Note:   Cognitive tx complete. Pt was lethargic. He is parietally orientated to his situation. Pt completed simple two step commands with 60% accuracy with verbal cues. There was a noted delay in completing various task. When provided a picture, pt was able to name common objects or people; however, pt had difficulty talking about the details. SLP noted slurred and misarticulated speech more than normal. Rate control was implemented during tx. Pt had difficulty with using rate control techniques. Recommended pt continue receiving cognitive therapy. SLP will continue to follow and treat.   1/21/2020 1441 by Maisha Ellsworth Speech Therapy Student  Reactivated

## 2020-01-22 LAB
BILIRUBIN URINE: NEGATIVE
BLOOD, URINE: NEGATIVE
CLARITY: CLEAR
COLOR: YELLOW
GLUCOSE URINE: NEGATIVE MG/DL
KETONES, URINE: NEGATIVE MG/DL
LEUKOCYTE ESTERASE, URINE: NEGATIVE
NITRITE, URINE: NEGATIVE
PH UA: 6 (ref 5–8)
PROTEIN UA: NEGATIVE MG/DL
SPECIFIC GRAVITY UA: 1.02 (ref 1–1.03)
URINE REFLEX TO CULTURE: NORMAL
UROBILINOGEN, URINE: 0.2 E.U./DL

## 2020-01-22 PROCEDURE — 6370000000 HC RX 637 (ALT 250 FOR IP): Performed by: PSYCHIATRY & NEUROLOGY

## 2020-01-22 PROCEDURE — 97116 GAIT TRAINING THERAPY: CPT

## 2020-01-22 PROCEDURE — 94640 AIRWAY INHALATION TREATMENT: CPT

## 2020-01-22 PROCEDURE — 97110 THERAPEUTIC EXERCISES: CPT

## 2020-01-22 PROCEDURE — 97530 THERAPEUTIC ACTIVITIES: CPT

## 2020-01-22 PROCEDURE — 96125 COGNITIVE TEST BY HC PRO: CPT

## 2020-01-22 PROCEDURE — 1180000000 HC REHAB R&B

## 2020-01-22 PROCEDURE — 81003 URINALYSIS AUTO W/O SCOPE: CPT

## 2020-01-22 PROCEDURE — 97161 PT EVAL LOW COMPLEX 20 MIN: CPT

## 2020-01-22 PROCEDURE — 92610 EVALUATE SWALLOWING FUNCTION: CPT

## 2020-01-22 PROCEDURE — 97535 SELF CARE MNGMENT TRAINING: CPT

## 2020-01-22 PROCEDURE — 51798 US URINE CAPACITY MEASURE: CPT

## 2020-01-22 PROCEDURE — 92522 EVALUATE SPEECH PRODUCTION: CPT

## 2020-01-22 PROCEDURE — 99223 1ST HOSP IP/OBS HIGH 75: CPT | Performed by: PSYCHIATRY & NEUROLOGY

## 2020-01-22 PROCEDURE — 97165 OT EVAL LOW COMPLEX 30 MIN: CPT

## 2020-01-22 RX ORDER — ONDANSETRON 4 MG/1
4 TABLET, ORALLY DISINTEGRATING ORAL EVERY 8 HOURS PRN
Status: DISCONTINUED | OUTPATIENT
Start: 2020-01-22 | End: 2020-02-12 | Stop reason: HOSPADM

## 2020-01-22 RX ORDER — FLUOXETINE HYDROCHLORIDE 20 MG/1
20 CAPSULE ORAL DAILY
Status: DISCONTINUED | OUTPATIENT
Start: 2020-01-22 | End: 2020-02-12 | Stop reason: HOSPADM

## 2020-01-22 RX ADMIN — FLUOXETINE HYDROCHLORIDE 20 MG: 20 CAPSULE ORAL at 08:57

## 2020-01-22 RX ADMIN — ASPIRIN 81 MG 81 MG: 81 TABLET ORAL at 08:57

## 2020-01-22 RX ADMIN — POLYETHYLENE GLYCOL 3350 17 G: 17 POWDER, FOR SOLUTION ORAL at 08:56

## 2020-01-22 RX ADMIN — SENNOSIDES AND DOCUSATE SODIUM 1 TABLET: 8.6; 5 TABLET ORAL at 21:37

## 2020-01-22 RX ADMIN — LEVOTHYROXINE SODIUM 125 MCG: 0.1 TABLET ORAL at 05:40

## 2020-01-22 RX ADMIN — IPRATROPIUM BROMIDE AND ALBUTEROL SULFATE 3 ML: 2.5; .5 SOLUTION RESPIRATORY (INHALATION) at 06:07

## 2020-01-22 RX ADMIN — ONDANSETRON 4 MG: 4 TABLET, ORALLY DISINTEGRATING ORAL at 14:11

## 2020-01-22 RX ADMIN — LISINOPRIL 20 MG: 20 TABLET ORAL at 08:56

## 2020-01-22 RX ADMIN — ACETAMINOPHEN 650 MG: 325 TABLET ORAL at 08:57

## 2020-01-22 RX ADMIN — IPRATROPIUM BROMIDE AND ALBUTEROL SULFATE 3 ML: 2.5; .5 SOLUTION RESPIRATORY (INHALATION) at 18:28

## 2020-01-22 RX ADMIN — DOCUSATE SODIUM 100 MG: 100 CAPSULE, LIQUID FILLED ORAL at 08:56

## 2020-01-22 RX ADMIN — IPRATROPIUM BROMIDE AND ALBUTEROL SULFATE 3 ML: 2.5; .5 SOLUTION RESPIRATORY (INHALATION) at 15:29

## 2020-01-22 RX ADMIN — IPRATROPIUM BROMIDE AND ALBUTEROL SULFATE 3 ML: 2.5; .5 SOLUTION RESPIRATORY (INHALATION) at 10:07

## 2020-01-22 RX ADMIN — DOCUSATE SODIUM 100 MG: 100 CAPSULE, LIQUID FILLED ORAL at 21:37

## 2020-01-22 RX ADMIN — AMLODIPINE BESYLATE 10 MG: 10 TABLET ORAL at 08:57

## 2020-01-22 RX ADMIN — ATORVASTATIN CALCIUM 80 MG: 80 TABLET, FILM COATED ORAL at 21:37

## 2020-01-22 ASSESSMENT — PAIN SCALES - GENERAL
PAINLEVEL_OUTOF10: 4
PAINLEVEL_OUTOF10: 0
PAINLEVEL_OUTOF10: 8

## 2020-01-22 NOTE — PROGRESS NOTES
01/22/20 1516   Restrictions/Precautions   Restrictions/Precautions Fall Risk;Seizure   Bed Mobility   Supine to Sit Stand by assistance   Sit to Supine Stand by assistance   Transfers   Sit to Stand Contact guard assistance   Stand to sit Contact guard assistance   Bed to Chair Contact guard assistance   Comment impulsive, reaches for external support for assistance during transfers   Ambulation 1   Surface level tile   Device Rolling Walker   Other Apparatus Wheelchair follow   Assistance Contact guard assistance;Minimal assistance   Quality of Gait decrease base of support, heel to toe, reciprocal, instability,    Gait Deviations Slow Ronnie   Distance 25ft   Comments requires cue to slow down and reset   Ambulation 2   Surface - 2 level tile   Device 2 Rolling Walker   Other Apparatus 2 Wheelchair follow   Assistance 2 Contact guard assistance   Quality of Gait 2 3 point gait, slow ronnie, small AUTUMN, inconsistent step length, lateral lean     Distance 25ft   Comments requires cues for small steps and reset for proper gait pattern   Exercises   Comments seated BLE exercises. Slow and  controlled x10 each    Conditions Requiring Skilled Therapeutic Intervention   Assessment Patient working on step to gait pattern and is inconsistent with lead foot and requires cues to slow down, move one segment at a time, and weight shift forward to prevent posterior ankle strategies. continue to work on coordinated BLE movements. Patient can be impuslive with transfers and ambulation. Activity Tolerance   Activity Tolerance Patient limited by endurance   Safety Devices   Type of devices All fall risk precautions in place; Bed alarm in place;Call light within reach;Gait belt;Patient at risk for falls; Left in bed   Electronically signed by Sagar Gayle on 1/22/2020 at 3:30 PM

## 2020-01-22 NOTE — PROGRESS NOTES
Speech Language Pathology  Facility/Department: [facility] 8 REHAB UNIT   CLINICAL BEDSIDE SWALLOW EVALUATION    NAME: Millie Smith  : 1955  MRN: [id number]  ADMISSION DATE: 2020   Date of Eval: 2020  Evaluating Therapist: Silvestre Falcon MS CCC-SLP    ADMITTING DIAGNOSIS:   has Impetigo; Skin tear of left upper arm without complication; and Acute ischemic stroke ([address] on their problem list.    HISTORY OF PRESENT ILLNESS:   Per Neurology: PHEX 5 y. o. male  R handed pt admitted to [facility] on on 2020 for stroke evaluation. Pt reports that he has felt dizzy, poor coordination w/his R arm and R leg, numbness in his face, feet and hands. He reports over this same couple of days, that he also had fever and chills, nausea & emesis. He has been SOA and very weak. Pt reports that he stopped smoking 10 years ago, but still smokes intermittently. Influenza swab was positive for influenza A.  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 Dr. Kristy Medel w/neuro and Dr. Radha Gage w/neurosurgery were consulted. Dr Radha Gage did not see surgical intervention to be necessary. Pt was initially admitted to ICU but was moved to neuro floor on 2020. Notes from [facility] from 16 indicates pt has a hx of seizures, but pt has not been on any anticonvulsants. Pt states his last seizure was in Dec 2015. He is being treated w/tamiflu. He is now medically stable, but continues to be ataxic w/his gait, dysarthric, as well as coordination issues w/R UE/LE.  He is participating w/therapy and is ready to begin rehab w/plan of returning home after rehab dc w/support from his 2 sisters and friends           Recent Chest Xray/CT of Chest:  Result Impression   Mild bronchial and liquids;Upright as possible for all oral intake;Swallow 2 times per bite/sip; Remain upright for 30-45 minutes after meals;Small bites/sips    Treatment/Goals  Short-term Goals  Goal 1: The patient will tolerate a regular diet with thin liquids with minimal overt s/s of aspiration. Goal 2: The patient will complete oral care at least two times daily to prevent aspiration of oral bacteria. Goal 3: SLP and staff will monitor safety with oral intake and monitor increased congestion, overt s/s of aspiration, elevated temp and RLL infiltrates on CXR. Long-term Goals  Goal 1: Client will maintain adequate hydration/nutrition with optimum safety and efficiency of swallowing function on P.O. intake without overt signs and symptoms of aspiration for the highest appropriate diet level    General  Chart Reviewed: Yes  Behavior/Cognition: Alert; Cooperative  Temperature Spikes Noted: No  Respiratory Status: Room air  Breath Sounds: Clear  O2 Device: None (Room air)  Communication Observation: Dysarthria;Aphasia  Follows Directions: Simple  Dentition: (Natural lower dentition. His upper denture is at home.)  Patient Positioning: Upright in bed  Baseline Vocal Quality: Normal  Volitional Cough: Strong(Strong throat clearing)  Prior Dysphagia History: Patient denies dyspahgia history. He states he was receiving a regular diet with thin liquids at home. He was taking multiple medications at one time followed by water. Consistencies Administered: Reg solid; Dysphagia Pureed (Dysphagia I); Thin - cup; Thin - straw    Vision/Hearing  Vision  Vision: Impaired  Vision Exceptions: Wears glasses at all times  Hearing  Hearing: Exceptions to [facility]  Hearing Exceptions: Hard of hearing/hearing concerns    Oral Motor Deficits  Oral/Motor  Oral Motor: Exceptions to WFL(No labial asymmetry, no lingual deviation, upper denture, natural lower dentition.  Pt reports decreased right sided facial sensation.)    Oral Phase Dysfunction  Oral

## 2020-01-22 NOTE — PROGRESS NOTES
after rehab dc w/support from his 2 sisters and friends    REVIEW OF SYSTEMS:  Constitutional - No fever or chills. No diaphoresis or significant fatigue. HENT -  No tinnitus or significant hearing loss. Eyes - no sudden vision change or eye pain  Respiratory - no significant shortness of breath or cough  Cardiovascular - no chest pain No palpitations or significant leg swelling  Gastrointestinal - no abdominal swelling or pain. Genitourinary - No difficulty urinating, dysuria  Musculoskeletal - no back pain or myalgia. Skin - no color change or rash  Neurologic - No seizures. No lateralizing weakness. Hematologic - no easy bruising or excessive bleeding. Psychiatric - no severe anxiety or nervousness. All other review of systems are negative.       Past Medical History:      Diagnosis Date    Arthritis     Asthma     [facility]     Diabetes mellitus ([facility]     Hypertension     Seizures ([facility]     Pt states last seizure was in December 2015    Thyroid disease        Past Surgical History:      Procedure Laterality Date    ARM SURGERY Left     EYE SURGERY Right     KNEE SURGERY Right     MANDIBLE SURGERY      NM COLONOSCOPY FLX DX W/COLLJ SPEC WHEN PFRMD N/A 6/26/2018    Dr Mk Zaidi yr recall       Medications in Hospital:      Current Facility-Administered Medications:     levothyroxine (SYNTHROID) tablet 125 mcg, 125 mcg, Oral, Daily, Trip Simmons MD, 125 mcg at 01/22/20 0540    amLODIPine (NORVASC) tablet 10 mg, 10 mg, Oral, Daily, Trip Simmons MD    aspirin chewable tablet 81 mg, 81 mg, Oral, Daily, Trip Simmons MD    atorvastatin (LIPITOR) tablet 80 mg, 80 mg, Oral, Nightly, Trip Simmons MD, 80 mg at 01/21/20 2030    lisinopril (PRINIVIL;ZESTRIL) tablet 20 mg, 20 mg, Oral, Daily, Trip Simmons MD    polyethylene glycol [facility]) packet 17 g, 17 g, Oral, Daily, Trip Simmons MD    sennosides-docusate sodium (SENOKOT-S) 8.6-50 MG tablet 1 tablet, 1 tablet, Oral, Nightly, Lonnie Cheng MD, 1 tablet at 01/21/20 2030    ipratropium-albuterol (DUONEB) nebulizer solution 3 mL, 1 vial, Inhalation, 4x Daily, Lonnie Cheng MD, 3 mL at 01/22/20 0607    acetaminophen (TYLENOL) tablet 650 mg, 650 mg, Oral, Q4H PRN, Lonnie Cheng MD    docusate sodium (COLACE) capsule 100 mg, 100 mg, Oral, BID, Lonnie Cheng MD, 100 mg at 01/21/20 2030    bisacodyl (DULCOLAX) suppository 10 mg, 10 mg, Rectal, Daily PRN, Lonnie Cheng MD    Allergies:  Codeine    Social History:   TOBACCO:   reports that he has quit smoking. His smoking use included cigarettes. He has a 40.00 pack-year smoking history. He has never used smokeless tobacco.  ETOH:   reports no history of alcohol use. Family History:       Problem Relation Age of Onset    Breast Cancer Mother     Heart Attack Father     High Blood Pressure Sister     High Blood Pressure Brother     Colon Cancer Neg Hx     Colon Polyps Neg Hx     Liver Cancer Neg Hx     Liver Disease Neg Hx     Esophageal Cancer Neg Hx     Rectal Cancer Neg Hx     Stomach Cancer Neg Hx            Physical Exam:    Vitals: BP (!) 140/70   Pulse 76   Temp 97.8 °F (36.6 °C) (Temporal)   Resp 18   Ht 6' (1.829 m)   Wt 170 lb (77.1 kg)   SpO2 92%   BMI 23.06 kg/m²     Constitutional - well developed, well nourished. Eyes - conjunctiva normal.  Pupils react to light  Ear, nose, throat -hearing intact to finger rub No scars, masses, or lesions over external nose or ears, no atrophy of tongue  Neck-symmetric, no masses noted, no jugular vein distension  Respiration- chest wall appears symmetric, good expansion,   normal effort without use of accessory muscles  Cardiovascular- RRR without m,r,g  Musculoskeletal - no significant wasting of muscles noted, no bony deformities  Extremities-no clubbing, cyanosis or edema  Skin - warm, dry, and intact. No rash, erythema, or pallor.   Psychiatric - mood, affect, and behavior appear normal.      Neurological exam  Awake, alert, fluent oriented x 3 appropriate affect  Attention and concentration appear appropriate  Memory fair  Speech stuttering with significant dysarthria  No clear issues with language of fund of knowledge    Cranial Nerve Exam   CN II- Visual fields grossly unremarkable  CN III, IV,VI-EOMI, significant left greater than right horizontal nystagmus, disconjugate eyes at rest, no ptosis  CN VII-no facial assymetry  CN VIII-Hearing intact   CN IX and X- Palate elevates in midline  CN XI-good shoulder shrug  CN XII-Tongue midline with no fasciculations or fibrillations    Motor Exam  V/V throughout upper and lower extremities bilaterally, no cogwheeling, normal tone      Reflexes   2+ biceps bilaterally  2+ brachioradialis  2+patella  2+ ankle jerks  No clonus ankles  No Hernandez's sign bilateral hands    Tremors- no tremors in hands or head noted    Gait  Not tested    Coordination  Ataxic bilateral upper extremities        CBC:   Recent Labs     01/21/20  1925   WBC 10.3   HGB 18.9*          BMP:  No results for input(s): NA, K, CL, CO2, BUN, CREATININE, GLUCOSE in the last 72 hours. Hepatic: No results for input(s): AST, ALT, ALB, BILITOT, ALKPHOS in the last 72 hours. Lipids: No results for input(s): CHOL, HDL in the last 72 hours. Invalid input(s): LDLCALCU    INR: No results for input(s): INR in the last 72 hours. Assessment and Plan     1. 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 Antiplatelet treatment  2. Hypertension-continue current medications and monitoring  3. Hyperlipidemia-continue statin  4. DVT prophylaxis-SCDs  5. GI-bowel regimen  6. Hypothyroidism-continue replacement therapy    Patient's functional assessment  Prior to hospitalization the patient was continent of bowel and bladder    Functional assessment  All notes from reehab data were reviewed regarding the patient's functional status.     Current management of the following medical issues including ataxia, motor planning, balance, disease management, elimination, endurance, family training, education, independent ADLs, pain management, precautions, range of motion, safety, strength, and transfers    I have reviewed the preadmission screening documents and concur with the findings. I believe the patient meets criteria and is sufficiently stable to allow participation in the program. This requires an intensive level of therapy, close medical supervision, and an interdisciplinary team approach provided through an individualized plan of care. I approve admitting this patient for an intensive inpatient rehabilitation program.      Salma Jaeger.  Rui Sharp MD

## 2020-01-22 NOTE — PROGRESS NOTES
Speech Language Pathology  Facility/Department: [facility] 8 REHAB UNIT  Initial Speech/Language/Cognitive Assessment    NAME: Razia Butts  : 1955   MRN: [id number]  ADMISSION DATE: 2020   Date of Eval: 2020   Evaluating Therapist: Jorja Aschoff, MS CCC-SLP    ADMITTING DIAGNOSIS:   has Impetigo; Skin tear of left upper arm without complication; and Acute ischemic stroke ([address] on their problem list.    HISTORY OF PRESENT ILLNESS:   Per Neurology: This 63 y.o. male  R handed pt admitted to [address] [place] [address] on on 2020 for stroke evaluation. Pt reports that he has felt dizzy, poor coordination w/his R arm and R leg, numbness in his face, feet and hands. He reports over this same couple of days, that he also had fever and chills, nausea & emesis. He has been SOA and very weak. Pt reports that he stopped smoking 10 years ago, but still smokes intermittently. Influenza swab was positive for influenza A. 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 Dr. Allison Ward w/neuro and Dr. Cindy Andrews w/neurosurgery were consulted. Dr Cindy Andrews did not see surgical intervention to be necessary. Pt was initially admitted to ICU but was moved to neuro floor on 2020. Notes from [place] from 16 indicates pt has a hx of seizures, but pt has not been on any anticonvulsants. Pt states his last seizure was in Dec 2015. He is being treated w/tamiflu. He is now medically stable, but continues to be ataxic w/his gait, dysarthric, as well as coordination issues w/R UE/LE. He is participating w/therapy and is ready to begin rehab w/plan of returning home after rehab dc w/support from his 2 sisters and friends      Pain:  Pain Assessment  Pain Level: 8    Assessment:   The difficulty. 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 )  Vision  Vision: Impaired  Vision Exceptions: Wears glasses at all times  Hearing  Hearing: Exceptions to [facility]  Hearing Exceptions: Hard of hearing/hearing concerns           Objective:  Oral/Motor  Oral Motor: Exceptions to [facility](No labial asymmetry, no lingual deviation, upper denture, natural lower dentition. Pt reports decreased right sided facial sensation.)    Auditory Comprehension  Comprehension: Exceptions  One Step Basic Commands: (WNL)  L/R Discrimination: Mild  Conversation: Moderate    Reading Comprehension  Reading Status: Within functional limits    Expression  Primary Mode of Expression: Verbal(Dysfluencies, dysarthria, expressive aphasia)    Verbal Expression  Verbal Expression: Exceptions to functional limits  Initiation: Moderate  Repetition: Moderate  Automatic Speech: Moderate  Convergent: (WFL)  Divergent: Severe    Written Expression  Dominant Hand: Right    Motor Speech  Motor Speech: Exceptions to [facility]  Intelligibility: Moderate  Apraxia: Moderate  Dysarthria : Moderate    Pragmatics/Social Functioning  Pragmatics: Exceptions to [facility]  Affect: Moderate  Eye Contact: Moderate  Monotone: Moderate  Topic Maintenance: Moderate  Turn Taking: Moderate    Cognition:     Memory  Memory: Exceptions to [facility]  Long-term Memory: (WFL)  Short-term Memory: Severe  Working Memory: Severe  Numeric Reasoning  Numeric Reasoning: (WFL- for single digit simple math)  Safety/Judgement  Safety/Judgement: Exceptions to [facility]  Unable to Self-monitor and Self-correct Consistently: Severe    Additional Assessments:  Portions of the CLQT were completed as well as the RIPA. He was unable to complete all portions of the CLQT due to poor processing. Will attempt this again at a later date.     Prognosis:  Good    Education:  Patient Education: Skilled education provided regarding swallowing anatomy and physiology.    Patient Education Response: Verbalizes understanding          Margaux Gonzalez 87 CCC-SLP  1/22/2020 4:44 PM     Electronically Signed By:  Katlyn Almeida M.S. CCC-SLP  1/22/2020,4:48 PM.

## 2020-01-22 NOTE — PROGRESS NOTES
Occupational Therapy   Occupational Therapy Initial Assessment  Date: 2020   Patient Name: Colton Wooten  MRN: [id number]     : 1955    Date of Service: 2020    Discharge Recommendations:  Continue to assess pending progress       Assessment   Performance deficits / Impairments: Decreased functional mobility ; Decreased ADL status; Decreased strength;Decreased balance;Decreased high-level IADLs;Decreased cognition;Decreased safe awareness;Decreased vision/visual deficit  Assessment: Patient requires increased assistance with ADLs due to BUE/BLE ataxia. He can be impulsive at times-attempts to stand without locking brakes. Patient also has expressive aphasia. He requires skilled OT to address the above deficits and return the patient home independently. Treatment Diagnosis: R PICA territory w/asociated blood products in the infarcted brain parenchyma and trace L cerebral convexity subdural hemorrhage  Prognosis: Good  Decision Making: Low Complexity  OT Education: OT Role;Plan of Care;ADL Adaptive Strategies;Transfer Training  Activity Tolerance  Activity Tolerance: Patient Tolerated treatment well  Safety Devices  Safety Devices in place: Yes  Type of devices: Bed alarm in place;Call light within reach           Patient Diagnosis(es): There were no encounter diagnoses. has a past medical history of Arthritis, Asthma, COPD (chronic obstructive pulmonary disease) ([address] Diabetes mellitus ([address] Hypertension, Seizures ([address] and Thyroid disease. has a past surgical history that includes Arm Surgery (Left); Mandible surgery; knee surgery (Right); Eye surgery (Right); and pr colonoscopy flx dx w/collj spec when pfrmd (N/A, 2018).     Treatment Diagnosis: R PICA territory w/asociated blood products in the infarcted brain parenchyma and trace L cerebral convexity subdural hemorrhage      Restrictions  Restrictions/Precautions  Restrictions/Precautions: Fall Risk, Seizure    Subjective   General  Chart Reviewed: Yes  Patient assessed for rehabilitation services?: Yes  Family / Caregiver Present: No  Diagnosis: R PICA territory w/asociated blood products in the infarcted brain parenchyma and trace L cerebral convexity subdural hemorrhage    Social/Functional History  Social/Functional History  Lives With: Alone  Type of Home: Apartment([address] )  Home Layout: One level  Home Access: Elevator  Bathroom Shower/Tub: Tub/Shower unit, Curtain  Bathroom Toilet: Standard  Bathroom Equipment: Grab bars in shower  Bathroom Accessibility: Wheelchair accessible  Home Equipment: (NONE )  ADL Assistance: [address]: Independent  Homemaking Responsibilities: Yes  Ambulation Assistance: Independent  Transfer Assistance: Independent  Active : No(patient has had multiple DUIs, he walks and rides bus everywhere )  Mode of Transportation: Bus, Other(PATS and walks a lot to grocery store )  Occupation: Retired  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 Impaired tracking )  Vision Exceptions: Wears glasses at all times  Hearing: Exceptions to [facility]  Hearing Exceptions: Hard of hearing/hearing concerns    Orientation  Overall Orientation Status: Within Functional Limits     Balance  Sitting Balance: Stand by assistance  Standing Balance: Minimal assistance     Tone RUE  RUE Tone: Normotonic  Tone LUE  LUE Tone: Normotonic  Coordination  Movements Are Fluid And Coordinated: No  Coordination and Movement description: Right UE;Left UE; Ataxia; Decreased accuracy; Fine motor impairments;Gross motor impairments        Transfers  Stand Pivot Transfers: Minimal assistance  Sit to stand: Minimal assistance  Stand to sit: Minimal assistance  Transfer Comments: Can be impulsive at times

## 2020-01-22 NOTE — THERAPY DISCHARGE NOTE
Acute Care - Occupational Therapy Discharge Summary  [facility]     Patient Name: Reza Cruz  : 1955  MRN: [id number]    Today's Date: 2020  Onset of Illness/Injury or Date of Surgery: 20    Date of Referral to OT: 20  Referring Physician: Dr. Montenegro      Admit Date: 2020        OT Recommendation and Plan    Visit Dx:    ICD-10-CM ICD-9-CM   1. Dysphagia, unspecified type R13.10 787.20   2. Influenza A J10.1 487.1   3. Elevated TSH R79.89 794.5   4. Cerebrovascular accident (CVA), unspecified mechanism (CMS/HCC) I63.9 434.91   5. Tobacco abuse Z72.0 305.1   6. Impaired mobility Z74.09 799.89   7. Impaired mobility and ADLs Z74.09 799.89   8. Cognitive and neurobehavioral dysfunction F09 294.9    F07.89 310.1               Rehab Goal Summary     Row Name 20 0700             Toileting Goal 1 (OT)    Activity/Device (Toileting Goal 1, OT)  toileting skills, all;commode  -TS      [place] Level/Cues Needed (Toileting Goal 1, OT)  supervision required  -TS      Time Frame (Toileting Goal 1, OT)  long term goal (LTG);by discharge  -TS      Progress/Outcome (Toileting Goal 1, OT)  goal not met  -TS         Balance Goal 1 (OT)    Activity/Assistive Device (Balance Goal 1, OT)  sitting, static;sitting, dynamic;standing, static;standing, dynamic  -TS      [place] Level/Cues Needed (Balance Goal 1, OT)  contact guard assist  -TS      Time Frame (Balance Goal 1, OT)  long term goal (LTG);by discharge  -TS      Progress/Outcomes (Balance Goal 1, OT)  goal not met  -TS         Coordination Goal 1 (OT)    Activity/Assistive Device (Coordination Goal 1, OT)  FM task;GM task  -TS      [place] Level/Cues Needed (Coordination Goal 1, OT)  supervision required  -TS      Time Frame (Coordination Goal 1, OT)  long term goal (LTG);by discharge  -TS      Progress/Outcomes (Coordination Goal 1, OT)  goal not met  -TS        User Key  (r) = Recorded By, (t) = Taken By, (c) = Cosigned By     Initials Name Provider Type Discipline     Kassidy Max COTA/L Occupational Therapy Assistant OT          Outcome Measures     Row Name 01/19/20 0955             How much help from another is currently needed...    Putting on and taking off regular lower body clothing?  4  -CJ      Bathing (including washing, rinsing, and drying)  3  -CJ      Toileting (which includes using toilet bed pan or urinal)  4  -CJ      Putting on and taking off regular upper body clothing  3  -CJ      Taking care of personal grooming (such as brushing teeth)  3  -CJ      Eating meals  3  -CJ      AM-PAC 6 Clicks Score (OT)  20  -CJ         Functional Assessment    Outcome Measure Options  AM-PAC 6 Clicks Daily Activity (OT)  -CJ        User Key  (r) = Recorded By, (t) = Taken By, (c) = Cosigned By    Initials Name Provider Type    CJ Jalen Dozier COTA/L Occupational Therapy Assistant          Therapy Suggested Charges     Code   Minutes Charges    39185 (CPT®) Hc Ot Neuromusc Re Education Ea 15 Min      93007 (CPT®) Hc Ot Ther Proc Ea 15 Min      27433 (CPT®) Hc Ot Therapeutic Act Ea 15 Min      87302 (CPT®) Hc Ot Manual Therapy Ea 15 Min      34571 (CPT®) Hc Ot Iontophoresis Ea 15 Min      02038 (CPT®) Hc Ot Elec Stim Ea-Per 15 Min      29112 (CPT®) Hc Ot Ultrasound Ea 15 Min      23120 (CPT®) Hc Ot Self Care/Mgmt/Train Ea 15 Min 23 2    Total  23 2              OT Discharge Summary  Reason for Discharge: Discharge from facility  Outcomes Achieved: Refer to plan of care for updates on goals achieved  Discharge Destination: Inpatient rehabilitation facility      CEASAR Fenton  1/22/2020

## 2020-01-22 NOTE — PLAN OF CARE
Problem: COMMUNICATION IMPAIRMENT  Goal: Ability to express needs and understand communication  1/21/2020 2209 by Macario Can RN  Outcome: Ongoing  1/21/2020 1749 by Fredy Carmona RN  Outcome: Ongoing     Problem: Falls - Risk of:  Goal: Will remain free from falls  Description  Will remain free from falls  1/21/2020 2209 by Macario Can RN  Outcome: Ongoing  1/21/2020 1749 by Fredy Carmona RN  Outcome: Ongoing  Goal: Absence of physical injury  Description  Absence of physical injury  1/21/2020 2209 by Macario Can RN  Outcome: Ongoing  1/21/2020 1749 by Fredy Carmona RN  Outcome: Ongoing     Problem: Infection:  Goal: Will remain free from infection  Description  Will remain free from infection  1/21/2020 2209 by Macario Can RN  Outcome: Ongoing  1/21/2020 1749 by Fredy Carmona RN  Outcome: Ongoing

## 2020-01-22 NOTE — PATIENT CARE CONFERENCE
checks q8hrs. Assist x 1 for transfers. Patient on Aspirin. SOCIAL WORK/CASE MANAGEMENT  Assessment: Lives independently at [facility], previously disabled due to COPD and seizure disorder(?). . Family noted- Josué hernandez  Discharge Plan   Estimated Length of Stay: to be determined  Destination: undetermined at this time    Pass: No    Services at Discharge: continued rehabilitation    Equipment at Discharge: to be determined    Progress made in the prior week:  1. N/A, new patient  2.  3.  4.  5.      Goals for following week:  1. Establish treatment plan  2.   3.   4.   5.     Factors facilitating achievement of predicted outcomes: being assessed    Barriers to the achievement of predicted outcomes: being assessed    Team Members Present at Conference:  : Sammie Mccrary   Occupational Therapist: Amparo Pan OTR/L  Physical Therapist: Viri Adam PT  Speech Therapist: Margaux Blood Mike 87, [id number] [facility]  Nurse: Wil Kirby RN, BSN   Nurse Manager:  Wil Kirby RN, BSN  Dietitian:  Sacha Grant MS, RD, LD  Rehab Director:  Bryant Cabrera approve the established interdisciplinary plan of care as documented within the medical record of Lyle Meckel.

## 2020-01-23 LAB
ANION GAP SERPL CALCULATED.3IONS-SCNC: 15 MMOL/L (ref 7–19)
BASOPHILS ABSOLUTE: 0.1 K/UL (ref 0–0.2)
BASOPHILS RELATIVE PERCENT: 1.1 % (ref 0–1)
BUN BLDV-MCNC: 29 MG/DL (ref 8–23)
CALCIUM SERPL-MCNC: 9.7 MG/DL (ref 8.8–10.2)
CHLORIDE BLD-SCNC: 103 MMOL/L (ref 98–111)
CO2: 22 MMOL/L (ref 22–29)
CREAT SERPL-MCNC: 1.1 MG/DL (ref 0.5–1.2)
EOSINOPHILS ABSOLUTE: 1 K/UL (ref 0–0.6)
EOSINOPHILS RELATIVE PERCENT: 8.9 % (ref 0–5)
GFR NON-AFRICAN AMERICAN: >60
GLUCOSE BLD-MCNC: 93 MG/DL (ref 74–109)
HCT VFR BLD CALC: 57.3 % (ref 42–52)
HEMOGLOBIN: 19 G/DL (ref 14–18)
IMMATURE GRANULOCYTES #: 0 K/UL
LYMPHOCYTES ABSOLUTE: 2.1 K/UL (ref 1.1–4.5)
LYMPHOCYTES RELATIVE PERCENT: 18.3 % (ref 20–40)
MCH RBC QN AUTO: 31.4 PG (ref 27–31)
MCHC RBC AUTO-ENTMCNC: 33.2 G/DL (ref 33–37)
MCV RBC AUTO: 94.7 FL (ref 80–94)
MONOCYTES ABSOLUTE: 2 K/UL (ref 0–0.9)
MONOCYTES RELATIVE PERCENT: 17.2 % (ref 0–10)
NEUTROPHILS ABSOLUTE: 6.1 K/UL (ref 1.5–7.5)
NEUTROPHILS RELATIVE PERCENT: 54.2 % (ref 50–65)
PDW BLD-RTO: 13.9 % (ref 11.5–14.5)
PLATELET # BLD: 240 K/UL (ref 130–400)
PMV BLD AUTO: 10.6 FL (ref 9.4–12.4)
POTASSIUM REFLEX MAGNESIUM: 4.3 MMOL/L (ref 3.5–5)
RBC # BLD: 6.05 M/UL (ref 4.7–6.1)
SODIUM BLD-SCNC: 140 MMOL/L (ref 136–145)
WBC # BLD: 11.3 K/UL (ref 4.8–10.8)

## 2020-01-23 PROCEDURE — 99232 SBSQ HOSP IP/OBS MODERATE 35: CPT | Performed by: PSYCHIATRY & NEUROLOGY

## 2020-01-23 PROCEDURE — 97116 GAIT TRAINING THERAPY: CPT

## 2020-01-23 PROCEDURE — 36415 COLL VENOUS BLD VENIPUNCTURE: CPT

## 2020-01-23 PROCEDURE — 51701 INSERT BLADDER CATHETER: CPT

## 2020-01-23 PROCEDURE — 97130 THER IVNTJ EA ADDL 15 MIN: CPT

## 2020-01-23 PROCEDURE — 94640 AIRWAY INHALATION TREATMENT: CPT

## 2020-01-23 PROCEDURE — 97129 THER IVNTJ 1ST 15 MIN: CPT

## 2020-01-23 PROCEDURE — 85025 COMPLETE CBC W/AUTO DIFF WBC: CPT

## 2020-01-23 PROCEDURE — 6370000000 HC RX 637 (ALT 250 FOR IP): Performed by: PSYCHIATRY & NEUROLOGY

## 2020-01-23 PROCEDURE — 80048 BASIC METABOLIC PNL TOTAL CA: CPT

## 2020-01-23 PROCEDURE — 97530 THERAPEUTIC ACTIVITIES: CPT

## 2020-01-23 PROCEDURE — 1180000000 HC REHAB R&B

## 2020-01-23 PROCEDURE — 97110 THERAPEUTIC EXERCISES: CPT

## 2020-01-23 PROCEDURE — 51798 US URINE CAPACITY MEASURE: CPT

## 2020-01-23 PROCEDURE — 92526 ORAL FUNCTION THERAPY: CPT

## 2020-01-23 RX ORDER — BENZONATATE 100 MG/1
100 CAPSULE ORAL 3 TIMES DAILY
Status: DISCONTINUED | OUTPATIENT
Start: 2020-01-23 | End: 2020-02-03

## 2020-01-23 RX ADMIN — ACETAMINOPHEN 650 MG: 325 TABLET ORAL at 13:40

## 2020-01-23 RX ADMIN — LEVOTHYROXINE SODIUM 125 MCG: 0.1 TABLET ORAL at 05:41

## 2020-01-23 RX ADMIN — IPRATROPIUM BROMIDE AND ALBUTEROL SULFATE 3 ML: 2.5; .5 SOLUTION RESPIRATORY (INHALATION) at 06:08

## 2020-01-23 RX ADMIN — BENZONATATE 100 MG: 100 CAPSULE ORAL at 13:41

## 2020-01-23 RX ADMIN — ASPIRIN 81 MG 81 MG: 81 TABLET ORAL at 08:08

## 2020-01-23 RX ADMIN — LISINOPRIL 20 MG: 20 TABLET ORAL at 08:07

## 2020-01-23 RX ADMIN — DOCUSATE SODIUM 100 MG: 100 CAPSULE, LIQUID FILLED ORAL at 08:08

## 2020-01-23 RX ADMIN — IPRATROPIUM BROMIDE AND ALBUTEROL SULFATE 3 ML: 2.5; .5 SOLUTION RESPIRATORY (INHALATION) at 15:32

## 2020-01-23 RX ADMIN — ATORVASTATIN CALCIUM 80 MG: 80 TABLET, FILM COATED ORAL at 21:08

## 2020-01-23 RX ADMIN — POLYETHYLENE GLYCOL 3350 17 G: 17 POWDER, FOR SOLUTION ORAL at 08:08

## 2020-01-23 RX ADMIN — BENZONATATE 100 MG: 100 CAPSULE ORAL at 09:26

## 2020-01-23 RX ADMIN — AMLODIPINE BESYLATE 10 MG: 10 TABLET ORAL at 08:08

## 2020-01-23 RX ADMIN — BENZONATATE 100 MG: 100 CAPSULE ORAL at 21:08

## 2020-01-23 RX ADMIN — IPRATROPIUM BROMIDE AND ALBUTEROL SULFATE 3 ML: 2.5; .5 SOLUTION RESPIRATORY (INHALATION) at 19:58

## 2020-01-23 RX ADMIN — FLUOXETINE HYDROCHLORIDE 20 MG: 20 CAPSULE ORAL at 08:08

## 2020-01-23 RX ADMIN — IPRATROPIUM BROMIDE AND ALBUTEROL SULFATE 3 ML: 2.5; .5 SOLUTION RESPIRATORY (INHALATION) at 11:59

## 2020-01-23 ASSESSMENT — PAIN DESCRIPTION - INTENSITY
RATING_2: 8
RATING_2: 2
RATING_2: 2

## 2020-01-23 ASSESSMENT — PAIN SCALES - GENERAL
PAINLEVEL_OUTOF10: 1
PAINLEVEL_OUTOF10: 1

## 2020-01-23 ASSESSMENT — 9 HOLE PEG TEST
TEST_RESULT: IMPAIRED
TESTTIME_SECONDS: 72
TEST_RESULT: IMPAIRED
TESTTIME_SECONDS: 99

## 2020-01-23 ASSESSMENT — PAIN DESCRIPTION - LOCATION: LOCATION_2: ARM

## 2020-01-23 NOTE — PROGRESS NOTES
discharge information  Method of Education:  [x]Discussion          [x]Demonstration          []Handout          []Other  Evaluation of Education:   [x]Verbalized understanding   []Demonstrates without assistance  []Demonstrates with assistance  []Needs further instruction     []No evidence of learning                  []No family present      Plan: [x]Continue with current plan of care    []Modify current plan of care as follows:    []Discharge patient    Discharge Location:    Services/Supervision Recommended:      [x]Patient continues to require treatment by a licensed therapist to address functional deficits as outlined in the established plan of care.     Electronically signed by STEVE Lynne on 1/23/2020 at 9:56 AM

## 2020-01-23 NOTE — PROGRESS NOTES
Patient: Keny Middleton  MR#:    [id number]   Room:    8294/821-29   YOB: 1955  Date of Progress Note: 1/23/2020  Time of Note                           8:22 AM  Consulting Physician:   Pawel Pearson M.D. Attending Physician:  Pawel Pearson MD     CHIEF COMPLAINT: Right-sided weakness with ataxia and dysarthria        Subjective  This 63 y.o. male  R handed pt admitted to [facility] on on 1/13/2020 for stroke evaluation. Pt reports that he has felt dizzy, poor coordination w/his R arm and R leg, numbness in his face, feet and hands. He reports over this same couple of days, that he also had fever and chills, nausea & emesis. He has been SOA and very weak. Pt reports that he stopped smoking 10 years ago, but still smokes intermittently. Influenza swab was positive for influenza A. 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 Dr. Rachel Triplett w/neuro and Dr. Vale Jimenez w/neurosurgery were consulted. Dr Vale Jimenez did not see surgical intervention to be necessary. Pt was initially admitted to ICU but was moved to neuro floor on 1/17/2020. Notes from [facility] from 8/5/16 indicates pt has a hx of seizures, but pt has not been on any anticonvulsants. Pt states his last seizure was in Dec 2015. He is being treated w/tamiflu. He is now medically stable, but continues to be ataxic w/his gait, dysarthric, as well as coordination issues w/R UE/LE. Planing of a dry cough through the night that kept him up.   REVIEW OF SYSTEMS:  Constitutional: No fevers No chills  Neck:No stiffness  Respiratory: No shortness of breath  Cardiovascular: No chest pain No palpitations  Gastrointestinal: No abdominal pain    Genitourinary: No Dysuria  Neurological: No headache, no confusion      PHYSICAL EXAM:  /83   Pulse 85   Temp 96.2 °F (35.7 °C) (Temporal)   Resp 16   Ht 6' (1.829 m)   Wt 170 lb (77.1 kg)   SpO2 91%   BMI 23.06 kg/m²     Constitutional: he appears well-developed and well-nourished. Eyes - conjunctiva normal.  Pupils react to light  Ear, nose, throat -hearing intact to voice. No scars, masses, or lesions over external nose or ears, no atrophy of tongue  Neck-symmetric, no masses noted, no jugular vein distension  Respiration- chest wall appears symmetric, good expansion,   normal effort without use of accessory muscles  Cardiovascular- RRR  Musculoskeletal - no significant wasting of muscles noted, no bony deformities, gait no gross ataxia  Extremities-no clubbing, cyanosis or edema  Skin - warm, dry, and intact. No rash, erythema, or pallor. Psychiatric - mood, affect, and behavior appear normal.      Neurology  NEUROLOGICAL EXAM:  Awake, alert, fluent oriented x 3 appropriate affect  Attention and concentration appear appropriate  Memory fair  Speech stuttering with significant dysarthria  No clear issues with language of fund of knowledge     Cranial Nerve Exam   CN II- Visual fields grossly unremarkable  CN III, IV,VI-EOMI, significant left greater than right horizontal nystagmus, disconjugate eyes at rest, no ptosis  CN VII-no facial assymetry  CN VIII-Hearing intact   CN IX and X- Palate elevates in midline  CN XI-good shoulder shrug  CN XII-Tongue midline with no fasciculations or fibrillations     Motor Exam  V/V throughout upper and lower extremities bilaterally, no cogwheeling, normal tone            Nursing/pcp notes, imaging,labs and vitals reviewed.      PT,OT and/or speech notes reviewed    Lab Results   Component Value Date    WBC 11.3 (H) 01/23/2020    HGB 19.0 (H) 01/23/2020    HCT 57.3 (H) 01/23/2020    MCV 94.7 (H) 01/23/2020     01/23/2020     Lab Results   Component Value Date     01/23/2020    K 4.3 01/23/2020     01/23/2020    CO2 22 01/23/2020    BUN 29 (H) 01/23/2020    CREATININE 1.1 01/23/2020    GLUCOSE 93 01/23/2020    CALCIUM 9.7 01/23/2020    LABGLOM >60 01/23/2020   No results found for: INRNo results found for: PHENYTOIN, ESR, CRP    Assessment:  The patient exhibits moderate dysarthria, dysfluencies, moderate deficits in receptive language, moderate to severe deficits in expressive language, and severe cognitive deficits. He is recommended to receive inpatient acute rehab speech therapy services to address deficits 1-2x/day, 5-6x/week for 2-3 weeks.     Objective   Vision: Impaired(R eye has been blurry for 40 years, states that he has not noticed any new vision issues. Impaired tracking )  Vision Exceptions: Wears glasses at all times  Hearing: Exceptions to [facility]  Hearing Exceptions: Hard of hearing/hearing concerns    Orientation  Overall Orientation Status: Within Functional Limits  Balance  Sitting Balance: Stand by assistance  Standing Balance: Minimal assistance  Tone RUE  RUE Tone: Normotonic  Tone LUE  LUE Tone: Normotonic  Coordination  Movements Are Fluid And Coordinated: No  Coordination and Movement description: Right UE;Left UE; Ataxia; Decreased accuracy; Fine motor impairments;Gross motor impairments  Transfers  Stand Pivot Transfers: Minimal assistance  Sit to stand: Minimal assistance  Stand to sit: Minimal assistance  Transfer Comments: Can be impulsive at times   Cognition  Overall Cognitive Status: Exceptions  Safety Judgement: Decreased awareness of need for assistance;Decreased awareness of need for safety  Insights: Decreased awareness of deficits        01/22/20 1516   Restrictions/Precautions   Restrictions/Precautions Fall Risk;Seizure   Bed Mobility   Supine to Sit Stand by assistance   Sit to Supine Stand by assistance   Transfers   Sit to Stand Contact guard assistance   Stand to sit Contact guard assistance   Bed to Chair Contact guard assistance   Comment impulsive, reaches for external support for assistance during transfers   Ambulation 1   Surface level tile   Device Rolling Walker   Other Apparatus Wheelchair follow   Assistance Contact guard assistance;Minimal assistance   Quality of Gait decrease base of support, heel to toe, reciprocal, instability,    Gait Deviations Slow Ronnie   Distance 25ft   Comments requires cue to slow down and reset   Ambulation 2   Surface - 2 level tile   Device 2 Rolling Walker   Other Apparatus 2 Wheelchair follow   Assistance 2 Contact guard assistance   Quality of Gait 2 3 point gait, slow ronnie, small AUTUMN, inconsistent step length, lateral lean     Distance 25ft   Comments requires cues for small steps and reset for proper gait pattern   Exercises   Comments seated BLE exercises. Slow and  controlled x10 each    Conditions Requiring Skilled Therapeutic Intervention   Assessment Patient working on step to gait pattern and is inconsistent with lead foot and requires cues to slow down, move one segment at a time, and weight shift forward to prevent posterior ankle strategies. continue to work on coordinated BLE movements. Patient can be impuslive with transfers and ambulation. Activity Tolerance   Activity Tolerance Patient limited by endurance   Safety Devices   Type of devices All fall risk precautions in place; Bed alarm in place;Call light within reach;Gait belt;Patient at risk for falls; Left in bed         RECORD REVIEW: Previous medical records, medications were reviewed at today's visit    IMPRESSION:  1. 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 Antiplatelet treatment  2. Hypertension-continue current medications and monitoring  3. Hyperlipidemia-continue statin  4. DVT prophylaxis-SCDs  5. GI-bowel regimen  6. Hypothyroidism-continue replacement therapy  Cough.   Kelvin/Tessalon Perles  Staff next week

## 2020-01-23 NOTE — PROGRESS NOTES
assistance  Stand to sit: Minimal assistance        Type of ROM/Therapeutic Exercise  Type of ROM/Therapeutic Exercise: Rossi  Comment: 15#; 4 sets of 20         Hand Dominance  Hand Dominance: Right  Left Hand Strength -  (lbs)  Handle Setting 2: 53.5  Left 9-Hole Peg Test  Left 9-Hole Peg Test: Impaired  Right Hand Strength -  (lbs)  Handle Setting 2: 59.7  Right 9-Hole Peg Test  Right 9-Hole Peg Test: Impaired  Fine Motor Skills  Left 9-Hole Peg Test: Impaired  Left 9 Hole Peg Test Time (secs): 72  Right 9-Hole Peg Test: Impaired  Right 9 Hole Peg Test Time (secs): 99           Plan   Plan  Current Treatment Recommendations: Strengthening, Balance Training, Functional Mobility Training, Equipment Evaluation, Education, & procurement, Patient/Caregiver Education & Training, Self-Care / ADL, Safety Education & Training            Goals  Short term goals  Time Frame for Short term goals: 1 week   Short term goal 1: Min A with LB dressing. Short term goal 2: SBA with bathing hygiene. Short term goal 3: CGA with clothing management/hygiene for toileting. Short term goal 4: CGA with toilet transfers. Short term goal 5: CGA with ambulatory home making tasks. Short term goal 6: Patient will complete 1-2 handed static standing tasks for 4 minutes, requiring CGA. Short term goal 7: Patient will decrease time for 9 hole peg test for RUE by 10 seconds. Short term goal 8: Patient will decrease time for 9 hole peg test for LUE by 10 seconds. Short term goal 9: Patient will increase BUE  by 3# to increase independence with ADLs. Long term goals  Time Frame for Long term goals : 3 weeks   Long term goal 1: Supervision with bathing hygiene. Long term goal 2: Supervision with LB dressing. Long term goal 3: Supervision with clothing management/hygiene for toileting. Long term goal 4: Supervision with toilet transfers. Long term goal 5: SBA with ambulatory home making tasks.    Long term goals 6: Patient verbalize DME needs. Patient Goals   Patient goals : Return home independently.         Therapy Time   Individual Concurrent Group Co-treatment   Time In 1330         Time Out 1430         Minutes 60         Timed Code Treatment Minutes: [address], OT

## 2020-01-23 NOTE — PROGRESS NOTES
Prognosis  --   --  Good   Activity Tolerance   Activity Tolerance  --   --  Patient Tolerated treatment well   Electronically signed by Nikhil Chacon PTA on 1/23/2020 at 11:57 AM

## 2020-01-23 NOTE — THERAPY DISCHARGE NOTE
Acute Care - Speech Language Pathology Discharge Summary  [facility]       Patient Name: Reza Cruz  : 1955  MRN: [id number]    Today's Date: 2020  Onset of Illness/Injury or Date of Surgery: 20       Referring Physician: Dr. Montenegro        Admit Date: 2020      SLP Recommendation and Plan    Visit Dx:    ICD-10-CM ICD-9-CM   1. Dysphagia, unspecified type R13.10 787.20   2. Influenza A J10.1 487.1   3. Elevated TSH R79.89 794.5   4. Cerebrovascular accident (CVA), unspecified mechanism (CMS/HCC) I63.9 434.91   5. Tobacco abuse Z72.0 305.1   6. Impaired mobility Z74.09 799.89   7. Impaired mobility and ADLs Z74.09 799.89   8. Cognitive and neurobehavioral dysfunction F09 294.9    F07.89 310.1               SLP GOALS     Row Name 20 1000 20 1350          Comprehend Questions Goal 1 (SLP)    Improve Ability to Comprehend Questions Goal 1 (SLP)  complex yes/no questions;90%  -MB  complex yes/no questions;90%  (Pended)   -LM     Time Frame (Comprehend Questions Goal 1, SLP)  by discharge  -MB  by discharge  (Pended)   -LM     Progress (Ability to Comprehend Questions Goal 1, SLP)  --  60%;with minimal cues (75-90%)  (Pended)   -LM     Progress/Outcomes (Comprehend Questions Goal 1, SLP)  goal not met  -MB  goal ongoing  (Pended)   -LM     Comment (Comprehend Questions Goal 1, SLP)  --  Pt was able to answer complex yes/no questions with 60%  (Pended)   -LM        Follow Directions Goal 2 (SLP)    Improve Ability to Follow Directions Goal 1 (SLP)  2 step commands;90%  -MB  2 step commands;90%  (Pended)   -LM     Time Frame (Follow Directions Goal 1, SLP)  by discharge  -MB  by discharge  (Pended)   -LM     Progress (Ability to Follow Directions Goal 1, SLP)  --  50%;with moderate cues (50-74%)  (Pended)   -LM     Progress/Outcomes (Follow Directions Goal 1, SLP)  goal partially met  -MB  goal ongoing  (Pended)   -LM     Comment (Follow Directions Goal 1, SLP)  --  Pt was able to follow  2 step commands with 50% accuracy. Pt required min. to mod. cues to complete task.  (Pended)   -LM        Connected Speech to Express Thoughts Goal 1 (SLP)    Improve Narrative Discourse to Express Thoughts By Goal 1 (SLP)  describing a picture;90%  -MB  describing a picture;90%  (Pended)   -LM     Time Frame (Connected Speech Goal 1, SLP)  by discharge  -MB  by discharge  (Pended)   -LM     Barriers (Connected Speech Goal 1, SLP)  --  Low motivation  (Pended)   -LM     Progress (Connected Speech Goal 1, SLP)  --  50%;with moderate cues (50-74%)  (Pended)   -LM     Progress/Outcomes (Connected Speech Goal 1, SLP)  goal partially met  -MB  progress slower than expected  (Pended)   -LM     Comment (Connected Speech Goal 1, SLP)  --  Pt was able to describe what he saw but had difficulty describing details about what was going on in the picture.   (Pended)   -LM        Articulation Goal 1 (SLP)    Improve Articulation Goal 1 (SLP)  by over-articulating in connected speech;90%  -MB  by over-articulating in connected speech;90%  (Pended)   -LM     Time Frame (Articulation Goal 1, SLP)  by discharge  -MB  by discharge  (Pended)   -LM     Progress (Articulation Goal 1, SLP)  --  30%;with moderate cues (50-74%)  (Pended)   -LM     Progress/Outcomes (Articulation Goal 1, SLP)  goal not met  -MB  goal ongoing  (Pended)   -LM     Comment (Articulation Goal 1, SLP)  --  Pt had greater difficulty with connected speech possible due to tiredness.   (Pended)   -LM        Patient Will Implement Strategies Goal 1 (SLP)    Implement Strategies of Goal 1 (SLP)  using external rate control;90%;independently (over 90% accuracy)  -MB  using external rate control;90%;independently (over 90% accuracy)  (Pended)   -LM     Time Frame (Strategy Implementation Goal 1, SLP)  short term goal (STG);by discharge  -MB  short term goal (STG);by discharge  (Pended)   -LM     Barriers (Strategy Implementation Goal 1, SLP)  --  Low motivation  (Pended)    -LM     Progress (Strategy Implementation Goal 1, SLP)  --  50%;with moderate cues (50-74%)  (Pended)   -LM     Progress/Outcomes (Strategy Implementation Goal 1, SLP)  goal partially met  -MB  goal ongoing  (Pended)   -LM     Comment (Strategy Implementation Goal 1, SLP)  --  Pt had difficulty with connected speech which affected his overall ability to control speaking rate.   (Pended)   -LM        Attention Goal 1 (SLP)    Improve Attention by Goal 1 (SLP)  complete selective attention task;90%  -MB  complete selective attention task;90%  (Pended)   -LM     Time Frame (Attention Goal 1, SLP)  by discharge  -MB  by discharge  (Pended)   -LM     Progress/Outcomes (Attention Goal 1, SLP)  goal not met  -MB  goal ongoing  (Pended)   -LM        Organizational Skills Goal 1 (SLP)    Improve Thought Organization Through Goal 1 (SLP)  completing a divergent naming task;completing a convergent naming task;abstract;90%  -MB  completing a divergent naming task;completing a convergent naming task;abstract;90%  (Pended)   -LM     Time Frame (Thought Organization Skills Goal 1, SLP)  by discharge  -MB  by discharge  (Pended)   -LM     Barriers (Thought Organization Skills Goal 1, SLP)  --  dysarthria  (Pended)   -LM     Progress (Thought Organization Skills Goal 1, SLP)  --  50%;with moderate cues (50-74%)  (Pended)   -LM     Progress/Outcomes (Thought Organization Skills Goal 1, SLP)  goal partially met  -MB  goal ongoing  (Pended)   -LM       User Key  (r) = Recorded By, (t) = Taken By, (c) = Cosigned By    Initials Name Provider Type    Jo Ann Valero CCC-SLP Speech and Language Pathologist    Maisha Torres, Speech Therapy Student Speech Therapy Student                  SLP Discharge Summary  Anticipated Dischage Disposition: unknown  Reason for Discharge: discharge from this facility  Progress Toward Achieving Short/long Term Goals: goals partially met within established timelines  Discharge Destination:  [facility]      Jo Ann Ramirez, CCC-SLP  1/23/2020

## 2020-01-24 ENCOUNTER — OUTSIDE FACILITY SERVICE (OUTPATIENT)
Dept: PULMONOLOGY | Facility: CLINIC | Age: 65
End: 2020-01-24

## 2020-01-24 ENCOUNTER — APPOINTMENT (OUTPATIENT)
Dept: GENERAL RADIOLOGY | Age: 65
DRG: 057 | End: 2020-01-24
Attending: PSYCHIATRY & NEUROLOGY
Payer: MEDICAID

## 2020-01-24 ENCOUNTER — APPOINTMENT (OUTPATIENT)
Dept: CT IMAGING | Age: 65
DRG: 057 | End: 2020-01-24
Attending: PSYCHIATRY & NEUROLOGY
Payer: MEDICAID

## 2020-01-24 PROCEDURE — 97110 THERAPEUTIC EXERCISES: CPT

## 2020-01-24 PROCEDURE — 70450 CT HEAD/BRAIN W/O DYE: CPT

## 2020-01-24 PROCEDURE — 97530 THERAPEUTIC ACTIVITIES: CPT

## 2020-01-24 PROCEDURE — 6370000000 HC RX 637 (ALT 250 FOR IP): Performed by: PSYCHIATRY & NEUROLOGY

## 2020-01-24 PROCEDURE — 71045 X-RAY EXAM CHEST 1 VIEW: CPT

## 2020-01-24 PROCEDURE — 6360000002 HC RX W HCPCS: Performed by: INTERNAL MEDICINE

## 2020-01-24 PROCEDURE — 99232 SBSQ HOSP IP/OBS MODERATE 35: CPT | Performed by: PSYCHIATRY & NEUROLOGY

## 2020-01-24 PROCEDURE — 1180000000 HC REHAB R&B

## 2020-01-24 PROCEDURE — 97535 SELF CARE MNGMENT TRAINING: CPT

## 2020-01-24 PROCEDURE — 94640 AIRWAY INHALATION TREATMENT: CPT

## 2020-01-24 PROCEDURE — 97130 THER IVNTJ EA ADDL 15 MIN: CPT

## 2020-01-24 PROCEDURE — 97116 GAIT TRAINING THERAPY: CPT

## 2020-01-24 PROCEDURE — 51701 INSERT BLADDER CATHETER: CPT

## 2020-01-24 PROCEDURE — 97129 THER IVNTJ 1ST 15 MIN: CPT

## 2020-01-24 PROCEDURE — 99255 IP/OBS CONSLTJ NEW/EST HI 80: CPT | Performed by: INTERNAL MEDICINE

## 2020-01-24 PROCEDURE — 51798 US URINE CAPACITY MEASURE: CPT

## 2020-01-24 RX ORDER — MECLIZINE HYDROCHLORIDE 25 MG/1
25 TABLET ORAL 3 TIMES DAILY PRN
Status: DISCONTINUED | OUTPATIENT
Start: 2020-01-24 | End: 2020-01-29

## 2020-01-24 RX ORDER — BETHANECHOL CHLORIDE 25 MG/1
25 TABLET ORAL 3 TIMES DAILY
Status: DISCONTINUED | OUTPATIENT
Start: 2020-01-24 | End: 2020-01-25

## 2020-01-24 RX ORDER — IPRATROPIUM BROMIDE AND ALBUTEROL SULFATE 2.5; .5 MG/3ML; MG/3ML
1 SOLUTION RESPIRATORY (INHALATION) EVERY 4 HOURS PRN
Status: DISCONTINUED | OUTPATIENT
Start: 2020-01-24 | End: 2020-02-12 | Stop reason: HOSPADM

## 2020-01-24 RX ORDER — FORMOTEROL FUMARATE 20 UG/2ML
20 SOLUTION RESPIRATORY (INHALATION) 2 TIMES DAILY
Status: DISCONTINUED | OUTPATIENT
Start: 2020-01-24 | End: 2020-02-05 | Stop reason: CLARIF

## 2020-01-24 RX ORDER — BUDESONIDE 0.5 MG/2ML
0.5 INHALANT ORAL 2 TIMES DAILY
Status: DISCONTINUED | OUTPATIENT
Start: 2020-01-24 | End: 2020-02-12 | Stop reason: HOSPADM

## 2020-01-24 RX ADMIN — IPRATROPIUM BROMIDE AND ALBUTEROL SULFATE 3 ML: 2.5; .5 SOLUTION RESPIRATORY (INHALATION) at 13:28

## 2020-01-24 RX ADMIN — POLYETHYLENE GLYCOL 3350 17 G: 17 POWDER, FOR SOLUTION ORAL at 08:32

## 2020-01-24 RX ADMIN — MECLIZINE HYDROCHLORIDE 25 MG: 25 TABLET ORAL at 13:12

## 2020-01-24 RX ADMIN — ASPIRIN 81 MG 81 MG: 81 TABLET ORAL at 08:34

## 2020-01-24 RX ADMIN — ATORVASTATIN CALCIUM 80 MG: 80 TABLET, FILM COATED ORAL at 21:16

## 2020-01-24 RX ADMIN — BENZONATATE 100 MG: 100 CAPSULE ORAL at 13:36

## 2020-01-24 RX ADMIN — DOCUSATE SODIUM 100 MG: 100 CAPSULE, LIQUID FILLED ORAL at 08:32

## 2020-01-24 RX ADMIN — BENZONATATE 100 MG: 100 CAPSULE ORAL at 21:16

## 2020-01-24 RX ADMIN — IPRATROPIUM BROMIDE AND ALBUTEROL SULFATE 3 ML: 2.5; .5 SOLUTION RESPIRATORY (INHALATION) at 10:10

## 2020-01-24 RX ADMIN — LEVOTHYROXINE SODIUM 125 MCG: 0.1 TABLET ORAL at 06:14

## 2020-01-24 RX ADMIN — IPRATROPIUM BROMIDE AND ALBUTEROL SULFATE 3 ML: 2.5; .5 SOLUTION RESPIRATORY (INHALATION) at 05:58

## 2020-01-24 RX ADMIN — BUDESONIDE 500 MCG: 0.5 INHALANT RESPIRATORY (INHALATION) at 20:29

## 2020-01-24 RX ADMIN — LISINOPRIL 20 MG: 20 TABLET ORAL at 08:32

## 2020-01-24 RX ADMIN — ACETAMINOPHEN 650 MG: 325 TABLET ORAL at 11:49

## 2020-01-24 RX ADMIN — BETHANECHOL CHLORIDE 25 MG: 25 TABLET ORAL at 10:29

## 2020-01-24 RX ADMIN — BENZONATATE 100 MG: 100 CAPSULE ORAL at 08:33

## 2020-01-24 RX ADMIN — ONDANSETRON 4 MG: 4 TABLET, ORALLY DISINTEGRATING ORAL at 11:47

## 2020-01-24 RX ADMIN — AMLODIPINE BESYLATE 10 MG: 10 TABLET ORAL at 08:32

## 2020-01-24 RX ADMIN — IPRATROPIUM BROMIDE AND ALBUTEROL SULFATE 3 ML: 2.5; .5 SOLUTION RESPIRATORY (INHALATION) at 20:25

## 2020-01-24 RX ADMIN — SENNOSIDES AND DOCUSATE SODIUM 1 TABLET: 8.6; 5 TABLET ORAL at 21:16

## 2020-01-24 RX ADMIN — DOCUSATE SODIUM 100 MG: 100 CAPSULE, LIQUID FILLED ORAL at 21:17

## 2020-01-24 RX ADMIN — FLUOXETINE HYDROCHLORIDE 20 MG: 20 CAPSULE ORAL at 08:32

## 2020-01-24 RX ADMIN — BETHANECHOL CHLORIDE 25 MG: 25 TABLET ORAL at 13:36

## 2020-01-24 RX ADMIN — IPRATROPIUM BROMIDE AND ALBUTEROL SULFATE 1 AMPULE: .5; 3 SOLUTION RESPIRATORY (INHALATION) at 17:27

## 2020-01-24 RX ADMIN — IPRATROPIUM BROMIDE AND ALBUTEROL SULFATE 1 AMPULE: .5; 3 SOLUTION RESPIRATORY (INHALATION) at 01:46

## 2020-01-24 RX ADMIN — FORMOTEROL FUMARATE DIHYDRATE 20 MCG: 20 SOLUTION RESPIRATORY (INHALATION) at 20:29

## 2020-01-24 RX ADMIN — BETHANECHOL CHLORIDE 25 MG: 25 TABLET ORAL at 21:16

## 2020-01-24 ASSESSMENT — PAIN DESCRIPTION - ONSET
ONSET: SUDDEN
ONSET: SUDDEN

## 2020-01-24 ASSESSMENT — ENCOUNTER SYMPTOMS
EYES NEGATIVE: 1
SHORTNESS OF BREATH: 1
GASTROINTESTINAL NEGATIVE: 1
COUGH: 1

## 2020-01-24 ASSESSMENT — PAIN - FUNCTIONAL ASSESSMENT
PAIN_FUNCTIONAL_ASSESSMENT: PREVENTS OR INTERFERES SOME ACTIVE ACTIVITIES AND ADLS
PAIN_FUNCTIONAL_ASSESSMENT: PREVENTS OR INTERFERES SOME ACTIVE ACTIVITIES AND ADLS

## 2020-01-24 ASSESSMENT — PAIN DESCRIPTION - FREQUENCY
FREQUENCY: INTERMITTENT
FREQUENCY: INTERMITTENT

## 2020-01-24 ASSESSMENT — PAIN SCALES - GENERAL
PAINLEVEL_OUTOF10: 0
PAINLEVEL_OUTOF10: 0
PAINLEVEL_OUTOF10: 5
PAINLEVEL_OUTOF10: 2

## 2020-01-24 ASSESSMENT — PAIN DESCRIPTION - PAIN TYPE
TYPE: ACUTE PAIN
TYPE: ACUTE PAIN

## 2020-01-24 ASSESSMENT — PAIN DESCRIPTION - LOCATION
LOCATION: HEAD
LOCATION: HEAD

## 2020-01-24 ASSESSMENT — PAIN DESCRIPTION - PROGRESSION
CLINICAL_PROGRESSION: RAPIDLY WORSENING
CLINICAL_PROGRESSION: RAPIDLY WORSENING

## 2020-01-24 ASSESSMENT — PAIN DESCRIPTION - DESCRIPTORS
DESCRIPTORS: HEADACHE
DESCRIPTORS: HEADACHE

## 2020-01-24 NOTE — PROGRESS NOTES
Training;Self-Care / ADL; Safety Education & Training      01/24/20 0900   Oral Hygiene   Assistance Needed Setup or clean-up assistance   CARE Score 5   Upper Body Dressing   Assistance Needed Supervision or touching assistance   CARE Score 4

## 2020-01-24 NOTE — CONSULTS
Palpations: Abdomen is soft. Musculoskeletal: Normal range of motion. Skin:     Comments: He has ecchymoses on his upper extremities. Neurological:      Mental Status: He is alert and oriented to person, place, and time. Comments: He is noticeably dysarthric. Psychiatric:         Mood and Affect: Mood normal.         Behavior: Behavior normal.       Recent Labs     01/21/20  1925 01/23/20  0524   WBC 10.3 11.3*   RBC 6.03 6.05   HGB 18.9* 19.0*   HCT 57.6* 57.3*    240   MCV 95.5* 94.7*   MCH 31.3* 31.4*   MCHC 32.8* 33.2   RDW 13.9 13.9      Recent Labs     01/23/20  0524      K 4.3      CO2 22   BUN 29*   CREATININE 1.1   CALCIUM 9.7   GLUCOSE 93      No results for input(s): PHART, RWK7HLJ, PO2ART, SVY5FPR, X9OWUAUU, BEART in the last 72 hours. Recent Labs     01/23/20  0524   CALCIUM 9.7     No results for input(s): BC, LABGRAM, CULTRESP, BFCX in the last 72 hours. Radiograph: Xr Chest Portable    Result Date: 1/24/2020  XR CHEST PORTABLE 1/24/2020 7:45 AM HISTORY:   Cough and wheezing  Single view. COMPARISONS:  4/26/2009 FINDINGS: The lungs are clear without infiltrates. The heart is normal in size, pulmonary circulation appropriate, without heart failure. The bony structures are intact without acute osseous abnormality. . Radiographically, chest is unchanged. No acute cardiopulmonary process. Signed by Dr Izzy Parker on 1/24/2020 12:27 PM    My radiograph interpretation/independent review of imaging: Chest x-ray shows no acute process. Other test results (not lab or imaging): His office records including previous pulmonary functions are reviewed. His last pulmonary function studies from September 2018 were consistent with a mild restrictive defect on spirometry with small airways disease, but actually borderline hyperinflation was noted on lung volumes. Diffusion capacity was within normal limits.   Independent review of ekg: Normal sinus rhythm with sinus arrhythmia. Problem list generated by [employer]:  Patient Active Problem List   Diagnosis    Impetigo    Skin tear of left upper arm without complication    Acute ischemic stroke [facility])     Pulmonary Assessment:  New problem (to me), with additional workup planned:  1. Recent stroke. New problem (to me), no additional workup planned:  1. Recent influenza A infection. Other problems either stable, failing to improve or worsenin. Obstructive airways disease which appears to be predominately due to an asthmatic picture rather than COPD. 2. Past history of tobacco use. Recommend/plan:   · Agree with continuing DuoNeb treatments and also utilizing incentive spirometry. He has been on Advair as an outpatient and will treat him with nebulized budesonide and formoterol while he is hospitalized. Thank you for this consult.   We will follow  Electronically signed by Xochilt Bowles on 20 at 4:28 PM

## 2020-01-24 NOTE — PROGRESS NOTES
[facility]  [facility]PATIENT SPEECH [facility]ERAPY  [facility] 8 REHAB UNIT  TIME   Time In: 1000  Time Out: 1100  Minutes: 60       [x]Daily Note  []Progress Note    Date: 2020  Patient Name: Ke Nguyễn        MRN: [id number]    Account #: [de-identified]  : 1955  (66 y.o.)  Gender: male   Primary Provider: Lakshmi Wilson MD  Precautions: Fall  Swallowing Status/Diet: Regular consistencies with thin liquids      Subjective: Patient was alert and cooperative with all tasks. No family present during treatment time. Objective:  Patient was able to recall recent therapies including details of therapies with mild verbal cues required to give more details at times. Patient completed a simple problem solving worksheet. Patient was required to identify a word that does note belong among a list of multiple words. Patient completed with 60% accuracy and required moderate verbal cues to complete at times. Patient completed a medication management task this date. Patient stated he manages his own medications at home. Patient was required to organize 3 medications for a week's supply. Patient completed with 80% accuracy and required mild verbal cues to complete at times. Patient demonstrated decreased thought organization and planning during the task. Patient continues with decreased cognitive-linguistic function and severe dysarthria. Patient does continue to progress towards goals. Will continue to follow. SHORT TERM GOAL #1:  Goal 1: The patient will demonstrate recall of functional information following a (short term) delay with minimal cues in order to increase functional integration in environment. SHORT TERM GOAL #2:  Goal 2: Identify the item that does not belong in a list of words presented auditorily with 100% accuracy and(min/mod/max) verbal, visual and tactile cues    SHORT TERM GOAL #3:  Goal 3: Identify appropriate solutions to a problem when presented with a field of 4 with minimal cues.     SHORT TERM

## 2020-01-24 NOTE — PLAN OF CARE
Problem: HEMODYNAMIC STATUS  Goal: Patient has stable vital signs and fluid balance  1/24/2020 0230 by Angella Olvera LPN  Outcome: Ongoing  1/23/2020 1549 by Aaron Russo RN  Outcome: Ongoing     Problem: ACTIVITY INTOLERANCE/IMPAIRED MOBILITY  Goal: Mobility/activity is maintained at optimum level for patient  1/24/2020 0230 by Angella Olvera LPN  Outcome: Ongoing  1/23/2020 1549 by Aaron Russo RN  Outcome: Ongoing     Problem: COMMUNICATION IMPAIRMENT  Goal: Ability to express needs and understand communication  1/24/2020 0230 by Angella Olvera LPN  Outcome: Ongoing  1/23/2020 1549 by Aaron Russo RN  Outcome: Ongoing     Problem: Falls - Risk of:  Goal: Will remain free from falls  Description  Will remain free from falls  1/24/2020 0230 by Angella Olvera LPN  Outcome: Ongoing  1/23/2020 1549 by Aaron Russo RN  Outcome: Ongoing  Goal: Absence of physical injury  Description  Absence of physical injury  1/24/2020 0230 by Angella Olvera LPN  Outcome: Ongoing  1/23/2020 1549 by Aaron Russo RN  Outcome: Ongoing     Problem: Infection:  Goal: Will remain free from infection  Description  Will remain free from infection  1/24/2020 0230 by Angella Olvera LPN  Outcome: Ongoing  1/23/2020 1549 by Aaron Russo RN  Outcome: Ongoing     Problem: Safety:  Goal: Ability to chew and swallow food without choking will improve  Description  Ability to chew and swallow food without choking will improve  1/24/2020 0230 by Angella Olvera LPN  Outcome: Ongoing  1/23/2020 1549 by Aaron Russo RN  Outcome: Ongoing  Goal: Signs and symptoms of aspiration will decrease  Description  Signs and symptoms of aspiration will decrease  1/24/2020 0230 by Angella Olvera LPN  Outcome: Ongoing  1/23/2020 1549 by Aaron Russo RN  Outcome: Ongoing     Problem: IP BLADDER/VOIDING  Goal: LTG - Patient will utilize adaptive techniques/equipment to complete bladder LPN  Outcome: Ongoing  1/23/2020 1549 by Dede Runner, RN  Outcome: Ongoing     Problem: SKIN INTEGRITY  Goal: STG - patient will maintain good skin integrity  1/24/2020 0230 by Angela Velázquez LPN  Outcome: Ongoing  1/23/2020 1549 by Dede Runner, RN  Outcome: Ongoing     Problem: PAIN  Goal: STG - Patient will verbalize an acceptable level of pain  1/24/2020 0230 by Angela Velázquez LPN  Outcome: Ongoing  1/23/2020 1549 by Dede Runner, RN  Outcome: Ongoing     Problem: Mobility - Impaired:  Goal: Mobility will improve  Description  Mobility will improve  1/24/2020 0230 by Angela Velázquez LPN  Outcome: Ongoing  1/23/2020 1549 by Dede Runner, RN  Outcome: Ongoing     Problem: Health Behavior:  Goal: Identification of resources available to assist in meeting health care needs will improve  Description  Identification of resources available to assist in meeting health care needs will improve  1/24/2020 0230 by Angela Velázquez LPN  Outcome: Ongoing  1/23/2020 1549 by Dede Runner, RN  Outcome: Ongoing     Problem: Nutritional:  Goal: Consumption of the prescribed amount of daily calories will improve  Description  Consumption of the prescribed amount of daily calories will improve  1/24/2020 0230 by Angela Velázquez LPN  Outcome: Ongoing  1/23/2020 1549 by Dede Runner, RN  Outcome: Ongoing

## 2020-01-24 NOTE — PROGRESS NOTES
Pt 3 pt gait pattern, lead w/ LLE one time then lead w/ RLE the next times. Pt showed R lateral deviation at times, kept downward gaze, and needed assistance at times keeping RW still when stepping to it. Pt showed better pelvic control, but would show posterolateral left shift at times. --   --    Distance 75'x2  --   --    Exercises   Comments  --  Pt performed Standing Static Tandem ex's in parallel bars working on looking upward keeping balance. --    Conditions Requiring Skilled Therapeutic Intervention   Body structures, Functions, Activity limitations  --   --  Decreased functional mobility ; Decreased ADL status; Decreased ROM; Decreased strength;Decreased safe awareness;Decreased cognition;Decreased endurance;Decreased balance;Decreased vision/visual deficit; Decreased fine motor control;Decreased coordination   Assessment  --   --  Pt continues to be very impulsive and quick w/ movements. Pt tolerated all activities well w/ minimal fatigue. Pt improved w/ amb, but continues to show some deviations, SEE AMB note. Pt performed Standing Static Tandem ex's in parallel bars well. Prognosis  --   --  Good   Activity Tolerance   Activity Tolerance  --   --  Patient Tolerated treatment well       Objective:  Patient able to recall morning meal and details of each therapy from previous date independently. Patient was oriented to person, place (specific hospital, floor/room number, city, and state) independently. Patient was oriented to correct month and year and required mild verbal cues to state correct day of the month and TJ.      Completed recall task. SLP provided patient's therapy schedule for the day. Patient was able to recall schedule after a 10-15 minute time delay independently.      Patient completed a simple problem solving/safety awareness task.  Patient able to state correct solutions to a pictured scenario involving ADLs with 90% accuracy with mild verbal cues required at times to give more details to the problem and/or solution.      Patient required to state what does not belong in a simple pictured scenario. Patient was able to identify problem or item that did not belong in 100% of opportunities.      Noted severe dysarthria at word, sentence, and conversation level this date. Patient's speech was considered to be 50-60% intelligible to unfamiliar listeners as characterized by decreased tongue ROM and strength. SLP provided patient with strategies (slower speech rate, over articulation, and elongations) to improve speech.     Patient continues with decreased problem solving and severe dysarthric speech. Will continue to follow.        RECORD REVIEW: Previous medical records, medications were reviewed at today's visit    IMPRESSION:  1. 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 Antiplatelet treatment  2. Hypertension-continue current medications and monitoring  3. Hyperlipidemia-continue statin  4. DVT prophylaxis-SCDs  5. GI-bowel regimen  6. Hypothyroidism-continue replacement therapy  7. Persistent cough and now some wheezing. Continue duo nebs and Tessalon Perles. Chest x-ray and pulmonary consult ordered. 8.  Urinary retention.   Continue in and out cath and Urecholine added  Staff next week

## 2020-01-25 ENCOUNTER — OUTSIDE FACILITY SERVICE (OUTPATIENT)
Dept: PULMONOLOGY | Facility: CLINIC | Age: 65
End: 2020-01-25

## 2020-01-25 PROCEDURE — 51798 US URINE CAPACITY MEASURE: CPT

## 2020-01-25 PROCEDURE — 99232 SBSQ HOSP IP/OBS MODERATE 35: CPT | Performed by: INTERNAL MEDICINE

## 2020-01-25 PROCEDURE — 97530 THERAPEUTIC ACTIVITIES: CPT

## 2020-01-25 PROCEDURE — 97116 GAIT TRAINING THERAPY: CPT

## 2020-01-25 PROCEDURE — 99253 IP/OBS CNSLTJ NEW/EST LOW 45: CPT | Performed by: NEUROLOGICAL SURGERY

## 2020-01-25 PROCEDURE — 51701 INSERT BLADDER CATHETER: CPT

## 2020-01-25 PROCEDURE — 97535 SELF CARE MNGMENT TRAINING: CPT

## 2020-01-25 PROCEDURE — 6370000000 HC RX 637 (ALT 250 FOR IP): Performed by: PSYCHIATRY & NEUROLOGY

## 2020-01-25 PROCEDURE — 92507 TX SP LANG VOICE COMM INDIV: CPT

## 2020-01-25 PROCEDURE — 97110 THERAPEUTIC EXERCISES: CPT

## 2020-01-25 PROCEDURE — 6360000002 HC RX W HCPCS: Performed by: INTERNAL MEDICINE

## 2020-01-25 PROCEDURE — 99232 SBSQ HOSP IP/OBS MODERATE 35: CPT | Performed by: PSYCHIATRY & NEUROLOGY

## 2020-01-25 PROCEDURE — 1180000000 HC REHAB R&B

## 2020-01-25 PROCEDURE — 94640 AIRWAY INHALATION TREATMENT: CPT

## 2020-01-25 RX ADMIN — IPRATROPIUM BROMIDE AND ALBUTEROL SULFATE 1 AMPULE: .5; 3 SOLUTION RESPIRATORY (INHALATION) at 22:55

## 2020-01-25 RX ADMIN — BETHANECHOL CHLORIDE 25 MG: 25 TABLET ORAL at 08:28

## 2020-01-25 RX ADMIN — BUDESONIDE 500 MCG: 0.5 INHALANT RESPIRATORY (INHALATION) at 06:26

## 2020-01-25 RX ADMIN — BENZONATATE 100 MG: 100 CAPSULE ORAL at 08:28

## 2020-01-25 RX ADMIN — FORMOTEROL FUMARATE DIHYDRATE 20 MCG: 20 SOLUTION RESPIRATORY (INHALATION) at 18:24

## 2020-01-25 RX ADMIN — FLUOXETINE HYDROCHLORIDE 20 MG: 20 CAPSULE ORAL at 08:28

## 2020-01-25 RX ADMIN — IPRATROPIUM BROMIDE AND ALBUTEROL SULFATE 3 ML: 2.5; .5 SOLUTION RESPIRATORY (INHALATION) at 11:04

## 2020-01-25 RX ADMIN — BENZONATATE 100 MG: 100 CAPSULE ORAL at 13:45

## 2020-01-25 RX ADMIN — BENZONATATE 100 MG: 100 CAPSULE ORAL at 21:32

## 2020-01-25 RX ADMIN — ACETAMINOPHEN 650 MG: 325 TABLET ORAL at 10:44

## 2020-01-25 RX ADMIN — AMLODIPINE BESYLATE 10 MG: 10 TABLET ORAL at 08:28

## 2020-01-25 RX ADMIN — LISINOPRIL 20 MG: 20 TABLET ORAL at 08:28

## 2020-01-25 RX ADMIN — LEVOTHYROXINE SODIUM 125 MCG: 0.1 TABLET ORAL at 06:06

## 2020-01-25 RX ADMIN — ASPIRIN 81 MG 81 MG: 81 TABLET ORAL at 08:28

## 2020-01-25 RX ADMIN — SENNOSIDES AND DOCUSATE SODIUM 1 TABLET: 8.6; 5 TABLET ORAL at 21:32

## 2020-01-25 RX ADMIN — BUDESONIDE 500 MCG: 0.5 INHALANT RESPIRATORY (INHALATION) at 18:23

## 2020-01-25 RX ADMIN — DOCUSATE SODIUM 100 MG: 100 CAPSULE, LIQUID FILLED ORAL at 21:32

## 2020-01-25 RX ADMIN — FORMOTEROL FUMARATE DIHYDRATE 20 MCG: 20 SOLUTION RESPIRATORY (INHALATION) at 06:26

## 2020-01-25 RX ADMIN — IPRATROPIUM BROMIDE AND ALBUTEROL SULFATE 3 ML: 2.5; .5 SOLUTION RESPIRATORY (INHALATION) at 06:19

## 2020-01-25 RX ADMIN — IPRATROPIUM BROMIDE AND ALBUTEROL SULFATE 3 ML: 2.5; .5 SOLUTION RESPIRATORY (INHALATION) at 14:53

## 2020-01-25 RX ADMIN — ATORVASTATIN CALCIUM 80 MG: 80 TABLET, FILM COATED ORAL at 21:32

## 2020-01-25 RX ADMIN — IPRATROPIUM BROMIDE AND ALBUTEROL SULFATE 3 ML: 2.5; .5 SOLUTION RESPIRATORY (INHALATION) at 19:02

## 2020-01-25 RX ADMIN — ACETAMINOPHEN 650 MG: 325 TABLET ORAL at 04:03

## 2020-01-25 ASSESSMENT — PAIN SCALES - GENERAL
PAINLEVEL_OUTOF10: 8
PAINLEVEL_OUTOF10: 3
PAINLEVEL_OUTOF10: 5
PAINLEVEL_OUTOF10: 4

## 2020-01-25 ASSESSMENT — PAIN DESCRIPTION - PAIN TYPE: TYPE: ACUTE PAIN

## 2020-01-25 ASSESSMENT — PAIN DESCRIPTION - LOCATION: LOCATION: GENERALIZED

## 2020-01-25 NOTE — PROGRESS NOTES
01/23/2020     01/23/2020    CO2 22 01/23/2020    BUN 29 (H) 01/23/2020    CREATININE 1.1 01/23/2020    GLUCOSE 93 01/23/2020    CALCIUM 9.7 01/23/2020    LABGLOM >60 01/23/2020   No results found for: INRNo results found for: PHENYTOIN, ESR, CRP  Objective     Balance  Sitting Balance: Stand by assistance  Standing Balance: Minimal assistance  Functional Mobility  Functional - Mobility Device: Rolling Walker  Activity: Other  Assist Level: Minimal assistance  Functional Mobility Comments: Short distance, severe ataxia BUE/BLE   Transfers  Sit to stand: Minimal assistance  Stand to sit: Minimal assistance  Type of ROM/Therapeutic Exercise  Type of ROM/Therapeutic Exercise: Rossi  Comment: 15#; 4 sets of 20   Hand Dominance  Hand Dominance: Right  Left Hand Strength -  (lbs)  Handle Setting 2: 53.5  Left 9-Hole Peg Test  Left 9-Hole Peg Test: Impaired  Right Hand Strength -  (lbs)  Handle Setting 2: 59.7  Right 9-Hole Peg Test  Right 9-Hole Peg Test: Impaired  Fine Motor Skills  Left 9-Hole Peg Test: Impaired  Left 9 Hole Peg Test Time (secs): 72  Right 9-Hole Peg Test: Impaired  Right 9 Hole Peg Test Time (secs): 99       01/23/20 1142 01/23/20 1144 01/23/20 1145   General   Response To Previous Treatment Patient with no complaints from previous session. --   --    Family / Caregiver Present No  --   --    Subjective   Subjective Pt agreed to therapy this morning. --   --    Bed Mobility   Rolling Stand by assistance  --   --    Supine to Sit Stand by assistance  --   --    Sit to Supine Stand by assistance  --   --    Transfers   Sit to Stand Contact guard assistance  --   --    Stand to sit Contact guard assistance  --   --    Bed to Chair Contact guard assistance  --   --    Ambulation   Ambulation?  Yes  --   --    Ambulation 2   Surface - 2 level tile  --   --    Device 2 Rolling Walker  --   --    Other Apparatus 2 Wheelchair follow  --   --    Assistance 2 Contact guard assistance  --   -- times to give more details to the problem and/or solution.      Patient required to state what does not belong in a simple pictured scenario. Patient was able to identify problem or item that did not belong in 100% of opportunities.      Noted severe dysarthria at word, sentence, and conversation level this date. Patient's speech was considered to be 50-60% intelligible to unfamiliar listeners as characterized by decreased tongue ROM and strength. SLP provided patient with strategies (slower speech rate, over articulation, and elongations) to improve speech.     Patient continues with decreased problem solving and severe dysarthric speech. Will continue to follow.        RECORD REVIEW: Previous medical records, medications were reviewed at today's visit    IMPRESSION:  1. 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 Antiplatelet treatment. Follow-up CT scan 1/24 showing no change in ventricular size nor any new difficulties. Spoke with radiologist.  2.  Hypertension-continue current medications and monitoring  3. Hyperlipidemia-continue statin  4. DVT prophylaxis-SCDs  5. GI-bowel regimen  6. Hypothyroidism-continue replacement therapy  7. Persistent cough and now some wheezing. Continue duo nebs and Tessalon Perles. Chest x-ray unremarkable. Pulmonology has seen  8. Urinary retention.   Continue in and out cath and Urecholine added  Staff next week

## 2020-01-25 NOTE — PROGRESS NOTES
Physical Therapy  Name: Colton Wooten  MRN:  [id number]  Date of service:  1/25/2020 01/25/20 1350   Restrictions/Precautions   Restrictions/Precautions Fall Risk;Seizure   General   Family / Caregiver Present No  (family stayed back in room during session)   Subjective   Subjective Pt w/o complaint. Ready to go to therapy and quick to get to [address]   Pain Screening   Patient Currently in Pain No   Ambulation 2   Surface - 2 level tile   Device 2 Rolling Walker   Other Apparatus 2 Wheelchair follow   Assistance 2 Contact guard assistance;Minimal assistance   Quality of Gait 2 Step to gt pattern; leans/pushes to R and is bothered by tension applied to belt to correct this   Distance 75'   Exercises   Hamstring Sets 20 HS curls with green band   Hip Flexion 2/10 with 2# wt   Hip Abduction x 20 ABD with green band and isometric ball ADD   Knee Long Arc Quad 2/10 with 2# wt   Ankle Pumps 20 ankle DF with green band   Comments ex performed seated in WC with repetitive cues to slow down and control motion   Other exercises   Other exercises?  Yes  (wild motion;frequent cues to slow down and control motion)   Other exercises 1 standing mini squats x 10   Other exercises 2 standing hip abd x 10   Other exercises 3 standing marching x 10   Other exercises 4 standing hip ext x 10   Other exercises 5 sidestepping in // bars x 2 each direction   Other exercises 6  4\" step ups fwd in // bars x 10 ea LE   Other exercises 7 LBE x 2.5 min    Short term goals   Time Frame for Short term goals 1-2 weeks   Short term goal 1 indep bed mobility   Short term goal 2 CGA transfers with AD   Short term goal 3 indep 150ft WC propulsion    Short term goal 4 ' amb with AD   Short term goal 5 min A 1 step with AD   Long term goals   Time Frame for Long term goals  2-3 weeks   Long term goal 1 indep transfers with AD   Long term goal 2 indep 150ft amb with AD   Long term goal 3 CGA 1 step with AD   Conditions Requiring Skilled Therapeutic

## 2020-01-25 NOTE — PROGRESS NOTES
Occupational Therapy  Facility/Department: [facility] 8 REHAB UNIT  Daily Treatment Note  NAME: Renan Dinh  : 1955  MRN: [id number]    Date of Service: 2020    Discharge Recommendations:  Continue to assess pending progress       Assessment   Assessment: Continuing to follow for ADL training, functional mobility training, and coordination. Treatment Diagnosis: R PICA territory w/asociated blood products in the infarcted brain parenchyma and trace L cerebral convexity subdural hemorrhage  Activity Tolerance  Activity Tolerance: Patient Tolerated treatment well  Safety Devices  Safety Devices in place: Yes  Type of devices: Bed alarm in place;Call light within reach; Left in bed         Patient Diagnosis(es): There were no encounter diagnoses. has a past medical history of Arthritis, Asthma, COPD (chronic obstructive pulmonary disease) ([facility] [address] Diabetes mellitus ([facility] [address] Hypertension, Seizures ([facility] [address] and Thyroid disease. has a past surgical history that includes Arm Surgery (Left); Mandible surgery; knee surgery (Right); Eye surgery (Right); and pr colonoscopy flx dx w/collj spec when pfrmd (N/A, 2018). Restrictions  Restrictions/Precautions  Restrictions/Precautions: Fall Risk, Seizure  Subjective   General  Chart Reviewed: Yes  Patient assessed for rehabilitation services?: Yes  Family / Caregiver Present: No  Diagnosis: R PICA territory w/asociated blood products in the infarcted brain parenchyma and trace L cerebral convexity subdural hemorrhage  General Comment  Comments: Neurosurgery consult planned for today.  Nurse victoria therapy  Vital Signs  Patient Currently in Pain: No   Orientation     Objective         [address] Needed Partial/moderate assistance   Comment Min A for clothing management, supervision for toilet hygiene in sitting   CARE Score 3           Standing Balance  Activity: CGA for one handed task with minimal dynamics, tendency to list toward right side  Functional bathing hygiene. Long term goal 2: Supervision with LB dressing. Long term goal 3: Supervision with clothing management/hygiene for toileting. Long term goal 4: Supervision with toilet transfers. Long term goal 5: SBA with ambulatory home making tasks. Long term goals 6: Patient verbalize DME needs. Patient Goals   Patient goals : Return home independently.         Therapy Time   Individual Concurrent Group Co-treatment   Time In 0940         Time Out 9092         Minutes [address] Electronically signed by Megan Camacho OT on 1/25/2020 at 12:21 PM

## 2020-01-25 NOTE — PLAN OF CARE
Problem: HEMODYNAMIC STATUS  Goal: Patient has stable vital signs and fluid balance  Outcome: Ongoing     Problem: ACTIVITY INTOLERANCE/IMPAIRED MOBILITY  Goal: Mobility/activity is maintained at optimum level for patient  Outcome: Ongoing     Problem: COMMUNICATION IMPAIRMENT  Goal: Ability to express needs and understand communication  Outcome: Ongoing     Problem: Falls - Risk of:  Goal: Will remain free from falls  Description  Will remain free from falls  Outcome: Ongoing  Goal: Absence of physical injury  Description  Absence of physical injury  Outcome: Ongoing     Problem: Infection:  Goal: Will remain free from infection  Description  Will remain free from infection  Outcome: Ongoing     Problem: Safety:  Goal: Ability to chew and swallow food without choking will improve  Description  Ability to chew and swallow food without choking will improve  Outcome: Ongoing  Goal: Signs and symptoms of aspiration will decrease  Description  Signs and symptoms of aspiration will decrease  Outcome: Ongoing     Problem: IP BLADDER/VOIDING  Goal: LTG - Patient will utilize adaptive techniques/equipment to complete bladder elimination  Outcome: Ongoing  Goal: STG - Patient demonstrates no accidents  Outcome: Ongoing  Goal: STG - Patient will state signs and symptoms of UTI  Outcome: Ongoing     Problem: IP BOWEL ELIMINATION  Goal: LTG - patient will have regular and routine bowel evacuation  Outcome: Ongoing     Problem: IP BREATHING  Goal: LTG - patient will mobilize secretions and maintain airway  Outcome: Ongoing     Problem: NUTRITION  Goal: Patient maintains adequate hydration  Outcome: Ongoing     Problem: SAFETY  Goal: LTG - Patient will demonstrate safety requirements appropriate to situation/environment  Outcome: Ongoing  Goal: LTG - patient will utilize safety techniques  Outcome: Ongoing  Goal: STG - patient locks brakes on wheelchair  Outcome: Ongoing  Goal: STG - Patient uses call light consistently to request assistance with transfers  Outcome: Ongoing  Goal: STG - patient uses gait belt during all transfers  Outcome: Ongoing     Problem: SKIN INTEGRITY  Goal: STG - patient will maintain good skin integrity  Outcome: Ongoing     Problem: PAIN  Goal: STG - Patient will verbalize an acceptable level of pain  Outcome: Ongoing     Problem: Mobility - Impaired:  Goal: Mobility will improve  Description  Mobility will improve  Outcome: Ongoing     Problem: Health Behavior:  Goal: Identification of resources available to assist in meeting health care needs will improve  Description  Identification of resources available to assist in meeting health care needs will improve  Outcome: Ongoing     Problem: Nutritional:  Goal: Consumption of the prescribed amount of daily calories will improve  Description  Consumption of the prescribed amount of daily calories will improve  Outcome: Ongoing

## 2020-01-25 NOTE — PROGRESS NOTES
TID    FLUoxetine  20 mg Oral Daily    levothyroxine  125 mcg Oral Daily    amLODIPine  10 mg Oral Daily    aspirin  81 mg Oral Daily    atorvastatin  80 mg Oral Nightly    lisinopril  20 mg Oral Daily    polyethylene glycol  17 g Oral Daily    sennosides-docusate sodium  1 tablet Oral Nightly    ipratropium-albuterol  1 vial Inhalation 4x Daily    docusate sodium  100 mg Oral BID           ASSESSMENT:   1. Recent stroke. New problem (to me), no additional workup planned:  1. Recent influenza A infection. Other problems either stable, failing to improve or worsenin. Obstructive airways disease which appears to be predominately due to an asthmatic picture rather than COPD. 2. Past history of tobacco use. PLAN:  Continue symptomatic treatment with bronchodilators incentive spirometry  Ambulate with physical therapy  Watch for signs of aspiration  Slowly improving  If stable will be followed up on Tuesday by Dr. Gail Crockett. Please call if any concerns arises.     Amy Sayied  20  10:14 AM

## 2020-01-26 ENCOUNTER — APPOINTMENT (OUTPATIENT)
Dept: CT IMAGING | Age: 65
DRG: 057 | End: 2020-01-26
Attending: PSYCHIATRY & NEUROLOGY
Payer: MEDICAID

## 2020-01-26 PROCEDURE — 94669 MECHANICAL CHEST WALL OSCILL: CPT

## 2020-01-26 PROCEDURE — 6360000002 HC RX W HCPCS: Performed by: INTERNAL MEDICINE

## 2020-01-26 PROCEDURE — 99231 SBSQ HOSP IP/OBS SF/LOW 25: CPT | Performed by: NEUROLOGICAL SURGERY

## 2020-01-26 PROCEDURE — 51798 US URINE CAPACITY MEASURE: CPT

## 2020-01-26 PROCEDURE — 2700000000 HC OXYGEN THERAPY PER DAY

## 2020-01-26 PROCEDURE — 6370000000 HC RX 637 (ALT 250 FOR IP): Performed by: PSYCHIATRY & NEUROLOGY

## 2020-01-26 PROCEDURE — 94640 AIRWAY INHALATION TREATMENT: CPT

## 2020-01-26 PROCEDURE — 1180000000 HC REHAB R&B

## 2020-01-26 PROCEDURE — 70450 CT HEAD/BRAIN W/O DYE: CPT

## 2020-01-26 PROCEDURE — 51701 INSERT BLADDER CATHETER: CPT

## 2020-01-26 RX ADMIN — FORMOTEROL FUMARATE DIHYDRATE 20 MCG: 20 SOLUTION RESPIRATORY (INHALATION) at 06:29

## 2020-01-26 RX ADMIN — IPRATROPIUM BROMIDE AND ALBUTEROL SULFATE 3 ML: 2.5; .5 SOLUTION RESPIRATORY (INHALATION) at 18:22

## 2020-01-26 RX ADMIN — LEVOTHYROXINE SODIUM 125 MCG: 0.1 TABLET ORAL at 05:47

## 2020-01-26 RX ADMIN — ASPIRIN 81 MG 81 MG: 81 TABLET ORAL at 08:09

## 2020-01-26 RX ADMIN — IPRATROPIUM BROMIDE AND ALBUTEROL SULFATE 3 ML: 2.5; .5 SOLUTION RESPIRATORY (INHALATION) at 06:15

## 2020-01-26 RX ADMIN — BENZONATATE 100 MG: 100 CAPSULE ORAL at 12:50

## 2020-01-26 RX ADMIN — FLUOXETINE HYDROCHLORIDE 20 MG: 20 CAPSULE ORAL at 08:09

## 2020-01-26 RX ADMIN — IPRATROPIUM BROMIDE AND ALBUTEROL SULFATE 3 ML: 2.5; .5 SOLUTION RESPIRATORY (INHALATION) at 10:03

## 2020-01-26 RX ADMIN — IPRATROPIUM BROMIDE AND ALBUTEROL SULFATE 3 ML: 2.5; .5 SOLUTION RESPIRATORY (INHALATION) at 12:53

## 2020-01-26 RX ADMIN — AMLODIPINE BESYLATE 10 MG: 10 TABLET ORAL at 08:09

## 2020-01-26 RX ADMIN — BENZONATATE 100 MG: 100 CAPSULE ORAL at 20:14

## 2020-01-26 RX ADMIN — BENZONATATE 100 MG: 100 CAPSULE ORAL at 08:09

## 2020-01-26 RX ADMIN — DOCUSATE SODIUM 100 MG: 100 CAPSULE, LIQUID FILLED ORAL at 20:14

## 2020-01-26 RX ADMIN — IPRATROPIUM BROMIDE AND ALBUTEROL SULFATE 1 AMPULE: .5; 3 SOLUTION RESPIRATORY (INHALATION) at 15:34

## 2020-01-26 RX ADMIN — SENNOSIDES AND DOCUSATE SODIUM 1 TABLET: 8.6; 5 TABLET ORAL at 20:14

## 2020-01-26 RX ADMIN — ATORVASTATIN CALCIUM 80 MG: 80 TABLET, FILM COATED ORAL at 20:14

## 2020-01-26 RX ADMIN — FORMOTEROL FUMARATE DIHYDRATE 20 MCG: 20 SOLUTION RESPIRATORY (INHALATION) at 18:29

## 2020-01-26 RX ADMIN — BUDESONIDE 500 MCG: 0.5 INHALANT RESPIRATORY (INHALATION) at 18:21

## 2020-01-26 RX ADMIN — BUDESONIDE 500 MCG: 0.5 INHALANT RESPIRATORY (INHALATION) at 06:29

## 2020-01-26 RX ADMIN — IPRATROPIUM BROMIDE AND ALBUTEROL SULFATE 1 AMPULE: .5; 3 SOLUTION RESPIRATORY (INHALATION) at 04:17

## 2020-01-26 RX ADMIN — LISINOPRIL 20 MG: 20 TABLET ORAL at 08:09

## 2020-01-26 NOTE — PROGRESS NOTES
[facility]  Progress Note    LAST 24 HRS:  No new issues. Speech remains dysarthric. CT this AM is stable. CHIEF COMPLAINT:  Cerebellar stroke    HISTORY OF PRESENT ILLNESS:      63 y.o. right handed male initially admitted to [facility] on on 1/13/2020 for stroke evaluation. At that time he has felt dizzy, had poor coordination w/his R arm and R leg, numbness in his face, feet and hands. MRI showed a large area of acute infarct in the R PICA territory w/asociated blood products in the infarcted brain parenchyma and trace L cerebral convexity subdural hemorrhage.  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 Dr. Catherine Preston w/neuro and Dr. Ashley Montes w/neurosurgery were consulted. Dr Ashley Montes did not see surgical intervention to be necessary. Pt was initially admitted to ICU but was moved to neuro floor on 1/17/2020. Notes from [facility] from 8/5/16 indicates pt has a hx of seizures, but pt has not been on any anticonvulsants. Pt states his last seizure was in Dec 2015. He is being treated w/tamiflu. He is now medically stable, but continues to be ataxic w/his gait, dysarthric, as well as coordination issues w/R UE/LE. I was asked to follow due to possible edema and need for surgical intervention. On ASA.       Past Medical History:   Diagnosis Date    Arthritis     Asthma     [facility]     Diabetes mellitus ([facility]     Hypertension     Seizures (HCC)     Pt states last seizure was in December 2015    Thyroid disease        Past Surgical History:   Procedure Laterality Date    ARM SURGERY Left     EYE SURGERY Right     KNEE SURGERY Right     MANDIBLE SURGERY      IL COLONOSCOPY FLX DX W/COLLJ SPEC WHEN PFRMD N/A 6/26/2018    Dr Gabriel Gonzales yr recall        Medications    Current Facility-Administered Medications:     ipratropium-albuterol (DUONEB) nebulizer solution 1 ampule, 1 ampule, Inhalation, Q4H PRN, Dashawn Carpenter MD, 1 ampule at 01/26/20 0417    meclizine (ANTIVERT) tablet 25 mg, 25 mg, Oral, TID PRN, Dashawn Carpenter MD, 25 mg at 01/24/20 1312    budesonide (PULMICORT) nebulizer suspension 500 mcg, 0.5 mg, Nebulization, BID, Saskia Delaney MD, 500 mcg at 01/26/20 4919    formoterol (PERFOROMIST) nebulizer solution 20 mcg, 20 mcg, Nebulization, BID, Saskia Delaney MD, 20 mcg at 01/26/20 5954    benzonatate (TESSALON) capsule 100 mg, 100 mg, Oral, TID, Dashawn Carpenter MD, 100 mg at 01/25/20 2132    FLUoxetine (PROZAC) capsule 20 mg, 20 mg, Oral, Daily, Dashawn Carpenter MD, 20 mg at 01/25/20 4678    ondansetron (ZOFRAN-ODT) disintegrating tablet 4 mg, 4 mg, Oral, Q8H PRN, Dashawn Carpenter MD, 4 mg at 01/24/20 1147    levothyroxine (SYNTHROID) tablet 125 mcg, 125 mcg, Oral, Daily, Dashawn Carpenter MD, 125 mcg at 01/26/20 0547    amLODIPine (NORVASC) tablet 10 mg, 10 mg, Oral, Daily, Dashawn Carpenter MD, 10 mg at 01/25/20 1002    aspirin chewable tablet 81 mg, 81 mg, Oral, Daily, Dashawn Carpenter MD, 81 mg at 01/25/20 0568    atorvastatin (LIPITOR) tablet 80 mg, 80 mg, Oral, Nightly, Dashawn Carpenter MD, 80 mg at 01/25/20 2132    lisinopril (PRINIVIL;ZESTRIL) tablet 20 mg, 20 mg, Oral, Daily, Dashawn Carpenter MD, 20 mg at 01/25/20 5379    polyethylene glycol (GLYCOLAX) packet 17 g, 17 g, Oral, Daily, Dashawn Carpenter MD, 17 g at 01/24/20 0663    sennosides-docusate sodium (SENOKOT-S) 8.6-50 MG tablet 1 tablet, 1 tablet, Oral, Nightly, Dashawn Carpenter MD, 1 tablet at 01/25/20 2132    ipratropium-albuterol (DUONEB) nebulizer solution 3 mL, 1 vial, Inhalation, 4x Daily, Dashawn Carpenter MD, 3 mL at 01/26/20 0615    acetaminophen (TYLENOL) tablet 650 mg, 650 mg, Oral, Q4H PRN, Dashawn Carpenter MD, 650 mg at 01/25/20 1044    docusate sodium (COLACE) capsule 100 mg, 100 mg, Oral, BID, Dashawn Carpenter MD, 100 mg at 01/25/20 2132    bisacodyl (DULCOLAX) suppository 10 mg, 10 mg, Rectal, Daily Shyanne HUMPHREYS MD Codeine    Social History  Social History     Tobacco Use   Smoking Status Former Smoker    Packs/day: 1.00    Years: 40.00    Pack years: 40.00    Types: Cigarettes   Smokeless Tobacco Never Used     Social History     Substance and Sexual Activity   Alcohol Use No         Family History   Problem Relation Age of Onset    Breast Cancer Mother     Heart Attack Father     High Blood Pressure Sister     High Blood Pressure Brother     Colon Cancer Neg Hx     Colon Polyps Neg Hx     Liver Cancer Neg Hx     Liver Disease Neg Hx     Esophageal Cancer Neg Hx     Rectal Cancer Neg Hx     Stomach Cancer Neg Hx          REVIEW OF SYSTEMS:  Constitutional: No fevers No chills  Neck:No stiffness  Respiratory: No shortness of breath  Cardiovascular: No chest pain No palpitations  Gastrointestinal: No abdominal pain    Genitourinary: No Dysuria  Neurological: No headache, no confusion    PHYSICAL EXAM:  Vitals:    01/26/20 0615   BP:    Pulse:    Resp: 16   Temp:    SpO2: 94%       Constitutional: The patient appears well-developed and well-nourished. Eyes - conjunctiva normal.  Conjugate Gaze  Ear, nose, throat - No scars, masses, or lesions over external nose or ears, no atrophy of tongue  Neck-symmetric, no masses noted, no jugular vein distension  Respiration- chest wall appears symmetric, good expansion, normal effort without use of accessory muscles  Musculoskeletal - no significant wasting of muscles noted, no bony deformities  Extremities-no clubbing, cyanosis or edema  Skin - warm, dry, and intact. No rash, erythema, or pallor.   Psychiatric - mood, affect, and behavior appear normal.     Neurologic Examination  Awake, Alert and oriented x 4  Normal speech pattern, following commands, some dysarthria  Motor 5/5 all extremities   Bilateral dysmetria noted  PERRL, EOMI,   CN II-XII grossly intact  No deficits to light touch     DATA:  Nursing/pcp notes, imaging,labs and vitals the previous study the   ventricles are enlarged but the previous study that I have available   is from more than 10 years ago. It is difficult to ascertain whether   there has been any progressive dilatation of the ventricles on the   basis of that comparison. If the patient had a recent outside CT exam   thin it may be worthwhile to compare the current study with that   previous exam. There is mild dilatation of the temporal horns of both   lateral ventricles. No evidence of hemorrhagic conversion of the   infarcts. 2. Atrophy and small vessel disease. 3. I notified the patient's nurse on the rehabilitation floor at 10:30   PM of the finding of bilateral cerebellar infarcts with some mass   effect on the cerebral aqueduct.    Signed by Dr Michelle John on 1/24/2020 10:33 PM         IMPRESSION  63year old male with bilateral cerebellar infarctions     RECOMMENDATIONS:    No emergent neurosurgical intervention needed at this time  CT this AM is stable, no change in ventricular size  Will follow along      Marita Rucker DO

## 2020-01-26 NOTE — PLAN OF CARE
Guanaco Kirby LPN  Outcome: Ongoing  1/25/2020 1556 by Kanchan Perdomo RN  Outcome: Ongoing     Problem: SKIN INTEGRITY  Goal: STG - patient will maintain good skin integrity  1/26/2020 0119 by Guanaco Kirby LPN  Outcome: Ongoing  1/25/2020 1556 by Kanchan Perdomo RN  Outcome: Ongoing     Problem: PAIN  Goal: STG - Patient will verbalize an acceptable level of pain  1/26/2020 0119 by Guanaco Kirby LPN  Outcome: Ongoing  1/25/2020 1556 by Kanchan Perdomo RN  Outcome: Ongoing     Problem: Mobility - Impaired:  Goal: Mobility will improve  Description  Mobility will improve  1/26/2020 0119 by Guanaco Kirby LPN  Outcome: Ongoing  1/25/2020 1556 by Kanchan Perdomo RN  Outcome: Ongoing     Problem: Health Behavior:  Goal: Identification of resources available to assist in meeting health care needs will improve  Description  Identification of resources available to assist in meeting health care needs will improve  1/26/2020 0119 by Guanaco Kirby LPN  Outcome: Ongoing  1/25/2020 1556 by Kanchan Perdomo RN  Outcome: Ongoing     Problem: Nutritional:  Goal: Consumption of the prescribed amount of daily calories will improve  Description  Consumption of the prescribed amount of daily calories will improve  1/26/2020 0119 by Guanaco Kirby LPN  Outcome: Ongoing  1/25/2020 1556 by Kanchan Perdomo RN  Outcome: Ongoing

## 2020-01-27 ENCOUNTER — OUTSIDE FACILITY SERVICE (OUTPATIENT)
Dept: PULMONOLOGY | Facility: CLINIC | Age: 65
End: 2020-01-27

## 2020-01-27 LAB
ANION GAP SERPL CALCULATED.3IONS-SCNC: 16 MMOL/L (ref 7–19)
BASOPHILS ABSOLUTE: 0.2 K/UL (ref 0–0.2)
BASOPHILS RELATIVE PERCENT: 1.3 % (ref 0–1)
BUN BLDV-MCNC: 19 MG/DL (ref 8–23)
CALCIUM SERPL-MCNC: 9.5 MG/DL (ref 8.8–10.2)
CHLORIDE BLD-SCNC: 101 MMOL/L (ref 98–111)
CO2: 23 MMOL/L (ref 22–29)
CREAT SERPL-MCNC: 0.9 MG/DL (ref 0.5–1.2)
EOSINOPHILS ABSOLUTE: 1.5 K/UL (ref 0–0.6)
EOSINOPHILS RELATIVE PERCENT: 12.7 % (ref 0–5)
GFR NON-AFRICAN AMERICAN: >60
GLUCOSE BLD-MCNC: 96 MG/DL (ref 74–109)
HCT VFR BLD CALC: 54.2 % (ref 42–52)
HEMOGLOBIN: 18.1 G/DL (ref 14–18)
IMMATURE GRANULOCYTES #: 0.1 K/UL
LYMPHOCYTES ABSOLUTE: 2.7 K/UL (ref 1.1–4.5)
LYMPHOCYTES RELATIVE PERCENT: 22.3 % (ref 20–40)
MCH RBC QN AUTO: 31.8 PG (ref 27–31)
MCHC RBC AUTO-ENTMCNC: 33.4 G/DL (ref 33–37)
MCV RBC AUTO: 95.3 FL (ref 80–94)
MONOCYTES ABSOLUTE: 1.4 K/UL (ref 0–0.9)
MONOCYTES RELATIVE PERCENT: 11.3 % (ref 0–10)
NEUTROPHILS ABSOLUTE: 6.3 K/UL (ref 1.5–7.5)
NEUTROPHILS RELATIVE PERCENT: 52 % (ref 50–65)
PDW BLD-RTO: 13.5 % (ref 11.5–14.5)
PLATELET # BLD: 254 K/UL (ref 130–400)
PMV BLD AUTO: 10.4 FL (ref 9.4–12.4)
POTASSIUM REFLEX MAGNESIUM: 4.8 MMOL/L (ref 3.5–5)
RBC # BLD: 5.69 M/UL (ref 4.7–6.1)
SODIUM BLD-SCNC: 140 MMOL/L (ref 136–145)
WBC # BLD: 12.2 K/UL (ref 4.8–10.8)

## 2020-01-27 PROCEDURE — 97530 THERAPEUTIC ACTIVITIES: CPT

## 2020-01-27 PROCEDURE — 2700000000 HC OXYGEN THERAPY PER DAY

## 2020-01-27 PROCEDURE — 51798 US URINE CAPACITY MEASURE: CPT

## 2020-01-27 PROCEDURE — 80048 BASIC METABOLIC PNL TOTAL CA: CPT

## 2020-01-27 PROCEDURE — 94640 AIRWAY INHALATION TREATMENT: CPT

## 2020-01-27 PROCEDURE — 6370000000 HC RX 637 (ALT 250 FOR IP): Performed by: PSYCHIATRY & NEUROLOGY

## 2020-01-27 PROCEDURE — 85025 COMPLETE CBC W/AUTO DIFF WBC: CPT

## 2020-01-27 PROCEDURE — 6360000002 HC RX W HCPCS: Performed by: INTERNAL MEDICINE

## 2020-01-27 PROCEDURE — 94669 MECHANICAL CHEST WALL OSCILL: CPT

## 2020-01-27 PROCEDURE — 97110 THERAPEUTIC EXERCISES: CPT

## 2020-01-27 PROCEDURE — 36415 COLL VENOUS BLD VENIPUNCTURE: CPT

## 2020-01-27 PROCEDURE — 1180000000 HC REHAB R&B

## 2020-01-27 PROCEDURE — 97535 SELF CARE MNGMENT TRAINING: CPT

## 2020-01-27 PROCEDURE — 99232 SBSQ HOSP IP/OBS MODERATE 35: CPT | Performed by: INTERNAL MEDICINE

## 2020-01-27 PROCEDURE — 92507 TX SP LANG VOICE COMM INDIV: CPT

## 2020-01-27 PROCEDURE — 99232 SBSQ HOSP IP/OBS MODERATE 35: CPT | Performed by: PSYCHIATRY & NEUROLOGY

## 2020-01-27 PROCEDURE — 51701 INSERT BLADDER CATHETER: CPT

## 2020-01-27 PROCEDURE — 99231 SBSQ HOSP IP/OBS SF/LOW 25: CPT | Performed by: NEUROLOGICAL SURGERY

## 2020-01-27 RX ORDER — METHYLPREDNISOLONE SODIUM SUCCINATE 125 MG/2ML
40 INJECTION, POWDER, LYOPHILIZED, FOR SOLUTION INTRAMUSCULAR; INTRAVENOUS EVERY 8 HOURS
Status: DISCONTINUED | OUTPATIENT
Start: 2020-01-27 | End: 2020-01-31

## 2020-01-27 RX ADMIN — IPRATROPIUM BROMIDE AND ALBUTEROL SULFATE 3 ML: 2.5; .5 SOLUTION RESPIRATORY (INHALATION) at 14:16

## 2020-01-27 RX ADMIN — AMLODIPINE BESYLATE 10 MG: 10 TABLET ORAL at 08:28

## 2020-01-27 RX ADMIN — BENZONATATE 100 MG: 100 CAPSULE ORAL at 13:13

## 2020-01-27 RX ADMIN — ASPIRIN 81 MG 81 MG: 81 TABLET ORAL at 08:28

## 2020-01-27 RX ADMIN — BUDESONIDE 500 MCG: 0.5 INHALANT RESPIRATORY (INHALATION) at 19:30

## 2020-01-27 RX ADMIN — LEVOTHYROXINE SODIUM 125 MCG: 0.1 TABLET ORAL at 05:34

## 2020-01-27 RX ADMIN — METHYLPREDNISOLONE SODIUM SUCCINATE 40 MG: 125 INJECTION, POWDER, FOR SOLUTION INTRAMUSCULAR; INTRAVENOUS at 13:13

## 2020-01-27 RX ADMIN — IPRATROPIUM BROMIDE AND ALBUTEROL SULFATE 3 ML: 2.5; .5 SOLUTION RESPIRATORY (INHALATION) at 19:30

## 2020-01-27 RX ADMIN — FORMOTEROL FUMARATE DIHYDRATE 20 MCG: 20 SOLUTION RESPIRATORY (INHALATION) at 06:20

## 2020-01-27 RX ADMIN — IPRATROPIUM BROMIDE AND ALBUTEROL SULFATE 3 ML: 2.5; .5 SOLUTION RESPIRATORY (INHALATION) at 06:26

## 2020-01-27 RX ADMIN — LISINOPRIL 20 MG: 20 TABLET ORAL at 08:27

## 2020-01-27 RX ADMIN — SENNOSIDES AND DOCUSATE SODIUM 1 TABLET: 8.6; 5 TABLET ORAL at 21:50

## 2020-01-27 RX ADMIN — DOCUSATE SODIUM 100 MG: 100 CAPSULE, LIQUID FILLED ORAL at 08:27

## 2020-01-27 RX ADMIN — BENZONATATE 100 MG: 100 CAPSULE ORAL at 21:50

## 2020-01-27 RX ADMIN — IPRATROPIUM BROMIDE AND ALBUTEROL SULFATE 1 AMPULE: .5; 3 SOLUTION RESPIRATORY (INHALATION) at 04:40

## 2020-01-27 RX ADMIN — DOCUSATE SODIUM 100 MG: 100 CAPSULE, LIQUID FILLED ORAL at 21:50

## 2020-01-27 RX ADMIN — FLUOXETINE HYDROCHLORIDE 20 MG: 20 CAPSULE ORAL at 08:27

## 2020-01-27 RX ADMIN — BENZONATATE 100 MG: 100 CAPSULE ORAL at 08:27

## 2020-01-27 RX ADMIN — METHYLPREDNISOLONE SODIUM SUCCINATE 40 MG: 125 INJECTION, POWDER, FOR SOLUTION INTRAMUSCULAR; INTRAVENOUS at 21:51

## 2020-01-27 RX ADMIN — BUDESONIDE 500 MCG: 0.5 INHALANT RESPIRATORY (INHALATION) at 06:20

## 2020-01-27 RX ADMIN — ACETAMINOPHEN 650 MG: 325 TABLET ORAL at 00:53

## 2020-01-27 RX ADMIN — FORMOTEROL FUMARATE DIHYDRATE 20 MCG: 20 SOLUTION RESPIRATORY (INHALATION) at 19:41

## 2020-01-27 RX ADMIN — ATORVASTATIN CALCIUM 80 MG: 80 TABLET, FILM COATED ORAL at 21:50

## 2020-01-27 RX ADMIN — IPRATROPIUM BROMIDE AND ALBUTEROL SULFATE 3 ML: 2.5; .5 SOLUTION RESPIRATORY (INHALATION) at 11:09

## 2020-01-27 ASSESSMENT — PAIN SCALES - GENERAL
PAINLEVEL_OUTOF10: 8
PAINLEVEL_OUTOF10: 0
PAINLEVEL_OUTOF10: 0

## 2020-01-27 ASSESSMENT — PAIN DESCRIPTION - PAIN TYPE: TYPE: ACUTE PAIN

## 2020-01-27 ASSESSMENT — PAIN DESCRIPTION - ORIENTATION: ORIENTATION: RIGHT

## 2020-01-27 ASSESSMENT — PAIN DESCRIPTION - LOCATION: LOCATION: ARM

## 2020-01-27 NOTE — PROGRESS NOTES
Dr Lindsay Zhou   LOS: 6 days   Patient Care Team:  Alva Lynn MD as PCP - General (Family Medicine)  KEEGAN Cardona (Family Nurse Practitioner)    Chief Complaint: Suspected severe COPD history of tobacco abuse recent influenza infection and recent CVA    Interval History: Feels worse this morning. Cough congestion shortness of breath with minimal exertion. REVIEW OF SYSTEMS:   CARDIOVASCULAR: No chest pain, chest pressure or chest discomfort. No palpitations or edema. RESPIRATORY: Short of breath with exertion. GASTROINTESTINAL: No anorexia, nausea, vomiting or diarrhea. No abdominal pain or blood. HEMATOLOGIC: No bleeding or bruising. Vital Signs  Patient Vitals for the past 24 hrs:   BP Temp Temp src Pulse Resp SpO2   01/27/20 0641 126/87 96.9 °F (36.1 °C) Temporal 62 16 95 %   01/26/20 1849 132/89 98 °F (36.7 °C) Temporal 96 18 90 %   01/26/20 1534 -- -- -- -- 18 95 %   01/26/20 1419 -- -- -- -- 20 95 %   01/26/20 1253 -- -- -- -- 18 95 %       Physical Exam:   General Appearance:    Alert, cooperative, in no acute distress   Lungs:    Tight with bilateral diffuse wheezing.     Heart:    Regular rhythm and normal rate, normal S1 and S2, no            murmur, no gallop, no rub, no click   Chest Wall:    No abnormalities observed   Abdomen:     Normal bowel sounds, no masses, no organomegaly, soft        non-tender, non-distended, no guarding, no rebound                tenderness   Extremities:   Moves all extremities well, no edema, no cyanosis, no             redness     Results Review:      Recent Results (from the past 24 hour(s))   Basic Metabolic Panel w/ Reflex to MG    Collection Time: 01/27/20  4:45 AM   Result Value Ref Range    Sodium 140 136 - 145 mmol/L    Potassium reflex Magnesium 4.8 3.5 - 5.0 mmol/L    Chloride 101 98 - 111 mmol/L    CO2 23 22 - 29 mmol/L    Anion Gap 16 7 - 19 mmol/L    Glucose 96 74 - 109 mg/dL    BUN 19 8 - 23 mg/dL    CREATININE 0.9 0.5 - 1.2 mg/dL and vest therapy patient clinically worse with chest tightness and wheezing on exam.  I have no other options but to start him on Solu-Medrol IV. I will check a chest x-ray in the morning also. Continue bronchodilator and vest therapy.   Ambulate with physical therapy  Watch for signs of aspiration  We will continue to wean down oxygen as tolerated  We will follow along closely  Tausif [facility]  01/27/20  12:40 PM

## 2020-01-27 NOTE — PLAN OF CARE
Problem: Falls - Risk of:  Goal: Will remain free from falls  Description  Will remain free from falls  1/26/2020 2205 by Brad Bello RN  Outcome: Ongoing  1/26/2020 1651 by Linnette Mcgrath RN  Outcome: Ongoing  Goal: Absence of physical injury  Description  Absence of physical injury  1/26/2020 2205 by Brad Bello RN  Outcome: Ongoing  1/26/2020 1651 by Linnette Mcgrath RN  Outcome: Ongoing     Problem: Infection:  Goal: Will remain free from infection  Description  Will remain free from infection  1/26/2020 2205 by Brad Bello RN  Outcome: Ongoing  1/26/2020 1651 by Linnette Mcgrath RN  Outcome: Ongoing     Problem: Safety:  Goal: Ability to chew and swallow food without choking will improve  Description  Ability to chew and swallow food without choking will improve  1/26/2020 2205 by Brad Bello RN  Outcome: Ongoing  1/26/2020 1651 by Linnette Mcgrath RN  Outcome: Ongoing  Goal: Signs and symptoms of aspiration will decrease  Description  Signs and symptoms of aspiration will decrease  1/26/2020 2205 by Brad Bello RN  Outcome: Ongoing  1/26/2020 1651 by Linnette Mcgrath RN  Outcome: Ongoing     Problem: SAFETY  Goal: LTG - Patient will demonstrate safety requirements appropriate to situation/environment  1/26/2020 2205 by Brad Bello RN  Outcome: Ongoing  1/26/2020 1651 by Linnette Mcgrath RN  Outcome: Ongoing  Goal: LTG - patient will utilize safety techniques  1/26/2020 2205 by Brad Bello RN  Outcome: Ongoing  1/26/2020 1651 by Linnette Mcgrath RN  Outcome: Ongoing  Goal: STG - patient locks brakes on wheelchair  1/26/2020 2205 by Brad Bello RN  Outcome: Ongoing  1/26/2020 1651 by Linnette Mcgrath RN  Outcome: Ongoing  Goal: STG - Patient uses call light consistently to request assistance with transfers  1/26/2020 2205 by Brad Bello RN  Outcome: Ongoing  1/26/2020 1651 by Linnette Mcgrath RN  Outcome: Ongoing  Goal: STG - patient uses gait belt during all transfers  1/26/2020 2205 by Dino Flores RN  Outcome: Ongoing  1/26/2020 1651 by Robert White RN  Outcome: Ongoing

## 2020-01-27 NOTE — PROGRESS NOTES
Patient: Riley Grande  MR#:    [id number]   Room:    7245/083-62   YOB: 1955  Date of Progress Note: 1/27/2020  Time of Note                           8:16 AM  Consulting Physician:   Lonnie Cheng M.D. Attending Physician:  Lonnie Cheng MD     CHIEF COMPLAINT: Right-sided weakness with ataxia and dysarthria        Subjective  This 63 y.o. male  R handed pt admitted to [facility] on on 1/13/2020 for stroke evaluation. Pt reports that he has felt dizzy, poor coordination w/his R arm and R leg, numbness in his face, feet and hands. He reports over this same couple of days, that he also had fever and chills, nausea & emesis. He has been SOA and very weak. Pt reports that he stopped smoking 10 years ago, but still smokes intermittently. Influenza swab was positive for influenza A. 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 Dr. Raciel Tarango w/neuro and Dr. Molly Moreno w/neurosurgery were consulted. Dr Molly Moreno did not see surgical intervention to be necessary. Pt was initially admitted to ICU but was moved to neuro floor on 1/17/2020. Notes from [facility] from 8/5/16 indicates pt has a hx of seizures, but pt has not been on any anticonvulsants. Pt states his last seizure was in Dec 2015. He is being treated w/tamiflu. He is now medically stable, but continues to be ataxic w/his gait, dysarthric, as well as coordination issues w/R UE/LE. no further nausea. Had some low-grade fever over the weekend. Neurosurgery has seen and feels he is stable.   Continues to cough  REVIEW OF SYSTEMS:  Constitutional: No fevers No chills  Neck:No stiffness  Respiratory: No shortness of breath  Cardiovascular: No chest pain No palpitations  Gastrointestinal: No abdominal pain    Genitourinary: No Dysuria  Neurological: No headache, no confusion      PHYSICAL EXAM:  /87   Pulse 62   Temp 96.9 °F (36.1 °C) (Temporal)   Resp 16   Ht 6' (1.829 m)   Wt 170 lb 4.8 oz (77.2 kg)   SpO2 95%   BMI 23.10 kg/m²     Constitutional: he appears well-developed and well-nourished. Eyes - conjunctiva normal.  Pupils react to light  Ear, nose, throat -hearing intact to voice. No scars, masses, or lesions over external nose or ears, no atrophy of tongue  Neck-symmetric, no masses noted, no jugular vein distension  Respiration- chest wall appears symmetric, good expansion,   normal effort without use of accessory muscles  Cardiovascular- RRR  Musculoskeletal - no significant wasting of muscles noted, no bony deformities, gait no gross ataxia  Extremities-no clubbing, cyanosis or edema  Skin - warm, dry, and intact. No rash, erythema, or pallor. Psychiatric - mood, affect, and behavior appear normal.      Neurology  NEUROLOGICAL EXAM:  Awake, alert, fluent oriented x 3 appropriate affect  Attention and concentration appear appropriate  Memory fair  Speech stuttering with significant dysarthria  No clear issues with language of fund of knowledge     Cranial Nerve Exam   CN II- Visual fields grossly unremarkable  CN III, IV,VI-EOMI, significant left greater than right horizontal nystagmus, disconjugate eyes at rest, no ptosis  CN VII-no facial assymetry  CN VIII-Hearing intact   CN IX and X- Palate elevates in midline  CN XI-good shoulder shrug  CN XII-Tongue midline with no fasciculations or fibrillations     Motor Exam  V/V throughout upper and lower extremities bilaterally, no cogwheeling, normal tone            Nursing/pcp notes, imaging,labs and vitals reviewed.      PT,OT and/or speech notes reviewed    Lab Results   Component Value Date    WBC 12.2 (H) 01/27/2020    HGB 18.1 (H) 01/27/2020    HCT 54.2 (H) 01/27/2020    MCV 95.3 (H) 01/27/2020     01/27/2020     Lab Results   Component Value Date     01/27/2020    K 4.8 01/27/2020     01/27/2020    CO2 23 01/27/2020    BUN 19 01/27/2020    CREATININE 0.9 01/27/2020    GLUCOSE 96 01/27/2020    CALCIUM 9.5 01/27/2020    LABGLOM >60 01/27/2020   No results found for: INRNo results found for: PHENYTOIN, ESR, CRP  Objective     Balance  Sitting Balance: Stand by assistance  Standing Balance: Minimal assistance  Functional Mobility  Functional - Mobility Device: Rolling Walker  Activity: Other  Assist Level: Minimal assistance  Functional Mobility Comments: Short distance, severe ataxia BUE/BLE   Transfers  Sit to stand: Minimal assistance  Stand to sit: Minimal assistance  Type of ROM/Therapeutic Exercise  Type of ROM/Therapeutic Exercise: Rossi  Comment: 15#; 4 sets of 20   Hand Dominance  Hand Dominance: Right  Left Hand Strength -  (lbs)  Handle Setting 2: 53.5  Left 9-Hole Peg Test  Left 9-Hole Peg Test: Impaired  Right Hand Strength -  (lbs)  Handle Setting 2: 59.7  Right 9-Hole Peg Test  Right 9-Hole Peg Test: Impaired  Fine Motor Skills  Left 9-Hole Peg Test: Impaired  Left 9 Hole Peg Test Time (secs): 72  Right 9-Hole Peg Test: Impaired  Right 9 Hole Peg Test Time (secs): 99       01/23/20 1142 01/23/20 1144 01/23/20 1145   General   Response To Previous Treatment Patient with no complaints from previous session. --   --    Family / Caregiver Present No  --   --    Subjective   Subjective Pt agreed to therapy this morning. --   --    Bed Mobility   Rolling Stand by assistance  --   --    Supine to Sit Stand by assistance  --   --    Sit to Supine Stand by assistance  --   --    Transfers   Sit to Stand Contact guard assistance  --   --    Stand to sit Contact guard assistance  --   --    Bed to Chair Contact guard assistance  --   --    Ambulation   Ambulation?  Yes  --   --    Ambulation 2   Surface - 2 level tile  --   --    Device 2 Rolling Walker  --   --    Other Apparatus 2 Wheelchair follow  -- ADLs with 90% accuracy with mild verbal cues required at times to give more details to the problem and/or solution.      Patient required to state what does not belong in a simple pictured scenario. Patient was able to identify problem or item that did not belong in 100% of opportunities.      Noted severe dysarthria at word, sentence, and conversation level this date. Patient's speech was considered to be 50-60% intelligible to unfamiliar listeners as characterized by decreased tongue ROM and strength. SLP provided patient with strategies (slower speech rate, over articulation, and elongations) to improve speech.     Patient continues with decreased problem solving and severe dysarthric speech. Will continue to follow.        RECORD REVIEW: Previous medical records, medications were reviewed at today's visit    IMPRESSION:  1. 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 Antiplatelet treatment. Follow-up CT scan 1/24 showing no change in ventricular size nor any new difficulties. Spoke with radiologist.  2.  Hypertension-continue current medications and monitoring  3. Hyperlipidemia-continue statin  4. DVT prophylaxis-SCDs  5. GI-bowel regimen  6. Hypothyroidism-continue replacement therapy  7. Persistent cough and now some wheezing. Continue duo nebs and Tessalon Perles. Chest x-ray unremarkable. Pulmonology has seen. Asked them to help with continue cough  8. Urinary retention.   Continue in and out cath   Staff this week

## 2020-01-27 NOTE — PROGRESS NOTES
Occupational Therapy     01/27/20 0900   General   Diagnosis R PICA territory w/asociated blood products in the infarcted brain parenchyma and trace L cerebral convexity subdural hemorrhage   Pain Assessment   Patient Currently in Pain No   Pain Assessment 0-10   Pain Level 0   ADL   Grooming Setup   Balance   Sitting Balance Supervision   Standing Balance Contact guard assistance   Standing Balance   Time 2 minutes x2 trials. Activity 1 handed static standing task   Functional Mobility   Functional - Mobility Device Rolling Walker   Activity Other   Assist Level Contact guard assistance   Functional Mobility Comments x3 trials, cues for safety. Transfers   Sit to stand Minimal assistance   Stand to sit Minimal assistance   Type of ROM/Therapeutic Exercise   Type of ROM/Therapeutic Exercise [place]   Comment Rossi: BILLYJORGE 13# 3 sets x 15 reps. Coordination   Fine Motor RUE light and medium resistance clothespins. 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   Safety Devices   Safety Devices in place Yes   Type of devices Gait belt  (Given to SLP.)   Plan   Current Treatment Recommendations Strengthening;Balance Training;Functional Mobility Training;Equipment Evaluation, Education, & procurement;Patient/Caregiver Education & Training;Self-Care / ADL; Safety Education & Training

## 2020-01-27 NOTE — PROGRESS NOTES
01/27/20 1333   Restrictions/Precautions   Restrictions/Precautions Fall Risk;Seizure   Oxygen Therapy   SpO2 96 %   O2 Device Nasal cannula   O2 Flow Rate (L/min) 2 L/min   Exercises   Hamstring Sets 2x20 YTB each    Hip Flexion 2x20 each   Knee Long Arc Quad 2x20 each   Comments BLE seated exercises [id number], decreased coordination/control of RLE greater than LLE   Other exercises   Other exercises 1 Yellow ball squeeses [id number]   Other exercises 2 seated YTB hip abduction 2x20   Conditions Requiring Skilled Therapeutic Intervention   Assessment Patient states he has been nauseous all morning and coughing. Just recieved IV steriod and cough medication prior to therapy. Hesistant for standing/ambulation exericses but agreed to seated exercises and motivated to feel better so he can practice walking. Decreased coordination of RLE greater than LLE during BLE exercises. Activity Tolerance   Activity Tolerance Patient Tolerated treatment well   Safety Devices   Type of devices All fall risk precautions in place; Bed alarm in place;Call light within reach; Patient at risk for falls; Left in bed   Electronically signed by Wilfred Forbes on 1/27/2020 at 1:50 PM

## 2020-01-27 NOTE — PROGRESS NOTES
ampule, 1 ampule, Inhalation, Q4H PRN, Marcelle Zavala MD, 1 ampule at 01/27/20 0440    meclizine (ANTIVERT) tablet 25 mg, 25 mg, Oral, TID PRN, Marcelle Zavala MD, 25 mg at 01/24/20 1312    budesonide (PULMICORT) nebulizer suspension 500 mcg, 0.5 mg, Nebulization, BID, Nuno Avitia MD, 500 mcg at 01/27/20 0620    formoterol (PERFOROMIST) nebulizer solution 20 mcg, 20 mcg, Nebulization, BID, Nuno Avitia MD, 20 mcg at 01/27/20 0620    benzonatate (TESSALON) capsule 100 mg, 100 mg, Oral, TID, Marcelle Zavala MD, 100 mg at 01/26/20 2014    FLUoxetine (PROZAC) capsule 20 mg, 20 mg, Oral, Daily, Marcelle Zavala MD, 20 mg at 01/26/20 0809    ondansetron (ZOFRAN-ODT) disintegrating tablet 4 mg, 4 mg, Oral, Q8H PRN, Marcelle Zavala MD, 4 mg at 01/24/20 1147    levothyroxine (SYNTHROID) tablet 125 mcg, 125 mcg, Oral, Daily, Marcelle Zavala MD, 125 mcg at 01/27/20 0534    amLODIPine (NORVASC) tablet 10 mg, 10 mg, Oral, Daily, Marcelle Zavala MD, 10 mg at 01/26/20 0809    aspirin chewable tablet 81 mg, 81 mg, Oral, Daily, Marcelle Zavala MD, 81 mg at 01/26/20 0809    atorvastatin (LIPITOR) tablet 80 mg, 80 mg, Oral, Nightly, Marcelle Zavala MD, 80 mg at 01/26/20 2014    lisinopril (PRINIVIL;ZESTRIL) tablet 20 mg, 20 mg, Oral, Daily, Marcelle Zavala MD, 20 mg at 01/26/20 0809    polyethylene glycol (GLYCOLAX) packet 17 g, 17 g, Oral, Daily, Marcelle Zavala MD, 17 g at 01/24/20 8716    sennosides-docusate sodium (SENOKOT-S) 8.6-50 MG tablet 1 tablet, 1 tablet, Oral, Nightly, Marcelle Zavala MD, 1 tablet at 01/26/20 2014    ipratropium-albuterol (DUONEB) nebulizer solution 3 mL, 1 vial, Inhalation, 4x Daily, Marcelle Zavala MD, 3 mL at 01/27/20 0626    acetaminophen (TYLENOL) tablet 650 mg, 650 mg, Oral, Q4H PRN, aMrcelle Zavala MD, 650 mg at 01/27/20 0053    docusate sodium (COLACE) capsule 100 mg, 100 mg, Oral, BID, Marcelle Zavala MD, 100 mg at 01/26/20 2014    bisacodyl (DULCOLAX) suppository 10 mg, 10 mg, Rectal, Daily

## 2020-01-27 NOTE — PROGRESS NOTES
appear. i. Patient was 3/3.   d. Press firmly down on the center of tongue. Release and move towards instrument to hard palate. Tongue should follow pressure. (Like a spring). i. Patient was 3/3 without use of accessory jaw movement.  e. Brush instrument along the alveolar ridge 3xs and remove. Tongue should extend to ridge. If not cue patient to extend tongue up along ridge. i. Patient was 0/3 without use of accessory jaw movement. SLP utilized tools/strategies to promote increased clarity of speech when communicating with conversation partners. Reenforced patient to use slow speech rate with over-articulation/over-exaggeration. In structured activity, patient demonstrated ability to verbally express words in short phrases and short sentences with slow speech rate and over-articulation/over-exaggeration with provision of max cues/prompts. With provision of max cues/prompts including visual pacing board to emphasis each syllable, patient verbalized noticing increased intelligibility. Patient was able to recall use of strategies and visual pacing board that were introduced in previous session. Patient also stated he has been utilizing the strategies during conversation with staff and/or visitors outside of speech therapy sessions. Will continue to address speech intelligibility and strategies during treatment time. SHORT TERM GOAL #1:  Goal 1: The patient will demonstrate recall of functional information following a (short term) delay with minimal cues in order to increase functional integration in environment. SHORT TERM GOAL #2:  Goal 2: Identify the item that does not belong in a list of words presented auditorily with 100% accuracy and(min/mod/max) verbal, visual and tactile cues    SHORT TERM GOAL #3:  Goal 3: Identify appropriate solutions to a problem when presented with a field of 4 with minimal cues.     SHORT TERM GOAL #4:  Goal 4: Patient will complete simple/ complex reasoning tasks to improve problem solving and safety awareness with 100% accuracy and minimal cues. SHORT TERM GOAL #5:  Goal 5: The patient will complete divergent naming tasks (concrete/abstract) with minimal cueing. SHORT TERM GOAL #6: Patient will utilize tools/strategies to promote increased clarity of speech at word, phrase, and sentence level with provision of mod cues/prompts.      SHORT TERM GOAL #7: Patient will complete oral motor exercises, to promote increased labial and lingual movements for increased clarity of speech, with provision of min cues/prompts. Swallowing Short Term Goals  Short-term Goals  Goal 1: The patient will tolerate a regular diet with thin liquids with minimal overt s/s of aspiration. Goal 2: The patient will complete oral care at least two times daily to prevent aspiration of oral bacteria. Goal 3: SLP and staff will monitor safety with oral intake and monitor increased congestion, overt s/s of aspiration, elevated temp and RLL infiltrates on CXR.     Comprehension: 5 - Patient understands basic needs (hungry/hot/pain)  Expression: 6 - Device used to express complex ideas/needs  Social Interaction: 5 - Patient is appropriate with supervision/cues  Problem Solvin - Patient able to solve simple/routine tasks  Memory: 6 - Patient requires device to recall (e.g. memory book)    ASSESSMENT:  Assessment: [x]Progressing towards goals          []Not Progressing towards goals    Patient Tolerance of Treatment:   [x]Tolerated well []Tolerated fair []Required rest breaks []Fatigued    Education:  Learner:  [x]Patient          []Significant Other          []Other  Education provided regarding:  [x]Goals and POC   []Diet and swallowing precautions    []Home Exercise Program  []Progress and/or discharge information  Method of Education:  [x]Discussion          [x]Demonstration          []Handout          []Other  Evaluation of Education:   [x]Verbalized understanding   []Demonstrates

## 2020-01-27 NOTE — PATIENT CARE CONFERENCE
[facility] ACUTE INPATIENT REHABILITATION  TEAM CONFERENCE NOTE    Date: 2020  Patient Name: Olman Sandhu        MRN: [id number]    : 1955  (66 y.o.)  Gender: male      Diagnosis: stroke: left body involvement      PHYSICAL THERAPY  STRENGTH  Strength RLE  Strength RLE: Exception  Comment: grossly 4/5  Strength LLE  Strength LLE: Exception  Comment: grossly 4/5  ROM  AROM RLE (degrees)  RLE AROM: WFL  AROM LLE (degrees)  LLE AROM : WFL  BED MOBILITY  Bed Mobility  Rolling: Stand by assistance  Supine to Sit: Stand by assistance  Sit to Supine: Stand by assistance  Scooting: Stand by assistance  TRANSFERS  Transfers  Sit to Stand: Stand by assistance  Stand to sit: Stand by assistance  Bed to Chair: Contact guard assistance  Car Transfer: Minimal Assistance  Comment: impulsive, reaches for external support for assistance during transfers  [facility] PROPULSION  Propulsion 1  Propulsion: Manual  Level: Level Tile  Method: RLE, LLE  Level of Assistance: Supervision  Description/ Details: 2 turns   Distance: 250ft  AMBULATION  Ambulation 1  Surface: level tile  Device: Rolling Walker  Other Apparatus: Wheelchair follow  Assistance: Contact guard assistance, Minimal assistance  Quality of Gait: 3 point gait, LLE lead, decreased control of RLE,   Gait Deviations: Slow Deanna  Distance: 75ft  Comments:  occassional instability toward the  R, VC to control heel strike on RLE,     STAIRS     GOALS:  Short term goals  Time Frame for Short term goals: 1-2 weeks  Short term goal 1: indep bed mobility  Short term goal 2: CGA transfers with AD  Short term goal 3: indep 150ft WC propulsion   Short term goal 4: ' amb with AD  Short term goal 5: min A 1 step with AD    Long term goals  Time Frame for Long term goals : 2-3 weeks  Long term goal 1: indep transfers with AD  Long term goal 2: indep 150ft amb with AD  Long term goal 3: CGA 1 step with AD    ASSESSMENT:  Assessment: Patient ambulated 75ft with improved gait pattern but occassionally unstable on RLE. Required VC for slow and controlled step with RLE. Increased WC propulsion distance with good control and manuevering with LEs. SPEECH THERAPY  Precautions: Fall/Swallowing  Swallowing Status/Diet: Regular consistencies with thin liquids        Subjective: Patient was alert and cooperative with all tasks. No family present during treatment time.      Swallowing:  Patient noted to have consistent immediate coughs following thin liquids this date. SLP observed patient swallow function this date on current diet. Patient consumed regular consistencies with thin liquids via cup and trial of nectar thick liquids via cup. Patient was able to feed self.      Oral Phase:  Mild extended mastication noted with solid consistencies; bolus prep was adequate. 1-2 second initiation of swallow noted with all PO trials except thin liquids. Inconsistent fast transit noted with thin liquids. No oral residue noted with any PO trials.      Pharyngeal Phase:  Mild-mod sluggish and decreased laryngeal elevation for airway protection noted with all PO trials. Inconsistent immediate coughing noted with thin liquids only. No vocal changes noted with any PO trials.      Recommended liquids changed to nectar thick liquids. Patient refused altered/tjhickened liquids this date. SLP reviewed recommendations with patient as well as provided education regarding thickened liquids with expressed understanding noted from the patient.      At this time, per patient request, continue diet of regular consistencies with thin liquids. Meds whole in applesauce/pudding. Able to self feed. Will continue to follow.     Speech/Language:  Patient speech intelligibility ranked by SLP at 50-60% intelligibility to unfamiliar listeners and no background noises present.  Severe dysarthric speech noted as characterized by decreased lingual ROM and strength.      SLP utilized tools/strategies to promote transfers. Short term goal 5: CGA with ambulatory home making tasks. Short term goal 6: Patient will complete 1-2 handed static standing tasks for 4 minutes, requiring CGA. Short term goal 7: Patient will decrease time for 9 hole peg test for RUE by 10 seconds. Short term goal 8: Patient will decrease time for 9 hole peg test for LUE by 10 seconds. Short term goal 9: Patient will increase BUE  by 3# to increase independence with ADLs. LTGs:  Long term goals  Time Frame for Long term goals : 3 weeks   Long term goal 1: Supervision with bathing hygiene. Long term goal 2: Supervision with LB dressing. Long term goal 3: Supervision with clothing management/hygiene for toileting. Long term goal 4: Supervision with toilet transfers. Long term goal 5: SBA with ambulatory home making tasks. Long term goals 6: Patient verbalize DME needs. Assessment:  Decreased functional mobility ; Decreased ADL status; Decreased strength; Decreased balance; Decreased high-level IADLs; Decreased cognition; Decreased safe awareness; Decreased vision/visual deficit    NUTRITION  Current Wt: Weight: 170 lb 4.8 oz (77.2 kg) / Body mass index is 23.1 kg/m². Admission Wt: Admission Body Weight: 170 lb (77.1 kg)  Oral Diet Orders: General   Oral Nutrition Supplement (ONS) Orders: None  Please see nutrition note for details. NURSING    FIMS:  Bladder  Level of Assistance: Bladder Level of Assistance: 1- requires 75%+ assistance for bladder management tasks, helper changes soiled linens, clothes, absorbant pads, helper cares for and empties frederick catheter  Frequency of Accidents: Bladder Frequency of Accidents: 6 - No accidents,uses device like urinal, bedpan, absorbant pad, or requires medication to manage bladder    Bowel  Level of Assistance:  Bowel Level of Assistance: 1- requires 75%+ assistance for bowel management tasks, staff changes soiled linens, clothes, absorbant pads, helper provides enema or provides digital stimulation to patient, staff empties colostomy  Frequency of Accidents: Bowel Frequency of Accidents: 6 - No accidents, uses device like bedpan, diaper, bedside commode colostomy, or requires medication to manage bowels    Wounds/Incisions/Ulcers: Scattered abrasions. Parth Scale Score: 18    Pain: No pain concerns to address    Consultations/Labs/X-rays: Dr. Breezy Samano. Family Education: Family available and participating in education     Fall Risk:  Angy Bucco Score: 95    Fall in the last week?no      Other Nursing Issues: Blind left eye. Right side weak. BM 28. Expressive aphasia. PVR's-has required intermittant catheterization; SCD's. IID LFA. Pills whole. Up x1 assist walker. General diet. Incentive rubina. Persistent cough. SOCIAL WORK/CASE MANAGEMENT  Assessment: Lives independently at [facility] apartments, Has distant support from his sisters. Discharge Plan   Estimated Length of Stay: 3-4 weeks  Destination: home health    Pass: No    Services at Discharge: [address] ([facility]), Occupational Therapy, Speech Therapy and Nursing per evaluations    Equipment at Discharge: to be determined, based on progress    Progress made in the prior week:  1. Now Min/CGA for toilet t/fs  2. Improved coordination prior to 1/25  3.   4.  5.      Goals for following week:  1. CGA for clothing management as standing level  2. TF SURFACE TO SURFACE SBA  3.  Tolerate diet with no overt s/s of aspiration  4.   5.     Factors facilitating achievement of predicted outcomes: Family support    Barriers to the achievement of predicted outcomes: global ataxia, poor understanding of his needs- refusing thickened liquids    Team Members Present at Conference:  : Krysta Moralez [place]   Occupational Therapist: Carolee Hall, OTR/L  Physical Therapist: Patrice Blake PT  Speech Therapist: Margaux Oakley 87, [facility]  Nurse: Dominique Vaca LPN   Nurse Manager:  Lucien Burnett RN, BSN  Dietitian:  Fannie Armendariz MS, RD, LD  Rehab Director:  Lulu Mccarty      I approve the established interdisciplinary plan of care as documented within the medical record of Manuel Jimenez.

## 2020-01-28 ENCOUNTER — OUTSIDE FACILITY SERVICE (OUTPATIENT)
Dept: PULMONOLOGY | Facility: CLINIC | Age: 65
End: 2020-01-28

## 2020-01-28 ENCOUNTER — APPOINTMENT (OUTPATIENT)
Dept: GENERAL RADIOLOGY | Age: 65
DRG: 057 | End: 2020-01-28
Attending: PSYCHIATRY & NEUROLOGY
Payer: MEDICAID

## 2020-01-28 PROCEDURE — 97110 THERAPEUTIC EXERCISES: CPT

## 2020-01-28 PROCEDURE — 94669 MECHANICAL CHEST WALL OSCILL: CPT

## 2020-01-28 PROCEDURE — 92526 ORAL FUNCTION THERAPY: CPT

## 2020-01-28 PROCEDURE — 94640 AIRWAY INHALATION TREATMENT: CPT

## 2020-01-28 PROCEDURE — 97535 SELF CARE MNGMENT TRAINING: CPT

## 2020-01-28 PROCEDURE — 6360000002 HC RX W HCPCS: Performed by: INTERNAL MEDICINE

## 2020-01-28 PROCEDURE — 6370000000 HC RX 637 (ALT 250 FOR IP): Performed by: PSYCHIATRY & NEUROLOGY

## 2020-01-28 PROCEDURE — 51798 US URINE CAPACITY MEASURE: CPT

## 2020-01-28 PROCEDURE — 71045 X-RAY EXAM CHEST 1 VIEW: CPT

## 2020-01-28 PROCEDURE — 2700000000 HC OXYGEN THERAPY PER DAY

## 2020-01-28 PROCEDURE — 99232 SBSQ HOSP IP/OBS MODERATE 35: CPT | Performed by: INTERNAL MEDICINE

## 2020-01-28 PROCEDURE — 97116 GAIT TRAINING THERAPY: CPT

## 2020-01-28 PROCEDURE — 51701 INSERT BLADDER CATHETER: CPT

## 2020-01-28 PROCEDURE — 92507 TX SP LANG VOICE COMM INDIV: CPT

## 2020-01-28 PROCEDURE — 1180000000 HC REHAB R&B

## 2020-01-28 PROCEDURE — 97530 THERAPEUTIC ACTIVITIES: CPT

## 2020-01-28 PROCEDURE — 99232 SBSQ HOSP IP/OBS MODERATE 35: CPT | Performed by: PSYCHIATRY & NEUROLOGY

## 2020-01-28 RX ADMIN — ATORVASTATIN CALCIUM 80 MG: 80 TABLET, FILM COATED ORAL at 19:52

## 2020-01-28 RX ADMIN — METHYLPREDNISOLONE SODIUM SUCCINATE 40 MG: 125 INJECTION, POWDER, FOR SOLUTION INTRAMUSCULAR; INTRAVENOUS at 12:42

## 2020-01-28 RX ADMIN — BUDESONIDE 500 MCG: 0.5 INHALANT RESPIRATORY (INHALATION) at 19:29

## 2020-01-28 RX ADMIN — FORMOTEROL FUMARATE DIHYDRATE 20 MCG: 20 SOLUTION RESPIRATORY (INHALATION) at 19:29

## 2020-01-28 RX ADMIN — ASPIRIN 81 MG 81 MG: 81 TABLET ORAL at 08:48

## 2020-01-28 RX ADMIN — METHYLPREDNISOLONE SODIUM SUCCINATE 40 MG: 125 INJECTION, POWDER, FOR SOLUTION INTRAMUSCULAR; INTRAVENOUS at 05:47

## 2020-01-28 RX ADMIN — FLUOXETINE HYDROCHLORIDE 20 MG: 20 CAPSULE ORAL at 08:48

## 2020-01-28 RX ADMIN — METHYLPREDNISOLONE SODIUM SUCCINATE 40 MG: 125 INJECTION, POWDER, FOR SOLUTION INTRAMUSCULAR; INTRAVENOUS at 19:51

## 2020-01-28 RX ADMIN — BUDESONIDE 500 MCG: 0.5 INHALANT RESPIRATORY (INHALATION) at 06:24

## 2020-01-28 RX ADMIN — IPRATROPIUM BROMIDE AND ALBUTEROL SULFATE 3 ML: 2.5; .5 SOLUTION RESPIRATORY (INHALATION) at 19:29

## 2020-01-28 RX ADMIN — IPRATROPIUM BROMIDE AND ALBUTEROL SULFATE 3 ML: 2.5; .5 SOLUTION RESPIRATORY (INHALATION) at 10:24

## 2020-01-28 RX ADMIN — AMLODIPINE BESYLATE 10 MG: 10 TABLET ORAL at 08:48

## 2020-01-28 RX ADMIN — DOCUSATE SODIUM 100 MG: 100 CAPSULE, LIQUID FILLED ORAL at 19:51

## 2020-01-28 RX ADMIN — SENNOSIDES AND DOCUSATE SODIUM 1 TABLET: 8.6; 5 TABLET ORAL at 19:51

## 2020-01-28 RX ADMIN — IPRATROPIUM BROMIDE AND ALBUTEROL SULFATE 3 ML: 2.5; .5 SOLUTION RESPIRATORY (INHALATION) at 06:24

## 2020-01-28 RX ADMIN — BENZONATATE 100 MG: 100 CAPSULE ORAL at 14:05

## 2020-01-28 RX ADMIN — DOCUSATE SODIUM 100 MG: 100 CAPSULE, LIQUID FILLED ORAL at 08:48

## 2020-01-28 RX ADMIN — IPRATROPIUM BROMIDE AND ALBUTEROL SULFATE 3 ML: 2.5; .5 SOLUTION RESPIRATORY (INHALATION) at 14:17

## 2020-01-28 RX ADMIN — LISINOPRIL 20 MG: 20 TABLET ORAL at 08:48

## 2020-01-28 RX ADMIN — FORMOTEROL FUMARATE DIHYDRATE 20 MCG: 20 SOLUTION RESPIRATORY (INHALATION) at 06:23

## 2020-01-28 RX ADMIN — BENZONATATE 100 MG: 100 CAPSULE ORAL at 08:48

## 2020-01-28 RX ADMIN — BENZONATATE 100 MG: 100 CAPSULE ORAL at 19:51

## 2020-01-28 RX ADMIN — LEVOTHYROXINE SODIUM 125 MCG: 0.1 TABLET ORAL at 05:46

## 2020-01-28 ASSESSMENT — PAIN SCALES - GENERAL
PAINLEVEL_OUTOF10: 0
PAINLEVEL_OUTOF10: 0

## 2020-01-28 NOTE — PROGRESS NOTES
palpitations  Gastrointestinal: No abdominal pain    Genitourinary: No Dysuria  Neurological: No headache, no confusion      PHYSICAL EXAM:  /89   Pulse 75   Temp 96.7 °F (35.9 °C) (Temporal)   Resp 18   Ht 6' (1.829 m)   Wt 170 lb 4.8 oz (77.2 kg)   SpO2 93%   BMI 23.10 kg/m²     Constitutional: he appears well-developed and well-nourished. Eyes - conjunctiva normal.  Pupils react to light  Ear, nose, throat -hearing intact to voice. No scars, masses, or lesions over external nose or ears, no atrophy of tongue  Neck-symmetric, no masses noted, no jugular vein distension  Respiration- chest wall appears symmetric, good expansion,   normal effort without use of accessory muscles  Cardiovascular- RRR  Musculoskeletal - no significant wasting of muscles noted, no bony deformities, gait no gross ataxia  Extremities-no clubbing, cyanosis or edema  Skin - warm, dry, and intact. No rash, erythema, or pallor. Psychiatric - mood, affect, and behavior appear normal.      Neurology  NEUROLOGICAL EXAM:  Awake, alert, fluent oriented x 3 appropriate affect  Attention and concentration appear appropriate  Memory fair  Speech stuttering with significant dysarthria  No clear issues with language of fund of knowledge     Cranial Nerve Exam   CN II- Visual fields grossly unremarkable  CN III, IV,VI-EOMI, significant left greater than right horizontal nystagmus, disconjugate eyes at rest, no ptosis  CN VII-no facial assymetry  CN VIII-Hearing intact   CN IX and X- Palate elevates in midline  CN XI-good shoulder shrug  CN XII-Tongue midline with no fasciculations or fibrillations     Motor Exam  V/V throughout upper and lower extremities bilaterally, no cogwheeling, normal tone            Nursing/pcp notes, imaging,labs and vitals reviewed.      PT,OT and/or speech notes reviewed    Lab Results   Component Value Date    WBC 12.2 (H) 01/27/2020    HGB 18.1 (H) 01/27/2020    HCT 54.2 (H) 01/27/2020    MCV 95.3 (H) 01/27/2020     01/27/2020     Lab Results   Component Value Date     01/27/2020    K 4.8 01/27/2020     01/27/2020    CO2 23 01/27/2020    BUN 19 01/27/2020    CREATININE 0.9 01/27/2020    GLUCOSE 96 01/27/2020    CALCIUM 9.5 01/27/2020    LABGLOM >60 01/27/2020   No results found for: INRNo results found for: PHENYTOIN, ESR, CRP    01/27/20 1333   Restrictions/Precautions   Restrictions/Precautions Fall Risk;Seizure   Oxygen Therapy   SpO2 96 %   O2 Device Nasal cannula   O2 Flow Rate (L/min) 2 L/min   Exercises   Hamstring Sets 2x20 YTB each    Hip Flexion 2x20 each   Knee Long Arc Quad 2x20 each   Comments BLE seated exercises [id number], decreased coordination/control of RLE greater than LLE   Other exercises   Other exercises 1 Yellow ball squeeses [id number]   Other exercises 2 seated YTB hip abduction 2x20   Conditions Requiring Skilled Therapeutic Intervention   Assessment Patient states he has been nauseous all morning and coughing. Just recieved IV steriod and cough medication prior to therapy. Hesistant for standing/ambulation exericses but agreed to seated exercises and motivated to feel better so he can practice walking. Decreased coordination of RLE greater than LLE during BLE exercises. 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    Activity 1 handed static standing task   Functional Mobility Functional - Mobility Device Rolling Walker   Activity Other   Assist Level Contact guard assistance   Functional Mobility Comments x3 trials, cues for safety. Transfers   Sit to stand Minimal assistance   Stand to sit Minimal assistance   Type of ROM/Therapeutic Exercise   Type of ROM/Therapeutic Exercise Rossi   Comment Rossi: MAJO 13# 3 sets x 15 reps. Coordination   Fine Motor RUE light and medium resistance clothespins. 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   Safety Devices   Safety Devices in place Yes   Type of devices Gait belt  (Given to SLP.)   Plan   Current Treatment Recommendations Strengthening;Balance Training;Functional Mobility Training;Equipment Evaluation, Education, & procurement;Patient/Caregiver Education & Training;Self-Care / ADL; Safety Education & Training      Patient was able to recall use of strategies and visual pacing board that were introduced in previous session.  Patient also stated he has been utilizing the strategies during conversation with staff and/or visitors outside of speech therapy sessions.      Will continue to address speech intelligibility and strategies during treatment time.       RECORD REVIEW: Previous medical records, medications were reviewed at today's

## 2020-01-28 NOTE — PLAN OF CARE
LPN  Outcome: Ongoing  1/28/2020 0441 by Yuko Doan RN  Outcome: Ongoing     Problem: SKIN INTEGRITY  Goal: STG - patient will maintain good skin integrity  1/28/2020 1154 by Santino Carias LPN  Outcome: Ongoing  1/28/2020 0441 by Yuko Doan RN  Outcome: Ongoing     Problem: PAIN  Goal: STG - Patient will verbalize an acceptable level of pain  1/28/2020 1154 by Santino Carias LPN  Outcome: Ongoing  1/28/2020 0441 by Yuko Doan RN  Outcome: Ongoing     Problem: Mobility - Impaired:  Goal: Mobility will improve  Description  Mobility will improve  1/28/2020 1154 by Santino Carias LPN  Outcome: Ongoing  1/28/2020 0441 by Yuko Doan RN  Outcome: Ongoing     Problem: Health Behavior:  Goal: Identification of resources available to assist in meeting health care needs will improve  Description  Identification of resources available to assist in meeting health care needs will improve  1/28/2020 1154 by Santino Carias LPN  Outcome: Ongoing  1/28/2020 0441 by Yuko Doan RN  Outcome: Ongoing     Problem: Nutritional:  Goal: Consumption of the prescribed amount of daily calories will improve  Description  Consumption of the prescribed amount of daily calories will improve  1/28/2020 1154 by Santino Carias LPN  Outcome: Ongoing  1/28/2020 0441 by Yuko Doan RN  Outcome: Ongoing     Problem: Mood - Altered:  Goal: Mood stable  Description  Mood stable  Outcome: Ongoing

## 2020-01-28 NOTE — PROGRESS NOTES
01/28/20 1335   Restrictions/Precautions   Restrictions/Precautions Seizure; Fall Risk   Oxygen Therapy   SpO2 96 %   O2 Device Nasal cannula   O2 Flow Rate (L/min) 2 L/min   Transfers   Sit to Stand Stand by assistance   Stand to sit Stand by assistance   Bed to Chair Contact guard assistance   Ambulation 1   Surface level tile   Device Rolling Walker   Other Apparatus Wheelchair follow   Assistance Contact guard assistance;Minimal assistance   Quality of Gait 3 point gait, LLE lead, decreased control of RLE,    Gait Deviations Slow Deanna   Distance 75ft   Comments  occassional instability toward the  R, VC to control heel strike on RLE,      Propulsion 1   Propulsion Manual   Level Level Tile   Method RLE;LLE   Level of Assistance Supervision   Description/ Details 2 turns    Distance 250ft   Balance   Posture Good   Sitting - Static Good   Sitting - Dynamic Good   Standing - Static Fair   Standing - Dynamic Fair;-   Other exercises   Other exercises 1 side stepping in // bars, 5ft both directions, x2   Other exercises 2 tandem stance x 4, 60 seconds, R HR   Conditions Requiring Skilled Therapeutic Intervention   Assessment Patient ambulated 75ft with improved gait pattern but occassionally unstable on RLE. Required VC for slow and controlled step with RLE. Increased WC propulsion distance with good control and manuevering with LEs. Activity Tolerance   Activity Tolerance Patient Tolerated treatment well   Safety Devices   Type of devices All fall risk precautions in place;Call light within reach; Patient at risk for falls; Left in bed;Gait belt;Bed alarm in place   Electronically signed by Elijah Eugene on 1/28/2020 at 2:12 PM

## 2020-01-28 NOTE — PROGRESS NOTES
[facility]  INPATIENT SPEECH THERAPY  [facility] 8 REHAB UNIT  TIME   Time In: 1000  Time Out: 1100  Minutes: 60       [x]Daily Note  []Progress Note    Date: 2020  Patient Name: Radha Darden        MRN: [id number]    Account #: [de-identified]  : 1955  (66 y.o.)  Gender: male   Primary Provider: Subha Cruz MD  Precautions: Fall/Swallowing  Swallowing Status/Diet: Regular consistencies with thin liquids      Subjective: Patient was alert and cooperative with all tasks. No family present during treatment time. Swallowing:  Patient noted to have consistent immediate coughs following thin liquids this date. SLP observed patient swallow function this date on current diet. Patient consumed regular consistencies with thin liquids via cup and trial of nectar thick liquids via cup. Patient was able to feed self. Oral Phase:  Mild extended mastication noted with solid consistencies; bolus prep was adequate. 1-2 second initiation of swallow noted with all PO trials except thin liquids. Inconsistent fast transit noted with thin liquids. No oral residue noted with any PO trials. Pharyngeal Phase:  Mild-mod sluggish and decreased laryngeal elevation for airway protection noted with all PO trials. Inconsistent immediate coughing noted with thin liquids only. No vocal changes noted with any PO trials. Recommended liquids changed to nectar thick liquids. Patient refused altered/tjhickened liquids this date. SLP reviewed recommendations with patient as well as provided education regarding thickened liquids with expressed understanding noted from the patient. At this time, per patient request, continue diet of regular consistencies with thin liquids. Meds whole in applesauce/pudding. Able to self feed. Will continue to follow. Speech/Language:  Patient speech intelligibility ranked by SLP at 50-60% intelligibility to unfamiliar listeners and no background noises present.  Severe dysarthric speech noted as characterized by decreased lingual ROM and strength. SLP utilized tools/strategies to promote increased clarity of speech when communicating with conversation partners. Reenforced patient to use slow speech rate with over-articulation/over-exaggeration. In structured activity, patient demonstrated ability to verbally express words in short phrases and short sentences with slow speech rate and over-articulation/over-exaggeration with provision of max cues/prompts. With provision of max cues/prompts including visual pacing board to emphasis each syllable, patient verbalized noticing increased intelligibility.      Patient was able to recall use of strategies and visual pacing board that were introduced in previous session. Patient also stated he has been utilizing the strategies during conversation with staff and/or visitors outside of speech therapy sessions.      Will continue to address speech intelligibility and strategies during treatment time. SHORT TERM GOAL #1:  Goal 1: The patient will demonstrate recall of functional information following a (short term) delay with minimal cues in order to increase functional integration in environment. SHORT TERM GOAL #2:  Goal 2: Identify the item that does not belong in a list of words presented auditorily with 100% accuracy and(min/mod/max) verbal, visual and tactile cues    SHORT TERM GOAL #3:  Goal 3: Identify appropriate solutions to a problem when presented with a field of 4 with minimal cues. SHORT TERM GOAL #4:  Goal 4: Patient will complete simple/ complex reasoning tasks to improve problem solving and safety awareness with 100% accuracy and minimal cues. SHORT TERM GOAL #5:  Goal 5: The patient will complete divergent naming tasks (concrete/abstract) with minimal cueing. Swallowing Short Term Goals  Short-term Goals  Goal 1: The patient will tolerate a regular diet with thin liquids with minimal overt s/s of aspiration.   Goal 2: The patient will complete oral care at least two times daily to prevent aspiration of oral bacteria. Goal 3: SLP and staff will monitor safety with oral intake and monitor increased congestion, overt s/s of aspiration, elevated temp and RLL infiltrates on CXR. Comprehension: 5 - Patient understands basic needs (hungry/hot/pain)  Expression: 5 - Expresses basic ideas/needs only (hungry/hot/pain)  Social Interaction: 5 - Patient is appropriate with supervision/cues  Problem Solvin - Patient able to solve simple/routine tasks  Memory: 5 - Patient requires prompting with stress/unfamiliar situations    ASSESSMENT:  Assessment: [x]Progressing towards goals          []Not Progressing towards goals    Patient Tolerance of Treatment:   [x]Tolerated well []Tolerated fair []Required rest breaks []Fatigued    Education:  Learner:  [x]Patient          []Significant Other          []Other  Education provided regarding:  [x]Goals and POC   [x]Diet and swallowing precautions    []Home Exercise Program  []Progress and/or discharge information  Method of Education:  [x]Discussion          [x]Demonstration          []Handout          []Other  Evaluation of Education:   [x]Verbalized understanding   []Demonstrates without assistance  []Demonstrates with assistance  [x]Needs further instruction     []No evidence of learning                  [x]No family present      Plan: [x]Continue with current plan of care    []Modify current plan of care as follows:    []Discharge patient    Discharge Location:    Services/Supervision Recommended:      [x]Patient continues to require treatment by a licensed therapist to address functional deficits as outlined in the established plan of care.     Electronically signed by STEVE Oro on 2020 at 11:00 AM

## 2020-01-28 NOTE — PROGRESS NOTES
Pulmonary and Critical Care Progress Note [facility]    Patient: Francoise Langford  1955   MR# [id number]   Acct# [de-identified]  01/28/20   4:09 PM  Referring Provider: Trip Simmons MD    Chief Complaint: Cough and shortness of breath. Interval history: The patient had more problems with cough and dyspnea over the past few days and also had some problems of wheezing. He is better this afternoon. His follow-up chest x-ray showed no acute process. Medications:   methylPREDNISolone, 40 mg, Intravenous, Q8H    budesonide, 0.5 mg, Nebulization, BID    formoterol, 20 mcg, Nebulization, BID    benzonatate, 100 mg, Oral, TID    FLUoxetine, 20 mg, Oral, Daily    levothyroxine, 125 mcg, Oral, Daily    amLODIPine, 10 mg, Oral, Daily    aspirin, 81 mg, Oral, Daily    atorvastatin, 80 mg, Oral, Nightly    lisinopril, 20 mg, Oral, Daily    polyethylene glycol, 17 g, Oral, Daily    sennosides-docusate sodium, 1 tablet, Oral, Nightly    ipratropium-albuterol, 1 vial, Inhalation, 4x Daily    docusate sodium, 100 mg, Oral, BID   Review of Systems: Review of Systems Constitutional: Negative for fever. HENT:        He has problems with dysarthria. Eyes: Negative. Respiratory: Positive for cough, wheezing, and shortness of breath. Cardiovascular: Negative. Gastrointestinal: Negative. Genitourinary: Negative. Musculoskeletal: Negative. Neurological: Positive for weakness. Psychiatric/Behavioral: Negative. Physical Exam:  Blood pressure 133/89, pulse 75, temperature 96.7 °F (35.9 °C), temperature source Temporal, resp. rate 18, height 6' (1.829 m), weight 170 lb 4.8 oz (77.2 kg), SpO2 96 %. Intake/Output Summary (Last 24 hours) at 1/28/2020 1609  Last data filed at 1/28/2020 1258  Gross per 24 hour   Intake 840 ml   Output 550 ml   Net 290 ml     Physical Exam Constitutional:       Comments: Is a pleasant middle-aged white male. He appears in no acute distress.   He is noticeably dysarthric however. HENT:      Head: Normocephalic and atraumatic. Nasal oxygen is in place. Eyes:      Extraocular Movements: Extraocular movements intact. Pupils: Pupils are equal, round, and reactive to light. Neck:      Musculoskeletal: Normal range of motion and neck supple. Cardiovascular:      Rate and Rhythm: Normal rate and regular rhythm. Pulmonary:      Effort: Pulmonary effort is normal.      Comments: He does have a few coarse expiratory rhonchi but no wheezes are heard this afternoon. Abdominal:      General: Abdomen is flat. Palpations: Abdomen is soft. Musculoskeletal: Normal range of motion. Skin:     Comments: He has ecchymoses on his upper extremities. Neurological:      Mental Status: He is alert and oriented to person, place, and time. Comments: He is noticeably dysarthric. Psychiatric:         Mood and Affect: Mood normal.         Behavior: Behavior normal.   Recent Labs     01/27/20  0445   WBC 12.2*   RBC 5.69   HGB 18.1*   HCT 54.2*      MCV 95.3*   MCH 31.8*   MCHC 33.4   RDW 13.5      Recent Labs     01/27/20  0445      K 4.8      CO2 23   BUN 19   CREATININE 0.9   CALCIUM 9.5   GLUCOSE 96      No results for input(s): BC, LABGRAM, CULTRESP, BFCX in the last 72 hours. Radiograph:   Narrative   XR CHEST PORTABLE    1/28/2020 10:43 AM   History: Short of breath. Normal heart and mediastinum. Mild diffuse interstitial lung disease with no pneumonia,   pneumothorax, or congestive failure.       Impression   1. Stable mild chronic change. Signed by Dr Daylin Stafford on 1/28/2020 10:44 AM       My radiograph interpretation: No acute infiltrates are noted on his chest x-ray. He has some mild chronic changes. Pulmonary Assessment:    1.  History of obstructive airways disease which by prior pulmonary functions virtually his most recent studies which showed a normal FEV1 to FVC ratio, likely relate predominately to asthma although

## 2020-01-28 NOTE — PLAN OF CARE
Problem: HEMODYNAMIC STATUS  Goal: Patient has stable vital signs and fluid balance  Outcome: Ongoing     Problem: ACTIVITY INTOLERANCE/IMPAIRED MOBILITY  Goal: Mobility/activity is maintained at optimum level for patient  Outcome: Ongoing     Problem: COMMUNICATION IMPAIRMENT  Goal: Ability to express needs and understand communication  Outcome: Ongoing     Problem: Falls - Risk of:  Goal: Will remain free from falls  Description  Will remain free from falls  Outcome: Ongoing  Goal: Absence of physical injury  Description  Absence of physical injury  Outcome: Ongoing     Problem: Infection:  Goal: Will remain free from infection  Description  Will remain free from infection  Outcome: Ongoing     Problem: Safety:  Goal: Ability to chew and swallow food without choking will improve  Description  Ability to chew and swallow food without choking will improve  Outcome: Ongoing  Goal: Signs and symptoms of aspiration will decrease  Description  Signs and symptoms of aspiration will decrease  Outcome: Ongoing     Problem: IP BLADDER/VOIDING  Goal: LTG - Patient will utilize adaptive techniques/equipment to complete bladder elimination  Outcome: Ongoing  Goal: STG - Patient demonstrates no accidents  Outcome: Ongoing  Goal: STG - Patient will state signs and symptoms of UTI  Outcome: Ongoing     Problem: IP BOWEL ELIMINATION  Goal: LTG - patient will have regular and routine bowel evacuation  Outcome: Ongoing     Problem: IP BREATHING  Goal: LTG - patient will mobilize secretions and maintain airway  Outcome: Ongoing     Problem: NUTRITION  Description  Altered Nutrition and Hydration  Goal: Patient maintains adequate hydration  Outcome: Ongoing     Problem: NUTRITION  Description  Altered Nutrition and Hydration  Goal: Patient maintains adequate hydration  Outcome: Ongoing     Problem: SAFETY  Goal: LTG - Patient will demonstrate safety requirements appropriate to situation/environment  Outcome: Ongoing  Goal: LTG - patient will utilize safety techniques  Outcome: Ongoing  Goal: STG - patient locks brakes on wheelchair  Outcome: Ongoing  Goal: STG - Patient uses call light consistently to request assistance with transfers  Outcome: Ongoing  Goal: STG - patient uses gait belt during all transfers  Outcome: Ongoing     Problem: SKIN INTEGRITY  Goal: STG - patient will maintain good skin integrity  Outcome: Ongoing     Problem: PAIN  Goal: STG - Patient will verbalize an acceptable level of pain  Outcome: Ongoing     Problem: Mobility - Impaired:  Goal: Mobility will improve  Description  Mobility will improve  Outcome: Ongoing     Problem: Health Behavior:  Goal: Identification of resources available to assist in meeting health care needs will improve  Description  Identification of resources available to assist in meeting health care needs will improve  Outcome: Ongoing     Problem: Nutritional:  Goal: Consumption of the prescribed amount of daily calories will improve  Description  Consumption of the prescribed amount of daily calories will improve  Outcome: Ongoing

## 2020-01-29 ENCOUNTER — OUTSIDE FACILITY SERVICE (OUTPATIENT)
Dept: PULMONOLOGY | Facility: CLINIC | Age: 65
End: 2020-01-29

## 2020-01-29 PROCEDURE — 94669 MECHANICAL CHEST WALL OSCILL: CPT

## 2020-01-29 PROCEDURE — 99233 SBSQ HOSP IP/OBS HIGH 50: CPT | Performed by: PSYCHIATRY & NEUROLOGY

## 2020-01-29 PROCEDURE — 2700000000 HC OXYGEN THERAPY PER DAY

## 2020-01-29 PROCEDURE — 51798 US URINE CAPACITY MEASURE: CPT

## 2020-01-29 PROCEDURE — 6360000002 HC RX W HCPCS: Performed by: INTERNAL MEDICINE

## 2020-01-29 PROCEDURE — 1180000000 HC REHAB R&B

## 2020-01-29 PROCEDURE — 51701 INSERT BLADDER CATHETER: CPT

## 2020-01-29 PROCEDURE — 6370000000 HC RX 637 (ALT 250 FOR IP): Performed by: PSYCHIATRY & NEUROLOGY

## 2020-01-29 PROCEDURE — 97530 THERAPEUTIC ACTIVITIES: CPT

## 2020-01-29 PROCEDURE — 94640 AIRWAY INHALATION TREATMENT: CPT

## 2020-01-29 PROCEDURE — 97110 THERAPEUTIC EXERCISES: CPT

## 2020-01-29 PROCEDURE — 92507 TX SP LANG VOICE COMM INDIV: CPT

## 2020-01-29 PROCEDURE — 97535 SELF CARE MNGMENT TRAINING: CPT

## 2020-01-29 PROCEDURE — 92526 ORAL FUNCTION THERAPY: CPT

## 2020-01-29 PROCEDURE — 97116 GAIT TRAINING THERAPY: CPT

## 2020-01-29 PROCEDURE — 99232 SBSQ HOSP IP/OBS MODERATE 35: CPT | Performed by: INTERNAL MEDICINE

## 2020-01-29 RX ORDER — MECLIZINE HYDROCHLORIDE 25 MG/1
25 TABLET ORAL 3 TIMES DAILY
Status: DISCONTINUED | OUTPATIENT
Start: 2020-01-29 | End: 2020-02-03

## 2020-01-29 RX ADMIN — IPRATROPIUM BROMIDE AND ALBUTEROL SULFATE 3 ML: 2.5; .5 SOLUTION RESPIRATORY (INHALATION) at 10:31

## 2020-01-29 RX ADMIN — AMLODIPINE BESYLATE 10 MG: 10 TABLET ORAL at 07:57

## 2020-01-29 RX ADMIN — METHYLPREDNISOLONE SODIUM SUCCINATE 40 MG: 125 INJECTION, POWDER, FOR SOLUTION INTRAMUSCULAR; INTRAVENOUS at 05:34

## 2020-01-29 RX ADMIN — FLUOXETINE HYDROCHLORIDE 20 MG: 20 CAPSULE ORAL at 07:56

## 2020-01-29 RX ADMIN — IPRATROPIUM BROMIDE AND ALBUTEROL SULFATE 3 ML: 2.5; .5 SOLUTION RESPIRATORY (INHALATION) at 15:31

## 2020-01-29 RX ADMIN — BENZONATATE 100 MG: 100 CAPSULE ORAL at 07:56

## 2020-01-29 RX ADMIN — FORMOTEROL FUMARATE DIHYDRATE 20 MCG: 20 SOLUTION RESPIRATORY (INHALATION) at 20:34

## 2020-01-29 RX ADMIN — BENZONATATE 100 MG: 100 CAPSULE ORAL at 21:48

## 2020-01-29 RX ADMIN — IPRATROPIUM BROMIDE AND ALBUTEROL SULFATE 3 ML: 2.5; .5 SOLUTION RESPIRATORY (INHALATION) at 20:28

## 2020-01-29 RX ADMIN — LISINOPRIL 20 MG: 20 TABLET ORAL at 07:57

## 2020-01-29 RX ADMIN — FORMOTEROL FUMARATE DIHYDRATE 20 MCG: 20 SOLUTION RESPIRATORY (INHALATION) at 06:29

## 2020-01-29 RX ADMIN — IPRATROPIUM BROMIDE AND ALBUTEROL SULFATE 3 ML: 2.5; .5 SOLUTION RESPIRATORY (INHALATION) at 06:22

## 2020-01-29 RX ADMIN — ASPIRIN 81 MG 81 MG: 81 TABLET ORAL at 07:56

## 2020-01-29 RX ADMIN — MECLIZINE HYDROCHLORIDE 25 MG: 25 TABLET ORAL at 10:21

## 2020-01-29 RX ADMIN — MECLIZINE HYDROCHLORIDE 25 MG: 25 TABLET ORAL at 21:48

## 2020-01-29 RX ADMIN — BUDESONIDE 500 MCG: 0.5 INHALANT RESPIRATORY (INHALATION) at 06:29

## 2020-01-29 RX ADMIN — BUDESONIDE 500 MCG: 0.5 INHALANT RESPIRATORY (INHALATION) at 20:35

## 2020-01-29 RX ADMIN — METHYLPREDNISOLONE SODIUM SUCCINATE 40 MG: 125 INJECTION, POWDER, FOR SOLUTION INTRAMUSCULAR; INTRAVENOUS at 21:49

## 2020-01-29 RX ADMIN — LEVOTHYROXINE SODIUM 125 MCG: 0.1 TABLET ORAL at 05:34

## 2020-01-29 RX ADMIN — ATORVASTATIN CALCIUM 80 MG: 80 TABLET, FILM COATED ORAL at 21:48

## 2020-01-29 RX ADMIN — METHYLPREDNISOLONE SODIUM SUCCINATE 40 MG: 125 INJECTION, POWDER, FOR SOLUTION INTRAMUSCULAR; INTRAVENOUS at 12:33

## 2020-01-29 RX ADMIN — MECLIZINE HYDROCHLORIDE 25 MG: 25 TABLET ORAL at 15:21

## 2020-01-29 ASSESSMENT — PAIN SCALES - GENERAL: PAINLEVEL_OUTOF10: 0

## 2020-01-29 NOTE — PROGRESS NOTES
Pulse 87   Temp 97.7 °F (36.5 °C) (Temporal)   Resp 18   Ht 6' (1.829 m)   Wt 170 lb 4.8 oz (77.2 kg)   SpO2 93%   BMI 23.10 kg/m²     Constitutional: he appears well-developed and well-nourished. Eyes - conjunctiva normal.  Pupils react to light  Ear, nose, throat -hearing intact to voice. No scars, masses, or lesions over external nose or ears, no atrophy of tongue  Neck-symmetric, no masses noted, no jugular vein distension  Respiration- chest wall appears symmetric, good expansion,   normal effort without use of accessory muscles  Cardiovascular- RRR  Musculoskeletal - no significant wasting of muscles noted, no bony deformities, gait no gross ataxia  Extremities-no clubbing, cyanosis or edema  Skin - warm, dry, and intact. No rash, erythema, or pallor. Psychiatric - mood, affect, and behavior appear normal.      Neurology  NEUROLOGICAL EXAM:  Awake, alert, fluent oriented x 3 appropriate affect  Attention and concentration appear appropriate  Memory fair  Speech stuttering with significant dysarthria  No clear issues with language of fund of knowledge     Cranial Nerve Exam   CN II- Visual fields grossly unremarkable  CN III, IV,VI-EOMI, significant left greater than right horizontal nystagmus, disconjugate eyes at rest, no ptosis  CN VII-no facial assymetry  CN VIII-Hearing intact   CN IX and X- Palate elevates in midline  CN XI-good shoulder shrug  CN XII-Tongue midline with no fasciculations or fibrillations     Motor Exam  V/V throughout upper and lower extremities bilaterally, no cogwheeling, normal tone            Nursing/pcp notes, imaging,labs and vitals reviewed.      PT,OT and/or speech notes reviewed    Lab Results   Component Value Date    WBC 12.2 (H) 01/27/2020    HGB 18.1 (H) 01/27/2020    HCT 54.2 (H) 01/27/2020    MCV 95.3 (H) 01/27/2020     01/27/2020     Lab Results   Component Value Date     01/27/2020    K 4.8 01/27/2020     01/27/2020    CO2 23 01/27/2020    BUN 19 01/27/2020    CREATININE 0.9 01/27/2020    GLUCOSE 96 01/27/2020    CALCIUM 9.5 01/27/2020    LABGLOM >60 01/27/2020   No results found for: INRNo results found for: PHENYTOIN, ESR, CRP  PHYSICAL THERAPY  STRENGTH  Strength RLE  Strength RLE: Exception  Comment: grossly 4/5  Strength LLE  Strength LLE: Exception  Comment: grossly 4/5  ROM  AROM RLE (degrees)  RLE AROM: WFL  AROM LLE (degrees)  LLE AROM : WFL  BED MOBILITY  Bed Mobility  Rolling: Stand by assistance  Supine to Sit: Stand by assistance  Sit to Supine: Stand by assistance  Scooting: Stand by assistance  TRANSFERS  Transfers  Sit to Stand: Stand by assistance  Stand to sit: Stand by assistance  Bed to Chair: Contact guard assistance  Car Transfer: Minimal Assistance  Comment: impulsive, reaches for external support for assistance during transfers  [employer] PROPULSION  Propulsion 1  Propulsion: Manual  Level: Level Tile  Method: RLE, LLE  Level of Assistance: Supervision  Description/ Details: 2 turns   Distance: 250ft  AMBULATION  Ambulation 1  Surface: level tile  Device: Rolling Walker  Other Apparatus: Wheelchair follow  Assistance: Contact guard assistance, Minimal assistance  Quality of Gait: 3 point gait, LLE lead, decreased control of RLE,   Gait Deviations: Slow Deanna  Distance: 75ft  Comments:  occassional instability toward the  R, VC to control heel strike on RLE,     STAIRS  GOALS:  Short term goals  Time Frame for Short term goals: 1-2 weeks  Short term goal 1: indep bed mobility  Short term goal 2: CGA transfers with AD  Short term goal 3: indep 150ft WC propulsion   Short term goal 4: ' amb with AD  Short term goal 5: min A 1 step with AD     Long term goals  Time Frame for Long term goals : 2-3 weeks  Long term goal 1: indep transfers with AD  Long term goal 2: indep 150ft amb with AD  Long term goal 3: CGA 1 step with AD     ASSESSMENT:  Assessment: Patient ambulated 75ft with improved gait pattern but occassionally unstable on RLE. Required VC for slow and controlled step with RLE. Increased WC propulsion distance with good control and manuevering with LEs.       SPEECH THERAPY  Precautions: Fall/Swallowing  Swallowing Status/Diet: Regular consistencies with thin liquids        Subjective: Patient was alert and cooperative with all tasks. No family present during treatment time.      Swallowing:  Patient noted to have consistent immediate coughs following thin liquids this date. SLP observed patient swallow function this date on current diet. Patient consumed regular consistencies with thin liquids via cup and trial of nectar thick liquids via cup. Patient was able to feed self.      Oral Phase:  Mild extended mastication noted with solid consistencies; bolus prep was adequate. 1-2 second initiation of swallow noted with all PO trials except thin liquids. Inconsistent fast transit noted with thin liquids. No oral residue noted with any PO trials.      Pharyngeal Phase:  Mild-mod sluggish and decreased laryngeal elevation for airway protection noted with all PO trials. Inconsistent immediate coughing noted with thin liquids only. No vocal changes noted with any PO trials.      Recommended liquids changed to nectar thick liquids. Patient refused altered/tjhickened liquids this date. SLP reviewed recommendations with patient as well as provided education regarding thickened liquids with expressed understanding noted from the patient.      At this time, per patient request, continue diet of regular consistencies with thin liquids. Meds whole in applesauce/pudding. Able to self feed. Will continue to follow.     Speech/Language:  Patient speech intelligibility ranked by SLP at 50-60% intelligibility to unfamiliar listeners and no background noises present.  Severe dysarthric speech noted as characterized by decreased lingual ROM and strength.      SLP utilized tools/strategies to promote increased clarity of speech when safety with oral intake and monitor increased congestion, overt s/s of aspiration, elevated temp and RLL infiltrates on CXR.     Long-term Goals  Goal 1: The client will use appropriate memory strategies to schedule and recall weekly activities, express needs and recall names to maintain safety and participate socially in functional living environment  Goal 2: Patient will demonstrate functional problem solving skills and provide appropriate solutions to problems in order to improve safety and awareness in functional living environment. Client will maintain adequate hydration/nutrition with optimum safety and efficiency of swallowing function on P.O. intake without overt signs and symptoms of aspiration for the highest appropriate diet level     Comprehension: 5 - Patient understands basic needs (hungry/hot/pain)  Expression: 5 - Expresses basic ideas/needs only (hungry/hot/pain)  Social Interaction: 5 - Patient is appropriate with supervision/cues  Problem Solvin - Patient able to solve simple/routine tasks  Memory: 5 - Patient requires prompting with stress/unfamiliar situations     ASSESSMENT:  Assessment: [x]? ?Progressing towards goals           []? ? Not Progressing towards goals     GOALS MET: 0 STG  0 LTG  OCCUPATIONAL THERAPY     CURRENT IRF-CESIA SCORES  Eating: CARE Score: 5  Oral Hygiene: CARE Score: 5  Toileting: CARE Score: 3  Shower/Bathe: CARE Score: 3  Upper Body Dressing: CARE Score: 5  Lower Body Dressing: CARE Score: 3  Footwear: CARE Score: 3  Toilet Transfers: CARE Score: 3  Picking Up Object:  CARE Score: 1        RECORD REVIEW: Previous medical records, medications were reviewed at today's visit    IMPRESSION:  1. 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 Antiplatelet treatment. Follow-up CT scan  showing no change in ventricular size nor any new difficulties. Spoke with radiologist.  2.  Hypertension-continue current medications and monitoring  3. Hyperlipidemia-continue statin  4. DVT prophylaxis-SCDs  5. GI-bowel regimen  6. Hypothyroidism-continue replacement therapy  7.  cough , some wheezing. Continue pulmonary toilet. Chest x-ray unremarkable. Pulmonology has seen. Now getting Solu-Medrol, bronchodilators and vest therapy. Improved  8. Urinary retention.   Continue in and out cath   Meclizine scheduled-1/29  Staffing this date , mutlidisciplinary with entire team with complex decision making and planning for discharge  ELOS:2-3 weeks

## 2020-01-29 NOTE — PROGRESS NOTES
Pulmonary and Critical Care Progress Note [address] [facility]    Patient: Tyler Pulse  1955   MR# [id number]   Acct# [de-identified]  01/29/20   8:32 AM  Referring Provider: Tammie Prieto MD    Chief Complaint: Cough and shortness of breath. Interval history:   The patient is having less problems with cough and chest congestion. He seems to be responding to aggressive pulmonary toilet measures and IV steroids. Medications:   meclizine, 25 mg, Oral, TID    methylPREDNISolone, 40 mg, Intravenous, Q8H    budesonide, 0.5 mg, Nebulization, BID    formoterol, 20 mcg, Nebulization, BID    benzonatate, 100 mg, Oral, TID    FLUoxetine, 20 mg, Oral, Daily    levothyroxine, 125 mcg, Oral, Daily    amLODIPine, 10 mg, Oral, Daily    aspirin, 81 mg, Oral, Daily    atorvastatin, 80 mg, Oral, Nightly    lisinopril, 20 mg, Oral, Daily    polyethylene glycol, 17 g, Oral, Daily    sennosides-docusate sodium, 1 tablet, Oral, Nightly    ipratropium-albuterol, 1 vial, Inhalation, 4x Daily    docusate sodium, 100 mg, Oral, BID   Review of Systems: Review of Systems Constitutional: Negative for fever. HENT:        He has problems with dysarthria. Eyes: Negative. Respiratory: Positive for cough, wheezing, and shortness of breath. Cardiovascular: Negative. Gastrointestinal: Negative. Genitourinary: Negative. Musculoskeletal: Negative. Neurological: Positive for weakness. Psychiatric/Behavioral: Negative. Physical Exam:  Blood pressure 126/80, pulse 87, temperature 97.7 °F (36.5 °C), temperature source Temporal, resp. rate 18, height 6' (1.829 m), weight 170 lb 4.8 oz (77.2 kg), SpO2 93 %. Intake/Output Summary (Last 24 hours) at 1/29/2020 1015  Last data filed at 1/28/2020 1914  Gross per 24 hour   Intake 960 ml   Output 550 ml   Net 410 ml     Physical Exam Constitutional:       Comments: Is a pleasant middle-aged white male. He appears in no acute distress.   He is noticeably dysarthric

## 2020-01-29 NOTE — PLAN OF CARE
elimination  1/29/2020 1048 by Scooby You LPN  Outcome: Ongoing  1/28/2020 2343 by Mukesh Tellez RN  Outcome: Ongoing  Goal: STG - Patient demonstrates no accidents  1/29/2020 1048 by Scooby You LPN  Outcome: Ongoing  1/28/2020 2343 by Mukesh Tellez RN  Outcome: Ongoing  Goal: STG - Patient will state signs and symptoms of UTI  1/29/2020 1048 by Scooby You LPN  Outcome: Ongoing  1/28/2020 2343 by Mukesh Tellez RN  Outcome: Ongoing     Problem: IP BOWEL ELIMINATION  Goal: LTG - patient will have regular and routine bowel evacuation  1/29/2020 1048 by Scooby You LPN  Outcome: Ongoing  1/28/2020 2343 by Mukesh Tellez RN  Outcome: Ongoing     Problem: IP BREATHING  Goal: LTG - patient will mobilize secretions and maintain airway  1/29/2020 1048 by Scooby You LPN  Outcome: Ongoing  1/28/2020 2343 by Mukesh Tellez RN  Outcome: Ongoing     Problem: NUTRITION  Goal: Patient maintains adequate hydration  1/29/2020 1048 by Scooby You LPN  Outcome: Ongoing  1/28/2020 2343 by Mukesh Tellez RN  Outcome: Ongoing     Problem: SAFETY  Goal: LTG - Patient will demonstrate safety requirements appropriate to situation/environment  1/29/2020 1048 by Scooby You LPN  Outcome: Ongoing  1/28/2020 2343 by Mukesh Tellez RN  Outcome: Ongoing  Goal: LTG - patient will utilize safety techniques  1/29/2020 1048 by Scooby You LPN  Outcome: Ongoing  1/28/2020 2343 by Mukesh Tellez RN  Outcome: Ongoing  Goal: STG - patient locks brakes on wheelchair  1/29/2020 1048 by Scooby You LPN  Outcome: Ongoing  1/28/2020 2343 by Mukesh Tellez RN  Outcome: Ongoing  Goal: STG - Patient uses call light consistently to request assistance with transfers  1/29/2020 1048 by Scooby You LPN  Outcome: Ongoing  1/28/2020 2343 by Mukesh Tellez RN  Outcome: Ongoing  Goal: STG - patient uses gait belt during all transfers  1/29/2020 1048 by Scooby You

## 2020-01-29 NOTE — PROGRESS NOTES
[facility]  INPATIENT SPEECH THERAPY  [facility] 8 REHAB UNIT  TIME   Time In: 0900  Time Out: 1000  Minutes: 60       [x]Daily Note  []Progress Note    Date: 2020  Patient Name: Manuel Jimenez        MRN: [id number]    Account #: [de-identified]  : 1955  (66 y.o.)  Gender: male   Primary Provider: Marcelle Zavala MD  Precautions: Fall/Swallowing  Swallowing Status/Diet: Regular consistencies with thin liquids (per patient request)      Subjective: Patient was alert and cooperative with all tasks. No family present during treatment time this date.      Objective:  SLP noted, at start of treatment session, that patient presents with stuttering and dysarthria primarily characterized by low volume, fast speech rate, and decreased labial and lingual movements. Dysarthria negatively impacted communication exchange and SLP ranked functional intelligibility of speech for unfamiliar listeners at just 50-60% without background noise present. Subsequently transitioned to the following lingual exercises:  a. Have patient open mouth wide, gently but firming tap finger/instrument along the side of the tongue (towards midline) 3 xs and quickly drop finger/instrument along gum line or just outside of lip.  Tongue should follow pressure.  If not cue patient to find finger/instrument.  Repeat same exercise in reps of 3 at lower gum line, middle of cheek and upper gum on both sides of mouth.  i. Patient was 3/3, bilaterally, at the upper gum line, at the middle of the cheek, and at the lower gum line.   b. Have patient extend tongue out, run instrument down middle of tongue with gentle but firm pressure.  Tongue should stabilize with minimal rolling/tremors noted.  As you reach the tongue tip, you should see the tongue extend and point.  Repeat 3 xs.  i. Patient was 3/3.   c. Same as before, have patient extend tongue out, starting at tongue tip brush up towards midline of tongue blade.  Tongue tip should curl in response and you should #3:  Goal 3: Identify appropriate solutions to a problem when presented with a field of 4 with minimal cues. SHORT TERM GOAL #4:  Goal 4: Patient will complete simple/ complex reasoning tasks to improve problem solving and safety awareness with 100% accuracy and minimal cues. SHORT TERM GOAL #5:  Goal 5: The patient will complete divergent naming tasks (concrete/abstract) with minimal cueing. Swallowing Short Term Goals  Short-term Goals  Goal 1: The patient will tolerate a regular diet with thin liquids with minimal overt s/s of aspiration. Goal 2: The patient will complete oral care at least two times daily to prevent aspiration of oral bacteria. Goal 3: SLP and staff will monitor safety with oral intake and monitor increased congestion, overt s/s of aspiration, elevated temp and RLL infiltrates on CXR.     Comprehension: 5 - Patient understands basic needs (hungry/hot/pain)  Expression: 5 - Expresses basic ideas/needs only (hungry/hot/pain)  Social Interaction: 5 - Patient is appropriate with supervision/cues  Problem Solvin - Patient solves simple/routine tasks 75-90%+   Memory: 6 - Patient requires device to recall (e.g. memory book)    ASSESSMENT:  Assessment: [x]Progressing towards goals          []Not Progressing towards goals    Patient Tolerance of Treatment:   [x]Tolerated well []Tolerated fair []Required rest breaks []Fatigued    Education:  Learner:  [x]Patient          []Significant Other          []Other  Education provided regarding:  [x]Goals and POC   [x]Diet and swallowing precautions    []Home Exercise Program  []Progress and/or discharge information  Method of Education:  [x]Discussion          [x]Demonstration          []Handout          []Other  Evaluation of Education:   [x]Verbalized understanding   []Demonstrates without assistance  []Demonstrates with assistance  [x]Needs further instruction     []No evidence of learning                  [x]No family present      Plan: [x]Continue with current plan of care    []Modify current plan of care as follows:    []Discharge patient    Discharge Location:    Services/Supervision Recommended:      [x]Patient continues to require treatment by a licensed therapist to address functional deficits as outlined in the established plan of care.     Electronically signed by Glyn Skiff, SLP on 1/29/2020 at 9:52 AM

## 2020-01-29 NOTE — PLAN OF CARE
Problem: HEMODYNAMIC STATUS  Goal: Patient has stable vital signs and fluid balance  1/28/2020 2343 by Darrin Seaman RN  Outcome: Ongoing  1/28/2020 1154 by Tawana Reynolds LPN  Outcome: Ongoing     Problem: ACTIVITY INTOLERANCE/IMPAIRED MOBILITY  Goal: Mobility/activity is maintained at optimum level for patient  1/28/2020 2343 by Darrin Seaman RN  Outcome: Ongoing  1/28/2020 1154 by Tawana Reynolds LPN  Outcome: Ongoing     Problem: COMMUNICATION IMPAIRMENT  Goal: Ability to express needs and understand communication  1/28/2020 2343 by Darrin Seaman RN  Outcome: Ongoing  1/28/2020 1154 by Tawana Reynolds LPN  Outcome: Ongoing     Problem: Falls - Risk of:  Goal: Will remain free from falls  Description  Will remain free from falls  1/28/2020 2343 by Darrin Seaman RN  Outcome: Ongoing  1/28/2020 1154 by Tawana Reynolds LPN  Outcome: Ongoing  Goal: Absence of physical injury  Description  Absence of physical injury  1/28/2020 2343 by Darrin Seaman RN  Outcome: Ongoing  1/28/2020 1154 by Tawana Reynolds LPN  Outcome: Ongoing     Problem: Infection:  Goal: Will remain free from infection  Description  Will remain free from infection  1/28/2020 2343 by Darrin Seaman RN  Outcome: Ongoing  1/28/2020 1154 by Tawana Reynolds LPN  Outcome: Ongoing     Problem: Safety:  Goal: Ability to chew and swallow food without choking will improve  Description  Ability to chew and swallow food without choking will improve  1/28/2020 2343 by Darrin Seaman RN  Outcome: Ongoing  1/28/2020 1154 by Tawana Reynolds LPN  Outcome: Ongoing  Goal: Signs and symptoms of aspiration will decrease  Description  Signs and symptoms of aspiration will decrease  1/28/2020 2343 by Darrin Seaman RN  Outcome: Ongoing  1/28/2020 1154 by Tawana Reynolds LPN  Outcome: Ongoing     Problem: IP BLADDER/VOIDING  Goal: LTG - Patient will utilize adaptive techniques/equipment to complete bladder elimination  1/28/2020 2343 by Shelia Maier RN  Outcome: Ongoing  1/28/2020 1154 by Stephy Graham LPN  Outcome: Ongoing  Goal: STG - Patient demonstrates no accidents  1/28/2020 2343 by Shelia Maier RN  Outcome: Ongoing  1/28/2020 1154 by Stephy Graham LPN  Outcome: Ongoing  Goal: STG - Patient will state signs and symptoms of UTI  1/28/2020 2343 by Shelia Maier RN  Outcome: Ongoing  1/28/2020 1154 by Stephy Graham LPN  Outcome: Ongoing     Problem: IP BOWEL ELIMINATION  Goal: LTG - patient will have regular and routine bowel evacuation  1/28/2020 2343 by Shelia Maier RN  Outcome: Ongoing  1/28/2020 1154 by Stephy Graham LPN  Outcome: Ongoing     Problem: IP BREATHING  Goal: LTG - patient will mobilize secretions and maintain airway  1/28/2020 2343 by Shelia Maier RN  Outcome: Ongoing  1/28/2020 1154 by Stephy Graham LPN  Outcome: Ongoing     Problem: NUTRITION  Goal: Patient maintains adequate hydration  1/28/2020 2343 by Shelia Maier RN  Outcome: Ongoing  1/28/2020 1154 by Stephy Graham LPN  Outcome: Ongoing     Problem: SAFETY  Goal: LTG - Patient will demonstrate safety requirements appropriate to situation/environment  1/28/2020 2343 by Shelia Maier RN  Outcome: Ongoing  1/28/2020 1154 by Stephy Graham LPN  Outcome: Ongoing  Goal: LTG - patient will utilize safety techniques  1/28/2020 2343 by Shelia Maier RN  Outcome: Ongoing  1/28/2020 1154 by Stephy Graham LPN  Outcome: Ongoing  Goal: STG - patient locks brakes on wheelchair  1/28/2020 2343 by Shelia Maier RN  Outcome: Ongoing  1/28/2020 1154 by Stephy Graham LPN  Outcome: Ongoing  Goal: STG - Patient uses call light consistently to request assistance with transfers  1/28/2020 2343 by Shelia Maier RN  Outcome: Ongoing  1/28/2020 1154 by Stephy Graham LPN  Outcome: Ongoing  Goal: STG - patient uses gait belt during all transfers  1/28/2020 2343 by Shelia Maier

## 2020-01-30 ENCOUNTER — OUTSIDE FACILITY SERVICE (OUTPATIENT)
Dept: PULMONOLOGY | Facility: CLINIC | Age: 65
End: 2020-01-30

## 2020-01-30 LAB
ANION GAP SERPL CALCULATED.3IONS-SCNC: 10 MMOL/L (ref 7–19)
BASOPHILS ABSOLUTE: 0 K/UL (ref 0–0.2)
BASOPHILS RELATIVE PERCENT: 0.1 % (ref 0–1)
BUN BLDV-MCNC: 32 MG/DL (ref 8–23)
CALCIUM SERPL-MCNC: 9.5 MG/DL (ref 8.8–10.2)
CHLORIDE BLD-SCNC: 105 MMOL/L (ref 98–111)
CO2: 25 MMOL/L (ref 22–29)
CREAT SERPL-MCNC: 1 MG/DL (ref 0.5–1.2)
EOSINOPHILS ABSOLUTE: 0 K/UL (ref 0–0.6)
EOSINOPHILS RELATIVE PERCENT: 0 % (ref 0–5)
GFR NON-AFRICAN AMERICAN: >60
GLUCOSE BLD-MCNC: 143 MG/DL (ref 74–109)
HCT VFR BLD CALC: 51.4 % (ref 42–52)
HEMOGLOBIN: 16.6 G/DL (ref 14–18)
IMMATURE GRANULOCYTES #: 0.1 K/UL
LYMPHOCYTES ABSOLUTE: 0.7 K/UL (ref 1.1–4.5)
LYMPHOCYTES RELATIVE PERCENT: 4.5 % (ref 20–40)
MCH RBC QN AUTO: 30.9 PG (ref 27–31)
MCHC RBC AUTO-ENTMCNC: 32.3 G/DL (ref 33–37)
MCV RBC AUTO: 95.7 FL (ref 80–94)
MONOCYTES ABSOLUTE: 0.7 K/UL (ref 0–0.9)
MONOCYTES RELATIVE PERCENT: 4.6 % (ref 0–10)
NEUTROPHILS ABSOLUTE: 14.6 K/UL (ref 1.5–7.5)
NEUTROPHILS RELATIVE PERCENT: 90.3 % (ref 50–65)
PDW BLD-RTO: 13.5 % (ref 11.5–14.5)
PLATELET # BLD: 286 K/UL (ref 130–400)
PMV BLD AUTO: 10.5 FL (ref 9.4–12.4)
POTASSIUM REFLEX MAGNESIUM: 5.3 MMOL/L (ref 3.5–5)
RBC # BLD: 5.37 M/UL (ref 4.7–6.1)
SODIUM BLD-SCNC: 140 MMOL/L (ref 136–145)
WBC # BLD: 16.1 K/UL (ref 4.8–10.8)

## 2020-01-30 PROCEDURE — 6370000000 HC RX 637 (ALT 250 FOR IP): Performed by: PSYCHIATRY & NEUROLOGY

## 2020-01-30 PROCEDURE — 6360000002 HC RX W HCPCS: Performed by: INTERNAL MEDICINE

## 2020-01-30 PROCEDURE — 51701 INSERT BLADDER CATHETER: CPT

## 2020-01-30 PROCEDURE — 97110 THERAPEUTIC EXERCISES: CPT

## 2020-01-30 PROCEDURE — 97530 THERAPEUTIC ACTIVITIES: CPT

## 2020-01-30 PROCEDURE — 80048 BASIC METABOLIC PNL TOTAL CA: CPT

## 2020-01-30 PROCEDURE — 1180000000 HC REHAB R&B

## 2020-01-30 PROCEDURE — 99232 SBSQ HOSP IP/OBS MODERATE 35: CPT | Performed by: PSYCHIATRY & NEUROLOGY

## 2020-01-30 PROCEDURE — 94669 MECHANICAL CHEST WALL OSCILL: CPT

## 2020-01-30 PROCEDURE — 2700000000 HC OXYGEN THERAPY PER DAY

## 2020-01-30 PROCEDURE — 94640 AIRWAY INHALATION TREATMENT: CPT

## 2020-01-30 PROCEDURE — 51798 US URINE CAPACITY MEASURE: CPT

## 2020-01-30 PROCEDURE — 99232 SBSQ HOSP IP/OBS MODERATE 35: CPT | Performed by: INTERNAL MEDICINE

## 2020-01-30 PROCEDURE — 36415 COLL VENOUS BLD VENIPUNCTURE: CPT

## 2020-01-30 PROCEDURE — 97535 SELF CARE MNGMENT TRAINING: CPT

## 2020-01-30 PROCEDURE — 97116 GAIT TRAINING THERAPY: CPT

## 2020-01-30 PROCEDURE — 85025 COMPLETE CBC W/AUTO DIFF WBC: CPT

## 2020-01-30 RX ADMIN — IPRATROPIUM BROMIDE AND ALBUTEROL SULFATE 3 ML: 2.5; .5 SOLUTION RESPIRATORY (INHALATION) at 06:26

## 2020-01-30 RX ADMIN — IPRATROPIUM BROMIDE AND ALBUTEROL SULFATE 3 ML: 2.5; .5 SOLUTION RESPIRATORY (INHALATION) at 20:26

## 2020-01-30 RX ADMIN — LEVOTHYROXINE SODIUM 125 MCG: 0.1 TABLET ORAL at 04:57

## 2020-01-30 RX ADMIN — SENNOSIDES AND DOCUSATE SODIUM 1 TABLET: 8.6; 5 TABLET ORAL at 20:52

## 2020-01-30 RX ADMIN — FLUOXETINE HYDROCHLORIDE 20 MG: 20 CAPSULE ORAL at 08:07

## 2020-01-30 RX ADMIN — ASPIRIN 81 MG 81 MG: 81 TABLET ORAL at 08:06

## 2020-01-30 RX ADMIN — METHYLPREDNISOLONE SODIUM SUCCINATE 40 MG: 125 INJECTION, POWDER, FOR SOLUTION INTRAMUSCULAR; INTRAVENOUS at 14:38

## 2020-01-30 RX ADMIN — MECLIZINE HYDROCHLORIDE 25 MG: 25 TABLET ORAL at 08:06

## 2020-01-30 RX ADMIN — AMLODIPINE BESYLATE 10 MG: 10 TABLET ORAL at 08:07

## 2020-01-30 RX ADMIN — ACETAMINOPHEN 650 MG: 325 TABLET ORAL at 11:41

## 2020-01-30 RX ADMIN — BENZONATATE 100 MG: 100 CAPSULE ORAL at 20:52

## 2020-01-30 RX ADMIN — LISINOPRIL 20 MG: 20 TABLET ORAL at 08:07

## 2020-01-30 RX ADMIN — FORMOTEROL FUMARATE DIHYDRATE 20 MCG: 20 SOLUTION RESPIRATORY (INHALATION) at 06:32

## 2020-01-30 RX ADMIN — BUDESONIDE 500 MCG: 0.5 INHALANT RESPIRATORY (INHALATION) at 20:40

## 2020-01-30 RX ADMIN — DOCUSATE SODIUM 100 MG: 100 CAPSULE, LIQUID FILLED ORAL at 08:06

## 2020-01-30 RX ADMIN — BENZONATATE 100 MG: 100 CAPSULE ORAL at 08:06

## 2020-01-30 RX ADMIN — BUDESONIDE 500 MCG: 0.5 INHALANT RESPIRATORY (INHALATION) at 06:32

## 2020-01-30 RX ADMIN — BENZONATATE 100 MG: 100 CAPSULE ORAL at 14:38

## 2020-01-30 RX ADMIN — MECLIZINE HYDROCHLORIDE 25 MG: 25 TABLET ORAL at 20:51

## 2020-01-30 RX ADMIN — METHYLPREDNISOLONE SODIUM SUCCINATE 40 MG: 125 INJECTION, POWDER, FOR SOLUTION INTRAMUSCULAR; INTRAVENOUS at 21:08

## 2020-01-30 RX ADMIN — MECLIZINE HYDROCHLORIDE 25 MG: 25 TABLET ORAL at 14:43

## 2020-01-30 RX ADMIN — DOCUSATE SODIUM 100 MG: 100 CAPSULE, LIQUID FILLED ORAL at 20:52

## 2020-01-30 RX ADMIN — FORMOTEROL FUMARATE DIHYDRATE 20 MCG: 20 SOLUTION RESPIRATORY (INHALATION) at 20:40

## 2020-01-30 RX ADMIN — IPRATROPIUM BROMIDE AND ALBUTEROL SULFATE 3 ML: 2.5; .5 SOLUTION RESPIRATORY (INHALATION) at 14:11

## 2020-01-30 RX ADMIN — METHYLPREDNISOLONE SODIUM SUCCINATE 40 MG: 125 INJECTION, POWDER, FOR SOLUTION INTRAMUSCULAR; INTRAVENOUS at 04:57

## 2020-01-30 RX ADMIN — IPRATROPIUM BROMIDE AND ALBUTEROL SULFATE 3 ML: 2.5; .5 SOLUTION RESPIRATORY (INHALATION) at 09:45

## 2020-01-30 RX ADMIN — ATORVASTATIN CALCIUM 80 MG: 80 TABLET, FILM COATED ORAL at 20:51

## 2020-01-30 ASSESSMENT — PAIN SCALES - WONG BAKER: WONGBAKER_NUMERICALRESPONSE: 0

## 2020-01-30 ASSESSMENT — PAIN SCALES - GENERAL
PAINLEVEL_OUTOF10: 9
PAINLEVEL_OUTOF10: 0

## 2020-01-30 NOTE — PROGRESS NOTES
Occupational Therapy     01/30/20 1100   Restrictions/Precautions   Restrictions/Precautions Seizure; Fall Risk   General   Diagnosis R PICA territory w/asociated blood products in the infarcted brain parenchyma and trace L cerebral convexity subdural hemorrhage   ADL   Feeding Independent; Modified independent   (eats with non dominant hand d/t ataxia)   LE Dressing Setup; Increased time to complete  (shoes only)   Functional Mobility   Functional - Mobility Device Rolling Walker   Activity Other   Assist Level Contact guard assistance   Functional Mobility Comments good pacing and improved control    Bed mobility   Supine to Sit Modified independent   Sit to Supine Modified independent   Comment pt ind with bed controls    Transfers   Sit to stand Contact guard assistance   Stand to sit Contact guard assistance   Type of ROM/Therapeutic Exercise   Type of ROM/Therapeutic Exercise Resistive Bands   Comment Medium resistant Tband   Coordination   Gross Motor BUE targeting act, F- control R   (L WFLs)   Assessment   Performance deficits / Impairments Decreased functional mobility ; Decreased ADL status; Decreased safe awareness;Decreased balance;Decreased high-level IADLs;Decreased coordination   Timed Code Treatment Minutes 60 Minutes   Activity Tolerance   Activity Tolerance Patient Tolerated treatment well   Safety Devices   Safety Devices in place Yes   Type of devices Bed alarm in place;Call light within reach

## 2020-01-30 NOTE — PROGRESS NOTES
If not cue patient to find finger/instrument. Repeat same exercise in reps of 3 at lower gum line, middle of cheek and upper gum on both sides of mouth.  i. Patient was 3/3, bilaterally, at the upper gum line, at the middle of the cheek, and at the lower gum line. b. Have patient extend tongue out, run instrument down middle of tongue with gentle but firm pressure. Tongue should stabilize with minimal rolling/tremors noted. As you reach the tongue tip, you should see the tongue extend and point. Repeat 3 xs.  i. Patient was 3/3.   c. Same as before, have patient extend tongue out, starting at tongue tip brush up towards midline of tongue blade. Tongue tip should curl in response and you should see the natural bowl reflex appear. i. Patient was 3/3.   d. Press firmly down on the center of tongue. Release and move towards instrument to hard palate. Tongue should follow pressure. (Like a spring). i. Patient was 0/3 without use of accessory jaw movement.  e. Brush instrument along the alveolar ridge 3xs and remove. Tongue should extend to ridge. If not cue patient to extend tongue up along ridge. i. Patient was 0/3 without use of accessory jaw movement. Also targeted tools/strategies to utilize to promote increased clarity of speech when communicating with conversation partners as dysarthria was still observed in natural conversation. Continued to target slow speech rate with over-articulation/over-exaggeration. In structured activity, patient demonstrated ability to immediately recall and apply strategies of slow speech rate and over-articulation/over-exaggeration in words in short phrases with 30% of opportunities at independent level.       SHORT TERM GOAL #1:  Goal 1: The patient will demonstrate recall of functional information following a (short term) delay with minimal cues in order to increase functional integration in environment.     SHORT TERM GOAL #2:  Goal 2: Identify the item that does breaks          []? ? Fatigued     Education:  Learner:  [x]? ?Patient          []? ? Significant Other          []? ? Other  Education provided regarding:  [x]? ?Goals and POC                               []? ? Diet and swallowing precautions                  [x]? ? Home Exercise Program                 []? ?Progress and/or discharge information  Method of Education:  [x]? ? Discussion          [x]? ? Demonstration          []? ? Handout          []? ? Other  Evaluation of Education:   []??Verbalized understanding                           []? ? Demonstrates without assistance  [x]? ? Demonstrates with assistance                    [x]? ? Needs further instruction                               []? ?No evidence of learning                              []? ? No family present         Plan:   [x]? ? Continue with current plan of care                []? ? Modify current plan of care as follows:               []? ? Discharge patient                          Discharge Location:                          Services/Supervision Recommended:       [x]? ?Patient continues to require treatment by a licensed therapist to address functional deficits as outlined in the established plan of care.       Electronically signed by STEVE Boone on 1/30/2020 at 10:58 AM

## 2020-01-30 NOTE — PROGRESS NOTES
Patient: Coco Buitrago  MR#:    [id number]   Room:    8950/257-13   YOB: 1955  Date of Progress Note: 1/30/2020  Time of Note                           9:12 AM  Consulting Physician:   Shona Meléndez M.D. Attending Physician:  Shona Meléndez MD     CHIEF COMPLAINT: Right-sided weakness with ataxia and dysarthria        Subjective  This 63 y.o. male  R handed pt admitted to [facility] on on 1/13/2020 for stroke evaluation. Pt reports that he has felt dizzy, poor coordination w/his R arm and R leg, numbness in his face, feet and hands. He reports over this same couple of days, that he also had fever and chills, nausea & emesis. He has been SOA and very weak. Pt reports that he stopped smoking 10 years ago, but still smokes intermittently. Influenza swab was positive for influenza A. 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 Dr. Destiny Lyman w/neuro and Dr. Swati Chávez w/neurosurgery were consulted. Dr Swati Chávez did not see surgical intervention to be necessary. Pt was initially admitted to ICU but was moved to neuro floor on 1/17/2020. Notes from [place] from 8/5/16 indicates pt has a hx of seizures, but pt has not been on any anticonvulsants. Pt states his last seizure was in Dec 2015. He is being treated w/tamiflu. He is now medically stable, but continues to be ataxic w/his gait, dysarthric, as well as coordination issues w/R UE/LE.      No c/o this morning  REVIEW OF SYSTEMS:  Constitutional: No fevers No chills  Neck:No stiffness  Respiratory: No shortness of breath  Cardiovascular: No chest pain No palpitations  Gastrointestinal: No abdominal pain    Genitourinary: No Dysuria  Neurological: No headache, no confusion      PHYSICAL EXAM:  /84 Pulse 82   Temp 96.5 °F (35.8 °C) (Temporal)   Resp 17   Ht 6' (1.829 m)   Wt 170 lb 4.8 oz (77.2 kg)   SpO2 92%   BMI 23.10 kg/m²     Constitutional: he appears well-developed and well-nourished. Eyes - conjunctiva normal.  Pupils react to light  Ear, nose, throat -hearing intact to voice. No scars, masses, or lesions over external nose or ears, no atrophy of tongue  Neck-symmetric, no masses noted, no jugular vein distension  Respiration- chest wall appears symmetric, good expansion,   normal effort without use of accessory muscles  Cardiovascular- RRR  Musculoskeletal - no significant wasting of muscles noted, no bony deformities, gait no gross ataxia  Extremities-no clubbing, cyanosis or edema  Skin - warm, dry, and intact. No rash, erythema, or pallor. Psychiatric - mood, affect, and behavior appear normal.      Neurology  NEUROLOGICAL EXAM:  Awake, alert, fluent oriented x 3 appropriate affect  Attention and concentration appear appropriate  Memory fair  Speech stuttering with significant dysarthria  No clear issues with language of fund of knowledge     Cranial Nerve Exam   CN II- Visual fields grossly unremarkable  CN III, IV,VI-EOMI, significant left greater than right horizontal nystagmus, disconjugate eyes at rest, no ptosis  CN VII-no facial assymetry  CN VIII-Hearing intact   CN IX and X- Palate elevates in midline  CN XI-good shoulder shrug  CN XII-Tongue midline with no fasciculations or fibrillations     Motor Exam  V/V throughout upper and lower extremities bilaterally, no cogwheeling, normal tone            Nursing/pcp notes, imaging,labs and vitals reviewed.      PT,OT and/or speech notes reviewed    Lab Results   Component Value Date    WBC 16.1 (H) 01/30/2020    HGB 16.6 01/30/2020    HCT 51.4 01/30/2020    MCV 95.7 (H) 01/30/2020     01/30/2020     Lab Results   Component Value Date     01/30/2020    K 5.3 (H) 01/30/2020     01/30/2020    CO2 25 01/30/2020    BUN 32 (H) 01/30/2020    CREATININE 1.0 01/30/2020    GLUCOSE 143 (H) 01/30/2020    CALCIUM 9.5 01/30/2020    LABGLOM >60 01/30/2020   No results found for: INRNo results found for: PHENYTOIN, ESR, CRP  PHYSICAL THERAPY  STRENGTH  Strength RLE  Strength RLE: Exception  Comment: grossly 4/5  Strength LLE  Strength LLE: Exception  Comment: grossly 4/5  ROM  AROM RLE (degrees)  RLE AROM: WFL  AROM LLE (degrees)  LLE AROM : WFL  BED MOBILITY  Bed Mobility  Rolling: Stand by assistance  Supine to Sit: Stand by assistance  Sit to Supine: Stand by assistance  Scooting: Stand by assistance  TRANSFERS  Transfers  Sit to Stand: Stand by assistance  Stand to sit: Stand by assistance  Bed to Chair: Contact guard assistance  Car Transfer: Minimal Assistance  Comment: impulsive, reaches for external support for assistance during transfers  [employer] PROPULSION  Propulsion 1  Propulsion: Manual  Level: Level Tile  Method: RLE, LLE  Level of Assistance: Supervision  Description/ Details: 2 turns   Distance: 250ft  AMBULATION  Ambulation 1  Surface: level tile  Device: Rolling Walker  Other Apparatus: Wheelchair follow  Assistance: Contact guard assistance, Minimal assistance  Quality of Gait: 3 point gait, LLE lead, decreased control of RLE,   Gait Deviations: Slow Deanna  Distance: 75ft  Comments:  occassional instability toward the  R, VC to control heel strike on RLE,     STAIRS  GOALS:  Short term goals  Time Frame for Short term goals: 1-2 weeks  Short term goal 1: indep bed mobility  Short term goal 2: CGA transfers with AD  Short term goal 3: indep 150ft WC propulsion   Short term goal 4: ' amb with AD  Short term goal 5: min A 1 step with AD     Long term goals  Time Frame for Long term goals : 2-3 weeks  Long term goal 1: indep transfers with AD  Long term goal 2: indep 150ft amb with AD  Long term goal 3: CGA 1 step with AD     ASSESSMENT:  Assessment: Patient ambulated 75ft with improved gait pattern but speech when communicating with conversation partners. Reenforced patient to use slow speech rate with over-articulation/over-exaggeration. In structured activity, patient demonstrated ability to verbally express words in short phrases and short sentences with slow speech rate and over-articulation/over-exaggeration with provision of max cues/prompts. With provision of max cues/prompts including visual pacing board to emphasis each syllable, patient verbalized noticing increased intelligibility.      Patient was able to recall use of strategies and visual pacing board that were introduced in previous session. Patient also stated he has been utilizing the strategies during conversation with staff and/or visitors outside of speech therapy sessions.      Will continue to address speech intelligibility and strategies during treatment time.     SHORT TERM GOAL #1:  Goal 1: The patient will demonstrate recall of functional information following a (short term) delay with minimal cues in order to increase functional integration in environment.     SHORT TERM GOAL #2:  Goal 2: Identify the item that does not belong in a list of words presented auditorily with 100% accuracy and(min/mod/max) verbal, visual and tactile cues     SHORT TERM GOAL #3:  Goal 3: Identify appropriate solutions to a problem when presented with a field of 4 with minimal cues.     SHORT TERM GOAL #4:  Goal 4: Patient will complete simple/ complex reasoning tasks to improve problem solving and safety awareness with 100% accuracy and minimal cues.     SHORT TERM GOAL #5:  Goal 5: The patient will complete divergent naming tasks (concrete/abstract) with minimal cueing.     Swallowing Short Term Goals  Short-term Goals  Goal 1: The patient will tolerate a regular diet with thin liquids with minimal overt s/s of aspiration. Goal 2: The patient will complete oral care at least two times daily to prevent aspiration of oral bacteria.   Goal 3: SLP and staff will Hyperlipidemia-continue statin  4. DVT prophylaxis-SCDs  5. GI-bowel regimen  6. Hypothyroidism-continue replacement therapy  7.  cough , some wheezing. Continue pulmonary toilet. Chest x-ray unremarkable. Pulmonology has seen. Now getting Solu-Medrol, bronchodilators and vest therapy. Improved  8. Urinary retention.   Continue in and out cath   Meclizine scheduled-1/29    ELOS:2-3 weeks

## 2020-01-31 ENCOUNTER — OUTSIDE FACILITY SERVICE (OUTPATIENT)
Dept: PULMONOLOGY | Facility: CLINIC | Age: 65
End: 2020-01-31

## 2020-01-31 PROCEDURE — 6370000000 HC RX 637 (ALT 250 FOR IP): Performed by: PSYCHIATRY & NEUROLOGY

## 2020-01-31 PROCEDURE — 6360000002 HC RX W HCPCS: Performed by: INTERNAL MEDICINE

## 2020-01-31 PROCEDURE — 99232 SBSQ HOSP IP/OBS MODERATE 35: CPT | Performed by: PSYCHIATRY & NEUROLOGY

## 2020-01-31 PROCEDURE — 92526 ORAL FUNCTION THERAPY: CPT

## 2020-01-31 PROCEDURE — 97530 THERAPEUTIC ACTIVITIES: CPT

## 2020-01-31 PROCEDURE — 94640 AIRWAY INHALATION TREATMENT: CPT

## 2020-01-31 PROCEDURE — 1180000000 HC REHAB R&B

## 2020-01-31 PROCEDURE — 94669 MECHANICAL CHEST WALL OSCILL: CPT

## 2020-01-31 PROCEDURE — 97535 SELF CARE MNGMENT TRAINING: CPT

## 2020-01-31 PROCEDURE — 99232 SBSQ HOSP IP/OBS MODERATE 35: CPT | Performed by: INTERNAL MEDICINE

## 2020-01-31 PROCEDURE — 51798 US URINE CAPACITY MEASURE: CPT

## 2020-01-31 PROCEDURE — 92507 TX SP LANG VOICE COMM INDIV: CPT

## 2020-01-31 PROCEDURE — 97116 GAIT TRAINING THERAPY: CPT

## 2020-01-31 RX ORDER — METHYLPREDNISOLONE SODIUM SUCCINATE 125 MG/2ML
40 INJECTION, POWDER, LYOPHILIZED, FOR SOLUTION INTRAMUSCULAR; INTRAVENOUS EVERY 12 HOURS
Status: DISCONTINUED | OUTPATIENT
Start: 2020-01-31 | End: 2020-02-01

## 2020-01-31 RX ADMIN — MECLIZINE HYDROCHLORIDE 25 MG: 25 TABLET ORAL at 20:52

## 2020-01-31 RX ADMIN — LISINOPRIL 20 MG: 20 TABLET ORAL at 08:18

## 2020-01-31 RX ADMIN — BUDESONIDE 500 MCG: 0.5 INHALANT RESPIRATORY (INHALATION) at 18:59

## 2020-01-31 RX ADMIN — DOCUSATE SODIUM 100 MG: 100 CAPSULE, LIQUID FILLED ORAL at 08:18

## 2020-01-31 RX ADMIN — BENZONATATE 100 MG: 100 CAPSULE ORAL at 08:18

## 2020-01-31 RX ADMIN — IPRATROPIUM BROMIDE AND ALBUTEROL SULFATE 3 ML: 2.5; .5 SOLUTION RESPIRATORY (INHALATION) at 18:52

## 2020-01-31 RX ADMIN — ATORVASTATIN CALCIUM 80 MG: 80 TABLET, FILM COATED ORAL at 20:52

## 2020-01-31 RX ADMIN — BENZONATATE 100 MG: 100 CAPSULE ORAL at 13:29

## 2020-01-31 RX ADMIN — AMLODIPINE BESYLATE 10 MG: 10 TABLET ORAL at 08:18

## 2020-01-31 RX ADMIN — BUDESONIDE 500 MCG: 0.5 INHALANT RESPIRATORY (INHALATION) at 06:07

## 2020-01-31 RX ADMIN — MECLIZINE HYDROCHLORIDE 25 MG: 25 TABLET ORAL at 08:18

## 2020-01-31 RX ADMIN — MECLIZINE HYDROCHLORIDE 25 MG: 25 TABLET ORAL at 13:29

## 2020-01-31 RX ADMIN — FORMOTEROL FUMARATE DIHYDRATE 20 MCG: 20 SOLUTION RESPIRATORY (INHALATION) at 18:59

## 2020-01-31 RX ADMIN — BENZONATATE 100 MG: 100 CAPSULE ORAL at 20:52

## 2020-01-31 RX ADMIN — METHYLPREDNISOLONE SODIUM SUCCINATE 40 MG: 125 INJECTION, POWDER, FOR SOLUTION INTRAMUSCULAR; INTRAVENOUS at 05:49

## 2020-01-31 RX ADMIN — IPRATROPIUM BROMIDE AND ALBUTEROL SULFATE 3 ML: 2.5; .5 SOLUTION RESPIRATORY (INHALATION) at 11:25

## 2020-01-31 RX ADMIN — FORMOTEROL FUMARATE DIHYDRATE 20 MCG: 20 SOLUTION RESPIRATORY (INHALATION) at 06:07

## 2020-01-31 RX ADMIN — LEVOTHYROXINE SODIUM 125 MCG: 0.1 TABLET ORAL at 06:02

## 2020-01-31 RX ADMIN — FLUOXETINE HYDROCHLORIDE 20 MG: 20 CAPSULE ORAL at 08:18

## 2020-01-31 RX ADMIN — ASPIRIN 81 MG 81 MG: 81 TABLET ORAL at 08:18

## 2020-01-31 RX ADMIN — METHYLPREDNISOLONE SODIUM SUCCINATE 40 MG: 125 INJECTION, POWDER, FOR SOLUTION INTRAMUSCULAR; INTRAVENOUS at 17:19

## 2020-01-31 RX ADMIN — IPRATROPIUM BROMIDE AND ALBUTEROL SULFATE 3 ML: 2.5; .5 SOLUTION RESPIRATORY (INHALATION) at 06:12

## 2020-01-31 RX ADMIN — IPRATROPIUM BROMIDE AND ALBUTEROL SULFATE 3 ML: 2.5; .5 SOLUTION RESPIRATORY (INHALATION) at 15:20

## 2020-01-31 NOTE — PROGRESS NOTES
Occupational Therapy     01/31/20 1000   Restrictions/Precautions   Restrictions/Precautions Fall Risk   ADL   Grooming Setup   UE Dressing Setup   LE Dressing Setup  (Increased time to complete w/ AE) (Shoes only))   Toileting Minimal assistance   Balance   Sitting Balance Supervision   Standing Balance Contact guard assistance   Standing Balance   Time 3 mins on 2 occasions   Activity 2 hand FM activity utilizing pincer and tripod grasp   Functional Mobility   Functional - Mobility Device Rolling Walker   Assist Level Contact guard assistance   Bed mobility   Supine to Sit Modified independent   Sit to Supine Modified independent   Scooting Stand by assistance   Transfers   Stand Step Transfers Contact guard assistance   Stand Pivot Transfers Contact guard assistance   Sit to stand Contact guard assistance   Stand to sit Contact guard assistance   Toilet Transfers   Toilet - Technique Ambulating   Equipment Used Grab bars   Toilet Transfer Minimal assistance   Wheelchair Bed Transfers   Wheelchair/Bed - Technique Stand step   Equipment Used Bed; Wheelchair   Level of Asssistance Contact guard assistance

## 2020-01-31 NOTE — PLAN OF CARE
Problem: HEMODYNAMIC STATUS  Goal: Patient has stable vital signs and fluid balance  1/30/2020 2357 by Susana Ndiaye LPN  Outcome: Ongoing  1/30/2020 2016 by Rosy Kayser, RN  Outcome: Ongoing     Problem: ACTIVITY INTOLERANCE/IMPAIRED MOBILITY  Goal: Mobility/activity is maintained at optimum level for patient  1/30/2020 2357 by Susana Ndiaye LPN  Outcome: Ongoing  1/30/2020 2016 by Rosy Kayser, RN  Outcome: Ongoing     Problem: COMMUNICATION IMPAIRMENT  Goal: Ability to express needs and understand communication  1/30/2020 2357 by Susana Ndiaye LPN  Outcome: Ongoing  1/30/2020 2016 by Rosy Kayser, RN  Outcome: Ongoing     Problem: Falls - Risk of:  Goal: Will remain free from falls  Description  Will remain free from falls  1/30/2020 2357 by Susana Ndiaye LPN  Outcome: Ongoing  1/30/2020 2016 by Rosy Kayser, RN  Outcome: Ongoing  Goal: Absence of physical injury  Description  Absence of physical injury  1/30/2020 2357 by Susana Ndiaye LPN  Outcome: Ongoing  1/30/2020 2016 by Rosy Kayser, RN  Outcome: Ongoing     Problem: Infection:  Goal: Will remain free from infection  Description  Will remain free from infection  1/30/2020 2357 by Susana Ndiaye LPN  Outcome: Ongoing  1/30/2020 2016 by Rosy Kayser, RN  Outcome: Ongoing     Problem: Safety:  Goal: Ability to chew and swallow food without choking will improve  Description  Ability to chew and swallow food without choking will improve  1/30/2020 2357 by Susana Ndiaye LPN  Outcome: Ongoing  1/30/2020 2016 by Rosy Kayser, RN  Outcome: Ongoing  Goal: Signs and symptoms of aspiration will decrease  Description  Signs and symptoms of aspiration will decrease  1/30/2020 2357 by Susana Ndiaye LPN  Outcome: Ongoing  1/30/2020 2016 by Rosy Kayser, RN  Outcome: Ongoing     Problem: IP BLADDER/VOIDING  Goal: LTG - Patient will utilize adaptive techniques/equipment to complete bladder elimination  1/30/2020 2357 by Susana Ndiaye INTEGRITY  Goal: STG - patient will maintain good skin integrity  1/30/2020 2357 by Samson Lopez LPN  Outcome: Ongoing  1/30/2020 2016 by Chely Douglas RN  Outcome: Ongoing     Problem: PAIN  Goal: STG - Patient will verbalize an acceptable level of pain  1/30/2020 2357 by Samson Lopez LPN  Outcome: Ongoing  1/30/2020 2016 by Chely Douglas RN  Outcome: Ongoing     Problem: Mobility - Impaired:  Goal: Mobility will improve  Description  Mobility will improve  1/30/2020 2357 by Samson Lopez LPN  Outcome: Ongoing  1/30/2020 2016 by Chely Douglas RN  Outcome: Ongoing     Problem: Health Behavior:  Goal: Identification of resources available to assist in meeting health care needs will improve  Description  Identification of resources available to assist in meeting health care needs will improve  1/30/2020 2357 by Samson Lopez LPN  Outcome: Ongoing  1/30/2020 2016 by Chely Douglas RN  Outcome: Ongoing     Problem: Nutritional:  Goal: Consumption of the prescribed amount of daily calories will improve  Description  Consumption of the prescribed amount of daily calories will improve  1/30/2020 2357 by Samson Lopez LPN  Outcome: Ongoing  1/30/2020 2016 by Chely Douglas RN  Outcome: Ongoing     Problem: Mood - Altered:  Goal: Mood stable  Description  Mood stable  1/30/2020 2357 by Samson Lopez LPN  Outcome: Ongoing  1/30/2020 2016 by Chely Douglas RN  Outcome: Ongoing

## 2020-01-31 NOTE — PROGRESS NOTES
Pulse 80   Temp 96.8 °F (36 °C) (Temporal)   Resp 16   Ht 6' (1.829 m)   Wt 170 lb 4.8 oz (77.2 kg)   SpO2 91%   BMI 23.10 kg/m²     Constitutional: he appears well-developed and well-nourished. Eyes - conjunctiva normal.  Pupils react to light  Ear, nose, throat -hearing intact to voice. No scars, masses, or lesions over external nose or ears, no atrophy of tongue  Neck-symmetric, no masses noted, no jugular vein distension  Respiration- chest wall appears symmetric, good expansion,   normal effort without use of accessory muscles  Cardiovascular- RRR  Musculoskeletal - no significant wasting of muscles noted, no bony deformities, gait no gross ataxia  Extremities-no clubbing, cyanosis or edema  Skin - warm, dry, and intact. No rash, erythema, or pallor. Psychiatric - mood, affect, and behavior appear normal.      Neurology  NEUROLOGICAL EXAM:  Awake, alert, fluent oriented x 3 appropriate affect  Attention and concentration appear appropriate  Memory fair  Speech stuttering with significant dysarthria  No clear issues with language of fund of knowledge     Cranial Nerve Exam   CN II- Visual fields grossly unremarkable  CN III, IV,VI-EOMI, significant left greater than right horizontal nystagmus, disconjugate eyes at rest, no ptosis  CN VII-no facial assymetry  CN VIII-Hearing intact   CN IX and X- Palate elevates in midline  CN XI-good shoulder shrug  CN XII-Tongue midline with no fasciculations or fibrillations     Motor Exam  V/V throughout upper and lower extremities bilaterally, no cogwheeling, normal tone            Nursing/pcp notes, imaging,labs and vitals reviewed.      PT,OT and/or speech notes reviewed    Lab Results   Component Value Date    WBC 16.1 (H) 01/30/2020    HGB 16.6 01/30/2020    HCT 51.4 01/30/2020    MCV 95.7 (H) 01/30/2020     01/30/2020     Lab Results   Component Value Date     01/30/2020    K 5.3 (H) 01/30/2020     01/30/2020    CO2 25 01/30/2020    BUN 32 (H) 01/30/2020    CREATININE 1.0 01/30/2020    GLUCOSE 143 (H) 01/30/2020    CALCIUM 9.5 01/30/2020    LABGLOM >60 01/30/2020   No results found for: INRNo results found for: PHENYTOIN, ESR, CRP    01/30/20 1343   Bed Mobility   Rolling Stand by assistance   Supine to Sit Stand by assistance   Sit to Supine Stand by assistance   Transfers   Sit to Stand Stand by assistance   Stand to sit Stand by assistance   Bed to Chair Stand by assistance   Ambulation 2   Surface - 2 level tile   Device 2 Rolling Walker   Assistance 2 Contact guard assistance;Minimal assistance   Quality of Gait 2 3 point gait    Gait Deviations Slow Deanna   Distance 4x25ft   Comments destination sitting, turning to the L. VC to keep RW close when turning    Propulsion 1   Propulsion Manual   Level Level Tile   Method RLE;LLE   Level of Assistance Supervision   Description/ Details 4 turns   Distance 2x 300ft   Other exercises   Other exercises 1 side stepping in // bars, 10ft both directions, 2x10ft   Other exercises 2 standing marches x20   Other exercises 3 standing color dots in // bars   Other exercises 4 backwards walking 10ft   Other exercises 5 mini squats 2x10   Conditions Requiring Skilled Therapeutic Intervention   Assessment Patient improved turning with destination walking keeping the RW closer during turning to sit, occassionally gets unsteady with turning but better after several trials when RW is close.        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

## 2020-01-31 NOTE — PROGRESS NOTES
with stress/unfamiliar situations    ASSESSMENT:  Assessment: [x]Progressing towards goals          []Not Progressing towards goals    Patient Tolerance of Treatment:   [x]Tolerated well []Tolerated fair []Required rest breaks []Fatigued    Education:  Learner:  [x]Patient          []Significant Other          []Other  Education provided regarding:  [x]Goals and POC   [x]Diet and swallowing precautions    []Home Exercise Program  []Progress and/or discharge information  Method of Education:  [x]Discussion          [x]Demonstration          []Handout          []Other  Evaluation of Education:   [x]Verbalized understanding   []Demonstrates without assistance  []Demonstrates with assistance  [x]Needs further instruction     []No evidence of learning                  [x]No family present      Plan: [x]Continue with current plan of care    []Modify current plan of care as follows:    []Discharge patient    Discharge Location:    Services/Supervision Recommended:      [x]Patient continues to require treatment by a licensed therapist to address functional deficits as outlined in the established plan of care.     Electronically signed by STEVE Oro on 1/31/2020 at 1:05 PM

## 2020-02-01 ENCOUNTER — OUTSIDE FACILITY SERVICE (OUTPATIENT)
Dept: PULMONOLOGY | Facility: CLINIC | Age: 65
End: 2020-02-01

## 2020-02-01 PROCEDURE — 99232 SBSQ HOSP IP/OBS MODERATE 35: CPT | Performed by: INTERNAL MEDICINE

## 2020-02-01 PROCEDURE — 94669 MECHANICAL CHEST WALL OSCILL: CPT

## 2020-02-01 PROCEDURE — 6370000000 HC RX 637 (ALT 250 FOR IP): Performed by: PSYCHIATRY & NEUROLOGY

## 2020-02-01 PROCEDURE — 51798 US URINE CAPACITY MEASURE: CPT

## 2020-02-01 PROCEDURE — 2700000000 HC OXYGEN THERAPY PER DAY

## 2020-02-01 PROCEDURE — 6360000002 HC RX W HCPCS: Performed by: INTERNAL MEDICINE

## 2020-02-01 PROCEDURE — 97530 THERAPEUTIC ACTIVITIES: CPT

## 2020-02-01 PROCEDURE — 51701 INSERT BLADDER CATHETER: CPT

## 2020-02-01 PROCEDURE — 6370000000 HC RX 637 (ALT 250 FOR IP): Performed by: INTERNAL MEDICINE

## 2020-02-01 PROCEDURE — 1180000000 HC REHAB R&B

## 2020-02-01 PROCEDURE — 94640 AIRWAY INHALATION TREATMENT: CPT

## 2020-02-01 RX ORDER — PREDNISONE 20 MG/1
20 TABLET ORAL EVERY 12 HOURS
Status: DISCONTINUED | OUTPATIENT
Start: 2020-02-01 | End: 2020-02-02

## 2020-02-01 RX ADMIN — SENNOSIDES AND DOCUSATE SODIUM 1 TABLET: 8.6; 5 TABLET ORAL at 20:45

## 2020-02-01 RX ADMIN — BUDESONIDE 500 MCG: 0.5 INHALANT RESPIRATORY (INHALATION) at 19:20

## 2020-02-01 RX ADMIN — IPRATROPIUM BROMIDE AND ALBUTEROL SULFATE 3 ML: 2.5; .5 SOLUTION RESPIRATORY (INHALATION) at 10:13

## 2020-02-01 RX ADMIN — ASPIRIN 81 MG 81 MG: 81 TABLET ORAL at 08:10

## 2020-02-01 RX ADMIN — FLUOXETINE HYDROCHLORIDE 20 MG: 20 CAPSULE ORAL at 08:09

## 2020-02-01 RX ADMIN — MECLIZINE HYDROCHLORIDE 25 MG: 25 TABLET ORAL at 14:12

## 2020-02-01 RX ADMIN — IPRATROPIUM BROMIDE AND ALBUTEROL SULFATE 3 ML: 2.5; .5 SOLUTION RESPIRATORY (INHALATION) at 14:07

## 2020-02-01 RX ADMIN — MECLIZINE HYDROCHLORIDE 25 MG: 25 TABLET ORAL at 08:10

## 2020-02-01 RX ADMIN — FORMOTEROL FUMARATE DIHYDRATE 20 MCG: 20 SOLUTION RESPIRATORY (INHALATION) at 05:59

## 2020-02-01 RX ADMIN — BUDESONIDE 500 MCG: 0.5 INHALANT RESPIRATORY (INHALATION) at 05:58

## 2020-02-01 RX ADMIN — METHYLPREDNISOLONE SODIUM SUCCINATE 40 MG: 125 INJECTION, POWDER, FOR SOLUTION INTRAMUSCULAR; INTRAVENOUS at 06:25

## 2020-02-01 RX ADMIN — MECLIZINE HYDROCHLORIDE 25 MG: 25 TABLET ORAL at 20:45

## 2020-02-01 RX ADMIN — FORMOTEROL FUMARATE DIHYDRATE 20 MCG: 20 SOLUTION RESPIRATORY (INHALATION) at 19:21

## 2020-02-01 RX ADMIN — DOCUSATE SODIUM 100 MG: 100 CAPSULE, LIQUID FILLED ORAL at 20:45

## 2020-02-01 RX ADMIN — IPRATROPIUM BROMIDE AND ALBUTEROL SULFATE 3 ML: 2.5; .5 SOLUTION RESPIRATORY (INHALATION) at 05:58

## 2020-02-01 RX ADMIN — BENZONATATE 100 MG: 100 CAPSULE ORAL at 08:09

## 2020-02-01 RX ADMIN — ATORVASTATIN CALCIUM 80 MG: 80 TABLET, FILM COATED ORAL at 20:45

## 2020-02-01 RX ADMIN — BENZONATATE 100 MG: 100 CAPSULE ORAL at 20:45

## 2020-02-01 RX ADMIN — BENZONATATE 100 MG: 100 CAPSULE ORAL at 14:12

## 2020-02-01 RX ADMIN — IPRATROPIUM BROMIDE AND ALBUTEROL SULFATE 3 ML: 2.5; .5 SOLUTION RESPIRATORY (INHALATION) at 19:13

## 2020-02-01 RX ADMIN — AMLODIPINE BESYLATE 10 MG: 10 TABLET ORAL at 08:10

## 2020-02-01 RX ADMIN — LISINOPRIL 20 MG: 20 TABLET ORAL at 08:10

## 2020-02-01 RX ADMIN — LEVOTHYROXINE SODIUM 125 MCG: 0.1 TABLET ORAL at 06:25

## 2020-02-01 RX ADMIN — PREDNISONE 20 MG: 20 TABLET ORAL at 16:55

## 2020-02-01 ASSESSMENT — ENCOUNTER SYMPTOMS
CHOKING: 0
VOMITING: 0

## 2020-02-01 ASSESSMENT — PAIN SCALES - GENERAL
PAINLEVEL_OUTOF10: 0

## 2020-02-01 ASSESSMENT — PAIN SCALES - WONG BAKER: WONGBAKER_NUMERICALRESPONSE: 0

## 2020-02-01 NOTE — PLAN OF CARE
Problem: HEMODYNAMIC STATUS  Goal: Patient has stable vital signs and fluid balance  Outcome: Ongoing     Problem: ACTIVITY INTOLERANCE/IMPAIRED MOBILITY  Goal: Mobility/activity is maintained at optimum level for patient  Outcome: Ongoing     Problem: COMMUNICATION IMPAIRMENT  Goal: Ability to express needs and understand communication  Outcome: Ongoing     Problem: Falls - Risk of:  Goal: Will remain free from falls  Description  Will remain free from falls  Outcome: Ongoing  Goal: Absence of physical injury  Description  Absence of physical injury  Outcome: Ongoing     Problem: Infection:  Goal: Will remain free from infection  Description  Will remain free from infection  Outcome: Ongoing     Problem: Safety:  Goal: Ability to chew and swallow food without choking will improve  Description  Ability to chew and swallow food without choking will improve  Outcome: Ongoing  Goal: Signs and symptoms of aspiration will decrease  Description  Signs and symptoms of aspiration will decrease  Outcome: Ongoing     Problem: IP BLADDER/VOIDING  Goal: LTG - Patient will utilize adaptive techniques/equipment to complete bladder elimination  Outcome: Ongoing  Goal: STG - Patient demonstrates no accidents  Outcome: Ongoing  Goal: STG - Patient will state signs and symptoms of UTI  Outcome: Ongoing     Problem: IP BOWEL ELIMINATION  Goal: LTG - patient will have regular and routine bowel evacuation  Outcome: Ongoing     Problem: IP BREATHING  Goal: LTG - patient will mobilize secretions and maintain airway  Outcome: Ongoing     Problem: NUTRITION  Goal: Patient maintains adequate hydration  Outcome: Ongoing     Problem: SAFETY  Goal: LTG - Patient will demonstrate safety requirements appropriate to situation/environment  Outcome: Ongoing  Goal: LTG - patient will utilize safety techniques  Outcome: Ongoing  Goal: STG - patient locks brakes on wheelchair  Outcome: Ongoing  Goal: STG - Patient uses call light consistently to request assistance with transfers  Outcome: Ongoing  Goal: STG - patient uses gait belt during all transfers  Outcome: Ongoing     Problem: SKIN INTEGRITY  Goal: STG - patient will maintain good skin integrity  Outcome: Ongoing     Problem: PAIN  Goal: STG - Patient will verbalize an acceptable level of pain  Outcome: Ongoing     Problem: Mobility - Impaired:  Goal: Mobility will improve  Description  Mobility will improve  Outcome: Ongoing     Problem: Health Behavior:  Goal: Identification of resources available to assist in meeting health care needs will improve  Description  Identification of resources available to assist in meeting health care needs will improve  Outcome: Ongoing     Problem: Nutritional:  Goal: Consumption of the prescribed amount of daily calories will improve  Description  Consumption of the prescribed amount of daily calories will improve  Outcome: Ongoing     Problem: Mood - Altered:  Goal: Mood stable  Description  Mood stable  Outcome: Ongoing

## 2020-02-01 NOTE — PROGRESS NOTES
Occupational Therapy  Facility/Department: [facility] 8 REHAB UNIT  Daily Treatment Note  NAME: Kelley Steve  : 1955  MRN: [id number]    Date of Service: 2020    Discharge Recommendations:  Continue to assess pending progress       Assessment      Activity Tolerance  Activity Tolerance: Patient Tolerated treatment well  Safety Devices  Safety Devices in place: Yes  Type of devices: Bed alarm in place;Call light within reach; Left in bed         Patient Diagnosis(es): There were no encounter diagnoses. has a past medical history of Arthritis, Asthma, COPD (chronic obstructive pulmonary disease) ([facility] [address] Diabetes mellitus ([facility] [address] Hypertension, Seizures ([facility] [address] and Thyroid disease. has a past surgical history that includes Arm Surgery (Left); Mandible surgery; knee surgery (Right); Eye surgery (Right); and pr colonoscopy flx dx w/collj spec when pfrmd (N/A, 2018). Restrictions  Restrictions/Precautions  Restrictions/Precautions: Seizure, Fall Risk  Subjective     Pain Assessment  Pain Assessment: 0-10  Pain Level: 0  Vital Signs  Patient Currently in Pain: No   Objective    Balance  Sitting Balance: Supervision  Standing Balance: Contact guard assistance  Functional Mobility  Functional - Mobility Device: Rolling Walker  Activity: To/From therapy gym  Assist Level: Contact guard assistance  Type of ROM/Therapeutic Exercise  Type of ROM/Therapeutic Exercise: Cane/Wand(3# dowel all planesx 2 sets x 20reps)     Plan   Plan  Current Treatment Recommendations: Strengthening, Balance Training, Functional Mobility Training, Equipment Evaluation, Education, & procurement, Patient/Caregiver Education & Training, Self-Care / ADL, Safety Education & Training    Goals  Short term goals  Time Frame for Short term goals: 1 week   Short term goal 1: MET  Short term goal 2: SBA with bathing hygiene. Short term goal 3: CGA with clothing management/hygiene for toileting. Short term goal 4: CGA with toilet transfers.    Short

## 2020-02-01 NOTE — PROGRESS NOTES
Pulmonary and Critical Care Progress Note [facility]    Patient: Colton Wooten  1955   MR# [id number]   Acct# [de-identified]  02/01/20   4:01 PM  Referring Provider: Ben Vazquez MD    Chief Complaint: Cough and shortness of breath. Interval history:   He remains off oxygen. He complains of minimal intermittent dyspnea in the chest associated with nothing for a few days in the setting of stroke and rehab. Medications:   predniSONE, 20 mg, Oral, Q12H    meclizine, 25 mg, Oral, TID    budesonide, 0.5 mg, Nebulization, BID    formoterol, 20 mcg, Nebulization, BID    benzonatate, 100 mg, Oral, TID    FLUoxetine, 20 mg, Oral, Daily    levothyroxine, 125 mcg, Oral, Daily    amLODIPine, 10 mg, Oral, Daily    aspirin, 81 mg, Oral, Daily    atorvastatin, 80 mg, Oral, Nightly    lisinopril, 20 mg, Oral, Daily    polyethylene glycol, 17 g, Oral, Daily    sennosides-docusate sodium, 1 tablet, Oral, Nightly    ipratropium-albuterol, 1 vial, Inhalation, 4x Daily    docusate sodium, 100 mg, Oral, BID     Review of Systems:   Review of Systems   Constitutional: Negative for chills and fever. Respiratory: Negative for choking. Cardiovascular: Positive for chest pain. Gastrointestinal: Negative for vomiting. Physical Exam:  Blood pressure (!) 158/92, pulse 71, temperature 96.8 °F (36 °C), temperature source Temporal, resp. rate 18, height 6' (1.829 m), weight 164 lb (74.4 kg), SpO2 93 %. Intake/Output Summary (Last 24 hours) at 2/1/2020 1601  Last data filed at 2/1/2020 1322  Gross per 24 hour   Intake 1200 ml   Output 425 ml   Net 775 ml     Physical Exam  Constitutional:       General: He is not in acute distress. Appearance: He is well-developed. He is ill-appearing. HENT:      Head: Normocephalic and atraumatic. Nose: Nose normal.   Neck:      Trachea: No tracheal deviation. Cardiovascular:      Rate and Rhythm: Normal rate and regular rhythm.       Heart sounds: Normal heart sounds. No murmur. No friction rub. Pulmonary:      Effort: Pulmonary effort is normal. No accessory muscle usage or respiratory distress. Breath sounds: Wheezing (faint) present. No rales. Chest:      Chest wall: No tenderness. Abdominal:      General: Bowel sounds are normal.      Palpations: Abdomen is soft. Tenderness: There is no abdominal tenderness. Skin:     Findings: Ecchymosis (arms) present. Neurological:      Mental Status: He is alert. Cranial Nerves: Dysarthria present. Motor: Weakness present. Recent Labs     01/30/20  0443   WBC 16.1*   RBC 5.37   HGB 16.6   HCT 51.4      MCV 95.7*   MCH 30.9   MCHC 32.3*   RDW 13.5      Recent Labs     01/30/20  0443      K 5.3*      CO2 25   BUN 32*   CREATININE 1.0   CALCIUM 9.5   GLUCOSE 143*        Pulmonary Assessment:    1. Asthma with possible COPD overlap  2. Wheezing  3. Recent stroke.     Recommend:   · Taper steroids  · Continue rehab program    Electronically signed by Isidro Ovalle on 2/1/2020 at 4:01 PM

## 2020-02-02 ENCOUNTER — OUTSIDE FACILITY SERVICE (OUTPATIENT)
Dept: PULMONOLOGY | Facility: CLINIC | Age: 65
End: 2020-02-02

## 2020-02-02 PROCEDURE — 6370000000 HC RX 637 (ALT 250 FOR IP): Performed by: INTERNAL MEDICINE

## 2020-02-02 PROCEDURE — 1180000000 HC REHAB R&B

## 2020-02-02 PROCEDURE — 6360000002 HC RX W HCPCS: Performed by: INTERNAL MEDICINE

## 2020-02-02 PROCEDURE — 2700000000 HC OXYGEN THERAPY PER DAY

## 2020-02-02 PROCEDURE — 94669 MECHANICAL CHEST WALL OSCILL: CPT

## 2020-02-02 PROCEDURE — 99232 SBSQ HOSP IP/OBS MODERATE 35: CPT | Performed by: INTERNAL MEDICINE

## 2020-02-02 PROCEDURE — 94640 AIRWAY INHALATION TREATMENT: CPT

## 2020-02-02 PROCEDURE — 6370000000 HC RX 637 (ALT 250 FOR IP): Performed by: PSYCHIATRY & NEUROLOGY

## 2020-02-02 PROCEDURE — 51798 US URINE CAPACITY MEASURE: CPT

## 2020-02-02 PROCEDURE — 51701 INSERT BLADDER CATHETER: CPT

## 2020-02-02 RX ORDER — PREDNISONE 10 MG/1
10 TABLET ORAL EVERY 12 HOURS
Status: DISCONTINUED | OUTPATIENT
Start: 2020-02-02 | End: 2020-02-03

## 2020-02-02 RX ADMIN — MECLIZINE HYDROCHLORIDE 25 MG: 25 TABLET ORAL at 14:23

## 2020-02-02 RX ADMIN — DOCUSATE SODIUM 100 MG: 100 CAPSULE, LIQUID FILLED ORAL at 20:02

## 2020-02-02 RX ADMIN — BENZONATATE 100 MG: 100 CAPSULE ORAL at 14:23

## 2020-02-02 RX ADMIN — IPRATROPIUM BROMIDE AND ALBUTEROL SULFATE 3 ML: 2.5; .5 SOLUTION RESPIRATORY (INHALATION) at 11:48

## 2020-02-02 RX ADMIN — PREDNISONE 20 MG: 20 TABLET ORAL at 05:38

## 2020-02-02 RX ADMIN — BUDESONIDE 500 MCG: 0.5 INHALANT RESPIRATORY (INHALATION) at 07:36

## 2020-02-02 RX ADMIN — ATORVASTATIN CALCIUM 80 MG: 80 TABLET, FILM COATED ORAL at 20:02

## 2020-02-02 RX ADMIN — FORMOTEROL FUMARATE DIHYDRATE 20 MCG: 20 SOLUTION RESPIRATORY (INHALATION) at 07:36

## 2020-02-02 RX ADMIN — SENNOSIDES AND DOCUSATE SODIUM 1 TABLET: 8.6; 5 TABLET ORAL at 20:02

## 2020-02-02 RX ADMIN — MECLIZINE HYDROCHLORIDE 25 MG: 25 TABLET ORAL at 07:56

## 2020-02-02 RX ADMIN — BENZONATATE 100 MG: 100 CAPSULE ORAL at 20:02

## 2020-02-02 RX ADMIN — BENZONATATE 100 MG: 100 CAPSULE ORAL at 07:55

## 2020-02-02 RX ADMIN — LEVOTHYROXINE SODIUM 125 MCG: 0.1 TABLET ORAL at 05:38

## 2020-02-02 RX ADMIN — BUDESONIDE 500 MCG: 0.5 INHALANT RESPIRATORY (INHALATION) at 19:29

## 2020-02-02 RX ADMIN — MECLIZINE HYDROCHLORIDE 25 MG: 25 TABLET ORAL at 20:02

## 2020-02-02 RX ADMIN — LISINOPRIL 20 MG: 20 TABLET ORAL at 07:56

## 2020-02-02 RX ADMIN — IPRATROPIUM BROMIDE AND ALBUTEROL SULFATE 3 ML: 2.5; .5 SOLUTION RESPIRATORY (INHALATION) at 14:00

## 2020-02-02 RX ADMIN — IPRATROPIUM BROMIDE AND ALBUTEROL SULFATE 3 ML: 2.5; .5 SOLUTION RESPIRATORY (INHALATION) at 19:00

## 2020-02-02 RX ADMIN — IPRATROPIUM BROMIDE AND ALBUTEROL SULFATE 3 ML: 2.5; .5 SOLUTION RESPIRATORY (INHALATION) at 07:36

## 2020-02-02 RX ADMIN — PREDNISONE 10 MG: 10 TABLET ORAL at 16:57

## 2020-02-02 RX ADMIN — AMLODIPINE BESYLATE 10 MG: 10 TABLET ORAL at 07:55

## 2020-02-02 RX ADMIN — FORMOTEROL FUMARATE DIHYDRATE 20 MCG: 20 SOLUTION RESPIRATORY (INHALATION) at 19:29

## 2020-02-02 RX ADMIN — ASPIRIN 81 MG 81 MG: 81 TABLET ORAL at 07:55

## 2020-02-02 RX ADMIN — FLUOXETINE HYDROCHLORIDE 20 MG: 20 CAPSULE ORAL at 07:55

## 2020-02-02 ASSESSMENT — ENCOUNTER SYMPTOMS
TROUBLE SWALLOWING: 0
CHOKING: 0
VOMITING: 0

## 2020-02-02 ASSESSMENT — PAIN SCALES - GENERAL: PAINLEVEL_OUTOF10: 0

## 2020-02-02 NOTE — PLAN OF CARE
Problem: HEMODYNAMIC STATUS  Goal: Patient has stable vital signs and fluid balance  2/1/2020 2303 by Edith Luong RN  Outcome: Ongoing  2/1/2020 1438 by Taylor Abraham LPN  Outcome: Ongoing     Problem: ACTIVITY INTOLERANCE/IMPAIRED MOBILITY  Goal: Mobility/activity is maintained at optimum level for patient  2/1/2020 2303 by Edith Luong RN  Outcome: Ongoing  2/1/2020 1438 by Taylor Abraham LPN  Outcome: Ongoing     Problem: COMMUNICATION IMPAIRMENT  Goal: Ability to express needs and understand communication  2/1/2020 2303 by Edith Luong RN  Outcome: Ongoing  2/1/2020 1438 by Taylor Abraham LPN  Outcome: Ongoing     Problem: Falls - Risk of:  Goal: Will remain free from falls  Description  Will remain free from falls  2/1/2020 2303 by Edith Luong RN  Outcome: Ongoing  2/1/2020 1438 by Taylor Abraham LPN  Outcome: Ongoing  Goal: Absence of physical injury  Description  Absence of physical injury  2/1/2020 2303 by Edith Luong RN  Outcome: Ongoing  2/1/2020 1438 by Taylor Abraham LPN  Outcome: Ongoing     Problem: Infection:  Goal: Will remain free from infection  Description  Will remain free from infection  2/1/2020 2303 by Edith Luong RN  Outcome: Ongoing  2/1/2020 1438 by Taylor Abraham LPN  Outcome: Ongoing     Problem: Safety:  Goal: Ability to chew and swallow food without choking will improve  Description  Ability to chew and swallow food without choking will improve  2/1/2020 2303 by Edith Luong RN  Outcome: Ongoing  2/1/2020 1438 by Taylor Abraham LPN  Outcome: Ongoing  Goal: Signs and symptoms of aspiration will decrease  Description  Signs and symptoms of aspiration will decrease  2/1/2020 2303 by Edith Luong RN  Outcome: Ongoing  2/1/2020 1438 by Taylor Abraham LPN  Outcome: Ongoing     Problem: IP BLADDER/VOIDING  Goal: LTG - Patient will utilize adaptive techniques/equipment to complete bladder elimination  2/1/2020 2303 by Lettie Mortimer, RN  Outcome: Ongoing  2/1/2020 1438 by Idania Mosquera LPN  Outcome: Ongoing  Goal: STG - Patient demonstrates no accidents  2/1/2020 2303 by Lettie Mortimer, RN  Outcome: Ongoing  2/1/2020 1438 by Idania Mosquera LPN  Outcome: Ongoing  Goal: STG - Patient will state signs and symptoms of UTI  2/1/2020 2303 by Lettie Mortimer, RN  Outcome: Ongoing  2/1/2020 1438 by Idania Mosquera LPN  Outcome: Ongoing     Problem: IP BOWEL ELIMINATION  Goal: LTG - patient will have regular and routine bowel evacuation  2/1/2020 2303 by Lettie Mortimer, RN  Outcome: Ongoing  2/1/2020 1438 by Idania Mosquera LPN  Outcome: Ongoing     Problem: IP BREATHING  Goal: LTG - patient will mobilize secretions and maintain airway  2/1/2020 2303 by Lettie Mortimer, RN  Outcome: Ongoing  2/1/2020 1438 by Idania Mosquera LPN  Outcome: Ongoing     Problem: NUTRITION  Goal: Patient maintains adequate hydration  2/1/2020 2303 by Lettie Mortimer, RN  Outcome: Ongoing  2/1/2020 1438 by Idania Mosquera LPN  Outcome: Ongoing     Problem: SAFETY  Goal: LTG - Patient will demonstrate safety requirements appropriate to situation/environment  2/1/2020 2303 by Lettie Mortimer, RN  Outcome: Ongoing  2/1/2020 1438 by Idania Mosquera LPN  Outcome: Ongoing  Goal: LTG - patient will utilize safety techniques  2/1/2020 2303 by Lettie Mortimer, RN  Outcome: Ongoing  2/1/2020 1438 by Idania Mosquera LPN  Outcome: Ongoing  Goal: STG - patient locks brakes on wheelchair  2/1/2020 2303 by Lettie Mortimer, RN  Outcome: Ongoing  2/1/2020 1438 by Idania Mosquera LPN  Outcome: Ongoing  Goal: STG - Patient uses call light consistently to request assistance with transfers  2/1/2020 2303 by Lettie Mortimer, RN  Outcome: Ongoing  2/1/2020 1438 by Idania Mosquera LPN  Outcome: Ongoing  Goal: STG - patient uses gait belt during all transfers  2/1/2020 2303 by Damaris Reynolds Rhys Seaman RN  Outcome: Ongoing  2/1/2020 1438 by Qamar Cueva LPN  Outcome: Ongoing     Problem: SKIN INTEGRITY  Goal: STG - patient will maintain good skin integrity  2/1/2020 2303 by Hao Daley RN  Outcome: Ongoing  2/1/2020 1438 by Qamar Cueva LPN  Outcome: Ongoing     Problem: PAIN  Goal: STG - Patient will verbalize an acceptable level of pain  2/1/2020 2303 by Hao Daley RN  Outcome: Ongoing  2/1/2020 1438 by Qamar Cueva LPN  Outcome: Ongoing     Problem: Mobility - Impaired:  Goal: Mobility will improve  Description  Mobility will improve  2/1/2020 2303 by Hao Daley RN  Outcome: Ongoing  2/1/2020 1438 by Qamar Cueva LPN  Outcome: Ongoing     Problem: Health Behavior:  Goal: Identification of resources available to assist in meeting health care needs will improve  Description  Identification of resources available to assist in meeting health care needs will improve  2/1/2020 2303 by Hao Daley RN  Outcome: Ongoing  2/1/2020 1438 by Qamar Cueva LPN  Outcome: Ongoing     Problem: Nutritional:  Goal: Consumption of the prescribed amount of daily calories will improve  Description  Consumption of the prescribed amount of daily calories will improve  2/1/2020 2303 by Hao Daley RN  Outcome: Ongoing  2/1/2020 1438 by Qamar Cueva LPN  Outcome: Ongoing     Problem: Mood - Altered:  Goal: Mood stable  Description  Mood stable  2/1/2020 2303 by Hao Daley RN  Outcome: Ongoing  2/1/2020 1438 by Qamar Cueva LPN  Outcome: Ongoing

## 2020-02-02 NOTE — PLAN OF CARE
elimination  2/2/2020 1124 by Patricia Noble LPN  Outcome: Ongoing  2/1/2020 2303 by Khushi Metzger RN  Outcome: Ongoing  Goal: STG - Patient demonstrates no accidents  2/2/2020 1124 by Patricia Noble LPN  Outcome: Ongoing  2/1/2020 2303 by Khushi Metzger RN  Outcome: Ongoing  Goal: STG - Patient will state signs and symptoms of UTI  2/2/2020 1124 by Patricia Noble LPN  Outcome: Ongoing  2/1/2020 2303 by Khushi Metzger RN  Outcome: Ongoing     Problem: IP BOWEL ELIMINATION  Goal: LTG - patient will have regular and routine bowel evacuation  2/2/2020 1124 by Patricia Noble LPN  Outcome: Ongoing  2/1/2020 2303 by Khushi Metzger RN  Outcome: Ongoing     Problem: IP BREATHING  Goal: LTG - patient will mobilize secretions and maintain airway  2/2/2020 1124 by Patricia Noble LPN  Outcome: Ongoing  2/1/2020 2303 by Khushi Metzger RN  Outcome: Ongoing     Problem: NUTRITION  Goal: Patient maintains adequate hydration  2/2/2020 1124 by Patricia Noble LPN  Outcome: Ongoing  2/1/2020 2303 by Khushi Metzger RN  Outcome: Ongoing     Problem: SAFETY  Goal: LTG - Patient will demonstrate safety requirements appropriate to situation/environment  2/2/2020 1124 by Patricia Noble LPN  Outcome: Ongoing  2/1/2020 2303 by Khushi Metzger RN  Outcome: Ongoing  Goal: LTG - patient will utilize safety techniques  2/2/2020 1124 by Patricia Noble LPN  Outcome: Ongoing  2/1/2020 2303 by Khushi Metzger RN  Outcome: Ongoing  Goal: STG - patient locks brakes on wheelchair  2/2/2020 1124 by Patricia Noble LPN  Outcome: Ongoing  2/1/2020 2303 by Khushi Metzger RN  Outcome: Ongoing  Goal: STG - Patient uses call light consistently to request assistance with transfers  2/2/2020 1124 by Patricia Noble LPN  Outcome: Ongoing  2/1/2020 2303 by Khushi Metzger RN  Outcome: Ongoing  Goal: STG - patient uses gait belt during all transfers  2/2/2020 1124 by Vince Lujan

## 2020-02-02 NOTE — PROGRESS NOTES
muscle usage or respiratory distress. Breath sounds: No wheezing, rhonchi or rales. Chest:      Chest wall: No tenderness. Abdominal:      General: Bowel sounds are normal.      Palpations: Abdomen is soft. Tenderness: There is no abdominal tenderness. Skin:     Findings: Ecchymosis (arms) present. Neurological:      Mental Status: He is alert. Cranial Nerves: Dysarthria present. Motor: Weakness present. No results for input(s): WBC, RBC, HGB, HCT, PLT, MCV, MCH, MCHC, RDW, NRBC, SEGSPCT, BANDSPCT in the last 72 hours. No results for input(s): NA, K, CL, CO2, BUN, CREATININE, CALCIUM, GLUCOSE, PHART, QST1CES, PO2ART, NOI9GWM, F6CPNVID, BEART, AST, ALT, ALKPHOS, BILITOT, MG, CALCIUM, PHOS, BNP, TROPONINI, LACTA, INR, TSH in the last 72 hours. Pulmonary Assessment:    1. Asthma with possible COPD overlap stable  2. Wheezing resolved  3. Recent stroke. Recommend:   · Reduce prednisone to 10 mg bid, could taper to daily tomorrow if better  · Continue rehab program  · Continue perforomist and budesonide due to formulary issues; otherwise he could take symbicort 160 mcg 2 puffs q 12 hr and go home with that, but not available.     Electronically signed by Elisha Rubi on 2/2/2020 at 2:34 PM

## 2020-02-03 ENCOUNTER — OUTSIDE FACILITY SERVICE (OUTPATIENT)
Dept: PULMONOLOGY | Facility: CLINIC | Age: 65
End: 2020-02-03

## 2020-02-03 LAB
ANION GAP SERPL CALCULATED.3IONS-SCNC: 12 MMOL/L (ref 7–19)
BASOPHILS ABSOLUTE: 0 K/UL (ref 0–0.2)
BASOPHILS RELATIVE PERCENT: 0.1 % (ref 0–1)
BUN BLDV-MCNC: 22 MG/DL (ref 8–23)
CALCIUM SERPL-MCNC: 9 MG/DL (ref 8.8–10.2)
CHLORIDE BLD-SCNC: 101 MMOL/L (ref 98–111)
CO2: 23 MMOL/L (ref 22–29)
CREAT SERPL-MCNC: 0.8 MG/DL (ref 0.5–1.2)
EOSINOPHILS ABSOLUTE: 0 K/UL (ref 0–0.6)
EOSINOPHILS RELATIVE PERCENT: 0.1 % (ref 0–5)
GFR NON-AFRICAN AMERICAN: >60
GLUCOSE BLD-MCNC: 130 MG/DL (ref 74–109)
HCT VFR BLD CALC: 52.7 % (ref 42–52)
HEMOGLOBIN: 17.7 G/DL (ref 14–18)
IMMATURE GRANULOCYTES #: 0.1 K/UL
LYMPHOCYTES ABSOLUTE: 2 K/UL (ref 1.1–4.5)
LYMPHOCYTES RELATIVE PERCENT: 14.4 % (ref 20–40)
MCH RBC QN AUTO: 31.6 PG (ref 27–31)
MCHC RBC AUTO-ENTMCNC: 33.6 G/DL (ref 33–37)
MCV RBC AUTO: 93.9 FL (ref 80–94)
MONOCYTES ABSOLUTE: 1.9 K/UL (ref 0–0.9)
MONOCYTES RELATIVE PERCENT: 13.5 % (ref 0–10)
NEUTROPHILS ABSOLUTE: 9.9 K/UL (ref 1.5–7.5)
NEUTROPHILS RELATIVE PERCENT: 71.1 % (ref 50–65)
PDW BLD-RTO: 13.2 % (ref 11.5–14.5)
PLATELET # BLD: 267 K/UL (ref 130–400)
PMV BLD AUTO: 10.5 FL (ref 9.4–12.4)
POTASSIUM REFLEX MAGNESIUM: 4.6 MMOL/L (ref 3.5–5)
RBC # BLD: 5.61 M/UL (ref 4.7–6.1)
SODIUM BLD-SCNC: 136 MMOL/L (ref 136–145)
WBC # BLD: 14 K/UL (ref 4.8–10.8)

## 2020-02-03 PROCEDURE — 97130 THER IVNTJ EA ADDL 15 MIN: CPT

## 2020-02-03 PROCEDURE — 99232 SBSQ HOSP IP/OBS MODERATE 35: CPT | Performed by: INTERNAL MEDICINE

## 2020-02-03 PROCEDURE — 92526 ORAL FUNCTION THERAPY: CPT

## 2020-02-03 PROCEDURE — 51798 US URINE CAPACITY MEASURE: CPT

## 2020-02-03 PROCEDURE — 85025 COMPLETE CBC W/AUTO DIFF WBC: CPT

## 2020-02-03 PROCEDURE — 6370000000 HC RX 637 (ALT 250 FOR IP): Performed by: NURSE PRACTITIONER

## 2020-02-03 PROCEDURE — 97530 THERAPEUTIC ACTIVITIES: CPT

## 2020-02-03 PROCEDURE — 94669 MECHANICAL CHEST WALL OSCILL: CPT

## 2020-02-03 PROCEDURE — 1180000000 HC REHAB R&B

## 2020-02-03 PROCEDURE — 6370000000 HC RX 637 (ALT 250 FOR IP): Performed by: PSYCHIATRY & NEUROLOGY

## 2020-02-03 PROCEDURE — 94640 AIRWAY INHALATION TREATMENT: CPT

## 2020-02-03 PROCEDURE — 51701 INSERT BLADDER CATHETER: CPT

## 2020-02-03 PROCEDURE — 6370000000 HC RX 637 (ALT 250 FOR IP): Performed by: INTERNAL MEDICINE

## 2020-02-03 PROCEDURE — 2700000000 HC OXYGEN THERAPY PER DAY

## 2020-02-03 PROCEDURE — 97116 GAIT TRAINING THERAPY: CPT

## 2020-02-03 PROCEDURE — 36415 COLL VENOUS BLD VENIPUNCTURE: CPT

## 2020-02-03 PROCEDURE — 80048 BASIC METABOLIC PNL TOTAL CA: CPT

## 2020-02-03 PROCEDURE — 97129 THER IVNTJ 1ST 15 MIN: CPT

## 2020-02-03 PROCEDURE — 99232 SBSQ HOSP IP/OBS MODERATE 35: CPT | Performed by: PSYCHIATRY & NEUROLOGY

## 2020-02-03 PROCEDURE — 97535 SELF CARE MNGMENT TRAINING: CPT

## 2020-02-03 PROCEDURE — 6360000002 HC RX W HCPCS: Performed by: INTERNAL MEDICINE

## 2020-02-03 RX ORDER — PREDNISONE 20 MG/1
20 TABLET ORAL EVERY 12 HOURS
Status: DISCONTINUED | OUTPATIENT
Start: 2020-02-03 | End: 2020-02-06

## 2020-02-03 RX ADMIN — ASPIRIN 81 MG 81 MG: 81 TABLET ORAL at 07:39

## 2020-02-03 RX ADMIN — IPRATROPIUM BROMIDE AND ALBUTEROL SULFATE 3 ML: 2.5; .5 SOLUTION RESPIRATORY (INHALATION) at 11:01

## 2020-02-03 RX ADMIN — FLUOXETINE HYDROCHLORIDE 20 MG: 20 CAPSULE ORAL at 07:39

## 2020-02-03 RX ADMIN — FORMOTEROL FUMARATE DIHYDRATE 20 MCG: 20 SOLUTION RESPIRATORY (INHALATION) at 06:23

## 2020-02-03 RX ADMIN — IPRATROPIUM BROMIDE AND ALBUTEROL SULFATE 3 ML: 2.5; .5 SOLUTION RESPIRATORY (INHALATION) at 19:24

## 2020-02-03 RX ADMIN — IPRATROPIUM BROMIDE AND ALBUTEROL SULFATE 3 ML: 2.5; .5 SOLUTION RESPIRATORY (INHALATION) at 06:22

## 2020-02-03 RX ADMIN — BUDESONIDE 500 MCG: 0.5 INHALANT RESPIRATORY (INHALATION) at 19:24

## 2020-02-03 RX ADMIN — LEVOTHYROXINE SODIUM 125 MCG: 0.1 TABLET ORAL at 05:22

## 2020-02-03 RX ADMIN — BENZONATATE 100 MG: 100 CAPSULE ORAL at 07:40

## 2020-02-03 RX ADMIN — FORMOTEROL FUMARATE DIHYDRATE 20 MCG: 20 SOLUTION RESPIRATORY (INHALATION) at 19:35

## 2020-02-03 RX ADMIN — PREDNISONE 20 MG: 20 TABLET ORAL at 17:19

## 2020-02-03 RX ADMIN — ATORVASTATIN CALCIUM 80 MG: 80 TABLET, FILM COATED ORAL at 19:50

## 2020-02-03 RX ADMIN — MECLIZINE HYDROCHLORIDE 25 MG: 25 TABLET ORAL at 07:39

## 2020-02-03 RX ADMIN — AMLODIPINE BESYLATE 10 MG: 10 TABLET ORAL at 07:40

## 2020-02-03 RX ADMIN — PREDNISONE 10 MG: 10 TABLET ORAL at 05:22

## 2020-02-03 RX ADMIN — BUDESONIDE 500 MCG: 0.5 INHALANT RESPIRATORY (INHALATION) at 06:23

## 2020-02-03 RX ADMIN — LISINOPRIL 20 MG: 20 TABLET ORAL at 07:40

## 2020-02-03 RX ADMIN — IPRATROPIUM BROMIDE AND ALBUTEROL SULFATE 3 ML: 2.5; .5 SOLUTION RESPIRATORY (INHALATION) at 14:56

## 2020-02-03 ASSESSMENT — 9 HOLE PEG TEST
TESTTIME_SECONDS: 72
TESTTIME_SECONDS: 46

## 2020-02-03 ASSESSMENT — ENCOUNTER SYMPTOMS
TROUBLE SWALLOWING: 0
VOMITING: 0
COUGH: 1
CHOKING: 0
WHEEZING: 1

## 2020-02-03 NOTE — PROGRESS NOTES
CREATININE 0.8 02/03/2020    GLUCOSE 130 (H) 02/03/2020    CALCIUM 9.0 02/03/2020    LABGLOM >60 02/03/2020   No results found for: INRNo results found for: PHENYTOIN, ESR, CRP  Objective    Balance  Sitting Balance: Supervision  Standing Balance: Contact guard assistance  Functional Mobility  Functional - Mobility Device: Rolling Walker  Activity: To/From therapy gym  Assist Level: Contact guard assistance  Type of ROM/Therapeutic Exercise  Type of ROM/Therapeutic Exercise: Cane/Wand(3# dowel all planesx 2 sets x 20reps)     Swallowing:  Patient also seen for swallow monitor this date. Patient continues to present with consistent overt s/s of aspiration with thin liquids. Patient educated regarding aspiration/thickened liquids this date. Patient complied with trials of nectar thick liquids this date.      Patient demonstrated 1-2 second initiation of swallow with nectar thick liquids. Mild sluggish and decreased laryngeal elevation noted with PO trial. No overt s/s of aspiration noted with nectar thick liquids via cup.      Patient complied with recommendation of thickened liquids this date with expressed understanding noted by the patient.     At this time, recommend continued diet of regular consistencies with downgrade to nectar thick liquids. Meds whole in puree. Able to feed self. Will continue to follow.      Speech/Language:  SLP utilized tools/strategies to promote increased clarity of speech when communicating with conversation partners. Reenforced patient to use slow speech rate with over-articulation/over-exaggeration. In structured activity, patient demonstrated ability to verbally express words in short phrases and short sentences with slow speech rate and over-articulation/over-exaggeration with provision of max cues/prompts.  With provision of max cues/prompts including visual pacing board to emphasis each syllable, patient verbalized noticing increased intelligibility.          01/31/20 1103

## 2020-02-03 NOTE — PATIENT CARE CONFERENCE
[facility] ACUTE INPATIENT REHABILITATION  TEAM CONFERENCE NOTE    Date: 2/3/2020  Patient Name: Carmina Chapa        MRN: [id number]    : 1955  (66 y.o.)  Gender: male      Diagnosis: stroke: left body involvement      PHYSICAL THERAPY  STRENGTH  Strength RLE  Strength RLE: Exception  Comment: grossly 4/5  Strength LLE  Strength LLE: Exception  Comment: grossly 4/5  ROM  AROM RLE (degrees)  RLE AROM: WFL  AROM LLE (degrees)  LLE AROM : WFL  BED MOBILITY  Bed Mobility  Rolling: Stand by assistance  Supine to Sit: Stand by assistance  Sit to Supine: Stand by assistance  Scooting: Stand by assistance  TRANSFERS  Transfers  Sit to Stand: Contact guard assistance  Stand to sit: Stand by assistance  Bed to Chair: Stand by assistance  Stand Pivot Transfers: Stand by assistance  Car Transfer: Minimal Assistance  Comment: impulsive, VC to keep gait belt on until he is seated on the bed.    WHEELCHAIR PROPULSION  Propulsion 1  Propulsion: Manual  Level: Level Tile  Method: RLE, LLE  Level of Assistance: Supervision  Description/ Details: 2 turns  Distance: 300ft  AMBULATION  Ambulation 1  Surface: level tile  Device: Rolling Walker  Other Apparatus: Wheelchair follow  Assistance: Contact guard assistance  Quality of Gait: 3 point gait lead with LLE, improved stability  Gait Deviations: Slow Deanna  Distance: 65ft  Comments: incoporated L turns and sit to stands   STAIRS     GOALS:  Short term goals  Time Frame for Short term goals: 1-2 weeks  Short term goal 1: indep bed mobility  Short term goal 2: CGA transfers with AD  Short term goal 3: indep 150ft WC propulsion   Short term goal 4: ' amb with AD  Short term goal 5: min A 1 step with AD    Long term goals  Time Frame for Long term goals : 2-3 weeks  Long term goal 1: indep transfers with AD  Long term goal 2: indep 150ft amb with AD  Long term goal 3: CGA 1 step with AD    ASSESSMENT:  Assessment: Patient increased ambulation distance with turns to left and the right direction. Good 3 point gait and able to self correct disrupted sequence. VC to push up from armrest and then one hand at a time onto RW for stability    SPEECH THERAPY  Precautions: Fall/aspiration   Swallowing Status/Diet: Regular diet with nectar thick liquids.         Subjective: Patient alert and cooperative with all therapy tasks.      Objective: Patient was not oriented to time today and stated it was January several times during the session with verbal cues given. Patient also stating it was Friday.      Patient had thin liquids bedside when SLP entered the room. SLP educated on the risk for aspiration. Patient continues to require education. Patient had sips of nectar thick liquids via cup. Immediate cough noted x1 during consecutive sips.      Patient was given one word to formulate a sentence addressing speech intelligibility. Patient was 75% intelligible during this task. Patient continues to require mod verbal cues to utilize dysarthric strategies.      Within conversational discourse patients speech intelligibility ranks at 60-70% intelligible. Patient continues to have distortions and imprecise articulations within     Patient was able to verbalize safety awareness techniques related to walker and sit to stand with 100% accuracy independently. Will monitor when completing these tasks in functional situations.      Patient continues to recall staff members names with no difficulty. Patient used external aid in room to tell SLP his therapy schedule for the day.      Patient continues to require speech services for swallow monitoring, motor speech, and cognition.  Patient is progressing towards goals.         SHORT TERM GOAL #1:  Goal 1: The patient will demonstrate recall of functional information following a (short term) delay with minimal cues in order to increase functional integration in environment.     SHORT TERM GOAL #2:  Goal 2: Identify the item that does not belong in a list of Patient solves simple/routine tasks 75-90%+   Memory: 5 - Patient requires prompting with stress/unfamiliar situations     ASSESSMENT:  Assessment: [x]? Progressing towards goals           []? Not Progressing towards goals    GOALS MET: 0 STG 0 LTG    OCCUPATIONAL THERAPY    CURRENT IRF-CESIA SCORES  Eating: CARE Score: 5  Oral Hygiene: CARE Score: 5  Toileting: CARE Score: 3  Shower/Bathe: CARE Score: 3  Upper Body Dressing: CARE Score: 5  Lower Body Dressing: CARE Score: 3  Footwear: CARE Score: 3  Toilet Transfers: CARE Score: 3  Picking Up Object:  CARE Score: 1      UE Functioning:  B UE ataxia,  Greater impairment R vs L     Pain Assessment:  Pain Level: 0       STGs:  Short term goals  Time Frame for Short term goals: 1 week   Short term goal 1: MET  Short term goal 2: MET  Short term goal 3: MET  Short term goal 4: MET  Short term goal 5: CGA with ambulatory home making tasks. Short term goal 6: MET  Short term goal 7: MET  Short term goal 8: MET  Short term goal 9: Patient will increase BUE  by 3# to increase independence with ADLs. LTGs:  Long term goals  Time Frame for Long term goals : 3 weeks   Long term goal 1: Supervision with bathing hygiene. Long term goal 2: Supervision with LB dressing. Long term goal 3: Supervision with clothing management/hygiene for toileting. Long term goal 4: Supervision with toilet transfers. Long term goal 5: SBA with ambulatory home making tasks. Long term goals 6: Patient verbalize DME needs. Assessment:  Decreased functional mobility ; Decreased ADL status; Decreased ROM; Decreased safe awareness; Decreased cognition; Decreased balance; Decreased high-level IADLs; Decreased fine motor control; Decreased coordination    NUTRITION  Current Wt: Weight: 164 lb (74.4 kg) / Body mass index is 22.24 kg/m².   Admission Wt: Admission Body Weight: 170 lb (77.1 kg)  Oral Diet Orders: General Carb Control 4  Oral Nutrition Supplement (ONS) Orders: None   PO intake transfers  3. Patient tolerating NTL  4.  5.      Goals for following week:  1. Complete toilet transfers and LB clothing management without cues for safety  2. Supervision transfers  3. Improve safety with ADL's   4.   5.     Factors facilitating achievement of predicted outcomes: vertigo and dizziness has improved    Barriers to the achievement of predicted outcomes: Limited insight into deficits, global ataxia    Team Members Present at Conference:  : Adrian Herring [place]   Occupational Therapist: Ximena Daniels, OTR/L  Physical Therapist: Caitlin Bañuelos PT  Speech Therapist: Farrah Monzon, Margaux Mckeon 89, Anne Real  Nurse: Andrey Gold, RN, BSN   Nurse Manager:  Andrey Gold, RN, BSN  Dietitian:  Rachel Salomon, MS RD, LD  Rehab Director:  Saeid Mead approve the established interdisciplinary plan of care as documented within the medical record of [facility].

## 2020-02-03 NOTE — PROGRESS NOTES
Pulmonary and Critical Care Progress Note [facility]    Patient: Jeanette Morrissey  1955   MR# [id number]   Acct# [de-identified]  02/03/20   9:42 AM  Referring Provider: Corey Sauer MD    Chief Complaint: Cough and shortness of breath. Interval history:   Dyspnea is much better. Off oxygen. No new respiratory complaints  Medications:   predniSONE, 20 mg, Oral, Q12H    budesonide, 0.5 mg, Nebulization, BID    formoterol, 20 mcg, Nebulization, BID    FLUoxetine, 20 mg, Oral, Daily    levothyroxine, 125 mcg, Oral, Daily    amLODIPine, 10 mg, Oral, Daily    aspirin, 81 mg, Oral, Daily    atorvastatin, 80 mg, Oral, Nightly    lisinopril, 20 mg, Oral, Daily    polyethylene glycol, 17 g, Oral, Daily    sennosides-docusate sodium, 1 tablet, Oral, Nightly    ipratropium-albuterol, 1 vial, Inhalation, 4x Daily    docusate sodium, 100 mg, Oral, BID     Review of Systems:   Review of Systems   Constitutional: Negative for chills and fever. HENT: Negative for trouble swallowing. Respiratory: Negative for choking. Gastrointestinal: Negative for vomiting. Physical Exam:  Blood pressure 139/89, pulse 74, temperature 96.8 °F (36 °C), temperature source Temporal, resp. rate 16, height 6' (1.829 m), weight 164 lb (74.4 kg), SpO2 95 %. Intake/Output Summary (Last 24 hours) at 2/3/2020 0942  Last data filed at 2/3/2020 0856  Gross per 24 hour   Intake 840 ml   Output 1075 ml   Net -235 ml     Physical Exam  Constitutional:       General: He is not in acute distress. Appearance: He is well-developed. HENT:      Head: Normocephalic and atraumatic. Nose: Nose normal.   Neck:      Trachea: No tracheal deviation. Cardiovascular:      Rate and Rhythm: Normal rate and regular rhythm. Heart sounds: Normal heart sounds. No murmur. No friction rub. Pulmonary:      Effort: Pulmonary effort is normal. No accessory muscle usage or respiratory distress. Breath sounds:  No wheezing,

## 2020-02-03 NOTE — PROGRESS NOTES
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 and trace L cerebral convexity subdural hemorrhage   Balance   Sitting Balance Independent   Standing Balance Stand by assistance   Standing Balance   Time 6.5 mins   Activity 2 hand act   Bed mobility   Supine to Sit Modified independent   Sit to Supine Modified independent   Transfers   Stand Step Transfers Stand by assistance   Transfer Comments Impulsive movements with transfers   Toilet Transfers   Toilet - Technique To left   Toilet Transfer Stand by assistance   Coordination   Fine Motor BUE    Fine Motor Skills   Left 9 Hole Peg Test Time (secs) 46   Right 9 Hole Peg Test Time (secs) 72   Short term goals   Short term goal 2 MET   Short term goal 3 MET   Short term goal 4 MET   Short term goal 6 MET   Short term goal 7 MET   Short term goal 8 MET   Assessment   Performance deficits / Impairments Decreased functional mobility ; Decreased ADL status; Decreased ROM; Decreased safe awareness;Decreased cognition;Decreased balance;Decreased high-level IADLs;Decreased fine motor control;Decreased coordination   Timed Code Treatment Minutes 60 Minutes   Activity Tolerance   Activity Tolerance Patient Tolerated treatment well   Safety Devices   Safety Devices in place Yes

## 2020-02-03 NOTE — PLAN OF CARE
elimination  2/2/2020 2007 by Nena Swanson RN  Outcome: Ongoing  2/2/2020 1124 by Bryan Bucio LPN  Outcome: Ongoing  Goal: STG - Patient demonstrates no accidents  2/2/2020 2007 by Nena Swanson RN  Outcome: Ongoing  2/2/2020 1124 by Bryan Bucio LPN  Outcome: Ongoing  Goal: STG - Patient will state signs and symptoms of UTI  2/2/2020 2007 by Nena Swanson RN  Outcome: Ongoing  2/2/2020 1124 by Bryan Bucio LPN  Outcome: Ongoing     Problem: IP BOWEL ELIMINATION  Goal: LTG - patient will have regular and routine bowel evacuation  2/2/2020 2007 by Nena Swanson RN  Outcome: Ongoing  2/2/2020 1124 by Bryan Bucio LPN  Outcome: Ongoing     Problem: IP BREATHING  Goal: LTG - patient will mobilize secretions and maintain airway  2/2/2020 2007 by Nena Swanson RN  Outcome: Ongoing  2/2/2020 1124 by Bryan Bucio LPN  Outcome: Ongoing     Problem: NUTRITION  Goal: Patient maintains adequate hydration  2/2/2020 2007 by Nena Swanson RN  Outcome: Ongoing  2/2/2020 1124 by Bryan Bucio LPN  Outcome: Ongoing     Problem: SAFETY  Goal: LTG - Patient will demonstrate safety requirements appropriate to situation/environment  2/2/2020 2007 by Nena Swanson RN  Outcome: Ongoing  2/2/2020 1124 by Bryan Bucio LPN  Outcome: Ongoing  Goal: LTG - patient will utilize safety techniques  2/2/2020 2007 by Nena Swanson RN  Outcome: Ongoing  2/2/2020 1124 by Bryan Bucio LPN  Outcome: Ongoing  Goal: STG - patient locks brakes on wheelchair  2/2/2020 2007 by Nena Swanson RN  Outcome: Ongoing  2/2/2020 1124 by Bryan Bucio LPN  Outcome: Ongoing  Goal: STG - Patient uses call light consistently to request assistance with transfers  2/2/2020 2007 by Nena Swanson RN  Outcome: Ongoing  2/2/2020 1124 by Bryan Bucio LPN  Outcome: Ongoing  Goal: STG - patient uses gait belt during all transfers  2/2/2020 2007 by Jennifer Lepe Jayjay Medley RN  Outcome: Ongoing  2/2/2020 1124 by Bessy Lester LPN  Outcome: Ongoing     Problem: SKIN INTEGRITY  Goal: STG - patient will maintain good skin integrity  2/2/2020 2007 by Jimenez Franklin RN  Outcome: Ongoing  2/2/2020 1124 by Bessy Lester LPN  Outcome: Ongoing     Problem: PAIN  Goal: STG - Patient will verbalize an acceptable level of pain  2/2/2020 2007 by Jimenez Franklin RN  Outcome: Ongoing  2/2/2020 1124 by Bessy Lester LPN  Outcome: Ongoing     Problem: Mobility - Impaired:  Goal: Mobility will improve  Description  Mobility will improve  2/2/2020 2007 by Jimenez Franklin RN  Outcome: Ongoing  2/2/2020 1124 by Bessy Lester LPN  Outcome: Ongoing     Problem: Health Behavior:  Goal: Identification of resources available to assist in meeting health care needs will improve  Description  Identification of resources available to assist in meeting health care needs will improve  2/2/2020 2007 by Jimenez Franklin RN  Outcome: Ongoing  2/2/2020 1124 by Bessy Lester LPN  Outcome: Ongoing     Problem: Nutritional:  Goal: Consumption of the prescribed amount of daily calories will improve  Description  Consumption of the prescribed amount of daily calories will improve  2/2/2020 2007 by Jimenez Franklin RN  Outcome: Ongoing  2/2/2020 1124 by Bessy Lester LPN  Outcome: Ongoing     Problem: Mood - Altered:  Goal: Mood stable  Description  Mood stable  2/2/2020 2007 by Jimenez Franklin RN  Outcome: Ongoing  2/2/2020 1124 by Bessy Lester LPN  Outcome: Ongoing

## 2020-02-03 NOTE — PROGRESS NOTES
02/03/20 1111   Oxygen Therapy   SpO2 96 %   O2 Device None (Room air)   Transfers   Sit to Stand Contact guard assistance   Stand to sit Stand by assistance   Bed to Chair Stand by assistance   Stand Pivot Transfers Stand by assistance   Ambulation 1   Surface level tile   Device Rolling Walker   Assistance Contact guard assistance   Quality of Gait 3 point gait lead with LLE, improved stability   Distance 65ft   Comments incoporated L turns and sit to stands    Ambulation 2   Surface - 2 level tile   Device 2 Rolling Walker   Assistance 2 Contact guard assistance   Quality of Gait 2 3 point gait, improved instability, able to self correct disrupted sequence   Gait Deviations Slow Deanna   Distance 175ft, 100ft   Comments L and R turns   Propulsion 1   Propulsion Manual   Level Level Tile   Method RLE;LLE   Level of Assistance Supervision   Description/ Details 2 turns   Distance 300ft   Conditions Requiring Skilled Therapeutic Intervention   Assessment Patient increased ambulation distance with turns to left and the right direction. Good 3 point gait and able to self correct disrupted sequence. VC to push up from armrest and then one hand at a time onto RW for stability   Activity Tolerance   Activity Tolerance Patient Tolerated treatment well   Safety Devices   Type of devices All fall risk precautions in place;Call light within reach; Chair alarm in place;Gait belt;Patient at risk for falls; Left in chair

## 2020-02-03 NOTE — PROGRESS NOTES
Occupational Therapy     01/31/20 1000   Restrictions/Precautions   Restrictions/Precautions Fall Risk   ADL   Grooming Setup   UE Dressing Setup   LE Dressing Setup  (Increased time to complete w/ AE (Shoes only))   Toileting Minimal assistance   Balance   Sitting Balance Supervision   Standing Balance Contact guard assistance   Standing Balance   Time 3 mins on 2 occasions   Activity 2 hand FM activity utilizing pincer and tripod grasp   Functional Mobility   Functional - Mobility Device Rolling Walker   Assist Level Contact guard assistance   Bed mobility   Supine to Sit Modified independent   Sit to Supine Modified independent   Scooting Stand by assistance   Transfers   Stand Step Transfers Contact guard assistance   Stand Pivot Transfers Contact guard assistance   Sit to stand Contact guard assistance   Stand to sit Contact guard assistance   Toilet Transfers   Toilet - Technique Ambulating   Equipment Used Grab bars   Toilet Transfer Minimal assistance   Wheelchair Bed Transfers   Wheelchair/Bed - Technique Stand step   Equipment Used Bed; Wheelchair   Level of Asssistance Contact guard assistance   Assessment   Timed Code Treatment Minutes 60 Minutes

## 2020-02-03 NOTE — PROGRESS NOTES
friction rub. Pulmonary:      Effort: Pulmonary effort is normal. No accessory muscle usage or respiratory distress. Breath sounds: Wheezing present. No rhonchi or rales. Chest:      Chest wall: No tenderness. Abdominal:      General: Bowel sounds are normal.      Palpations: Abdomen is soft. Tenderness: There is no abdominal tenderness. Skin:     Findings: Ecchymosis (arms) present. Neurological:      Mental Status: He is alert. Cranial Nerves: Dysarthria present. Motor: Weakness present. Recent Labs     02/03/20  0436   WBC 14.0*   RBC 5.61   HGB 17.7   HCT 52.7*      MCV 93.9   MCH 31.6*   MCHC 33.6   RDW 13.2      Recent Labs     02/03/20  0436      K 4.6      CO2 23   BUN 22   CREATININE 0.8   CALCIUM 9.0   GLUCOSE 130*        Pulmonary Assessment:    1. Asthma with possible COPD overlap stable  2. Wheezing, persists  3. Recent stroke. Recommend:     · Prednisone increased back to 20 twice a day   · Continue rehab program  · Continue perforomist and budesonide due to formulary issues; otherwise he could take symbicort 160 mcg 2 puffs q 12 hr and go home with that, but not available. · Mobilize as much as feasible to do so  · Not requiring oxygen    Electronically signed by Darren Diaz on 2/3/2020 at 9:04 AM  Physician's substantive portion:  Subjective: The patient is currently undergoing occupational therapy and is wearing oxygen during therapy otherwise he is not requiring oxygen. Objective: He appeared had some wheezing earlier. His lungs are fairly clear at this time. Assessment/recommendation: Continue his current regimen but hopefully we can decrease his prednisone dosage in the next 1 to 2 days. I have seen and examined patient personally, performing a face-to-face diagnostic evaluation with plan of care reviewed and developed with APRN.  I have addended and/or modified the above history of present illness, physical examination, and assessment and plan to reflect my findings and impressions. Essential elements of the care plan were discussed with APRN above. Agree with findings and assessment/plan as documented above.     Electronically signed by Bernrad Mahan MD on 2/3/20 at 10:04 AM

## 2020-02-03 NOTE — PROGRESS NOTES
[facility]  INPATIENT SPEECH THERAPY  [facility] UNIT  TIME   [id number]    [x]Daily Note  []Progress Note    Date: 2/3/2020  Patient Name: Roosevelt Loco        MRN: [id number]    Account #: [de-identified]  : 1955  (66 y.o.)  Gender: male   Primary Provider: Saul Tom MD  Precautions: fall/aspiration     Swallowing Status/Diet: Regular diet with nectar thick liquids    Subjective: Patient alert and cooperative with all therapy tasks. Patient completed oral motor exercises to address lingual and labial weakness. Improved lingual movements noted during exercises. Patient was able to recall several of the therapists names today as well as some of the details about each one. Patient also able to recall recent/daily events with no difficulty. Improved recall noted. Patient continues to have poor speech intelligibility for effective communication. Patient requires moderate verbal cues to utilize dysarthria strategies. Patient often becomes frustrated when people do not understand. Patient did attempt to self correct unintelligible speech today. Speech intelligibility ranks at approx 60% for unknown context and unfamiliar listener. Patient observed completing transfer from bed to wheel chair. Patient continues to require cues for safety awareness. Divergent naming task completed to address verbal expression  patient was able to name 9 items in 1 minute time constraint related to making a grocery list.     Thin liquids (H2O) were administered via cup. Immediate cough noted. Trials discontinued. Patient continues to tolerate nectar thick liquids with no overt s/s of aspiration. Patient was able to state how to thicken liquids with independence today. Patient continues to have sluggish/decreased laryngeal elevation for airway protection. At this time, recommend continued diet of regular consistencies with nectar thick liquids. Meds whole in puree. Able to feed self. Will continue to follow.      SHORT TERM GOAL #1:  Goal 1: The patient will demonstrate recall of functional information following a (short term) delay with minimal cues in order to increase functional integration in environment. SHORT TERM GOAL #2:  Goal 2: Identify the item that does not belong in a list of words presented auditorily with 100% accuracy and(min/mod/max) verbal, visual and tactile cues    SHORT TERM GOAL #3:  Goal 3: Identify appropriate solutions to a problem when presented with a field of 4 with minimal cues. SHORT TERM GOAL #4:  Goal 4: Patient will complete simple/ complex reasoning tasks to improve problem solving and safety awareness with 100% accuracy and minimal cues. SHORT TERM GOAL #5:  Goal 5: The patient will complete divergent naming tasks (concrete/abstract) with minimal cueing. Swallowing Short Term Goals  Short-term Goals  Goal 1: The patient will tolerate a regular diet with thin liquids with minimal overt s/s of aspiration. Goal 2: The patient will complete oral care at least two times daily to prevent aspiration of oral bacteria. Goal 3: SLP and staff will monitor safety with oral intake and monitor increased congestion, overt s/s of aspiration, elevated temp and RLL infiltrates on CXR.     Comprehension: 4 - Patient understands basic needs 75-90%+ of the time  Expression: 5 - Expresses basic ideas/needs only (hungry/hot/pain)  Social Interaction: 4 - Patient appropriate 75-90%+ of the time  Problem Solvin - Patient solves simple/routine tasks 75-90%+   Memory: 5 - Patient requires prompting with stress/unfamiliar situations    ASSESSMENT:  Assessment: [x]Progressing towards goals          []Not Progressing towards goals    Patient Tolerance of Treatment:   [x]Tolerated well []Tolerated fair []Required rest breaks []Fatigued    Education:  Learner:  [x]Patient          []Significant Other          []Other  Education provided regarding:  [x]Goals and POC   [x]Diet and swallowing precautions    []Home Exercise Program  []Progress and/or discharge information  Method of Education:  [x]Discussion          [x]Demonstration          []Handout          []Other  Evaluation of Education:   []Verbalized understanding   []Demonstrates without assistance  []Demonstrates with assistance  [x]Needs further instruction     [x]No evidence of learning                  [x]No family present      Plan: [x]Continue with current plan of care    []Modify current plan of care as follows:    []Discharge patient    Discharge Location:    Services/Supervision Recommended:      [x]Patient continues to require treatment by a licensed therapist to address functional deficits as outlined in the established plan of care.

## 2020-02-04 ENCOUNTER — OUTSIDE FACILITY SERVICE (OUTPATIENT)
Dept: PULMONOLOGY | Facility: CLINIC | Age: 65
End: 2020-02-04

## 2020-02-04 PROCEDURE — 94669 MECHANICAL CHEST WALL OSCILL: CPT

## 2020-02-04 PROCEDURE — 51798 US URINE CAPACITY MEASURE: CPT

## 2020-02-04 PROCEDURE — 97116 GAIT TRAINING THERAPY: CPT

## 2020-02-04 PROCEDURE — 97129 THER IVNTJ 1ST 15 MIN: CPT

## 2020-02-04 PROCEDURE — 6370000000 HC RX 637 (ALT 250 FOR IP): Performed by: PSYCHIATRY & NEUROLOGY

## 2020-02-04 PROCEDURE — 6370000000 HC RX 637 (ALT 250 FOR IP): Performed by: NURSE PRACTITIONER

## 2020-02-04 PROCEDURE — 97130 THER IVNTJ EA ADDL 15 MIN: CPT

## 2020-02-04 PROCEDURE — 97110 THERAPEUTIC EXERCISES: CPT

## 2020-02-04 PROCEDURE — 6360000002 HC RX W HCPCS: Performed by: INTERNAL MEDICINE

## 2020-02-04 PROCEDURE — 99232 SBSQ HOSP IP/OBS MODERATE 35: CPT | Performed by: INTERNAL MEDICINE

## 2020-02-04 PROCEDURE — 97530 THERAPEUTIC ACTIVITIES: CPT

## 2020-02-04 PROCEDURE — 99232 SBSQ HOSP IP/OBS MODERATE 35: CPT | Performed by: PSYCHIATRY & NEUROLOGY

## 2020-02-04 PROCEDURE — 92526 ORAL FUNCTION THERAPY: CPT

## 2020-02-04 PROCEDURE — 94640 AIRWAY INHALATION TREATMENT: CPT

## 2020-02-04 PROCEDURE — 1180000000 HC REHAB R&B

## 2020-02-04 PROCEDURE — 6360000002 HC RX W HCPCS: Performed by: NURSE PRACTITIONER

## 2020-02-04 PROCEDURE — 51701 INSERT BLADDER CATHETER: CPT

## 2020-02-04 RX ORDER — GUAIFENESIN 600 MG/1
600 TABLET, EXTENDED RELEASE ORAL 2 TIMES DAILY
Status: DISCONTINUED | OUTPATIENT
Start: 2020-02-04 | End: 2020-02-12 | Stop reason: HOSPADM

## 2020-02-04 RX ORDER — METHYLPREDNISOLONE ACETATE 80 MG/ML
80 INJECTION, SUSPENSION INTRA-ARTICULAR; INTRALESIONAL; INTRAMUSCULAR; SOFT TISSUE ONCE
Status: COMPLETED | OUTPATIENT
Start: 2020-02-04 | End: 2020-02-04

## 2020-02-04 RX ADMIN — LISINOPRIL 20 MG: 20 TABLET ORAL at 08:12

## 2020-02-04 RX ADMIN — LEVOTHYROXINE SODIUM 125 MCG: 0.1 TABLET ORAL at 05:37

## 2020-02-04 RX ADMIN — IPRATROPIUM BROMIDE AND ALBUTEROL SULFATE 3 ML: 2.5; .5 SOLUTION RESPIRATORY (INHALATION) at 18:25

## 2020-02-04 RX ADMIN — PREDNISONE 20 MG: 20 TABLET ORAL at 05:37

## 2020-02-04 RX ADMIN — DOCUSATE SODIUM 100 MG: 100 CAPSULE, LIQUID FILLED ORAL at 21:58

## 2020-02-04 RX ADMIN — ATORVASTATIN CALCIUM 80 MG: 80 TABLET, FILM COATED ORAL at 21:58

## 2020-02-04 RX ADMIN — IPRATROPIUM BROMIDE AND ALBUTEROL SULFATE 3 ML: 2.5; .5 SOLUTION RESPIRATORY (INHALATION) at 07:22

## 2020-02-04 RX ADMIN — FORMOTEROL FUMARATE DIHYDRATE 20 MCG: 20 SOLUTION RESPIRATORY (INHALATION) at 07:29

## 2020-02-04 RX ADMIN — IPRATROPIUM BROMIDE AND ALBUTEROL SULFATE 3 ML: 2.5; .5 SOLUTION RESPIRATORY (INHALATION) at 11:18

## 2020-02-04 RX ADMIN — METHYLPREDNISOLONE ACETATE 80 MG: 80 INJECTION, SUSPENSION INTRA-ARTICULAR; INTRALESIONAL; INTRAMUSCULAR; SOFT TISSUE at 12:06

## 2020-02-04 RX ADMIN — IPRATROPIUM BROMIDE AND ALBUTEROL SULFATE 3 ML: 2.5; .5 SOLUTION RESPIRATORY (INHALATION) at 15:31

## 2020-02-04 RX ADMIN — SENNOSIDES AND DOCUSATE SODIUM 1 TABLET: 8.6; 5 TABLET ORAL at 21:58

## 2020-02-04 RX ADMIN — DOCUSATE SODIUM 100 MG: 100 CAPSULE, LIQUID FILLED ORAL at 08:11

## 2020-02-04 RX ADMIN — BUDESONIDE 500 MCG: 0.5 INHALANT RESPIRATORY (INHALATION) at 18:35

## 2020-02-04 RX ADMIN — PREDNISONE 20 MG: 20 TABLET ORAL at 17:27

## 2020-02-04 RX ADMIN — GUAIFENESIN 600 MG: 600 TABLET, EXTENDED RELEASE ORAL at 21:57

## 2020-02-04 RX ADMIN — ASPIRIN 81 MG 81 MG: 81 TABLET ORAL at 08:12

## 2020-02-04 RX ADMIN — ACETAMINOPHEN 650 MG: 325 TABLET ORAL at 10:36

## 2020-02-04 RX ADMIN — FORMOTEROL FUMARATE DIHYDRATE 20 MCG: 20 SOLUTION RESPIRATORY (INHALATION) at 18:35

## 2020-02-04 RX ADMIN — FLUOXETINE HYDROCHLORIDE 20 MG: 20 CAPSULE ORAL at 08:12

## 2020-02-04 RX ADMIN — BUDESONIDE 500 MCG: 0.5 INHALANT RESPIRATORY (INHALATION) at 07:29

## 2020-02-04 RX ADMIN — AMLODIPINE BESYLATE 10 MG: 10 TABLET ORAL at 08:12

## 2020-02-04 RX ADMIN — POLYETHYLENE GLYCOL 3350 17 G: 17 POWDER, FOR SOLUTION ORAL at 08:11

## 2020-02-04 RX ADMIN — GUAIFENESIN 600 MG: 600 TABLET, EXTENDED RELEASE ORAL at 10:45

## 2020-02-04 ASSESSMENT — PAIN DESCRIPTION - PAIN TYPE: TYPE: ACUTE PAIN

## 2020-02-04 ASSESSMENT — ENCOUNTER SYMPTOMS
COUGH: 1
VOMITING: 0
CHOKING: 0
WHEEZING: 1
TROUBLE SWALLOWING: 0

## 2020-02-04 ASSESSMENT — PAIN SCALES - GENERAL
PAINLEVEL_OUTOF10: 7
PAINLEVEL_OUTOF10: 6

## 2020-02-04 ASSESSMENT — PAIN DESCRIPTION - LOCATION: LOCATION: OTHER (COMMENT)

## 2020-02-04 NOTE — PLAN OF CARE
Problem: HEMODYNAMIC STATUS  Goal: Patient has stable vital signs and fluid balance  Outcome: Ongoing     Problem: ACTIVITY INTOLERANCE/IMPAIRED MOBILITY  Goal: Mobility/activity is maintained at optimum level for patient  Outcome: Ongoing     Problem: COMMUNICATION IMPAIRMENT  Goal: Ability to express needs and understand communication  Outcome: Ongoing     Problem: Falls - Risk of:  Goal: Will remain free from falls  Description  Will remain free from falls  Outcome: Ongoing  Goal: Absence of physical injury  Description  Absence of physical injury  Outcome: Ongoing     Problem: Infection:  Goal: Will remain free from infection  Description  Will remain free from infection  Outcome: Ongoing     Problem: Safety:  Goal: Ability to chew and swallow food without choking will improve  Description  Ability to chew and swallow food without choking will improve  Outcome: Ongoing  Goal: Signs and symptoms of aspiration will decrease  Description  Signs and symptoms of aspiration will decrease  Outcome: Ongoing     Problem: Pain:  Goal: Pain level will decrease  Description  Pain level will decrease  Outcome: Ongoing  Goal: Control of acute pain  Description  Control of acute pain  Outcome: Ongoing  Goal: Control of chronic pain  Description  Control of chronic pain  Outcome: Ongoing     Problem: IP BLADDER/VOIDING  Goal: LTG - Patient will utilize adaptive techniques/equipment to complete bladder elimination  Outcome: Ongoing  Goal: STG - Patient demonstrates no accidents  Outcome: Ongoing  Goal: STG - Patient will state signs and symptoms of UTI  Outcome: Ongoing     Problem: IP BOWEL ELIMINATION  Goal: LTG - patient will have regular and routine bowel evacuation  Outcome: Ongoing     Problem: IP BREATHING  Goal: LTG - patient will mobilize secretions and maintain airway  Outcome: Ongoing     Problem: NUTRITION  Goal: Patient maintains adequate hydration  Outcome: Ongoing     Problem: SAFETY  Goal: LTG - Patient will

## 2020-02-04 NOTE — PROGRESS NOTES
Pulmonary and Critical Care Progress Note [address] [facility]    Patient: Luisito Jennings  1955   MR# [id number]   Acct# [de-identified]  02/04/20   9:00 AM  Referring Provider: Demian Jett MD    Chief Complaint: Cough and shortness of breath    Interval history:   Patient denies being short of breath. He reports persistent wheezing and coughing which is worse today than yesterday despite increasing prednisone. He is coughing up some mucus. Not requiring oxygen at rest.  He reports no other complaints other than the cough. No family at the bedside. Medications:   methylPREDNISolone acetate, 80 mg, Intramuscular, Once    predniSONE, 20 mg, Oral, Q12H    budesonide, 0.5 mg, Nebulization, BID    formoterol, 20 mcg, Nebulization, BID    FLUoxetine, 20 mg, Oral, Daily    levothyroxine, 125 mcg, Oral, Daily    amLODIPine, 10 mg, Oral, Daily    aspirin, 81 mg, Oral, Daily    atorvastatin, 80 mg, Oral, Nightly    lisinopril, 20 mg, Oral, Daily    polyethylene glycol, 17 g, Oral, Daily    sennosides-docusate sodium, 1 tablet, Oral, Nightly    ipratropium-albuterol, 1 vial, Inhalation, 4x Daily    docusate sodium, 100 mg, Oral, BID     Review of Systems:   Review of Systems   Constitutional: Negative for chills and fever. HENT: Negative for trouble swallowing. Respiratory: Positive for cough and wheezing. Negative for choking. Gastrointestinal: Negative for vomiting. Neurological: Positive for speech difficulty. Physical Exam:  Blood pressure 131/80, pulse 75, temperature 97.6 °F (36.4 °C), temperature source Temporal, resp. rate 16, height 6' (1.829 m), weight 164 lb (74.4 kg), SpO2 95 %. Intake/Output Summary (Last 24 hours) at 2/4/2020 0900  Last data filed at 2/4/2020 1050  Gross per 24 hour   Intake 840 ml   Output 1150 ml   Net -310 ml     Physical Exam  Constitutional:       General: He is not in acute distress. Appearance: He is well-developed.    HENT:      Head:

## 2020-02-04 NOTE — PROGRESS NOTES
Nutrition Assessment    Type and Reason for Visit: Reassess    Nutrition Recommendations: modify current diet with addition of Carb control--starting Prednisone    Nutrition Assessment: Appetite has improvied with intake now ranging %. Restarting Carb Control diet--started receiving Prednisone. Pt is not at nutritional risk at this time    Malnutrition Assessment:  · Malnutrition Status: No malnutrition  · Context: Acute illness or injury  · Findings of the 6 clinical characteristics of malnutrition (Minimum of 2 out of 6 clinical characteristics is required to make the diagnosis of moderate or severe Protein Calorie Malnutrition based on AND/ASPEN Guidelines):  1. Energy Intake- ,    %  2. Weight Loss-2% loss or greater, in 1 week  3. Fat Loss-No significant subcutaneous fat loss,    4. Muscle Loss-No significant muscle mass loss,    5. Fluid Accumulation-No significant fluid accumulation,    6.   Strength-Not measured    Nutrition Risk Level: Low    Nutrition Diagnosis:   · Problem: Biting/chewing difficulty, Unintended weight loss  · Etiology: related to Acute injury/trauma, Partial or complete edentulism     Signs and symptoms:  as evidenced by Patient report of, Swallow study results, Weight loss greater than or equal to 2% in 1 week    Objective Information:  · Nutrition-Focused Physical Findings:    · Wound Type: Skin Tears  · Current Nutrition Therapies:  · Oral Diet Orders: General, Mildly Thick   · Oral Diet intake: 51-75%, %  · Oral Nutrition Supplement (ONS) Orders: None    · Anthropometric Measures:  · Ht: 6' (182.9 cm)   · Current Body Wt: 164 lb (74.4 kg)  · Admission Body Wt: 170 lb (77.1 kg)  · % Weight Change:  ,  3.6% weight decrease in 1 week  · Ideal Body Wt: 178 lb (80.7 kg), % Ideal Body 92.1%  · BMI Classification: BMI 18.5 - 24.9 Normal Weight    Nutrition Interventions:   Modify current diet  Continued Inpatient Monitoring    Nutrition Evaluation:   · Evaluation:

## 2020-02-04 NOTE — PROGRESS NOTES
information  Method of Education:  [x]Discussion          [x]Demonstration          []Handout          []Other  Evaluation of Education:   []Verbalized understanding   []Demonstrates without assistance  []Demonstrates with assistance  [x]Needs further instruction     []No evidence of learning                  []No family present      Plan: [x]Continue with current plan of care    []Modify current plan of care as follows:    []Discharge patient    Discharge Location:    Services/Supervision Recommended:      [x]Patient continues to require treatment by a licensed therapist to address functional deficits as outlined in the established plan of care.

## 2020-02-04 NOTE — PLAN OF CARE
Ongoing  2/3/2020 1036 by Darrion Tellez RN  Outcome: Ongoing  Goal: STG - Patient uses call light consistently to request assistance with transfers  2/3/2020 2359 by Katy Cerda RN  Outcome: Ongoing  2/3/2020 1036 by Darrion Tellez RN  Outcome: Ongoing  Goal: STG - patient uses gait belt during all transfers  2/3/2020 2359 by Katy Cerda RN  Outcome: Ongoing  2/3/2020 1036 by Darrion Tellez RN  Outcome: Ongoing

## 2020-02-04 NOTE — PROGRESS NOTES
confusion      PHYSICAL EXAM:  /80   Pulse 75   Temp 97.6 °F (36.4 °C) (Temporal)   Resp 16   Ht 6' (1.829 m)   Wt 164 lb (74.4 kg)   SpO2 95%   BMI 22.24 kg/m²     Constitutional: he appears well-developed and well-nourished. Eyes - conjunctiva normal.  Pupils react to light  Ear, nose, throat -hearing intact to voice. No scars, masses, or lesions over external nose or ears, no atrophy of tongue  Neck-symmetric, no masses noted, no jugular vein distension  Respiration- chest wall appears symmetric, good expansion,   normal effort without use of accessory muscles  Cardiovascular- RRR  Musculoskeletal - no significant wasting of muscles noted, no bony deformities, gait no gross ataxia  Extremities-no clubbing, cyanosis or edema  Skin - warm, dry, and intact. No rash, erythema, or pallor. Psychiatric - mood, affect, and behavior appear normal.      Neurology  NEUROLOGICAL EXAM:  Awake, alert, fluent oriented x 3 appropriate affect  Attention and concentration appear appropriate  Memory fair  Speech stuttering with significant dysarthria  No clear issues with language of fund of knowledge     Cranial Nerve Exam   CN II- Visual fields grossly unremarkable  CN III, IV,VI-EOMI, significant left greater than right horizontal nystagmus, disconjugate eyes at rest, no ptosis  CN VII-no facial assymetry  CN VIII-Hearing intact   CN IX and X- Palate elevates in midline  CN XI-good shoulder shrug  CN XII-Tongue midline with no fasciculations or fibrillations     Motor Exam  V/V throughout upper and lower extremities bilaterally, no cogwheeling, normal tone            Nursing/pcp notes, imaging,labs and vitals reviewed.      PT,OT and/or speech notes reviewed    Lab Results   Component Value Date    WBC 14.0 (H) 02/03/2020    HGB 17.7 02/03/2020    HCT 52.7 (H) 02/03/2020    MCV 93.9 02/03/2020     02/03/2020     Lab Results   Component Value Date     02/03/2020    K 4.6 02/03/2020     02/03/2020    CO2 23 02/03/2020    BUN 22 02/03/2020    CREATININE 0.8 02/03/2020    GLUCOSE 130 (H) 02/03/2020    CALCIUM 9.0 02/03/2020    LABGLOM >60 02/03/2020   No results found for: INRNo results found for: PHENYTOIN, ESR, CRP  Objective    Balance  Sitting Balance: Supervision  Standing Balance: Contact guard assistance  Functional Mobility  Functional - Mobility Device: Rolling Walker  Activity: To/From therapy gym  Assist Level: Contact guard assistance  Type of ROM/Therapeutic Exercise  Type of ROM/Therapeutic Exercise: Cane/Wand(3# dowel all planesx 2 sets x 20reps)     Swallowing:  Patient also seen for swallow monitor this date. Patient continues to present with consistent overt s/s of aspiration with thin liquids. Patient educated regarding aspiration/thickened liquids this date. Patient complied with trials of nectar thick liquids this date.      Patient demonstrated 1-2 second initiation of swallow with nectar thick liquids. Mild sluggish and decreased laryngeal elevation noted with PO trial. No overt s/s of aspiration noted with nectar thick liquids via cup.      Patient complied with recommendation of thickened liquids this date with expressed understanding noted by the patient.     At this time, recommend continued diet of regular consistencies with downgrade to nectar thick liquids. Meds whole in puree. Able to feed self. Will continue to follow.      Speech/Language:  SLP utilized tools/strategies to promote increased clarity of speech when communicating with conversation partners. Reenforced patient to use slow speech rate with over-articulation/over-exaggeration. In structured activity, patient demonstrated ability to verbally express words in short phrases and short sentences with slow speech rate and over-articulation/over-exaggeration with provision of max cues/prompts.  With provision of max cues/prompts including visual pacing board to emphasis each syllable, patient verbalized noticing increased intelligibility.          01/31/20 1103   Restrictions/Precautions   Restrictions/Precautions Seizure; Fall Risk   Oxygen Therapy   SpO2 95 %   O2 Device None (Room air)   Transfers   Sit to Stand Contact guard assistance   Stand to sit Stand by assistance   Bed to Chair Stand by assistance   Ambulation 1   Surface level tile   Device Rolling Walker   Assistance Contact guard assistance   Quality of Gait 3 point gait lead with LLE, improved stability   Gait Deviations Slow Deanna   Distance 3x65ft    Comments incoporated L turns and sit to stands    Propulsion 1   Propulsion Manual   Level Level Tile   Method RLE;LLE   Level of Assistance Supervision   Description/ Details 2 turns   Distance 300   Conditions Requiring Skilled Therapeutic Intervention   Assessment patient improved stability with turning to the left and sit to stands by keeping the walker close when turning to sit. Occassional VCs for moving one segment at a time and decrease speed during amb. Activity Tolerance   Activity Tolerance Patient Tolerated treatment well   Safety Devices   Type of devices All fall risk precautions in place;Call light within reach; Patient at risk for falls; Left in bed;Gait belt;Bed alarm in place       RECORD REVIEW: Previous medical records, medications were reviewed at today's visit    IMPRESSION:  1. 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 Antiplatelet treatment. Follow-up CT scan 1/24 showing no change in ventricular size nor any new difficulties. Spoke with radiologist.  2.  Hypertension-continue current medications and monitoring  3. Hyperlipidemia-continue statin  4. DVT prophylaxis-SCDs  5. GI-bowel regimen  6. Hypothyroidism-continue replacement therapy  7.  cough , some wheezing. Continue pulmonary toilet. Chest x-ray unremarkable. Pulmonology has seen. Now getting Solu-Medrol, bronchodilators and vest therapy. Improved  8. Urinary retention.   Continue in and out cath Alvina Silva Meclizine and Tessalon Perles discontinued in case they are hampering the efforts. Bladder function has improved but coughing somewhat worse  Continue current treatment  ELOS:2-3 weeks.   Re-staff tomorrow

## 2020-02-04 NOTE — PROGRESS NOTES
Cynthia Sarah  [id number]     02/04/20 1131 02/04/20 1132 02/04/20 1133   Subjective   Subjective Pt agreed to therapy this morning. Pt was being cathed at time of session. --   --    Transfers   Sit to Stand  --  Stand by assistance  --    Stand to sit  --  Stand by assistance  --    Ambulation   Ambulation?  --  Yes  --    WB Status  --  WBAT BLE'S  --    Ambulation 1   Surface  --  level tile  --    Device  --  Rolling Walker  --    Assistance  --  Contact guard assistance  --    Quality of Gait  --  Pt showed 3 pt gait pattern, but did get out of sequence at times needing VC's. Pt was also slightly unsteady during turns. --    Distance  --  100'  --    Exercises   Comments  --   --  Sitting Helio LE ther ex x 20 reps. Conditions Requiring Skilled Therapeutic Intervention   Body structures, Functions, Activity limitations  --   --  Decreased functional mobility ; Decreased ADL status; Decreased strength;Decreased safe awareness;Decreased endurance;Decreased balance   Assessment  --   --  Pt was being cathed at time of session. Pt performed sitting ther ex in recliner. Once cath was removed Pt was able to amb. Pt needed VC's for gait techniques at times. Pt was slightly unsteady during turning as well.      Prognosis  --   --  Fair   Activity Tolerance   Activity Tolerance  --   --  Patient limited by cognitive status   Electronically signed by Marcial Goodwin PTA on 2/4/2020 at 11:44 AM

## 2020-02-04 NOTE — PROGRESS NOTES
Occupational Therapy     02/04/20 1000   Restrictions/Precautions   Restrictions/Precautions Fall Risk   Vision   Vision Impaired   Vision Exceptions Wears glasses at all times   Pain Assessment   Patient Currently in Pain Yes   Pain Assessment 0-10   Pain Level 7   Pain Type Acute pain   Pain Location Other (Comment)  (Pt. stated he was experiencing all over body pain.)   Multiple Pain Sites Yes   Oxygen Therapy   SpO2 96 %   ADL   LE Dressing Setup  (Increased time to complete (Shoes only))   Balance   Sitting Balance Independent   Standing Balance Stand by assistance   Standing Balance   Time 2 mins on 2 occ. Activity 2 hand static standing act   Functional Mobility   Functional - Mobility Device Rolling Walker   Activity To/from bathroom   Assist Level Stand by assistance   Functional Mobility Comments Pt. displayed better control and navigated turns well with RW    Transfers   Stand Step Transfers Stand by assistance   Sit to stand Stand by assistance   Stand to sit Stand by assistance   Transfer Comments Pt. displays impulsive movments on transfer   Type of ROM/Therapeutic Exercise   Type of ROM/Therapeutic Exercise Resistive Bands   Comment Med resistance band ex 1 set 15 reps in all planes   Exercises   Grasp/Release BUE Hand ex. 3 set of 10 reps for  strength   Assessment   Performance deficits / Impairments Decreased functional mobility ; Decreased ADL status; Decreased high-level IADLs;Decreased balance;Decreased fine motor control;Decreased coordination;Decreased cognition   Assessment Pt. became more agitated as treatment progressed. Pt. stated he was experincing all over body pain. Nursing was called to address pain. Upon completion of treatment patient stated he did not want to go to PT next or do anymore therapy today. Safety Devices   Type of devices Call light within reach; Chair alarm in place   Plan   Current Treatment Recommendations Strengthening; Functional Mobility Training;Patient/Caregiver Education & Training;Self-Care / ADL; Safety Education & Training

## 2020-02-05 ENCOUNTER — OUTSIDE FACILITY SERVICE (OUTPATIENT)
Dept: PULMONOLOGY | Facility: CLINIC | Age: 65
End: 2020-02-05

## 2020-02-05 PROCEDURE — 51701 INSERT BLADDER CATHETER: CPT

## 2020-02-05 PROCEDURE — 51798 US URINE CAPACITY MEASURE: CPT

## 2020-02-05 PROCEDURE — 97129 THER IVNTJ 1ST 15 MIN: CPT

## 2020-02-05 PROCEDURE — 94640 AIRWAY INHALATION TREATMENT: CPT

## 2020-02-05 PROCEDURE — 1180000000 HC REHAB R&B

## 2020-02-05 PROCEDURE — 6370000000 HC RX 637 (ALT 250 FOR IP): Performed by: PSYCHIATRY & NEUROLOGY

## 2020-02-05 PROCEDURE — 97535 SELF CARE MNGMENT TRAINING: CPT

## 2020-02-05 PROCEDURE — 97110 THERAPEUTIC EXERCISES: CPT

## 2020-02-05 PROCEDURE — 99233 SBSQ HOSP IP/OBS HIGH 50: CPT | Performed by: PSYCHIATRY & NEUROLOGY

## 2020-02-05 PROCEDURE — 92526 ORAL FUNCTION THERAPY: CPT

## 2020-02-05 PROCEDURE — 6370000000 HC RX 637 (ALT 250 FOR IP): Performed by: NURSE PRACTITIONER

## 2020-02-05 PROCEDURE — 97530 THERAPEUTIC ACTIVITIES: CPT

## 2020-02-05 PROCEDURE — 97130 THER IVNTJ EA ADDL 15 MIN: CPT

## 2020-02-05 PROCEDURE — 97116 GAIT TRAINING THERAPY: CPT

## 2020-02-05 PROCEDURE — 94669 MECHANICAL CHEST WALL OSCILL: CPT

## 2020-02-05 PROCEDURE — 99232 SBSQ HOSP IP/OBS MODERATE 35: CPT | Performed by: INTERNAL MEDICINE

## 2020-02-05 PROCEDURE — 6360000002 HC RX W HCPCS: Performed by: INTERNAL MEDICINE

## 2020-02-05 RX ORDER — ACETAMINOPHEN 325 MG/1
650 TABLET ORAL 3 TIMES DAILY
Status: DISCONTINUED | OUTPATIENT
Start: 2020-02-05 | End: 2020-02-12 | Stop reason: HOSPADM

## 2020-02-05 RX ORDER — TAMSULOSIN HYDROCHLORIDE 0.4 MG/1
0.4 CAPSULE ORAL DAILY
Status: DISCONTINUED | OUTPATIENT
Start: 2020-02-05 | End: 2020-02-07

## 2020-02-05 RX ORDER — ARFORMOTEROL TARTRATE 15 UG/2ML
15 SOLUTION RESPIRATORY (INHALATION) 2 TIMES DAILY
Status: DISCONTINUED | OUTPATIENT
Start: 2020-02-05 | End: 2020-02-12 | Stop reason: HOSPADM

## 2020-02-05 RX ADMIN — LISINOPRIL 20 MG: 20 TABLET ORAL at 07:40

## 2020-02-05 RX ADMIN — GUAIFENESIN 600 MG: 600 TABLET, EXTENDED RELEASE ORAL at 20:41

## 2020-02-05 RX ADMIN — BUDESONIDE 500 MCG: 0.5 INHALANT RESPIRATORY (INHALATION) at 20:03

## 2020-02-05 RX ADMIN — TAMSULOSIN HYDROCHLORIDE 0.4 MG: 0.4 CAPSULE ORAL at 09:07

## 2020-02-05 RX ADMIN — SENNOSIDES AND DOCUSATE SODIUM 1 TABLET: 8.6; 5 TABLET ORAL at 20:41

## 2020-02-05 RX ADMIN — IPRATROPIUM BROMIDE AND ALBUTEROL SULFATE 3 ML: 2.5; .5 SOLUTION RESPIRATORY (INHALATION) at 14:51

## 2020-02-05 RX ADMIN — ACETAMINOPHEN 650 MG: 325 TABLET ORAL at 14:11

## 2020-02-05 RX ADMIN — ARFORMOTEROL TARTRATE 15 MCG: 15 SOLUTION RESPIRATORY (INHALATION) at 20:12

## 2020-02-05 RX ADMIN — PREDNISONE 20 MG: 20 TABLET ORAL at 06:05

## 2020-02-05 RX ADMIN — ACETAMINOPHEN 650 MG: 325 TABLET ORAL at 07:40

## 2020-02-05 RX ADMIN — DOCUSATE SODIUM 100 MG: 100 CAPSULE, LIQUID FILLED ORAL at 20:41

## 2020-02-05 RX ADMIN — GUAIFENESIN 600 MG: 600 TABLET, EXTENDED RELEASE ORAL at 07:40

## 2020-02-05 RX ADMIN — DOCUSATE SODIUM 100 MG: 100 CAPSULE, LIQUID FILLED ORAL at 07:40

## 2020-02-05 RX ADMIN — BUDESONIDE 500 MCG: 0.5 INHALANT RESPIRATORY (INHALATION) at 06:10

## 2020-02-05 RX ADMIN — AMLODIPINE BESYLATE 10 MG: 10 TABLET ORAL at 07:40

## 2020-02-05 RX ADMIN — LEVOTHYROXINE SODIUM 125 MCG: 0.1 TABLET ORAL at 06:05

## 2020-02-05 RX ADMIN — FORMOTEROL FUMARATE DIHYDRATE 20 MCG: 20 SOLUTION RESPIRATORY (INHALATION) at 06:10

## 2020-02-05 RX ADMIN — IPRATROPIUM BROMIDE AND ALBUTEROL SULFATE 3 ML: 2.5; .5 SOLUTION RESPIRATORY (INHALATION) at 10:14

## 2020-02-05 RX ADMIN — ASPIRIN 81 MG 81 MG: 81 TABLET ORAL at 07:40

## 2020-02-05 RX ADMIN — IPRATROPIUM BROMIDE AND ALBUTEROL SULFATE 3 ML: 2.5; .5 SOLUTION RESPIRATORY (INHALATION) at 06:10

## 2020-02-05 RX ADMIN — FLUOXETINE HYDROCHLORIDE 20 MG: 20 CAPSULE ORAL at 07:40

## 2020-02-05 RX ADMIN — ACETAMINOPHEN 650 MG: 325 TABLET ORAL at 20:41

## 2020-02-05 RX ADMIN — PREDNISONE 20 MG: 20 TABLET ORAL at 17:05

## 2020-02-05 RX ADMIN — ATORVASTATIN CALCIUM 80 MG: 80 TABLET, FILM COATED ORAL at 20:41

## 2020-02-05 RX ADMIN — IPRATROPIUM BROMIDE AND ALBUTEROL SULFATE 3 ML: 2.5; .5 SOLUTION RESPIRATORY (INHALATION) at 20:03

## 2020-02-05 RX ADMIN — ACETAMINOPHEN 650 MG: 325 TABLET ORAL at 01:16

## 2020-02-05 ASSESSMENT — PAIN SCALES - GENERAL
PAINLEVEL_OUTOF10: 5
PAINLEVEL_OUTOF10: 4
PAINLEVEL_OUTOF10: 0
PAINLEVEL_OUTOF10: 3
PAINLEVEL_OUTOF10: 8
PAINLEVEL_OUTOF10: 0
PAINLEVEL_OUTOF10: 8
PAINLEVEL_OUTOF10: 0
PAINLEVEL_OUTOF10: 6

## 2020-02-05 ASSESSMENT — PAIN DESCRIPTION - DESCRIPTORS
DESCRIPTORS: ACHING

## 2020-02-05 ASSESSMENT — PAIN DESCRIPTION - LOCATION
LOCATION: ARM

## 2020-02-05 ASSESSMENT — ENCOUNTER SYMPTOMS
WHEEZING: 1
CHOKING: 0
TROUBLE SWALLOWING: 0
COUGH: 1
VOMITING: 0

## 2020-02-05 ASSESSMENT — PAIN DESCRIPTION - FREQUENCY
FREQUENCY: INTERMITTENT

## 2020-02-05 ASSESSMENT — PAIN - FUNCTIONAL ASSESSMENT
PAIN_FUNCTIONAL_ASSESSMENT: ACTIVITIES ARE NOT PREVENTED

## 2020-02-05 ASSESSMENT — PAIN DESCRIPTION - PAIN TYPE
TYPE: ACUTE PAIN

## 2020-02-05 ASSESSMENT — PAIN DESCRIPTION - ORIENTATION
ORIENTATION: RIGHT;LEFT

## 2020-02-05 ASSESSMENT — PAIN DESCRIPTION - ONSET
ONSET: SUDDEN

## 2020-02-05 ASSESSMENT — PAIN DESCRIPTION - PROGRESSION
CLINICAL_PROGRESSION: GRADUALLY WORSENING

## 2020-02-05 NOTE — PROGRESS NOTES
Patient: Fritz Razo  MR#:    [id number]   Room:    2692/967-38   YOB: 1955  Date of Progress Note: 2/5/2020  Time of Note                           8:13 AM  Consulting Physician:   Anabela Foreman M.D. Attending Physician:  Anabela Foreman MD     CHIEF COMPLAINT: Right-sided weakness with ataxia and dysarthria        Subjective  This 63 y.o. male  R handed pt admitted to [facility] on on 1/13/2020 for stroke evaluation. Pt reports that he has felt dizzy, poor coordination w/his R arm and R leg, numbness in his face, feet and hands. He reports over this same couple of days, that he also had fever and chills, nausea & emesis. He has been SOA and very weak. Pt reports that he stopped smoking 10 years ago, but still smokes intermittently. Influenza swab was positive for influenza A. 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 Dr. Reji Coe w/neuro and Dr. Clau Stoddard w/neurosurgery were consulted. Dr Clau Stoddard did not see surgical intervention to be necessary. Pt was initially admitted to ICU but was moved to neuro floor on 1/17/2020. Notes from [facility] from 8/5/16 indicates pt has a hx of seizures, but pt has not been on any anticonvulsants. Pt states his last seizure was in Dec 2015. He is being treated w/tamiflu. He is now medically stable, but continues to be ataxic w/his gait, dysarthric, as well as coordination issues w/R UE/LE. C/o pain in both arms today. Nonspecific.   REVIEW OF SYSTEMS:  Constitutional: No fevers No chills  Neck:No stiffness  Respiratory: No shortness of breath  Cardiovascular: No chest pain No palpitations  Gastrointestinal: No abdominal pain    Genitourinary: No Dysuria  Neurological: No headache, no Identify appropriate solutions to a problem when presented with a field of 4 with minimal cues.     SHORT TERM GOAL #4:  Goal 4: Patient will complete simple/ complex reasoning tasks to improve problem solving and safety awareness with 100% accuracy and minimal cues.     SHORT TERM GOAL #5:  Goal 5: The patient will complete divergent naming tasks (concrete/abstract) with minimal cueing.        Short-term Goals  Goal 1: The patient will tolerate a regular diet with thin liquids with minimal overt s/s of aspiration. Goal 2: The patient will complete oral care at least two times daily to prevent aspiration of oral bacteria. Goal 3: SLP and staff will monitor safety with oral intake and monitor increased congestion, overt s/s of aspiration, elevated temp and RLL infiltrates on CXR. Goal 4: Patient will utilize tools/strategies to promote increased clarity of speech at word, phrase, and sentence level with provision of mod cues/prompts.      Long-term Goals  Goal 1: The client will use appropriate memory strategies to schedule and recall weekly activities, express needs and recall names to maintain safety and participate socially in functional living environment  Goal 2: Patient will demonstrate functional problem solving skills and provide appropriate solutions to problems in order to improve safety and awareness in functional living environment.   GOAL 3: Client will maintain adequate hydration/nutrition with optimum safety and efficiency of swallowing function on P.O. intake without overt signs and symptoms of aspiration for the highest appropriate diet level         Comprehension: 4 - Patient understands basic needs 75-90%+ of the time  Expression: 5 - Expresses basic ideas/needs only (hungry/hot/pain)  Social Interaction: 4 - Patient appropriate 75-90%+ of the time  Problem Solvin - Patient solves simple/routine tasks 75-90%+   Memory: 5 - Patient requires prompting with stress/unfamiliar

## 2020-02-05 NOTE — PROGRESS NOTES
[facility]  INPATIENT SPEECH THERAPY  [facility] 8 REHAB UNIT  TIME   Time In: 0700  Time Out: 0800  Minutes: 60       [x]Daily Note  []Progress Note    Date: 2020  Patient Name: Millie Smith        MRN: [id number]    Account #: [de-identified]  : 1955  (66 y.o.)  Gender: male   Primary Provider: Milena Joseph MD  Swallowing Status/Diet: General diet, nectar thick liquids    PATIENT DIAGNOSIS(ES):    Diagnosis: stroke: left body involvement  Additional Pertinent Hx: hemiplegia and hemiparesis, dysarthria, hx of seizures, ataxia, bradycardia, influenza, nicotine dependence, COPD, hypothyroidism, pneumonia, HTN, DM2, arthritis, asthma, hx of medical non-compliance    RESTRICTIONS/PRECAUTIONS:    Restrictions/Precautions  Restrictions/Precautions: Fall Risk, Seizure    CXR:  Narrative   XR CHEST PORTABLE    2020 10:43 AM   History: Short of breath. Normal heart and mediastinum. Mild diffuse interstitial lung disease with no pneumonia,   pneumothorax, or congestive failure.       Impression   1. Stable mild chronic change. Signed by Dr Yang Hagen on 2020 10:44 AM           Subjective: The patient was upright in his bed. He was cooperative with all tasks. He is aware he needs more therapy and understands he cannot return home at this time. Objective:  NP reported wheezing this morning. He did tolerate single sips of thin liquids. No infiltrates were seen on the last CXR. His WBC is 14.0. His temperature today is 97.0. No wet/gurgly vocal quality noted this date. No overt s/s of aspriation with controlled cup sips of thin liquids. Speech intelligibility was poor this morning. When reminders were given to slow down his rate of speech, and to increase vocal intensity his intelligibility did improve. Once he used the strategies he was able to communicate his wants, needs and express frustration with his pain. During his meal, he did exhibit overt s/s of aspiration with thin liquids via cup. 1: The patient will tolerate a regular diet with thin liquids with minimal overt s/s of aspiration. Goal 2: The patient will complete oral care at least two times daily to prevent aspiration of oral bacteria. Goal 3: SLP and staff will monitor safety with oral intake and monitor increased congestion, overt s/s of aspiration, elevated temp and RLL infiltrates on CXR. Goal 4: Patient will utilize tools/strategies to promote increased clarity of speech at word, phrase, and sentence level with provision of mod cues/prompts. Goal 5: Patient will complete oral motor exercises, to promote increased labial and lingual movements for increased clarity of speech, with provision of min cues/prompts.      Comprehension: 4 - Patient understands basic needs 75-90%+ of the time  Expression: 3 - Expresses basic ideas/needs 50-74% of the time  Social Interaction: 5 - Patient is appropriate with supervision/cues  Problem Solving: 3 - Patient solves simple/routine tasks 50%-74%  Memory: 3 - Patient remembers 50%-74% of the time    ASSESSMENT:  Assessment: [x]Progressing towards goals          []Not Progressing towards goals    Patient Tolerance of Treatment:   [x]Tolerated well []Tolerated fair []Required rest breaks []Fatigued    Education:  Learner:  [x]Patient          []Significant Other          []Other  Education provided regarding:  [x]Goals and POC   []Diet and swallowing precautions    []Home Exercise Program  []Progress and/or discharge information  Method of Education:  [x]Discussion          []Demonstration          []Handout          []Other  Evaluation of Education:   []Verbalized understanding   []Demonstrates without assistance  []Demonstrates with assistance  []Needs further instruction     []No evidence of learning                  []No family present      Plan: [x]Continue with current plan of care    []Modify current plan of care as follows:    []Discharge patient    Discharge Location:    Services/Supervision Recommended:      [x]Patient continues to require treatment by a licensed therapist to address functional deficits as outlined in the established plan of care.                             Electronically Signed By:  Annette Hebert M.S., CCC-SLP  2/5/2020,3:37 PM.

## 2020-02-05 NOTE — PROGRESS NOTES
Devices   Type of devices Call light within reach; Chair alarm in place   Plan   Current Treatment Recommendations Strengthening; Functional Mobility Training;Patient/Caregiver Education & Training;Self-Care / ADL; Safety Education & Training   Occupational Therapy

## 2020-02-05 NOTE — PLAN OF CARE
Problem: HEMODYNAMIC STATUS  Goal: Patient has stable vital signs and fluid balance  2/5/2020 0156 by Kalyan Hauser LPN  Outcome: Ongoing  2/4/2020 1414 by Lynette Neff RN  Outcome: Ongoing

## 2020-02-05 NOTE — PROGRESS NOTES
Net 965 ml     Physical Exam  Constitutional:       General: He is not in acute distress. Appearance: He is well-developed. HENT:      Head: Normocephalic and atraumatic. Nose: Nose normal.   Neck:      Trachea: No tracheal deviation. Cardiovascular:      Rate and Rhythm: Normal rate and regular rhythm. Heart sounds: Normal heart sounds. No murmur. No friction rub. Pulmonary:      Effort: Pulmonary effort is normal. No accessory muscle usage or respiratory distress. Breath sounds: Wheezing present. No rhonchi or rales. Chest:      Chest wall: No tenderness. Abdominal:      General: Bowel sounds are normal.      Palpations: Abdomen is soft. Tenderness: There is no abdominal tenderness. Skin:     Findings: Ecchymosis (arms) present. Neurological:      Mental Status: He is alert. Cranial Nerves: Dysarthria present. Motor: Weakness present. Recent Labs     02/03/20  0436   WBC 14.0*   RBC 5.61   HGB 17.7   HCT 52.7*      MCV 93.9   MCH 31.6*   MCHC 33.6   RDW 13.2      Recent Labs     02/03/20  0436      K 4.6      CO2 23   BUN 22   CREATININE 0.8   CALCIUM 9.0   GLUCOSE 130*        Pulmonary Assessment:    1. Asthma with possible COPD overlap stable  2. Wheezing, persists  3. Recent stroke. 4. Muscle cramps    Recommend:     · Continue prednisone 20 twice a day   · Depo-Medrol 80 mg given yesterday  · Continue Mucinex. Cough sounds more loose today   · Continue rehab program  · Continue perforomist, duonebs and budesonide due to formulary issues; otherwise he could take symbicort 160 mcg 2 puffs q 12 hr and go home with that, but not available. · Mobilize as much as feasible to do so  · Not requiring oxygen presently  · Muscle cramps, possibly secondary to Lipitor, will defer to attending    Electronically signed by Alethea Webber on 2/5/2020 at 8:18 AM  Physician's substantive portion:  Subjective:  The patient is doing well from a pulmonary

## 2020-02-06 ENCOUNTER — OUTSIDE FACILITY SERVICE (OUTPATIENT)
Dept: PULMONOLOGY | Facility: CLINIC | Age: 65
End: 2020-02-06

## 2020-02-06 LAB
ANION GAP SERPL CALCULATED.3IONS-SCNC: 14 MMOL/L (ref 7–19)
BASOPHILS ABSOLUTE: 0 K/UL (ref 0–0.2)
BASOPHILS RELATIVE PERCENT: 0.1 % (ref 0–1)
BUN BLDV-MCNC: 25 MG/DL (ref 8–23)
CALCIUM SERPL-MCNC: 8.9 MG/DL (ref 8.8–10.2)
CHLORIDE BLD-SCNC: 103 MMOL/L (ref 98–111)
CO2: 21 MMOL/L (ref 22–29)
CREAT SERPL-MCNC: 0.8 MG/DL (ref 0.5–1.2)
EOSINOPHILS ABSOLUTE: 0 K/UL (ref 0–0.6)
EOSINOPHILS RELATIVE PERCENT: 0 % (ref 0–5)
GFR NON-AFRICAN AMERICAN: >60
GLUCOSE BLD-MCNC: 134 MG/DL (ref 74–109)
HCT VFR BLD CALC: 52.7 % (ref 42–52)
HEMOGLOBIN: 17.3 G/DL (ref 14–18)
IMMATURE GRANULOCYTES #: 0.2 K/UL
LYMPHOCYTES ABSOLUTE: 1.4 K/UL (ref 1.1–4.5)
LYMPHOCYTES RELATIVE PERCENT: 10.5 % (ref 20–40)
MCH RBC QN AUTO: 30.9 PG (ref 27–31)
MCHC RBC AUTO-ENTMCNC: 32.8 G/DL (ref 33–37)
MCV RBC AUTO: 94.3 FL (ref 80–94)
MONOCYTES ABSOLUTE: 1.6 K/UL (ref 0–0.9)
MONOCYTES RELATIVE PERCENT: 11.4 % (ref 0–10)
NEUTROPHILS ABSOLUTE: 10.4 K/UL (ref 1.5–7.5)
NEUTROPHILS RELATIVE PERCENT: 76.5 % (ref 50–65)
PDW BLD-RTO: 13.5 % (ref 11.5–14.5)
PLATELET # BLD: 276 K/UL (ref 130–400)
PMV BLD AUTO: 10.4 FL (ref 9.4–12.4)
POTASSIUM REFLEX MAGNESIUM: 5 MMOL/L (ref 3.5–5)
RBC # BLD: 5.59 M/UL (ref 4.7–6.1)
SODIUM BLD-SCNC: 138 MMOL/L (ref 136–145)
WBC # BLD: 13.6 K/UL (ref 4.8–10.8)

## 2020-02-06 PROCEDURE — 94640 AIRWAY INHALATION TREATMENT: CPT

## 2020-02-06 PROCEDURE — 51701 INSERT BLADDER CATHETER: CPT

## 2020-02-06 PROCEDURE — 6370000000 HC RX 637 (ALT 250 FOR IP): Performed by: PSYCHIATRY & NEUROLOGY

## 2020-02-06 PROCEDURE — 97535 SELF CARE MNGMENT TRAINING: CPT

## 2020-02-06 PROCEDURE — 97130 THER IVNTJ EA ADDL 15 MIN: CPT

## 2020-02-06 PROCEDURE — 99232 SBSQ HOSP IP/OBS MODERATE 35: CPT | Performed by: INTERNAL MEDICINE

## 2020-02-06 PROCEDURE — 36415 COLL VENOUS BLD VENIPUNCTURE: CPT

## 2020-02-06 PROCEDURE — 85025 COMPLETE CBC W/AUTO DIFF WBC: CPT

## 2020-02-06 PROCEDURE — 6360000002 HC RX W HCPCS: Performed by: INTERNAL MEDICINE

## 2020-02-06 PROCEDURE — 97116 GAIT TRAINING THERAPY: CPT

## 2020-02-06 PROCEDURE — 6370000000 HC RX 637 (ALT 250 FOR IP): Performed by: NURSE PRACTITIONER

## 2020-02-06 PROCEDURE — 51798 US URINE CAPACITY MEASURE: CPT

## 2020-02-06 PROCEDURE — 97129 THER IVNTJ 1ST 15 MIN: CPT

## 2020-02-06 PROCEDURE — 80048 BASIC METABOLIC PNL TOTAL CA: CPT

## 2020-02-06 PROCEDURE — 1180000000 HC REHAB R&B

## 2020-02-06 PROCEDURE — 97110 THERAPEUTIC EXERCISES: CPT

## 2020-02-06 PROCEDURE — 97530 THERAPEUTIC ACTIVITIES: CPT

## 2020-02-06 PROCEDURE — 99232 SBSQ HOSP IP/OBS MODERATE 35: CPT | Performed by: PSYCHIATRY & NEUROLOGY

## 2020-02-06 PROCEDURE — 92526 ORAL FUNCTION THERAPY: CPT

## 2020-02-06 RX ORDER — PREDNISONE 10 MG/1
10 TABLET ORAL EVERY 12 HOURS
Status: DISCONTINUED | OUTPATIENT
Start: 2020-02-06 | End: 2020-02-08

## 2020-02-06 RX ADMIN — IPRATROPIUM BROMIDE AND ALBUTEROL SULFATE 3 ML: 2.5; .5 SOLUTION RESPIRATORY (INHALATION) at 11:44

## 2020-02-06 RX ADMIN — PREDNISONE 20 MG: 20 TABLET ORAL at 05:25

## 2020-02-06 RX ADMIN — BUDESONIDE 500 MCG: 0.5 INHALANT RESPIRATORY (INHALATION) at 06:07

## 2020-02-06 RX ADMIN — ACETAMINOPHEN 650 MG: 325 TABLET ORAL at 08:10

## 2020-02-06 RX ADMIN — ATORVASTATIN CALCIUM 80 MG: 80 TABLET, FILM COATED ORAL at 20:29

## 2020-02-06 RX ADMIN — TAMSULOSIN HYDROCHLORIDE 0.4 MG: 0.4 CAPSULE ORAL at 08:10

## 2020-02-06 RX ADMIN — AMLODIPINE BESYLATE 10 MG: 10 TABLET ORAL at 08:10

## 2020-02-06 RX ADMIN — ACETAMINOPHEN 650 MG: 325 TABLET ORAL at 14:16

## 2020-02-06 RX ADMIN — ARFORMOTEROL TARTRATE 15 MCG: 15 SOLUTION RESPIRATORY (INHALATION) at 22:41

## 2020-02-06 RX ADMIN — IPRATROPIUM BROMIDE AND ALBUTEROL SULFATE 3 ML: 2.5; .5 SOLUTION RESPIRATORY (INHALATION) at 15:56

## 2020-02-06 RX ADMIN — ARFORMOTEROL TARTRATE 15 MCG: 15 SOLUTION RESPIRATORY (INHALATION) at 06:19

## 2020-02-06 RX ADMIN — PREDNISONE 10 MG: 10 TABLET ORAL at 17:15

## 2020-02-06 RX ADMIN — ASPIRIN 81 MG 81 MG: 81 TABLET ORAL at 08:11

## 2020-02-06 RX ADMIN — ACETAMINOPHEN 650 MG: 325 TABLET ORAL at 20:29

## 2020-02-06 RX ADMIN — LEVOTHYROXINE SODIUM 125 MCG: 0.1 TABLET ORAL at 05:25

## 2020-02-06 RX ADMIN — FLUOXETINE HYDROCHLORIDE 20 MG: 20 CAPSULE ORAL at 08:10

## 2020-02-06 RX ADMIN — BUDESONIDE 500 MCG: 0.5 INHALANT RESPIRATORY (INHALATION) at 20:06

## 2020-02-06 RX ADMIN — GUAIFENESIN 600 MG: 600 TABLET, EXTENDED RELEASE ORAL at 08:11

## 2020-02-06 RX ADMIN — LISINOPRIL 20 MG: 20 TABLET ORAL at 08:11

## 2020-02-06 RX ADMIN — IPRATROPIUM BROMIDE AND ALBUTEROL SULFATE 3 ML: 2.5; .5 SOLUTION RESPIRATORY (INHALATION) at 20:06

## 2020-02-06 RX ADMIN — IPRATROPIUM BROMIDE AND ALBUTEROL SULFATE 3 ML: 2.5; .5 SOLUTION RESPIRATORY (INHALATION) at 06:08

## 2020-02-06 RX ADMIN — GUAIFENESIN 600 MG: 600 TABLET, EXTENDED RELEASE ORAL at 20:29

## 2020-02-06 ASSESSMENT — PAIN DESCRIPTION - LOCATION
LOCATION: HEAD
LOCATION: ARM

## 2020-02-06 ASSESSMENT — PAIN DESCRIPTION - FREQUENCY: FREQUENCY: INTERMITTENT

## 2020-02-06 ASSESSMENT — PAIN SCALES - GENERAL
PAINLEVEL_OUTOF10: 5
PAINLEVEL_OUTOF10: 4
PAINLEVEL_OUTOF10: 8
PAINLEVEL_OUTOF10: 5
PAINLEVEL_OUTOF10: 8

## 2020-02-06 ASSESSMENT — PAIN DESCRIPTION - PAIN TYPE: TYPE: ACUTE PAIN

## 2020-02-06 ASSESSMENT — ENCOUNTER SYMPTOMS
WHEEZING: 1
TROUBLE SWALLOWING: 0
VOMITING: 0
CHOKING: 0
COUGH: 1

## 2020-02-06 ASSESSMENT — PAIN DESCRIPTION - ONSET: ONSET: ON-GOING

## 2020-02-06 ASSESSMENT — PAIN DESCRIPTION - PROGRESSION: CLINICAL_PROGRESSION: NOT CHANGED

## 2020-02-06 ASSESSMENT — PAIN - FUNCTIONAL ASSESSMENT: PAIN_FUNCTIONAL_ASSESSMENT: ACTIVITIES ARE NOT PREVENTED

## 2020-02-06 ASSESSMENT — PAIN DESCRIPTION - ORIENTATION: ORIENTATION: RIGHT

## 2020-02-06 ASSESSMENT — PAIN DESCRIPTION - DESCRIPTORS: DESCRIPTORS: ACHING

## 2020-02-06 NOTE — PROGRESS NOTES
Pulmonary and Critical Care Progress Note [address] [facility]    Patient: Colton Wooten  1955   MR# [id number]   Acct# [de-identified]  02/06/20   8:02 AM  Referring Provider: Ben Vazquez MD    Chief Complaint: Cough and shortness of breath    Interval history:   Patient denies being short of breath. He reports his coughing and wheezing have improved since yesterday. He is not coughing at all overnight. He slept well. No longer wheezing. Some mild rhonchi noted. Muscle cramps gone. No new issues. No family at the bedside. Medications:   predniSONE, 10 mg, Oral, Q12H    tamsulosin, 0.4 mg, Oral, Daily    acetaminophen, 650 mg, Oral, TID    Arformoterol Tartrate, 15 mcg, Nebulization, BID    guaiFENesin, 600 mg, Oral, BID    budesonide, 0.5 mg, Nebulization, BID    FLUoxetine, 20 mg, Oral, Daily    levothyroxine, 125 mcg, Oral, Daily    amLODIPine, 10 mg, Oral, Daily    aspirin, 81 mg, Oral, Daily    atorvastatin, 80 mg, Oral, Nightly    lisinopril, 20 mg, Oral, Daily    polyethylene glycol, 17 g, Oral, Daily    sennosides-docusate sodium, 1 tablet, Oral, Nightly    ipratropium-albuterol, 1 vial, Inhalation, 4x Daily    docusate sodium, 100 mg, Oral, BID     Review of Systems:   Review of Systems   Constitutional: Negative for chills and fever. HENT: Negative for trouble swallowing. Respiratory: Positive for cough and wheezing. Negative for choking. Gastrointestinal: Negative for vomiting. Neurological: Positive for speech difficulty. Physical Exam:  Blood pressure 123/83, pulse 66, temperature 96.7 °F (35.9 °C), temperature source Temporal, resp. rate 18, height 6' (1.829 m), weight 164 lb (74.4 kg), SpO2 93 %. Intake/Output Summary (Last 24 hours) at 2/6/2020 0802  Last data filed at 2/6/2020 6914  Gross per 24 hour   Intake 600 ml   Output 1800 ml   Net -1200 ml     Physical Exam  Constitutional:       General: He is not in acute distress.      Appearance: He is well-developed. HENT:      Head: Normocephalic and atraumatic. Nose: Nose normal.   Neck:      Trachea: No tracheal deviation. Cardiovascular:      Rate and Rhythm: Normal rate and regular rhythm. Heart sounds: Normal heart sounds. No murmur. No friction rub. Pulmonary:      Effort: Pulmonary effort is normal. No accessory muscle usage or respiratory distress. Breath sounds: No rhonchi or rales. Comments: Minimal rhonchi  Chest:      Chest wall: No tenderness. Abdominal:      General: Bowel sounds are normal.      Palpations: Abdomen is soft. Tenderness: There is no abdominal tenderness. Skin:     Findings: Ecchymosis (arms) present. Neurological:      Mental Status: He is alert. Cranial Nerves: Dysarthria present. Motor: Weakness present. Recent Labs     02/06/20  0549   WBC 13.6*   RBC 5.59   HGB 17.3   HCT 52.7*      MCV 94.3*   MCH 30.9   MCHC 32.8*   RDW 13.5      Recent Labs     02/06/20  0549      K 5.0      CO2 21*   BUN 25*   CREATININE 0.8   CALCIUM 8.9   GLUCOSE 134*        Pulmonary Assessment:    1. Asthma with possible COPD overlap stable  2. Wheezing, persists  3. Recent stroke. 4. Muscle cramps    Recommend:     · Reduce prednisone dosing   · Continue Mucinex   · Continue rehab program  · Continue perforomist, duonebs and budesonide due to formulary issues; otherwise he could take symbicort 160 mcg 2 puffs q 12 hr and go home with that, but not available. · Mobilize as much as feasible to do so  · Not requiring oxygen presently    Electronically signed by Stephany Monsalve on 2/6/2020 at 8:02 AM  Physician's substantive portion:  Subjective: He continues to improve from pulmonary standpoint. His cough is improved and is not short of breath. Objective: He has improved air movement on chest exam with no wheezes heard today. Assessment/recommendation: We will decrease his steroid dosage.    I have seen and examined patient personally, performing a face-to-face diagnostic evaluation with plan of care reviewed and developed with APRN. I have addended and/or modified the above history of present illness, physical examination, and assessment and plan to reflect my findings and impressions. Essential elements of the care plan were discussed with APRN above. Agree with findings and assessment/plan as documented above.     Electronically signed by Roxy Naik MD on 2/6/20 at 12:26 PM

## 2020-02-06 NOTE — PROGRESS NOTES
EXAM:  /83   Pulse 66   Temp 96.7 °F (35.9 °C) (Temporal)   Resp 18   Ht 6' (1.829 m)   Wt 164 lb (74.4 kg)   SpO2 93%   BMI 22.24 kg/m²     Constitutional: he appears well-developed and well-nourished. Eyes - conjunctiva normal.  Pupils react to light  Ear, nose, throat -hearing intact to voice. No scars, masses, or lesions over external nose or ears, no atrophy of tongue  Neck-symmetric, no masses noted, no jugular vein distension  Respiration- chest wall appears symmetric, good expansion,   normal effort without use of accessory muscles  Cardiovascular- RRR  Musculoskeletal - no significant wasting of muscles noted, no bony deformities, gait no gross ataxia  Extremities-no clubbing, cyanosis or edema  Skin - warm, dry, and intact. No rash, erythema, or pallor. Psychiatric - mood, affect, and behavior appear normal.      Neurology  NEUROLOGICAL EXAM:  Awake, alert, fluent oriented x 3 appropriate affect  Attention and concentration appear appropriate  Memory fair  Speech  with significant dysarthria  No clear issues with language of fund of knowledge     Cranial Nerve Exam   CN II- Visual fields grossly unremarkable  CN III, IV,VI-EOMI, significant left greater than right horizontal nystagmus, disconjugate eyes at rest, no ptosis  CN VII-no facial assymetry  CN VIII-Hearing intact        Motor Exam  V/V throughout upper and lower extremities bilaterally, no cogwheeling, normal tone            Nursing/pcp notes, imaging,labs and vitals reviewed.      PT,OT and/or speech notes reviewed    Lab Results   Component Value Date    WBC 13.6 (H) 02/06/2020    HGB 17.3 02/06/2020    HCT 52.7 (H) 02/06/2020    MCV 94.3 (H) 02/06/2020     02/06/2020     Lab Results   Component Value Date     02/06/2020    K 5.0 02/06/2020     02/06/2020    CO2 21 (L) 02/06/2020    BUN 25 (H) 02/06/2020    CREATININE 0.8 02/06/2020    GLUCOSE 134 (H) 02/06/2020    CALCIUM 8.9 02/06/2020    LABGLOM >60 onto RW for stability     SPEECH THERAPY  Precautions: Fall/aspiration   Swallowing Status/Diet: Regular diet with nectar thick liquids.         Subjective: Patient alert and cooperative with all therapy tasks.      Objective: Patient was not oriented to time today and stated it was January several times during the session with verbal cues given. Patient also stating it was Friday.      Patient had thin liquids bedside when SLP entered the room. SLP educated on the risk for aspiration. Patient continues to require education. Patient had sips of nectar thick liquids via cup. Immediate cough noted x1 during consecutive sips.      Patient was given one word to formulate a sentence addressing speech intelligibility. Patient was 75% intelligible during this task. Patient continues to require mod verbal cues to utilize dysarthric strategies.      Within conversational discourse patients speech intelligibility ranks at 60-70% intelligible. Patient continues to have distortions and imprecise articulations within     Patient was able to verbalize safety awareness techniques related to walker and sit to stand with 100% accuracy independently. Will monitor when completing these tasks in functional situations.      Patient continues to recall staff members names with no difficulty. Patient used external aid in room to tell SLP his therapy schedule for the day.      Patient continues to require speech services for swallow monitoring, motor speech, and cognition.  Patient is progressing towards goals.         SHORT TERM GOAL #1:  Goal 1: The patient will demonstrate recall of functional information following a (short term) delay with minimal cues in order to increase functional integration in environment.     SHORT TERM GOAL #2:  Goal 2: Identify the item that does not belong in a list of words presented auditorily with 100% accuracy and(min/mod/max) verbal, visual and tactile cues     SHORT TERM GOAL #3:  Goal 3: Identify appropriate solutions to a problem when presented with a field of 4 with minimal cues.     SHORT TERM GOAL #4:  Goal 4: Patient will complete simple/ complex reasoning tasks to improve problem solving and safety awareness with 100% accuracy and minimal cues.     SHORT TERM GOAL #5:  Goal 5: The patient will complete divergent naming tasks (concrete/abstract) with minimal cueing.        Short-term Goals  Goal 1: The patient will tolerate a regular diet with thin liquids with minimal overt s/s of aspiration. Goal 2: The patient will complete oral care at least two times daily to prevent aspiration of oral bacteria. Goal 3: SLP and staff will monitor safety with oral intake and monitor increased congestion, overt s/s of aspiration, elevated temp and RLL infiltrates on CXR. Goal 4: Patient will utilize tools/strategies to promote increased clarity of speech at word, phrase, and sentence level with provision of mod cues/prompts.      Long-term Goals  Goal 1: The client will use appropriate memory strategies to schedule and recall weekly activities, express needs and recall names to maintain safety and participate socially in functional living environment  Goal 2: Patient will demonstrate functional problem solving skills and provide appropriate solutions to problems in order to improve safety and awareness in functional living environment.   GOAL 3: Client will maintain adequate hydration/nutrition with optimum safety and efficiency of swallowing function on P.O. intake without overt signs and symptoms of aspiration for the highest appropriate diet level         Comprehension: 4 - Patient understands basic needs 75-90%+ of the time  Expression: 5 - Expresses basic ideas/needs only (hungry/hot/pain)  Social Interaction: 4 - Patient appropriate 75-90%+ of the time  Problem Solvin - Patient solves simple/routine tasks 75-90%+   Memory: 5 - Patient requires prompting with stress/unfamiliar situations     ASSESSMENT:  Assessment: [x]? ?Progressing towards goals           []? ? Not Progressing towards goals     GOALS MET: 0 STG 0 LTG     OCCUPATIONAL THERAPY     CURRENT IRF-CESIA SCORES  Eating: CARE Score: 5  Oral Hygiene: CARE Score: 5  Toileting: CARE Score: 3  Shower/Bathe: CARE Score: 3  Upper Body Dressing: CARE Score: 5  Lower Body Dressing: CARE Score: 3  Footwear: CARE Score: 3  Toilet Transfers: CARE Score: 3  Picking Up Object:  CARE Score: 1        UE Functioning:  B UE ataxia,  Greater impairment R vs L  RECORD REVIEW: Previous medical records, medications were reviewed at today's visit    IMPRESSION:  1. 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 Antiplatelet treatment. Follow-up CT scan 1/24 showing no change in ventricular size nor any new difficulties. Spoke with radiologist.  2.  Hypertension-continue current medications and monitoring  3. Hyperlipidemia-continue statin  4. DVT prophylaxis-SCDs  5. GI-bowel regimen  6. Hypothyroidism-continue replacement therapy  7.  cough , some wheezing. Continue pulmonary toilet. Chest x-ray unremarkable. Pulmonology has seen. Now getting Solu-Medrol, bronchodilators and vest therapy. Improved  8. Urinary retention. Continue in and out cath . Meclizine and Tessalon Perles discontinued in case they are hampering the efforts. Bladder function has improved some. flomax added  Continue current treatment      ELOS:1-2 weeks. restaff.

## 2020-02-06 NOTE — PROGRESS NOTES
Cynthia Sarah  [id number]     02/06/20 1586 02/06/20 0923 02/06/20 0924   General   Response To Previous Treatment Patient with no complaints from previous session. --   --    Family / Caregiver Present No  --   --    Subjective   Subjective Pt agreed to therapy this morning. --   --    Bed Mobility   Rolling Modified independent  --   --    Supine to Sit Modified independent  --   --    Transfers   Sit to Stand Stand by assistance  --   --    Stand to sit Stand by assistance  --   --    Bed to Chair Stand by assistance  --   --    Ambulation   Ambulation?  --  Yes  --    WB Status  --  WBAT BLE'S  --    Ambulation 1   Surface  --  level tile  --    Device  --  Rolling Walker  --    Assistance  --  Contact guard assistance  --    Quality of Gait  --  Attempted to have Pt amb w/ continous gait pattern. Pt was unable to process continous and still wants to show L side lean w/ unsteadiness. Reinstructed Pt back on 3 pt gait. --    Distance  --  150'  --    Exercises   Comments  --   --  Sitting Helio LE ther ex x 20 reps. Conditions Requiring Skilled Therapeutic Intervention   Body structures, Functions, Activity limitations  --   --  Decreased functional mobility ; Decreased ADL status; Decreased strength;Decreased safe awareness;Decreased endurance;Decreased balance   Assessment  --   --  Attempted to have Pt try continous gait pattern this morning. Pt had trouble processing it and still showed L side lean w/ unsteadiness. Reinstructed Pt on 3 pt gait pattern. Pt tolerated sitting ther ex well.    Prognosis  --   --  Fair   Activity Tolerance   Activity Tolerance  --   --  Patient limited by cognitive status   Electronically signed by Marcial Goodwin PTA on 2/6/2020 at 9:41 AM

## 2020-02-06 NOTE — PROGRESS NOTES
breaks []Fatigued    Education:  Learner:  [x]Patient          []Significant Other          []Other  Education provided regarding:  [x]Goals and POC   []Diet and swallowing precautions    []Home Exercise Program  []Progress and/or discharge information  Method of Education:  [x]Discussion          [x]Demonstration          []Handout          []Other  Evaluation of Education:   []Verbalized understanding   []Demonstrates without assistance  []Demonstrates with assistance  []Needs further instruction     [x]No evidence of learning                  []No family present      Plan: [x]Continue with current plan of care    []Modify current plan of care as follows:    []Discharge patient    Discharge Location:    Services/Supervision Recommended:      [x]Patient continues to require treatment by a licensed therapist to address functional deficits as outlined in the established plan of care.           \

## 2020-02-07 ENCOUNTER — OUTSIDE FACILITY SERVICE (OUTPATIENT)
Dept: PULMONOLOGY | Facility: CLINIC | Age: 65
End: 2020-02-07

## 2020-02-07 PROCEDURE — 6370000000 HC RX 637 (ALT 250 FOR IP): Performed by: NURSE PRACTITIONER

## 2020-02-07 PROCEDURE — 99232 SBSQ HOSP IP/OBS MODERATE 35: CPT | Performed by: INTERNAL MEDICINE

## 2020-02-07 PROCEDURE — 51701 INSERT BLADDER CATHETER: CPT

## 2020-02-07 PROCEDURE — 97130 THER IVNTJ EA ADDL 15 MIN: CPT

## 2020-02-07 PROCEDURE — 2700000000 HC OXYGEN THERAPY PER DAY

## 2020-02-07 PROCEDURE — 92526 ORAL FUNCTION THERAPY: CPT

## 2020-02-07 PROCEDURE — 6370000000 HC RX 637 (ALT 250 FOR IP): Performed by: PSYCHIATRY & NEUROLOGY

## 2020-02-07 PROCEDURE — 94669 MECHANICAL CHEST WALL OSCILL: CPT

## 2020-02-07 PROCEDURE — 97110 THERAPEUTIC EXERCISES: CPT

## 2020-02-07 PROCEDURE — 51798 US URINE CAPACITY MEASURE: CPT

## 2020-02-07 PROCEDURE — 97116 GAIT TRAINING THERAPY: CPT

## 2020-02-07 PROCEDURE — 97530 THERAPEUTIC ACTIVITIES: CPT

## 2020-02-07 PROCEDURE — 94640 AIRWAY INHALATION TREATMENT: CPT

## 2020-02-07 PROCEDURE — 97129 THER IVNTJ 1ST 15 MIN: CPT

## 2020-02-07 PROCEDURE — 6360000002 HC RX W HCPCS: Performed by: INTERNAL MEDICINE

## 2020-02-07 PROCEDURE — 99232 SBSQ HOSP IP/OBS MODERATE 35: CPT | Performed by: PSYCHIATRY & NEUROLOGY

## 2020-02-07 PROCEDURE — 1180000000 HC REHAB R&B

## 2020-02-07 RX ORDER — TAMSULOSIN HYDROCHLORIDE 0.4 MG/1
0.4 CAPSULE ORAL ONCE
Status: COMPLETED | OUTPATIENT
Start: 2020-02-07 | End: 2020-02-07

## 2020-02-07 RX ORDER — TAMSULOSIN HYDROCHLORIDE 0.4 MG/1
0.8 CAPSULE ORAL DAILY
Status: DISCONTINUED | OUTPATIENT
Start: 2020-02-08 | End: 2020-02-12 | Stop reason: HOSPADM

## 2020-02-07 RX ADMIN — TAMSULOSIN HYDROCHLORIDE 0.4 MG: 0.4 CAPSULE ORAL at 09:39

## 2020-02-07 RX ADMIN — TAMSULOSIN HYDROCHLORIDE 0.4 MG: 0.4 CAPSULE ORAL at 07:55

## 2020-02-07 RX ADMIN — LISINOPRIL 20 MG: 20 TABLET ORAL at 07:53

## 2020-02-07 RX ADMIN — IPRATROPIUM BROMIDE AND ALBUTEROL SULFATE 3 ML: 2.5; .5 SOLUTION RESPIRATORY (INHALATION) at 16:00

## 2020-02-07 RX ADMIN — ARFORMOTEROL TARTRATE 15 MCG: 15 SOLUTION RESPIRATORY (INHALATION) at 20:04

## 2020-02-07 RX ADMIN — SENNOSIDES AND DOCUSATE SODIUM 1 TABLET: 8.6; 5 TABLET ORAL at 21:10

## 2020-02-07 RX ADMIN — PREDNISONE 10 MG: 10 TABLET ORAL at 05:34

## 2020-02-07 RX ADMIN — DOCUSATE SODIUM 100 MG: 100 CAPSULE, LIQUID FILLED ORAL at 07:52

## 2020-02-07 RX ADMIN — ACETAMINOPHEN 650 MG: 325 TABLET ORAL at 21:11

## 2020-02-07 RX ADMIN — ACETAMINOPHEN 650 MG: 325 TABLET ORAL at 07:52

## 2020-02-07 RX ADMIN — BUDESONIDE 500 MCG: 0.5 INHALANT RESPIRATORY (INHALATION) at 07:42

## 2020-02-07 RX ADMIN — IPRATROPIUM BROMIDE AND ALBUTEROL SULFATE 3 ML: 2.5; .5 SOLUTION RESPIRATORY (INHALATION) at 07:35

## 2020-02-07 RX ADMIN — AMLODIPINE BESYLATE 10 MG: 10 TABLET ORAL at 07:51

## 2020-02-07 RX ADMIN — GUAIFENESIN 600 MG: 600 TABLET, EXTENDED RELEASE ORAL at 07:52

## 2020-02-07 RX ADMIN — IPRATROPIUM BROMIDE AND ALBUTEROL SULFATE 3 ML: 2.5; .5 SOLUTION RESPIRATORY (INHALATION) at 19:53

## 2020-02-07 RX ADMIN — ATORVASTATIN CALCIUM 80 MG: 80 TABLET, FILM COATED ORAL at 21:10

## 2020-02-07 RX ADMIN — DOCUSATE SODIUM 100 MG: 100 CAPSULE, LIQUID FILLED ORAL at 21:10

## 2020-02-07 RX ADMIN — PREDNISONE 10 MG: 10 TABLET ORAL at 16:47

## 2020-02-07 RX ADMIN — ACETAMINOPHEN 650 MG: 325 TABLET ORAL at 14:59

## 2020-02-07 RX ADMIN — ASPIRIN 81 MG 81 MG: 81 TABLET ORAL at 07:51

## 2020-02-07 RX ADMIN — BUDESONIDE 500 MCG: 0.5 INHALANT RESPIRATORY (INHALATION) at 19:53

## 2020-02-07 RX ADMIN — ARFORMOTEROL TARTRATE 15 MCG: 15 SOLUTION RESPIRATORY (INHALATION) at 07:42

## 2020-02-07 RX ADMIN — LEVOTHYROXINE SODIUM 125 MCG: 0.1 TABLET ORAL at 05:34

## 2020-02-07 RX ADMIN — FLUOXETINE HYDROCHLORIDE 20 MG: 20 CAPSULE ORAL at 07:51

## 2020-02-07 RX ADMIN — IPRATROPIUM BROMIDE AND ALBUTEROL SULFATE 3 ML: 2.5; .5 SOLUTION RESPIRATORY (INHALATION) at 11:26

## 2020-02-07 RX ADMIN — GUAIFENESIN 600 MG: 600 TABLET, EXTENDED RELEASE ORAL at 21:10

## 2020-02-07 ASSESSMENT — PAIN SCALES - GENERAL
PAINLEVEL_OUTOF10: 4
PAINLEVEL_OUTOF10: 8
PAINLEVEL_OUTOF10: 0
PAINLEVEL_OUTOF10: 4
PAINLEVEL_OUTOF10: 0
PAINLEVEL_OUTOF10: 4
PAINLEVEL_OUTOF10: 6

## 2020-02-07 ASSESSMENT — PAIN DESCRIPTION - ORIENTATION
ORIENTATION: RIGHT

## 2020-02-07 ASSESSMENT — PAIN DESCRIPTION - PROGRESSION: CLINICAL_PROGRESSION: NOT CHANGED

## 2020-02-07 ASSESSMENT — PAIN - FUNCTIONAL ASSESSMENT: PAIN_FUNCTIONAL_ASSESSMENT: ACTIVITIES ARE NOT PREVENTED

## 2020-02-07 ASSESSMENT — PAIN DESCRIPTION - DESCRIPTORS
DESCRIPTORS: ACHING

## 2020-02-07 ASSESSMENT — PAIN DESCRIPTION - PAIN TYPE
TYPE: CHRONIC PAIN
TYPE: ACUTE PAIN
TYPE: CHRONIC PAIN

## 2020-02-07 ASSESSMENT — PAIN DESCRIPTION - FREQUENCY
FREQUENCY: INTERMITTENT

## 2020-02-07 ASSESSMENT — PAIN DESCRIPTION - LOCATION
LOCATION: ARM

## 2020-02-07 ASSESSMENT — ENCOUNTER SYMPTOMS
TROUBLE SWALLOWING: 0
COUGH: 1
WHEEZING: 1
VOMITING: 0
CHOKING: 0

## 2020-02-07 NOTE — PROGRESS NOTES
Patient: Razia Butts  MR#:    [id number]   Room:    [facility]69   YOB: 1955  Date of Progress Note: 2/7/2020  Time of Note                           8:45 AM  Consulting Physician:   Petros Israel M.D. Attending Physician:  Petros Israel MD     CHIEF COMPLAINT: Right-sided weakness with ataxia and dysarthria        Subjective  This 63 y.o. male  R handed pt admitted to [facility] on on 1/13/2020 for stroke evaluation. Pt reports that he has felt dizzy, poor coordination w/his R arm and R leg, numbness in his face, feet and hands. He reports over this same couple of days, that he also had fever and chills, nausea & emesis. He has been SOA and very weak. Pt reports that he stopped smoking 10 years ago, but still smokes intermittently. Influenza swab was positive for influenza A. 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 Dr. Allison Ward w/neuro and Dr. Cindy Andrews w/neurosurgery were consulted. Dr Cindy Andrews did not see surgical intervention to be necessary. Pt was initially admitted to ICU but was moved to neuro floor on 1/17/2020. Notes from [place] from 8/5/16 indicates pt has a hx of seizures, but pt has not been on any anticonvulsants. Pt states his last seizure was in Dec 2015. He is being treated w/tamiflu. He is now medically stable, but continues to be ataxic w/his gait, dysarthric, as well as coordination issues w/R UE/LE.      No complaints today  REVIEW OF SYSTEMS:  Constitutional: No fevers No chills  Neck:No stiffness  Respiratory: No shortness of breath  Cardiovascular: No chest pain No palpitations  Gastrointestinal: No abdominal pain    Genitourinary: No Dysuria  Neurological: No headache, no confusion      PHYSICAL EXAM:  /80 Pulse 62   Temp 97.2 °F (36.2 °C) (Temporal)   Resp 16   Ht 6' (1.829 m)   Wt 164 lb (74.4 kg)   SpO2 92%   BMI 22.24 kg/m²     Constitutional: he appears well-developed and well-nourished. Eyes - conjunctiva normal.  Pupils react to light  Ear, nose, throat -hearing intact to voice. No scars, masses, or lesions over external nose or ears, no atrophy of tongue  Neck-symmetric, no masses noted, no jugular vein distension  Respiration- chest wall appears symmetric, good expansion,   normal effort without use of accessory muscles  Cardiovascular- RRR  Musculoskeletal - no significant wasting of muscles noted, no bony deformities, gait no gross ataxia  Extremities-no clubbing, cyanosis or edema  Skin - warm, dry, and intact. No rash, erythema, or pallor. Psychiatric - mood, affect, and behavior appear normal.      Neurology  NEUROLOGICAL EXAM:  Awake, alert, fluent oriented x 3 appropriate affect  Attention and concentration appear appropriate  Memory fair  Speech  with significant dysarthria  No clear issues with language of fund of knowledge     Cranial Nerve Exam   CN II- Visual fields grossly unremarkable  CN III, IV,VI-EOMI, significant left greater than right horizontal nystagmus, disconjugate eyes at rest, no ptosis  CN VII-no facial assymetry  CN VIII-Hearing intact        Motor Exam  V/V throughout upper and lower extremities bilaterally, no cogwheeling, normal tone            Nursing/pcp notes, imaging,labs and vitals reviewed.      PT,OT and/or speech notes reviewed    Lab Results   Component Value Date    WBC 13.6 (H) 02/06/2020    HGB 17.3 02/06/2020    HCT 52.7 (H) 02/06/2020    MCV 94.3 (H) 02/06/2020     02/06/2020     Lab Results   Component Value Date     02/06/2020    K 5.0 02/06/2020     02/06/2020    CO2 21 (L) 02/06/2020    BUN 25 (H) 02/06/2020    CREATININE 0.8 02/06/2020    GLUCOSE 134 (H) 02/06/2020    CALCIUM 8.9 02/06/2020    LABGLOM >60 02/06/2020   No results stability     SPEECH THERAPY  Precautions: Fall/aspiration   Swallowing Status/Diet: Regular diet with nectar thick liquids.         Subjective: Patient alert and cooperative with all therapy tasks.      Objective: Patient was not oriented to time today and stated it was January several times during the session with verbal cues given. Patient also stating it was Friday.      Patient had thin liquids bedside when SLP entered the room. SLP educated on the risk for aspiration. Patient continues to require education. Patient had sips of nectar thick liquids via cup. Immediate cough noted x1 during consecutive sips.      Patient was given one word to formulate a sentence addressing speech intelligibility. Patient was 75% intelligible during this task. Patient continues to require mod verbal cues to utilize dysarthric strategies.      Within conversational discourse patients speech intelligibility ranks at 60-70% intelligible. Patient continues to have distortions and imprecise articulations within     Patient was able to verbalize safety awareness techniques related to walker and sit to stand with 100% accuracy independently. Will monitor when completing these tasks in functional situations.      Patient continues to recall staff members names with no difficulty. Patient used external aid in room to tell SLP his therapy schedule for the day.      Patient continues to require speech services for swallow monitoring, motor speech, and cognition.  Patient is progressing towards goals.         SHORT TERM GOAL #1:  Goal 1: The patient will demonstrate recall of functional information following a (short term) delay with minimal cues in order to increase functional integration in environment.     SHORT TERM GOAL #2:  Goal 2: Identify the item that does not belong in a list of words presented auditorily with 100% accuracy and(min/mod/max) verbal, visual and tactile cues     SHORT TERM GOAL #3:  Goal 3: Identify appropriate

## 2020-02-07 NOTE — PROGRESS NOTES
Name: Cynthia Smith  MRN:  [id number]  Date of service:  2/7/2020 02/07/20 0916   Subjective   Subjective Pt agrees to work with therapy   General Comment   Comments assisted pt fillling out his menu for today. Transfers   Sit to Stand Modified independent   Stand to sit Modified independent   Bed to Chair Modified independent   Ambulation   Ambulation? Yes   WB Status WBAT BLE'S   Ambulation 1   Surface level tile   Device Rolling Walker   Gait Deviations Slow Deanna;Decreased step length;Decreased step height   Distance 150',75'   Comments 2 slight LOB which he correctd   Propulsion 1   Propulsion Manual   Level Level Tile   Method RLE;LLE   Level of Assistance Independent   Distance 300'   Exercises   Hamstring Sets HS curls Red t-band 20x2   Hip Flexion 20x2 2#   Hip Abduction 20x2 #2   Knee Long Arc Quad 20x2 2#   Ankle Pumps 40x   Comments ball squueeze 40x   Short term goals   Time Frame for Short term goals 1-2 weeks   Short term goal 1 indep bed mobility   Short term goal 2 CGA transfers with AD   Short term goal 3 indep 150ft WC propulsion    Short term goal 4 ' amb with AD   Short term goal 5 min A 1 step with AD   Long term goals   Time Frame for Long term goals  2-3 weeks   Long term goal 1 indep transfers with AD   Long term goal 2 indep 150ft amb with AD   Long term goal 3 CGA 1 step with AD   Conditions Requiring Skilled Therapeutic Intervention   Body structures, Functions, Activity limitations Decreased functional mobility ; Decreased ADL status; Decreased strength;Decreased safe awareness;Decreased endurance;Decreased balance   Assessment Pt did well with increased ex and amb, for strengthening and endurance training. Activity Tolerance   Activity Tolerance Patient limited by cognitive status; Patient limited by endurance; Patient Tolerated treatment well   Safety Devices   Type of devices Left in chair;Chair alarm in place       Electronically signed by Sangita Barrientos PTA on 2/7/2020 at 10:01 AM

## 2020-02-07 NOTE — PROGRESS NOTES
Pulmonary and Critical Care Progress Note [address] [facility]    Patient: Millie Smith  1955   MR# [id number]   Acct# [de-identified]  02/07/20   1:53 PM  Referring Provider: Milena Joseph MD    Chief Complaint: Cough and shortness of breath    Interval history:   He is sitting up in the bed eating lunch. He states his breathing is improved and cough has improved. No new issues. No family at the bedside. Medications:   [START ON 2/8/2020] tamsulosin, 0.8 mg, Oral, Daily    predniSONE, 10 mg, Oral, Q12H    acetaminophen, 650 mg, Oral, TID    Arformoterol Tartrate, 15 mcg, Nebulization, BID    guaiFENesin, 600 mg, Oral, BID    budesonide, 0.5 mg, Nebulization, BID    FLUoxetine, 20 mg, Oral, Daily    levothyroxine, 125 mcg, Oral, Daily    amLODIPine, 10 mg, Oral, Daily    aspirin, 81 mg, Oral, Daily    atorvastatin, 80 mg, Oral, Nightly    lisinopril, 20 mg, Oral, Daily    polyethylene glycol, 17 g, Oral, Daily    sennosides-docusate sodium, 1 tablet, Oral, Nightly    ipratropium-albuterol, 1 vial, Inhalation, 4x Daily    docusate sodium, 100 mg, Oral, BID     Review of Systems:   Review of Systems   Constitutional: Negative for chills and fever. HENT: Negative for trouble swallowing. Respiratory: Positive for cough (improving ) and wheezing. Negative for choking. Gastrointestinal: Negative for vomiting. Neurological: Positive for speech difficulty. Physical Exam:  Blood pressure 133/80, pulse 62, temperature 97.2 °F (36.2 °C), temperature source Temporal, resp. rate 16, height 6' (1.829 m), weight 164 lb (74.4 kg), SpO2 95 %. Intake/Output Summary (Last 24 hours) at 2/7/2020 1353  Last data filed at 2/7/2020 1316  Gross per 24 hour   Intake 840 ml   Output 1125 ml   Net -285 ml     Physical Exam  Constitutional:       General: He is not in acute distress. Appearance: He is well-developed. HENT:      Head: Normocephalic and atraumatic.       Nose: Nose normal.   Neck: Trachea: No tracheal deviation. Cardiovascular:      Rate and Rhythm: Normal rate and regular rhythm. Heart sounds: Normal heart sounds. No murmur. No friction rub. Pulmonary:      Effort: Pulmonary effort is normal. No accessory muscle usage or respiratory distress. Breath sounds: No rhonchi or rales. Chest:      Chest wall: No tenderness. Abdominal:      General: Bowel sounds are normal.      Palpations: Abdomen is soft. Tenderness: There is no abdominal tenderness. Skin:     Findings: Ecchymosis (arms) present. Neurological:      Mental Status: He is alert. Cranial Nerves: Dysarthria present. Motor: Weakness present. Recent Labs     02/06/20  0549   WBC 13.6*   RBC 5.59   HGB 17.3   HCT 52.7*      MCV 94.3*   MCH 30.9   MCHC 32.8*   RDW 13.5      Recent Labs     02/06/20  0549      K 5.0      CO2 21*   BUN 25*   CREATININE 0.8   CALCIUM 8.9   GLUCOSE 134*        Pulmonary Assessment:    1. Asthma with possible COPD overlap stable  2. Wheezing, persists  3. Recent stroke. 4. Muscle cramps    Recommend:     · Continue prednisone and taper as tolerated    · Continue Mucinex   · Continue rehab program  · Continue perforomist, duonebs and budesonide due to formulary issues; otherwise he could take symbicort 160 mcg 2 puffs q 12 hr and go home with that, but not available. · Mobilize as much as feasible to do so  · Not requiring oxygen presently    Electronically signed by Kelsey Arce on 2/7/2020 at 1:53 PM  Physician's substantive portion:  Subjective: The patient tends to improve from a pulmonary standpoint. He is having no complaints of dyspnea. Objective: Lung fields are clear to exam at this time. Assessment/recommendation: We will plan on tapering steroids other in the next 1 to 2 days if he remains stable.    I have seen and examined patient personally, performing a face-to-face diagnostic evaluation with plan of care reviewed and developed with APRN. I have addended and/or modified the above history of present illness, physical examination, and assessment and plan to reflect my findings and impressions. Essential elements of the care plan were discussed with APRN above. Agree with findings and assessment/plan as documented above.     Electronically signed by Manuel Fletcher MD on 2/7/20 at 6:07 PM

## 2020-02-07 NOTE — PROGRESS NOTES
Meds with water followed by swallow  Therapeutic Interventions: Patient/Family education;Diet tolerance monitoring;Pharyngeal exercises;Oral care     Compensatory Swallowing Strategies  Compensatory Swallowing Strategies: Alternate solids and liquids;Upright as possible for all oral intake;Swallow 2 times per bite/sip; Remain upright for 30-45 minutes after meals;Small bites/sips     SHORT TERM GOAL #1:  Goal 1: The patient will demonstrate recall of functional information following a (short term) delay with minimal cues in order to increase functional integration in environment. SHORT TERM GOAL #2:  Goal 2: Identify the item that does not belong in a list of words presented auditorily with 100% accuracy and(min/mod/max) verbal, visual and tactile cues    SHORT TERM GOAL #3:  Goal 3: Identify appropriate solutions to a problem when presented with a field of 4 with minimal cues. SHORT TERM GOAL #4:  Goal 4: Patient will complete simple/ complex reasoning tasks to improve problem solving and safety awareness with 100% accuracy and minimal cues. SHORT TERM GOAL #5:  Goal 5: The patient will complete divergent naming tasks (concrete/abstract) with minimal cueing. Swallowing Short Term Goals  Short-term Goals  Goal 1: The patient will tolerate a regular diet with thin liquids with minimal overt s/s of aspiration. Goal 2: The patient will complete oral care at least two times daily to prevent aspiration of oral bacteria. Goal 3: SLP and staff will monitor safety with oral intake and monitor increased congestion, overt s/s of aspiration, elevated temp and RLL infiltrates on CXR. Goal 4: Patient will utilize tools/strategies to promote increased clarity of speech at word, phrase, and sentence level with provision of mod cues/prompts. Goal 5: Patient will complete oral motor exercises, to promote increased labial and lingual movements for increased clarity of speech, with provision of min cues/prompts.

## 2020-02-07 NOTE — PROGRESS NOTES
Occupational Therapy     02/07/20 1100   Restrictions/Precautions   Restrictions/Precautions Fall Risk   Pain Assessment   Patient Currently in Pain No   Pain Assessment 0-10   Pain Level 0   ADL   Toileting Stand by assistance   Balance   Sitting Balance Independent   Standing Balance Stand by assistance   Functional Mobility   Functional - Mobility Device Rolling Walker   Activity To/from bathroom   Assist Level Stand by assistance   Bed mobility   Supine to Sit Modified independent   Sit to Supine Modified independent   Scooting Supervision   Transfers   Stand Step Transfers Stand by assistance   Sit to stand Stand by assistance   Stand to sit Stand by assistance   Toilet Transfers   Toilet - Technique Ambulating   Equipment Used Grab bars   Toilet Transfer Stand by assistance   Assessment   Performance deficits / Impairments Decreased functional mobility ; Decreased ADL status; Decreased balance;Decreased cognition;Decreased high-level IADLs;Decreased fine motor control   Assessment Upon arrival to Pt's. room, it was noticed that Pt. had sustained small superficial cut to RUE on forearm. Nursing was notified and bandage was placed. Pt. ambulated to bathroom to perform toileting acts and washed hands at sink. Safety Devices   Safety Devices in place Yes   Type of devices Call light within reach; Chair alarm in place      02/07/20 1100   OT Education   OT Education Energy Conservation; ADL Adaptive Strategies   Patient Education Educated Pt. on safety while toileting and use of grab bars during transfer.

## 2020-02-07 NOTE — PROGRESS NOTES
Occupational Therapy     02/07/20 0815   Restrictions/Precautions   Restrictions/Precautions Fall Risk   Pain Assessment   Patient Currently in Pain Yes   Pain Assessment 0-10   Pain Level 4   Oxygen Therapy   SpO2 95 %   Balance   Sitting Balance Independent   Standing Balance Stand by assistance   Standing Balance   Time  Approx 3 mins on 2 occ. Activity 1-2 hand static standing act. Functional Mobility   Functional - Mobility Device Wheelchair   Activity To/From therapy gym   Assist Level Stand by assistance   Functional Mobility Comments Pt. self propelled in WC   Bed mobility   Sit to Supine Modified independent   Scooting Supervision   Transfers   Stand Step Transfers Stand by assistance   Stand Pivot Transfers Stand by assistance   Sit to stand Stand by assistance   Stand to sit Stand by assistance   Assessment   Performance deficits / Impairments Decreased functional mobility ; Decreased ADL status; Decreased balance;Decreased cognition;Decreased high-level IADLs;Decreased fine motor control   Assessment Pt.was more lethargic than usual to start morning tx. Pt. worked on fine motor skills and slower to control ataxic movements. Followed cues and sequencing instructions well. Pt. also demo/verbalized the steps in safe t/f from [facility] to  with no cues. Activity Tolerance   Activity Tolerance Patient Tolerated treatment well   Safety Devices   Type of devices Call light within reach; Chair alarm in place;Gait belt   Plan   Current Treatment Recommendations Strengthening;Balance Training;Functional Mobility Training; Endurance Training;Self-Care / ADL; Wheelchair Mobility Training      02/07/20 0815   OT Education   OT Education Energy Conservation; ADL Adaptive Strategies   Patient Education Pt. demo/verbalized steps in transferring from [facility] to  safely. Educated Pt. further on energy conservation and exertion.

## 2020-02-07 NOTE — PLAN OF CARE
Problem: Falls - Risk of:  Goal: Will remain free from falls  Description  Will remain free from falls  Outcome: Ongoing   Up with one assist

## 2020-02-08 ENCOUNTER — OUTSIDE FACILITY SERVICE (OUTPATIENT)
Dept: PULMONOLOGY | Facility: CLINIC | Age: 65
End: 2020-02-08

## 2020-02-08 PROCEDURE — 94669 MECHANICAL CHEST WALL OSCILL: CPT

## 2020-02-08 PROCEDURE — 99232 SBSQ HOSP IP/OBS MODERATE 35: CPT | Performed by: INTERNAL MEDICINE

## 2020-02-08 PROCEDURE — 6370000000 HC RX 637 (ALT 250 FOR IP): Performed by: PSYCHIATRY & NEUROLOGY

## 2020-02-08 PROCEDURE — 51701 INSERT BLADDER CATHETER: CPT

## 2020-02-08 PROCEDURE — 94640 AIRWAY INHALATION TREATMENT: CPT

## 2020-02-08 PROCEDURE — 6360000002 HC RX W HCPCS: Performed by: INTERNAL MEDICINE

## 2020-02-08 PROCEDURE — 1180000000 HC REHAB R&B

## 2020-02-08 PROCEDURE — 97116 GAIT TRAINING THERAPY: CPT

## 2020-02-08 PROCEDURE — 6370000000 HC RX 637 (ALT 250 FOR IP): Performed by: NURSE PRACTITIONER

## 2020-02-08 PROCEDURE — 51798 US URINE CAPACITY MEASURE: CPT

## 2020-02-08 RX ORDER — PREDNISONE 10 MG/1
10 TABLET ORAL DAILY
Status: DISCONTINUED | OUTPATIENT
Start: 2020-02-09 | End: 2020-02-12 | Stop reason: HOSPADM

## 2020-02-08 RX ADMIN — LEVOTHYROXINE SODIUM 125 MCG: 0.1 TABLET ORAL at 06:43

## 2020-02-08 RX ADMIN — IPRATROPIUM BROMIDE AND ALBUTEROL SULFATE 3 ML: 2.5; .5 SOLUTION RESPIRATORY (INHALATION) at 14:27

## 2020-02-08 RX ADMIN — ASPIRIN 81 MG 81 MG: 81 TABLET ORAL at 08:57

## 2020-02-08 RX ADMIN — ACETAMINOPHEN 650 MG: 325 TABLET ORAL at 08:57

## 2020-02-08 RX ADMIN — ATORVASTATIN CALCIUM 80 MG: 80 TABLET, FILM COATED ORAL at 21:07

## 2020-02-08 RX ADMIN — GUAIFENESIN 600 MG: 600 TABLET, EXTENDED RELEASE ORAL at 08:58

## 2020-02-08 RX ADMIN — BUDESONIDE 500 MCG: 0.5 INHALANT RESPIRATORY (INHALATION) at 18:05

## 2020-02-08 RX ADMIN — AMLODIPINE BESYLATE 10 MG: 10 TABLET ORAL at 08:57

## 2020-02-08 RX ADMIN — GUAIFENESIN 600 MG: 600 TABLET, EXTENDED RELEASE ORAL at 21:08

## 2020-02-08 RX ADMIN — DOCUSATE SODIUM 100 MG: 100 CAPSULE, LIQUID FILLED ORAL at 21:08

## 2020-02-08 RX ADMIN — TAMSULOSIN HYDROCHLORIDE 0.8 MG: 0.4 CAPSULE ORAL at 08:58

## 2020-02-08 RX ADMIN — IPRATROPIUM BROMIDE AND ALBUTEROL SULFATE 3 ML: 2.5; .5 SOLUTION RESPIRATORY (INHALATION) at 10:07

## 2020-02-08 RX ADMIN — ARFORMOTEROL TARTRATE 15 MCG: 15 SOLUTION RESPIRATORY (INHALATION) at 18:05

## 2020-02-08 RX ADMIN — FLUOXETINE HYDROCHLORIDE 20 MG: 20 CAPSULE ORAL at 08:57

## 2020-02-08 RX ADMIN — ACETAMINOPHEN 650 MG: 325 TABLET ORAL at 21:07

## 2020-02-08 RX ADMIN — ACETAMINOPHEN 650 MG: 325 TABLET ORAL at 13:44

## 2020-02-08 RX ADMIN — LISINOPRIL 20 MG: 20 TABLET ORAL at 08:57

## 2020-02-08 RX ADMIN — IPRATROPIUM BROMIDE AND ALBUTEROL SULFATE 3 ML: 2.5; .5 SOLUTION RESPIRATORY (INHALATION) at 06:14

## 2020-02-08 RX ADMIN — SENNOSIDES AND DOCUSATE SODIUM 1 TABLET: 8.6; 5 TABLET ORAL at 21:08

## 2020-02-08 RX ADMIN — PREDNISONE 10 MG: 10 TABLET ORAL at 06:44

## 2020-02-08 RX ADMIN — BUDESONIDE 500 MCG: 0.5 INHALANT RESPIRATORY (INHALATION) at 06:14

## 2020-02-08 RX ADMIN — IPRATROPIUM BROMIDE AND ALBUTEROL SULFATE 3 ML: 2.5; .5 SOLUTION RESPIRATORY (INHALATION) at 18:05

## 2020-02-08 ASSESSMENT — PAIN DESCRIPTION - PROGRESSION: CLINICAL_PROGRESSION: NOT CHANGED

## 2020-02-08 ASSESSMENT — PAIN DESCRIPTION - PAIN TYPE: TYPE: CHRONIC PAIN

## 2020-02-08 ASSESSMENT — PAIN DESCRIPTION - LOCATION: LOCATION: ARM

## 2020-02-08 ASSESSMENT — PAIN DESCRIPTION - DESCRIPTORS: DESCRIPTORS: ACHING

## 2020-02-08 ASSESSMENT — PAIN SCALES - GENERAL
PAINLEVEL_OUTOF10: 4
PAINLEVEL_OUTOF10: 4
PAINLEVEL_OUTOF10: 3
PAINLEVEL_OUTOF10: 8

## 2020-02-08 ASSESSMENT — PAIN DESCRIPTION - FREQUENCY: FREQUENCY: INTERMITTENT

## 2020-02-08 ASSESSMENT — ENCOUNTER SYMPTOMS
VOMITING: 0
WHEEZING: 1
CHOKING: 0
COUGH: 1
TROUBLE SWALLOWING: 0

## 2020-02-08 ASSESSMENT — PAIN DESCRIPTION - ORIENTATION: ORIENTATION: RIGHT

## 2020-02-08 ASSESSMENT — PAIN SCALES - WONG BAKER: WONGBAKER_NUMERICALRESPONSE: 0

## 2020-02-08 NOTE — PLAN OF CARE
Problem: HEMODYNAMIC STATUS  Goal: Patient has stable vital signs and fluid balance  2/8/2020 1215 by Tani Perez RN  Outcome: Ongoing  2/8/2020 0348 by Lei Curiel RN  Outcome: Ongoing     Problem: ACTIVITY INTOLERANCE/IMPAIRED MOBILITY  Goal: Mobility/activity is maintained at optimum level for patient  2/8/2020 1215 by Tani Perez RN  Outcome: Ongoing  2/8/2020 0348 by Lei Curiel RN  Outcome: Ongoing     Problem: COMMUNICATION IMPAIRMENT  Goal: Ability to express needs and understand communication  2/8/2020 1215 by Tani Perez RN  Outcome: Ongoing  2/8/2020 0348 by Lei Curiel RN  Outcome: Ongoing     Problem: Falls - Risk of:  Goal: Will remain free from falls  Description  Will remain free from falls  2/8/2020 1215 by Tani Perez RN  Outcome: Ongoing  2/8/2020 0348 by Lei Curiel RN  Outcome: Ongoing  Goal: Absence of physical injury  Description  Absence of physical injury  2/8/2020 1215 by Tani Perez RN  Outcome: Ongoing  2/8/2020 0348 by Lei Curiel RN  Outcome: Ongoing     Problem: Infection:  Goal: Will remain free from infection  Description  Will remain free from infection  2/8/2020 1215 by Tani Perez RN  Outcome: Ongoing  2/8/2020 0348 by Lei Curiel RN  Outcome: Ongoing     Problem: Safety:  Goal: Ability to chew and swallow food without choking will improve  Description  Ability to chew and swallow food without choking will improve  2/8/2020 1215 by Tani Perez RN  Outcome: Ongoing  2/8/2020 0348 by Lei Curiel RN  Outcome: Ongoing  Goal: Signs and symptoms of aspiration will decrease  Description  Signs and symptoms of aspiration will decrease  2/8/2020 1215 by Tani Perez RN  Outcome: Ongoing  2/8/2020 0348 by Lei Curiel RN  Outcome: Ongoing     Problem: Pain:  Goal: Pain level will decrease  Description  Pain level will decrease  2/8/2020 1215 by Tani Perez RN  Outcome: Ongoing  2/8/2020 0348 by Ann Pelayo Ongoing  Goal: STG - patient locks brakes on wheelchair  2/8/2020 1215 by Kieran Purdy RN  Outcome: Ongoing  2/8/2020 0348 by Watson Henderson RN  Outcome: Ongoing  Goal: STG - Patient uses call light consistently to request assistance with transfers  2/8/2020 1215 by Kieran Purdy RN  Outcome: Ongoing  2/8/2020 0348 by Watson Henderson RN  Outcome: Ongoing  Goal: STG - patient uses gait belt during all transfers  2/8/2020 1215 by Kieran Purdy RN  Outcome: Ongoing  2/8/2020 0348 by Watson Henderson RN  Outcome: Ongoing     Problem: SKIN INTEGRITY  Goal: STG - patient will maintain good skin integrity  2/8/2020 1215 by Kieran Purdy RN  Outcome: Ongoing  2/8/2020 0348 by Watson Henderson RN  Outcome: Ongoing     Problem: PAIN  Goal: STG - Patient will verbalize an acceptable level of pain  2/8/2020 1215 by Kieran Purdy RN  Outcome: Ongoing  2/8/2020 0348 by Watson Henderson RN  Outcome: Ongoing     Problem: Mobility - Impaired:  Goal: Mobility will improve  Description  Mobility will improve  2/8/2020 1215 by Kieran Purdy RN  Outcome: Ongoing  2/8/2020 0348 by Watson Henderson RN  Outcome: Ongoing     Problem: Health Behavior:  Goal: Identification of resources available to assist in meeting health care needs will improve  Description  Identification of resources available to assist in meeting health care needs will improve  2/8/2020 1215 by Kieran Purdy RN  Outcome: Ongoing  2/8/2020 0348 by Watson Henderson RN  Outcome: Ongoing     Problem: Nutritional:  Goal: Consumption of the prescribed amount of daily calories will improve  Description  Consumption of the prescribed amount of daily calories will improve  2/8/2020 1215 by Kieran Purdy RN  Outcome: Ongoing  2/8/2020 0348 by Watson Henderson RN  Outcome: Ongoing     Problem: Mood - Altered:  Goal: Mood stable  Description  Mood stable  2/8/2020 1215 by Kieran Purdy RN  Outcome: Ongoing  2/8/2020 0348 by Watson Henderson

## 2020-02-08 NOTE — PROGRESS NOTES
Pulmonary and Critical Care Progress Note [facility]    Patient: Francoise Langford  1955   MR# [id number]   Acct# [de-identified]  02/08/20   10:09 AM  Referring Provider: Trip Simmons MD    Chief Complaint: Cough and shortness of breath    Interval history:   The patient is stable from a pulmonary standpoint and appears fairly close to his baseline pulmonary wise. Medications:   tamsulosin, 0.8 mg, Oral, Daily    predniSONE, 10 mg, Oral, Q12H    acetaminophen, 650 mg, Oral, TID    Arformoterol Tartrate, 15 mcg, Nebulization, BID    guaiFENesin, 600 mg, Oral, BID    budesonide, 0.5 mg, Nebulization, BID    FLUoxetine, 20 mg, Oral, Daily    levothyroxine, 125 mcg, Oral, Daily    amLODIPine, 10 mg, Oral, Daily    aspirin, 81 mg, Oral, Daily    atorvastatin, 80 mg, Oral, Nightly    lisinopril, 20 mg, Oral, Daily    polyethylene glycol, 17 g, Oral, Daily    sennosides-docusate sodium, 1 tablet, Oral, Nightly    ipratropium-albuterol, 1 vial, Inhalation, 4x Daily    docusate sodium, 100 mg, Oral, BID     Review of Systems:   Review of Systems   Constitutional: Negative for chills and fever. HENT: Negative for trouble swallowing. Respiratory: Positive for cough (improving ) and wheezing. Negative for choking. Gastrointestinal: Negative for vomiting. Neurological: Positive for speech difficulty. Physical Exam:  Blood pressure (!) 142/89, pulse 72, temperature 97.1 °F (36.2 °C), temperature source Temporal, resp. rate 16, height 6' (1.829 m), weight 164 lb (74.4 kg), SpO2 93 %. Intake/Output Summary (Last 24 hours) at 2/8/2020 1009  Last data filed at 2/8/2020 0902  Gross per 24 hour   Intake 960 ml   Output 1350 ml   Net -390 ml     Physical Exam  Constitutional:       General: He is not in acute distress. Appearance: He is well-developed. HENT:      Head: Normocephalic and atraumatic. Nose: Nose normal.   Neck:      Trachea: No tracheal deviation.    Cardiovascular: Rate and Rhythm: Normal rate and regular rhythm. Heart sounds: Normal heart sounds. No murmur. No friction rub. Pulmonary:      Effort: Pulmonary effort is normal. No accessory muscle usage or respiratory distress. Breath sounds: No rhonchi or rales. Chest:      Chest wall: No tenderness. Abdominal:      General: Bowel sounds are normal.      Palpations: Abdomen is soft. Tenderness: There is no abdominal tenderness. Skin:     Findings: Ecchymosis (arms) present. Neurological:      Mental Status: He is alert. Cranial Nerves: Dysarthria present. Motor: Weakness present. Recent Labs     02/06/20  0549   WBC 13.6*   RBC 5.59   HGB 17.3   HCT 52.7*      MCV 94.3*   MCH 30.9   MCHC 32.8*   RDW 13.5      Recent Labs     02/06/20  0549      K 5.0      CO2 21*   BUN 25*   CREATININE 0.8   CALCIUM 8.9   GLUCOSE 134*        Pulmonary Assessment:    1. Asthma with possible COPD overlap stable  2. Wheezing, persists  3. Recent stroke. 4. Muscle cramps    Recommend:     · We will taper his prednisone further. · Continue Mucinex   · Continue rehab program  · Continue perforomist, duonebs and budesonide due to formulary issues; otherwise he could take symbicort 160 mcg 2 puffs q 12 hr and go home with that, but not available.   · Mobilize as much as feasible to do so  · Not requiring oxygen presently    Electronically signed by Pat Simmons on 2/8/2020 at 10:09 AM

## 2020-02-08 NOTE — PROGRESS NOTES
Physical Therapy Note      Name: Cali Patten  MRN:  [id number]  Date of service:  2/8/2020 02/08/20 1149   Restrictions/Precautions   Restrictions/Precautions Fall Risk   General   Chart Reviewed Yes   Additional Pertinent Hx hemiplegia and hemiparesis, dysarthria, hx of seizures, ataxia, bradycardia, influenza, nicotine dependence, COPD, hypothyroidism, pneumonia, HTN, DM2, arthritis, asthma, hx of medical non-compliance   Response To Previous Treatment Patient with no complaints from previous session. Family / Caregiver Present No   Subjective   Subjective Pt agrees to work with therapy   Pain Screening   Patient Currently in Pain No   Pain Assessment   Clinical Progression Not changed   Sauer-Baker Pain Rating 0   Oxygen Therapy   O2 Device None (Room air)   Bed Mobility   Rolling Modified independent   Supine to Sit Modified independent   Sit to Supine Modified independent   Scooting Supervision   Transfers   Sit to Stand Modified independent   Stand to sit Modified independent   Bed to Chair Modified independent   Stand Pivot Transfers Modified independent   Ambulation   Ambulation? Yes   WB Status WBAT BLE'S   Ambulation 1   Surface level tile   Device Rolling Walker   Assistance Contact guard assistance   Quality of Gait 3-point gait pattern   Gait Deviations Slow Deanna;Decreased step length;Decreased step height   Distance 150ft   Comments 2 slight LOB    Stairs/Curb   Stairs?  No   Balance   Posture Good   Sitting - Static Good   Sitting - Dynamic Good   Standing - Static Fair   Standing - Dynamic Fair   Short term goals   Time Frame for Short term goals 1-2 weeks   Short term goal 1 indep bed mobility   Short term goal 2 CGA transfers with AD   Short term goal 3 indep 150ft WC propulsion    Short term goal 4 ' amb with AD   Short term goal 5 min A 1 step with AD   Long term goals   Time Frame for Long term goals  2-3 weeks   Long term goal 1 indep transfers with AD   Long term goal 2 indep

## 2020-02-09 ENCOUNTER — OUTSIDE FACILITY SERVICE (OUTPATIENT)
Dept: PULMONOLOGY | Facility: CLINIC | Age: 65
End: 2020-02-09

## 2020-02-09 PROCEDURE — 94640 AIRWAY INHALATION TREATMENT: CPT

## 2020-02-09 PROCEDURE — 6370000000 HC RX 637 (ALT 250 FOR IP): Performed by: PSYCHIATRY & NEUROLOGY

## 2020-02-09 PROCEDURE — 6360000002 HC RX W HCPCS: Performed by: INTERNAL MEDICINE

## 2020-02-09 PROCEDURE — 99232 SBSQ HOSP IP/OBS MODERATE 35: CPT | Performed by: INTERNAL MEDICINE

## 2020-02-09 PROCEDURE — 51798 US URINE CAPACITY MEASURE: CPT

## 2020-02-09 PROCEDURE — 6370000000 HC RX 637 (ALT 250 FOR IP): Performed by: INTERNAL MEDICINE

## 2020-02-09 PROCEDURE — 6370000000 HC RX 637 (ALT 250 FOR IP): Performed by: NURSE PRACTITIONER

## 2020-02-09 PROCEDURE — 1180000000 HC REHAB R&B

## 2020-02-09 RX ADMIN — ATORVASTATIN CALCIUM 80 MG: 80 TABLET, FILM COATED ORAL at 20:19

## 2020-02-09 RX ADMIN — ACETAMINOPHEN 650 MG: 325 TABLET ORAL at 20:19

## 2020-02-09 RX ADMIN — IPRATROPIUM BROMIDE AND ALBUTEROL SULFATE 3 ML: 2.5; .5 SOLUTION RESPIRATORY (INHALATION) at 15:45

## 2020-02-09 RX ADMIN — BUDESONIDE 500 MCG: 0.5 INHALANT RESPIRATORY (INHALATION) at 18:44

## 2020-02-09 RX ADMIN — ACETAMINOPHEN 650 MG: 325 TABLET ORAL at 13:31

## 2020-02-09 RX ADMIN — LISINOPRIL 20 MG: 20 TABLET ORAL at 07:13

## 2020-02-09 RX ADMIN — TAMSULOSIN HYDROCHLORIDE 0.8 MG: 0.4 CAPSULE ORAL at 07:13

## 2020-02-09 RX ADMIN — PREDNISONE 10 MG: 10 TABLET ORAL at 07:12

## 2020-02-09 RX ADMIN — LEVOTHYROXINE SODIUM 125 MCG: 0.1 TABLET ORAL at 05:58

## 2020-02-09 RX ADMIN — IPRATROPIUM BROMIDE AND ALBUTEROL SULFATE 3 ML: 2.5; .5 SOLUTION RESPIRATORY (INHALATION) at 18:44

## 2020-02-09 RX ADMIN — IPRATROPIUM BROMIDE AND ALBUTEROL SULFATE 3 ML: 2.5; .5 SOLUTION RESPIRATORY (INHALATION) at 06:14

## 2020-02-09 RX ADMIN — ASPIRIN 81 MG 81 MG: 81 TABLET ORAL at 07:13

## 2020-02-09 RX ADMIN — ARFORMOTEROL TARTRATE 15 MCG: 15 SOLUTION RESPIRATORY (INHALATION) at 06:20

## 2020-02-09 RX ADMIN — GUAIFENESIN 600 MG: 600 TABLET, EXTENDED RELEASE ORAL at 20:19

## 2020-02-09 RX ADMIN — DOCUSATE SODIUM 100 MG: 100 CAPSULE, LIQUID FILLED ORAL at 07:12

## 2020-02-09 RX ADMIN — FLUOXETINE HYDROCHLORIDE 20 MG: 20 CAPSULE ORAL at 07:13

## 2020-02-09 RX ADMIN — BUDESONIDE 500 MCG: 0.5 INHALANT RESPIRATORY (INHALATION) at 06:14

## 2020-02-09 RX ADMIN — GUAIFENESIN 600 MG: 600 TABLET, EXTENDED RELEASE ORAL at 07:11

## 2020-02-09 RX ADMIN — ACETAMINOPHEN 650 MG: 325 TABLET ORAL at 07:12

## 2020-02-09 RX ADMIN — IPRATROPIUM BROMIDE AND ALBUTEROL SULFATE 3 ML: 2.5; .5 SOLUTION RESPIRATORY (INHALATION) at 10:24

## 2020-02-09 RX ADMIN — ARFORMOTEROL TARTRATE 15 MCG: 15 SOLUTION RESPIRATORY (INHALATION) at 18:56

## 2020-02-09 RX ADMIN — AMLODIPINE BESYLATE 10 MG: 10 TABLET ORAL at 07:13

## 2020-02-09 ASSESSMENT — PAIN DESCRIPTION - ORIENTATION
ORIENTATION: RIGHT

## 2020-02-09 ASSESSMENT — PAIN SCALES - GENERAL
PAINLEVEL_OUTOF10: 1
PAINLEVEL_OUTOF10: 0
PAINLEVEL_OUTOF10: 5
PAINLEVEL_OUTOF10: 3
PAINLEVEL_OUTOF10: 8

## 2020-02-09 ASSESSMENT — PAIN DESCRIPTION - FREQUENCY
FREQUENCY: INTERMITTENT

## 2020-02-09 ASSESSMENT — PAIN - FUNCTIONAL ASSESSMENT
PAIN_FUNCTIONAL_ASSESSMENT: ACTIVITIES ARE NOT PREVENTED

## 2020-02-09 ASSESSMENT — PAIN DESCRIPTION - ONSET
ONSET: GRADUAL

## 2020-02-09 ASSESSMENT — ENCOUNTER SYMPTOMS
COUGH: 1
VOMITING: 0
WHEEZING: 1
CHOKING: 0
TROUBLE SWALLOWING: 0

## 2020-02-09 ASSESSMENT — PAIN DESCRIPTION - LOCATION
LOCATION: ARM
LOCATION: ARM
LOCATION: GENERALIZED
LOCATION: ARM

## 2020-02-09 ASSESSMENT — PAIN DESCRIPTION - DESCRIPTORS
DESCRIPTORS: ACHING

## 2020-02-09 ASSESSMENT — PAIN DESCRIPTION - PROGRESSION
CLINICAL_PROGRESSION: GRADUALLY IMPROVING

## 2020-02-09 ASSESSMENT — PAIN DESCRIPTION - PAIN TYPE
TYPE: CHRONIC PAIN

## 2020-02-09 NOTE — PROGRESS NOTES
2/9/2020 0905  Gross per 24 hour   Intake 630 ml   Output --   Net 630 ml     Physical Exam  Constitutional:       General: He is not in acute distress. Appearance: He is well-developed. HENT:      Head: Normocephalic and atraumatic. Nose: Nose normal.   Neck:      Trachea: No tracheal deviation. Cardiovascular:      Rate and Rhythm: Normal rate and regular rhythm. Heart sounds: Normal heart sounds. No murmur. No friction rub. Pulmonary:      Effort: Pulmonary effort is normal. No accessory muscle usage or respiratory distress. Breath sounds: No rhonchi or rales. Chest:      Chest wall: No tenderness. Abdominal:      General: Bowel sounds are normal.      Palpations: Abdomen is soft. Tenderness: There is no abdominal tenderness. Skin:     Findings: Ecchymosis (arms) present. Neurological:      Mental Status: He is alert. Cranial Nerves: Dysarthria present. Motor: Weakness present. No results for input(s): WBC, RBC, HGB, HCT, PLT, MCV, MCH, MCHC, RDW, NRBC, SEGSPCT, BANDSPCT in the last 72 hours. No results for input(s): NA, K, CL, CO2, BUN, CREATININE, CALCIUM, GLUCOSE, PHART, JJL8SJA, PO2ART, CUC6GQY, Q9XBMBHD, BEART, AST, ALT, ALKPHOS, BILITOT, MG, CALCIUM, PHOS, BNP, TROPONINI, LACTA, INR, TSH in the last 72 hours. Pulmonary Assessment:    1. Asthma with possible COPD overlap stable  2. Wheezing, persists  3. Recent stroke. 4. Muscle cramps    Recommend:     · We will continue his current dose of prednisone. This could be discontinued at discharge. · Continue Mucinex   · Continue rehab program  · Continue perforomist, duonebs and budesonide due to formulary issues; otherwise he could take symbicort 160 mcg 2 puffs q 12 hr and go home with that, but not available.   · Mobilize as much as feasible to do so  · Not requiring oxygen presently  · I will again try to get him some inhaler samples prior to discharge    Electronically signed by Jama Garcia on 2/9/2020 at 11:00 AM

## 2020-02-10 ENCOUNTER — OUTSIDE FACILITY SERVICE (OUTPATIENT)
Dept: PULMONOLOGY | Facility: CLINIC | Age: 65
End: 2020-02-10

## 2020-02-10 LAB
ANION GAP SERPL CALCULATED.3IONS-SCNC: 11 MMOL/L (ref 7–19)
BASOPHILS ABSOLUTE: 0.1 K/UL (ref 0–0.2)
BASOPHILS RELATIVE PERCENT: 0.3 % (ref 0–1)
BUN BLDV-MCNC: 22 MG/DL (ref 8–23)
CALCIUM SERPL-MCNC: 8.6 MG/DL (ref 8.8–10.2)
CHLORIDE BLD-SCNC: 102 MMOL/L (ref 98–111)
CO2: 26 MMOL/L (ref 22–29)
CREAT SERPL-MCNC: 0.8 MG/DL (ref 0.5–1.2)
EOSINOPHILS ABSOLUTE: 0.3 K/UL (ref 0–0.6)
EOSINOPHILS RELATIVE PERCENT: 1.7 % (ref 0–5)
GFR NON-AFRICAN AMERICAN: >60
GLUCOSE BLD-MCNC: 94 MG/DL (ref 74–109)
HCT VFR BLD CALC: 51 % (ref 42–52)
HEMOGLOBIN: 16.6 G/DL (ref 14–18)
IMMATURE GRANULOCYTES #: 0.2 K/UL
LYMPHOCYTES ABSOLUTE: 3 K/UL (ref 1.1–4.5)
LYMPHOCYTES RELATIVE PERCENT: 20.3 % (ref 20–40)
MCH RBC QN AUTO: 30.9 PG (ref 27–31)
MCHC RBC AUTO-ENTMCNC: 32.5 G/DL (ref 33–37)
MCV RBC AUTO: 95 FL (ref 80–94)
MONOCYTES ABSOLUTE: 1.4 K/UL (ref 0–0.9)
MONOCYTES RELATIVE PERCENT: 9.5 % (ref 0–10)
NEUTROPHILS ABSOLUTE: 10.1 K/UL (ref 1.5–7.5)
NEUTROPHILS RELATIVE PERCENT: 67.2 % (ref 50–65)
PDW BLD-RTO: 13.7 % (ref 11.5–14.5)
PLATELET # BLD: 229 K/UL (ref 130–400)
PMV BLD AUTO: 9.8 FL (ref 9.4–12.4)
POTASSIUM REFLEX MAGNESIUM: 4.6 MMOL/L (ref 3.5–5)
RBC # BLD: 5.37 M/UL (ref 4.7–6.1)
SODIUM BLD-SCNC: 139 MMOL/L (ref 136–145)
WBC # BLD: 15 K/UL (ref 4.8–10.8)

## 2020-02-10 PROCEDURE — 97530 THERAPEUTIC ACTIVITIES: CPT

## 2020-02-10 PROCEDURE — 99232 SBSQ HOSP IP/OBS MODERATE 35: CPT | Performed by: PSYCHIATRY & NEUROLOGY

## 2020-02-10 PROCEDURE — 97110 THERAPEUTIC EXERCISES: CPT

## 2020-02-10 PROCEDURE — 6360000002 HC RX W HCPCS: Performed by: INTERNAL MEDICINE

## 2020-02-10 PROCEDURE — 6370000000 HC RX 637 (ALT 250 FOR IP): Performed by: PSYCHIATRY & NEUROLOGY

## 2020-02-10 PROCEDURE — 94669 MECHANICAL CHEST WALL OSCILL: CPT

## 2020-02-10 PROCEDURE — 1180000000 HC REHAB R&B

## 2020-02-10 PROCEDURE — 92526 ORAL FUNCTION THERAPY: CPT

## 2020-02-10 PROCEDURE — 97129 THER IVNTJ 1ST 15 MIN: CPT

## 2020-02-10 PROCEDURE — 6370000000 HC RX 637 (ALT 250 FOR IP): Performed by: INTERNAL MEDICINE

## 2020-02-10 PROCEDURE — 36415 COLL VENOUS BLD VENIPUNCTURE: CPT

## 2020-02-10 PROCEDURE — 6370000000 HC RX 637 (ALT 250 FOR IP): Performed by: NURSE PRACTITIONER

## 2020-02-10 PROCEDURE — 80048 BASIC METABOLIC PNL TOTAL CA: CPT

## 2020-02-10 PROCEDURE — 94640 AIRWAY INHALATION TREATMENT: CPT

## 2020-02-10 PROCEDURE — 97130 THER IVNTJ EA ADDL 15 MIN: CPT

## 2020-02-10 PROCEDURE — 97116 GAIT TRAINING THERAPY: CPT

## 2020-02-10 PROCEDURE — 99232 SBSQ HOSP IP/OBS MODERATE 35: CPT | Performed by: INTERNAL MEDICINE

## 2020-02-10 PROCEDURE — 85025 COMPLETE CBC W/AUTO DIFF WBC: CPT

## 2020-02-10 RX ADMIN — GUAIFENESIN 600 MG: 600 TABLET, EXTENDED RELEASE ORAL at 20:47

## 2020-02-10 RX ADMIN — FLUOXETINE HYDROCHLORIDE 20 MG: 20 CAPSULE ORAL at 08:03

## 2020-02-10 RX ADMIN — IPRATROPIUM BROMIDE AND ALBUTEROL SULFATE 3 ML: 2.5; .5 SOLUTION RESPIRATORY (INHALATION) at 10:04

## 2020-02-10 RX ADMIN — TAMSULOSIN HYDROCHLORIDE 0.8 MG: 0.4 CAPSULE ORAL at 08:03

## 2020-02-10 RX ADMIN — ASPIRIN 81 MG 81 MG: 81 TABLET ORAL at 08:03

## 2020-02-10 RX ADMIN — BUDESONIDE 500 MCG: 0.5 INHALANT RESPIRATORY (INHALATION) at 06:15

## 2020-02-10 RX ADMIN — ACETAMINOPHEN 650 MG: 325 TABLET ORAL at 14:05

## 2020-02-10 RX ADMIN — AMLODIPINE BESYLATE 10 MG: 10 TABLET ORAL at 08:03

## 2020-02-10 RX ADMIN — LEVOTHYROXINE SODIUM 125 MCG: 0.1 TABLET ORAL at 05:36

## 2020-02-10 RX ADMIN — ATORVASTATIN CALCIUM 80 MG: 80 TABLET, FILM COATED ORAL at 20:47

## 2020-02-10 RX ADMIN — PREDNISONE 10 MG: 10 TABLET ORAL at 08:03

## 2020-02-10 RX ADMIN — ARFORMOTEROL TARTRATE 15 MCG: 15 SOLUTION RESPIRATORY (INHALATION) at 20:24

## 2020-02-10 RX ADMIN — IPRATROPIUM BROMIDE AND ALBUTEROL SULFATE 3 ML: 2.5; .5 SOLUTION RESPIRATORY (INHALATION) at 19:35

## 2020-02-10 RX ADMIN — BUDESONIDE 500 MCG: 0.5 INHALANT RESPIRATORY (INHALATION) at 19:35

## 2020-02-10 RX ADMIN — SENNOSIDES AND DOCUSATE SODIUM 1 TABLET: 8.6; 5 TABLET ORAL at 20:47

## 2020-02-10 RX ADMIN — ACETAMINOPHEN 650 MG: 325 TABLET ORAL at 20:47

## 2020-02-10 RX ADMIN — DOCUSATE SODIUM 100 MG: 100 CAPSULE, LIQUID FILLED ORAL at 20:47

## 2020-02-10 RX ADMIN — LISINOPRIL 20 MG: 20 TABLET ORAL at 08:03

## 2020-02-10 RX ADMIN — IPRATROPIUM BROMIDE AND ALBUTEROL SULFATE 3 ML: 2.5; .5 SOLUTION RESPIRATORY (INHALATION) at 06:15

## 2020-02-10 RX ADMIN — ACETAMINOPHEN 650 MG: 325 TABLET ORAL at 10:07

## 2020-02-10 RX ADMIN — IPRATROPIUM BROMIDE AND ALBUTEROL SULFATE 3 ML: 2.5; .5 SOLUTION RESPIRATORY (INHALATION) at 15:13

## 2020-02-10 RX ADMIN — ACETAMINOPHEN 650 MG: 325 TABLET ORAL at 08:03

## 2020-02-10 RX ADMIN — ARFORMOTEROL TARTRATE 15 MCG: 15 SOLUTION RESPIRATORY (INHALATION) at 06:15

## 2020-02-10 RX ADMIN — GUAIFENESIN 600 MG: 600 TABLET, EXTENDED RELEASE ORAL at 08:03

## 2020-02-10 ASSESSMENT — PAIN DESCRIPTION - PAIN TYPE
TYPE: CHRONIC PAIN

## 2020-02-10 ASSESSMENT — PAIN DESCRIPTION - LOCATION
LOCATION: GENERALIZED

## 2020-02-10 ASSESSMENT — PAIN SCALES - GENERAL
PAINLEVEL_OUTOF10: 8
PAINLEVEL_OUTOF10: 5
PAINLEVEL_OUTOF10: 6
PAINLEVEL_OUTOF10: 8

## 2020-02-10 ASSESSMENT — PAIN DESCRIPTION - ORIENTATION
ORIENTATION: RIGHT

## 2020-02-10 ASSESSMENT — ENCOUNTER SYMPTOMS
WHEEZING: 1
TROUBLE SWALLOWING: 0
COUGH: 1
VOMITING: 0
CHOKING: 0

## 2020-02-10 ASSESSMENT — PAIN DESCRIPTION - DESCRIPTORS: DESCRIPTORS: ACHING

## 2020-02-10 ASSESSMENT — PAIN - FUNCTIONAL ASSESSMENT: PAIN_FUNCTIONAL_ASSESSMENT: ACTIVITIES ARE NOT PREVENTED

## 2020-02-10 ASSESSMENT — PAIN DESCRIPTION - ONSET: ONSET: GRADUAL

## 2020-02-10 ASSESSMENT — PAIN DESCRIPTION - PROGRESSION: CLINICAL_PROGRESSION: GRADUALLY IMPROVING

## 2020-02-10 ASSESSMENT — PAIN DESCRIPTION - FREQUENCY: FREQUENCY: INTERMITTENT

## 2020-02-10 NOTE — PROGRESS NOTES
utilize tools/strategies to promote increased clarity of speech at word, phrase, and sentence level with provision of mod cues/prompts. Goal 5: Patient will complete oral motor exercises, to promote increased labial and lingual movements for increased clarity of speech, with provision of min cues/prompts. Comprehension: 4 - Patient understands basic needs 75-90%+ of the time  Expression: 3 - Expresses basic ideas/needs 50-74% of the time  Social Interaction: 5 - Patient is appropriate with supervision/cues  Problem Solving: 3 - Patient solves simple/routine tasks 50%-74%  Memory: 3 - Patient remembers 50%-74% of the time    ASSESSMENT:  Assessment: [x]Progressing towards goals          []Not Progressing towards goals    Patient Tolerance of Treatment:   [x]Tolerated well []Tolerated fair []Required rest breaks []Fatigued    Education:  Learner:  [x]Patient          []Significant Other          []Other  Education provided regarding:  [x]Goals and POC   [x]Diet and swallowing precautions    []Home Exercise Program  []Progress and/or discharge information  Method of Education:  [x]Discussion          [x]Demonstration          []Handout          []Other  Evaluation of Education:   []Verbalized understanding   []Demonstrates without assistance  []Demonstrates with assistance  [x]Needs further instruction     []No evidence of learning                  []No family present      Plan: [x]Continue with current plan of care    []Modify current plan of care as follows:    []Discharge patient    Discharge Location:    Services/Supervision Recommended:      [x]Patient continues to require treatment by a licensed therapist to address functional deficits as outlined in the established plan of care.

## 2020-02-10 NOTE — PROGRESS NOTES
Pulmonary and Critical Care Progress Note [address] [facility]    Patient: Marta Lange  1955   MR# [id number]   Acct# [de-identified]  02/10/20   9:54 AM  Referring Provider: Serafin Eason MD    Chief Complaint: Cough and shortness of breath    Interval history:   The patient in stable from a pulmonary standpoint. He should be ready for discharge tomorrow. I went to try to get him some samples of Symbicort inhaler from the office and he could utilize this along with neb treatments as an outpatient. We can plan on seeing the office in follow-up in several weeks. Medications:   predniSONE, 10 mg, Oral, Daily    tamsulosin, 0.8 mg, Oral, Daily    acetaminophen, 650 mg, Oral, TID    Arformoterol Tartrate, 15 mcg, Nebulization, BID    guaiFENesin, 600 mg, Oral, BID    budesonide, 0.5 mg, Nebulization, BID    FLUoxetine, 20 mg, Oral, Daily    levothyroxine, 125 mcg, Oral, Daily    amLODIPine, 10 mg, Oral, Daily    aspirin, 81 mg, Oral, Daily    atorvastatin, 80 mg, Oral, Nightly    lisinopril, 20 mg, Oral, Daily    polyethylene glycol, 17 g, Oral, Daily    sennosides-docusate sodium, 1 tablet, Oral, Nightly    ipratropium-albuterol, 1 vial, Inhalation, 4x Daily    docusate sodium, 100 mg, Oral, BID     Review of Systems:   Review of Systems   Constitutional: Negative for chills and fever. HENT: Negative for trouble swallowing. Respiratory: Positive for cough (improving ) and wheezing. Negative for choking. Gastrointestinal: Negative for vomiting. Neurological: Positive for speech difficulty. Physical Exam:  Blood pressure (!) 143/96, pulse 77, temperature 97.7 °F (36.5 °C), temperature source Temporal, resp. rate 18, height 6' (1.829 m), weight 168 lb 1.6 oz (76.2 kg), SpO2 93 %.     Intake/Output Summary (Last 24 hours) at 2/10/2020 0987  Last data filed at 2/10/2020 0841  Gross per 24 hour   Intake 1200 ml   Output --   Net 1200 ml     Physical Exam  Constitutional:       General: He is not in acute distress. Appearance: He is well-developed. HENT:      Head: Normocephalic and atraumatic. Nose: Nose normal.   Neck:      Trachea: No tracheal deviation. Cardiovascular:      Rate and Rhythm: Normal rate and regular rhythm. Heart sounds: Normal heart sounds. No murmur. No friction rub. Pulmonary:      Effort: Pulmonary effort is normal. No accessory muscle usage or respiratory distress. Breath sounds: No rhonchi or rales. Chest:      Chest wall: No tenderness. Abdominal:      General: Bowel sounds are normal.      Palpations: Abdomen is soft. Tenderness: There is no abdominal tenderness. Skin:     Findings: Ecchymosis (arms) present. Neurological:      Mental Status: He is alert. Cranial Nerves: Dysarthria present. Motor: Weakness present. Recent Labs     02/10/20  0516   WBC 15.0*   RBC 5.37   HGB 16.6   HCT 51.0      MCV 95.0*   MCH 30.9   MCHC 32.5*   RDW 13.7      Recent Labs     02/10/20  0516      K 4.6      CO2 26   BUN 22   CREATININE 0.8   CALCIUM 8.6*   GLUCOSE 94        Pulmonary Assessment:    1. Asthma with possible COPD overlap stable  2. Wheezing, persists  3. Recent stroke. 4. Muscle cramps    Recommend:     · We will continue his current dose of prednisone. This could be discontinued at discharge. · Continue Mucinex   · Continue rehab program  · Continue perforomist, duonebs and budesonide due to formulary issues; otherwise he could take symbicort 160 mcg 2 puffs q 12 hr and go home with that, but not available. · Mobilize as much as feasible to do so  · Not requiring oxygen presently  · I will again try to get him some inhaler samples prior to discharge he should be ready for discharge as soon as tomorrow from a pulmonary standpoint.     Electronically signed by Cary Chen on 2/10/2020 at 9:54 AM

## 2020-02-10 NOTE — PROGRESS NOTES
02/10/2020    LABGLOM >60 02/10/2020   No results found for: INRNo results found for: PHENYTOIN, ESR, CRP    02/08/20 1149   Restrictions/Precautions   Restrictions/Precautions Fall Risk   General   Chart Reviewed Yes   Additional Pertinent Hx hemiplegia and hemiparesis, dysarthria, hx of seizures, ataxia, bradycardia, influenza, nicotine dependence, COPD, hypothyroidism, pneumonia, HTN, DM2, arthritis, asthma, hx of medical non-compliance   Response To Previous Treatment Patient with no complaints from previous session. Family / Caregiver Present No   Subjective   Subjective Pt agrees to work with therapy   Pain Screening   Patient Currently in Pain No   Pain Assessment   Clinical Progression Not changed   Sauer-Baker Pain Rating 0   Oxygen Therapy   O2 Device None (Room air)   Bed Mobility   Rolling Modified independent   Supine to Sit Modified independent   Sit to Supine Modified independent   Scooting Supervision   Transfers   Sit to Stand Modified independent   Stand to sit Modified independent   Bed to Chair Modified independent   Stand Pivot Transfers Modified independent   Ambulation   Ambulation? Yes   WB Status WBAT BLE'S   Ambulation 1   Surface level tile   Device Rolling Walker   Assistance Contact guard assistance   Quality of Gait 3-point gait pattern   Gait Deviations Slow Deanna;Decreased step length;Decreased step height   Distance 150ft   Comments 2 slight LOB    Stairs/Curb   Stairs?  No   Balance   Posture Good   Sitting - Static Good   Sitting - Dynamic Good   Standing - Static Fair   Standing - Dynamic Fair   Short term goals   Time Frame for Short term goals 1-2 weeks   Short term goal 1 indep bed mobility   Short term goal 2 CGA transfers with AD   Short term goal 3 indep 150ft WC propulsion    Short term goal 4 ' amb with AD   Short term goal 5 min A 1 step with AD   Long term goals   Time Frame for Long term goals  2-3 weeks   Long term goal 1 indep transfers with AD   Long term Stand by assistance   Assessment   Performance deficits / Impairments Decreased functional mobility ; Decreased ADL status; Decreased balance;Decreased cognition;Decreased high-level IADLs;Decreased fine motor control   Assessment Pt.was more lethargic than usual to start morning tx. Pt. worked on fine motor skills and slower to control ataxic movements. Followed cues and sequencing instructions well. Pt. also demo/verbalized the steps in safe t/f from [facility] to  with no cues. Activity Tolerance   Activity Tolerance Patient Tolerated treatment well   Safety Devices   Type of devices Call light within reach; Chair alarm in place;Gait belt   Plan   Current Treatment Recommendations Strengthening;Balance Training;Functional Mobility Training; Endurance Training;Self-Care / ADL; Wheelchair Mobility Training        02/07/20 0815   OT Education   OT Education Energy Conservation; ADL Adaptive Strategies   Patient Education Pt. demo/verbalized steps in transferring from [facility] to  safely. Educated Pt. further on energy conservation and exertion.              RECORD REVIEW: Previous medical records, medications were reviewed at today's visit    IMPRESSION:  1. 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 Antiplatelet treatment. Follow-up CT scan 1/24 showing no change in ventricular size nor any new difficulties. Spoke with radiologist.  2.  Hypertension-continue current medications and monitoring  3. Hyperlipidemia-continue statin  4. DVT prophylaxis-SCDs  5. GI-bowel regimen  6. Hypothyroidism-continue replacement therapy  7.  cough , some wheezing. Continue pulmonary toilet. Chest x-ray unremarkable. Pulmonology has seen. Now getting Solu-Medrol, bronchodilators and vest therapy. Improved  8. Urinary retention. Continue in and out cath . Meclizine and Tessalon Perles discontinued in case they are hampering the efforts. Bladder function better over the weekend. Flomax increased to 2/7.   Continue

## 2020-02-10 NOTE — PLAN OF CARE
Problem: Falls - Risk of:  Goal: Will remain free from falls  Description  Will remain free from falls  2/9/2020 2236 by Rainer Phan RN  Outcome: Ongoing  2/9/2020 0951 by Maria T Moe RN  Outcome: Ongoing  Goal: Absence of physical injury  Description  Absence of physical injury  2/9/2020 2236 by Rainer Phan RN  Outcome: Ongoing  2/9/2020 0951 by Maria T Moe RN  Outcome: Ongoing     Problem: Safety:  Goal: Ability to chew and swallow food without choking will improve  Description  Ability to chew and swallow food without choking will improve  2/9/2020 2236 by Rainer Phan RN  Outcome: Ongoing  2/9/2020 0951 by Maria T Moe RN  Outcome: Ongoing  Goal: Signs and symptoms of aspiration will decrease  Description  Signs and symptoms of aspiration will decrease  2/9/2020 2236 by Rainer Phan RN  Outcome: Ongoing  2/9/2020 0951 by Maria T Moe RN  Outcome: Ongoing     Problem: Pain:  Goal: Pain level will decrease  Description  Pain level will decrease  2/9/2020 2236 by Rainer Phan RN  Outcome: Ongoing  2/9/2020 0951 by Maria T Moe RN  Outcome: Ongoing  Goal: Control of acute pain  Description  Control of acute pain  2/9/2020 2236 by Rainer Phan RN  Outcome: Ongoing  2/9/2020 0951 by Maria T Moe RN  Outcome: Ongoing  Goal: Control of chronic pain  Description  Control of chronic pain  2/9/2020 2236 by Rainer Phan RN  Outcome: Ongoing  2/9/2020 0951 by Maria T Moe RN  Outcome: Ongoing     Problem: SKIN INTEGRITY  Goal: STG - patient will maintain good skin integrity  2/9/2020 2236 by Rainer Phan RN  Outcome: Ongoing  2/9/2020 0951 by Maria T Moe RN  Outcome: Ongoing

## 2020-02-10 NOTE — PATIENT CARE CONFERENCE
AD    ASSESSMENT:  Assessment: practiced manuevering around objects and reaching for cones to mimic home envirnment. Patient displayed good stability when focused, but occassionally displays minimal LOB when distracted or when reaching for objects out of [employer]    SPEECH THERAPY    Precautions: Fall/aspiration   Swallowing Status/Diet: REgular diet with nectar thick liquids        Subjective: Patient alert and cooperative with all therapy tasks.      Objective: Patient continues to recall staff members names. Increased recall of recent/daily events. Patient oriented to place, time, and biographical information.      Patient transferred from bed to wheelchair with no difficulty. Patient does continue to require cues to slow down secondary to impulsivity.      Patient was given a word to create a sentence to address speech intelligibility. Patient completing exercise with 60% intelligibility. Patient requires mod/max cues to utilize dysarthria strategies. Patient unable to recall from previous sessions. Slow rate and overarticulate. When patient does decreased rate of speech improved speech intelligibility is noted. In conversational discourse patient is 60% intelligible or unknown context and unfamiliar listener. Imprecise articulations and distortions noted. Repetition of words with labiodentals, bilabials completed. Patient is noted to have increased difficulty with these sounds.      Effortful swallow completed during therapy. Laryngeal elevation completed on 80% of opportunities given. Laryngeal rocking noted with some attempts.      Sips of thin H2O administered via cup. Patient required cues to tuck his chin on each swallow. With 10 sips of water patient coughing on 5 out of the 10 attempts. Due to impulsivity and ataxia he was unable to master the timing of the chin tuck today with water swallows. Improvement was noted today for use of chin tuck.  Patient continues to require education on swallow function to promote increased clarity of speech at word, phrase, and sentence level with provision of mod cues/prompts. Goal 5: Patient will complete oral motor exercises, to promote increased labial and lingual movements for increased clarity of speech, with provision of min cues/prompts.      Comprehension: 4 - Patient understands basic needs 75-90%+ of the time  Expression: 3 - Expresses basic ideas/needs 50-74% of the time  Social Interaction: 5 - Patient is appropriate with supervision/cues  Problem Solving: 3 - Patient solves simple/routine tasks 50%-74%  Memory: 3 - Patient remembers 50%-74% of the time     ASSESSMENT:  Assessment: [x]? Progressing towards goals           []? Not Progressing towards goals  OCCUPATIONAL THERAPY    CURRENT IRF-CESIA SCORES  Eating: CARE Score: 6  Oral Hygiene: CARE Score: 5  Toileting: CARE Score: 4  Shower/Bathe: CARE Score: 3  Upper Body Dressing: CARE Score: 5  Lower Body Dressing: CARE Score: 4  Footwear: CARE Score: 5  Toilet Transfers: CARE Score: 4  Picking Up Object:  CARE Score: 1      UE Functioning:  B Ataxia    Pain Assessment:  Pain Level: 2  Pain Location: Head    STGs:  Short term goals  Time Frame for Short term goals: 1 week   Short term goal 1: MET  Short term goal 2: MET  Short term goal 3: MET  Short term goal 4: MET  Short term goal 5: CGA with ambulatory home making tasks. Short term goal 6: MET  Short term goal 7: MET  Short term goal 8: MET  Short term goal 9: MET    LTGs:  Long term goals  Time Frame for Long term goals : 3 weeks   Long term goal 1: Supervision with bathing hygiene. Long term goal 2: Supervision with LB dressing. Long term goal 3: Supervision with clothing management/hygiene for toileting. Long term goal 4: Supervision with toilet transfers. Long term goal 5: SBA with ambulatory home making tasks. Long term goals 6: Patient verbalize DME needs. Assessment:  Decreased functional mobility ; Decreased ADL status;  Decreased balance; Decreased cognition; Decreased high-level IADLs; Decreased fine motor control            NUTRITION  Current Wt: Weight: 168 lb 1.6 oz (76.2 kg) / Body mass index is 22.8 kg/m². Admission Wt: Admission Body Weight: 170 lb (77.1 kg)  Oral Diet Orders: General, Mildly Thick   Oral Nutrition Supplement (ONS) Orders: None   PO %  Please see nutrition note for details. NURSING    FIMS:  Bladder  Level of Assistance: Bladder Level of Assistance: 4- Requires Minimal assistance to manage or place device, patient performs 75% or more of the bladder management tasks  Frequency of Accidents: Bladder Frequency of Accidents: 6 - No accidents,uses device like urinal, bedpan, absorbant pad, or requires medication to manage bladder    Bowel  Level of Assistance: Bowel Level of Assistance: 4- Requires Minimal assistance to manage or place device, helper inserts suppository without digital stimulation, patient performs 75% or more of the bowel management tasks  Frequency of Accidents: Bowel Frequency of Accidents: 6 - No accidents, uses device like bedpan, diaper, bedside commode colostomy, or requires medication to manage bowels    Wounds/Incisions/Ulcers: scabbed areas, skin tear     Parth Scale Score: 18    Pain: No pain concerns to address    Consultations/Labs/X-rays: Dr Zaida Adler (pulmonology) has signed off now    Family Education: his sister, Shannon Morales is scheduled for family therapy day 2/12/20    Fall Risk:  Johnson Score: 72    Fall in the last week? Other Nursing Issues: D/C 2/12? BM 11. UP x1 assist. RT nebs. Blind r eye. Exp aphasia. ASA. General diet. NTL. SOCIAL WORK/CASE MANAGEMENT  Assessment: He is very anxious to go home[facility], NOT any type of assisted features). His sister is supportive but unable to be caregiver. He did agree to let her participate in family instruction.   He has commented to some of rehab staff he is eager to go home to have a

## 2020-02-10 NOTE — PROGRESS NOTES
02/10/20 1123   Restrictions/Precautions   Restrictions/Precautions Fall Risk   Transfers   Sit to Stand Modified independent   Stand to sit Modified independent   Bed to Chair Modified independent   Stand Pivot Transfers Modified independent   Car Transfer Contact guard assistance;Stand by assistance   Ambulation 1   Surface level tile   Device Rolling Walker   Assistance Contact guard assistance   Quality of Gait 3-point gait pattern   Distance 150ft,200   Comments incorporated turning and destination sitting   Ambulation 2   Surface - 2 level tile   Device 2 Rolling Walker   Assistance 2 Contact guard assistance   Quality of Gait 2 3 point gait, improved instability, able to self correct disrupted sequence   Distance 2x20ft   Comments amb from [facility] to/from   Propulsion 1   Propulsion Manual   Level Level Tile   Method RLE;LLE   Level of Assistance Independent   Description/ Details 2 turns   Distance [id number]   Exercises   Hamstring Sets 15x RTB    Hip Flexion 15x 2lb wt   Hip Extension/Leg Presses 15x   Hip Abduction 20x RTB   Knee Long Arc Quad 15x 2lb wt   Ankle Pumps 15x DF/PF manual resistance   Comments ball squeeze 40x   Conditions Requiring Skilled Therapeutic Intervention   Assessment patient improved stability and increased ambulation distance. Patient had difficulty initiating 3 point gait sequence but able to self correct after a few steps.     Activity Tolerance   Activity Tolerance Patient Tolerated treatment well   Electronically signed by Gumaro Alicea on 2/10/2020 at 11:50 AM

## 2020-02-10 NOTE — PLAN OF CARE
demonstrate safety requirements appropriate to situation/environment  Outcome: Ongoing  Goal: LTG - patient will utilize safety techniques  Outcome: Ongoing  Goal: STG - patient locks brakes on wheelchair  Outcome: Ongoing  Goal: STG - Patient uses call light consistently to request assistance with transfers  Outcome: Ongoing  Goal: STG - patient uses gait belt during all transfers  Outcome: Ongoing     Problem: SKIN INTEGRITY  Goal: STG - patient will maintain good skin integrity  Outcome: Ongoing     Problem: PAIN  Goal: STG - Patient will verbalize an acceptable level of pain  Outcome: Ongoing     Problem: Mobility - Impaired:  Goal: Mobility will improve  Description  Mobility will improve  Outcome: Ongoing     Problem: Health Behavior:  Goal: Identification of resources available to assist in meeting health care needs will improve  Description  Identification of resources available to assist in meeting health care needs will improve  Outcome: Ongoing     Problem: Nutritional:  Goal: Consumption of the prescribed amount of daily calories will improve  Description  Consumption of the prescribed amount of daily calories will improve  Outcome: Ongoing     Problem: Mood - Altered:  Goal: Mood stable  Description  Mood stable  Outcome: Ongoing

## 2020-02-11 PROCEDURE — 97535 SELF CARE MNGMENT TRAINING: CPT

## 2020-02-11 PROCEDURE — 1180000000 HC REHAB R&B

## 2020-02-11 PROCEDURE — 6360000002 HC RX W HCPCS: Performed by: INTERNAL MEDICINE

## 2020-02-11 PROCEDURE — 97530 THERAPEUTIC ACTIVITIES: CPT

## 2020-02-11 PROCEDURE — 97129 THER IVNTJ 1ST 15 MIN: CPT

## 2020-02-11 PROCEDURE — 6370000000 HC RX 637 (ALT 250 FOR IP): Performed by: PSYCHIATRY & NEUROLOGY

## 2020-02-11 PROCEDURE — 92526 ORAL FUNCTION THERAPY: CPT

## 2020-02-11 PROCEDURE — 6370000000 HC RX 637 (ALT 250 FOR IP): Performed by: NURSE PRACTITIONER

## 2020-02-11 PROCEDURE — 97130 THER IVNTJ EA ADDL 15 MIN: CPT

## 2020-02-11 PROCEDURE — 94640 AIRWAY INHALATION TREATMENT: CPT

## 2020-02-11 PROCEDURE — 97116 GAIT TRAINING THERAPY: CPT

## 2020-02-11 PROCEDURE — 6370000000 HC RX 637 (ALT 250 FOR IP): Performed by: INTERNAL MEDICINE

## 2020-02-11 PROCEDURE — 94669 MECHANICAL CHEST WALL OSCILL: CPT

## 2020-02-11 PROCEDURE — 99232 SBSQ HOSP IP/OBS MODERATE 35: CPT | Performed by: PSYCHIATRY & NEUROLOGY

## 2020-02-11 RX ADMIN — SENNOSIDES AND DOCUSATE SODIUM 1 TABLET: 8.6; 5 TABLET ORAL at 21:37

## 2020-02-11 RX ADMIN — ACETAMINOPHEN 650 MG: 325 TABLET ORAL at 13:40

## 2020-02-11 RX ADMIN — IPRATROPIUM BROMIDE AND ALBUTEROL SULFATE 3 ML: 2.5; .5 SOLUTION RESPIRATORY (INHALATION) at 06:12

## 2020-02-11 RX ADMIN — ACETAMINOPHEN 650 MG: 325 TABLET ORAL at 21:36

## 2020-02-11 RX ADMIN — IPRATROPIUM BROMIDE AND ALBUTEROL SULFATE 3 ML: 2.5; .5 SOLUTION RESPIRATORY (INHALATION) at 19:28

## 2020-02-11 RX ADMIN — LEVOTHYROXINE SODIUM 125 MCG: 0.1 TABLET ORAL at 06:04

## 2020-02-11 RX ADMIN — IPRATROPIUM BROMIDE AND ALBUTEROL SULFATE 3 ML: 2.5; .5 SOLUTION RESPIRATORY (INHALATION) at 14:03

## 2020-02-11 RX ADMIN — AMLODIPINE BESYLATE 10 MG: 10 TABLET ORAL at 08:25

## 2020-02-11 RX ADMIN — IPRATROPIUM BROMIDE AND ALBUTEROL SULFATE 3 ML: 2.5; .5 SOLUTION RESPIRATORY (INHALATION) at 09:57

## 2020-02-11 RX ADMIN — ATORVASTATIN CALCIUM 80 MG: 80 TABLET, FILM COATED ORAL at 21:36

## 2020-02-11 RX ADMIN — GUAIFENESIN 600 MG: 600 TABLET, EXTENDED RELEASE ORAL at 08:26

## 2020-02-11 RX ADMIN — TAMSULOSIN HYDROCHLORIDE 0.8 MG: 0.4 CAPSULE ORAL at 08:25

## 2020-02-11 RX ADMIN — ASPIRIN 81 MG 81 MG: 81 TABLET ORAL at 08:25

## 2020-02-11 RX ADMIN — BUDESONIDE 500 MCG: 0.5 INHALANT RESPIRATORY (INHALATION) at 19:28

## 2020-02-11 RX ADMIN — ARFORMOTEROL TARTRATE 15 MCG: 15 SOLUTION RESPIRATORY (INHALATION) at 06:15

## 2020-02-11 RX ADMIN — PREDNISONE 10 MG: 10 TABLET ORAL at 08:25

## 2020-02-11 RX ADMIN — FLUOXETINE HYDROCHLORIDE 20 MG: 20 CAPSULE ORAL at 08:25

## 2020-02-11 RX ADMIN — ARFORMOTEROL TARTRATE 15 MCG: 15 SOLUTION RESPIRATORY (INHALATION) at 19:39

## 2020-02-11 RX ADMIN — LISINOPRIL 20 MG: 20 TABLET ORAL at 08:25

## 2020-02-11 RX ADMIN — GUAIFENESIN 600 MG: 600 TABLET, EXTENDED RELEASE ORAL at 21:36

## 2020-02-11 RX ADMIN — DOCUSATE SODIUM 100 MG: 100 CAPSULE, LIQUID FILLED ORAL at 21:36

## 2020-02-11 RX ADMIN — BUDESONIDE 500 MCG: 0.5 INHALANT RESPIRATORY (INHALATION) at 06:11

## 2020-02-11 RX ADMIN — ACETAMINOPHEN 650 MG: 325 TABLET ORAL at 08:26

## 2020-02-11 ASSESSMENT — PAIN DESCRIPTION - ONSET
ONSET: GRADUAL
ONSET: GRADUAL

## 2020-02-11 ASSESSMENT — PAIN SCALES - GENERAL
PAINLEVEL_OUTOF10: 4
PAINLEVEL_OUTOF10: 4
PAINLEVEL_OUTOF10: 2
PAINLEVEL_OUTOF10: 3
PAINLEVEL_OUTOF10: 4
PAINLEVEL_OUTOF10: 3
PAINLEVEL_OUTOF10: 0
PAINLEVEL_OUTOF10: 2

## 2020-02-11 ASSESSMENT — PAIN DESCRIPTION - LOCATION
LOCATION: GENERALIZED
LOCATION: ARM;HAND

## 2020-02-11 ASSESSMENT — PAIN DESCRIPTION - PROGRESSION
CLINICAL_PROGRESSION: GRADUALLY IMPROVING

## 2020-02-11 ASSESSMENT — PAIN DESCRIPTION - FREQUENCY
FREQUENCY: INTERMITTENT
FREQUENCY: INTERMITTENT

## 2020-02-11 ASSESSMENT — PAIN DESCRIPTION - ORIENTATION
ORIENTATION: RIGHT
ORIENTATION: RIGHT

## 2020-02-11 ASSESSMENT — PAIN DESCRIPTION - DESCRIPTORS
DESCRIPTORS: ACHING
DESCRIPTORS: ACHING

## 2020-02-11 ASSESSMENT — PAIN - FUNCTIONAL ASSESSMENT
PAIN_FUNCTIONAL_ASSESSMENT: ACTIVITIES ARE NOT PREVENTED
PAIN_FUNCTIONAL_ASSESSMENT: ACTIVITIES ARE NOT PREVENTED

## 2020-02-11 ASSESSMENT — PAIN DESCRIPTION - PAIN TYPE
TYPE: CHRONIC PAIN
TYPE: CHRONIC PAIN

## 2020-02-11 NOTE — PROGRESS NOTES
[facility]  INPATIENT SPEECH THERAPY  [facility] [address] [facility] UNIT  TIME   [id number]    [x]Daily Note  []Progress Note    Date: 2020  Patient Name: Radha Darden        MRN: [id number]    Account #: [de-identified]  : 1955  (66 y.o.)  Gender: male   Primary Provider: Subha Cruz MD  Precautions: Fall/aspiration   Swallowing Status/Diet: Regular diet with nectar thick liquids      Subjective: Patient alert and cooperative with all therapy tasks. Objective:     Extensive education provided for thickening liquids when returning home. Patient was able to correctly state why he was on thickened liquids and the risk for aspiration. SLP demonstrated how to thicken using single packets and with the scoop/can. Patient had sips of thin liquids with utilization of chin tuck. SLP had to cue each time when taking a drink of thin liquids. Decreased timing of chin tuck resulting in cough episode 9/10 sips via cup. Patient continues to be very impulsive with intake and required cues to slow down and listen to direction. Swallow exercises were attempted however increased difficulty noted for following of commands secondary to poor attention to task. Speech intelligibility ranks at 60% for unknown context and unfamiliar listener. Patient continues to have imprecise articulations and distortions. Patient requires cues to utilize dysarthric strategies (over articulate, slow down). Poor awareness of when speech is not intelligible. Transfers observed from wheel chair to bed. Patient following safety precautions. Patient does continue to require cues for safety with other ADL's and cues to slow down secondary to impulsivity.      Recommended Diet and Intervention  Diet Solids Recommendation: Regular  Liquid Consistency Recommendation: Nectar thick liquids  Recommended Form of Meds: Meds with water followed by swallow  Therapeutic Interventions: Patient/Family education;Diet tolerance monitoring;Pharyngeal exercises;Oral care     Compensatory Swallowing Strategies  Compensatory Swallowing Strategies: Alternate solids and liquids;Upright as possible for all oral intake;Swallow 2 times per bite/sip; Remain upright for 30-45 minutes after meals;Small bites/sips    It is recommended at this time to continue with regular diet and nectar thick liquids. SHORT TERM GOAL #1:  Goal 1: The patient will demonstrate recall of functional information following a (short term) delay with minimal cues in order to increase functional integration in environment. SHORT TERM GOAL #2:  Goal 2: Identify the item that does not belong in a list of words presented auditorily with 100% accuracy and(min/mod/max) verbal, visual and tactile cues    SHORT TERM GOAL #3:  Goal 3: Identify appropriate solutions to a problem when presented with a field of 4 with minimal cues. SHORT TERM GOAL #4:  Goal 4: Patient will complete simple/ complex reasoning tasks to improve problem solving and safety awareness with 100% accuracy and minimal cues. SHORT TERM GOAL #5:  Goal 5: The patient will complete divergent naming tasks (concrete/abstract) with minimal cueing. Swallowing Short Term Goals  Short-term Goals  Goal 1: The patient will tolerate a regular diet with thin liquids with minimal overt s/s of aspiration. Goal 2: The patient will complete oral care at least two times daily to prevent aspiration of oral bacteria. Goal 3: SLP and staff will monitor safety with oral intake and monitor increased congestion, overt s/s of aspiration, elevated temp and RLL infiltrates on CXR. Goal 4: Patient will utilize tools/strategies to promote increased clarity of speech at word, phrase, and sentence level with provision of mod cues/prompts. Goal 5: Patient will complete oral motor exercises, to promote increased labial and lingual movements for increased clarity of speech, with provision of min cues/prompts.      Comprehension: 4 - Patient understands basic needs 75-90%+ of the time  Expression: 3 - Expresses basic ideas/needs 50-74% of the time  Social Interaction: 5 - Patient is appropriate with supervision/cues  Problem Solving: 3 - Patient solves simple/routine tasks 50%-74%  Memory: 3 - Patient remembers 50%-74% of the time    ASSESSMENT:  Assessment: [x]Progressing towards goals          []Not Progressing towards goals    Patient Tolerance of Treatment:   [x]Tolerated well []Tolerated fair []Required rest breaks []Fatigued    Education:  Learner:  [x]Patient          []Significant Other          []Other  Education provided regarding:  [x]Goals and POC   [x]Diet and swallowing precautions    []Home Exercise Program  []Progress and/or discharge information  Method of Education:  [x]Discussion          [x]Demonstration          []Handout          []Other  Evaluation of Education:   []Verbalized understanding   []Demonstrates without assistance  []Demonstrates with assistance  [x]Needs further instruction     []No evidence of learning                  [x]No family present      Plan: [x]Continue with current plan of care    []Modify current plan of care as follows:    []Discharge patient    Discharge Location:    Services/Supervision Recommended:      [x]Patient continues to require treatment by a licensed therapist to address functional deficits as outlined in the established plan of care.

## 2020-02-11 NOTE — PROGRESS NOTES
Occupational Therapy     02/11/20 1500   Restrictions/Precautions   Restrictions/Precautions Fall Risk   Pain Assessment   Patient Currently in Pain No   Pain Assessment 0-10   Pain Level 2   ADL   Toileting Stand by assistance   Balance   Sitting Balance Independent   Standing Balance Stand by assistance   Functional Mobility   Functional - Mobility Device Wheelchair   Activity Other   Assist Level Stand by assistance   Functional Mobility Comments Pt. self propelled in w/c throughout rehab floor   Bed mobility   Supine to Sit Modified independent   Sit to Supine Modified independent   Scooting Supervision   Coordination   Fine Motor 1-2 handed fine motor activity   Assessment   Performance deficits / Impairments Decreased functional mobility ; Decreased ADL status; Decreased balance;Decreased cognition;Decreased high-level IADLs;Decreased fine motor control   Assessment Pt. conducted 1-2 handed fine motor activites that involved visual scanning and decision making. Timed Code Treatment Minutes 30 Minutes   Activity Tolerance   Activity Tolerance Patient Tolerated treatment well   Safety Devices   Safety Devices in place Yes   Type of devices Bed alarm in place;Call light within reach   Plan   Current Treatment Recommendations Strengthening;Balance Training;Functional Mobility Training; Endurance Training;Self-Care / ADL; Wheelchair Mobility Training      02/11/20 1500   OT Education   OT Education Energy Conservation; ADL Adaptive Strategies   Patient Education Educated Pt. on slowing down when self propelling with w/c to avoid future injury and accidents.

## 2020-02-11 NOTE — PROGRESS NOTES
Pulse 65   Temp 98.7 °F (37.1 °C) (Temporal)   Resp 18   Ht 6' (1.829 m)   Wt 168 lb 1.6 oz (76.2 kg)   SpO2 94%   BMI 22.80 kg/m²     Constitutional: he appears well-developed and well-nourished. Eyes - conjunctiva normal.  Pupils react to light  Ear, nose, throat -hearing intact to voice. No scars, masses, or lesions over external nose or ears, no atrophy of tongue  Neck-symmetric, no masses noted, no jugular vein distension  Respiration- chest wall appears symmetric, good expansion,   normal effort without use of accessory muscles  Cardiovascular- RRR  Musculoskeletal - no significant wasting of muscles noted, no bony deformities, gait no gross ataxia  Extremities-no clubbing, cyanosis or edema  Skin - warm, dry, and intact. No rash, erythema, or pallor. Psychiatric - mood, affect, and behavior appear normal.      Neurology  NEUROLOGICAL EXAM:  Awake, alert, fluent oriented x 3 appropriate affect  Attention and concentration appear appropriate  Memory fair  Speech  with significant dysarthria  No clear issues with language of fund of knowledge     Cranial Nerve Exam   CN II- Visual fields grossly unremarkable  CN III, IV,VI-EOMI, significant left greater than right horizontal nystagmus, disconjugate eyes at rest, no ptosis  CN VII-no facial assymetry  CN VIII-Hearing intact        Motor Exam  V/V throughout upper and lower extremities bilaterally, no cogwheeling, normal tone            Nursing/pcp notes, imaging,labs and vitals reviewed.      PT,OT and/or speech notes reviewed    Lab Results   Component Value Date    WBC 15.0 (H) 02/10/2020    HGB 16.6 02/10/2020    HCT 51.0 02/10/2020    MCV 95.0 (H) 02/10/2020     02/10/2020     Lab Results   Component Value Date     02/10/2020    K 4.6 02/10/2020     02/10/2020    CO2 26 02/10/2020    BUN 22 02/10/2020    CREATININE 0.8 02/10/2020    GLUCOSE 94 02/10/2020    CALCIUM 8.6 (L) 02/10/2020    LABGLOM >60 02/10/2020   No results found for: INRNo results found for: PHENYTOIN, ESR, CRP     Objective: Patient continues to recall staff members names. Increased recall of recent/daily events. Patient oriented to place, time, and biographical information.      Patient transferred from bed to wheelchair with no difficulty. Patient does continue to require cues to slow down secondary to impulsivity.      Patient was given a word to create a sentence to address speech intelligibility. Patient completing exercise with 60% intelligibility. Patient requires mod/max cues to utilize dysarthria strategies. Patient unable to recall from previous sessions. Slow rate and overarticulate. When patient does decreased rate of speech improved speech intelligibility is noted. In conversational discourse patient is 60% intelligible or unknown context and unfamiliar listener. Imprecise articulations and distortions noted. Repetition of words with labiodentals, bilabials completed. Patient is noted to have increased difficulty with these sounds.      Effortful swallow completed during therapy. Laryngeal elevation completed on 80% of opportunities given. Laryngeal rocking noted with some attempts.      Sips of thin H2O administered via cup. Patient required cues to tuck his chin on each swallow. With 10 sips of water patient coughing on 5 out of the 10 attempts. Due to impulsivity and ataxia he was unable to master the timing of the chin tuck today with water swallows. Improvement was noted today for use of chin tuck.  Patient continues to require education on swallow function and using chin tuck when consuming thin liquids       02/10/20 1123   Restrictions/Precautions   Restrictions/Precautions Fall Risk   Transfers   Sit to Stand Modified independent   Stand to sit Modified independent   Bed to Chair Modified independent   Stand Pivot Transfers Modified independent   Car Transfer Contact guard assistance;Stand by assistance   Ambulation 1   Surface level tile Device Rolling Walker   Assistance Contact guard assistance   Quality of Gait 3-point gait pattern   Distance 150ft,200   Comments incorporated turning and destination sitting   Ambulation 2   Surface - 2 level tile   Device 2 Rolling Walker   Assistance 2 Contact guard assistance   Quality of Gait 2 3 point gait, improved instability, able to self correct disrupted sequence   Distance 2x20ft   Comments amb from [facility] to/from   Propulsion 1   Propulsion Manual   Level Level Tile   Method RLE;LLE   Level of Assistance Independent   Description/ Details 2 turns   Distance [id number]   Exercises   Hamstring Sets 15x RTB    Hip Flexion 15x 2lb wt   Hip Extension/Leg Presses 15x   Hip Abduction 20x RTB   Knee Long Arc Quad 15x 2lb wt   Ankle Pumps 15x DF/PF manual resistance   Comments ball squeeze 40x   Conditions Requiring Skilled Therapeutic Intervention   Assessment patient improved stability and increased ambulation distance. Patient had difficulty initiating 3 point gait sequence but able to self correct after a few steps. Activity Tolerance   Activity Tolerance Patient Tolerated treatment well       02/10/20 0900   Restrictions/Precautions   Restrictions/Precautions Fall Risk   Vision   Vision Impaired   Vision Exceptions Wears glasses at all times   General   Chart Reviewed Yes   Pain Assessment   Patient Currently in Pain No   Pain Assessment 0-10   Pain Level 6   ADL   Grooming Supervision;Setup   LE Dressing Supervision;Setup  (LE dressing Sup/setup w/ (shoes))   Toileting Stand by assistance   Balance   Sitting Balance Independent   Standing Balance Stand by assistance   Standing Balance   Time 2 mins   Activity 1-2 hand static standing act at sink   Functional Mobility   Functional - Mobility Device Rolling Walker   Activity To/from bathroom   Assist Level Stand by assistance   Functional Mobility Comments Pt. self propelled w/ WC from gym to his room.    Bed mobility   Supine to Sit Modified independent   Sit to Supine Modified independent   Scooting Supervision   Transfers   Stand Step Transfers Stand by assistance   Stand Pivot Transfers Stand by assistance   Sit to stand Stand by assistance   Stand to sit Stand by assistance   Toilet Transfers   Toilet - Technique Ambulating   Equipment Used Grab bars   Toilet Transfer Stand by assistance   Type of ROM/Therapeutic Exercise   Type of ROM/Therapeutic Exercise Resistive Bands   Comment Med resistance band ex 1 set 15 reps in all planes   Assessment   Performance deficits / Impairments Decreased functional mobility ; Decreased ADL status; Decreased balance;Decreased cognition;Decreased high-level IADLs;Decreased fine motor control   Assessment Pt. was eager to participate today and follwed all cues given with tx. When ambulating w/ RW Pt. still tends to have decreased balance in R side when making turns. Pt. also completed several hand exercises to work on dexterity as well as pincer grasp. When Pt. slows down he completes tasks well. Activity Tolerance   Activity Tolerance Patient Tolerated treatment well   Safety Devices   Safety Devices in place Yes   Type of devices Call light within reach; Chair alarm in place;Gait belt   Plan   Current Treatment Recommendations Strengthening;Balance Training;Functional Mobility Training; Endurance Training;Self-Care / ADL; Wheelchair Mobility Training        RECORD REVIEW: Previous medical records, medications were reviewed at today's visit    IMPRESSION:  1. 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 Antiplatelet treatment. Follow-up CT scan 1/24 showing no change in ventricular size nor any new difficulties. Spoke with radiologist.  2.  Hypertension-continue current medications and monitoring  3. Hyperlipidemia-continue statin  4. DVT prophylaxis-SCDs  5. GI-bowel regimen  6. Hypothyroidism-continue replacement therapy  7.  cough , some wheezing. Continue pulmonary toilet. Chest x-ray unremarkable. Pulmonology has seen. Now getting Solu-Medrol, bronchodilators and vest therapy. Improved  8. Urinary retention. ? Resolved. Flomax increased to 2/7. Continue current treatment      ELOS:1-2 weeks.   restaff in am

## 2020-02-11 NOTE — PLAN OF CARE
Problem: HEMODYNAMIC STATUS  Goal: Patient has stable vital signs and fluid balance  Outcome: Ongoing     Problem: ACTIVITY INTOLERANCE/IMPAIRED MOBILITY  Goal: Mobility/activity is maintained at optimum level for patient  Outcome: Ongoing     Problem: COMMUNICATION IMPAIRMENT  Goal: Ability to express needs and understand communication  Outcome: Ongoing     Problem: Falls - Risk of:  Goal: Will remain free from falls  Description  Will remain free from falls  Outcome: Ongoing  Goal: Absence of physical injury  Description  Absence of physical injury  Outcome: Ongoing     Problem: Infection:  Goal: Will remain free from infection  Description  Will remain free from infection  Outcome: Ongoing     Problem: Safety:  Goal: Ability to chew and swallow food without choking will improve  Description  Ability to chew and swallow food without choking will improve  Outcome: Ongoing  Goal: Signs and symptoms of aspiration will decrease  Description  Signs and symptoms of aspiration will decrease  Outcome: Ongoing     Problem: Pain:  Goal: Pain level will decrease  Description  Pain level will decrease  Outcome: Ongoing  Goal: Control of acute pain  Description  Control of acute pain  Outcome: Ongoing  Goal: Control of chronic pain  Description  Control of chronic pain  Outcome: Ongoing     Problem: IP BLADDER/VOIDING  Goal: LTG - Patient will utilize adaptive techniques/equipment to complete bladder elimination  Outcome: Ongoing  Goal: STG - Patient demonstrates no accidents  Outcome: Ongoing  Goal: STG - Patient will state signs and symptoms of UTI  Outcome: Ongoing     Problem: IP BOWEL ELIMINATION  Goal: LTG - patient will have regular and routine bowel evacuation  Outcome: Ongoing     Problem: IP BREATHING  Goal: LTG - patient will mobilize secretions and maintain airway  Outcome: Ongoing     Problem: NUTRITION  Goal: Patient maintains adequate hydration  Outcome: Ongoing     Problem: SAFETY  Goal: LTG - Patient will demonstrate safety requirements appropriate to situation/environment  Outcome: Ongoing  Goal: LTG - patient will utilize safety techniques  Outcome: Ongoing  Goal: STG - patient locks brakes on wheelchair  Outcome: Ongoing  Goal: STG - Patient uses call light consistently to request assistance with transfers  Outcome: Ongoing  Goal: STG - patient uses gait belt during all transfers  Outcome: Ongoing     Problem: SKIN INTEGRITY  Goal: STG - patient will maintain good skin integrity  Outcome: Ongoing     Problem: PAIN  Goal: STG - Patient will verbalize an acceptable level of pain  Outcome: Ongoing     Problem: Mobility - Impaired:  Goal: Mobility will improve  Description  Mobility will improve  Outcome: Ongoing     Problem: Health Behavior:  Goal: Identification of resources available to assist in meeting health care needs will improve  Description  Identification of resources available to assist in meeting health care needs will improve  Outcome: Ongoing     Problem: Nutritional:  Goal: Consumption of the prescribed amount of daily calories will improve  Description  Consumption of the prescribed amount of daily calories will improve  Outcome: Ongoing     Problem: Mood - Altered:  Goal: Mood stable  Description  Mood stable  Outcome: Ongoing

## 2020-02-11 NOTE — PROGRESS NOTES
Occupational Therapy     02/11/20 1000   Restrictions/Precautions   Restrictions/Precautions Fall Risk   Vision   Vision Impaired   General   Chart Reviewed Yes   Pain Assessment   Patient Currently in Pain No   Pain Assessment 0-10   Pain Level 4   ADL   Grooming Stand by assistance   UE Bathing Stand by assistance   LE Bathing Stand by assistance   UE Dressing Supervision;Setup   LE Dressing Supervision;Setup   Toileting Stand by assistance   Balance   Sitting Balance Independent   Standing Balance Stand by assistance   Standing Balance   Time 2 mins   Activity 1-2 hand static standing act at sink   Functional Mobility   Functional - Mobility Device Rolling Walker   Activity To/from bathroom   Assist Level Stand by assistance   Bed mobility   Supine to Sit Modified independent   Sit to Supine Modified independent   Scooting Supervision   Transfers   Stand Step Transfers Stand by assistance   Sit to stand Stand by assistance   Stand to sit Stand by assistance   Toilet Transfers   Toilet - Technique Ambulating   Equipment Used Grab bars   Toilet Transfer Stand by assistance   Type of ROM/Therapeutic Exercise   Type of ROM/Therapeutic Exercise Resistive Bands   Comment Med resistance band ex 1 set 15 reps in all planes   Assessment   Performance deficits / Impairments Decreased functional mobility ; Decreased ADL status; Decreased balance;Decreased cognition;Decreased high-level IADLs;Decreased fine motor control   Assessment Pt. agreed, with some conversation, to take a shower as well practice grooming and hygiene techniques. Upon completion of bathing and dressing, the Pt. agreed to participate in UE exercises. Activity Tolerance   Activity Tolerance Patient Tolerated treatment well   Safety Devices   Safety Devices in place Yes   Type of devices Bed alarm in place;Call light within reach   Plan   Current Treatment Recommendations Strengthening;Balance Training;Functional Mobility Training; Endurance

## 2020-02-11 NOTE — PROGRESS NOTES
Nutrition Assessment (Low Risk)    Type and Reason for Visit: Reassess    Nutrition Assessment:  Patient assessed for nutritional risk. Deemed to be at low risk at this time. Will continue to monitor for changes in status. Pt remains nutritionally stable AEB adequate intake, wt gain.      Malnutrition Assessment:  · Malnutrition Status: No malnutrition    Nutrition Risk Level   Risk Level: Low    Nutrition Diagnosis:   · Problem: No nutrition diagnosis at this time    Nutrition Intervention:  Food and/or Delivery: Continue current diet  Nutrition Education/Counseling/Coordination of Care:  Continued Inpatient Monitoring      Electronically signed by Craig Arellano, MS, RD, LD on 2/11/20 at 11:25 AM    Contact Number: [phone]

## 2020-02-11 NOTE — PROGRESS NOTES
Surgical History:  Past Surgical History             Procedure Laterality Date    ARM SURGERY Left      EYE SURGERY Right      KNEE SURGERY Right      MANDIBLE SURGERY        NM COLONOSCOPY FLX DX W/COLLJ SPEC WHEN PFRMD N/A 6/26/2018     Dr Cara Carpio yr recall            Medications in Hospital:      Current Medication      Current Facility-Administered Medications:     levothyroxine (SYNTHROID) tablet 125 mcg, 125 mcg, Oral, Daily, Tammie Prieto MD, 125 mcg at 01/22/20 0540    amLODIPine (NORVASC) tablet 10 mg, 10 mg, Oral, Daily, Tammie Prieto MD    aspirin chewable tablet 81 mg, 81 mg, Oral, Daily, Tammie Prieto MD    atorvastatin (LIPITOR) tablet 80 mg, 80 mg, Oral, Nightly, Tammie Prieto MD, 80 mg at 01/21/20 2030    lisinopril (PRINIVIL;ZESTRIL) tablet 20 mg, 20 mg, Oral, Daily, Tammie Prieto MD    polyethylene glycol [facility]) packet 17 g, 17 g, Oral, Daily, Tammie Prieto MD    sennosides-docusate sodium (SENOKOT-S) 8.6-50 MG tablet 1 tablet, 1 tablet, Oral, Nightly, Tammie Prieto MD, 1 tablet at 01/21/20 2030    ipratropium-albuterol (DUONEB) nebulizer solution 3 mL, 1 vial, Inhalation, 4x Daily, Tammie Prieto MD, 3 mL at 01/22/20 0607    acetaminophen (TYLENOL) tablet 650 mg, 650 mg, Oral, Q4H PRN, Tammie Prieto MD    docusate sodium (COLACE) capsule 100 mg, 100 mg, Oral, BID, Tammie Prieto MD, 100 mg at 01/21/20 2030    bisacodyl (DULCOLAX) suppository 10 mg, 10 mg, Rectal, Daily PRN, Tammie Prieto MD        Allergies:  Codeine     Social History:   TOBACCO:   reports that he has quit smoking. His smoking use included cigarettes. He has a 40.00 pack-year smoking history.  He has never used smokeless tobacco.  ETOH:   reports no history of alcohol use.     Family History:   Family History             Problem Relation Age of Onset    Breast Cancer Mother      Heart Attack Father      High Blood Pressure Sister      High Blood Pressure Brother      Colon Cancer Neg Hx      Colon Polyps Neg Hx      Liver Cancer Neg Hx      Liver Disease Neg Hx      Esophageal Cancer Neg Hx      Rectal Cancer Neg Hx      Stomach Cancer Neg Hx                    Physical Exam:    Vitals: BP (!) 140/70   Pulse 76   Temp 97.8 °F (36.6 °C) (Temporal)   Resp 18   Ht 6' (1.829 m)   Wt 170 lb (77.1 kg)   SpO2 92%   BMI 23.06 kg/m²      Constitutional - well developed, well nourished. Eyes - conjunctiva normal.  Pupils react to light  Ear, nose, throat -hearing intact to finger rub No scars, masses, or lesions over external nose or ears, no atrophy of tongue  Neck-symmetric, no masses noted, no jugular vein distension  Respiration- chest wall appears symmetric, good expansion,   normal effort without use of accessory muscles  Cardiovascular- RRR without m,r,g  Musculoskeletal - no significant wasting of muscles noted, no bony deformities  Extremities-no clubbing, cyanosis or edema  Skin - warm, dry, and intact. No rash, erythema, or pallor.   Psychiatric - mood, affect, and behavior appear normal.       Neurological exam  Awake, alert, fluent oriented x 3 appropriate affect  Attention and concentration appear appropriate  Memory fair  Speech stuttering with significant dysarthria  No clear issues with language of fund of knowledge     Cranial Nerve Exam   CN II- Visual fields grossly unremarkable  CN III, IV,VI-EOMI, significant left greater than right horizontal nystagmus, disconjugate eyes at rest, no ptosis  CN VII-no facial assymetry  CN VIII-Hearing intact   CN IX and X- Palate elevates in midline  CN XI-good shoulder shrug  CN XII-Tongue midline with no fasciculations or fibrillations     Motor Exam  V/V throughout upper and lower extremities bilaterally, no cogwheeling, normal tone        Reflexes   2+ biceps bilaterally  2+ brachioradialis  2+patella  2+ ankle jerks  No clonus ankles  No Hernandez's sign bilateral hands     Tremors- no tremors in hands or head noted     Gait  Not note     Intensive rehabilitation nursing: The patient demonstrates the need for 24-hour rehabilitation nursing care for active management of the multiple medical issues documented in the admission note     Appropriate therapy needed: The patient requires the active and ongoing therapeutic intervention of at least 2 therapeutic disciplines, one of which must be physical or occupational therapy and/or speech therapy     Intensive therapy: The patient requires and is reasonably expected to actively participate in at least 3 hours of therapy per day at least 5 days per week, and expected to make measurable improvements that will be of practical value to improve the patient's functional capacity or adaptation to impairments. In addition, therapy treatments will begin within 36 hours from midnight of the day of the patient's admission to the inpatient rehabilitation facility     Expected duration and frequency therapy: 180 minutes per day, 5 days per week     Interdisciplinary team: The patient demonstrates the need for an interdisciplinary team for active management of the following medical issues including ataxia, motor planning, balance, disease management, elimination, endurance, family training, education, independent ADLs, pain management, precautions, range of motion, safety, strength, and transfers     I have reviewed the preadmission screening documents and concur with the findings. I believe the patient meets criteria and is sufficiently stable to allow participation in the program. This requires an intensive level of therapy, close medical supervision, and an interdisciplinary team approach provided through an individualized plan of care. I approve admitting this patient for an intensive inpatient rehabilitation program.        Luis M Hernandez.  Jacob Hester MD                 ___________________ _____________________ ________________   Physician Signature      Physician Name (print)   Physician NPI          Date

## 2020-02-11 NOTE — CARE COORDINATION
Discharge Planning:  Mr. Francis John is insistent about going home tomorrow, somewhat based on Dr. Jasiel Dowling advise that he could go home \"from his standpoint\". His sister, Shawn Turner was planning to come tomorrow for family instruction anyway-can provide transportation and assistance with transition to home. Team emphasizing safety-risk to fall, discussed plan for emergency response, noting the respondent might have difficulty understanding his speech-that needs to be pre-noted. Discussed plan for altered diet(thicken liquids)- his sister is willing to help clean the refrigerator and go to grocery, suggestions given for compliance with diet and where thicken-up can be purchased locally. Referral initiated to [facility] for continuing care- nurse and therapies.

## 2020-02-11 NOTE — PLAN OF CARE
Problem: HEMODYNAMIC STATUS  Goal: Patient has stable vital signs and fluid balance  2/10/2020 2317 by Kalyan Hauser LPN  Outcome: Ongoing  2/10/2020 1644 by Kerry Conn RN  Outcome: Ongoing

## 2020-02-12 ENCOUNTER — APPOINTMENT (OUTPATIENT)
Dept: GENERAL RADIOLOGY | Age: 65
DRG: 057 | End: 2020-02-12
Attending: PSYCHIATRY & NEUROLOGY
Payer: MEDICAID

## 2020-02-12 VITALS
TEMPERATURE: 97.2 F | HEIGHT: 72 IN | SYSTOLIC BLOOD PRESSURE: 138 MMHG | RESPIRATION RATE: 18 BRPM | OXYGEN SATURATION: 91 % | HEART RATE: 78 BPM | DIASTOLIC BLOOD PRESSURE: 79 MMHG | BODY MASS INDEX: 22.77 KG/M2 | WEIGHT: 168.1 LBS

## 2020-02-12 PROCEDURE — 6370000000 HC RX 637 (ALT 250 FOR IP): Performed by: INTERNAL MEDICINE

## 2020-02-12 PROCEDURE — 6360000002 HC RX W HCPCS: Performed by: INTERNAL MEDICINE

## 2020-02-12 PROCEDURE — 92526 ORAL FUNCTION THERAPY: CPT

## 2020-02-12 PROCEDURE — 94669 MECHANICAL CHEST WALL OSCILL: CPT

## 2020-02-12 PROCEDURE — 99233 SBSQ HOSP IP/OBS HIGH 50: CPT | Performed by: PSYCHIATRY & NEUROLOGY

## 2020-02-12 PROCEDURE — 97530 THERAPEUTIC ACTIVITIES: CPT

## 2020-02-12 PROCEDURE — 74230 X-RAY XM SWLNG FUNCJ C+: CPT

## 2020-02-12 PROCEDURE — 97129 THER IVNTJ 1ST 15 MIN: CPT

## 2020-02-12 PROCEDURE — 92611 MOTION FLUOROSCOPY/SWALLOW: CPT

## 2020-02-12 PROCEDURE — 6370000000 HC RX 637 (ALT 250 FOR IP): Performed by: PSYCHIATRY & NEUROLOGY

## 2020-02-12 PROCEDURE — 97130 THER IVNTJ EA ADDL 15 MIN: CPT

## 2020-02-12 PROCEDURE — 6370000000 HC RX 637 (ALT 250 FOR IP): Performed by: NURSE PRACTITIONER

## 2020-02-12 PROCEDURE — 94640 AIRWAY INHALATION TREATMENT: CPT

## 2020-02-12 RX ORDER — LEVOTHYROXINE SODIUM 0.12 MG/1
125 TABLET ORAL DAILY
Qty: 30 TABLET | Refills: 3 | Status: ON HOLD | OUTPATIENT
Start: 2020-02-13 | End: 2020-08-21 | Stop reason: ALTCHOICE

## 2020-02-12 RX ORDER — ASPIRIN 81 MG/1
81 TABLET, CHEWABLE ORAL DAILY
Qty: 30 TABLET | Refills: 3 | Status: SHIPPED | OUTPATIENT
Start: 2020-02-12

## 2020-02-12 RX ORDER — AMLODIPINE BESYLATE 10 MG/1
10 TABLET ORAL DAILY
Qty: 30 TABLET | Refills: 3 | Status: SHIPPED | OUTPATIENT
Start: 2020-02-12

## 2020-02-12 RX ORDER — ATORVASTATIN CALCIUM 80 MG/1
80 TABLET, FILM COATED ORAL NIGHTLY
Qty: 30 TABLET | Refills: 3 | Status: SHIPPED | OUTPATIENT
Start: 2020-02-12

## 2020-02-12 RX ORDER — GUAIFENESIN 600 MG/1
600 TABLET, EXTENDED RELEASE ORAL 2 TIMES DAILY
Qty: 60 TABLET | Refills: 0 | Status: ON HOLD | OUTPATIENT
Start: 2020-02-12 | End: 2020-08-21 | Stop reason: ALTCHOICE

## 2020-02-12 RX ORDER — LISINOPRIL 10 MG/1
10 TABLET ORAL DAILY
Status: DISCONTINUED | OUTPATIENT
Start: 2020-02-13 | End: 2020-02-12 | Stop reason: HOSPADM

## 2020-02-12 RX ORDER — IPRATROPIUM BROMIDE AND ALBUTEROL SULFATE 2.5; .5 MG/3ML; MG/3ML
1 SOLUTION RESPIRATORY (INHALATION) 4 TIMES DAILY
Qty: 1 VIAL | Refills: 0 | Status: ON HOLD | OUTPATIENT
Start: 2020-02-12 | End: 2020-08-21 | Stop reason: ALTCHOICE

## 2020-02-12 RX ORDER — LISINOPRIL 10 MG/1
10 TABLET ORAL DAILY
Qty: 30 TABLET | Refills: 3 | Status: ON HOLD
Start: 2020-02-13 | End: 2020-08-26 | Stop reason: HOSPADM

## 2020-02-12 RX ORDER — FLUOXETINE HYDROCHLORIDE 20 MG/1
20 CAPSULE ORAL DAILY
Qty: 30 CAPSULE | Refills: 3 | Status: SHIPPED | OUTPATIENT
Start: 2020-02-13

## 2020-02-12 RX ORDER — TAMSULOSIN HYDROCHLORIDE 0.4 MG/1
0.8 CAPSULE ORAL DAILY
Qty: 60 CAPSULE | Refills: 3 | Status: SHIPPED | OUTPATIENT
Start: 2020-02-13

## 2020-02-12 RX ADMIN — AMLODIPINE BESYLATE 10 MG: 10 TABLET ORAL at 09:19

## 2020-02-12 RX ADMIN — TAMSULOSIN HYDROCHLORIDE 0.8 MG: 0.4 CAPSULE ORAL at 09:19

## 2020-02-12 RX ADMIN — PREDNISONE 10 MG: 10 TABLET ORAL at 09:20

## 2020-02-12 RX ADMIN — DOCUSATE SODIUM 100 MG: 100 CAPSULE, LIQUID FILLED ORAL at 09:20

## 2020-02-12 RX ADMIN — FLUOXETINE HYDROCHLORIDE 20 MG: 20 CAPSULE ORAL at 09:19

## 2020-02-12 RX ADMIN — ARFORMOTEROL TARTRATE 15 MCG: 15 SOLUTION RESPIRATORY (INHALATION) at 06:23

## 2020-02-12 RX ADMIN — LEVOTHYROXINE SODIUM 125 MCG: 0.1 TABLET ORAL at 05:49

## 2020-02-12 RX ADMIN — LISINOPRIL 20 MG: 20 TABLET ORAL at 09:20

## 2020-02-12 RX ADMIN — BUDESONIDE 500 MCG: 0.5 INHALANT RESPIRATORY (INHALATION) at 06:11

## 2020-02-12 RX ADMIN — ASPIRIN 81 MG 81 MG: 81 TABLET ORAL at 09:20

## 2020-02-12 RX ADMIN — IPRATROPIUM BROMIDE AND ALBUTEROL SULFATE 3 ML: 2.5; .5 SOLUTION RESPIRATORY (INHALATION) at 06:09

## 2020-02-12 RX ADMIN — GUAIFENESIN 600 MG: 600 TABLET, EXTENDED RELEASE ORAL at 09:19

## 2020-02-12 RX ADMIN — IPRATROPIUM BROMIDE AND ALBUTEROL SULFATE 3 ML: 2.5; .5 SOLUTION RESPIRATORY (INHALATION) at 11:02

## 2020-02-12 RX ADMIN — ACETAMINOPHEN 650 MG: 325 TABLET ORAL at 09:19

## 2020-02-12 ASSESSMENT — PAIN SCALES - GENERAL: PAINLEVEL_OUTOF10: 8

## 2020-02-12 NOTE — DISCHARGE SUMMARY
Reason for Stopping:         albuterol sulfate  (90 BASE) MCG/ACT inhaler Comments:   Reason for Stopping:         budesonide-formoterol (SYMBICORT) 160-4.5 MCG/ACT AERO Comments:   Reason for Stopping:         montelukast (SINGULAIR) 10 MG tablet Comments:   Reason for Stopping:         albuterol-ipratropium (COMBIVENT RESPIMAT)  MCG/ACT AERS inhaler Comments:   Reason for Stopping:                 Consults:   Pulmonary    Hospital Course: LJLD 42 y. o. male  R handed pt admitted to [facility] on on 1/13/2020 for stroke evaluation. Pt reports that he has felt dizzy, poor coordination w/his R arm and R leg, numbness in his face, feet and hands. He reports over this same couple of days, that he also had fever and chills, nausea & emesis. He has been SOA and very weak. Pt reports that he stopped smoking 10 years ago, but still smokes intermittently. Influenza swab was positive for influenza A.  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 Dr. Destiny Lyman w/neuro and Dr. Swati Chávez w/neurosurgery were consulted. Dr Swati Chávez did not see surgical intervention to be necessary. Pt was initially admitted to ICU but was moved to neuro floor on 1/17/2020. Notes from [facility] from 8/5/16 indicates pt has a hx of seizures, but pt has not been on any anticonvulsants. Pt states his last seizure was in Dec 2015. He is being treated w/tamiflu. He is now medically stable, but continues to be ataxic w/his gait, dysarthric, as well as coordination issues w/R UE/LE. The patient made slow steady gains while here. He had a follow-up CT scan 1/24 showing no change in ventricular size according to radiologist verbal report. Blood pressure medicines were adjusted.

## 2020-02-12 NOTE — PLAN OF CARE
Problem: HEMODYNAMIC STATUS  Goal: Patient has stable vital signs and fluid balance  Outcome: Completed     Problem: ACTIVITY INTOLERANCE/IMPAIRED MOBILITY  Goal: Mobility/activity is maintained at optimum level for patient  Outcome: Completed     Problem: COMMUNICATION IMPAIRMENT  Goal: Ability to express needs and understand communication  Outcome: Completed     Problem: Falls - Risk of:  Goal: Will remain free from falls  Description  Will remain free from falls  Outcome: Completed  Goal: Absence of physical injury  Description  Absence of physical injury  Outcome: Completed     Problem: Infection:  Goal: Will remain free from infection  Description  Will remain free from infection  Outcome: Completed     Problem: Safety:  Goal: Ability to chew and swallow food without choking will improve  Description  Ability to chew and swallow food without choking will improve  Outcome: Completed  Goal: Signs and symptoms of aspiration will decrease  Description  Signs and symptoms of aspiration will decrease  Outcome: Completed     Problem: Pain:  Goal: Pain level will decrease  Description  Pain level will decrease  Outcome: Completed  Goal: Control of acute pain  Description  Control of acute pain  Outcome: Completed  Goal: Control of chronic pain  Description  Control of chronic pain  Outcome: Completed     Problem: IP BLADDER/VOIDING  Goal: LTG - Patient will utilize adaptive techniques/equipment to complete bladder elimination  Outcome: Completed  Goal: STG - Patient demonstrates no accidents  Outcome: Completed  Goal: STG - Patient will state signs and symptoms of UTI  Outcome: Completed     Problem: IP BOWEL ELIMINATION  Goal: LTG - patient will have regular and routine bowel evacuation  Outcome: Completed     Problem: IP BREATHING  Goal: LTG - patient will mobilize secretions and maintain airway  Outcome: Completed     Problem: NUTRITION  Goal: Patient maintains adequate hydration  Outcome: Completed     Problem: SAFETY  Goal: LTG - Patient will demonstrate safety requirements appropriate to situation/environment  Outcome: Completed  Goal: LTG - patient will utilize safety techniques  Outcome: Completed  Goal: STG - patient locks brakes on wheelchair  Outcome: Completed  Goal: STG - Patient uses call light consistently to request assistance with transfers  Outcome: Completed  Goal: STG - patient uses gait belt during all transfers  Outcome: Completed     Problem: SKIN INTEGRITY  Goal: STG - patient will maintain good skin integrity  Outcome: Completed     Problem: PAIN  Goal: STG - Patient will verbalize an acceptable level of pain  Outcome: Completed     Problem: Mobility - Impaired:  Goal: Mobility will improve  Description  Mobility will improve  Outcome: Completed     Problem: Health Behavior:  Goal: Identification of resources available to assist in meeting health care needs will improve  Description  Identification of resources available to assist in meeting health care needs will improve  Outcome: Completed     Problem: Nutritional:  Goal: Consumption of the prescribed amount of daily calories will improve  Description  Consumption of the prescribed amount of daily calories will improve  Outcome: Completed     Problem: Mood - Altered:  Goal: Mood stable  Description  Mood stable  Outcome: Completed

## 2020-02-12 NOTE — CARE COORDINATION
Referral made to [facility], requesting speech therapy ASAP to follow- can be scheduled by Friday, along with nurse and other therapy. PCP, Ben Crawford cannot follow because he had not been seen in their office for quite some time and next available appointment is not until first of March- Dr. Arthur Ervin agreed to be following physician for home care services.

## 2020-02-12 NOTE — PROCEDURES
INSTRUMENTAL SWALLOW REPORT  MODIFIED BARIUM SWALLOW    NAME: Radha Darden    : 1955  MRN: [id number]     Date of Eval: 2020    Ordering Physician: Dr. Ashton Board  Radiologist: Dr. Chandni Cook    Referring Diagnosis:   Pharyngeal dysphagia R13.13    History of Present Illness:  Per Neurology: KGUQ 89 y. o. male  R handed pt admitted to [facility] on on 2020 for stroke evaluation. Pt reports that he has felt dizzy, poor coordination w/his R arm and R leg, numbness in his face, feet and hands. He reports over this same couple of days, that he also had fever and chills, nausea & emesis. He has been SOA and very weak. Pt reports that he stopped smoking 10 years ago, but still smokes intermittently. Influenza swab was positive for influenza A.  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 Dr. Adrien Sanz w/neuro and Dr. Marleen Cuenca w/neurosurgery were consulted. Dr Marleen Cuenca did not see surgical intervention to be necessary. Pt was initially admitted to ICU but was moved to neuro floor on 2020. Notes from [facility] from 16 indicates pt has a hx of seizures, but pt has not been on any anticonvulsants. Pt states his last seizure was in Dec 2015. He is being treated w/tamiflu. He is now medically stable, but continues to be ataxic w/his gait, dysarthric, as well as coordination issues w/R UE/LE. Past Medical History:    has a past medical history of Arthritis, Asthma, COPD (chronic obstructive pulmonary disease) ([facility] [address] Diabetes mellitus ([facility] [address] Hypertension, Seizures ([facility] [address] and Thyroid disease. Past Surgical History:   has a past surgical history that includes Arm Surgery (Left); Mandible surgery; knee surgery (Right);  Eye surgery (Right); and pr colonoscopy flx dx w/collj spec when pfrmd (N/A, 6/26/2018). Current Diet Solid Consistency:   Regular    Current Diet Liquid Consistency:   Mildly Thick (Nectar)       Type of Study:  Initial MBS       Recent CXR/CT of Chest:  Narrative   XR CHEST PORTABLE    1/28/2020 10:43 AM   History: Short of breath. Normal heart and mediastinum. Mild diffuse interstitial lung disease with no pneumonia,   pneumothorax, or congestive failure.       Impression   1. Stable mild chronic change. Signed by Dr Vitaliy Schultz on 1/28/2020 10:44 AM       Patient Complaints/Reason for Referral:  Cali Patten was referred for a MBS to assess the efficiency of his/her swallow function, assess for aspiration, and to make recommendations regarding safe dietary consistencies, effective compensatory strategies, and safe eating environment. Due to consistent overt s/s of aspiration observed with thin liquids by speech therapy an MBSS was requested. The patient has expressed his distaste for thickened liquids and has requested liquid upgrade. The patient was seen by speech therapy on 2/13/20. Extensive education provided for thickening liquids when returning home. Patient was able to correctly state why he was on thickened liquids and the risk for aspiration. SLP demonstrated how to thicken using single packets and with the scoop/can.      Patient had sips of thin liquids with utilization of chin tuck. SLP had to cue each time when taking a drink of thin liquids. Decreased timing of chin tuck resulting in cough episode 9/10 sips via cup. Patient continues to be very impulsive with intake and required cues to slow down and listen to direction.      Swallow exercises were attempted however increased difficulty noted for following of commands secondary to poor attention to task. Behavior/Cognition/Vision/Hearing:  Behavior/Cognition: Alert; Cooperative    Impressions:   The patient exhibited delayed oral transit, extended not eject from the airway. Nectar thick liquids via cup resulted in gross silent aspiration during the swallow without ejection. Thin liquids via cup with a left head turn was suspicious for aspiration as an immediate cough was noted. Due to positioning it was difficult to determine. Honey thick liquids via cup were presented x2 and no penetration or aspiration was observed. Postural Changes and/or Swallow Maneuvers Trialed:   Chin tuck; Head turn left    Oral Phase:   Mild to moderate oral phase dysphagia due to anterior loss/labial spillage, extended oral transit, oral residue, and premature loss of the bolus over the tongue base. Pharyngeal:   Moderate to severe pharyngeal phase dysphagia due to laryngeal penetration of thin liquids, aspiration of thin liquids via cup wtih a cough response, silent aspiration of nectar thick liquids. Functional hyolaryngeal elevation, epiglottic inversion, and anterior hyoid excursion. Dysphagia Outcome Severity Scale: Level 2: Moderate Severe dysphagia- Maximum assistance or maximum use of strategies with partial PO only    Penetration-Aspiration Scale (PAS): 8 - Material Enters the airway, passes below the vocal folds, and no effort is made to eject    Recommended Diet:  Solid consistency: Regular  Liquid consistency: Moderately Thick (Honey)  Liquid administration via: Cup(NO STRAWS)    Medication administration: Meds in puree(Meds whole in applesauce or pudding)    Safe Swallow Protocol:  Supervision: 1:1  Compensatory Swallowing Strategies: Alternate solids and liquids;Upright as possible for all oral intake;Swallow 2 times per bite/sip; Remain upright for 30-45 minutes after meals;Small bites/sips; No straws;Eat/Feed slowly; Check for pocketing of food on the Left    Recommendations/Treatment  Requires SLP Intervention: Yes  Recommendations: F/U MBS     D/C Recommendations:   Home ST      Recommended Exercises:   Therapeutic Interventions: Patient/Family education;Diet tolerance monitoring;Pharyngeal exercises;Oral care;Oral motor exercises;[place] Protocol    Referral To:  (Home health speech therapy services are recommended for dysphagia, dysarthria and cognition)    Education:   Images and recommendations were reviewed with the patient following this exam.     Patient Education:   Skilled education provided regarding swallowing anatomy and physiology. Results of the MBSS were reviewed in detail. A handout with his swallowing precautions and diet recommendations were reviewed and presented to the patient. Instructions for thickening to honey thick were reviewed. He was given a bag of ThickenUp clear packets to last until he can purchase thickener for home use. Patient Education Response: Verbalizes understanding      Goals:    Long Term:   Goal 1: Client will maintain adequate hydration/nutrition with optimum safety and efficiency of swallowing function on P.O. intake without overt signs and symptoms of aspiration for the highest appropriate diet level        Short Term:  Goal 1: The patient will tolerate a regular diet honey thick liquids with minimal overt s/s of aspiration. Goal 2: The patient will complete oral care at least two times daily to prevent aspiration of oral bacteria. Goal 3: Staff will monitor safety with oral intake and monitor increased congestion, overt s/s of aspiration, elevated temp and RLL infiltrates on CXR. Goal 4: Patient will utilize tools/strategies to promote increased clarity of speech at word, phrase, and sentence level with provision of mod cues/prompts. Goal 5: Patient will complete oral motor exercises, to promote increased labial and lingual movements for increased clarity of speech, with provision of min cues/prompts. Goal 6: The patient will complete pharyngeal exercises to improve hyolaryngeal elevation and airway protection. Goal 7:  The patient will utilize compensatory strategies small bites/sips,  with

## 2020-02-12 NOTE — PROGRESS NOTES
Occupational Therapy     02/12/20 0815   Restrictions/Precautions   Restrictions/Precautions Fall Risk   General   Diagnosis R PICA territory w/asociated blood products in the infarcted brain parenchyma and trace L cerebral convexity subdural hemorrhage   Subjective   Subjective Pt due to discharge this date. Very eager and had difficulty accepting that he had to wait until sister comes to get him. Pt requested assist packing and calling his sister Cayla Harden to get a hold of her but did get a hold of Elizabeth Bridges. General Comment   Comments DME received and reviewed with patient   Functional Mobility   Functional - Mobility Device Wheelchair   Activity Retrieve items;Transport items; To/From therapy gym; Other   Assist Level Independent   Functional Mobility Comments pt consistently locked w/c throughout session   Bed mobility   Rolling to Right Independent   Supine to Sit Independent   Sit to Supine Independent   Scooting Independent   Transfers   Stand Step Transfers Stand by assistance  (bed to w/c, safe technique)   Long term goals   Long term goal 1 MET   Long term goal 2 MET   Long term goal 3 MET   Long term goal 4 MET   Long term goals 6 MET   Assessment   Performance deficits / Impairments Decreased functional mobility ; Decreased ADL status; Decreased cognition;Decreased balance;Decreased high-level IADLs;Decreased coordination   Assessment Wheelchair and RW received. Pt demonstrates and states safe use.     Timed Code Treatment Minutes 45 Minutes   Activity Tolerance   Activity Tolerance Patient Tolerated treatment well   Safety Devices   Safety Devices in place Yes

## 2020-02-12 NOTE — DISCHARGE SUMMARY
Physical Therapy Discharge Note  DATE:  2020  NAME:  Razia Butts  :  1955  (65 y.o.,male)  MRN:  [id number]    HEIGHT:  Height: 6' (182.9 cm)  WEIGHT:  Weight: 168 lb 1.6 oz (76.2 kg)    PATIENT DIAGNOSIS(ES):    Diagnosis: stroke: left body involvement  Additional Pertinent Hx: hemiplegia and hemiparesis, dysarthria, hx of seizures, ataxia, bradycardia, influenza, nicotine dependence, COPD, hypothyroidism, pneumonia, HTN, DM2, arthritis, asthma, hx of medical non-compliance  RESTRICTIONS/PRECAUTIONS:    Restrictions/Precautions  Restrictions/Precautions: Fall Risk     OVERALL  ORIENTATION STATUS:     PAIN:  Pain Level: 8  Pain Type: Chronic pain    Pain Location: Arm, Hand     Pain Orientation: Right      NEUROLOGICAL                    Motor Control  Gross Motor: BUE targeting act, F- control R (L WFLs)     STRENGTH  Strength RLE  Strength RLE: Exception  Comment: grossly 4/5  Strength LLE  Strength LLE: Exception  Comment: grossly 4/5  ROM  AROM RLE (degrees)  RLE AROM: WFL  AROM LLE (degrees)  LLE AROM : WFL  POSTURE/BALANCE  Balance  Posture: Good  Sitting - Static: Good  Sitting - Dynamic: Good  Standing - Static: Fair  Standing - Dynamic: Fair       ACTIVITY TOLERANCE  Activity Tolerance  Activity Tolerance: Patient Tolerated treatment well      BED MOBILITY  Bed Mobility  Rolling: Modified independent  Supine to Sit: Modified independent  Sit to Supine: Modified independent  Scooting: Supervision  TRANSFERS  Sit to Stand: Stand by assistance      Bed to Chair: Stand by assistance  Car Transfer: Contact guard assistance, Stand by assistance     WHEELCHAIR PROPULSION 1  Propulsion 1  Propulsion: Manual  Level: Level Tile  Method: RLE, LLE  Level of Assistance: Independent  Description/ Details: 2 turns  Distance: [id number]  WHEELCHAIR PROPULSION 2  Propulsion 2  Propulsion: Manual  Level: Level Tile  Method: RUE, LUE  Level of Assistance: Stand by assistance(MORE LABORED WITH INCREASED DIFFICULTY Turns  Assistance Needed: Supervision or touching assistance  CARE Score: 4  Discharge Goal: Independent    Wheel 150 Feet  Assistance Needed: Supervision or touching assistance  CARE Score: 4  Discharge Goal: Independent      Electronically signed by Ani Giles PT on 2/12/20 at 1:16 PM

## 2020-02-12 NOTE — PROGRESS NOTES
displayed good stability when focused, but occassionally displays minimal LOB when distracted or when reaching for objects out of [employer]UM[employer]     SPEECH THERAPY     Precautions: Fall/aspiration   Swallowing Status/Diet: REgular diet with nectar thick liquids        Subjective: Patient alert and cooperative with all therapy tasks.      Objective: Patient continues to recall staff members names. Increased recall of recent/daily events. Patient oriented to place, time, and biographical information.      Patient transferred from bed to wheelchair with no difficulty. Patient does continue to require cues to slow down secondary to impulsivity.      Patient was given a word to create a sentence to address speech intelligibility. Patient completing exercise with 60% intelligibility. Patient requires mod/max cues to utilize dysarthria strategies. Patient unable to recall from previous sessions. Slow rate and overarticulate. When patient does decreased rate of speech improved speech intelligibility is noted. In conversational discourse patient is 60% intelligible or unknown context and unfamiliar listener. Imprecise articulations and distortions noted. Repetition of words with labiodentals, bilabials completed. Patient is noted to have increased difficulty with these sounds.      Effortful swallow completed during therapy. Laryngeal elevation completed on 80% of opportunities given. Laryngeal rocking noted with some attempts.      Sips of thin H2O administered via cup. Patient required cues to tuck his chin on each swallow. With 10 sips of water patient coughing on 5 out of the 10 attempts. Due to impulsivity and ataxia he was unable to master the timing of the chin tuck today with water swallows. Improvement was noted today for use of chin tuck.  Patient continues to require education on swallow function and using chin tuck when consuming thin liquids        Recommended Diet and Intervention  Diet Solids Recommendation: Regular  Liquid Consistency Recommendation: Nectar thick liquids  Recommended Form of Meds: Meds with water followed by swallow  Therapeutic Interventions: Patient/Family education;Diet tolerance monitoring;Pharyngeal exercises;Oral care     Compensatory Swallowing Strategies  Compensatory Swallowing Strategies: Alternate solids and liquids;Upright as possible for all oral intake;Swallow 2 times per bite/sip; Remain upright for 30-45 minutes after meals;Small bites/sips        Continue treatment plan to address cognition and swallow function.      SHORT TERM GOAL #1:  Goal 1: The patient will demonstrate recall of functional information following a (short term) delay with minimal cues in order to increase functional integration in environment.     SHORT TERM GOAL #2:  Goal 2: Identify the item that does not belong in a list of words presented auditorily with 100% accuracy and(min/mod/max) verbal, visual and tactile cues     SHORT TERM GOAL #3:  Goal 3: Identify appropriate solutions to a problem when presented with a field of 4 with minimal cues.     SHORT TERM GOAL #4:  Goal 4: Patient will complete simple/ complex reasoning tasks to improve problem solving and safety awareness with 100% accuracy and minimal cues.     SHORT TERM GOAL #5:  Goal 5: The patient will complete divergent naming tasks (concrete/abstract) with minimal cueing.     Swallowing Short Term Goals  Short-term Goals  Goal 1: The patient will tolerate a regular diet with thin liquids with minimal overt s/s of aspiration. Goal 2: The patient will complete oral care at least two times daily to prevent aspiration of oral bacteria. Goal 3: SLP and staff will monitor safety with oral intake and monitor increased congestion, overt s/s of aspiration, elevated temp and RLL infiltrates on CXR. Goal 4: Patient will utilize tools/strategies to promote increased clarity of speech at word, phrase, and sentence level with provision of mod cues/prompts.    Goal

## 2020-02-14 ENCOUNTER — TELEPHONE (OUTPATIENT)
Dept: NEUROLOGY | Age: 65
End: 2020-02-14

## 2020-02-18 NOTE — TELEPHONE ENCOUNTER
Home health from [facility] called and stated that based on his evaluation today he will no longer be needing any further visits.

## 2020-02-18 NOTE — DISCHARGE SUMMARY
in ventricular size according to radiologist verbal report. Blood pressure medicines were adjusted. Lisinopril cut in half at the time of discharge as Flomax was doubled about 5 days earlier for bladder control. He is now voiding on his own. Pulmonary saw him and he was placed on some Solu-Medrol with bronchodilators. He had no medical difficulties. Disposition upon discharge-improved. (per neurology note)       FIM SCORES:   Comprehension: 4 - Patient understands basic needs 75-90%+ of the time  Expression: 3 - Expresses basic ideas/needs 50-74% of the time  Social Interaction: 5 - Patient is appropriate with supervision/cues  Problem Solving: 3 - Patient solves simple/routine tasks 50%-74%  Memory: 3 - Patient remembers 50%-74% of the time    MBSS completed on 2/12/20  Impressions: The patient exhibited delayed oral transit, extended mastication and delayed swallow initiation. He did exhibit aspiration of thin liquids with a cough response and silent aspiration of nectar thick liquids. Honey thick liquids did prove safest this study. No significant pharyngeal residue was observed following soft solids or solids. He is recommended to receive a regular diet with honey thick liquids. Medications should be whole in applesauce or pudding. He is recommended to receive home health speech therapy services.      Patient Position:   Lateral and Patient Degrees: 90     Oral Mechanism Examination:  Left labial asymmetry, no lingual deviation, right palatal asymmetry. Upper denture. Natural lower dentition.     Consistencies Administered:   Reg solid; Dysphagia Minced and Moist (Dysphagia II); Dysphagia Pureed (Dysphagia I); Pudding teaspoon;Nectar cup     Thin liquids via cup were presented. A minimal amount of silent laryngeal penetration during the swallow was noted. This was ejected with swallow completion. A minimal amount of base of tongue, and vallecular residue did clear with a cued dry swallow.  A second presentation of thin liquids via cup resulted in premature loss of the bolus over the tongue base. Thin liquids via cup with a chin tuck resulted in gross aspiration during the swallow. An immediate cough response did not eject the material from the airway. A left head turn with thin liquids via cup did not result in laryngeal penetration or aspiration. Pudding via tsp was tolerated well. Puree resulted in premature loss to the pyriforms. Laryngeal penetration well above the vocal folds was noted with ejection from the airway with swallow completion. Mechanical soft resulted in premature loss to the valleculae then the pyriforms. Extended mastication and oral transit noted. Solid resulted in premature loss to the valleculae then the pyriforms. Thin liquids via cup resulted in a moderate amount of aspiration with an immediate cough response. The material did not eject from the airway. Nectar thick liquids via cup resulted in gross silent aspiration during the swallow without ejection. Thin liquids via cup with a left head turn was suspicious for aspiration as an immediate cough was noted. Due to positioning it was difficult to determine. Honey thick liquids via cup were presented x2 and no penetration or aspiration was observed. Patient continued to have decreased recall for swallow precautions and safety techniques when completing ADL's. Extensive education during his stay on how to thicken liquids when he returned home, however patient continued to have difficulty. Dysarthria strategies also given to patient to improve speech intelligibility. Some improvement noted during his stay for speech intelligibility. Decreased recall of strategies consistent cues required. SHORT TERM GOAL #1:  Goal 1: The patient will demonstrate recall of functional information following a (short term) delay with minimal cues in order to increase functional integration in environment.       SHORT TERM GOAL #2:  Goal 2: Identify the item that does not belong in a list of words presented auditorily with 100% accuracy and(min/mod/max) verbal, visual and tactile cues      SHORT TERM GOAL #3:  Goal 3: Identify appropriate solutions to a problem when presented with a field of 4 with minimal cues. SHORT TERM GOAL #4:  Goal 4: Patient will complete simple/ complex reasoning tasks to improve problem solving and safety awareness with 100% accuracy and minimal cues. SHORT TERM GOAL #5:  Goal 5: The patient will complete divergent naming tasks (concrete/abstract) with minimal cueing. Swallowing Short Term Goals  Short-term Goals  Goal 1: The patient will tolerate a regular diet with thin liquids with minimal overt s/s of aspiration. Goal 2: The patient will complete oral care at least two times daily to prevent aspiration of oral bacteria. Goal 3: SLP and staff will monitor safety with oral intake and monitor increased congestion, overt s/s of aspiration, elevated temp and RLL infiltrates on CXR. Goal 4: Patient will utilize tools/strategies to promote increased clarity of speech at word, phrase, and sentence level with provision of mod cues/prompts. Goal 5: Patient will complete oral motor exercises, to promote increased labial and lingual movements for increased clarity of speech, with provision of min cues/prompts. Goal 6: The patient will complete pharyngeal exercises to improve hyolaryngeal elevation and airway protection. Long-term Goals  Goal 1: The client will use appropriate memory strategies to schedule and recall weekly activities, express needs and recall names to maintain safety and participate socially in functional living environment  Goal 2: Patient will demonstrate functional problem solving skills and provide appropriate solutions to problems in order to improve safety and awareness in functional living environment.   GOAL 3: Client will maintain adequate hydration/nutrition with optimum safety and efficiency of swallowing function on P.O. intake without overt signs and symptoms of aspiration for the highest appropriate diet level    Goals Met: 0STG's     0 LTG's      Plan:  Discharge patient             Discharge Location: home with home health services             Further Speech treatment/recommendations: yes for motor speech, cognition, and swallow function.

## 2020-03-05 ENCOUNTER — TELEPHONE (OUTPATIENT)
Dept: NEUROLOGY | Age: 65
End: 2020-03-05

## 2020-03-07 ENCOUNTER — APPOINTMENT (OUTPATIENT)
Dept: GENERAL RADIOLOGY | Age: 65
DRG: 178 | End: 2020-03-07
Payer: MEDICAID

## 2020-03-07 ENCOUNTER — HOSPITAL ENCOUNTER (INPATIENT)
Age: 65
LOS: 3 days | Discharge: HOME HEALTH | DRG: 178 | End: 2020-03-10
Attending: EMERGENCY MEDICINE | Admitting: FAMILY MEDICINE
Payer: MEDICAID

## 2020-03-07 ENCOUNTER — APPOINTMENT (OUTPATIENT)
Dept: CT IMAGING | Age: 65
DRG: 178 | End: 2020-03-07
Payer: MEDICAID

## 2020-03-07 PROBLEM — I69.30 HISTORY OF STROKE WITH RESIDUAL DEFICIT: Status: ACTIVE | Noted: 2020-03-07

## 2020-03-07 PROBLEM — J43.9 PULMONARY EMPHYSEMA (HCC): Status: ACTIVE | Noted: 2020-03-07

## 2020-03-07 PROBLEM — J18.9 PNEUMONIA DUE TO INFECTIOUS ORGANISM: Status: ACTIVE | Noted: 2020-03-07

## 2020-03-07 PROBLEM — R47.1 DYSARTHRIA: Status: ACTIVE | Noted: 2020-03-07

## 2020-03-07 PROBLEM — J44.0 CHRONIC OBSTRUCTIVE PULMONARY DISEASE WITH ACUTE LOWER RESPIRATORY INFECTION (HCC): Status: ACTIVE | Noted: 2020-03-07

## 2020-03-07 PROBLEM — G40.909 SEIZURE DISORDER (HCC): Status: ACTIVE | Noted: 2020-03-07

## 2020-03-07 PROBLEM — R74.01 TRANSAMINITIS: Status: ACTIVE | Noted: 2020-03-07

## 2020-03-07 PROBLEM — E11.9 TYPE 2 DIABETES MELLITUS, WITHOUT LONG-TERM CURRENT USE OF INSULIN (HCC): Status: ACTIVE | Noted: 2020-03-07

## 2020-03-07 LAB
ALBUMIN SERPL-MCNC: 3.7 G/DL (ref 3.5–5.2)
ALP BLD-CCNC: 84 U/L (ref 40–130)
ALT SERPL-CCNC: 183 U/L (ref 5–41)
ANION GAP SERPL CALCULATED.3IONS-SCNC: 11 MMOL/L (ref 7–19)
AST SERPL-CCNC: 179 U/L (ref 5–40)
BILIRUB SERPL-MCNC: 0.6 MG/DL (ref 0.2–1.2)
BUN BLDV-MCNC: 8 MG/DL (ref 8–23)
CALCIUM SERPL-MCNC: 9.3 MG/DL (ref 8.8–10.2)
CHLORIDE BLD-SCNC: 105 MMOL/L (ref 98–111)
CO2: 26 MMOL/L (ref 22–29)
CREAT SERPL-MCNC: 0.8 MG/DL (ref 0.5–1.2)
GFR NON-AFRICAN AMERICAN: >60
GLUCOSE BLD-MCNC: 120 MG/DL (ref 74–109)
GLUCOSE BLD-MCNC: 144 MG/DL (ref 70–99)
GLUCOSE BLD-MCNC: 187 MG/DL (ref 70–99)
GLUCOSE BLD-MCNC: 187 MG/DL (ref 70–99)
HCT VFR BLD CALC: 50.3 % (ref 42–52)
HEMOGLOBIN: 16.9 G/DL (ref 14–18)
MCH RBC QN AUTO: 30.8 PG (ref 27–31)
MCHC RBC AUTO-ENTMCNC: 33.6 G/DL (ref 33–37)
MCV RBC AUTO: 91.8 FL (ref 80–94)
PDW BLD-RTO: 15.7 % (ref 11.5–14.5)
PERFORMED ON: ABNORMAL
PLATELET # BLD: 435 K/UL (ref 130–400)
PMV BLD AUTO: 9.3 FL (ref 9.4–12.4)
POTASSIUM SERPL-SCNC: 4.5 MMOL/L (ref 3.5–5)
RAPID INFLUENZA  B AGN: NEGATIVE
RAPID INFLUENZA A AGN: NEGATIVE
RBC # BLD: 5.48 M/UL (ref 4.7–6.1)
SODIUM BLD-SCNC: 142 MMOL/L (ref 136–145)
TOTAL PROTEIN: 7.3 G/DL (ref 6.6–8.7)
TROPONIN: <0.01 NG/ML (ref 0–0.03)
WBC # BLD: 10.3 K/UL (ref 4.8–10.8)

## 2020-03-07 PROCEDURE — 71046 X-RAY EXAM CHEST 2 VIEWS: CPT

## 2020-03-07 PROCEDURE — 82947 ASSAY GLUCOSE BLOOD QUANT: CPT

## 2020-03-07 PROCEDURE — 84484 ASSAY OF TROPONIN QUANT: CPT

## 2020-03-07 PROCEDURE — 87040 BLOOD CULTURE FOR BACTERIA: CPT

## 2020-03-07 PROCEDURE — 6370000000 HC RX 637 (ALT 250 FOR IP): Performed by: EMERGENCY MEDICINE

## 2020-03-07 PROCEDURE — 74177 CT ABD & PELVIS W/CONTRAST: CPT

## 2020-03-07 PROCEDURE — 92610 EVALUATE SWALLOWING FUNCTION: CPT

## 2020-03-07 PROCEDURE — 99285 EMERGENCY DEPT VISIT HI MDM: CPT

## 2020-03-07 PROCEDURE — 94640 AIRWAY INHALATION TREATMENT: CPT

## 2020-03-07 PROCEDURE — 6370000000 HC RX 637 (ALT 250 FOR IP): Performed by: FAMILY MEDICINE

## 2020-03-07 PROCEDURE — 80053 COMPREHEN METABOLIC PANEL: CPT

## 2020-03-07 PROCEDURE — 87804 INFLUENZA ASSAY W/OPTIC: CPT

## 2020-03-07 PROCEDURE — 6360000002 HC RX W HCPCS: Performed by: FAMILY MEDICINE

## 2020-03-07 PROCEDURE — 1210000000 HC MED SURG R&B

## 2020-03-07 PROCEDURE — 93005 ELECTROCARDIOGRAM TRACING: CPT | Performed by: EMERGENCY MEDICINE

## 2020-03-07 PROCEDURE — 2700000000 HC OXYGEN THERAPY PER DAY

## 2020-03-07 PROCEDURE — 2580000003 HC RX 258: Performed by: EMERGENCY MEDICINE

## 2020-03-07 PROCEDURE — 6360000002 HC RX W HCPCS: Performed by: EMERGENCY MEDICINE

## 2020-03-07 PROCEDURE — 6360000004 HC RX CONTRAST MEDICATION: Performed by: EMERGENCY MEDICINE

## 2020-03-07 PROCEDURE — 36415 COLL VENOUS BLD VENIPUNCTURE: CPT

## 2020-03-07 PROCEDURE — 2580000003 HC RX 258: Performed by: FAMILY MEDICINE

## 2020-03-07 PROCEDURE — 85027 COMPLETE CBC AUTOMATED: CPT

## 2020-03-07 RX ORDER — IBUPROFEN 200 MG
400 TABLET ORAL ONCE
Status: COMPLETED | OUTPATIENT
Start: 2020-03-07 | End: 2020-03-07

## 2020-03-07 RX ORDER — DEXTROSE MONOHYDRATE 50 MG/ML
100 INJECTION, SOLUTION INTRAVENOUS PRN
Status: DISCONTINUED | OUTPATIENT
Start: 2020-03-07 | End: 2020-03-10 | Stop reason: HOSPADM

## 2020-03-07 RX ORDER — PROMETHAZINE HYDROCHLORIDE 12.5 MG/1
12.5 TABLET ORAL EVERY 6 HOURS PRN
Status: DISCONTINUED | OUTPATIENT
Start: 2020-03-07 | End: 2020-03-10 | Stop reason: HOSPADM

## 2020-03-07 RX ORDER — LEVOFLOXACIN 5 MG/ML
750 INJECTION, SOLUTION INTRAVENOUS EVERY 24 HOURS
Status: DISCONTINUED | OUTPATIENT
Start: 2020-03-07 | End: 2020-03-10 | Stop reason: HOSPADM

## 2020-03-07 RX ORDER — METHYLPREDNISOLONE SODIUM SUCCINATE 40 MG/ML
40 INJECTION, POWDER, LYOPHILIZED, FOR SOLUTION INTRAMUSCULAR; INTRAVENOUS DAILY
Status: DISCONTINUED | OUTPATIENT
Start: 2020-03-07 | End: 2020-03-10 | Stop reason: HOSPADM

## 2020-03-07 RX ORDER — IPRATROPIUM BROMIDE AND ALBUTEROL SULFATE 2.5; .5 MG/3ML; MG/3ML
1 SOLUTION RESPIRATORY (INHALATION) ONCE
Status: COMPLETED | OUTPATIENT
Start: 2020-03-07 | End: 2020-03-07

## 2020-03-07 RX ORDER — LEVOTHYROXINE SODIUM 0.12 MG/1
125 TABLET ORAL DAILY
Status: DISCONTINUED | OUTPATIENT
Start: 2020-03-07 | End: 2020-03-10 | Stop reason: HOSPADM

## 2020-03-07 RX ORDER — FLUOXETINE HYDROCHLORIDE 20 MG/1
20 CAPSULE ORAL DAILY
Status: DISCONTINUED | OUTPATIENT
Start: 2020-03-07 | End: 2020-03-10 | Stop reason: HOSPADM

## 2020-03-07 RX ORDER — ASPIRIN 81 MG/1
81 TABLET, CHEWABLE ORAL DAILY
Status: DISCONTINUED | OUTPATIENT
Start: 2020-03-07 | End: 2020-03-10 | Stop reason: HOSPADM

## 2020-03-07 RX ORDER — AMLODIPINE BESYLATE 10 MG/1
10 TABLET ORAL DAILY
Status: DISCONTINUED | OUTPATIENT
Start: 2020-03-07 | End: 2020-03-10 | Stop reason: HOSPADM

## 2020-03-07 RX ORDER — ACETAMINOPHEN 650 MG/1
650 SUPPOSITORY RECTAL EVERY 6 HOURS PRN
Status: DISCONTINUED | OUTPATIENT
Start: 2020-03-07 | End: 2020-03-10 | Stop reason: HOSPADM

## 2020-03-07 RX ORDER — ONDANSETRON 2 MG/ML
4 INJECTION INTRAMUSCULAR; INTRAVENOUS EVERY 6 HOURS PRN
Status: DISCONTINUED | OUTPATIENT
Start: 2020-03-07 | End: 2020-03-10 | Stop reason: HOSPADM

## 2020-03-07 RX ORDER — DEXTROSE MONOHYDRATE 25 G/50ML
12.5 INJECTION, SOLUTION INTRAVENOUS PRN
Status: DISCONTINUED | OUTPATIENT
Start: 2020-03-07 | End: 2020-03-10 | Stop reason: HOSPADM

## 2020-03-07 RX ORDER — GUAIFENESIN 600 MG/1
600 TABLET, EXTENDED RELEASE ORAL 2 TIMES DAILY
Status: DISCONTINUED | OUTPATIENT
Start: 2020-03-07 | End: 2020-03-10 | Stop reason: HOSPADM

## 2020-03-07 RX ORDER — TAMSULOSIN HYDROCHLORIDE 0.4 MG/1
0.8 CAPSULE ORAL DAILY
Status: DISCONTINUED | OUTPATIENT
Start: 2020-03-07 | End: 2020-03-10 | Stop reason: HOSPADM

## 2020-03-07 RX ORDER — SODIUM CHLORIDE 0.9 % (FLUSH) 0.9 %
10 SYRINGE (ML) INJECTION EVERY 12 HOURS SCHEDULED
Status: DISCONTINUED | OUTPATIENT
Start: 2020-03-07 | End: 2020-03-10 | Stop reason: HOSPADM

## 2020-03-07 RX ORDER — NICOTINE POLACRILEX 4 MG
15 LOZENGE BUCCAL PRN
Status: DISCONTINUED | OUTPATIENT
Start: 2020-03-07 | End: 2020-03-10 | Stop reason: HOSPADM

## 2020-03-07 RX ORDER — LISINOPRIL 10 MG/1
10 TABLET ORAL DAILY
Status: DISCONTINUED | OUTPATIENT
Start: 2020-03-07 | End: 2020-03-10 | Stop reason: HOSPADM

## 2020-03-07 RX ORDER — POLYETHYLENE GLYCOL 3350 17 G/17G
17 POWDER, FOR SOLUTION ORAL DAILY PRN
Status: DISCONTINUED | OUTPATIENT
Start: 2020-03-07 | End: 2020-03-10 | Stop reason: HOSPADM

## 2020-03-07 RX ORDER — SODIUM CHLORIDE 0.9 % (FLUSH) 0.9 %
10 SYRINGE (ML) INJECTION PRN
Status: DISCONTINUED | OUTPATIENT
Start: 2020-03-07 | End: 2020-03-10 | Stop reason: HOSPADM

## 2020-03-07 RX ORDER — ACETAMINOPHEN 325 MG/1
650 TABLET ORAL EVERY 6 HOURS PRN
Status: DISCONTINUED | OUTPATIENT
Start: 2020-03-07 | End: 2020-03-10 | Stop reason: HOSPADM

## 2020-03-07 RX ORDER — IPRATROPIUM BROMIDE AND ALBUTEROL SULFATE 2.5; .5 MG/3ML; MG/3ML
1 SOLUTION RESPIRATORY (INHALATION)
Status: DISCONTINUED | OUTPATIENT
Start: 2020-03-07 | End: 2020-03-10 | Stop reason: HOSPADM

## 2020-03-07 RX ORDER — ATORVASTATIN CALCIUM 80 MG/1
80 TABLET, FILM COATED ORAL NIGHTLY
Status: DISCONTINUED | OUTPATIENT
Start: 2020-03-07 | End: 2020-03-10 | Stop reason: HOSPADM

## 2020-03-07 RX ADMIN — INSULIN LISPRO 1 UNITS: 100 INJECTION, SOLUTION INTRAVENOUS; SUBCUTANEOUS at 21:19

## 2020-03-07 RX ADMIN — IPRATROPIUM BROMIDE AND ALBUTEROL SULFATE 1 AMPULE: .5; 3 SOLUTION RESPIRATORY (INHALATION) at 06:21

## 2020-03-07 RX ADMIN — METHYLPREDNISOLONE SODIUM SUCCINATE 40 MG: 40 INJECTION, POWDER, FOR SOLUTION INTRAMUSCULAR; INTRAVENOUS at 11:30

## 2020-03-07 RX ADMIN — IOPAMIDOL 90 ML: 755 INJECTION, SOLUTION INTRAVENOUS at 07:27

## 2020-03-07 RX ADMIN — LISINOPRIL 10 MG: 10 TABLET ORAL at 11:33

## 2020-03-07 RX ADMIN — AMLODIPINE BESYLATE 10 MG: 10 TABLET ORAL at 11:29

## 2020-03-07 RX ADMIN — VANCOMYCIN HYDROCHLORIDE 1250 MG: 10 INJECTION, POWDER, LYOPHILIZED, FOR SOLUTION INTRAVENOUS at 09:38

## 2020-03-07 RX ADMIN — GUAIFENESIN 600 MG: 600 TABLET, EXTENDED RELEASE ORAL at 11:28

## 2020-03-07 RX ADMIN — IPRATROPIUM BROMIDE AND ALBUTEROL SULFATE 1 AMPULE: .5; 3 SOLUTION RESPIRATORY (INHALATION) at 18:36

## 2020-03-07 RX ADMIN — SODIUM CHLORIDE, PRESERVATIVE FREE 10 ML: 5 INJECTION INTRAVENOUS at 11:31

## 2020-03-07 RX ADMIN — ATORVASTATIN CALCIUM 80 MG: 80 TABLET, FILM COATED ORAL at 21:14

## 2020-03-07 RX ADMIN — INSULIN LISPRO 2 UNITS: 100 INJECTION, SOLUTION INTRAVENOUS; SUBCUTANEOUS at 13:39

## 2020-03-07 RX ADMIN — LEVOTHYROXINE SODIUM 125 MCG: 125 TABLET ORAL at 11:29

## 2020-03-07 RX ADMIN — TAMSULOSIN HYDROCHLORIDE 0.8 MG: 0.4 CAPSULE ORAL at 11:29

## 2020-03-07 RX ADMIN — ASPIRIN 81 MG 81 MG: 81 TABLET ORAL at 11:29

## 2020-03-07 RX ADMIN — PIPERACILLIN SODIUM AND TAZOBACTAM SODIUM 3.38 G: 3; .375 INJECTION, POWDER, LYOPHILIZED, FOR SOLUTION INTRAVENOUS at 17:34

## 2020-03-07 RX ADMIN — LEVOFLOXACIN 750 MG: 5 INJECTION, SOLUTION INTRAVENOUS at 12:12

## 2020-03-07 RX ADMIN — IPRATROPIUM BROMIDE AND ALBUTEROL SULFATE 1 AMPULE: .5; 3 SOLUTION RESPIRATORY (INHALATION) at 10:13

## 2020-03-07 RX ADMIN — VANCOMYCIN HYDROCHLORIDE 1250 MG: 10 INJECTION, POWDER, LYOPHILIZED, FOR SOLUTION INTRAVENOUS at 21:14

## 2020-03-07 RX ADMIN — PIPERACILLIN SODIUM AND TAZOBACTAM SODIUM 4.5 G: 4; .5 INJECTION, POWDER, LYOPHILIZED, FOR SOLUTION INTRAVENOUS at 08:49

## 2020-03-07 RX ADMIN — IBUPROFEN 400 MG: 200 TABLET, FILM COATED ORAL at 06:16

## 2020-03-07 RX ADMIN — FLUOXETINE HYDROCHLORIDE 20 MG: 20 CAPSULE ORAL at 11:30

## 2020-03-07 RX ADMIN — Medication 500 MG: at 07:12

## 2020-03-07 RX ADMIN — ACETAMINOPHEN 650 MG: 325 TABLET ORAL at 11:29

## 2020-03-07 RX ADMIN — GUAIFENESIN 600 MG: 600 TABLET, EXTENDED RELEASE ORAL at 21:14

## 2020-03-07 RX ADMIN — ENOXAPARIN SODIUM 40 MG: 40 INJECTION SUBCUTANEOUS at 11:30

## 2020-03-07 RX ADMIN — IPRATROPIUM BROMIDE AND ALBUTEROL SULFATE 1 AMPULE: .5; 3 SOLUTION RESPIRATORY (INHALATION) at 15:09

## 2020-03-07 RX ADMIN — INSULIN LISPRO 2 UNITS: 100 INJECTION, SOLUTION INTRAVENOUS; SUBCUTANEOUS at 17:41

## 2020-03-07 ASSESSMENT — PAIN DESCRIPTION - LOCATION: LOCATION: CHEST

## 2020-03-07 ASSESSMENT — ENCOUNTER SYMPTOMS
DIARRHEA: 0
VOMITING: 0
ABDOMINAL PAIN: 0
EYE PAIN: 0
COUGH: 1
SHORTNESS OF BREATH: 1

## 2020-03-07 ASSESSMENT — PAIN SCALES - GENERAL
PAINLEVEL_OUTOF10: 0
PAINLEVEL_OUTOF10: 10
PAINLEVEL_OUTOF10: 3

## 2020-03-07 ASSESSMENT — PAIN DESCRIPTION - PAIN TYPE: TYPE: ACUTE PAIN

## 2020-03-07 ASSESSMENT — PAIN DESCRIPTION - DESCRIPTORS: DESCRIPTORS: SHARP

## 2020-03-07 ASSESSMENT — PAIN DESCRIPTION - ORIENTATION: ORIENTATION: RIGHT

## 2020-03-07 NOTE — ED PROVIDER NOTES
Transportation needs     Medical: None     Non-medical: None   Tobacco Use    Smoking status: Former Smoker     Packs/day: 1.00     Years: 40.00     Pack years: 40.00     Types: Cigarettes    Smokeless tobacco: Never Used   Substance and Sexual Activity    Alcohol use: No    Drug use: No    Sexual activity: None   Lifestyle    Physical activity     Days per week: None     Minutes per session: None    Stress: None   Relationships    Social connections     Talks on phone: None     Gets together: None     Attends Congregation service: None     Active member of club or organization: None     Attends meetings of clubs or organizations: None     Relationship status: None    Intimate partner violence     Fear of current or ex partner: None     Emotionally abused: None     Physically abused: None     Forced sexual activity: None   Other Topics Concern    None   Social History Narrative    None       SCREENINGS             PHYSICAL EXAM    (up to 7 for level 4, 8 or more for level 5)     ED Triage Vitals [03/07/20 0543]   BP Temp Temp src Pulse Resp SpO2 Height Weight   (!) 147/108 98.4 °F (36.9 °C) -- 109 18 94 % 6' (1.829 m) 170 lb (77.1 kg)       Physical Exam  Vitals signs reviewed. Constitutional:       General: He is not in acute distress. Appearance: He is well-developed. HENT:      Head: Normocephalic and atraumatic. Mouth/Throat:      Mouth: Mucous membranes are moist.      Pharynx: Oropharynx is clear. Eyes:      General: No scleral icterus. Pupils: Pupils are equal, round, and reactive to light. Comments: Strabismus of right eye. Patient said this is chronic and baseline and that he is blind in right eye. Neck:      Musculoskeletal: Normal range of motion and neck supple. Vascular: No JVD. Cardiovascular:      Rate and Rhythm: Normal rate and regular rhythm. Pulses: Normal pulses. Heart sounds: Normal heart sounds. No murmur.    Pulmonary:      Effort: Pulmonary effort is normal. No respiratory distress. Breath sounds: Wheezing present. Chest:      Chest wall: Tenderness (R chest wall) present. Abdominal:      General: There is no distension. Palpations: Abdomen is soft. Tenderness: There is no abdominal tenderness. There is no guarding or rebound. Musculoskeletal:         General: No swelling or tenderness. Skin:     General: Skin is warm and dry. Capillary Refill: Capillary refill takes less than 2 seconds. Neurological:      Mental Status: He is alert and oriented to person, place, and time. Sensory: Sensation is intact. Motor: Motor function is intact. Comments: dysarthria. Patient said this is baseline for him   Psychiatric:         Behavior: Behavior normal.         DIAGNOSTIC RESULTS     EKG: All EKG's are interpreted by the Emergency Department Physician who either signs or Co-signs this chart in the absence of a cardiologist.    Sinus tachycardia. Normal QT.  PVC. No evidence of acute ischemia. RADIOLOGY:   Non-plain film images such as CT, Ultrasound and MRI are read by the radiologist. Izaiah Brewster images are visualized and preliminarily interpreted by the emergency physician with the below findings:    Chest: Right lower lobe infiltrate    Interpretation per the Radiologist below, if available at the time of this note:    XR CHEST STANDARD (2 VW)   Final Result   1. Patchy bibasilar infiltrates most likely representing an early   inflammatory process.    Signed by Dr Rama Winters on 3/7/2020 7:24 AM      CT ABDOMEN PELVIS W IV CONTRAST Additional Contrast? None    (Results Pending)           LABS:  Labs Reviewed   CBC - Abnormal; Notable for the following components:       Result Value    RDW 15.7 (*)     Platelets 895 (*)     MPV 9.3 (*)     All other components within normal limits   COMPREHENSIVE METABOLIC PANEL - Abnormal; Notable for the following components:    Glucose 120 (*)      (*)     AST

## 2020-03-07 NOTE — ED TRIAGE NOTES
Pt here with right sided sharp chest pain started yesterday pt states his PCP to looking to see if he has pneumonia

## 2020-03-07 NOTE — PROGRESS NOTES
Nutrition Assessment    Type and Reason for Visit: Initial, Positive Nutrition Screen    Nutrition Recommendations: modify current diet with addition of Carb control    Nutrition Assessment: Pt working with SLP. Diet advanced to General diet with moderately thick liquids. Modified diet with addition of Carb control. Pt had not received diet at time of visit. Malnutrition Assessment:  · Malnutrition Status: No malnutrition  · Context: Acute illness or injury  · Findings of the 6 clinical characteristics of malnutrition (Minimum of 2 out of 6 clinical characteristics is required to make the diagnosis of moderate or severe Protein Calorie Malnutrition based on AND/ASPEN Guidelines):  1. Energy Intake-Unable to assess, Unable to assess    2. Weight Loss-No significant weight loss,    3. Fat Loss-No significant subcutaneous fat loss,    4. Muscle Loss-No significant muscle mass loss,    5. Fluid Accumulation-No significant fluid accumulation, Extremities  6.  Strength-Not measured    Nutrition Risk Level: Low    Nutrition Diagnosis:   · Problem: Swallowing difficulty  · Etiology: related to Cognitive or neurological impairment     Signs and symptoms:  as evidenced by Swallow study results    Objective Information:  · Nutrition-Focused Physical Findings:    · Wound Type: Skin Tears  · Current Nutrition Therapies:  · Oral Diet Orders: General, Moderately Thick   · Oral Diet intake: Unable to assess  · Oral Nutrition Supplement (ONS) Orders: None    · Anthropometric Measures:  · Ht: 6' (182.9 cm)   · Current Body Wt: 170 lb (77.1 kg)  · Admission Body Wt: 170 lb (77.1 kg)(stated)  · Usual Body Wt: 168 lb (76.2 kg)(1/2020)  · Ideal Body Wt: 178 lb (80.7 kg), % Ideal Body 95.5%  · BMI Classification: BMI 18.5 - 24.9 Normal Weight    Nutrition Interventions:   Modify current diet  Continued Inpatient Monitoring    Nutrition Evaluation:   · Evaluation: Goals set   · Goals: po intake 50% or greater. Weight stable.

## 2020-03-07 NOTE — PROGRESS NOTES
Pharmacy Note  Vancomycin Consult    Olman Sandhu is a 63 y.o. male started on Vancomycin for infection; consult received from Dr. Mehdi Hodgson to manage therapy. Also receiving the following antibiotics: zosyn 4.5 g IV x 1 dose in the ED, zithromax 500 mg IV x 1 dose in the ED. Patient Active Problem List   Diagnosis    Impetigo    Skin tear of left upper arm without complication    Acute ischemic stroke (HCC)    Pneumonia due to infectious organism       Allergies:  Codeine     Temp max: 98.4    Recent Labs     03/07/20  0554   BUN 8       Recent Labs     03/07/20  0554   CREATININE 0.8       Recent Labs     03/07/20  0554   WBC 10.3       No intake or output data in the 24 hours ending 03/07/20 0742    Culture Date Source Results   03/07/20 Blood x 2  Sent                  Ht Readings from Last 1 Encounters:   03/07/20 6' (1.829 m)        Wt Readings from Last 1 Encounters:   03/07/20 170 lb (77.1 kg)         Body mass index is 23.06 kg/m². Estimated Creatinine Clearance: 103 mL/min (based on SCr of 0.8 mg/dL). Assessment/Plan:  Will initiate vancomycin 1250 mg IV every 12 hours. Timing of trough level will be determined based on culture results, renal function, and clinical response. Thank you for the consult. Will continue to follow.     Electronically signed by Seth Ash [facility] on 3/7/2020 at 7:42 AM

## 2020-03-07 NOTE — H&P
Pawel Pearson MD   lisinopril (PRINIVIL;ZESTRIL) 10 MG tablet Take 1 tablet by mouth daily 2/13/20   Pawel Pearson MD   amLODIPine [facility]) 10 MG tablet Take 1 tablet by mouth daily 2/12/20   Pawel Pearson MD   guaiFENesin [facility]) 600 MG extended release tablet Take 1 tablet by mouth 2 times daily 2/12/20   Pawel Pearson MD   tamsulosin [facility]) 0.4 MG capsule Take 2 capsules by mouth daily 2/13/20   Pawel Pearson MD   levothyroxine (SYNTHROID) 125 MCG tablet Take 1 tablet by mouth Daily 2/13/20   Pawel Pearson MD       Allergies:  Codeine    Social History:   TOBACCO:   reports that he has quit smoking. His smoking use included cigarettes. He has a 40.00 pack-year smoking history. He has never used smokeless tobacco.  ETOH:   reports no history of alcohol use.     Family History:       Problem Relation Age of Onset    Breast Cancer Mother     Heart Attack Father     High Blood Pressure Sister     High Blood Pressure Brother     Colon Cancer Neg Hx     Colon Polyps Neg Hx     Liver Cancer Neg Hx     Liver Disease Neg Hx     Esophageal Cancer Neg Hx     Rectal Cancer Neg Hx     Stomach Cancer Neg Hx            Physical Exam:    Vitals: BP (!) 148/87   Pulse 71   Temp 97.9 °F (36.6 °C) (Temporal)   Resp 18   Ht 6' (1.829 m)   Wt 170 lb (77.1 kg)   SpO2 90%   BMI 23.06 kg/m²   24HR INTAKE/OUTPUT:  No intake or output data in the 24 hours ending 03/07/20 0916  General appearance: alert and cooperative with exam  HEENT: atraumatic, eyes with clear conjunctiva and normal lids, pupils and irises normal, external ears and nose are normal, lips normal. Neck without masses, lympadenopathy, bruit, thyroid normal  Lungs: no increased work of breathing, diminished breath sounds bilaterally and coarse breath sounds  Heart: regular rate and rhythm, S1, S2 normal, no murmur, click, rub or gallop  Abdomen: soft, non-tender; bowel sounds normal; no masses,  no organomegaly  Extremities: extremities normal without clubbing, atraumatic, no cyanosis or edema  Neurologic: dysarthric speech, normal sensation, alert and oriented, affect and mood appropriate  Skin: no rashes, nodules    CBC:   Recent Labs     03/07/20  0554   WBC 10.3   HGB 16.9   *     BMP:    Recent Labs     03/07/20  0554      K 4.5      CO2 26   BUN 8   CREATININE 0.8   GLUCOSE 120*     Hepatic:   Recent Labs     03/07/20  0554   *   *   BILITOT 0.6   ALKPHOS 84     Troponin T:   Recent Labs     03/07/20  0554   TROPONINI <0.01     ABGs: No results for input(s): PH, PCO2, PO2, HCO3, BE, O2SAT in the last 72 hours. INR: No results for input(s): INR in the last 72 hours. Lactic Acid: No results for input(s): LACTA in the last 72 hours. -----------------------------------------------------------------  PA/lat CXR: patchy bibasilar infiltrates    EKG: not available, reported as sinus tachycardia without acute ischemia or infarction    Assessment and Plan   Principal Problem:    Pneumonia due to infectious organism  Active Problems:    History of stroke with residual deficit    Dysarthria    Transaminitis    Type 2 diabetes mellitus, without long-term current use of insulin (HCC)    Chronic obstructive pulmonary disease with acute lower respiratory infection ([facility]    Seizure disorder ([facility]  Resolved Problems:    * No resolved hospital problems. *    Admit to med/surg. Day #1 levofloxacin, piperacillin/tazobactam, and vancomycin empiric therapy for healthcare-associated pneumonia with need to cover probable aspiration pneumonia and possible Gram negative and resistant organisms. Cultures pending to guide therapy. DuoNebs, supplemental oxygen as needed, steroids at low doses due to COPD.    SLP, PT, OT evaluations. Home medications reconciled. Insulin per scale.     Brittney Harper DO  Admitting Hospitalist

## 2020-03-07 NOTE — ED NOTES
Bed: 05  Expected date:   Expected time:   Means of arrival: [facility]  Comments:  EMS: ANGELA Hartman RN  03/07/20 8771

## 2020-03-07 NOTE — ED NOTES
Pt removed oxygen placed by EMS.   Oxygen down to 88%, placed back on oxygen at 2lpm and increased to 96%     Irma Martin RN  03/07/20 8361

## 2020-03-07 NOTE — PROGRESS NOTES
Speech Language Pathology  Facility/Department: [facility] SURG SERVICES   CLINICAL BEDSIDE SWALLOW EVALUATION    NAME: Merwyn Closs  : 1955  MRN: [id number]  ADMISSION DATE: 3/7/2020   Date of Eval: 3/7/2020  Evaluating Therapist: Annalise Correia MS CCC-SLP    ADMITTING DIAGNOSIS:   has Impetigo; Skin tear of left upper arm without complication; Acute ischemic stroke ([address] Pneumonia due to infectious organism; History of stroke with residual deficit; Dysarthria; Transaminitis; Type 2 diabetes mellitus, without long-term current use of insulin ([address] Chronic obstructive pulmonary disease with acute lower respiratory infection (HCC); and Seizure disorder ([address] on their problem list.    HISTORY OF PRESENT ILLNESS:    Per Physician: 63year old white male presents to the emergency department with complaint of shortness of breath with nonproductive cough for 2 days. Denies fever or chills. Patient has dysarthria from recent stroke, with left hemiparesis as well. Went home with recommendation for thickened liquids and there is some concern regarding his compliance, though the patient claims he has been compliant. No treatment prior to arrival. He claims some recent loose stools. Patient with bibasilar pneumonia on imaging. No exacerbating or relieving factors. Recent Chest Xray/CT of Chest:  Narrative   XR CHEST (2 VW) 3/7/2020 5:15 AM   HISTORY: Fever   COMPARISON: 2020. FINDINGS:    Patchy infiltrates noted in the right lung base. Most likely this is   an early inflammatory process. There are also some patchy infiltrate   in the left lung base. . The cardiomediastinal silhouette and pulmonary   vascularity are within normal limits. The osseous structures and   surrounding soft tissues demonstrate no acute abnormality.       Impression   1. Patchy bibasilar infiltrates most likely representing an early   inflammatory process.    Signed by Dr Andre Pedraza on 3/7/2020 7:24 AM     Current Diet level:  Current Diet : NPO  Current Liquid Diet : NPO        Pain:  Pain Assessment  Pain Level: 10  Pain Type: Acute pain  Pain Location: Chest  Pain Orientation: Right  Pain Descriptors: Sharp    Reason for Referral  Merwyn Closs was referred for a bedside swallow evaluation to assess the efficiency of his swallow function, identify signs and symptoms of aspiration and make recommendations regarding safe dietary consistencies, effective compensatory strategies, and safe eating environment. Impression  Dysphagia Impression : Mild to moderate oral phase dysphagia and moderate pharyngeal phase dysphagia. The patient has a history of silent aspiration with nectar thick liquids and aspiration with a cough repsonse with thin liquids during an MBSS on 2/12/20. Today, the safest liquid tested at bedside was honey thick liquids when he was sitting fully upright. He is recommended to receive a regular diet with honey thick, meds whole in puree and tray set up. Dysphagia Outcome Severity Scale: Level 4: Mild moderate dysphagia- Intermittent supervision/cueing. One - two diet consistencies restricted     Dysphagia Diagnosis: Mild to moderate oral stage dysphagia; Moderate pharyngeal stage dysphagia      Treatment Plan  Requires SLP Intervention: Yes  Duration/Frequency of Treatment: 1-2x/day, 3-5x/week  D/C Recommendations: To be determined       Recommended Diet and Intervention  Diet Solids Recommendation: Regular  Liquid Consistency Recommendation: Moderately Thick (Honey)  Recommended Form of Meds: Whole with puree  Recommendations: Modified barium swallow study(PT may require repeat MBSS)  Therapeutic Interventions: Patient/Family education; Therapeutic PO trials with SLP; Diet tolerance monitoring;Oral motor exercises;Oral care;[place] Protocol;Pharyngeal exercises    Compensatory Swallowing Strategies  Compensatory Swallowing Strategies:  Alternate solids and liquids  Small bites/sips  Upright as possible for all Minced and Moist (Dysphagia II); Thin;Thin - cup;Nectar - cup;Honey - cup; Ice Chips, puree      Oral Motor Deficits  Oral/Motor  Oral Motor: (Upper denture at home, natural lower dentition, right palatal weakness, no labial asymmetry, no lingual deviation)    Oral Phase Dysfunction  Oral Phase  Oral Phase - Comment: Suspect mild to moderate oral phase dysphagia due to suspected premature loss of the bolus over the tongue base with thin liquids and nectar thick liquids. No pocketing observed this date. No labial loss noted. Indicators of Pharyngeal Phase Dysfunction   Pharyngeal Phase   Pharyngeal: Moderate pharyngeal phase dysphagia due to consistent overt s/s of aspiration with thin liquids via cup and nectar thick liquids via cup. Good hyolaryngeal elevation and timely swallow response. Numerous trials of honey thick liquids were tolerated well. No overt s/s of aspiration observed with honey thick liquids, pureed foods, soft foods, or solids when he was fully upright. When he was partially reclined, cough response was observed with honey thick liquids. Prognosis  Good, if he will follow all swallowing precautions      Education  Patient Education: Skilled education provided regarding swallowing anatomy and physiology. Patient Education Response: Verbalizes understanding     The patient will require speech therapy for dysphagia following discharge. He may also require a repeat MBSS to insure safety with oral intake prior to discharge.     Electronically Signed By:  Roe Thomas M.S., CCC-SLP  3/7/2020,9:48 AM.

## 2020-03-08 LAB
ANION GAP SERPL CALCULATED.3IONS-SCNC: 10 MMOL/L (ref 7–19)
BASOPHILS ABSOLUTE: 0 K/UL (ref 0–0.2)
BASOPHILS RELATIVE PERCENT: 0.4 % (ref 0–1)
BUN BLDV-MCNC: 13 MG/DL (ref 8–23)
CALCIUM SERPL-MCNC: 9.3 MG/DL (ref 8.8–10.2)
CHLORIDE BLD-SCNC: 99 MMOL/L (ref 98–111)
CO2: 26 MMOL/L (ref 22–29)
CREAT SERPL-MCNC: 0.9 MG/DL (ref 0.5–1.2)
EOSINOPHILS ABSOLUTE: 0 K/UL (ref 0–0.6)
EOSINOPHILS RELATIVE PERCENT: 0 % (ref 0–5)
GFR NON-AFRICAN AMERICAN: >60
GLUCOSE BLD-MCNC: 115 MG/DL (ref 70–99)
GLUCOSE BLD-MCNC: 120 MG/DL (ref 70–99)
GLUCOSE BLD-MCNC: 157 MG/DL (ref 74–109)
GLUCOSE BLD-MCNC: 186 MG/DL (ref 70–99)
GLUCOSE BLD-MCNC: 204 MG/DL (ref 70–99)
HCT VFR BLD CALC: 45.1 % (ref 42–52)
HEMOGLOBIN: 15 G/DL (ref 14–18)
IMMATURE GRANULOCYTES #: 0.1 K/UL
LYMPHOCYTES ABSOLUTE: 1 K/UL (ref 1.1–4.5)
LYMPHOCYTES RELATIVE PERCENT: 10.4 % (ref 20–40)
MCH RBC QN AUTO: 30.9 PG (ref 27–31)
MCHC RBC AUTO-ENTMCNC: 33.3 G/DL (ref 33–37)
MCV RBC AUTO: 92.8 FL (ref 80–94)
MONOCYTES ABSOLUTE: 0.9 K/UL (ref 0–0.9)
MONOCYTES RELATIVE PERCENT: 9.4 % (ref 0–10)
NEUTROPHILS ABSOLUTE: 7.5 K/UL (ref 1.5–7.5)
NEUTROPHILS RELATIVE PERCENT: 79.1 % (ref 50–65)
PDW BLD-RTO: 15.2 % (ref 11.5–14.5)
PERFORMED ON: ABNORMAL
PLATELET # BLD: 388 K/UL (ref 130–400)
PMV BLD AUTO: 9.3 FL (ref 9.4–12.4)
POTASSIUM REFLEX MAGNESIUM: 4.3 MMOL/L (ref 3.5–5)
RBC # BLD: 4.86 M/UL (ref 4.7–6.1)
SODIUM BLD-SCNC: 135 MMOL/L (ref 136–145)
VANCOMYCIN TROUGH: 12.4 UG/ML (ref 10–20)
WBC # BLD: 9.5 K/UL (ref 4.8–10.8)

## 2020-03-08 PROCEDURE — 6370000000 HC RX 637 (ALT 250 FOR IP): Performed by: FAMILY MEDICINE

## 2020-03-08 PROCEDURE — 6360000002 HC RX W HCPCS: Performed by: EMERGENCY MEDICINE

## 2020-03-08 PROCEDURE — 6360000002 HC RX W HCPCS: Performed by: FAMILY MEDICINE

## 2020-03-08 PROCEDURE — 2580000003 HC RX 258: Performed by: EMERGENCY MEDICINE

## 2020-03-08 PROCEDURE — 82947 ASSAY GLUCOSE BLOOD QUANT: CPT

## 2020-03-08 PROCEDURE — 36415 COLL VENOUS BLD VENIPUNCTURE: CPT

## 2020-03-08 PROCEDURE — 2700000000 HC OXYGEN THERAPY PER DAY

## 2020-03-08 PROCEDURE — 97530 THERAPEUTIC ACTIVITIES: CPT

## 2020-03-08 PROCEDURE — 85025 COMPLETE CBC W/AUTO DIFF WBC: CPT

## 2020-03-08 PROCEDURE — 80048 BASIC METABOLIC PNL TOTAL CA: CPT

## 2020-03-08 PROCEDURE — 2580000003 HC RX 258: Performed by: FAMILY MEDICINE

## 2020-03-08 PROCEDURE — 1210000000 HC MED SURG R&B

## 2020-03-08 PROCEDURE — 94640 AIRWAY INHALATION TREATMENT: CPT

## 2020-03-08 PROCEDURE — 97161 PT EVAL LOW COMPLEX 20 MIN: CPT

## 2020-03-08 PROCEDURE — 80202 ASSAY OF VANCOMYCIN: CPT

## 2020-03-08 RX ADMIN — ASPIRIN 81 MG 81 MG: 81 TABLET ORAL at 09:12

## 2020-03-08 RX ADMIN — GUAIFENESIN 600 MG: 600 TABLET, EXTENDED RELEASE ORAL at 09:12

## 2020-03-08 RX ADMIN — SODIUM CHLORIDE, PRESERVATIVE FREE 10 ML: 5 INJECTION INTRAVENOUS at 21:38

## 2020-03-08 RX ADMIN — VANCOMYCIN HYDROCHLORIDE 1250 MG: 10 INJECTION, POWDER, LYOPHILIZED, FOR SOLUTION INTRAVENOUS at 09:08

## 2020-03-08 RX ADMIN — IPRATROPIUM BROMIDE AND ALBUTEROL SULFATE 1 AMPULE: .5; 3 SOLUTION RESPIRATORY (INHALATION) at 14:04

## 2020-03-08 RX ADMIN — ATORVASTATIN CALCIUM 80 MG: 80 TABLET, FILM COATED ORAL at 21:38

## 2020-03-08 RX ADMIN — IPRATROPIUM BROMIDE AND ALBUTEROL SULFATE 1 AMPULE: .5; 3 SOLUTION RESPIRATORY (INHALATION) at 05:51

## 2020-03-08 RX ADMIN — VANCOMYCIN HYDROCHLORIDE 1250 MG: 10 INJECTION, POWDER, LYOPHILIZED, FOR SOLUTION INTRAVENOUS at 21:38

## 2020-03-08 RX ADMIN — INSULIN LISPRO 2 UNITS: 100 INJECTION, SOLUTION INTRAVENOUS; SUBCUTANEOUS at 21:40

## 2020-03-08 RX ADMIN — LEVOTHYROXINE SODIUM 125 MCG: 125 TABLET ORAL at 05:46

## 2020-03-08 RX ADMIN — PIPERACILLIN SODIUM AND TAZOBACTAM SODIUM 3.38 G: 3; .375 INJECTION, POWDER, LYOPHILIZED, FOR SOLUTION INTRAVENOUS at 21:38

## 2020-03-08 RX ADMIN — ENOXAPARIN SODIUM 40 MG: 40 INJECTION SUBCUTANEOUS at 09:11

## 2020-03-08 RX ADMIN — METHYLPREDNISOLONE SODIUM SUCCINATE 40 MG: 40 INJECTION, POWDER, FOR SOLUTION INTRAMUSCULAR; INTRAVENOUS at 09:11

## 2020-03-08 RX ADMIN — PIPERACILLIN SODIUM AND TAZOBACTAM SODIUM 3.38 G: 3; .375 INJECTION, POWDER, LYOPHILIZED, FOR SOLUTION INTRAVENOUS at 01:27

## 2020-03-08 RX ADMIN — PIPERACILLIN SODIUM AND TAZOBACTAM SODIUM 3.38 G: 3; .375 INJECTION, POWDER, LYOPHILIZED, FOR SOLUTION INTRAVENOUS at 11:36

## 2020-03-08 RX ADMIN — FLUOXETINE HYDROCHLORIDE 20 MG: 20 CAPSULE ORAL at 09:12

## 2020-03-08 RX ADMIN — SODIUM CHLORIDE, PRESERVATIVE FREE 10 ML: 5 INJECTION INTRAVENOUS at 09:14

## 2020-03-08 RX ADMIN — LISINOPRIL 10 MG: 10 TABLET ORAL at 09:14

## 2020-03-08 RX ADMIN — GUAIFENESIN 600 MG: 600 TABLET, EXTENDED RELEASE ORAL at 21:38

## 2020-03-08 RX ADMIN — INSULIN LISPRO 2 UNITS: 100 INJECTION, SOLUTION INTRAVENOUS; SUBCUTANEOUS at 17:28

## 2020-03-08 RX ADMIN — ACETAMINOPHEN 650 MG: 325 TABLET ORAL at 15:10

## 2020-03-08 RX ADMIN — ACETAMINOPHEN 650 MG: 325 TABLET ORAL at 21:38

## 2020-03-08 RX ADMIN — IPRATROPIUM BROMIDE AND ALBUTEROL SULFATE 1 AMPULE: .5; 3 SOLUTION RESPIRATORY (INHALATION) at 18:22

## 2020-03-08 RX ADMIN — TAMSULOSIN HYDROCHLORIDE 0.8 MG: 0.4 CAPSULE ORAL at 09:12

## 2020-03-08 RX ADMIN — ACETAMINOPHEN 650 MG: 325 TABLET ORAL at 05:46

## 2020-03-08 RX ADMIN — AMLODIPINE BESYLATE 10 MG: 10 TABLET ORAL at 09:12

## 2020-03-08 RX ADMIN — LEVOFLOXACIN 750 MG: 5 INJECTION, SOLUTION INTRAVENOUS at 15:11

## 2020-03-08 RX ADMIN — IPRATROPIUM BROMIDE AND ALBUTEROL SULFATE 1 AMPULE: .5; 3 SOLUTION RESPIRATORY (INHALATION) at 10:10

## 2020-03-08 ASSESSMENT — PAIN DESCRIPTION - FREQUENCY
FREQUENCY: INTERMITTENT
FREQUENCY: CONTINUOUS

## 2020-03-08 ASSESSMENT — PAIN SCALES - GENERAL
PAINLEVEL_OUTOF10: 3
PAINLEVEL_OUTOF10: 3
PAINLEVEL_OUTOF10: 6
PAINLEVEL_OUTOF10: 0

## 2020-03-08 ASSESSMENT — PAIN DESCRIPTION - LOCATION
LOCATION: BACK
LOCATION: RIB CAGE

## 2020-03-08 ASSESSMENT — PAIN DESCRIPTION - PAIN TYPE
TYPE: ACUTE PAIN
TYPE: CHRONIC PAIN

## 2020-03-08 ASSESSMENT — PAIN DESCRIPTION - DESCRIPTORS
DESCRIPTORS: SORE
DESCRIPTORS: DISCOMFORT

## 2020-03-08 ASSESSMENT — PAIN DESCRIPTION - DIRECTION: RADIATING_TOWARDS: BACK

## 2020-03-08 ASSESSMENT — PAIN DESCRIPTION - PROGRESSION
CLINICAL_PROGRESSION: GRADUALLY IMPROVING
CLINICAL_PROGRESSION: GRADUALLY IMPROVING

## 2020-03-08 ASSESSMENT — PAIN DESCRIPTION - ONSET
ONSET: GRADUAL
ONSET: PROGRESSIVE

## 2020-03-08 ASSESSMENT — PAIN DESCRIPTION - ORIENTATION
ORIENTATION: LOWER
ORIENTATION: RIGHT

## 2020-03-08 NOTE — PROGRESS NOTES
Physical Therapy    [facility] PHYSICAL THERAPY EVALUATION      Leander Leyva    : 1955  MRN: [id number]   PHYSICIAN:  Cezar Naqvi DO  Primary Problem    Patient Active Problem List   Diagnosis    Impetigo    Skin tear of left upper arm without complication    [facility] ischemic stroke ([facility]    Pneumonia due to infectious organism    History of stroke with residual deficit    Dysarthria    Transaminitis    Type 2 diabetes mellitus, without long-term current use of insulin ([facility])    Chronic obstructive pulmonary disease with acute lower respiratory infection ([facility]    Seizure disorder ([facility])       Rehabilitation Diagnosis:     Pneumonia due to infectious organism, unspecified laterality, unspecified part of lung [J18.9]       SERVICE DATE: 3/8/2020        SUBJECTIVE: Agrees to work with therapy    PRIOR LEVEL OF FUNCTION:    [] Independent in community no assistive device     [] Independent in community with assistive device     [] Independent in the house with assistive device     [x] Transfer only    []     OBJECTIVE:  Orientation      [x] Within normal limits       [] Follows directions one step at a time    [] Unable to follow directions    [] Unable to participate in therapy    ROM     [x] Active     [] Passive     HIP   LEFT:   [x] WFL Flexion:  Extension:      RIGHT: [x] WFL Flexion:  Extension:    KNEE   LEFT:   [x] WFL Flexion:  Extension:   RIGHT: [x] WFL Flexion:  Extension:     STRENGTH    [] Grossly /5 and bilaterally symmetrical.    HIP   LEFT:   [x] WFL Flexion:  Extension:      RIGHT: [x] WFL Flexion:  Extension:    KNEE   LEFT:   [x] WFL Flexion:  Extension:   RIGHT: [x] WFL Flexion:  Extension:     TRANSFERS   Sit to stand   [x] CGA   [] Independent [] Modified Independent [] Stand by / Supervision   [] Minimum  [] Moderate   [] Maximum      Bed to chair   [x] CGA   [] Independent [] Modified Independent [] Stand by / Supervision   [] Minimum  [] Moderate   [] Maximum      Bed mobility   Supine to sit   [] Independent [x] Modified Independent [] Stand by / Supervision   [] Minimum  [] Moderate   [] Maximum        Roll  [] Left  [] Right    [] Independent [x] Modified Independent [] Stand by / Supervision   [] Minimum  [] Moderate   [] Maximum      Scoot  [] Side to side  [] Up and down   [] Independent [x] Modified Independent [] Stand by / Supervision   [] Minimum  [] Moderate   [] Maximum     AMBULATION   Weight bearing:   [x] WBAT [] TTWB [] NWB []      Distance: non-ambulatory     Device:    [] No device [] Rolling Walker [] Nena Tyson [] [employer] [] Hand Held     Assistance:   [] CGA    [] Independent [] Modified Independent [] Stand by / Supervision   [] Minimum  [] Moderate   [] Maximum     Comment:    BALANCE   Sitting    [x] Good  [] Fair    [] Poor   Standing    [] Good  [x] Fair -   [] Poor    ASSESSMENT   [x] Would benefit from skilled physical therapy   [] Independent    [] Unable to participate in physical therapy at this time    PLAN  [] Discharge from skilled physical therapy      Physical therapy to see 4 to 7 X/ week for 2 weeks then reassess.  Plan of care to include:   [] Gait training   [x] Therex   [] Stair training   [] Balance training  [x] Transfer training  [] Bed mobility   [] PreAmbulation  [] Brace/Sling training  [] Family education      GOALS   [] Independent and safe with all functional mobility (MET)    [x] Bed mobility: Independent   [x] Transfers: Independent   [] Ambulation  Feet with  Assistance           Assistive device:    [] No device [] Rolling Walker [] Walker [] [employer] [] Hand Held      RECOMMENDATION FOR TRANSFERS WITH NURSING:      [x] Assist of 1  [] Assist of 2  [] LISETTE pace      RECOMMENDATION FOR DISCHARGE:      [] Home Health  [] Inpatient Rehab  [x] No recommendation    TREATMENT TODAY  3/8/2020  Tyler Pulse    : 1955  MRN: [id number]    AMBULATION   Distance: Non-ambulatory     Device:    [] No device [] Rolling Walker [] Nena Tyson [] [employer] [] Hand Held     Assistance:   [] CGA   [] Independent [] Modified Independent [] Stand by / Supervision   [] Minimum  [] Moderate   [] Maximum     TRANSFERS   Bed to chair   [x] CGA   [] Independent [] Modified Independent [] Stand by / Supervision   [] Minimum  [] Moderate   [] Maximum     Bed mobility   Supine to sit   [] Independent [x] Modified Independent [] Stand by / Supervision   [] Minimum  [] Moderate   [] Maximum        Roll  [] Left  [] Right    [] Independent [x] Modified Independent [] Stand by / Supervision   [] Minimum  [] Moderate   [] Maximum      Scoot  [] Side to side  [] Up and down   [] Independent [x] Modified Independent [] Stand by / Supervision   [] Minimum  [] Moderate   [] Maximum       Comment:      Electronically signed by Daniel Yi PT on 3/8/2020 at 8:33 AM

## 2020-03-08 NOTE — PROGRESS NOTES
Hospitalist Progress Note  3/8/2020 9:02 AM  Subjective:   Admit Date: 3/7/2020  PCP: Hipolito Olmedo MD    Chief Complaint: shortness of breath    Subjective: Patient is complaining of pain in his right anterior lower chest today, states his \"lung is hurting. \" Repeatedly states that he was compliant with all orders at home. I explained that aspiration is possible even following all orders and that he simply has to follow them to minimize the risk. History is otherwise unchanged. Cumulative Hospital History:   3-7: Presents to ED with shortness of breath and cough x2 days. Recent stroke with dysarthria and dysphagia. Bibasilar infiltrate on imaging, admitted for suspected aspiration pneumonia on on levofloxacin, piperacillin/tazobactam, and vancomycin. Steroids, DuoNebs, supplemental oxygen ordered. 3-8: Patient with some diarrhea, C diff antigen pending. Continued pain in right lower lungfield. May need high intensity home physical therapy on discharge. ROS: 14 point review of systems is negative except as specifically addressed above.     DIET CARB CONTROL; Moderately Thick (Honey)    Intake/Output Summary (Last 24 hours) at 3/8/2020 0902  Last data filed at 3/8/2020 0545  Gross per 24 hour   Intake 1320 ml   Output 1075 ml   Net 245 ml     Medications:   dextrose       Current Facility-Administered Medications   Medication Dose Route Frequency Provider Last Rate Last Dose    vancomycin (VANCOCIN) intermittent dosing (placeholder)   Other RX Placeholder Miladis Garces MD        vancomycin (VANCOCIN) 1,250 mg in dextrose 5 % 250 mL IVPB  1,250 mg Intravenous Q12H Miladis Garces MD   Stopped at 03/07/20 2345    levothyroxine (SYNTHROID) tablet 125 mcg  125 mcg Oral Daily Ramon Nash, DO   125 mcg at 03/08/20 0546    lisinopril (PRINIVIL;ZESTRIL) tablet 10 mg  10 mg Oral Daily Ramon Nash, DO   10 mg at 03/07/20 1133    tamsulosin (FLOMAX) capsule 0.8 mg  0.8 mg Oral Daily Ramon Nash, DO   0.8 mg at 03/07/20 1129    guaiFENesin (MUCINEX) extended release tablet 600 mg  600 mg Oral BID [facility], DO   600 mg at 03/07/20 2114    FLUoxetine (PROZAC) capsule 20 mg  20 mg Oral Daily [facility], DO   20 mg at 03/07/20 1130    atorvastatin (LIPITOR) tablet 80 mg  80 mg Oral Nightly [facility], DO   80 mg at 03/07/20 2114    aspirin chewable tablet 81 mg  81 mg Oral Daily [facility], DO   81 mg at 03/07/20 1129    amLODIPine (NORVASC) tablet 10 mg  10 mg Oral Daily [place] Nash, DO   10 mg at 03/07/20 1129    sodium chloride flush 0.9 % injection 10 mL  10 mL Intravenous 2 times per day Charma Slipper, DO   10 mL at 03/07/20 1131    sodium chloride flush 0.9 % injection 10 mL  10 mL Intravenous PRN Charma Slipper, DO        acetaminophen (TYLENOL) tablet 650 mg  650 mg Oral Q6H PRN Charma Slipper, DO   650 mg at 03/08/20 0546    Or    acetaminophen (TYLENOL) suppository 650 mg  650 mg Rectal Q6H PRN Charma Slipper, DO        polyethylene glycol (GLYCOLAX) packet 17 g  17 g Oral Daily PRN Charma Slipper, DO        promethazine (PHENERGAN) tablet 12.5 mg  12.5 mg Oral Q6H PRN Charma Slipper, DO        Or    ondansetron [facility]) injection 4 mg  4 mg Intravenous Q6H PRN Charma Slipper, DO        enoxaparin (LOVENOX) injection 40 mg  40 mg Subcutaneous Daily [facility], DO   40 mg at 03/07/20 1130    ipratropium-albuterol (DUONEB) nebulizer solution 1 ampule  1 ampule Inhalation Q4H [facility], DO   1 ampule at 03/08/20 0551    piperacillin-tazobactam (ZOSYN) 3.375 g in dextrose 5 % 50 mL IVPB extended infusion (mini-bag)  3.375 g Intravenous Q8H [facility], DO 12.5 mL/hr at 03/08/20 0127 3.375 g at 03/08/20 0127    And    levofloxacin (LEVAQUIN) 750 MG/150ML infusion 750 mg  750 mg Intravenous Q24H Florentino Lang,    Stopped at 03/07/20 1219    methylPREDNISolone sodium (SOLU-MEDROL) injection 40 mg  40 mg Intravenous Daily Ramon Nash DO   40 mg at 03/07/20 1130    insulin lispro (HUMALOG) injection vial 0-12 Units  0-12 Units Subcutaneous TID WC rhythm, S1, S2 normal, no murmur, click, rub or gallop  Abdomen: soft, non-tender; bowel sounds normal; no masses,  no organomegaly  Extremities: extremities normal, atraumatic, no cyanosis or edema  Neurologic: stable dysarthria, normal sensation, alert and oriented, affect and mood appropriate  Skin: no rashes, nodules    Assessment and Plan:   Principal Problem:    Pneumonia due to infectious organism  Active Problems:    History of stroke with residual deficit    Dysarthria    Transaminitis    Type 2 diabetes mellitus, without long-term current use of insulin ([facility])    Chronic obstructive pulmonary disease with acute lower respiratory infection ([facility] [address]    Seizure disorder ([facility] [address]  Resolved Problems:    * No resolved hospital problems. *    Day #2 levofloxacin, piperacillin/tazobactam, and vancomycin empiric therapy for healthcare-associated pneumonia with need to cover probable aspiration pneumonia and possible Gram negative and resistant organisms. Cultures pending to guide therapy. PT and SLP evaluations completed. If he discharges back home, will need home SLP and likely high-intensity home PT/OT.     Advance Directive: Full Code    DVT prophylaxis: enoxaparin    Dis[facility] planning[facility]

## 2020-03-09 PROBLEM — J69.0 ASPIRATION PNEUMONIA (HCC): Status: ACTIVE | Noted: 2020-03-07

## 2020-03-09 LAB
ANION GAP SERPL CALCULATED.3IONS-SCNC: 10 MMOL/L (ref 7–19)
BASOPHILS ABSOLUTE: 0 K/UL (ref 0–0.2)
BASOPHILS RELATIVE PERCENT: 0.2 % (ref 0–1)
BUN BLDV-MCNC: 16 MG/DL (ref 8–23)
CALCIUM SERPL-MCNC: 9 MG/DL (ref 8.8–10.2)
CHLORIDE BLD-SCNC: 105 MMOL/L (ref 98–111)
CO2: 26 MMOL/L (ref 22–29)
CREAT SERPL-MCNC: 0.9 MG/DL (ref 0.5–1.2)
EOSINOPHILS ABSOLUTE: 0 K/UL (ref 0–0.6)
EOSINOPHILS RELATIVE PERCENT: 0.1 % (ref 0–5)
GFR NON-AFRICAN AMERICAN: >60
GLUCOSE BLD-MCNC: 106 MG/DL (ref 70–99)
GLUCOSE BLD-MCNC: 113 MG/DL (ref 74–109)
GLUCOSE BLD-MCNC: 146 MG/DL (ref 70–99)
GLUCOSE BLD-MCNC: 146 MG/DL (ref 70–99)
GLUCOSE BLD-MCNC: 182 MG/DL (ref 70–99)
HCT VFR BLD CALC: 45.5 % (ref 42–52)
HEMOGLOBIN: 15 G/DL (ref 14–18)
IMMATURE GRANULOCYTES #: 0.1 K/UL
LYMPHOCYTES ABSOLUTE: 1.7 K/UL (ref 1.1–4.5)
LYMPHOCYTES RELATIVE PERCENT: 10 % (ref 20–40)
MCH RBC QN AUTO: 31.1 PG (ref 27–31)
MCHC RBC AUTO-ENTMCNC: 33 G/DL (ref 33–37)
MCV RBC AUTO: 94.2 FL (ref 80–94)
MONOCYTES ABSOLUTE: 1.5 K/UL (ref 0–0.9)
MONOCYTES RELATIVE PERCENT: 8.8 % (ref 0–10)
NEUTROPHILS ABSOLUTE: 13.3 K/UL (ref 1.5–7.5)
NEUTROPHILS RELATIVE PERCENT: 80.1 % (ref 50–65)
PDW BLD-RTO: 15.3 % (ref 11.5–14.5)
PERFORMED ON: ABNORMAL
PLATELET # BLD: 368 K/UL (ref 130–400)
PMV BLD AUTO: 9.2 FL (ref 9.4–12.4)
POTASSIUM REFLEX MAGNESIUM: 4.6 MMOL/L (ref 3.5–5)
RBC # BLD: 4.83 M/UL (ref 4.7–6.1)
SODIUM BLD-SCNC: 141 MMOL/L (ref 136–145)
WBC # BLD: 16.6 K/UL (ref 4.8–10.8)

## 2020-03-09 PROCEDURE — 36415 COLL VENOUS BLD VENIPUNCTURE: CPT

## 2020-03-09 PROCEDURE — 92522 EVALUATE SPEECH PRODUCTION: CPT

## 2020-03-09 PROCEDURE — 2580000003 HC RX 258: Performed by: FAMILY MEDICINE

## 2020-03-09 PROCEDURE — 2700000000 HC OXYGEN THERAPY PER DAY

## 2020-03-09 PROCEDURE — 97165 OT EVAL LOW COMPLEX 30 MIN: CPT

## 2020-03-09 PROCEDURE — 80048 BASIC METABOLIC PNL TOTAL CA: CPT

## 2020-03-09 PROCEDURE — 97116 GAIT TRAINING THERAPY: CPT

## 2020-03-09 PROCEDURE — 6370000000 HC RX 637 (ALT 250 FOR IP): Performed by: FAMILY MEDICINE

## 2020-03-09 PROCEDURE — 85025 COMPLETE CBC W/AUTO DIFF WBC: CPT

## 2020-03-09 PROCEDURE — 97535 SELF CARE MNGMENT TRAINING: CPT

## 2020-03-09 PROCEDURE — 82947 ASSAY GLUCOSE BLOOD QUANT: CPT

## 2020-03-09 PROCEDURE — 6360000002 HC RX W HCPCS: Performed by: FAMILY MEDICINE

## 2020-03-09 PROCEDURE — 1210000000 HC MED SURG R&B

## 2020-03-09 PROCEDURE — 92526 ORAL FUNCTION THERAPY: CPT

## 2020-03-09 PROCEDURE — 94640 AIRWAY INHALATION TREATMENT: CPT

## 2020-03-09 RX ADMIN — AMLODIPINE BESYLATE 10 MG: 10 TABLET ORAL at 08:39

## 2020-03-09 RX ADMIN — GUAIFENESIN 600 MG: 600 TABLET, EXTENDED RELEASE ORAL at 08:39

## 2020-03-09 RX ADMIN — TAMSULOSIN HYDROCHLORIDE 0.8 MG: 0.4 CAPSULE ORAL at 08:39

## 2020-03-09 RX ADMIN — LEVOTHYROXINE SODIUM 125 MCG: 125 TABLET ORAL at 05:40

## 2020-03-09 RX ADMIN — SODIUM CHLORIDE, PRESERVATIVE FREE 10 ML: 5 INJECTION INTRAVENOUS at 20:53

## 2020-03-09 RX ADMIN — IPRATROPIUM BROMIDE AND ALBUTEROL SULFATE 1 AMPULE: .5; 3 SOLUTION RESPIRATORY (INHALATION) at 10:25

## 2020-03-09 RX ADMIN — LEVOFLOXACIN 750 MG: 5 INJECTION, SOLUTION INTRAVENOUS at 16:07

## 2020-03-09 RX ADMIN — ASPIRIN 81 MG 81 MG: 81 TABLET ORAL at 08:39

## 2020-03-09 RX ADMIN — ACETAMINOPHEN 650 MG: 325 TABLET ORAL at 05:54

## 2020-03-09 RX ADMIN — IPRATROPIUM BROMIDE AND ALBUTEROL SULFATE 1 AMPULE: .5; 3 SOLUTION RESPIRATORY (INHALATION) at 06:26

## 2020-03-09 RX ADMIN — PIPERACILLIN SODIUM AND TAZOBACTAM SODIUM 3.38 G: 3; .375 INJECTION, POWDER, LYOPHILIZED, FOR SOLUTION INTRAVENOUS at 11:56

## 2020-03-09 RX ADMIN — ATORVASTATIN CALCIUM 80 MG: 80 TABLET, FILM COATED ORAL at 20:52

## 2020-03-09 RX ADMIN — ACETAMINOPHEN 650 MG: 325 TABLET ORAL at 11:56

## 2020-03-09 RX ADMIN — LISINOPRIL 10 MG: 10 TABLET ORAL at 08:40

## 2020-03-09 RX ADMIN — IPRATROPIUM BROMIDE AND ALBUTEROL SULFATE 1 AMPULE: .5; 3 SOLUTION RESPIRATORY (INHALATION) at 19:07

## 2020-03-09 RX ADMIN — METHYLPREDNISOLONE SODIUM SUCCINATE 40 MG: 40 INJECTION, POWDER, FOR SOLUTION INTRAMUSCULAR; INTRAVENOUS at 08:39

## 2020-03-09 RX ADMIN — GUAIFENESIN 600 MG: 600 TABLET, EXTENDED RELEASE ORAL at 20:52

## 2020-03-09 RX ADMIN — ENOXAPARIN SODIUM 40 MG: 40 INJECTION SUBCUTANEOUS at 08:39

## 2020-03-09 RX ADMIN — IPRATROPIUM BROMIDE AND ALBUTEROL SULFATE 1 AMPULE: .5; 3 SOLUTION RESPIRATORY (INHALATION) at 14:28

## 2020-03-09 RX ADMIN — PIPERACILLIN SODIUM AND TAZOBACTAM SODIUM 3.38 G: 3; .375 INJECTION, POWDER, LYOPHILIZED, FOR SOLUTION INTRAVENOUS at 04:45

## 2020-03-09 RX ADMIN — FLUOXETINE HYDROCHLORIDE 20 MG: 20 CAPSULE ORAL at 08:39

## 2020-03-09 RX ADMIN — PIPERACILLIN SODIUM AND TAZOBACTAM SODIUM 3.38 G: 3; .375 INJECTION, POWDER, LYOPHILIZED, FOR SOLUTION INTRAVENOUS at 20:52

## 2020-03-09 RX ADMIN — ACETAMINOPHEN 650 MG: 325 TABLET ORAL at 20:52

## 2020-03-09 ASSESSMENT — PAIN SCALES - GENERAL
PAINLEVEL_OUTOF10: 4
PAINLEVEL_OUTOF10: 0
PAINLEVEL_OUTOF10: 7
PAINLEVEL_OUTOF10: 3
PAINLEVEL_OUTOF10: 0
PAINLEVEL_OUTOF10: 10

## 2020-03-09 ASSESSMENT — PAIN DESCRIPTION - PAIN TYPE: TYPE: ACUTE PAIN

## 2020-03-09 NOTE — PROGRESS NOTES
Speech Language Pathology  Facility/Department: [facility] SURG SERVICES    SWALLOW THERAPY  SPEECH PRODUCTION EVALUATION     NAME: Roosevelt Loco  : 1955  MRN: [id number]    ADMISSION DATE: 3/7/2020  ADMITTING DIAGNOSIS: has Impetigo; Skin tear of left upper arm without complication; Acute ischemic stroke ([facility] [address] Pneumonia due to infectious organism; History of stroke with residual deficit; Dysarthria; Transaminitis; Type 2 diabetes mellitus, without long-term current use of insulin ([address] Chronic obstructive pulmonary disease with acute lower respiratory infection ([facility] [address] and Seizure disorder ([facility] [address] on their problem list.    Date of Treat/Eval: 3/9/2020  Evaluating Therapist: Molly James    Reason for Referral  Roosevelt Loco was referred for a bedside swallow evaluation to assess the efficiency of his swallow function, identify signs and symptoms of aspiration and make recommendations regarding safe dietary consistencies, effective compensatory strategies, and safe eating environment. Impression  Monitored patient's swallowing function. Patient exhibits slow, decreased oral prep of more solid consistencies as well as sluggish laryngeal elevation for swallow airway protection. Even so, no outward S/S penetration/aspiration was noted with any regular solid consistency presentation or honey thick liquid presentation administered during treatment session this date.     At this time, would recommend continuation current diet. Self-feed. Meds in pudding/applesauce. Will continue to follow.     Treatment Plan  Requires SLP Intervention: Yes     Recommended Diet and Intervention  Diet Solids Recommendation: Regular solid   Liquid Consistency Recommendation: Honey thick  Recommended Form of Meds: Meds in puree as able  Therapeutic Interventions: Patient/Family education;Diet tolerance monitoring; Therapeutic PO trials with SLP     Compensatory Swallowing Strategies  Compensatory Swallowing Strategies: Upright as possible for all oral intake;Small bites/sips;Eat/Feed slowly; Alternate solids and liquids; Remain upright for 30-45 minutes after meals     Treatment/Goals  Timeframe for Short-term Goals: 1x/day for 3 days   Goal 1: Patient will tolerate regular solid consistency and honey thick liquids with min S/S penetration/aspiration during PO intake. Goal 2: Patient staff will follow swallow safety recommendations to decrease risk of penetration/aspiration during PO intake. Goal 3: Re-assessment of swallow function for potential diet upgrade. Goal 4: Monitor speech production. Goal 5: Patient will complete oral motor, lingual, and pharyngeal exercises with provision of mod cues/prompts. Goal 6: Patient will utilize tools/strategies to promote increased clarity of speech at word, phrase, and sentence level with provision of mod cues/prompts.      General  Chart Reviewed: Yes  Behavior/Cognition: Alert; Cooperative  Communication Observation: (Assessed patient's speech production. Patient exhibits dysarthria primarily characterized by slow speech rate with slow, decreased labial and lingual movements noted. SLP ranked functional intelligibility of speech for unfamiliar listeners at 60-70% without background noise present.)  Follows Directions: Simple   Patient Positioning: Upright in chair  Consistencies Administered: Regular solid; Honey- cup      Oral Motor Deficits  Labial ROM: (Decreased, on the left, during labial retraction trials and labial protrusion trials.)  Labial Strength: Reduced(Decreased during labial compression trials.)  Labial Coordination: Reduced(Slowed movements were noted.)  Lingual ROM: (Decreased during lingual extension trials with no full point achieved; decreased during lingual elevation trials without use of accessory jaw movement.)  Lingual Strength: Reduced  Lingual Coordination: (Slow movements were noted.)     Monitored patient's swallowing function with the following observations noted:     Oral

## 2020-03-09 NOTE — PROGRESS NOTES
03/09/20 1202   Subjective   Subjective Patient in bed agrees to TR to chair   Pain Screening   Patient Currently in Pain No   Vital Signs   Level of Consciousness 0   Oxygen Therapy   O2 Device Nasal cannula   O2 Flow Rate (L/min) 2 L/min   Bed Mobility   Supine to Sit Modified independent   Transfers   Sit to Stand Stand by assistance   Stand to sit Stand by assistance   Bed to Chair Stand by assistance   Stand Pivot Transfers Stand by assistance   Comment Patient up in chair   Ambulation   Ambulation? No   Activity Tolerance   Activity Tolerance Patient Tolerated treatment well   Safety Devices   Type of devices Call light within reach; Left in chair   Physical Therapy    Electronically signed by Ceferino Muse PTA on 3/9/2020 at 12:06 PM

## 2020-03-09 NOTE — PROGRESS NOTES
No  Oxygen Therapy  O2 Device: Nasal cannula  O2 Flow Rate (L/min): 2 L/min     Social/Functional History  Social/Functional History  Lives With: Alone  Type of Home: Apartment([address] )  Bathroom Shower/Tub: Walk-in shower  Bathroom Toilet: Handicap height  Bathroom Equipment: Shower chair, Tub transfer bench  Home Equipment: Jerelene Ty, Rolling walker, Cane  Receives Help From: Home health  Homemaking Assistance: Needs assistance  Shopping: Stand by  Ambulation Assistance: Independent  Transfer Assistance: Independent  Active : No  Occupation: Retired  Additional Comments: Pt has HH daily for a few hours each day; pt sister takes pt to grocery store; pt uses power cart        Objective   Vision: Within Functional Limits  Hearing: Within functional limits    Orientation  Overall Orientation Status: Within Functional Limits  Observation/Palpation  Posture: Good  Balance  Sitting Balance: Stand by assistance  Standing Balance: Stand by assistance  Functional Mobility  Functional - Mobility Device: No device  Assist Level: Minimal assistance  Functional Mobility Comments: pt could benefit from RW in room to complete short ambulatory tasks   Toilet Transfers  Toilet Transfer: Contact guard assistance  Shower Transfers  Shower Transfers: Minimal assistance  Shower Transfers Comments: per clinical assessment   ADL  Feeding: Independent  Grooming: Stand by assistance  UE Bathing: Stand by assistance  LE Bathing: Minimal assistance  UE Dressing: Stand by assistance  LE Dressing: Minimal assistance  Toileting: Minimal assistance  Additional Comments: Pt has HH for bathing/dressing tasks; pt at this time does not need AE  Tone RUE  RUE Tone: Normotonic  Tone LUE  LUE Tone: Normotonic  Coordination  Movements Are Fluid And Coordinated: No  Coordination and Movement description: Gross motor impairments; Fine motor impairments;Decreased accuracy; Decreased speed;Right UE  Quality of Movement Other  Comment: Pt demonstrates dyscoordination of R hand; and R FMC and Strength minimal deficits; pt LUE seems WFL for all FMC/FM strength      Bed mobility  Comment: Deferred 2/2 pt up in chair   Transfers  Sit to stand: Contact guard assistance  Stand to sit: Contact guard assistance     Cognition  Overall Cognitive Status: WFL        Sensation  Overall Sensation Status: Impaired(RUE )        LUE AROM (degrees)  LUE AROM : WFL  Left Hand AROM (degrees)  Left Hand AROM: WFL  RUE AROM (degrees)  RUE AROM : WFL  RUE General AROM: AROM WFL but decreased coordination to raise  Right Hand AROM (degrees)  Right Hand AROM: WFL  LUE Strength  Gross LUE Strength: WFL  RUE Strength  Gross RUE Strength: WFL  RUE Strength Comment: old CVA deficits but strngth 4-/5 for all joints and can achieve full ROM          Plan   Plan  Times per week: 3-5x/wk   Current Treatment Recommendations: Functional Mobility Training, Endurance Training, Equipment Evaluation, Education, & procurement, Self-Care / ADL, Home Management Training, Safety Education & Training, Patient/Caregiver Education & Training, Balance Training       Goals  Short term goals  Time Frame for Short term goals: 1 week  Short term goal 1: Pt will be Modified I for toileting task/transfer  Short term goal 2: Pt will be Modified I for functional mobility  Short term goal 3: Pt will be Modified I to stand at sink for grooming tasks   Patient Goals   Patient goals :  To go home     Electronically signed by Craig Cameron OT on 3/9/2020 at 12:37 PM    Craig Cameron OT

## 2020-03-10 VITALS
SYSTOLIC BLOOD PRESSURE: 123 MMHG | BODY MASS INDEX: 23.03 KG/M2 | HEART RATE: 77 BPM | OXYGEN SATURATION: 93 % | TEMPERATURE: 97.3 F | DIASTOLIC BLOOD PRESSURE: 75 MMHG | WEIGHT: 170 LBS | HEIGHT: 72 IN | RESPIRATION RATE: 18 BRPM

## 2020-03-10 LAB
ANION GAP SERPL CALCULATED.3IONS-SCNC: 11 MMOL/L (ref 7–19)
BASOPHILS ABSOLUTE: 0 K/UL (ref 0–0.2)
BASOPHILS RELATIVE PERCENT: 0.1 % (ref 0–1)
BUN BLDV-MCNC: 19 MG/DL (ref 8–23)
CALCIUM SERPL-MCNC: 9.1 MG/DL (ref 8.8–10.2)
CHLORIDE BLD-SCNC: 103 MMOL/L (ref 98–111)
CO2: 26 MMOL/L (ref 22–29)
CREAT SERPL-MCNC: 1.2 MG/DL (ref 0.5–1.2)
EKG P AXIS: 63 DEGREES
EKG P-R INTERVAL: 134 MS
EKG Q-T INTERVAL: 326 MS
EKG QRS DURATION: 82 MS
EKG QTC CALCULATION (BAZETT): 406 MS
EKG T AXIS: 66 DEGREES
EOSINOPHILS ABSOLUTE: 0 K/UL (ref 0–0.6)
EOSINOPHILS RELATIVE PERCENT: 0 % (ref 0–5)
GFR NON-AFRICAN AMERICAN: >60
GLUCOSE BLD-MCNC: 106 MG/DL (ref 70–99)
GLUCOSE BLD-MCNC: 107 MG/DL (ref 74–109)
GLUCOSE BLD-MCNC: 109 MG/DL (ref 70–99)
HCT VFR BLD CALC: 45.2 % (ref 42–52)
HEMOGLOBIN: 14.8 G/DL (ref 14–18)
IMMATURE GRANULOCYTES #: 0.2 K/UL
LYMPHOCYTES ABSOLUTE: 1.6 K/UL (ref 1.1–4.5)
LYMPHOCYTES RELATIVE PERCENT: 11 % (ref 20–40)
MCH RBC QN AUTO: 31 PG (ref 27–31)
MCHC RBC AUTO-ENTMCNC: 32.7 G/DL (ref 33–37)
MCV RBC AUTO: 94.8 FL (ref 80–94)
MONOCYTES ABSOLUTE: 1.4 K/UL (ref 0–0.9)
MONOCYTES RELATIVE PERCENT: 9.2 % (ref 0–10)
NEUTROPHILS ABSOLUTE: 11.7 K/UL (ref 1.5–7.5)
NEUTROPHILS RELATIVE PERCENT: 78.4 % (ref 50–65)
PDW BLD-RTO: 15.8 % (ref 11.5–14.5)
PERFORMED ON: ABNORMAL
PERFORMED ON: ABNORMAL
PLATELET # BLD: 376 K/UL (ref 130–400)
PMV BLD AUTO: 9.3 FL (ref 9.4–12.4)
POTASSIUM REFLEX MAGNESIUM: 4.4 MMOL/L (ref 3.5–5)
RBC # BLD: 4.77 M/UL (ref 4.7–6.1)
SODIUM BLD-SCNC: 140 MMOL/L (ref 136–145)
WBC # BLD: 14.9 K/UL (ref 4.8–10.8)

## 2020-03-10 PROCEDURE — 80048 BASIC METABOLIC PNL TOTAL CA: CPT

## 2020-03-10 PROCEDURE — 85025 COMPLETE CBC W/AUTO DIFF WBC: CPT

## 2020-03-10 PROCEDURE — 6370000000 HC RX 637 (ALT 250 FOR IP): Performed by: FAMILY MEDICINE

## 2020-03-10 PROCEDURE — 36415 COLL VENOUS BLD VENIPUNCTURE: CPT

## 2020-03-10 PROCEDURE — 93010 ELECTROCARDIOGRAM REPORT: CPT | Performed by: INTERNAL MEDICINE

## 2020-03-10 PROCEDURE — 6360000002 HC RX W HCPCS: Performed by: FAMILY MEDICINE

## 2020-03-10 PROCEDURE — 94640 AIRWAY INHALATION TREATMENT: CPT

## 2020-03-10 PROCEDURE — 82947 ASSAY GLUCOSE BLOOD QUANT: CPT

## 2020-03-10 PROCEDURE — 2700000000 HC OXYGEN THERAPY PER DAY

## 2020-03-10 PROCEDURE — 2580000003 HC RX 258: Performed by: FAMILY MEDICINE

## 2020-03-10 RX ORDER — PREDNISONE 20 MG/1
TABLET ORAL
Qty: 7 TABLET | Refills: 0 | Status: SHIPPED | OUTPATIENT
Start: 2020-03-10 | End: 2020-03-16

## 2020-03-10 RX ORDER — AMOXICILLIN AND CLAVULANATE POTASSIUM 875; 125 MG/1; MG/1
1 TABLET, FILM COATED ORAL 2 TIMES DAILY
Qty: 14 TABLET | Refills: 0 | Status: SHIPPED | OUTPATIENT
Start: 2020-03-10 | End: 2020-03-17

## 2020-03-10 RX ADMIN — IPRATROPIUM BROMIDE AND ALBUTEROL SULFATE 1 AMPULE: .5; 3 SOLUTION RESPIRATORY (INHALATION) at 14:25

## 2020-03-10 RX ADMIN — ASPIRIN 81 MG 81 MG: 81 TABLET ORAL at 07:40

## 2020-03-10 RX ADMIN — IPRATROPIUM BROMIDE AND ALBUTEROL SULFATE 1 AMPULE: .5; 3 SOLUTION RESPIRATORY (INHALATION) at 06:10

## 2020-03-10 RX ADMIN — IPRATROPIUM BROMIDE AND ALBUTEROL SULFATE 1 AMPULE: .5; 3 SOLUTION RESPIRATORY (INHALATION) at 10:01

## 2020-03-10 RX ADMIN — LEVOTHYROXINE SODIUM 125 MCG: 125 TABLET ORAL at 05:55

## 2020-03-10 RX ADMIN — ENOXAPARIN SODIUM 40 MG: 40 INJECTION SUBCUTANEOUS at 07:38

## 2020-03-10 RX ADMIN — AMLODIPINE BESYLATE 10 MG: 10 TABLET ORAL at 07:39

## 2020-03-10 RX ADMIN — LISINOPRIL 10 MG: 10 TABLET ORAL at 07:39

## 2020-03-10 RX ADMIN — ACETAMINOPHEN 650 MG: 325 TABLET ORAL at 05:57

## 2020-03-10 RX ADMIN — GUAIFENESIN 600 MG: 600 TABLET, EXTENDED RELEASE ORAL at 07:39

## 2020-03-10 RX ADMIN — TAMSULOSIN HYDROCHLORIDE 0.8 MG: 0.4 CAPSULE ORAL at 07:40

## 2020-03-10 RX ADMIN — FLUOXETINE HYDROCHLORIDE 20 MG: 20 CAPSULE ORAL at 07:39

## 2020-03-10 RX ADMIN — PIPERACILLIN SODIUM AND TAZOBACTAM SODIUM 3.38 G: 3; .375 INJECTION, POWDER, LYOPHILIZED, FOR SOLUTION INTRAVENOUS at 03:25

## 2020-03-10 RX ADMIN — METHYLPREDNISOLONE SODIUM SUCCINATE 40 MG: 40 INJECTION, POWDER, FOR SOLUTION INTRAMUSCULAR; INTRAVENOUS at 07:39

## 2020-03-10 RX ADMIN — SODIUM CHLORIDE, PRESERVATIVE FREE 10 ML: 5 INJECTION INTRAVENOUS at 07:40

## 2020-03-10 ASSESSMENT — PAIN SCALES - GENERAL: PAINLEVEL_OUTOF10: 7

## 2020-03-10 NOTE — CARE COORDINATION
[address] [facility] notified of patient discharge today. CHARLENE orders received from Any Lucia at Dr Mayda Agustin office. DC Summary and DC med list faxed.   Electronically signed by Phuong Leal RN on 3/10/20 at 2:57 PM CDT

## 2020-03-10 NOTE — DISCHARGE SUMMARY
Cynthia Smith  :  1955  MRN:  [id number]    Admit date:  3/7/2020  Discharge date:      Admitting Physician:  Kurtis Khan DO    Advance Directive: Full Code    Consults: none    Primary Care Physician:  Monica Milner MD    Discharge Diagnoses:  Principal Problem:    Aspiration pneumonia [facility])  Active Problems:    History of stroke with residual deficit    Dysarthria    Transaminitis    Type 2 diabetes mellitus, without long-term current use of insulin ([facility] [address]    Chronic obstructive pulmonary disease with acute lower respiratory infection ([facility] [address]    Seizure disorder ([facility] [address]  Resolved Problems:    * No resolved hospital problems. *      Significant Diagnostic Studies:   Xr Chest Standard (2 Vw)    Result Date: 3/7/2020  XR CHEST (2 VW) 3/7/2020 5:15 AM HISTORY: Fever COMPARISON: 2020. FINDINGS: Patchy infiltrates noted in the right lung base. Most likely this is an early inflammatory process. There are also some patchy infiltrate in the left lung base. . The cardiomediastinal silhouette and pulmonary vascularity are within normal limits. The osseous structures and surrounding soft tissues demonstrate no acute abnormality. 1. Patchy bibasilar infiltrates most likely representing an early inflammatory process. Signed by Dr Davin Carpio on 3/7/2020 7:24 AM    Ct Abdomen Pelvis W Iv Contrast Additional Contrast? None    Result Date: 3/7/2020  EXAMINATION: CT ABDOMEN PELVIS W IV CONTRAST 3/7/2020 7:54 AM HISTORY: CT ABDOMEN PELVIS W IV CONTRAST 3/7/2020 6:30 AM HISTORY: Elevated liver function tests COMPARISON: None. DLP: 1221 mGy cm TECHNIQUE: Following the intravenous administration of contrast, helical CT tomographic images of the abdomen and pelvis were acquired. Coronal reformatted images were also provided for review. FINDINGS: 12 mm nodules present in the left lower lobe. A neoplasm should be excluded. Trace pleural fluid is present in the right chest.. LIVER: No focal liver lesion.  The hepatic vasculature

## 2020-03-11 NOTE — TELEPHONE ENCOUNTER
Christiano Franco called stating the pt was evaluated today 3/11/20 and will complete therapy for 3 weeks to assist the patient with swallowing.

## 2020-03-12 LAB
BLOOD CULTURE, ROUTINE: NORMAL
CULTURE, BLOOD 2: NORMAL

## 2020-03-26 ENCOUNTER — TELEPHONE (OUTPATIENT)
Dept: NEUROLOGY | Age: 65
End: 2020-03-26

## 2020-04-02 NOTE — TELEPHONE ENCOUNTER
Lacie Ken from [facility] care stated that the patient reached 5/10 physical therapy goals and was discharged.

## 2020-08-21 ENCOUNTER — HOSPITAL ENCOUNTER (INPATIENT)
Age: 65
LOS: 5 days | Discharge: NURSING, REHAB OR LONG-TERM CARE | DRG: 190 | End: 2020-08-26
Attending: INTERNAL MEDICINE | Admitting: INTERNAL MEDICINE
Payer: MEDICAID

## 2020-08-21 ENCOUNTER — APPOINTMENT (OUTPATIENT)
Dept: GENERAL RADIOLOGY | Age: 65
DRG: 190 | End: 2020-08-21
Payer: MEDICAID

## 2020-08-21 PROBLEM — J44.1 COPD EXACERBATION (HCC): Status: ACTIVE | Noted: 2020-08-21

## 2020-08-21 LAB
ABO/RH: NORMAL
ADENOVIRUS BY PCR: NOT DETECTED
ALBUMIN SERPL-MCNC: 4.1 G/DL (ref 3.5–5.2)
ALP BLD-CCNC: 57 U/L (ref 40–130)
ALT SERPL-CCNC: 9 U/L (ref 5–41)
ANION GAP SERPL CALCULATED.3IONS-SCNC: 13 MMOL/L (ref 7–19)
ANTIBODY SCREEN: NORMAL
AST SERPL-CCNC: 18 U/L (ref 5–40)
BASE EXCESS ARTERIAL: -0.5 MMOL/L (ref -2–2)
BASOPHILS ABSOLUTE: 0.1 K/UL (ref 0–0.2)
BASOPHILS RELATIVE PERCENT: 1.6 % (ref 0–1)
BILIRUB SERPL-MCNC: 1.2 MG/DL (ref 0.2–1.2)
BORDETELLA PARAPERTUSSIS BY PCR: NOT DETECTED
BORDETELLA PERTUSSIS BY PCR: NOT DETECTED
BUN BLDV-MCNC: 15 MG/DL (ref 8–23)
CALCIUM SERPL-MCNC: 9.4 MG/DL (ref 8.8–10.2)
CARBOXYHEMOGLOBIN ARTERIAL: 2 % (ref 0–5)
CHLAMYDOPHILIA PNEUMONIAE BY PCR: NOT DETECTED
CHLORIDE BLD-SCNC: 101 MMOL/L (ref 98–111)
CO2: 25 MMOL/L (ref 22–29)
CORONAVIRUS 229E BY PCR: NOT DETECTED
CORONAVIRUS HKU1 BY PCR: NOT DETECTED
CORONAVIRUS NL63 BY PCR: NOT DETECTED
CORONAVIRUS OC43 BY PCR: NOT DETECTED
CREAT SERPL-MCNC: 1.4 MG/DL (ref 0.5–1.2)
D DIMER: <0.27 UG/ML FEU (ref 0–0.48)
EOSINOPHILS ABSOLUTE: 0.6 K/UL (ref 0–0.6)
EOSINOPHILS RELATIVE PERCENT: 7.8 % (ref 0–5)
GFR AFRICAN AMERICAN: >59
GFR NON-AFRICAN AMERICAN: 51
GLUCOSE BLD-MCNC: 124 MG/DL (ref 70–99)
GLUCOSE BLD-MCNC: 196 MG/DL (ref 70–99)
GLUCOSE BLD-MCNC: 88 MG/DL (ref 74–109)
HCO3 ARTERIAL: 23.4 MMOL/L (ref 22–26)
HCT VFR BLD CALC: 53 % (ref 42–52)
HEMOGLOBIN, ART, EXTENDED: 18.1 G/DL (ref 14–18)
HEMOGLOBIN: 17.9 G/DL (ref 14–18)
HUMAN METAPNEUMOVIRUS BY PCR: NOT DETECTED
HUMAN RHINOVIRUS/ENTEROVIRUS BY PCR: NOT DETECTED
IMMATURE GRANULOCYTES #: 0 K/UL
INFLUENZA A BY PCR: NOT DETECTED
INFLUENZA B BY PCR: NOT DETECTED
INR BLD: 1.06 (ref 0.88–1.18)
LACTIC ACID: 1.6 MMOL/L (ref 0.5–1.9)
LYMPHOCYTES ABSOLUTE: 1.9 K/UL (ref 1.1–4.5)
LYMPHOCYTES RELATIVE PERCENT: 25.5 % (ref 20–40)
MCH RBC QN AUTO: 31.9 PG (ref 27–31)
MCHC RBC AUTO-ENTMCNC: 33.8 G/DL (ref 33–37)
MCV RBC AUTO: 94.3 FL (ref 80–94)
METHEMOGLOBIN ARTERIAL: 1.3 %
MONOCYTES ABSOLUTE: 1.2 K/UL (ref 0–0.9)
MONOCYTES RELATIVE PERCENT: 16.4 % (ref 0–10)
MYCOPLASMA PNEUMONIAE BY PCR: NOT DETECTED
NEUTROPHILS ABSOLUTE: 3.6 K/UL (ref 1.5–7.5)
NEUTROPHILS RELATIVE PERCENT: 48.4 % (ref 50–65)
O2 CONTENT ARTERIAL: 22.3 ML/DL
O2 SAT, ARTERIAL: 87.9 %
O2 THERAPY: ABNORMAL
PARAINFLUENZA VIRUS 1 BY PCR: NOT DETECTED
PARAINFLUENZA VIRUS 2 BY PCR: NOT DETECTED
PARAINFLUENZA VIRUS 3 BY PCR: NOT DETECTED
PARAINFLUENZA VIRUS 4 BY PCR: NOT DETECTED
PCO2 ARTERIAL: 36 MMHG (ref 35–45)
PDW BLD-RTO: 18.1 % (ref 11.5–14.5)
PERFORMED ON: ABNORMAL
PERFORMED ON: ABNORMAL
PH ARTERIAL: 7.42 (ref 7.35–7.45)
PLATELET # BLD: 250 K/UL (ref 130–400)
PMV BLD AUTO: 9.6 FL (ref 9.4–12.4)
PO2 ARTERIAL: 53 MMHG (ref 80–100)
POTASSIUM REFLEX MAGNESIUM: 4.1 MMOL/L (ref 3.5–5)
POTASSIUM, WHOLE BLOOD: 3.8
PRO-BNP: 126 PG/ML (ref 0–900)
PROTHROMBIN TIME: 13.7 SEC (ref 12–14.6)
RBC # BLD: 5.62 M/UL (ref 4.7–6.1)
RESPIRATORY SYNCYTIAL VIRUS BY PCR: NOT DETECTED
SARS-COV-2, PCR: NOT DETECTED
SODIUM BLD-SCNC: 139 MMOL/L (ref 136–145)
TOTAL PROTEIN: 8 G/DL (ref 6.6–8.7)
TROPONIN: 0.01 NG/ML (ref 0–0.03)
WBC # BLD: 7.4 K/UL (ref 4.8–10.8)

## 2020-08-21 PROCEDURE — 6360000002 HC RX W HCPCS: Performed by: EMERGENCY MEDICINE

## 2020-08-21 PROCEDURE — 96374 THER/PROPH/DIAG INJ IV PUSH: CPT

## 2020-08-21 PROCEDURE — 85610 PROTHROMBIN TIME: CPT

## 2020-08-21 PROCEDURE — 86850 RBC ANTIBODY SCREEN: CPT

## 2020-08-21 PROCEDURE — 36600 WITHDRAWAL OF ARTERIAL BLOOD: CPT

## 2020-08-21 PROCEDURE — 6370000000 HC RX 637 (ALT 250 FOR IP): Performed by: EMERGENCY MEDICINE

## 2020-08-21 PROCEDURE — 85379 FIBRIN DEGRADATION QUANT: CPT

## 2020-08-21 PROCEDURE — 71045 X-RAY EXAM CHEST 1 VIEW: CPT

## 2020-08-21 PROCEDURE — 6370000000 HC RX 637 (ALT 250 FOR IP): Performed by: INTERNAL MEDICINE

## 2020-08-21 PROCEDURE — 87040 BLOOD CULTURE FOR BACTERIA: CPT

## 2020-08-21 PROCEDURE — 6360000002 HC RX W HCPCS: Performed by: INTERNAL MEDICINE

## 2020-08-21 PROCEDURE — 83605 ASSAY OF LACTIC ACID: CPT

## 2020-08-21 PROCEDURE — 0202U NFCT DS 22 TRGT SARS-COV-2: CPT

## 2020-08-21 PROCEDURE — 82947 ASSAY GLUCOSE BLOOD QUANT: CPT

## 2020-08-21 PROCEDURE — 82803 BLOOD GASES ANY COMBINATION: CPT

## 2020-08-21 PROCEDURE — 99283 EMERGENCY DEPT VISIT LOW MDM: CPT

## 2020-08-21 PROCEDURE — 0100U HC RESPIRPTHGN MULT REV TRANS & AMP PRB TECH 21 TRGT: CPT

## 2020-08-21 PROCEDURE — 96375 TX/PRO/DX INJ NEW DRUG ADDON: CPT

## 2020-08-21 PROCEDURE — 2700000000 HC OXYGEN THERAPY PER DAY

## 2020-08-21 PROCEDURE — 99284 EMERGENCY DEPT VISIT MOD MDM: CPT

## 2020-08-21 PROCEDURE — 80053 COMPREHEN METABOLIC PANEL: CPT

## 2020-08-21 PROCEDURE — 1210000000 HC MED SURG R&B

## 2020-08-21 PROCEDURE — 84484 ASSAY OF TROPONIN QUANT: CPT

## 2020-08-21 PROCEDURE — 93005 ELECTROCARDIOGRAM TRACING: CPT

## 2020-08-21 PROCEDURE — 94640 AIRWAY INHALATION TREATMENT: CPT

## 2020-08-21 PROCEDURE — 6370000000 HC RX 637 (ALT 250 FOR IP)

## 2020-08-21 PROCEDURE — 83880 ASSAY OF NATRIURETIC PEPTIDE: CPT

## 2020-08-21 PROCEDURE — 86901 BLOOD TYPING SEROLOGIC RH(D): CPT

## 2020-08-21 PROCEDURE — 90715 TDAP VACCINE 7 YRS/> IM: CPT | Performed by: EMERGENCY MEDICINE

## 2020-08-21 PROCEDURE — 2580000003 HC RX 258: Performed by: EMERGENCY MEDICINE

## 2020-08-21 PROCEDURE — 90471 IMMUNIZATION ADMIN: CPT | Performed by: EMERGENCY MEDICINE

## 2020-08-21 PROCEDURE — 84132 ASSAY OF SERUM POTASSIUM: CPT

## 2020-08-21 PROCEDURE — 86900 BLOOD TYPING SEROLOGIC ABO: CPT

## 2020-08-21 PROCEDURE — 36415 COLL VENOUS BLD VENIPUNCTURE: CPT

## 2020-08-21 PROCEDURE — 2580000003 HC RX 258: Performed by: INTERNAL MEDICINE

## 2020-08-21 PROCEDURE — 85025 COMPLETE CBC W/AUTO DIFF WBC: CPT

## 2020-08-21 RX ORDER — SODIUM CHLORIDE 9 MG/ML
INJECTION, SOLUTION INTRAVENOUS CONTINUOUS
Status: DISCONTINUED | OUTPATIENT
Start: 2020-08-21 | End: 2020-08-26 | Stop reason: HOSPADM

## 2020-08-21 RX ORDER — ARFORMOTEROL TARTRATE 15 UG/2ML
15 SOLUTION RESPIRATORY (INHALATION) 2 TIMES DAILY
Status: DISCONTINUED | OUTPATIENT
Start: 2020-08-21 | End: 2020-08-26 | Stop reason: HOSPADM

## 2020-08-21 RX ORDER — ONDANSETRON 2 MG/ML
4 INJECTION INTRAMUSCULAR; INTRAVENOUS EVERY 6 HOURS PRN
Status: DISCONTINUED | OUTPATIENT
Start: 2020-08-21 | End: 2020-08-26 | Stop reason: HOSPADM

## 2020-08-21 RX ORDER — SODIUM CHLORIDE 0.9 % (FLUSH) 0.9 %
10 SYRINGE (ML) INJECTION PRN
Status: DISCONTINUED | OUTPATIENT
Start: 2020-08-21 | End: 2020-08-26 | Stop reason: HOSPADM

## 2020-08-21 RX ORDER — METHYLPREDNISOLONE SODIUM SUCCINATE 40 MG/ML
40 INJECTION, POWDER, LYOPHILIZED, FOR SOLUTION INTRAMUSCULAR; INTRAVENOUS EVERY 12 HOURS
Status: DISCONTINUED | OUTPATIENT
Start: 2020-08-21 | End: 2020-08-26 | Stop reason: HOSPADM

## 2020-08-21 RX ORDER — AMLODIPINE BESYLATE 10 MG/1
10 TABLET ORAL DAILY
Status: DISCONTINUED | OUTPATIENT
Start: 2020-08-22 | End: 2020-08-26 | Stop reason: HOSPADM

## 2020-08-21 RX ORDER — ASPIRIN 81 MG/1
81 TABLET, CHEWABLE ORAL DAILY
Status: DISCONTINUED | OUTPATIENT
Start: 2020-08-22 | End: 2020-08-26 | Stop reason: HOSPADM

## 2020-08-21 RX ORDER — DEXTROSE MONOHYDRATE 25 G/50ML
12.5 INJECTION, SOLUTION INTRAVENOUS PRN
Status: DISCONTINUED | OUTPATIENT
Start: 2020-08-21 | End: 2020-08-26 | Stop reason: HOSPADM

## 2020-08-21 RX ORDER — ASPIRIN 325 MG
TABLET ORAL
Status: COMPLETED
Start: 2020-08-21 | End: 2020-08-21

## 2020-08-21 RX ORDER — POTASSIUM CHLORIDE 7.45 MG/ML
10 INJECTION INTRAVENOUS PRN
Status: DISCONTINUED | OUTPATIENT
Start: 2020-08-21 | End: 2020-08-26 | Stop reason: HOSPADM

## 2020-08-21 RX ORDER — FLUOXETINE HYDROCHLORIDE 20 MG/1
20 CAPSULE ORAL DAILY
Status: DISCONTINUED | OUTPATIENT
Start: 2020-08-22 | End: 2020-08-26 | Stop reason: HOSPADM

## 2020-08-21 RX ORDER — METHYLPREDNISOLONE SODIUM SUCCINATE 125 MG/2ML
80 INJECTION, POWDER, LYOPHILIZED, FOR SOLUTION INTRAMUSCULAR; INTRAVENOUS ONCE
Status: COMPLETED | OUTPATIENT
Start: 2020-08-21 | End: 2020-08-21

## 2020-08-21 RX ORDER — ACETAMINOPHEN 650 MG/1
650 SUPPOSITORY RECTAL EVERY 6 HOURS PRN
Status: DISCONTINUED | OUTPATIENT
Start: 2020-08-21 | End: 2020-08-26 | Stop reason: HOSPADM

## 2020-08-21 RX ORDER — SODIUM CHLORIDE 0.9 % (FLUSH) 0.9 %
10 SYRINGE (ML) INJECTION EVERY 12 HOURS SCHEDULED
Status: DISCONTINUED | OUTPATIENT
Start: 2020-08-21 | End: 2020-08-26 | Stop reason: HOSPADM

## 2020-08-21 RX ORDER — POTASSIUM CHLORIDE 20 MEQ/1
40 TABLET, EXTENDED RELEASE ORAL PRN
Status: DISCONTINUED | OUTPATIENT
Start: 2020-08-21 | End: 2020-08-26 | Stop reason: HOSPADM

## 2020-08-21 RX ORDER — NICOTINE POLACRILEX 4 MG
15 LOZENGE BUCCAL PRN
Status: DISCONTINUED | OUTPATIENT
Start: 2020-08-21 | End: 2020-08-26 | Stop reason: HOSPADM

## 2020-08-21 RX ORDER — ACETAMINOPHEN 325 MG/1
650 TABLET ORAL EVERY 6 HOURS PRN
Status: DISCONTINUED | OUTPATIENT
Start: 2020-08-21 | End: 2020-08-26 | Stop reason: HOSPADM

## 2020-08-21 RX ORDER — ASPIRIN 325 MG
325 TABLET ORAL ONCE
Status: COMPLETED | OUTPATIENT
Start: 2020-08-21 | End: 2020-08-21

## 2020-08-21 RX ORDER — ALBUTEROL SULFATE 90 UG/1
2 AEROSOL, METERED RESPIRATORY (INHALATION) EVERY 4 HOURS PRN
Status: DISCONTINUED | OUTPATIENT
Start: 2020-08-21 | End: 2020-08-26 | Stop reason: HOSPADM

## 2020-08-21 RX ORDER — IPRATROPIUM BROMIDE AND ALBUTEROL SULFATE 2.5; .5 MG/3ML; MG/3ML
1 SOLUTION RESPIRATORY (INHALATION)
Status: DISCONTINUED | OUTPATIENT
Start: 2020-08-21 | End: 2020-08-26 | Stop reason: HOSPADM

## 2020-08-21 RX ORDER — POLYETHYLENE GLYCOL 3350 17 G/17G
17 POWDER, FOR SOLUTION ORAL DAILY PRN
Status: DISCONTINUED | OUTPATIENT
Start: 2020-08-21 | End: 2020-08-26 | Stop reason: HOSPADM

## 2020-08-21 RX ORDER — DEXTROSE MONOHYDRATE 50 MG/ML
100 INJECTION, SOLUTION INTRAVENOUS PRN
Status: DISCONTINUED | OUTPATIENT
Start: 2020-08-21 | End: 2020-08-26 | Stop reason: HOSPADM

## 2020-08-21 RX ORDER — ATORVASTATIN CALCIUM 80 MG/1
80 TABLET, FILM COATED ORAL NIGHTLY
Status: DISCONTINUED | OUTPATIENT
Start: 2020-08-21 | End: 2020-08-26 | Stop reason: HOSPADM

## 2020-08-21 RX ORDER — MAGNESIUM SULFATE IN WATER 40 MG/ML
2 INJECTION, SOLUTION INTRAVENOUS PRN
Status: DISCONTINUED | OUTPATIENT
Start: 2020-08-21 | End: 2020-08-26 | Stop reason: HOSPADM

## 2020-08-21 RX ORDER — BUDESONIDE 0.5 MG/2ML
0.5 INHALANT ORAL 2 TIMES DAILY
Status: DISCONTINUED | OUTPATIENT
Start: 2020-08-21 | End: 2020-08-26 | Stop reason: HOSPADM

## 2020-08-21 RX ORDER — TAMSULOSIN HYDROCHLORIDE 0.4 MG/1
0.8 CAPSULE ORAL NIGHTLY
Status: DISCONTINUED | OUTPATIENT
Start: 2020-08-21 | End: 2020-08-26 | Stop reason: HOSPADM

## 2020-08-21 RX ORDER — PROMETHAZINE HYDROCHLORIDE 12.5 MG/1
12.5 TABLET ORAL EVERY 6 HOURS PRN
Status: DISCONTINUED | OUTPATIENT
Start: 2020-08-21 | End: 2020-08-26 | Stop reason: HOSPADM

## 2020-08-21 RX ADMIN — ALBUTEROL SULFATE 2 PUFF: 90 AEROSOL, METERED RESPIRATORY (INHALATION) at 11:12

## 2020-08-21 RX ADMIN — IPRATROPIUM BROMIDE AND ALBUTEROL SULFATE 1 AMPULE: .5; 3 SOLUTION RESPIRATORY (INHALATION) at 19:11

## 2020-08-21 RX ADMIN — SODIUM CHLORIDE: 9 INJECTION, SOLUTION INTRAVENOUS at 19:54

## 2020-08-21 RX ADMIN — TAMSULOSIN HYDROCHLORIDE 0.8 MG: 0.4 CAPSULE ORAL at 19:54

## 2020-08-21 RX ADMIN — METHYLPREDNISOLONE SODIUM SUCCINATE 80 MG: 125 INJECTION, POWDER, FOR SOLUTION INTRAMUSCULAR; INTRAVENOUS at 11:36

## 2020-08-21 RX ADMIN — BUDESONIDE 500 MCG: 0.5 SUSPENSION RESPIRATORY (INHALATION) at 19:12

## 2020-08-21 RX ADMIN — ATORVASTATIN CALCIUM 80 MG: 80 TABLET, FILM COATED ORAL at 19:54

## 2020-08-21 RX ADMIN — Medication 325 MG: at 11:42

## 2020-08-21 RX ADMIN — METHYLPREDNISOLONE SODIUM SUCCINATE 40 MG: 40 INJECTION, POWDER, FOR SOLUTION INTRAMUSCULAR; INTRAVENOUS at 17:51

## 2020-08-21 RX ADMIN — TETANUS TOXOID, REDUCED DIPHTHERIA TOXOID AND ACELLULAR PERTUSSIS VACCINE, ADSORBED 0.5 ML: 5; 2.5; 8; 8; 2.5 SUSPENSION INTRAMUSCULAR at 11:11

## 2020-08-21 RX ADMIN — SODIUM CHLORIDE: 9 INJECTION, SOLUTION INTRAVENOUS at 17:51

## 2020-08-21 RX ADMIN — ASPIRIN 325 MG ORAL TABLET 325 MG: 325 PILL ORAL at 11:42

## 2020-08-21 RX ADMIN — CEFTRIAXONE 1 G: 1 INJECTION, POWDER, FOR SOLUTION INTRAMUSCULAR; INTRAVENOUS at 11:36

## 2020-08-21 RX ADMIN — ARFORMOTEROL TARTRATE 15 MCG: 15 SOLUTION RESPIRATORY (INHALATION) at 19:12

## 2020-08-21 RX ADMIN — Medication 500 MG: at 11:36

## 2020-08-21 ASSESSMENT — ENCOUNTER SYMPTOMS
SHORTNESS OF BREATH: 1
VOMITING: 0
DIARRHEA: 0
ABDOMINAL PAIN: 0
COUGH: 1
EYE PAIN: 0

## 2020-08-21 NOTE — PLAN OF CARE
tolerate increased activity will improve  Description: Ability to tolerate increased activity will improve  Outcome: Ongoing     Problem: Airway Clearance - Ineffective:  Goal: Ability to maintain a clear airway will improve  Description: Ability to maintain a clear airway will improve  Outcome: Ongoing     Problem: Breathing Pattern - Ineffective:  Goal: Ability to achieve and maintain a regular respiratory rate will improve  Description: Ability to achieve and maintain a regular respiratory rate will improve  Outcome: Ongoing     Problem: Gas Exchange - Impaired:  Goal: Levels of oxygenation will improve  Description: Levels of oxygenation will improve  Outcome: Ongoing

## 2020-08-21 NOTE — PROGRESS NOTES
Results for Farnaz Calderon (MRN [id number]) as of 8/21/2020 10:34   Ref.  Range 8/21/2020 10:31   Hemoglobin, Art, Extended Latest Ref Range: 14.0 - 18.0 g/dL 18.1 (H)   pH, Arterial Latest Ref Range: 7.350 - 7.450  7.420   pCO2, Arterial Latest Ref Range: 35.0 - 45.0 mmHg 36.0   pO2, Arterial Latest Ref Range: 80.0 - 100.0 mmHg 53.0 (L)   HCO3, Arterial Latest Ref Range: 22.0 - 26.0 mmol/L 23.4   Base Excess, Arterial Latest Ref Range: -2.0 - 2.0 mmol/L -0.5   O2 Sat, Arterial Latest Ref Range: >92 % 87.9 (LL)   O2 Content, Arterial Latest Ref Range: Not Established mL/dL 22.3   Methemoglobin, Arterial Latest Ref Range: <1.5 % 1.3   Carboxyhgb, Arterial Latest Ref Range: 0.0 - 5.0 % 2.0   Pt on room air, rr, AT+

## 2020-08-21 NOTE — ED PROVIDER NOTES
[facility]  eMERGENCY dEPARTMENT eNCOUnter      Pt Name: Allan Maxwell  MRN: [id number]  [place] 1955  Date of evaluation: 8/21/2020  Provider: María Elena Ledesma MD    CHIEF COMPLAINT       Chief Complaint   Patient presents with    Shortness of Breath     arrived via EMS with co of increased SOA, cough and congestion, hx of COPD, also has bleeding from left upper arm         HISTORY OF PRESENT ILLNESS   (Location/Symptom, Timing/Onset,Context/Setting, Quality, Duration, Modifying Factors, Severity)  Note limiting factors. Allan Maxwell is a 63 y.o. male who presents to the emergency department due to dyspnea. Patient is a very poor historian. Has had a previous stroke and has difficulty with speech from his previous stroke. From what I gather from our conversation it sounds like he has had cough and shortness of breath for the past 2 weeks. Has associated discomfort in his chest and mild discomfort in his throat as well. Also has some aching pain in his left arm. No fevers vomiting or diarrhea. Seems to have some generalized weakness but a little weaker on the right than left. Patient tells me this is baseline for him. He is blind in his right eye and has strabismus which is baseline. Has some scabs on left arm that look to be healing well with a couple areas that are oozing some blood. Denies any pain or injury there. Patient tells me all of his symptoms feel similar to when he had pneumonia before. HPI    NursingNotes were reviewed. REVIEW OF SYSTEMS    (2-9 systems for level 4, 10 or more for level 5)     Review of Systems   Constitutional: Negative for fever. Eyes: Negative for pain. Respiratory: Positive for cough and shortness of breath. Cardiovascular: Positive for chest pain. Negative for palpitations. Gastrointestinal: Negative for abdominal pain, diarrhea and vomiting. Genitourinary: Negative for dysuria. Skin: Negative for rash.    Neurological: Negative for weakness and headaches. All other systems reviewed and are negative. A complete review of systems was performed and is negative except as noted above in the HPI.        PAST MEDICAL HISTORY     Past Medical History:   Diagnosis Date    Arthritis     Asthma     [facility]erebral [facility] occlusion [facility]     COPD (chronic obstructive pulmonary disease) ([facility]     Diabetes mellitus ([facility]     Hypertension     [facility]     Pt states last seizure was in December 2015    Thyroid disease          SURGICAL HISTORY       Past Surgical History:   Procedure Laterality Date    ARM SURGERY Left     EYE SURGERY Right     KNEE SURGERY Right     MANDIBLE SURGERY      MS COLONOSCOPY FLX DX W/COLLJ SPEC WHEN PFRMD N/A 6/26/2018    Dr Giovanni France yr recall         CURRENT MEDICATIONS       Current Discharge Medication List      CONTINUE these medications which have NOT CHANGED    Details   aspirin 81 MG chewable tablet Take 1 tablet by mouth daily  Qty: 30 tablet, Refills: 3      FLUoxetine (PROZAC) 20 MG capsule Take 1 capsule by mouth daily  Qty: 30 capsule, Refills: 3      atorvastatin (LIPITOR) 80 MG tablet Take 1 tablet by mouth nightly  Qty: 30 tablet, Refills: 3      lisinopril (PRINIVIL;ZESTRIL) 10 MG tablet Take 1 tablet by mouth daily  Qty: 30 tablet, Refills: 3      amLODIPine (NORVASC) 10 MG tablet Take 1 tablet by mouth daily  Qty: 30 tablet, Refills: 3      tamsulosin (FLOMAX) 0.4 MG capsule Take 2 capsules by mouth daily  Qty: 60 capsule, Refills: 3             ALLERGIES     Codeine    FAMILY HISTORY       Family History   Problem Relation Age of Onset    Breast Cancer Mother     Heart Attack Father     High Blood Pressure Sister     High Blood Pressure Brother     Colon Cancer Neg Hx     Colon Polyps Neg Hx     Liver Cancer Neg Hx     Liver Disease Neg Hx     Esophageal Cancer Neg Hx     Rectal Cancer Neg Hx     Stomach Cancer Neg Hx           SOCIAL HISTORY Social History     Socioeconomic History    Marital status: Single     Spouse name: None    Number of children: 0    Years of education: 15    Highest education level: None   Occupational History    None   Social Needs    Financial resource strain: None    Food insecurity     Worry: None     Inability: None    Transportation needs     Medical: None     Non-medical: None   Tobacco Use    Smoking status: Former Smoker     Packs/day: 1.00     Years: 40.00     Pack years: 40.00     Types: Cigarettes    Smokeless tobacco: Never Used   Substance and Sexual Activity    Alcohol use: No    Drug use: No    Sexual activity: None   Lifestyle    Physical activity     Days per week: None     Minutes per session: None    Stress: None   Relationships    Social connections     Talks on phone: None     Gets together: None     Attends Rastafarian service: None     Active member of club or organization: None     Attends meetings of clubs or organizations: None     Relationship status: None    Intimate partner violence     Fear of current or ex partner: None     Emotionally abused: None     Physically abused: None     Forced sexual activity: None   Other Topics Concern    None   Social History Narrative    None       SCREENINGS             PHYSICAL EXAM    (up to 7 for level 4, 8 or more for level 5)     ED Triage Vitals [08/21/20 0849]   BP Temp Temp src Pulse Resp SpO2 Height Weight   (!) 132/116 97.8 °F (36.6 °C) -- 64 20 90 % 5' 10\" (1.778 m) 175 lb (79.4 kg)       Physical Exam  Vitals signs reviewed. Constitutional:       General: He is not in acute distress. Appearance: He is well-developed. HENT:      Head: Normocephalic and atraumatic. Mouth/Throat:      Mouth: Mucous membranes are moist.      Pharynx: Oropharynx is clear. Eyes:      General: No scleral icterus. Comments: Extraocular movement intact in left eye. Blind in right eye. Strabismus.    Neck:      Musculoskeletal: Normal range of motion and neck supple. Vascular: No JVD. Cardiovascular:      Rate and Rhythm: Normal rate and regular rhythm. Heart sounds: Normal heart sounds. Pulmonary:      Effort: Pulmonary effort is normal. No respiratory distress. Breath sounds: Wheezing present. Abdominal:      General: There is no distension. Palpations: Abdomen is soft. Tenderness: There is no abdominal tenderness. There is no guarding or rebound. Musculoskeletal:         General: No tenderness or deformity. Arms:    Skin:     General: Skin is warm and dry. Capillary Refill: Capillary refill takes less than 2 seconds. Neurological:      Mental Status: He is alert and oriented to person, place, and time. Comments: Limited exam.  Severe dysarthria. Generalized weakness which seems to be worse on the right side. Very difficult to get a full history from the patient. He indicates to me this is baseline for him. Psychiatric:         Mood and Affect: Mood normal.         Behavior: Behavior normal.         DIAGNOSTIC RESULTS     EKG: All EKG's are interpreted by the Emergency Department Physician who either signs or Co-signs this chart in the absence of a cardiologist.    Normal sinus rhythm. Normal QT. PACs. Artifact. RADIOLOGY:   Non-plain film images such as CT, Ultrasound and MRI are read by the radiologist. Justice Fester images are visualized and preliminarily interpreted by the emergency physician with the below findings:    Interpretation per the Radiologist below, if available at the time of this note:    XR CHEST PORTABLE   Final Result   1. Linear left basilar densities favorable for atelectasis or   scarring. .   Signed by Dr Kianna Javier on 8/21/2020 11:01 AM            LABS:  Labs Reviewed   CBC WITH AUTO DIFFERENTIAL - Abnormal; Notable for the following components:       Result Value    Hematocrit 53.0 (*)     MCV 94.3 (*)     MCH 31.9 (*)     RDW 18.1 (*)     Neutrophils % 48.4 (*)     Monocytes % 16.4 (*)     Eosinophils % 7.8 (*)     Basophils % 1.6 (*)     Monocytes Absolute 1.20 (*)     All other components within normal limits   COMPREHENSIVE METABOLIC PANEL W/ REFLEX TO MG FOR LOW K - Abnormal; Notable for the following components:    CREATININE 1.4 (*)     GFR Non- 51 (*)     All other components within normal limits   BLOOD GAS, ARTERIAL - Abnormal; Notable for the following components:    pO2, Arterial 53.0 (*)     Hemoglobin, Art, Extended 18.1 (*)     O2 Sat, Arterial 87.9 (*)     All other components within normal limits    Narrative:     CALL  Nash  KLED tel. ,  moses mai rn, 08/21/2020 10:33, by [facility]   POCT GLUCOSE - Abnormal; Notable for the following components:    POC Glucose 124 (*)     All other components within normal limits   RESPIRATORY PANEL, MOLECULAR   CULTURE, BLOOD 1   CULTURE, BLOOD 2   PROTIME-INR   D-DIMER, QUANTITATIVE   LACTIC ACID, PLASMA   BRAIN NATRIURETIC PEPTIDE   POTASSIUM, WHOLE BLOOD   TYPE AND SCREEN       All other labs were within normal range or not returned as of this dictation. EMERGENCY DEPARTMENT COURSE and DIFFERENTIALDIAGNOSIS/MDM:   Vitals:    Vitals:    08/21/20 0849 08/21/20 1205 08/21/20 1232   BP: (!) 132/116 115/82 101/70   Pulse: 64 72 63   Resp: 20 22 19   Temp: 97.8 °F (36.6 °C)     SpO2: 90%  94%   Weight: 175 lb (79.4 kg)     Height: 5' 10\" (1.778 m)         MDM  Patient seems much more comfortable after albuterol. In no distress. O2 sats improved on oxygen. Wheezing improved. Given symptoms I think the area in the left lung base on x-ray is probably pneumonia. Case discussed with hospitalist, Dr. Vito Lennox, who is agreeable plan of care and admission. Patient updated on plan. CONSULTS:  None    PROCEDURES:  Unless otherwise notedbelow, none     Procedures    FINAL IMPRESSION     1. COPD exacerbation (HCC)    2. Chest pain, unspecified type    3. Hypoxia    4.  Pneumonia due to organism

## 2020-08-21 NOTE — H&P
[facility] - History & Physical    [id number]  PCP: Toya Benjamin MD  Date of Admission: 8/21/2020   Date of Service: Pt seen/examined on8/21/2020 and Admitted to Inpatient with expected LOS greater than two midnights due to medical therapy. Chief Complaint: Dyspnea    History Of Present Illness: The patient is a 63 y.o. male who presented to the ED complaining of progressively worsening dyspnea associated with productive cough with dark phlegm that has been worsening over the last 2 weeks. Patient denies home bronchodilator usage. Patient states he does not walk at home so he cannot tell me about dyspnea on exertion. Patient has a history of COPD and states he quit smoking approximately 2 weeks ago when this all began but has smoked all his life. Patient otherwise denies chest pain, nausea, vomiting, diarrhea, constipation, fevers, chills, sweats, dysuria, sick contacts, recent travel. Past Medical History:        Diagnosis Date    Arthritis     Asthma     Cerebral artery occlusion with [facility] infarction ([facility]     COPD (chronic obstructive pulmonary disease) ([facility]     Diabetes mellitus ([facility]     Hypertension     Seizures (HCC)     Pt states last seizure was in December 2015    Thyroid disease        Past Surgical History:        Procedure Laterality Date    ARM SURGERY Left     EYE SURGERY Right     KNEE SURGERY Right     MANDIBLE SURGERY      UT COLONOSCOPY FLX DX W/COLLJ SPEC WHEN PFRMD N/A 6/26/2018    Dr Emeka Braxton yr recall       Home Medications:  Prior to Admission medications    Medication Sig Start Date End Date Taking?  Authorizing Provider   aspirin 81 MG chewable tablet Take 1 tablet by mouth daily 2/12/20   Natacha Santa MD   [facility]) 20 MG capsule Take 1 capsule by mouth daily 2/13/20   Natacha Santa MD   atorvastatin (LIPITOR) 80 MG tablet Take 1 tablet by mouth nightly 2/12/20   Natacha Santa MD   lisinopril (PRINIVIL;ZESTRIL) 10 MG tablet Take 1 tablet by mouth daily 2/13/20   Manuel Huerta MD   amLODIPine [facility]) 10 MG tablet Take 1 tablet by mouth daily 2/12/20   Manuel Huerta MD   tamsulosin [facility]) 0.4 MG capsule Take 2 capsules by mouth daily  Patient taking differently: Take 0.8 mg by mouth nightly  2/13/20   Manuel Huerta MD       Allergies:    Codeine    Social History:    Tobacco:   reports that he has quit smoking. His smoking use included cigarettes. He has a 40.00 pack-year smoking history. He has never used smokeless tobacco.  Alcohol:   reports no history of alcohol use.   Illicit Drugs: denies    Family History:      Problem Relation Age of Onset    Breast Cancer Mother     Heart Attack Father     High Blood Pressure Sister     High Blood Pressure Brother     Colon Cancer Neg Hx     Colon Polyps Neg Hx     Liver Cancer Neg Hx     Liver Disease Neg Hx     Esophageal Cancer Neg Hx     Rectal Cancer Neg Hx     Stomach Cancer Neg Hx        Review of Systems:   Constitutional / general:  No fever / chills / sweats  Head:  No headache  Eyes:  No blurry vision  ENT: No sore throat  CV:  No chest pain / palpitations   Respiratory:  +cough / shortness of breath / sputum  GI: No nausea / vomiting / abdominal pain / diarrhea / constipation  :  No dysuria / hesitancy / urgency / hematuria   Neuro: No syncope / seizure  Musculoskeletal:  No joint stiffness  Vascular: No edema  Heme / endocrine: No easy bruising / bleeding  Psychiatric:  No mood changes  Skin:  No new rashes        Physical Examination:  /70   Pulse 63   Temp 97.8 °F (36.6 °C)   Resp 19   Ht 5' 10\" (1.778 m)   Wt 175 lb (79.4 kg)   SpO2 94%   BMI 25.11 kg/m²   General appearance: Alert  Head: NC/AT  Eyes: conjunctivae/corneas clear   Ears: normal external ears  Neck: Supple  Lungs: BLAE, diffuse wheezing, no crackles or rhonchi appreciated  Heart: regular rate and rhythm, S1, S2 normal, no murmurs appreciated   Abdomen: on chronic respiratory failure with hypoxia (HCC) [J96.21]     History of stroke with residual deficit [I69.30] 03/07/2020    Type 2 diabetes mellitus, without long-term current use of insulin ([facility] [E11.9] 03/07/2020    Dysarthria [R47.1] 03/07/2020       MPRESSION / PLAN:  Active Problems:    History of stroke with residual deficit    Dysarthria    Type 2 diabetes mellitus, without long-term current use of insulin ([facility])    COPD exacerbation ([facility]    Hypertension    ZARA (acute kidney injury) ([facility]    Acute on chronic respiratory failure with hypoxia (HCC)  Resolved Problems:    * No resolved hospital problems. *    Acute on chronic respiratory failure with hypoxia: O2 PRN. Bronchodilators. Antibiotics. Steroids. ZARA: IV fluids. Renal ultrasound. Monitor BMP. History of CVA: Aspirin/statin. PT/OT/SLP. Hypertension: Hold home lisinopril. Monitor blood pressure and adjust medications as needed. DM: HbA1c. Insulin sliding scale. Monitor blood glucose and adjust medications as needed. Supportive management.   Full code  DVT prophylaxis: sanjeev Ibarra MD  8/21/2020

## 2020-08-22 ENCOUNTER — APPOINTMENT (OUTPATIENT)
Dept: ULTRASOUND IMAGING | Age: 65
DRG: 190 | End: 2020-08-22
Payer: MEDICAID

## 2020-08-22 LAB
ALBUMIN SERPL-MCNC: 4 G/DL (ref 3.5–5.2)
ALP BLD-CCNC: 52 U/L (ref 40–130)
ALT SERPL-CCNC: 8 U/L (ref 5–41)
ANION GAP SERPL CALCULATED.3IONS-SCNC: 13 MMOL/L (ref 7–19)
AST SERPL-CCNC: 14 U/L (ref 5–40)
BASOPHILS ABSOLUTE: 0 K/UL (ref 0–0.2)
BASOPHILS RELATIVE PERCENT: 0.2 % (ref 0–1)
BILIRUB SERPL-MCNC: 0.8 MG/DL (ref 0.2–1.2)
BUN BLDV-MCNC: 26 MG/DL (ref 8–23)
CALCIUM SERPL-MCNC: 9.6 MG/DL (ref 8.8–10.2)
CHLORIDE BLD-SCNC: 104 MMOL/L (ref 98–111)
CHOLESTEROL, TOTAL: 136 MG/DL (ref 160–199)
CO2: 24 MMOL/L (ref 22–29)
CREAT SERPL-MCNC: 1.4 MG/DL (ref 0.5–1.2)
EOSINOPHILS ABSOLUTE: 0 K/UL (ref 0–0.6)
EOSINOPHILS RELATIVE PERCENT: 0 % (ref 0–5)
GFR AFRICAN AMERICAN: >59
GFR NON-AFRICAN AMERICAN: 51
GLUCOSE BLD-MCNC: 101 MG/DL (ref 70–99)
GLUCOSE BLD-MCNC: 134 MG/DL (ref 70–99)
GLUCOSE BLD-MCNC: 137 MG/DL (ref 74–109)
GLUCOSE BLD-MCNC: 145 MG/DL (ref 70–99)
GLUCOSE BLD-MCNC: 204 MG/DL (ref 70–99)
HBA1C MFR BLD: 5.2 % (ref 4–6)
HCT VFR BLD CALC: 50.2 % (ref 42–52)
HDLC SERPL-MCNC: 66 MG/DL (ref 55–121)
HEMOGLOBIN: 17 G/DL (ref 14–18)
IMMATURE GRANULOCYTES #: 0 K/UL
LDL CHOLESTEROL CALCULATED: 61 MG/DL
LYMPHOCYTES ABSOLUTE: 0.9 K/UL (ref 1.1–4.5)
LYMPHOCYTES RELATIVE PERCENT: 7.6 % (ref 20–40)
MAGNESIUM: 2.4 MG/DL (ref 1.6–2.4)
MCH RBC QN AUTO: 31.8 PG (ref 27–31)
MCHC RBC AUTO-ENTMCNC: 33.9 G/DL (ref 33–37)
MCV RBC AUTO: 93.8 FL (ref 80–94)
MONOCYTES ABSOLUTE: 0.8 K/UL (ref 0–0.9)
MONOCYTES RELATIVE PERCENT: 7.5 % (ref 0–10)
NEUTROPHILS ABSOLUTE: 9.5 K/UL (ref 1.5–7.5)
NEUTROPHILS RELATIVE PERCENT: 84.3 % (ref 50–65)
PDW BLD-RTO: 17.3 % (ref 11.5–14.5)
PERFORMED ON: ABNORMAL
PLATELET # BLD: 265 K/UL (ref 130–400)
PMV BLD AUTO: 10.5 FL (ref 9.4–12.4)
POTASSIUM SERPL-SCNC: 4.3 MMOL/L (ref 3.5–5)
RBC # BLD: 5.35 M/UL (ref 4.7–6.1)
SODIUM BLD-SCNC: 141 MMOL/L (ref 136–145)
TOTAL PROTEIN: 7.4 G/DL (ref 6.6–8.7)
TRIGL SERPL-MCNC: 47 MG/DL (ref 0–149)
TROPONIN: <0.01 NG/ML (ref 0–0.03)
TROPONIN: <0.01 NG/ML (ref 0–0.03)
WBC # BLD: 11.2 K/UL (ref 4.8–10.8)

## 2020-08-22 PROCEDURE — 6360000002 HC RX W HCPCS: Performed by: INTERNAL MEDICINE

## 2020-08-22 PROCEDURE — 1210000000 HC MED SURG R&B

## 2020-08-22 PROCEDURE — 6370000000 HC RX 637 (ALT 250 FOR IP): Performed by: INTERNAL MEDICINE

## 2020-08-22 PROCEDURE — 92522 EVALUATE SPEECH PRODUCTION: CPT

## 2020-08-22 PROCEDURE — 84484 ASSAY OF TROPONIN QUANT: CPT

## 2020-08-22 PROCEDURE — 83735 ASSAY OF MAGNESIUM: CPT

## 2020-08-22 PROCEDURE — 85025 COMPLETE CBC W/AUTO DIFF WBC: CPT

## 2020-08-22 PROCEDURE — 94640 AIRWAY INHALATION TREATMENT: CPT

## 2020-08-22 PROCEDURE — 2700000000 HC OXYGEN THERAPY PER DAY

## 2020-08-22 PROCEDURE — 80061 LIPID PANEL: CPT

## 2020-08-22 PROCEDURE — 76770 US EXAM ABDO BACK WALL COMP: CPT

## 2020-08-22 PROCEDURE — 83036 HEMOGLOBIN GLYCOSYLATED A1C: CPT

## 2020-08-22 PROCEDURE — 36415 COLL VENOUS BLD VENIPUNCTURE: CPT

## 2020-08-22 PROCEDURE — 92610 EVALUATE SWALLOWING FUNCTION: CPT

## 2020-08-22 PROCEDURE — 2580000003 HC RX 258: Performed by: INTERNAL MEDICINE

## 2020-08-22 PROCEDURE — 80053 COMPREHEN METABOLIC PANEL: CPT

## 2020-08-22 PROCEDURE — 82947 ASSAY GLUCOSE BLOOD QUANT: CPT

## 2020-08-22 RX ADMIN — IPRATROPIUM BROMIDE AND ALBUTEROL SULFATE 1 AMPULE: .5; 3 SOLUTION RESPIRATORY (INHALATION) at 06:19

## 2020-08-22 RX ADMIN — IPRATROPIUM BROMIDE AND ALBUTEROL SULFATE 1 AMPULE: .5; 3 SOLUTION RESPIRATORY (INHALATION) at 14:27

## 2020-08-22 RX ADMIN — ATORVASTATIN CALCIUM 80 MG: 80 TABLET, FILM COATED ORAL at 20:19

## 2020-08-22 RX ADMIN — IPRATROPIUM BROMIDE AND ALBUTEROL SULFATE 1 AMPULE: .5; 3 SOLUTION RESPIRATORY (INHALATION) at 20:31

## 2020-08-22 RX ADMIN — TAMSULOSIN HYDROCHLORIDE 0.8 MG: 0.4 CAPSULE ORAL at 20:19

## 2020-08-22 RX ADMIN — INSULIN LISPRO 2 UNITS: 100 INJECTION, SOLUTION INTRAVENOUS; SUBCUTANEOUS at 08:27

## 2020-08-22 RX ADMIN — ASPIRIN 81 MG: 81 TABLET, CHEWABLE ORAL at 08:26

## 2020-08-22 RX ADMIN — ARFORMOTEROL TARTRATE 15 MCG: 15 SOLUTION RESPIRATORY (INHALATION) at 20:31

## 2020-08-22 RX ADMIN — ARFORMOTEROL TARTRATE 15 MCG: 15 SOLUTION RESPIRATORY (INHALATION) at 06:31

## 2020-08-22 RX ADMIN — IPRATROPIUM BROMIDE AND ALBUTEROL SULFATE 1 AMPULE: .5; 3 SOLUTION RESPIRATORY (INHALATION) at 10:36

## 2020-08-22 RX ADMIN — BUDESONIDE 500 MCG: 0.5 SUSPENSION RESPIRATORY (INHALATION) at 20:31

## 2020-08-22 RX ADMIN — AMLODIPINE BESYLATE 10 MG: 10 TABLET ORAL at 08:26

## 2020-08-22 RX ADMIN — ENOXAPARIN SODIUM 40 MG: 40 INJECTION SUBCUTANEOUS at 08:26

## 2020-08-22 RX ADMIN — SODIUM CHLORIDE, PRESERVATIVE FREE 10 ML: 5 INJECTION INTRAVENOUS at 20:19

## 2020-08-22 RX ADMIN — SODIUM CHLORIDE, PRESERVATIVE FREE 1 G: 5 INJECTION INTRAVENOUS at 10:59

## 2020-08-22 RX ADMIN — METHYLPREDNISOLONE SODIUM SUCCINATE 40 MG: 40 INJECTION, POWDER, FOR SOLUTION INTRAMUSCULAR; INTRAVENOUS at 05:15

## 2020-08-22 RX ADMIN — AZITHROMYCIN DIHYDRATE 500 MG: 500 INJECTION, POWDER, LYOPHILIZED, FOR SOLUTION INTRAVENOUS at 12:17

## 2020-08-22 RX ADMIN — METHYLPREDNISOLONE SODIUM SUCCINATE 40 MG: 40 INJECTION, POWDER, FOR SOLUTION INTRAMUSCULAR; INTRAVENOUS at 17:34

## 2020-08-22 RX ADMIN — FLUOXETINE HYDROCHLORIDE 20 MG: 20 CAPSULE ORAL at 08:26

## 2020-08-22 RX ADMIN — BUDESONIDE 500 MCG: 0.5 SUSPENSION RESPIRATORY (INHALATION) at 06:31

## 2020-08-22 ASSESSMENT — PAIN SCALES - GENERAL: PAINLEVEL_OUTOF10: 0

## 2020-08-22 NOTE — PROGRESS NOTES
Dysphagia Pureed (Dysphagia I); Regular solid; Honey - cup;Nectar - cup; Thin - cup      Oral Motor Examination   Labial ROM: (Decreased, on the left, during labial retraction trials and labial protrusion trials.)  Labial Strength: (Decreased during labial compression trials.)  Labial Coordination: (Slowed movements were noted.)  Lingual ROM: (Adequate during lingual extension trials with full point achieved; decreased during lingual elevation trials without use of accessory jaw movement; adequate movements noted bilaterally.)  Lingual Symmetry: (Deviation was noted, to the right, during lingual extension trials.)  Lingual Strength: (Decreased.)  Lingual Coordination: (Slowed movements were noted.)     Assessed patient's swallowing function with the following observations noted:     Oral Phase  Mastication: Regular solid (Patient exhibited slow oral prep with primarily decreased vertical jaw movement noted at the front of the mouth during regular solid consistency trials presented independently.)  Suspected Premature Bolus Loss: Thin - cup (Patient exhibited fast oral transit of thin H2O trials presented independently via cup.)  Oral Phase - Comment: Oral transit of puree consistency trials, presented by SLP, primarily measured 1-2 seconds in length and no oral cavity residue was noted post swallows. Oral transit of regular solid consistency trials primarily measured 1-2 seconds in length and min oral cavity residue was observed post swallows; residue cleared from the mouth with additional dry swallows. Oral transit of honey thick liquid trials and nectar thick liquid trials, all presented independently via cup, primarily measured 1-2 seconds in length. Pharyngeal Phase  Suspected Swallow Delay: Thin - cup (Suspect secondary to oral transit times.)  Decreased Laryngeal Elevation: (Patient exhibited sluggish, inconsistently mildly decreased laryngeal elevation for swallow airway protection.)  Delayed Cough:  Thin - cup   Pharyngeal: No outward S/S penetration/aspiration was noted with any puree consistency trial, regular solid consistency trial, honey thick liquid trial, or nectar thick liquid trial administered during assessment this date. Patient did exhibit S/S penetration/aspiration with thin H2O trials with inconsistent delayed coughs observed post swallows. At this time, would trial nectar thick liquids. Continue regular solid consistency. Self-feed. Recommend meds whole in pudding/applesauce. If patient receives mouth care prior to intake, okay for ice chips and small sips thin H2O IN BETWEEN MEALS for comfort. Will continue to follow.     Electronically signed by STEVE Etienne on 8/22/2020 at 1:16 PM

## 2020-08-22 NOTE — PROGRESS NOTES
[facility]        Hospitalist Progress Note  8/22/2020 11:59 AM  Subjective:   Admit Date: 8/21/2020  PCP: Bing Carlin MD    Chief Complaint: Dyspnea    Subjective: Patient seen and examined at bedside. Still with productive cough but states his breathing is mildly improved. Cumulative Hospital History: 81-year-old male with a history of COPD presenting to the hospital for worsening dyspnea associated with productive cough for the previous 2 weeks admitted for acute on chronic respiratory failure with hypoxia/COPD exacerbation. Patient also found to have ZARA on admission. ROS: 14 point review of systems is negative except as specifically addressed above.     DIET CARB CONTROL; Mildly Thick (Nectar)    Intake/Output Summary (Last 24 hours) at 8/22/2020 1159  Last data filed at 8/22/2020 0116  Gross per 24 hour   Intake 470 ml   Output --   Net 470 ml     Medications:   sodium chloride 75 mL/hr at 08/21/20 1954    dextrose       Current Facility-Administered Medications   Medication Dose Route Frequency Provider Last Rate Last Dose    albuterol sulfate  (90 Base) MCG/ACT inhaler 2 puff  2 puff Inhalation Q4H PRN Meaghan Parker MD   2 puff at 08/21/20 1112    amLODIPine (NORVASC) tablet 10 mg  10 mg Oral Daily Lissette Abdullahi MD   10 mg at 08/22/20 6667    aspirin chewable tablet 81 mg  81 mg Oral Daily Lissette Abdullahi MD   81 mg at 08/22/20 0826    atorvastatin (LIPITOR) tablet 80 mg  80 mg Oral Nightly Lissette Abdullahi MD   80 mg at 08/21/20 1954    FLUoxetine (PROZAC) capsule 20 mg  20 mg Oral Daily Lissette Abdullahi MD   20 mg at 08/22/20 0826    tamsulosin (FLOMAX) capsule 0.8 mg  0.8 mg Oral Nightly Lissette Abdullahi MD   0.8 mg at 08/21/20 1954    cefTRIAXone (ROCEPHIN) 1 g in sodium chloride (PF) 10 mL IV syringe  1 g Intravenous Q24H Lissette Abdullahi MD   1 g at 08/22/20 1059    azithromycin (ZITHROMAX) 500 mg in D5W 250ml Vial Mate  500 mg Intravenous Q24H MD Gage Pizarro ipratropium-albuterol (DUONEB) nebulizer solution 1 ampule  1 ampule Inhalation Q4H WA Aiden Leblanc MD   1 ampule at 08/22/20 1036    budesonide (PULMICORT) nebulizer suspension 500 mcg  0.5 mg Nebulization BID Aiden Leblanc MD   500 mcg at 08/22/20 0631    Arformoterol Tartrate (BROVANA) nebulizer solution 15 mcg  15 mcg Nebulization BID Aiden Leblanc MD   15 mcg at 08/22/20 0631    methylPREDNISolone sodium (SOLU-MEDROL) injection 40 mg  40 mg Intravenous Q12H Aiden Leblanc MD   40 mg at 08/22/20 0515    0.9 % sodium chloride infusion   Intravenous Continuous Aiden Leblanc MD 75 mL/hr at 08/21/20 1954      sodium chloride flush 0.9 % injection 10 mL  10 mL Intravenous 2 times per day Aiden Leblanc MD        sodium chloride flush 0.9 % injection 10 mL  10 mL Intravenous PRN Aiden Leblanc MD        acetaminophen (TYLENOL) tablet 650 mg  650 mg Oral Q6H PRN Aiden Leblanc MD        Or    acetaminophen (TYLENOL) suppository 650 mg  650 mg Rectal Q6H PRN Aiden Leblanc MD        polyethylene glycol [facility]) packet 17 g  17 g Oral Daily PRN Aiden Leblanc MD        promethazine (PHENERGAN) tablet 12.5 mg  12.5 mg Oral Q6H PRN Aiden Leblanc MD        Or    [facility] injection 4 mg  4 mg Intravenous Q6H PRN Aiden Leblanc MD        enoxaparin (LOVENOX) injection 40 mg  40 mg Subcutaneous Daily Aiden Leblanc MD   40 mg at 08/22/20 7707    potassium chloride (KLOR-CON M) extended release tablet 40 mEq  40 mEq Oral PRN Aiden Leblanc MD        Or    potassium bicarb-citric acid (EFFER-K) effervescent tablet 40 mEq  40 mEq Oral PRN Aiden Leblanc MD        Or    potassium chloride 10 mEq/100 mL IVPB (Peripheral Line)  10 mEq Intravenous PRN Aiden Leblanc MD        magnesium sulfate 2 g in 50 mL IVPB premix  2 g Intravenous PRN Aiden Leblanc MD        insulin lispro (HUMALOG) injection vial 0-6 Units  0-6 Units Subcutaneous TID MARIA DEL CARMEN Leblanc MD   2 Units at 08/22/20 0808    insulin lispro (HUMALOG) ND  Extremities: no pedal edema  Skin: Skin color, texture, turgor normal  Neurologic: Alert. Gross motor and sensory function intact. Psychiatric:  Mood appropriate    Assessment and Plan: Active Problems:    History of stroke with residual deficit    Dysarthria    Type 2 diabetes mellitus, without long-term current use of insulin (HCC)    COPD exacerbation (HCC)    Hypertension    ZARA (acute kidney injury) ([facility]    Acute on chronic respiratory failure with hypoxia (HCC)  Resolved Problems:    * No resolved hospital problems. *    Acute on chronic respiratory failure with hypoxia: Mild improvement today. O2 PRN. Bronchodilators. Antibiotics. Steroids.     ZARA: IV fluids. Renal ultrasound pending. Monitor BMP.     History of CVA: Aspirin/statin. PT/OT/SLP.     Hypertension: Hold home lisinopril. Monitor blood pressure and adjust medications as needed.     DM: HbA1c 5.2. Insulin sliding scale. Monitor blood glucose and adjust medications as needed.     Supportive management.     Advance Directive: Full Code    DVT prophylaxis: Lovenox    Discharge planning: TBD      Signed:  Charisse Xiao MD 8/22/2020 11:59 AM  Rounding Hospitalist

## 2020-08-23 LAB
ALBUMIN SERPL-MCNC: 3.6 G/DL (ref 3.5–5.2)
ALP BLD-CCNC: 47 U/L (ref 40–130)
ALT SERPL-CCNC: 8 U/L (ref 5–41)
ANION GAP SERPL CALCULATED.3IONS-SCNC: 8 MMOL/L (ref 7–19)
AST SERPL-CCNC: 14 U/L (ref 5–40)
BASOPHILS ABSOLUTE: 0 K/UL (ref 0–0.2)
BASOPHILS RELATIVE PERCENT: 0.1 % (ref 0–1)
BILIRUB SERPL-MCNC: 0.4 MG/DL (ref 0.2–1.2)
BUN BLDV-MCNC: 29 MG/DL (ref 8–23)
CALCIUM SERPL-MCNC: 9.1 MG/DL (ref 8.8–10.2)
CHLORIDE BLD-SCNC: 109 MMOL/L (ref 98–111)
CO2: 23 MMOL/L (ref 22–29)
CREAT SERPL-MCNC: 1.5 MG/DL (ref 0.5–1.2)
EOSINOPHILS ABSOLUTE: 0 K/UL (ref 0–0.6)
EOSINOPHILS RELATIVE PERCENT: 0 % (ref 0–5)
GFR AFRICAN AMERICAN: 57
GFR NON-AFRICAN AMERICAN: 47
GLUCOSE BLD-MCNC: 116 MG/DL (ref 70–99)
GLUCOSE BLD-MCNC: 139 MG/DL (ref 70–99)
GLUCOSE BLD-MCNC: 157 MG/DL (ref 74–109)
GLUCOSE BLD-MCNC: 170 MG/DL (ref 70–99)
GLUCOSE BLD-MCNC: 207 MG/DL (ref 70–99)
HCT VFR BLD CALC: 47.6 % (ref 42–52)
HEMOGLOBIN: 15.9 G/DL (ref 14–18)
IMMATURE GRANULOCYTES #: 0.1 K/UL
LYMPHOCYTES ABSOLUTE: 0.8 K/UL (ref 1.1–4.5)
LYMPHOCYTES RELATIVE PERCENT: 4.7 % (ref 20–40)
MAGNESIUM: 2.2 MG/DL (ref 1.6–2.4)
MCH RBC QN AUTO: 31.5 PG (ref 27–31)
MCHC RBC AUTO-ENTMCNC: 33.4 G/DL (ref 33–37)
MCV RBC AUTO: 94.3 FL (ref 80–94)
MONOCYTES ABSOLUTE: 0.9 K/UL (ref 0–0.9)
MONOCYTES RELATIVE PERCENT: 5.3 % (ref 0–10)
NEUTROPHILS ABSOLUTE: 15.8 K/UL (ref 1.5–7.5)
NEUTROPHILS RELATIVE PERCENT: 89.3 % (ref 50–65)
PDW BLD-RTO: 17.2 % (ref 11.5–14.5)
PERFORMED ON: ABNORMAL
PLATELET # BLD: 270 K/UL (ref 130–400)
PMV BLD AUTO: 10.7 FL (ref 9.4–12.4)
POTASSIUM SERPL-SCNC: 4.4 MMOL/L (ref 3.5–5)
RBC # BLD: 5.05 M/UL (ref 4.7–6.1)
SODIUM BLD-SCNC: 140 MMOL/L (ref 136–145)
TOTAL PROTEIN: 6.9 G/DL (ref 6.6–8.7)
WBC # BLD: 17.7 K/UL (ref 4.8–10.8)

## 2020-08-23 PROCEDURE — 2580000003 HC RX 258: Performed by: INTERNAL MEDICINE

## 2020-08-23 PROCEDURE — 6360000002 HC RX W HCPCS: Performed by: INTERNAL MEDICINE

## 2020-08-23 PROCEDURE — 94640 AIRWAY INHALATION TREATMENT: CPT

## 2020-08-23 PROCEDURE — 1210000000 HC MED SURG R&B

## 2020-08-23 PROCEDURE — 97110 THERAPEUTIC EXERCISES: CPT

## 2020-08-23 PROCEDURE — 80053 COMPREHEN METABOLIC PANEL: CPT

## 2020-08-23 PROCEDURE — 85025 COMPLETE CBC W/AUTO DIFF WBC: CPT

## 2020-08-23 PROCEDURE — 6370000000 HC RX 637 (ALT 250 FOR IP): Performed by: INTERNAL MEDICINE

## 2020-08-23 PROCEDURE — 83735 ASSAY OF MAGNESIUM: CPT

## 2020-08-23 PROCEDURE — 97162 PT EVAL MOD COMPLEX 30 MIN: CPT

## 2020-08-23 PROCEDURE — 36415 COLL VENOUS BLD VENIPUNCTURE: CPT

## 2020-08-23 PROCEDURE — 2700000000 HC OXYGEN THERAPY PER DAY

## 2020-08-23 PROCEDURE — 82947 ASSAY GLUCOSE BLOOD QUANT: CPT

## 2020-08-23 RX ORDER — HEPARIN SODIUM 5000 [USP'U]/ML
5000 INJECTION, SOLUTION INTRAVENOUS; SUBCUTANEOUS EVERY 8 HOURS SCHEDULED
Status: DISCONTINUED | OUTPATIENT
Start: 2020-08-24 | End: 2020-08-26 | Stop reason: HOSPADM

## 2020-08-23 RX ORDER — GUAIFENESIN 100 MG/5ML
200 SYRUP ORAL EVERY 4 HOURS PRN
Status: DISCONTINUED | OUTPATIENT
Start: 2020-08-23 | End: 2020-08-26 | Stop reason: HOSPADM

## 2020-08-23 RX ADMIN — SODIUM CHLORIDE, PRESERVATIVE FREE 1 G: 5 INJECTION INTRAVENOUS at 10:59

## 2020-08-23 RX ADMIN — INSULIN LISPRO 2 UNITS: 100 INJECTION, SOLUTION INTRAVENOUS; SUBCUTANEOUS at 17:29

## 2020-08-23 RX ADMIN — AMLODIPINE BESYLATE 10 MG: 10 TABLET ORAL at 08:01

## 2020-08-23 RX ADMIN — IPRATROPIUM BROMIDE AND ALBUTEROL SULFATE 1 AMPULE: .5; 3 SOLUTION RESPIRATORY (INHALATION) at 06:07

## 2020-08-23 RX ADMIN — IPRATROPIUM BROMIDE AND ALBUTEROL SULFATE 1 AMPULE: .5; 3 SOLUTION RESPIRATORY (INHALATION) at 10:51

## 2020-08-23 RX ADMIN — ASPIRIN 81 MG: 81 TABLET, CHEWABLE ORAL at 08:01

## 2020-08-23 RX ADMIN — SODIUM CHLORIDE, PRESERVATIVE FREE 10 ML: 5 INJECTION INTRAVENOUS at 08:01

## 2020-08-23 RX ADMIN — METHYLPREDNISOLONE SODIUM SUCCINATE 40 MG: 40 INJECTION, POWDER, FOR SOLUTION INTRAMUSCULAR; INTRAVENOUS at 18:08

## 2020-08-23 RX ADMIN — IPRATROPIUM BROMIDE AND ALBUTEROL SULFATE 1 AMPULE: .5; 3 SOLUTION RESPIRATORY (INHALATION) at 14:30

## 2020-08-23 RX ADMIN — AZITHROMYCIN DIHYDRATE 500 MG: 500 INJECTION, POWDER, LYOPHILIZED, FOR SOLUTION INTRAVENOUS at 12:15

## 2020-08-23 RX ADMIN — IPRATROPIUM BROMIDE AND ALBUTEROL SULFATE 1 AMPULE: .5; 3 SOLUTION RESPIRATORY (INHALATION) at 18:54

## 2020-08-23 RX ADMIN — ARFORMOTEROL TARTRATE 15 MCG: 15 SOLUTION RESPIRATORY (INHALATION) at 06:15

## 2020-08-23 RX ADMIN — BUDESONIDE 500 MCG: 0.5 SUSPENSION RESPIRATORY (INHALATION) at 18:54

## 2020-08-23 RX ADMIN — SODIUM CHLORIDE: 9 INJECTION, SOLUTION INTRAVENOUS at 03:08

## 2020-08-23 RX ADMIN — SODIUM CHLORIDE: 9 INJECTION, SOLUTION INTRAVENOUS at 21:23

## 2020-08-23 RX ADMIN — ATORVASTATIN CALCIUM 80 MG: 80 TABLET, FILM COATED ORAL at 21:21

## 2020-08-23 RX ADMIN — INSULIN LISPRO 1 UNITS: 100 INJECTION, SOLUTION INTRAVENOUS; SUBCUTANEOUS at 08:02

## 2020-08-23 RX ADMIN — FLUOXETINE HYDROCHLORIDE 20 MG: 20 CAPSULE ORAL at 08:01

## 2020-08-23 RX ADMIN — ARFORMOTEROL TARTRATE 15 MCG: 15 SOLUTION RESPIRATORY (INHALATION) at 18:54

## 2020-08-23 RX ADMIN — BUDESONIDE 500 MCG: 0.5 SUSPENSION RESPIRATORY (INHALATION) at 06:15

## 2020-08-23 RX ADMIN — ENOXAPARIN SODIUM 40 MG: 40 INJECTION SUBCUTANEOUS at 08:01

## 2020-08-23 RX ADMIN — TAMSULOSIN HYDROCHLORIDE 0.8 MG: 0.4 CAPSULE ORAL at 21:21

## 2020-08-23 RX ADMIN — METHYLPREDNISOLONE SODIUM SUCCINATE 40 MG: 40 INJECTION, POWDER, FOR SOLUTION INTRAMUSCULAR; INTRAVENOUS at 05:46

## 2020-08-23 ASSESSMENT — PAIN SCALES - GENERAL
PAINLEVEL_OUTOF10: 0
PAINLEVEL_OUTOF10: 0

## 2020-08-23 NOTE — PROGRESS NOTES
12 hour chart check review completed. Electronically signed by Rianna Rodríguez LPN on 1/70/6155 at 2:25 AM

## 2020-08-23 NOTE — PROGRESS NOTES
Physical Therapy    Facility/Department: [facility] 4 ONCOLOGY UNIT  Initial Assessment    NAME: Anand Zaragoza  : 1955  MRN: [id number]    Date of Service: 2020    Discharge Recommendations:  Continue to assess pending progress, Patient would benefit from continued therapy after discharge        Assessment   Body structures, Functions, Activity limitations: Decreased functional mobility ; Decreased ROM; Decreased strength;Decreased posture;Decreased balance;Decreased coordination  Assessment: Pt. will benefit from PT during acute care stay to decrease impairments. Pt. a fall risk and should not attempt mobility on his own at this time. Anticipate pt. will benefit from additional therapy after d/c from [facility]. Need to ensure that he would be safe to return home independently again. Pt.'s main form of locomotion in the home is w/c. Pt. reports that he wants HHPT because he wants to learn to walk again, but he may beenfit from some form of rehab stay. Explained to him that home health would only be able to come 2-3 times a week for an hour or so at a time. Treatment Diagnosis: impaired gait and mobility  Prognosis: Fair  Decision Making: Medium Complexity  PT Education: Goals;PT Role;Plan of Care;Gait Training;General Safety;Transfer Training;Functional Mobility Training  Patient Education: sit up as much as possible today for respiratory benefits, use of call light  Barriers to Learning: none noted  REQUIRES PT FOLLOW UP: Yes  Activity Tolerance  Activity Tolerance: Patient Tolerated treatment well       Patient Diagnosis(es): The primary encounter diagnosis was COPD exacerbation ([facility] [address] Diagnoses of Chest pain, unspecified type, Hypoxia, and Pneumonia due to organism were also pertinent to this visit.      has a past medical history of Arthritis, Asthma, Cerebral artery occlusion with cerebral infarction ([address] COPD (chronic obstructive pulmonary disease) ([facility] [address] Diabetes mellitus ([facility] [address] Hypertension, Seizures ([facility] [address] and Thyroid disease. has a past surgical history that includes Arm Surgery (Left); Mandible surgery; knee surgery (Right); Eye surgery (Right); and pr colonoscopy flx dx w/collj spec when pfrmd (N/A, 6/26/2018). Restrictions  Restrictions/Precautions  Restrictions/Precautions: Fall Risk, General Precautions  Required Braces or Orthoses?: No  Vision/Hearing  Vision: Impaired  Vision Exceptions: Wears glasses at all times(per chart, blind R eye)  Hearing: Within functional limits     Subjective  General  Chart Reviewed: Yes  Patient assessed for rehabilitation services?: Yes  Additional Pertinent Hx: prior CVA with difficult speech, blind R eye (per chart)  Response To Previous Treatment: Not applicable  Family / Caregiver Present: No  Referring Practitioner: Jared Rodriguez MD  Referral Date : 08/21/20  Diagnosis: COPD exacerbation, chest pain, hypoxia, PNA  Follows Commands: Within Functional Limits  General Comment  Comments: [facility] PT. Subjective  Subjective: Pt. wiling to work with therapy, states he needs help at home, wants more therapy to learn how to walk better. Pain Screening  Patient Currently in Pain: No  Pain Assessment  Pain Assessment: 0-10  Pain Level: 0  Vital Signs  Patient Currently in Pain: No  Pre Treatment Pain Screening  Intervention List: Patient able to continue with treatment    Orientation  Orientation  Overall Orientation Status: Within Functional Limits  Social/Functional History  Social/Functional History  Lives With: Alone  Type of Home: House  Home Layout: One level  Bathroom Equipment: Shower chair  Home Equipment: Rolling walker, [employer]  Receives Help From: Family  ADL Assistance: Independent  Ambulation Assistance: Independent(states about 10-15')  Transfer Assistance: Independent  Active : No  Cognition   Cognition  Overall Cognitive Status: Exceptions  Following Commands:  Follows one step commands consistently  Safety Judgement: Good awareness of safety precautions  Problem Solving: Assistance required to generate solutions;Assistance required to implement solutions  Insights: Fully aware of deficits  Initiation: Requires cues for some  Sequencing: Requires cues for some  Cognition Comment: slightly impulsive    Objective     Observation/Palpation  Posture: Good    AROM RLE (degrees)  RLE AROM: Exceptions  RLE General AROM: AAROM WFLS, but pt unable to hold LE out against gravity for long  AROM LLE (degrees)  LLE AROM : WFL  AROM RUE (degrees)  RUE AROM : WFL  AROM LUE (degrees)  LUE AROM : WFL  Strength RLE  Strength RLE: Exception  Comment: 3-/5 grossly  Strength LLE  Strength LLE: WFL  Comment: 4/5 grossly        Bed mobility  Supine to Sit: Stand by assistance  Sit to Supine: Unable to assess  Comment: pt. sat on EOB x 5 mins SBA, but upon sitting immediately given v. cues to not stand yet until gait belt put on  Transfers  Sit to Stand: Contact guard assistance  Stand to sit: Contact guard assistance  Bed to Chair: Minimal assistance  Stand Pivot Transfers: Minimal Assistance  Ambulation  Ambulation?: Yes  Ambulation 1  Surface: level tile  Device: No Device;Hand-Held Assist  Assistance: Minimal assistance  Quality of Gait: unsteady  Gait Deviations: Decreased step length;Decreased step height  Distance: 2' to chair  Comments: impulsive     Balance  Posture: Fair  Sitting - Static: Good;+  Sitting - Dynamic: Good;-  Standing - Static: Fair;-  Standing - Dynamic: Poor;+  Exercises  Comments: AROM BLEs on EOB x 10 reps: AP, LAQ, marching     Plan   Plan  Times per week: 3-7  Times per day: Daily  Plan weeks: 2  Current Treatment Recommendations: Strengthening, ROM, Balance Training, Functional Mobility Training, Transfer Training, Gait Training, Safety Education & Training, Positioning, Equipment Evaluation, Education, & procurement, Patient/Caregiver Education & Training  Plan Comment: cont. PT per POC.   Safety Devices  Type of devices: Gait belt, Patient at risk for falls, Nurse notified, Left in chair, Chair alarm in place, Call light within reach(set up for lunch)    G-Code       OutComes Score                                                  AM-PAC Score             Goals  Short term goals  Time Frame for Short term goals: 2 wks  Short term goal 1: supine to sit indep  Short term goal 2: sit to stand indep  Short term goal 3: amb. 25' with RW SBA  Patient Goals   Patient goals : go home       Therapy Time   Individual Concurrent Group Co-treatment   Time In           Time Out           Minutes                   Arlen Cheatham PT    Electronically signed by Arlen Cheatham PT on 8/23/2020 at 3:56 PM

## 2020-08-23 NOTE — PROGRESS NOTES
[facility]        Hospitalist Progress Note  8/23/2020 10:12 AM  Subjective:   Admit Date: 8/21/2020  PCP: Cony Moreno MD    Chief Complaint: Dyspnea    Subjective: Patient seen and examined at bedside. Breathing improving. Denies chest pain. Appears comfortable. Cumulative Hospital History: 80-year-old male with a history of COPD presenting to the hospital for worsening dyspnea associated with productive cough for the previous 2 weeks admitted for acute on chronic respiratory failure with hypoxia/COPD exacerbation. Patient also found to have ZARA on admission. ROS: 14 point review of systems is negative except as specifically addressed above.     DIET CARB CONTROL; Mildly Thick (Nectar)    Intake/Output Summary (Last 24 hours) at 8/23/2020 1012  Last data filed at 8/23/2020 0309  Gross per 24 hour   Intake 2132.82 ml   Output --   Net 2132.82 ml     Medications:   sodium chloride 75 mL/hr at 08/23/20 0308    dextrose       Current Facility-Administered Medications   Medication Dose Route Frequency Provider Last Rate Last Dose    [START ON 8/24/2020] heparin (porcine) injection 5,000 Units  5,000 Units Subcutaneous 3 times per day Ariane Sandoval MD        albuterol sulfate  (90 Base) MCG/ACT inhaler 2 puff  2 puff Inhalation Q4H PRN Po Deal MD   2 puff at 08/21/20 1112    amLODIPine (NORVASC) tablet 10 mg  10 mg Oral Daily Ariane Sandoval MD   10 mg at 08/23/20 0801    aspirin chewable tablet 81 mg  81 mg Oral Daily Ariane Sandoval MD   81 mg at 08/23/20 0801    atorvastatin (LIPITOR) tablet 80 mg  80 mg Oral Nightly Ariane Sandoval MD   80 mg at 08/22/20 2019    FLUoxetine (PROZAC) capsule 20 mg  20 mg Oral Daily Ariane Sandoval MD   20 mg at 08/23/20 0801    tamsulosin (FLOMAX) capsule 0.8 mg  0.8 mg Oral Nightly Ariane Sandoval MD   0.8 mg at 08/22/20 2019    cefTRIAXone (ROCEPHIN) 1 g in sodium chloride (PF) 10 mL IV syringe  1 g Intravenous Q24H Ariane Sandoval MD   1 g at 08/22/20 1059    azithromycin (ZITHROMAX) 500 mg in D5W 250ml Vial Mate  500 mg Intravenous Q24H Gurwinder Sandoval MD   Stopped at 08/22/20 1317    ipratropium-albuterol (DUONEB) nebulizer solution 1 ampule  1 ampule Inhalation Q4H WA Gurwinder Sandoval MD   1 ampule at 08/23/20 3626    budesonide (PULMICORT) nebulizer suspension 500 mcg  0.5 mg Nebulization BID Gurwinder Sandoval MD   500 mcg at 08/23/20 0615    Arformoterol Tartrate (BROVANA) nebulizer solution 15 mcg  15 mcg Nebulization BID Gurwinder Sandoval MD   15 mcg at 08/23/20 0615    methylPREDNISolone sodium (SOLU-MEDROL) injection 40 mg  40 mg Intravenous Q12H Gurwinder Sandoval MD   40 mg at 08/23/20 0546    0.9 % sodium chloride infusion   Intravenous Continuous Gurwinder Sandoval MD 75 mL/hr at 08/23/20 0308      sodium chloride flush 0.9 % injection 10 mL  10 mL Intravenous 2 times per day Gurwinder Sandoval MD   10 mL at 08/23/20 0801    sodium chloride flush 0.9 % injection 10 mL  10 mL Intravenous PRN Gurwinder Sandoval MD        acetaminophen (TYLENOL) tablet 650 mg  650 mg Oral Q6H PRN Gurwinder Sandoval MD        Or    acetaminophen (TYLENOL) suppository 650 mg  650 mg Rectal Q6H PRN Gurwinder Sandoval MD        polyethylene glycol [facility]) packet 17 g  17 g Oral Daily PRN Gurwinder Sandoval MD        promethazine (PHENERGAN) tablet 12.5 mg  12.5 mg Oral Q6H PRN Gurwinder Sandoval MD        Or    ondansetron [facility]) injection 4 mg  4 mg Intravenous Q6H PRN Gurwinder Sandoval MD        potassium chloride (KLOR-CON M) extended release tablet 40 mEq  40 mEq Oral PRN Gurwinder Sandoval MD        Or    potassium bicarb-citric acid (EFFER-K) effervescent tablet 40 mEq  40 mEq Oral PRN Gurwinder Sandoval MD        Or    potassium chloride 10 mEq/100 mL IVPB (Peripheral Line)  10 mEq Intravenous PRN Gurwinder Sandoval MD        magnesium sulfate 2 g in 50 mL IVPB premix  2 g Intravenous PRN Gurwinder Sandoval MD        insulin lispro (HUMALOG) injection vial 0-6 Units  0-6 Units Subcutaneous TID MARIA DEL CARMEN Sandoval,

## 2020-08-24 LAB
ALBUMIN SERPL-MCNC: 2.6 G/DL (ref 3.5–5.2)
ALP BLD-CCNC: 30 U/L (ref 40–130)
ALT SERPL-CCNC: 6 U/L (ref 5–41)
ANION GAP SERPL CALCULATED.3IONS-SCNC: 7 MMOL/L (ref 7–19)
AST SERPL-CCNC: 11 U/L (ref 5–40)
BASOPHILS ABSOLUTE: 0 K/UL (ref 0–0.2)
BASOPHILS RELATIVE PERCENT: 0.1 % (ref 0–1)
BILIRUB SERPL-MCNC: <0.2 MG/DL (ref 0.2–1.2)
BUN BLDV-MCNC: 19 MG/DL (ref 8–23)
CALCIUM SERPL-MCNC: 6.8 MG/DL (ref 8.8–10.2)
CHLORIDE BLD-SCNC: 117 MMOL/L (ref 98–111)
CO2: 20 MMOL/L (ref 22–29)
CREAT SERPL-MCNC: 0.8 MG/DL (ref 0.5–1.2)
EOSINOPHILS ABSOLUTE: 0 K/UL (ref 0–0.6)
EOSINOPHILS RELATIVE PERCENT: 0 % (ref 0–5)
GFR AFRICAN AMERICAN: >59
GFR NON-AFRICAN AMERICAN: >60
GLUCOSE BLD-MCNC: 122 MG/DL (ref 74–109)
GLUCOSE BLD-MCNC: 134 MG/DL (ref 70–99)
GLUCOSE BLD-MCNC: 161 MG/DL (ref 70–99)
GLUCOSE BLD-MCNC: 161 MG/DL (ref 70–99)
GLUCOSE BLD-MCNC: 88 MG/DL (ref 70–99)
HCT VFR BLD CALC: 41.1 % (ref 42–52)
HEMOGLOBIN: 13.6 G/DL (ref 14–18)
IMMATURE GRANULOCYTES #: 0.1 K/UL
LYMPHOCYTES ABSOLUTE: 0.7 K/UL (ref 1.1–4.5)
LYMPHOCYTES RELATIVE PERCENT: 5.9 % (ref 20–40)
MAGNESIUM: 1.5 MG/DL (ref 1.6–2.4)
MCH RBC QN AUTO: 31.9 PG (ref 27–31)
MCHC RBC AUTO-ENTMCNC: 33.1 G/DL (ref 33–37)
MCV RBC AUTO: 96.3 FL (ref 80–94)
MONOCYTES ABSOLUTE: 0.7 K/UL (ref 0–0.9)
MONOCYTES RELATIVE PERCENT: 5.6 % (ref 0–10)
NEUTROPHILS ABSOLUTE: 10.2 K/UL (ref 1.5–7.5)
NEUTROPHILS RELATIVE PERCENT: 87.8 % (ref 50–65)
PDW BLD-RTO: 17.3 % (ref 11.5–14.5)
PERFORMED ON: ABNORMAL
PERFORMED ON: NORMAL
PLATELET # BLD: 238 K/UL (ref 130–400)
PMV BLD AUTO: 10.9 FL (ref 9.4–12.4)
POTASSIUM SERPL-SCNC: 3.3 MMOL/L (ref 3.5–5)
RBC # BLD: 4.27 M/UL (ref 4.7–6.1)
SODIUM BLD-SCNC: 144 MMOL/L (ref 136–145)
TOTAL PROTEIN: 4.9 G/DL (ref 6.6–8.7)
WBC # BLD: 11.6 K/UL (ref 4.8–10.8)

## 2020-08-24 PROCEDURE — 1210000000 HC MED SURG R&B

## 2020-08-24 PROCEDURE — 92526 ORAL FUNCTION THERAPY: CPT

## 2020-08-24 PROCEDURE — 36415 COLL VENOUS BLD VENIPUNCTURE: CPT

## 2020-08-24 PROCEDURE — 94640 AIRWAY INHALATION TREATMENT: CPT

## 2020-08-24 PROCEDURE — 82947 ASSAY GLUCOSE BLOOD QUANT: CPT

## 2020-08-24 PROCEDURE — 6360000002 HC RX W HCPCS: Performed by: INTERNAL MEDICINE

## 2020-08-24 PROCEDURE — 83735 ASSAY OF MAGNESIUM: CPT

## 2020-08-24 PROCEDURE — 97535 SELF CARE MNGMENT TRAINING: CPT

## 2020-08-24 PROCEDURE — 2580000003 HC RX 258: Performed by: HOSPITALIST

## 2020-08-24 PROCEDURE — 6360000002 HC RX W HCPCS: Performed by: HOSPITALIST

## 2020-08-24 PROCEDURE — 97165 OT EVAL LOW COMPLEX 30 MIN: CPT

## 2020-08-24 PROCEDURE — 6370000000 HC RX 637 (ALT 250 FOR IP): Performed by: INTERNAL MEDICINE

## 2020-08-24 PROCEDURE — 80053 COMPREHEN METABOLIC PANEL: CPT

## 2020-08-24 PROCEDURE — 85025 COMPLETE CBC W/AUTO DIFF WBC: CPT

## 2020-08-24 PROCEDURE — 2580000003 HC RX 258: Performed by: INTERNAL MEDICINE

## 2020-08-24 PROCEDURE — 2700000000 HC OXYGEN THERAPY PER DAY

## 2020-08-24 RX ADMIN — ARFORMOTEROL TARTRATE 15 MCG: 15 SOLUTION RESPIRATORY (INHALATION) at 19:32

## 2020-08-24 RX ADMIN — SODIUM CHLORIDE: 9 INJECTION, SOLUTION INTRAVENOUS at 04:56

## 2020-08-24 RX ADMIN — ARFORMOTEROL TARTRATE 15 MCG: 15 SOLUTION RESPIRATORY (INHALATION) at 07:09

## 2020-08-24 RX ADMIN — POTASSIUM CHLORIDE 40 MEQ: 1500 TABLET, EXTENDED RELEASE ORAL at 04:56

## 2020-08-24 RX ADMIN — ATORVASTATIN CALCIUM 80 MG: 80 TABLET, FILM COATED ORAL at 20:29

## 2020-08-24 RX ADMIN — CALCIUM GLUCONATE 1 G: 98 INJECTION, SOLUTION INTRAVENOUS at 04:56

## 2020-08-24 RX ADMIN — SODIUM CHLORIDE: 9 INJECTION, SOLUTION INTRAVENOUS at 22:52

## 2020-08-24 RX ADMIN — IPRATROPIUM BROMIDE AND ALBUTEROL SULFATE 1 AMPULE: .5; 3 SOLUTION RESPIRATORY (INHALATION) at 14:49

## 2020-08-24 RX ADMIN — IPRATROPIUM BROMIDE AND ALBUTEROL SULFATE 1 AMPULE: .5; 3 SOLUTION RESPIRATORY (INHALATION) at 06:59

## 2020-08-24 RX ADMIN — MAGNESIUM SULFATE HEPTAHYDRATE 2 G: 40 INJECTION, SOLUTION INTRAVENOUS at 06:11

## 2020-08-24 RX ADMIN — BUDESONIDE 500 MCG: 0.5 SUSPENSION RESPIRATORY (INHALATION) at 19:32

## 2020-08-24 RX ADMIN — SODIUM CHLORIDE, PRESERVATIVE FREE 1 G: 5 INJECTION INTRAVENOUS at 12:58

## 2020-08-24 RX ADMIN — HEPARIN SODIUM 5000 UNITS: 5000 INJECTION INTRAVENOUS; SUBCUTANEOUS at 15:14

## 2020-08-24 RX ADMIN — BUDESONIDE 500 MCG: 0.5 SUSPENSION RESPIRATORY (INHALATION) at 07:09

## 2020-08-24 RX ADMIN — TAMSULOSIN HYDROCHLORIDE 0.8 MG: 0.4 CAPSULE ORAL at 20:29

## 2020-08-24 RX ADMIN — HEPARIN SODIUM 5000 UNITS: 5000 INJECTION INTRAVENOUS; SUBCUTANEOUS at 06:07

## 2020-08-24 RX ADMIN — METHYLPREDNISOLONE SODIUM SUCCINATE 40 MG: 40 INJECTION, POWDER, FOR SOLUTION INTRAMUSCULAR; INTRAVENOUS at 17:37

## 2020-08-24 RX ADMIN — AZITHROMYCIN DIHYDRATE 500 MG: 500 INJECTION, POWDER, LYOPHILIZED, FOR SOLUTION INTRAVENOUS at 12:58

## 2020-08-24 RX ADMIN — IPRATROPIUM BROMIDE AND ALBUTEROL SULFATE 1 AMPULE: .5; 3 SOLUTION RESPIRATORY (INHALATION) at 10:34

## 2020-08-24 RX ADMIN — AMLODIPINE BESYLATE 10 MG: 10 TABLET ORAL at 08:58

## 2020-08-24 RX ADMIN — METHYLPREDNISOLONE SODIUM SUCCINATE 40 MG: 40 INJECTION, POWDER, FOR SOLUTION INTRAMUSCULAR; INTRAVENOUS at 05:00

## 2020-08-24 RX ADMIN — HEPARIN SODIUM 5000 UNITS: 5000 INJECTION INTRAVENOUS; SUBCUTANEOUS at 20:30

## 2020-08-24 RX ADMIN — FLUOXETINE HYDROCHLORIDE 20 MG: 20 CAPSULE ORAL at 08:58

## 2020-08-24 RX ADMIN — IPRATROPIUM BROMIDE AND ALBUTEROL SULFATE 1 AMPULE: .5; 3 SOLUTION RESPIRATORY (INHALATION) at 19:15

## 2020-08-24 RX ADMIN — ASPIRIN 81 MG: 81 TABLET, CHEWABLE ORAL at 08:58

## 2020-08-24 NOTE — PROGRESS NOTES
[facility]        Hospitalist Progress Note  8/24/2020 11:04 AM  Subjective:   Admit Date: 8/21/2020  PCP: Romy Baeza MD    Chief Complaint: Dyspnea    Subjective: Patient seen and examined at bedside. Not feeling well today. Denies chest pain. Cumulative Hospital History: 57-year-old male with a history of COPD presenting to the hospital for worsening dyspnea associated with productive cough for the previous 2 weeks admitted for acute on chronic respiratory failure with hypoxia/COPD exacerbation. Patient also found to have ZARA on admission. ZARA improved. Breathing improving. ROS: 14 point review of systems is negative except as specifically addressed above.     DIET CARB CONTROL; Mildly Thick (Nectar)    Intake/Output Summary (Last 24 hours) at 8/24/2020 1104  Last data filed at 8/24/2020 3349  Gross per 24 hour   Intake 3403.8 ml   Output --   Net 3403.8 ml     Medications:   sodium chloride 125 mL/hr at 08/24/20 0456    dextrose       Current Facility-Administered Medications   Medication Dose Route Frequency Provider Last Rate Last Dose    heparin (porcine) injection 5,000 Units  5,000 Units Subcutaneous 3 times per day Bryce Hwang MD   5,000 Units at 08/24/20 0607    guaiFENesin (ROBITUSSIN) 100 MG/5ML syrup 200 mg  200 mg Oral Q4H PRN Bryce Hwang MD        albuterol sulfate  (90 Base) MCG/ACT inhaler 2 puff  2 puff Inhalation Q4H PRN Vamsi Aleman MD   2 puff at 08/21/20 1112    amLODIPine (NORVASC) tablet 10 mg  10 mg Oral Daily Bryce Hwang MD   10 mg at 08/24/20 0858    aspirin chewable tablet 81 mg  81 mg Oral Daily Bryce Hwang MD   81 mg at 08/24/20 0858    atorvastatin (LIPITOR) tablet 80 mg  80 mg Oral Nightly Bryce Hwang MD   80 mg at 08/23/20 2121    FLUoxetine (PROZAC) capsule 20 mg  20 mg Oral Daily Bryce Hwang MD   20 mg at 08/24/20 0858    tamsulosin (FLOMAX) capsule 0.8 mg  0.8 mg Oral Nightly Bryce Hwang MD   0.8 mg at 08/23/20 2121    g Intravenous PRN Paradise Allen MD   Stopped at 08/24/20 0926    insulin lispro (HUMALOG) injection vial 0-6 Units  0-6 Units Subcutaneous TID WC Paradise Allen MD   2 Units at 08/23/20 1729    insulin lispro (HUMALOG) injection vial 0-3 Units  0-3 Units Subcutaneous Nightly Paradise Allen MD        glucose (GLUTOSE) 40 % oral gel 15 g  15 g Oral PRN Paradise Allen MD        dextrose 50 % IV solution  12.5 g Intravenous PRN Paradise Allen MD        glucagon (rDNA) injection 1 mg  1 mg Intramuscular PRN Paradise Allen MD        dextrose 5 % solution  100 mL/hr Intravenous PRN Paradise Allen MD            Labs:     Recent Labs     08/22/20 0433 08/23/20 0218 08/24/20 0224   WBC 11.2* 17.7* 11.6*   RBC 5.35 5.05 4.27*   HGB 17.0 15.9 13.6*   HCT 50.2 47.6 41.1*   MCV 93.8 94.3* 96.3*   MCH 31.8* 31.5* 31.9*   MCHC 33.9 33.4 33.1    270 238     Recent Labs     08/22/20  0433 08/23/20 0218 08/24/20 0224    140 144   K 4.3 4.4 3.3*   ANIONGAP 13 8 7    109 117*   CO2 24 23 20*   BUN 26* 29* 19   CREATININE 1.4* 1.5* 0.8   GLUCOSE 137* 157* 122*   CALCIUM 9.6 9.1 6.8*     Recent Labs     08/22/20  0433 08/23/20 0218 08/24/20 0224   MG 2.4 2.2 1.5*     Recent Labs     08/22/20  0433 08/23/20 0218 08/24/20 0224   AST 14 14 11   ALT 8 8 6   BILITOT 0.8 0.4 <0.2   ALKPHOS 52 47 30*     ABGs:No results for input(s): PH, PO2, PCO2, HCO3, BE, O2SAT in the last 72 hours. Troponin T:   Recent Labs     08/21/20  1156 08/21/20  1740 08/22/20 0433   TROPONINI <0.01 0.01 <0.01     INR:   No results for input(s): INR in the last 72 hours. Lactic Acid:   No results for input(s): LACTA in the last 72 hours.     Objective:   Vitals: /75   Pulse 65   Temp 97.4 °F (36.3 °C) (Temporal)   Resp 16   Ht 5' 10\" (1.778 m)   Wt 173 lb (78.5 kg)   SpO2 94%   BMI 24.82 kg/m²   24HR INTAKE/OUTPUT:      Intake/Output Summary (Last 24 hours) at 8/24/2020 1104  Last data filed at 8/24/2020 0611  Gross per 24 hour   Intake 3403.8 ml   Output --   Net 3403.8 ml     General appearance: Alert  Head: NC/AT  Eyes: conjunctivae/corneas clear   Ears: normal external ears  Neck: Supple  Lungs: BLAE, wheezing improving, no crackles or rhonchi appreciated, coarse breath sounds b/l  Heart: regular rate and rhythm, S1, S2 normal, no murmurs appreciated   Abdomen: BS+, soft, NT, ND  Extremities: no pedal edema  Skin: Skin color, texture, turgor normal  Neurologic: Alert. Gross motor and sensory function intact. Psychiatric:  Mood appropriate    Assessment and Plan: Active Problems:    History of stroke with residual deficit    Dysarthria    Type 2 diabetes mellitus, without long-term current use of insulin (HCC)    COPD exacerbation (HCC)    Hypertension    ZARA (acute kidney injury) ([facility] [address]    Acute on chronic respiratory failure with hypoxia (HCC)  Resolved Problems:    * No resolved hospital problems. *    Acute on chronic respiratory failure with hypoxia: Improving. O2 PRN. Bronchodilators. Antibiotics. Steroids.     ZARA: Resolved. Renal ultrasound unremarkable. Monitor BMP.     History of CVA: Aspirin/statin. PT/OT/SLP.     Hypertension: Monitor blood pressure and adjust medications as needed.     DM: HbA1c 5.2. Insulin sliding scale. Monitor blood glucose and adjust medications as needed.     Supportive management.     Advance Directive: Full Code    DVT prophylaxis: Heparin    Discharge planning: TBD      Signed:  Autumn Roldan MD 8/24/2020 11:04 AM  Rounding Hospitalist

## 2020-08-24 NOTE — PROGRESS NOTES
Speech Language Pathology  Facility/Department: [facility] 4 ONCOLOGY UNIT  SWALLOW THERAPY     NAME: Katie Angeles  : 1955  MRN: [id number]    ADMISSION DATE: 2020  ADMITTING DIAGNOSIS: has Impetigo; Skin tear of left upper arm without complication; Acute ischemic stroke ([address] Aspiration pneumonia ([address] History of stroke with residual deficit; Dysarthria; Transaminitis; Type 2 diabetes mellitus, without long-term current use of insulin ([address] Chronic obstructive pulmonary disease with acute lower respiratory infection ([address] Seizure disorder ([address] COPD exacerbation ([facility] [address] Hypertension; ZARA (acute kidney injury) ([facility] [address] and Acute on chronic respiratory failure with hypoxia ([facility] [address] on their problem list.    Date of Treat: 2020  Evaluating Therapist: Snehal Andrews    Reason for Referral  Katie Angeles was referred for a bedside swallow evaluation to assess the efficiency of his swallow function, identify signs and symptoms of aspiration and make recommendations regarding safe dietary consistencies, effective compensatory strategies, and safe eating environment. Impression  Monitored patient's swallowing function. Patient exhibits slow, decreased oral prep of more solid consistencies as well as sluggish, inconsistently mild-moderately decreased laryngeal elevation for swallow airway protection. Even so, no outward S/S penetration/aspiration was noted/reported with any puree consistency presentation, regular solid consistency presentation, or nectar thick liquid presentation administered during treatment session this date. At this time, recommend continuation regular solid consistency and nectar thick liquids. Self-feed. Recommend meds whole in pudding/applesauce. If patient receives mouth care prior to intake, okay for ice chips and small sips thin H2O IN BETWEEN MEALS for comfort. Will continue to follow.     Treatment Plan  Requires SLP Intervention:  Yes     Recommended Diet and Intervention  Diet Solids Recommendation: Regular solid  Liquid Consistency Recommendation: Nectar thick  Recommended Form of Meds: Meds whole in puree as able  Therapeutic Interventions: Patient/Family education;Diet tolerance monitoring; Therapeutic PO trials with SLP     Compensatory Swallowing Strategies  Compensatory Swallowing Strategies: Upright as possible for all oral intake;Small bites/sips;Eat/Feed slowly; Alternate solids and liquids; Remain upright for 30-45 minutes after meals     Treatment/Goals  Timeframe for Short-term Goals: 1x/day for 3 days   Goal 1: Patient will tolerate regular solid consistency and nectar thick liquids with min S/S penetration/aspiration during PO intake. Goal 2: Patient staff will follow swallow safety recommendations to decrease risk of penetration/aspiration during PO intake. Goal 3: Re-assessment of swallow function for potential diet upgrade. Goal 4: Trial oral motor, lingual, and pharyngeal exercises with provision of mod cues/prompts. Goal 5: Monitor speech production. Goal 6: Patient will utilize tools/strategies to promote increased clarity of speech at word, phase, and sentence level with provision of mod cues/prompts.      General  Chart Reviewed: Yes  Behavior/Cognition: Alert;Pleasant; Cooperative  O2 Device: Nasal Cannula  Communication Observation: (Patient exhibits dysarthria primarily characterized by decreased labial movements and slow, decreased lingual movements. SLP ranked functional intelligibility of speech for unfamiliar listeners at 50-60% in utterances without background noise present.)  Follows Directions: Simple   Dentition: Poor  Patient Positioning: Upright in bed  Consistencies Administered: Dysphagia Pureed (Dysphagia I); Regular solid; Nectar - cup     Monitored patient's swallowing function with the following observations noted:     Oral Phase  Mastication: Regular solid (Patient exhibits slow oral prep with primarily decreased vertical jaw movement noted at the front of the mouth during regular solid consistency presentations administered independently.)  Oral Phase - Comment: Oral transit of puree consistency presentations, administered independently, primarily measured 1-2 seconds in length and no oral cavity residue was noted post swallows. Oral transit of regular solid consistency primarily measured 1-2 seconds in length and min oral cavity residue was observed post swallows; residue cleared from the mouth with additional dry swallows. Oral transit of nectar thick liquid presentations, administered independently via cup, primarily measured 1-2 seconds in length.      Pharyngeal Phase  Decreased Laryngeal Elevation: (Patient exhibited sluggish, inconsistently mild-moderately decreased laryngeal elevation for swallow airway protection.)  Pharyngeal: No outward S/S penetration/aspiration was noted/reported with any puree consistency presentation, regular solid consistency presentation, or nectar thick liquid presentation administered during treatment session this date.     At this time, recommend continuation regular solid consistency and nectar thick liquids. Self-feed. Recommend meds whole in pudding/applesauce. If patient receives mouth care prior to intake, okay for ice chips and small sips thin H2O IN BETWEEN MEALS for comfort.  Will continue to follow.     Electronically signed by STEVE Fu on 8/24/2020 at 12:51 PM

## 2020-08-24 NOTE — PLAN OF CARE
Problem: Falls - Risk of:  Goal: Will remain free from falls  Description: Will remain free from falls  8/24/2020 1047 by Kanwal Simms RN  Outcome: Ongoing  8/24/2020 0312 by Albin Holloway LPN  Outcome: Ongoing  Goal: Absence of physical injury  Description: Absence of physical injury  8/24/2020 1047 by Kanwal Simms RN  Outcome: Ongoing  8/24/2020 0312 by Albin Holloway LPN  Outcome: Ongoing     Problem:  Activity:  Goal: Fatigue will decrease  Description: Fatigue will decrease  8/24/2020 1047 by Kanwal Simms RN  Outcome: Ongoing  8/24/2020 0312 by Albin Holloway LPN  Outcome: Ongoing     Problem: Cardiac:  Goal: Hemodynamic stability will improve  Description: Hemodynamic stability will improve  8/24/2020 1047 by Kanwal Simms RN  Outcome: Ongoing  8/24/2020 0312 by Albin Holloway LPN  Outcome: Ongoing     Problem: Coping:  Goal: Level of anxiety will decrease  Description: Level of anxiety will decrease  8/24/2020 1047 by Kanwal Simms RN  Outcome: Ongoing  8/24/2020 0312 by Albin Holloway LPN  Outcome: Ongoing  Goal: Ability to cope will improve  Description: Ability to cope will improve  8/24/2020 1047 by Kanwal Simms RN  Outcome: Ongoing  8/24/2020 0312 by Albin Holloway LPN  Outcome: Ongoing  Goal: Ability to establish a method of communication will improve  Description: Ability to establish a method of communication will improve  8/24/2020 1047 by Kanwal Simms RN  Outcome: Ongoing  8/24/2020 0312 by Albin Holloway LPN  Outcome: Ongoing     Problem: Nutritional:  Goal: Consumption of the prescribed amount of daily calories will improve  Description: Consumption of the prescribed amount of daily calories will improve  8/24/2020 1047 by Kanwal Simms RN  Outcome: Ongoing  8/24/2020 0312 by Albin Holloway LPN  Outcome: Ongoing     Problem: Respiratory:  Goal: Ability to maintain a clear airway will improve  Description: Ability to maintain a clear airway will improve  8/24/2020 1047 by Cass Ramon RN  Outcome: Ongoing  8/24/2020 0312 by Cece Heath LPN  Outcome: Ongoing  Goal: Ability to maintain adequate ventilation will improve  Description: Ability to maintain adequate ventilation will improve  8/24/2020 1047 by Cass Ramon RN  Outcome: Ongoing  8/24/2020 0312 by Cece Heath LPN  Outcome: Ongoing  Goal: Complications related to the disease process, condition or treatment will be avoided or minimized  Description: Complications related to the disease process, condition or treatment will be avoided or minimized  8/24/2020 1047 by Cass Ramon RN  Outcome: Ongoing  8/24/2020 0312 by Cece Heath LPN  Outcome: Ongoing     Problem: Skin Integrity:  Goal: Risk for impaired skin integrity will decrease  Description: Risk for impaired skin integrity will decrease  8/24/2020 1047 by Cass Ramon RN  Outcome: Ongoing  8/24/2020 0312 by Cece Heath LPN  Outcome: Ongoing     Problem: Discharge Planning:  Goal: Discharged to appropriate level of care  Description: Discharged to appropriate level of care  8/24/2020 1047 by Cass Ramon RN  Outcome: Ongoing  8/24/2020 0312 by Cece Heath LPN  Outcome: Ongoing     Problem:  Activity Intolerance:  Goal: Ability to tolerate increased activity will improve  Description: Ability to tolerate increased activity will improve  8/24/2020 1047 by Cass Ramon RN  Outcome: Ongoing  8/24/2020 0312 by Cece Heath LPN  Outcome: Ongoing     Problem: Airway Clearance - Ineffective:  Goal: Ability to maintain a clear airway will improve  Description: Ability to maintain a clear airway will improve  8/24/2020 1047 by Cass Ramon RN  Outcome: Ongoing  8/24/2020 0312 by Cece Heath LPN  Outcome: Ongoing     Problem: Breathing Pattern - Ineffective:  Goal: Ability to achieve and maintain a regular respiratory rate will improve  Description: Ability to achieve and maintain a regular respiratory rate will improve  8/24/2020 1047 by Bismark Tierney RN  Outcome: Ongoing  8/24/2020 0312 by Isrrael Chery LPN  Outcome: Ongoing     Problem: Gas Exchange - Impaired:  Goal: Levels of oxygenation will improve  Description: Levels of oxygenation will improve  8/24/2020 1047 by Bismark Tierney RN  Outcome: Ongoing  8/24/2020 0312 by Isrrael Chery LPN  Outcome: Ongoing

## 2020-08-24 NOTE — CARE COORDINATION
Patient advised CM it would be fine to reach out to his sister, Felicia Hamilton, to help him find placement. Per Bre, patient has a stroke 1/2020 and and was hospitalized at [facility]. He was transferred to [facility] at discharge. He was d/c from [address] [place] [address] 3/1/2020 and went home alone. Patient has 3 sibling, Deb Clark, and Belkys Seay, all live in the area. Bre would like a referral made to [facility] as she lives in [facility]. Bre states, pt has been disabled for many years. He worked in [employer] when younger man. Bre thinks patient only has Medicaid, not Medicare. CM will await responses from SNFs regarding admission.   Electronically signed by Libia Underwood RN on 8/24/2020 at 5:45 PM

## 2020-08-24 NOTE — PLAN OF CARE
Problem: Falls - Risk of:  Goal: Will remain free from falls  Description: Will remain free from falls  Outcome: Ongoing  Goal: Absence of physical injury  Description: Absence of physical injury  Outcome: Ongoing     Problem: Activity:  Goal: Fatigue will decrease  Description: Fatigue will decrease  Outcome: Ongoing     Problem: Cardiac:  Goal: Hemodynamic stability will improve  Description: Hemodynamic stability will improve  Outcome: Ongoing     Problem: Coping:  Goal: Level of anxiety will decrease  Description: Level of anxiety will decrease  Outcome: Ongoing  Goal: Ability to cope will improve  Description: Ability to cope will improve  Outcome: Ongoing  Goal: Ability to establish a method of communication will improve  Description: Ability to establish a method of communication will improve  Outcome: Ongoing     Problem: Nutritional:  Goal: Consumption of the prescribed amount of daily calories will improve  Description: Consumption of the prescribed amount of daily calories will improve  Outcome: Ongoing     Problem: Respiratory:  Goal: Ability to maintain a clear airway will improve  Description: Ability to maintain a clear airway will improve  Outcome: Ongoing  Goal: Ability to maintain adequate ventilation will improve  Description: Ability to maintain adequate ventilation will improve  Outcome: Ongoing  Goal: Complications related to the disease process, condition or treatment will be avoided or minimized  Description: Complications related to the disease process, condition or treatment will be avoided or minimized  Outcome: Ongoing     Problem: Skin Integrity:  Goal: Risk for impaired skin integrity will decrease  Description: Risk for impaired skin integrity will decrease  Outcome: Ongoing     Problem: Discharge Planning:  Goal: Discharged to appropriate level of care  Description: Discharged to appropriate level of care  Outcome: Ongoing     Problem:  Activity Intolerance:  Goal: Ability to tolerate increased activity will improve  Description: Ability to tolerate increased activity will improve  Outcome: Ongoing     Problem: Airway Clearance - Ineffective:  Goal: Ability to maintain a clear airway will improve  Description: Ability to maintain a clear airway will improve  Outcome: Ongoing     Problem: Breathing Pattern - Ineffective:  Goal: Ability to achieve and maintain a regular respiratory rate will improve  Description: Ability to achieve and maintain a regular respiratory rate will improve  Outcome: Ongoing     Problem: Gas Exchange - Impaired:  Goal: Levels of oxygenation will improve  Description: Levels of oxygenation will improve  Outcome: Ongoing

## 2020-08-24 NOTE — PROGRESS NOTES
Occupational Therapy   Occupational Therapy Initial Assessment  Date: 2020   Patient Name: Mariah Peacock  MRN: [id number]     : 1955    Date of Service: 2020    Discharge Recommendations:  Patient would benefit from continued therapy after discharge       Assessment   Performance deficits / Impairments: Decreased functional mobility ; Decreased ADL status; Decreased high-level IADLs;Decreased strength;Decreased balance  Assessment: Pt Supervision-SBA for all ADL tasks and transfers at this time; Will conitinue to monitor for increased safety for d/c planning due to pt lives alone. Pt had previous stroke, pt could benefit from higher level balance and IADL tasks due to safety concerns for d/c home without assist.  Treatment Diagnosis: COPD Exacerbation  Prognosis: Good  Decision Making: Low Complexity  REQUIRES OT FOLLOW UP: Yes  Activity Tolerance  Activity Tolerance: Patient Tolerated treatment well  Safety Devices  Safety Devices in place: Yes  Type of devices: Call light within reach; Left in chair;Chair alarm in place;Nurse notified           Patient Diagnosis(es): The primary encounter diagnosis was COPD exacerbation ([facility] [address] Diagnoses of Chest pain, unspecified type, Hypoxia, and Pneumonia due to organism were also pertinent to this visit. has a past medical history of Arthritis, Asthma, Cerebral artery occlusion with cerebral infarction ([address] COPD (chronic obstructive pulmonary disease) ([address] Diabetes mellitus ([address] Hypertension, Seizures ([address] and Thyroid disease. has a past surgical history that includes Arm Surgery (Left); Mandible surgery; knee surgery (Right); Eye surgery (Right); and pr colonoscopy flx dx w/collj spec when pfrmd (N/A, 2018).     Treatment Diagnosis: COPD Exacerbation      Restrictions  Restrictions/Precautions  Restrictions/Precautions: Fall Risk, General Precautions  Required Braces or Orthoses?: No    Subjective   General  Patient assessed for rehabilitation stand: Stand by assistance  Stand to sit: Stand by assistance     Cognition  Overall Cognitive Status: Exceptions  Following Commands: Follows one step commands consistently  Safety Judgement: Good awareness of safety precautions  Problem Solving: Assistance required to generate solutions;Assistance required to implement solutions  Insights: Fully aware of deficits  Initiation: Requires cues for some  Sequencing: Requires cues for some  Cognition Comment: slightly impulsive        Sensation  Overall Sensation Status: WFL        LUE AROM (degrees)  LUE AROM : WFL  Left Hand AROM (degrees)  Left Hand AROM: WFL  RUE AROM (degrees)  RUE AROM : WFL  Right Hand AROM (degrees)  Right Hand AROM: WFL  LUE Strength  Gross LUE Strength: WFL  RUE Strength  Gross RUE Strength: WFL        Plan   Plan  Times per week: 3-5x/wk  Current Treatment Recommendations: Functional Mobility Training, Safety Education & Training, Equipment Evaluation, Education, & procurement, Self-Care / ADL, Balance Training, Endurance Training, Strengthening       Goals  Short term goals  Time Frame for Short term goals: 1 week  Short term goal 1: Pt will be  Modified I for toilet transfer  Short term goal 2: Pt will be Modified I for LE Dressing  Patient Goals   Patient goals :  To go home        Electronically signed by Renetta Naik OT on 8/24/2020 at 9:26 AM    Renetta Naik OT

## 2020-08-24 NOTE — PROGRESS NOTES
12 hour chart check review completed. Electronically signed by Jeyson Arevalo LPN on 7/57/2602 at 66:53 AM

## 2020-08-25 LAB
EKG P AXIS: 69 DEGREES
EKG P-R INTERVAL: 150 MS
EKG Q-T INTERVAL: 458 MS
EKG QRS DURATION: 90 MS
EKG QTC CALCULATION (BAZETT): 458 MS
EKG T AXIS: 74 DEGREES
GLUCOSE BLD-MCNC: 105 MG/DL (ref 70–99)
GLUCOSE BLD-MCNC: 119 MG/DL (ref 70–99)
GLUCOSE BLD-MCNC: 126 MG/DL (ref 70–99)
GLUCOSE BLD-MCNC: 162 MG/DL (ref 70–99)
MAGNESIUM: 2.1 MG/DL (ref 1.6–2.4)
PERFORMED ON: ABNORMAL
POTASSIUM SERPL-SCNC: 4.4 MMOL/L (ref 3.5–5)
SARS-COV-2, PCR: NOT DETECTED

## 2020-08-25 PROCEDURE — 6370000000 HC RX 637 (ALT 250 FOR IP): Performed by: INTERNAL MEDICINE

## 2020-08-25 PROCEDURE — 83735 ASSAY OF MAGNESIUM: CPT

## 2020-08-25 PROCEDURE — 84132 ASSAY OF SERUM POTASSIUM: CPT

## 2020-08-25 PROCEDURE — 36415 COLL VENOUS BLD VENIPUNCTURE: CPT

## 2020-08-25 PROCEDURE — 92526 ORAL FUNCTION THERAPY: CPT

## 2020-08-25 PROCEDURE — 94761 N-INVAS EAR/PLS OXIMETRY MLT: CPT

## 2020-08-25 PROCEDURE — 2700000000 HC OXYGEN THERAPY PER DAY

## 2020-08-25 PROCEDURE — 2580000003 HC RX 258: Performed by: INTERNAL MEDICINE

## 2020-08-25 PROCEDURE — 1210000000 HC MED SURG R&B

## 2020-08-25 PROCEDURE — 94640 AIRWAY INHALATION TREATMENT: CPT

## 2020-08-25 PROCEDURE — 82947 ASSAY GLUCOSE BLOOD QUANT: CPT

## 2020-08-25 PROCEDURE — U0003 INFECTIOUS AGENT DETECTION BY NUCLEIC ACID (DNA OR RNA); SEVERE ACUTE RESPIRATORY SYNDROME CORONAVIRUS 2 (SARS-COV-2) (CORONAVIRUS DISEASE [COVID-19]), AMPLIFIED PROBE TECHNIQUE, MAKING USE OF HIGH THROUGHPUT TECHNOLOGIES AS DESCRIBED BY CMS-2020-01-R: HCPCS

## 2020-08-25 PROCEDURE — 6360000002 HC RX W HCPCS: Performed by: INTERNAL MEDICINE

## 2020-08-25 PROCEDURE — 97116 GAIT TRAINING THERAPY: CPT

## 2020-08-25 RX ADMIN — BUDESONIDE 500 MCG: 0.5 SUSPENSION RESPIRATORY (INHALATION) at 07:38

## 2020-08-25 RX ADMIN — ARFORMOTEROL TARTRATE 15 MCG: 15 SOLUTION RESPIRATORY (INHALATION) at 07:38

## 2020-08-25 RX ADMIN — METHYLPREDNISOLONE SODIUM SUCCINATE 40 MG: 40 INJECTION, POWDER, FOR SOLUTION INTRAMUSCULAR; INTRAVENOUS at 05:45

## 2020-08-25 RX ADMIN — HEPARIN SODIUM 5000 UNITS: 5000 INJECTION INTRAVENOUS; SUBCUTANEOUS at 16:13

## 2020-08-25 RX ADMIN — IPRATROPIUM BROMIDE AND ALBUTEROL SULFATE 1 AMPULE: .5; 3 SOLUTION RESPIRATORY (INHALATION) at 15:42

## 2020-08-25 RX ADMIN — SODIUM CHLORIDE, PRESERVATIVE FREE 10 ML: 5 INJECTION INTRAVENOUS at 08:37

## 2020-08-25 RX ADMIN — BUDESONIDE 500 MCG: 0.5 SUSPENSION RESPIRATORY (INHALATION) at 18:35

## 2020-08-25 RX ADMIN — SODIUM CHLORIDE: 9 INJECTION, SOLUTION INTRAVENOUS at 20:52

## 2020-08-25 RX ADMIN — ARFORMOTEROL TARTRATE 15 MCG: 15 SOLUTION RESPIRATORY (INHALATION) at 18:35

## 2020-08-25 RX ADMIN — HEPARIN SODIUM 5000 UNITS: 5000 INJECTION INTRAVENOUS; SUBCUTANEOUS at 05:45

## 2020-08-25 RX ADMIN — SODIUM CHLORIDE: 9 INJECTION, SOLUTION INTRAVENOUS at 05:44

## 2020-08-25 RX ADMIN — HEPARIN SODIUM 5000 UNITS: 5000 INJECTION INTRAVENOUS; SUBCUTANEOUS at 20:52

## 2020-08-25 RX ADMIN — AMLODIPINE BESYLATE 10 MG: 10 TABLET ORAL at 08:37

## 2020-08-25 RX ADMIN — TAMSULOSIN HYDROCHLORIDE 0.8 MG: 0.4 CAPSULE ORAL at 20:52

## 2020-08-25 RX ADMIN — IPRATROPIUM BROMIDE AND ALBUTEROL SULFATE 1 AMPULE: .5; 3 SOLUTION RESPIRATORY (INHALATION) at 11:28

## 2020-08-25 RX ADMIN — AZITHROMYCIN DIHYDRATE 500 MG: 500 INJECTION, POWDER, LYOPHILIZED, FOR SOLUTION INTRAVENOUS at 12:51

## 2020-08-25 RX ADMIN — METHYLPREDNISOLONE SODIUM SUCCINATE 40 MG: 40 INJECTION, POWDER, FOR SOLUTION INTRAMUSCULAR; INTRAVENOUS at 17:44

## 2020-08-25 RX ADMIN — IPRATROPIUM BROMIDE AND ALBUTEROL SULFATE 1 AMPULE: .5; 3 SOLUTION RESPIRATORY (INHALATION) at 07:23

## 2020-08-25 RX ADMIN — SODIUM CHLORIDE, PRESERVATIVE FREE 1 G: 5 INJECTION INTRAVENOUS at 12:51

## 2020-08-25 RX ADMIN — IPRATROPIUM BROMIDE AND ALBUTEROL SULFATE 1 AMPULE: .5; 3 SOLUTION RESPIRATORY (INHALATION) at 18:26

## 2020-08-25 RX ADMIN — FLUOXETINE HYDROCHLORIDE 20 MG: 20 CAPSULE ORAL at 08:37

## 2020-08-25 RX ADMIN — ATORVASTATIN CALCIUM 80 MG: 80 TABLET, FILM COATED ORAL at 20:52

## 2020-08-25 RX ADMIN — ASPIRIN 81 MG: 81 TABLET, CHEWABLE ORAL at 08:37

## 2020-08-25 ASSESSMENT — PAIN SCALES - GENERAL: PAINLEVEL_OUTOF10: 0

## 2020-08-25 NOTE — CARE COORDINATION
BASILIO spoke with Donis Mullen at [facility]. She has forwarded request to Medical Director to consider being PCP for patient. Donis Mullen will investigate insurance and determine if \"precert\" is required, and call back to PCP. [facility] [place] would be family preference as patient's sister lives in [facility].    Electronically signed by Deo Jacobsen RN on 8/25/2020 at 9:46 AM.

## 2020-08-25 NOTE — PROGRESS NOTES
Speech Language Pathology  Facility/Department: [facility] 4 ONCOLOGY UNIT  SWALLOW THERAPY     NAME: Sandy Leon  : 1955  MRN: [id number]    ADMISSION DATE: 2020  ADMITTING DIAGNOSIS: has Impetigo; Skin tear of left upper arm without complication; Acute ischemic stroke ([address] Aspiration pneumonia ([address] History of stroke with residual deficit; Dysarthria; Transaminitis; Type 2 diabetes mellitus, without long-term current use of insulin ([address] Chronic obstructive pulmonary disease with acute lower respiratory infection ([address] Seizure disorder ([address] COPD exacerbation ([address] Hypertension; ZARA (acute kidney injury) ([address] and Acute on chronic respiratory failure with hypoxia ([address] on their problem list.    Date of Treat: 2020  Evaluating Therapist: Tani Kirkpatrick    Reason for Referral  Sandy Leon was referred for a bedside swallow evaluation to assess the efficiency of his swallow function, identify signs and symptoms of aspiration and make recommendations regarding safe dietary consistencies, effective compensatory strategies, and safe eating environment. Impression  Monitored patient's swallowing function. Patient exhibits slow, decreased oral prep of more solid consistencies as well as sluggish, inconsistently mild-moderately decreased laryngeal elevation for swallow airway protection. Even so, no outward S/S penetration/aspiration was noted/reported with any puree consistency presentation, regular solid consistency presentation, or nectar thick liquid presentation administered during treatment session this date.     At this time, recommend continuation regular solid consistency and nectar thick liquids. Self-feed. Recommend meds whole in pudding/applesauce. If patient receives mouth care prior to intake, okay for ice chips and small sips thin H2O IN BETWEEN MEALS for comfort. Will continue to follow.     Treatment Plan  Requires SLP Intervention:  Yes     Recommended Diet and Intervention  Diet Solids Recommendation: Regular solid  Liquid Consistency Recommendation: Nectar thick  Recommended Form of Meds: Meds whole in puree as able  Therapeutic Interventions: Patient/Family education;Diet tolerance monitoring; Therapeutic PO trials with SLP     Compensatory Swallowing Strategies  Compensatory Swallowing Strategies: Upright as possible for all oral intake;Small bites/sips;Eat/Feed slowly; Alternate solids and liquids; Remain upright for 30-45 minutes after meals     Treatment/Goals  Timeframe for Short-term Goals: 1x/day for 3 days   Goal 1: Patient will tolerate regular solid consistency and nectar thick liquids with min S/S penetration/aspiration during PO intake. Goal 2: Patient staff will follow swallow safety recommendations to decrease risk of penetration/aspiration during PO intake. Goal 3: Re-assessment of swallow function for potential diet upgrade.   Goal 4: Trial oral motor, lingual, and pharyngeal exercises with provision of mod cues/prompts. Goal 5: Monitor speech production. Goal 6: Patient will utilize tools/strategies to promote increased clarity of speech at word, phase, and sentence level with provision of mod cues/prompts.      General  Chart Reviewed: Yes  Behavior/Cognition: Alert;Pleasant; Cooperative  O2 Device: Nasal Cannula  Communication Observation: (Patient exhibits dysarthria primarily characterized by decreased labial movements and slow, decreased lingual movements. SLP ranked functional intelligibility of speech for unfamiliar listeners at 50-60% in utterances without background noise present.)  Follows Directions: Simple   Dentition: Poor  Patient Positioning: Upright in bed  Consistencies Administered: Dysphagia Pureed (Dysphagia I); Regular solid; Nectar - cup     Monitored patient's swallowing function with the following observations noted:     Oral Phase  Mastication: Regular solid (Patient exhibits slow oral prep with primarily decreased vertical jaw movement noted at the front of the mouth during regular solid consistency presentations administered independently.)  Oral Phase - Comment: Oral transit of puree consistency presentations, administered independently, primarily measured 1-2 seconds in length and no oral cavity residue was noted post swallows. Oral transit of regular solid consistency primarily measured 1-2 seconds in length and min oral cavity residue was observed post swallows; residue cleared from the mouth with additional dry swallows. Oral transit of nectar thick liquid presentations, administered independently via cup, primarily measured 1-2 seconds in length.      Pharyngeal Phase  Decreased Laryngeal Elevation: (Patient exhibited sluggish, inconsistently mild-moderately decreased laryngeal elevation for swallow airway protection.)  Pharyngeal: No outward S/S penetration/aspiration was noted/reported with any puree consistency presentation, regular solid consistency presentation, or nectar thick liquid presentation administered during treatment session this date.     At this time, recommend continuation regular solid consistency and nectar thick liquids. Self-feed. Recommend meds whole in pudding/applesauce. If patient receives mouth care prior to intake, okay for ice chips and small sips thin H2O IN BETWEEN MEALS for comfort. Will continue to follow.     Electronically signed by STEVE Jessica on 8/25/2020 at 1:13 PM

## 2020-08-25 NOTE — PROGRESS NOTES
Physical Therapy  Name: Angel Castillo  MRN:  [id number]  Date of service:  8/25/2020 08/25/20 1008   Subjective   Subjective Pt states he spends most of his time in the wheelchair when he is home. States he does a stand pivot transfers. STates he does fall at home mostly because he does not use his walker regularly. Bed Mobility   Supine to Sit Supervision   Transfers   Sit to Stand Contact guard assistance   Stand to sit Contact guard assistance   Bed to Chair Contact guard assistance   Ambulation   Ambulation? Yes   Ambulation 1   Surface level tile   Device Rolling Walker   Assistance Contact guard assistance;Minimal assistance   Quality of Gait unsteady, antalgic   Gait Deviations Decreased step length;Decreased step height   Distance 15'   Comments unsafe due to being impulsive. Short term goals   Time Frame for Short term goals 2 wks   Short term goal 1 supine to sit indep   Short term goal 2 sit to stand indep   Short term goal 3 amb. 25' with RW SBA   Conditions Requiring Skilled Therapeutic Intervention   Body structures, Functions, Activity limitations Decreased functional mobility ; Decreased ROM; Decreased strength;Decreased posture;Decreased balance;Decreased coordination   Activity Tolerance   Activity Tolerance Patient Tolerated treatment well;Patient limited by endurance   Safety Devices   Type of devices Left in chair;Call light within reach; Chair alarm in place       Electronically signed by Solomon Akbar PTA on 8/25/2020 at 10:12 AM

## 2020-08-25 NOTE — PROGRESS NOTES
cefTRIAXone (ROCEPHIN) 1 g in sodium chloride (PF) 10 mL IV syringe  1 g Intravenous Q24H Vickie Lewis MD   1 g at 08/24/20 1258    azithromycin (ZITHROMAX) 500 mg in D5W 250ml Vial Mate  500 mg Intravenous Q24H Vickie Lewis MD   Stopped at 08/24/20 1420    ipratropium-albuterol (DUONEB) nebulizer solution 1 ampule  1 ampule Inhalation Q4H WA Vickie Lewis MD   1 ampule at 08/25/20 0723    budesonide (PULMICORT) nebulizer suspension 500 mcg  0.5 mg Nebulization BID Vickie Lewis MD   500 mcg at 08/25/20 0602    Arformoterol Tartrate (BROVANA) nebulizer solution 15 mcg  15 mcg Nebulization BID Vickie Lewis MD   15 mcg at 08/25/20 0738    methylPREDNISolone sodium (SOLU-MEDROL) injection 40 mg  40 mg Intravenous Q12H Vickie Lewis MD   40 mg at 08/25/20 0545    0.9 % sodium chloride infusion   Intravenous Continuous Vickie Lewis  mL/hr at 08/25/20 0544      sodium chloride flush 0.9 % injection 10 mL  10 mL Intravenous 2 times per day Vickie Lewis MD   10 mL at 08/25/20 0837    sodium chloride flush 0.9 % injection 10 mL  10 mL Intravenous PRN Vickie Lewis MD        acetaminophen (TYLENOL) tablet 650 mg  650 mg Oral Q6H PRN Vickie Lewis MD        Or    acetaminophen (TYLENOL) suppository 650 mg  650 mg Rectal Q6H PRN Vickie Lewis MD        polyethylene glycol [facility]) packet 17 g  17 g Oral Daily PRN Vickie Lewis MD        promethazine (PHENERGAN) tablet 12.5 mg  12.5 mg Oral Q6H PRN Vickie Lewis MD        Or    ondansetron [facility]) injection 4 mg  4 mg Intravenous Q6H PRN Vickie Lewis MD        potassium chloride (KLOR-CON M) extended release tablet 40 mEq  40 mEq Oral PRN Vickie Lewis MD   40 mEq at 08/24/20 0456    Or    potassium bicarb-citric acid (EFFER-K) effervescent tablet 40 mEq  40 mEq Oral PRN Vickie Lewis MD        Or    potassium chloride 10 mEq/100 mL IVPB (Peripheral Line)  10 mEq Intravenous PRN Vickie Lewis MD        magnesium sulfate 2 g in 50 mL IVPB premix  2 g Intravenous PRSHERRIE Smith MD   Stopped at 08/24/20 0926    insulin lispro (HUMALOG) injection vial 0-6 Units  0-6 Units Subcutaneous TID WC Roberto Smith MD   2 Units at 08/23/20 1729    insulin lispro (HUMALOG) injection vial 0-3 Units  0-3 Units Subcutaneous Nightly Roberto Smith MD        glucose (GLUTOSE) 40 % oral gel 15 g  15 g Oral PRSHERRIE Smith MD        dextrose 50 % IV solution  12.5 g Intravenous PRN Roberto Smith MD        glucagon (rDNA) injection 1 mg  1 mg Intramuscular PRSHERRIE Smith MD        dextrose 5 % solution  100 mL/hr Intravenous PETR Smith MD            Labs:     Recent Labs     08/23/20 0218 08/24/20 0224   WBC 17.7* 11.6*   RBC 5.05 4.27*   HGB 15.9 13.6*   HCT 47.6 41.1*   MCV 94.3* 96.3*   MCH 31.5* 31.9*   MCHC 33.4 33.1    238     Recent Labs     08/23/20 0218 08/24/20 0224 08/25/20  0325    144  --    K 4.4 3.3* 4.4   ANIONGAP 8 7  --     117*  --    CO2 23 20*  --    BUN 29* 19  --    CREATININE 1.5* 0.8  --    GLUCOSE 157* 122*  --    CALCIUM 9.1 6.8*  --      Recent Labs     08/23/20 0218 08/24/20 0224 08/25/20  0325   MG 2.2 1.5* 2.1     Recent Labs     08/23/20 0218 08/24/20 0224   AST 14 11   ALT 8 6   BILITOT 0.4 <0.2   ALKPHOS 47 30*     ABGs:No results for input(s): PH, PO2, PCO2, HCO3, BE, O2SAT in the last 72 hours. Troponin T:   No results for input(s): TROPONINI in the last 72 hours. INR:   No results for input(s): INR in the last 72 hours. Lactic Acid:   No results for input(s): LACTA in the last 72 hours.     Objective:   Vitals: BP (!) 146/99   Pulse 65   Temp 97.6 °F (36.4 °C)   Resp 16   Ht 5' 10\" (1.778 m)   Wt 178 lb (80.7 kg)   SpO2 95%   BMI 25.54 kg/m²   24HR INTAKE/OUTPUT:      Intake/Output Summary (Last 24 hours) at 8/25/2020 1023  Last data filed at 8/25/2020 0647  Gross per 24 hour   Intake 3666 ml   Output --   Net 3666 ml     General appearance: Alert  Head: NC/AT  Eyes: conjunctivae/corneas clear   Ears: normal external ears  Neck: Supple  Lungs: BLAE, mild wheezing, no crackles or rhonchi appreciated, coarse breath sounds b/l  Heart: regular rate and rhythm, S1, S2 normal, no murmurs appreciated   Abdomen: BS+, soft, NT, ND  Extremities: no pedal edema  Skin: Skin color, texture, turgor normal  Neurologic: Alert. Gross motor and sensory function intact. Psychiatric:  Mood appropriate    Assessment and Plan: Active Problems:    History of stroke with residual deficit    Dysarthria    Type 2 diabetes mellitus, without long-term current use of insulin (HCC)    COPD exacerbation (HCC)    Hypertension    ZARA (acute kidney injury) ([facility] [address]    Acute on chronic respiratory failure with hypoxia (HCC)  Resolved Problems:    * No resolved hospital problems. *    Acute on chronic respiratory failure with hypoxia: Improving. O2 PRN. Bronchodilators. Antibiotics. Steroids.     ZARA: Resolved. Renal ultrasound unremarkable. Monitor BMP.     History of CVA: Aspirin/statin. PT/OT/SLP.     Hypertension: Monitor blood pressure and adjust medications as needed.     DM: HbA1c 5.2. Insulin sliding scale. Monitor blood glucose and adjust medications as needed.     Supportive management. Advance Directive: Full Code    DVT prophylaxis: Heparin    Discharge planning: TBD - awaiting placement.       Signed:  Paradise Allen MD 8/25/2020 10:23 AM  Rounding Hospitalist

## 2020-08-25 NOTE — PROGRESS NOTES
1048 P. O. on NC @ 2 LPM @ Rest Sat was 97%  1127 P. On R/A @ Rest Sat was 93%    1145 P. O. on @ R/A @ Exercise Sat was 95 % pt walked to door with 3 people with pt. Pt. Is very unstead on his feet.

## 2020-08-25 NOTE — PROGRESS NOTES
Physical Therapy  Name: Bernard St  MRN:  [id number]  Date of service:  8/25/2020 08/25/20 1521   Subjective   Subjective Pt states he is ready to return to bed   General Comment   Comments pt in recliner. Bed Mobility   Sit to Supine Supervision   Transfers   Sit to Stand Contact guard assistance   Stand to sit Contact guard assistance   Bed to Chair Minimal assistance   Ambulation   Ambulation? Yes   Ambulation 1   Surface level tile   Device Rolling Walker   Assistance Minimal assistance   Quality of Gait unsteady, antalgic   Gait Deviations Decreased step length;Decreased step height; Increased AUTUMN   Distance 5'   Comments pt attempted to walk in pm but unable due to unsteadiness    Short term goals   Time Frame for Short term goals 2 wks   Short term goal 1 supine to sit indep   Short term goal 2 sit to stand indep   Short term goal 3 amb. 25' with RW SBA   Conditions Requiring Skilled Therapeutic Intervention   Body structures, Functions, Activity limitations Decreased functional mobility ; Decreased ROM; Decreased strength;Decreased posture;Decreased balance;Decreased coordination   Assessment Pt unable to walk in pm, just back to bed after attempting to take a few steps. Activity Tolerance   Activity Tolerance Patient limited by endurance; Patient Tolerated treatment well   Safety Devices   Type of devices Left in bed;Call light within reach; Bed alarm in place         Electronically signed by Ed Wood PTA on 8/25/2020 at 3:24 PM

## 2020-08-25 NOTE — PLAN OF CARE
Problem: Falls - Risk of:  Goal: Will remain free from falls  Description: Will remain free from falls  8/25/2020 0314 by Bessy Mejía LPN  Outcome: Ongoing  8/25/2020 0313 by Bessy Mejía LPN  Outcome: Ongoing  Goal: Absence of physical injury  Description: Absence of physical injury  8/25/2020 0314 by Bessy Mejía LPN  Outcome: Ongoing  8/25/2020 0313 by Bessy Mejía LPN  Outcome: Ongoing     Problem:  Activity:  Goal: Fatigue will decrease  Description: Fatigue will decrease  8/25/2020 0314 by Bessy Mejía LPN  Outcome: Ongoing  8/25/2020 0313 by Bessy Mejía LPN  Outcome: Ongoing     Problem: Cardiac:  Goal: Hemodynamic stability will improve  Description: Hemodynamic stability will improve  8/25/2020 0314 by Bessy Mejía LPN  Outcome: Ongoing  8/25/2020 0313 by Bessy Mejía LPN  Outcome: Ongoing     Problem: Coping:  Goal: Level of anxiety will decrease  Description: Level of anxiety will decrease  8/25/2020 0314 by Bessy Mejía LPN  Outcome: Ongoing  8/25/2020 0313 by Bessy Mejía LPN  Outcome: Ongoing  Goal: Ability to cope will improve  Description: Ability to cope will improve  8/25/2020 0314 by Bessy Mejía LPN  Outcome: Ongoing  8/25/2020 0313 by Bessy Mejía LPN  Outcome: Ongoing  Goal: Ability to establish a method of communication will improve  Description: Ability to establish a method of communication will improve  8/25/2020 0314 by Bessy Mejía LPN  Outcome: Ongoing  8/25/2020 0313 by Bessy Mejía LPN  Outcome: Ongoing     Problem: Nutritional:  Goal: Consumption of the prescribed amount of daily calories will improve  Description: Consumption of the prescribed amount of daily calories will improve  8/25/2020 0314 by Bessy Mejía LPN  Outcome: Ongoing  8/25/2020 0313 by Bessy Mejía LPN  Outcome: Ongoing     Problem: Respiratory:  Goal: Ability to maintain a clear airway will improve  Description: Ability to maintain a clear airway will improve  8/25/2020 0314 by Kelsey Tenorio LPN  Outcome: Ongoing  8/25/2020 0313 by Kelsey Tenorio LPN  Outcome: Ongoing  Goal: Ability to maintain adequate ventilation will improve  Description: Ability to maintain adequate ventilation will improve  8/25/2020 0314 by Kelsey Tenorio LPN  Outcome: Ongoing  8/25/2020 0313 by Kelsey Tenorio LPN  Outcome: Ongoing  Goal: Complications related to the disease process, condition or treatment will be avoided or minimized  Description: Complications related to the disease process, condition or treatment will be avoided or minimized  8/25/2020 0314 by Kelsey Tenorio LPN  Outcome: Ongoing  8/25/2020 0313 by Kelsey Tenorio LPN  Outcome: Ongoing     Problem: Skin Integrity:  Goal: Risk for impaired skin integrity will decrease  Description: Risk for impaired skin integrity will decrease  8/25/2020 0314 by Kelsey Tenorio LPN  Outcome: Ongoing  8/25/2020 0313 by Kelsey Tenorio LPN  Outcome: Ongoing     Problem: Discharge Planning:  Goal: Discharged to appropriate level of care  Description: Discharged to appropriate level of care  8/25/2020 0314 by Kelsey Tenorio LPN  Outcome: Ongoing  8/25/2020 0313 by Kelsey Tenorio LPN  Outcome: Ongoing     Problem:  Activity Intolerance:  Goal: Ability to tolerate increased activity will improve  Description: Ability to tolerate increased activity will improve  8/25/2020 0314 by Kelsey Tenorio LPN  Outcome: Ongoing  8/25/2020 0313 by Kelsey Tenorio LPN  Outcome: Ongoing     Problem: Airway Clearance - Ineffective:  Goal: Ability to maintain a clear airway will improve  Description: Ability to maintain a clear airway will improve  8/25/2020 0314 by Kelsey Tenorio LPN  Outcome: Ongoing  8/25/2020 0313 by Kelsey Tenorio LPN  Outcome: Ongoing     Problem: Breathing Pattern - Ineffective:  Goal: Ability to achieve and maintain a regular respiratory rate will improve  Description: Ability to achieve and maintain a regular respiratory rate will improve  8/25/2020 0314 by Anya Zuniga LPN  Outcome: Ongoing  8/25/2020 0313 by Anya Zuniga LPN  Outcome: Ongoing     Problem: Gas Exchange - Impaired:  Goal: Levels of oxygenation will improve  Description: Levels of oxygenation will improve  8/25/2020 0314 by Anya Zuniga LPN  Outcome: Ongoing  8/25/2020 0313 by Anya Zuniga LPN  Outcome: Ongoing     Problem: Fluid Volume:  Goal: Ability to achieve a balanced intake and output will improve  Description: Ability to achieve a balanced intake and output will improve  Outcome: Ongoing     Problem: Physical Regulation:  Goal: Ability to maintain clinical measurements within normal limits will improve  Description: Ability to maintain clinical measurements within normal limits will improve  Outcome: Ongoing  Goal: Will show no signs and symptoms of electrolyte imbalance  Description: Will show no signs and symptoms of electrolyte imbalance  Outcome: Ongoing

## 2020-08-25 NOTE — PLAN OF CARE
Problem: Falls - Risk of:  Goal: Will remain free from falls  Description: Will remain free from falls  8/25/2020 0935 by Candido Yi RN  Outcome: Ongoing  8/25/2020 0314 by Ming Vasquez LPN  Outcome: Ongoing  8/25/2020 0313 by Ming Vasquez LPN  Outcome: Ongoing  Goal: Absence of physical injury  Description: Absence of physical injury  8/25/2020 0935 by Candido Yi RN  Outcome: Ongoing  8/25/2020 0314 by Ming Vasquez LPN  Outcome: Ongoing  8/25/2020 0313 by Ming Vasquez LPN  Outcome: Ongoing     Problem:  Activity:  Goal: Fatigue will decrease  Description: Fatigue will decrease  8/25/2020 0935 by Candido Yi RN  Outcome: Ongoing  8/25/2020 0314 by Ming Vasquez LPN  Outcome: Ongoing  8/25/2020 0313 by Ming Vasquez LPN  Outcome: Ongoing     Problem: Cardiac:  Goal: Hemodynamic stability will improve  Description: Hemodynamic stability will improve  8/25/2020 0935 by Candido Yi RN  Outcome: Ongoing  8/25/2020 0314 by Ming Vasquez LPN  Outcome: Ongoing  8/25/2020 0313 by Ming Vasquez LPN  Outcome: Ongoing     Problem: Coping:  Goal: Level of anxiety will decrease  Description: Level of anxiety will decrease  8/25/2020 0935 by Candido Yi RN  Outcome: Ongoing  8/25/2020 0314 by Ming Vasquez LPN  Outcome: Ongoing  8/25/2020 0313 by Ming Vasquez LPN  Outcome: Ongoing  Goal: Ability to cope will improve  Description: Ability to cope will improve  8/25/2020 0935 by Candido Yi RN  Outcome: Ongoing  8/25/2020 0314 by Ming Vasquez LPN  Outcome: Ongoing  8/25/2020 0313 by Ming Vasquez LPN  Outcome: Ongoing  Goal: Ability to establish a method of communication will improve  Description: Ability to establish a method of communication will improve  8/25/2020 0935 by Candido Yi RN  Outcome: Ongoing  8/25/2020 0314 by Ming Vasquez LPN  Outcome: Ongoing  8/25/2020 0313 by Ming Vasquez LPN  Outcome: Ongoing     Problem: Nutritional:  Goal: Consumption of the prescribed amount of daily calories will improve  Description: Consumption of the prescribed amount of daily calories will improve  8/25/2020 0935 by Rayo Fonseca RN  Outcome: Ongoing  8/25/2020 0314 by Hilda Dukes LPN  Outcome: Ongoing  8/25/2020 0313 by Hilda Dukes LPN  Outcome: Ongoing     Problem: Respiratory:  Goal: Ability to maintain a clear airway will improve  Description: Ability to maintain a clear airway will improve  8/25/2020 0935 by Rayo Fonseca RN  Outcome: Ongoing  8/25/2020 0314 by Hilda Dukes LPN  Outcome: Ongoing  8/25/2020 0313 by Hilda Dukes LPN  Outcome: Ongoing  Goal: Ability to maintain adequate ventilation will improve  Description: Ability to maintain adequate ventilation will improve  8/25/2020 0935 by Rayo Fonseca RN  Outcome: Ongoing  8/25/2020 0314 by Hilda Dukes LPN  Outcome: Ongoing  8/25/2020 0313 by Hilda Dukes LPN  Outcome: Ongoing  Goal: Complications related to the disease process, condition or treatment will be avoided or minimized  Description: Complications related to the disease process, condition or treatment will be avoided or minimized  8/25/2020 0935 by Raoy Fonseca RN  Outcome: Ongoing  8/25/2020 0314 by Hilda Dukes LPN  Outcome: Ongoing  8/25/2020 0313 by Hilda Dukes LPN  Outcome: Ongoing     Problem: Skin Integrity:  Goal: Risk for impaired skin integrity will decrease  Description: Risk for impaired skin integrity will decrease  8/25/2020 0935 by Rayo Fonseca RN  Outcome: Ongoing  8/25/2020 0314 by Hilda Dukes LPN  Outcome: Ongoing  8/25/2020 0313 by Hilda Dukes LPN  Outcome: Ongoing     Problem: Discharge Planning:  Goal: Discharged to appropriate level of care  Description: Discharged to appropriate level of care  8/25/2020 0935 by Rayo Fonseca RN  Outcome: Ongoing  8/25/2020 0314 by Hilda Dukes LPN  Outcome: Ongoing  8/25/2020 0339 RN  Outcome: Ongoing  8/25/2020 0314 by Eugenio Donovan LPN  Outcome: Ongoing  Goal: Will show no signs and symptoms of electrolyte imbalance  Description: Will show no signs and symptoms of electrolyte imbalance  8/25/2020 0935 by Magdy Simon RN  Outcome: Ongoing  8/25/2020 0314 by Eugenio Donovan LPN  Outcome: Ongoing

## 2020-08-26 VITALS
SYSTOLIC BLOOD PRESSURE: 134 MMHG | HEART RATE: 63 BPM | RESPIRATION RATE: 16 BRPM | TEMPERATURE: 97.5 F | HEIGHT: 70 IN | OXYGEN SATURATION: 94 % | WEIGHT: 178 LBS | DIASTOLIC BLOOD PRESSURE: 84 MMHG | BODY MASS INDEX: 25.48 KG/M2

## 2020-08-26 LAB
BLOOD CULTURE, ROUTINE: NORMAL
CULTURE, BLOOD 2: NORMAL
GLUCOSE BLD-MCNC: 138 MG/DL (ref 70–99)
PERFORMED ON: ABNORMAL

## 2020-08-26 PROCEDURE — 82947 ASSAY GLUCOSE BLOOD QUANT: CPT

## 2020-08-26 PROCEDURE — 2700000000 HC OXYGEN THERAPY PER DAY

## 2020-08-26 PROCEDURE — 97116 GAIT TRAINING THERAPY: CPT

## 2020-08-26 PROCEDURE — 2580000003 HC RX 258: Performed by: INTERNAL MEDICINE

## 2020-08-26 PROCEDURE — 6360000002 HC RX W HCPCS: Performed by: INTERNAL MEDICINE

## 2020-08-26 PROCEDURE — 94640 AIRWAY INHALATION TREATMENT: CPT

## 2020-08-26 PROCEDURE — 6370000000 HC RX 637 (ALT 250 FOR IP): Performed by: INTERNAL MEDICINE

## 2020-08-26 PROCEDURE — 97530 THERAPEUTIC ACTIVITIES: CPT

## 2020-08-26 RX ORDER — METHYLPREDNISOLONE 4 MG/1
TABLET ORAL
Qty: 1 KIT | Refills: 0 | Status: SHIPPED | OUTPATIENT
Start: 2020-08-26 | End: 2020-09-01

## 2020-08-26 RX ORDER — BUDESONIDE AND FORMOTEROL FUMARATE DIHYDRATE 160; 4.5 UG/1; UG/1
2 AEROSOL RESPIRATORY (INHALATION) 2 TIMES DAILY
Qty: 1 INHALER | Refills: 0 | Status: SHIPPED | OUTPATIENT
Start: 2020-08-26

## 2020-08-26 RX ORDER — ALBUTEROL SULFATE 90 UG/1
2 AEROSOL, METERED RESPIRATORY (INHALATION) 4 TIMES DAILY PRN
Qty: 1 INHALER | Refills: 0 | Status: SHIPPED | OUTPATIENT
Start: 2020-08-26

## 2020-08-26 RX ADMIN — ARFORMOTEROL TARTRATE 15 MCG: 15 SOLUTION RESPIRATORY (INHALATION) at 06:27

## 2020-08-26 RX ADMIN — METHYLPREDNISOLONE SODIUM SUCCINATE 40 MG: 40 INJECTION, POWDER, FOR SOLUTION INTRAMUSCULAR; INTRAVENOUS at 08:33

## 2020-08-26 RX ADMIN — BUDESONIDE 500 MCG: 0.5 SUSPENSION RESPIRATORY (INHALATION) at 06:27

## 2020-08-26 RX ADMIN — FLUOXETINE HYDROCHLORIDE 20 MG: 20 CAPSULE ORAL at 08:34

## 2020-08-26 RX ADMIN — IPRATROPIUM BROMIDE AND ALBUTEROL SULFATE 1 AMPULE: .5; 3 SOLUTION RESPIRATORY (INHALATION) at 10:24

## 2020-08-26 RX ADMIN — HEPARIN SODIUM 5000 UNITS: 5000 INJECTION INTRAVENOUS; SUBCUTANEOUS at 08:34

## 2020-08-26 RX ADMIN — AMLODIPINE BESYLATE 10 MG: 10 TABLET ORAL at 08:33

## 2020-08-26 RX ADMIN — IPRATROPIUM BROMIDE AND ALBUTEROL SULFATE 1 AMPULE: .5; 3 SOLUTION RESPIRATORY (INHALATION) at 06:15

## 2020-08-26 RX ADMIN — SODIUM CHLORIDE, PRESERVATIVE FREE 10 ML: 5 INJECTION INTRAVENOUS at 08:34

## 2020-08-26 RX ADMIN — ASPIRIN 81 MG: 81 TABLET, CHEWABLE ORAL at 08:34

## 2020-08-26 NOTE — DISCHARGE INSTR - COC
Continuity of Care Form    Patient Name: Cheryl Dykes   :  1955  MRN:  [id number]    Admit date:  2020  Discharge date:  2020      Code Status Order: Full Code   Advance Directives:   [facility] Documentation     Date/Time Healthcare Directive Type of Healthcare Directive Copy in [facility] [address] Agent's Name Healthcare Agent's Phone Number    20 1645  No, patient does not have an advance directive for healthcare treatment -- -- -- -- --          Admitting Physician:  Elaine Lara MD  PCP: Mckay Godwin MD    Discharging Nurse: Hugo Brannon Unit/Room#: 7835/909-13  Discharging Unit Phone Number: [phone]      Emergency Contact:   Extended Emergency Contact Information  Primary Emergency Contact: Sony Wilder [address] Phone: [phone]  Mobile Phone: [phone]  Relation: Brother/Sister    Past Surgical History:  Past Surgical History:   Procedure Laterality Date    ARM SURGERY Left     EYE SURGERY Right     KNEE SURGERY Right     MANDIBLE SURGERY      CO COLONOSCOPY FLX DX W/COLLJ SPEC WHEN PFRMD N/A 2018    Dr Naomi Boggs yr recall       Immunization History:   Immunization History   Administered Date(s) Administered    Influenza Virus Vaccine 10/11/2017    Tdap (Boostrix, Adacel) 2020       Active Problems:  Patient Active Problem List   Diagnosis Code    Impetigo L01.00    Skin tear of left upper arm without complication K78.994E    Acute ischemic stroke ([address] I63.9    Aspiration pneumonia ([address] J69.0    History of stroke with residual deficit I69.30    Dysarthria R47.1    Transaminitis R74.0    Type 2 diabetes mellitus, without long-term current use of insulin ([address] E11.9    Chronic obstructive pulmonary disease with acute lower respiratory infection ([address] J44.0    Seizure disorder ([address] G40.909    COPD exacerbation ([address] J44.1    Hypertension I10    ZARA (acute kidney injury) ([facility] [address] N17.9    Acute on chronic respiratory failure with hypoxia (HCC) J96.21       Isolation/Infection:   Isolation          No Isolation        Unreconciled External Infections     Infection Onset Last Indicated Last Received Source    No mapped external infections found    . Unmapped Infections (1)      Influenza 01/13/20 08/21/20             Patient Infection Status     Infection Onset Added Last Indicated Last Indicated By Review Planned Expiration Resolved Resolved By    None active    Resolved    COVID-19 Rule Out 08/25/20 08/25/20 08/25/20 COVID-19 (Ordered)   08/25/20 Rule-Out Test Resulted    C-diff Rule Out 03/07/20 03/07/20 03/07/20 Clostridium difficile toxin/antigen (Ordered)   03/10/20 Anastasia Leventhal, RN    C. Diff order discontinued          Nurse Assessment:  Last Vital Signs: /84   Pulse 63   Temp 97.5 °F (36.4 °C) (Temporal)   Resp 16   Ht 5' 10\" (1.778 m)   Wt 178 lb (80.7 kg)   SpO2 94%   BMI 25.54 kg/m²     Last documented pain score (0-10 scale): Pain Level: 0  Last Weight:   Wt Readings from Last 1 Encounters:   08/25/20 178 lb (80.7 kg)     Mental Status:  oriented    IV Access:  - None    Nursing Mobility/ADLs:  Walking   Dependent  Transfer  Dependent  Bathing  Dependent  Dressing  Dependent  Toileting  Dependent  Feeding  Assisted  Med Admin  Dependent  Med Delivery   [facility]    Wound Care Documentation and Therapy:  Wound 08/05/16 Skin tear Arm Right Reference 1 Right Arm MULTIPLE SCATTERED AREAS (Active)   Number of days: 1482       Wound 08/05/16 Skin tear Arm Left REFERENCE 2 LEFT ARM MULTIPLE SCATTERED AREAS (Active)   Number of days: 1482        Elimination:  Continence:   · Bowel:  Yes  · Bladder: No  Urinary Catheter: None   Colostomy/Ileostomy/Ileal Conduit: No       Date of Last BM: 8/25/2020    Intake/Output Summary (Last 24 hours) at 8/26/2020 1009  Last data filed at 8/26/2020 0615  Gross per 24 hour   Intake 1656 ml   Output --   Net 1656 ml     I/O last 3 completed shifts: In: 8246 [P.O.:360; I.V.:1296]  Out: -     Safety Concerns: At Risk for Falls and Aspiration Risk    Impairments/Disabilities:      Speech and Hearing    Nutrition Therapy:  Current Nutrition Therapy:   - Oral Diet:  Carb Control 4 carbs/meal (1800kcals/day)    Routes of Feeding: Oral  Liquids: Nectar Thick Liquids  Daily Fluid Restriction: no  Last Modified Barium Swallow with Video (Video Swallowing Test): not done    Treatments at the Time of Hospital Discharge:   Respiratory Treatments: see orders    Oxygen Therapy:  is not on home oxygen therapy.   Ventilator:    - No ventilator support    Rehab Therapies: see orders  Weight Bearing Status/Restrictions: 50[employer] [facility] [employer] Weight Bearin}  Other Medical Equipment (for information only, NOT a DME order):  {EQUIPMENT:521871659}  Other Treatments: see orders    Patient's personal belongings (please select all that are sent with patient):  Marlen    RN SIGNATURE:  Electronically signed by Pat Shaw RN on 20 at 10:17 AM CDT    CASE MANAGEMENT/SOCIAL WORK SECTION    Inpatient Status Date: 2020      Readmission Risk Assessment Score:  Readmission Risk              Risk of Unplanned Readmission:        23           Discharging to Facility/ Agency   · Name:   · Address:  · Phone:  · Fax:    Dialysis Facility (if applicable)   · Name:  · Address:  · Dialysis Schedule:  · Phone:  · Fax:    / signature: {Esignature:[id number]}    PHYSICIAN SECTION    Prognosis: {Prognosis:0589384168}    Condition at Discharge: 50Guru Cedeno Patient Condition:588253261}    Rehab Potential (if transferring to Rehab): {Prognosis:6663697432}    Recommended Labs or Other Treatments After Discharge: ***    Physician Certification: I certify the above information and transfer of Jp Harris  is necessary for the continuing treatment of the diagnosis listed and that he requires {Admit to Appropriate Level of Care:07226} for {GREATER/LESS:117501716} 30 days.      Update Admission H&P: {CHP DME Changes in ALTUC:378160390}    PHYSICIAN SIGNATURE:  {Esignature:[id number]}

## 2020-08-26 NOTE — PROGRESS NOTES
Physical Therapy  Erwin Bermeo  [id number]     08/26/20 1052   Subjective   Subjective Agrees to work with therapy. Bed Mobility   Supine to Sit Supervision   Transfers   Sit to Stand Contact guard assistance   Stand to sit Contact guard assistance   Ambulation   Ambulation? Yes   Ambulation 1   Surface level tile   Device Rolling Walker   Assistance Minimal assistance;Contact guard assistance   Distance 15' x2   Other Activities   Comment Assisted patient to/from the BR. Patient able to perform self care in BR. Patient requested to return to bed after treatment. Patient positioned for comfort with all needs in reach. Short term goals   Time Frame for Short term goals 2 wks   Short term goal 1 supine to sit indep   Short term goal 2 sit to stand indep   Short term goal 3 amb. 22' with RW SBA   Activity Tolerance   Activity Tolerance Patient Tolerated treatment well   Safety Devices   Type of devices Call light within reach; Left in bed   Electronically signed by Natanael Landin PTA on 8/26/2020 at 11:06 AM

## 2020-08-26 NOTE — DISCHARGE SUMMARY
Discharge Summary      Meir Nieto  :  1955  MRN:  [id number]    Admit date:  2020  Discharge date:      Admitting Physician:  Nicolle Fernández MD    Advance Directive: Full Code    Consults: none    Primary Care Physician:  Greg Arnold MD    Discharge Diagnoses: Active Problems:    History of stroke with residual deficit    Dysarthria    Type 2 diabetes mellitus, without long-term current use of insulin (HCC)    COPD exacerbation (HCC)    Hypertension    ZARA (acute kidney injury) ([address]    Acute on chronic respiratory failure with hypoxia (HCC)  Resolved Problems:    * No resolved hospital problems. *      Significant Diagnostic Studies:   Us Renal Complete    Result Date: 2020  History: Acute kidney injury Renal ultrasound: Sonographic imaging of the kidneys performed. COMPARISON: CT abdomen pelvis 3/7/2020 FINDINGS: Kidneys are normal in contour and echotexture. Right kidney measures 10.9 x 4.6 x 4.3 cm. Left kidney measures 12.2 x 5.2 x 4.4 cm. No solid or cystic renal mass. No obstructing intrarenal stones. No hydronephrosis. No abnormal perinephric fluid collection. Images of the mild to moderately distended bladder are unremarkable. 1. Normal sonographic appearance to the kidneys. Signed by Dr Greg Garces on 2020 12:02 PM    Xr Chest Portable    Result Date: 2020  XR CHEST PORTABLE 2020 9:43 AM HISTORY: Dyspnea COMPARISON: 3/7/2020 CXR: A single frontal view of the chest is performed. Findings: Linear left basilar densities may be atelectasis or scarring. There is improving aeration lung bases compared back to 3/7/2020 study. No radiographic evidence of edema. Cardiac mediastinal contours normal. No pleural effusion or pneumothorax. No acute appearing bony pathology. Old anterolateral seventh rib fracture. 1. Linear left basilar densities favorable for atelectasis or scarring. . Signed by Dr Greg Garces on 2020 11:01 AM      Pertinent Labs:   CBC:   Recent Labs 08/24/20  0224   WBC 11.6*   HGB 13.6*        BMP:    Recent Labs     08/24/20 0224 08/25/20  0325     --    K 3.3* 4.4   *  --    CO2 20*  --    BUN 19  --    CREATININE 0.8  --    GLUCOSE 122*  --      INR: No results for input(s): INR in the last 72 hours. ABGs:No results for input(s): PH, PO2, PCO2, HCO3, BE, O2SAT in the last 72 hours. Lactic Acid:No results for input(s): LACTA in the last 72 hours. Procedures: None  Hospital Course: 61-year-old male with a history of COPD presenting to the hospital for worsening dyspnea associated with productive cough for the previous 2 weeks admitted for acute on chronic respiratory failure with hypoxia/COPD exacerbation. Patient also found to have ZARA on admission. ZARA improved. Breathing improving. Awaiting placement. Placement found and patient is hemodynamically stable for discharge today to follow-up with PCP as an outpatient. Physical Exam:  Vitals: /84   Pulse 63   Temp 97.5 °F (36.4 °C) (Temporal)   Resp 16   Ht 5' 10\" (1.778 m)   Wt 178 lb (80.7 kg)   SpO2 94%   BMI 25.54 kg/m²   24HR INTAKE/OUTPUT:      Intake/Output Summary (Last 24 hours) at 8/26/2020 0840  Last data filed at 8/26/2020 0615  Gross per 24 hour   Intake 1656 ml   Output --   Net 1656 ml     General appearance: Alert  Head: NC/AT  Eyes: conjunctivae/corneas clear   Ears: normal external ears  Neck: Supple  Lungs: BLAE, no crackles or rhonchi appreciated, coarse breath sounds b/l  Heart: regular rate and rhythm, S1, S2 normal, no murmurs appreciated   Abdomen: BS+, soft, NT, ND  Extremities: no pedal edema  Skin: Skin color, texture, turgor normal  Neurologic: Alert.  Gross motor and sensory function intact.   Psychiatric:  Mood appropriate    Discharge Medications:       Adrián, Reji7 mTraks Medication Instructions AXM:[id number]    Printed on:08/26/20 0840   Medication Information                      albuterol sulfate HFA (VENTOLIN HFA) 108 (90 Base) MCG/ACT inhaler  Inhale 2 puffs into the lungs 4 times daily as needed for Wheezing             amLODIPine (NORVASC) 10 MG tablet  Take 1 tablet by mouth daily             aspirin 81 MG chewable tablet  Take 1 tablet by mouth daily             atorvastatin (LIPITOR) 80 MG tablet  Take 1 tablet by mouth nightly             budesonide-formoterol (SYMBICORT) 160-4.5 MCG/ACT AERO  Inhale 2 puffs into the lungs 2 times daily             FLUoxetine (PROZAC) 20 MG capsule  Take 1 capsule by mouth daily             methylPREDNISolone (MEDROL DOSEPACK) 4 MG tablet  Take by mouth. tamsulosin (FLOMAX) 0.4 MG capsule  Take 2 capsules by mouth daily                 Discharge Instructions: Follow up with Denver Garfinkel, MD in 7 days. Take medications as directed. Resume activity as tolerated. Diet: DIET CARB CONTROL; Mildly Thick (Nectar)     Disposition: Patient is medically stable and will be discharged Skilled nursing facility. Time spent on discharge 35 minutes.     Signed:  Katia Ibarra MD 8/26/2020 8:40 AM

## 2022-05-30 ENCOUNTER — APPOINTMENT (OUTPATIENT)
Dept: GENERAL RADIOLOGY | Age: 67
DRG: 191 | End: 2022-05-30
Payer: MEDICARE

## 2022-05-30 ENCOUNTER — HOSPITAL ENCOUNTER (INPATIENT)
Age: 67
LOS: 3 days | Discharge: HOME | DRG: 191 | End: 2022-06-02
Attending: EMERGENCY MEDICINE | Admitting: HOSPITALIST
Payer: MEDICARE

## 2022-05-30 DIAGNOSIS — R77.8 ELEVATED TROPONIN: ICD-10-CM

## 2022-05-30 DIAGNOSIS — J44.1 COPD EXACERBATION (HCC): Primary | ICD-10-CM

## 2022-05-30 PROBLEM — E03.9 HYPOTHYROIDISM: Status: ACTIVE | Noted: 2022-05-30

## 2022-05-30 PROBLEM — R79.89 ELEVATED TROPONIN: Status: ACTIVE | Noted: 2020-03-07

## 2022-05-30 PROBLEM — E55.9 VITAMIN D DEFICIENCY: Status: ACTIVE | Noted: 2022-05-30

## 2022-05-30 LAB
ALBUMIN SERPL-MCNC: 4.4 G/DL (ref 3.5–5.2)
ALLENS TEST: ABNORMAL
ALP BLD-CCNC: 69 U/L (ref 40–130)
ALT SERPL-CCNC: 96 U/L (ref 5–41)
ANION GAP SERPL CALCULATED.3IONS-SCNC: 9 MMOL/L (ref 7–19)
AST SERPL-CCNC: 90 U/L (ref 5–40)
BASE EXCESS ARTERIAL: 3.2 MMOL/L (ref -2–2)
BASOPHILS ABSOLUTE: 0.1 K/UL (ref 0–0.2)
BASOPHILS RELATIVE PERCENT: 0.8 % (ref 0–1)
BILIRUB SERPL-MCNC: 0.5 MG/DL (ref 0.2–1.2)
BUN BLDV-MCNC: 17 MG/DL (ref 8–23)
CALCIUM SERPL-MCNC: 9.5 MG/DL (ref 8.8–10.2)
CARBOXYHEMOGLOBIN ARTERIAL: 2 % (ref 0–5)
CHLORIDE BLD-SCNC: 102 MMOL/L (ref 98–111)
CO2: 30 MMOL/L (ref 22–29)
CREAT SERPL-MCNC: 1.2 MG/DL (ref 0.5–1.2)
EOSINOPHILS ABSOLUTE: 0.2 K/UL (ref 0–0.6)
EOSINOPHILS RELATIVE PERCENT: 2.1 % (ref 0–5)
FERRITIN: 614.7 NG/ML (ref 30–400)
FIO2: 21 %
FOLATE: 12.9 NG/ML (ref 4.5–32.2)
GFR AFRICAN AMERICAN: >59
GFR NON-AFRICAN AMERICAN: >60
GLUCOSE BLD-MCNC: 80 MG/DL (ref 74–109)
HCO3 ARTERIAL: 27.9 MMOL/L (ref 22–26)
HCT VFR BLD CALC: 40 % (ref 42–52)
HEMOGLOBIN, ART, EXTENDED: 11.8 G/DL (ref 14–18)
HEMOGLOBIN: 12.6 G/DL (ref 14–18)
IMMATURE GRANULOCYTES #: 0 K/UL
IRON SATURATION: 20 % (ref 14–50)
IRON: 69 UG/DL (ref 59–158)
LACTIC ACID: 0.7 MMOL/L (ref 0.5–1.9)
LIPASE: 81 U/L (ref 13–60)
LYMPHOCYTES ABSOLUTE: 2.3 K/UL (ref 1.1–4.5)
LYMPHOCYTES RELATIVE PERCENT: 32.3 % (ref 20–40)
MAGNESIUM: 2 MG/DL (ref 1.6–2.4)
MCH RBC QN AUTO: 31.4 PG (ref 27–31)
MCHC RBC AUTO-ENTMCNC: 31.5 G/DL (ref 33–37)
MCV RBC AUTO: 99.8 FL (ref 80–94)
METHEMOGLOBIN ARTERIAL: 1.1 %
MONOCYTES ABSOLUTE: 0.7 K/UL (ref 0–0.9)
MONOCYTES RELATIVE PERCENT: 10 % (ref 0–10)
NEUTROPHILS ABSOLUTE: 3.9 K/UL (ref 1.5–7.5)
NEUTROPHILS RELATIVE PERCENT: 54.4 % (ref 50–65)
O2 CONTENT ARTERIAL: 14.8 ML/DL
O2 SAT, ARTERIAL: 89.3 %
O2 THERAPY: ABNORMAL
PCO2 ARTERIAL: 42 MMHG (ref 35–45)
PDW BLD-RTO: 15.4 % (ref 11.5–14.5)
PH ARTERIAL: 7.43 (ref 7.35–7.45)
PLATELET # BLD: 233 K/UL (ref 130–400)
PMV BLD AUTO: 9.6 FL (ref 9.4–12.4)
PO2 ARTERIAL: 51 MMHG (ref 80–100)
POTASSIUM SERPL-SCNC: 4.2 MMOL/L (ref 3.5–5)
POTASSIUM, WHOLE BLOOD: 3.7
PRO-BNP: 129 PG/ML (ref 0–900)
RBC # BLD: 4.01 M/UL (ref 4.7–6.1)
SAMPLE SOURCE: ABNORMAL
SARS-COV-2, NAAT: NOT DETECTED
SODIUM BLD-SCNC: 141 MMOL/L (ref 136–145)
T4 FREE: 0.1 NG/DL (ref 0.93–1.7)
TOTAL IRON BINDING CAPACITY: 351 UG/DL (ref 250–400)
TOTAL PROTEIN: 7.8 G/DL (ref 6.6–8.7)
TROPONIN: 0.11 NG/ML (ref 0–0.03)
TROPONIN: 0.15 NG/ML (ref 0–0.03)
TSH SERPL DL<=0.05 MIU/L-ACNC: 213.5 UIU/ML (ref 0.27–4.2)
VITAMIN B-12: 783 PG/ML (ref 211–946)
VITAMIN D 25-HYDROXY: 14.8 NG/ML
WBC # BLD: 7.2 K/UL (ref 4.8–10.8)

## 2022-05-30 PROCEDURE — 83605 ASSAY OF LACTIC ACID: CPT

## 2022-05-30 PROCEDURE — 82746 ASSAY OF FOLIC ACID SERUM: CPT

## 2022-05-30 PROCEDURE — 80053 COMPREHEN METABOLIC PANEL: CPT

## 2022-05-30 PROCEDURE — 71045 X-RAY EXAM CHEST 1 VIEW: CPT | Performed by: RADIOLOGY

## 2022-05-30 PROCEDURE — 71045 X-RAY EXAM CHEST 1 VIEW: CPT

## 2022-05-30 PROCEDURE — 82306 VITAMIN D 25 HYDROXY: CPT

## 2022-05-30 PROCEDURE — 93005 ELECTROCARDIOGRAM TRACING: CPT | Performed by: INTERNAL MEDICINE

## 2022-05-30 PROCEDURE — 36600 WITHDRAWAL OF ARTERIAL BLOOD: CPT

## 2022-05-30 PROCEDURE — 83550 IRON BINDING TEST: CPT

## 2022-05-30 PROCEDURE — 99221 1ST HOSP IP/OBS SF/LOW 40: CPT | Performed by: INTERNAL MEDICINE

## 2022-05-30 PROCEDURE — 84439 ASSAY OF FREE THYROXINE: CPT

## 2022-05-30 PROCEDURE — 94640 AIRWAY INHALATION TREATMENT: CPT

## 2022-05-30 PROCEDURE — 85025 COMPLETE CBC W/AUTO DIFF WBC: CPT

## 2022-05-30 PROCEDURE — 6370000000 HC RX 637 (ALT 250 FOR IP): Performed by: HOSPITALIST

## 2022-05-30 PROCEDURE — 93005 ELECTROCARDIOGRAM TRACING: CPT | Performed by: EMERGENCY MEDICINE

## 2022-05-30 PROCEDURE — 2500000003 HC RX 250 WO HCPCS: Performed by: HOSPITALIST

## 2022-05-30 PROCEDURE — 6360000002 HC RX W HCPCS: Performed by: EMERGENCY MEDICINE

## 2022-05-30 PROCEDURE — 99285 EMERGENCY DEPT VISIT HI MDM: CPT

## 2022-05-30 PROCEDURE — 82728 ASSAY OF FERRITIN: CPT

## 2022-05-30 PROCEDURE — 83540 ASSAY OF IRON: CPT

## 2022-05-30 PROCEDURE — 6370000000 HC RX 637 (ALT 250 FOR IP): Performed by: NURSE PRACTITIONER

## 2022-05-30 PROCEDURE — 96374 THER/PROPH/DIAG INJ IV PUSH: CPT

## 2022-05-30 PROCEDURE — 84484 ASSAY OF TROPONIN QUANT: CPT

## 2022-05-30 PROCEDURE — 87635 SARS-COV-2 COVID-19 AMP PRB: CPT

## 2022-05-30 PROCEDURE — 84443 ASSAY THYROID STIM HORMONE: CPT

## 2022-05-30 PROCEDURE — 82607 VITAMIN B-12: CPT

## 2022-05-30 PROCEDURE — 82803 BLOOD GASES ANY COMBINATION: CPT

## 2022-05-30 PROCEDURE — 2140000000 HC CCU INTERMEDIATE R&B

## 2022-05-30 PROCEDURE — 2700000000 HC OXYGEN THERAPY PER DAY

## 2022-05-30 PROCEDURE — 83690 ASSAY OF LIPASE: CPT

## 2022-05-30 PROCEDURE — 6370000000 HC RX 637 (ALT 250 FOR IP): Performed by: EMERGENCY MEDICINE

## 2022-05-30 PROCEDURE — 36415 COLL VENOUS BLD VENIPUNCTURE: CPT

## 2022-05-30 PROCEDURE — 6360000002 HC RX W HCPCS: Performed by: NURSE PRACTITIONER

## 2022-05-30 PROCEDURE — 83880 ASSAY OF NATRIURETIC PEPTIDE: CPT

## 2022-05-30 PROCEDURE — 6360000002 HC RX W HCPCS: Performed by: HOSPITALIST

## 2022-05-30 PROCEDURE — 83735 ASSAY OF MAGNESIUM: CPT

## 2022-05-30 PROCEDURE — 2580000003 HC RX 258: Performed by: HOSPITALIST

## 2022-05-30 RX ORDER — IPRATROPIUM BROMIDE AND ALBUTEROL SULFATE 2.5; .5 MG/3ML; MG/3ML
1 SOLUTION RESPIRATORY (INHALATION)
Status: DISCONTINUED | OUTPATIENT
Start: 2022-05-30 | End: 2022-05-30

## 2022-05-30 RX ORDER — SODIUM CHLORIDE 0.9 % (FLUSH) 0.9 %
10 SYRINGE (ML) INJECTION PRN
Status: DISCONTINUED | OUTPATIENT
Start: 2022-05-30 | End: 2022-06-02 | Stop reason: HOSPADM

## 2022-05-30 RX ORDER — ACETAMINOPHEN 650 MG/1
650 SUPPOSITORY RECTAL EVERY 6 HOURS PRN
Status: DISCONTINUED | OUTPATIENT
Start: 2022-05-30 | End: 2022-06-02 | Stop reason: HOSPADM

## 2022-05-30 RX ORDER — ONDANSETRON 2 MG/ML
4 INJECTION INTRAMUSCULAR; INTRAVENOUS EVERY 6 HOURS PRN
Status: DISCONTINUED | OUTPATIENT
Start: 2022-05-30 | End: 2022-06-02 | Stop reason: HOSPADM

## 2022-05-30 RX ORDER — LEVOTHYROXINE SODIUM 0.07 MG/1
150 TABLET ORAL DAILY
Status: DISCONTINUED | OUTPATIENT
Start: 2022-05-31 | End: 2022-05-31

## 2022-05-30 RX ORDER — ENOXAPARIN SODIUM 100 MG/ML
40 INJECTION SUBCUTANEOUS DAILY
Status: DISCONTINUED | OUTPATIENT
Start: 2022-05-30 | End: 2022-06-02 | Stop reason: HOSPADM

## 2022-05-30 RX ORDER — ASPIRIN 325 MG
325 TABLET ORAL ONCE
Status: COMPLETED | OUTPATIENT
Start: 2022-05-30 | End: 2022-05-30

## 2022-05-30 RX ORDER — IPRATROPIUM BROMIDE AND ALBUTEROL SULFATE 2.5; .5 MG/3ML; MG/3ML
1 SOLUTION RESPIRATORY (INHALATION) ONCE
Status: COMPLETED | OUTPATIENT
Start: 2022-05-30 | End: 2022-05-30

## 2022-05-30 RX ORDER — METHYLPREDNISOLONE SODIUM SUCCINATE 125 MG/2ML
125 INJECTION, POWDER, LYOPHILIZED, FOR SOLUTION INTRAMUSCULAR; INTRAVENOUS ONCE
Status: COMPLETED | OUTPATIENT
Start: 2022-05-30 | End: 2022-05-30

## 2022-05-30 RX ORDER — FLUOXETINE HYDROCHLORIDE 20 MG/1
20 CAPSULE ORAL DAILY
Status: DISCONTINUED | OUTPATIENT
Start: 2022-05-30 | End: 2022-06-02 | Stop reason: HOSPADM

## 2022-05-30 RX ORDER — ASPIRIN 81 MG/1
81 TABLET, CHEWABLE ORAL DAILY
Status: DISCONTINUED | OUTPATIENT
Start: 2022-05-31 | End: 2022-06-02 | Stop reason: HOSPADM

## 2022-05-30 RX ORDER — BUDESONIDE AND FORMOTEROL FUMARATE DIHYDRATE 160; 4.5 UG/1; UG/1
2 AEROSOL RESPIRATORY (INHALATION) 2 TIMES DAILY
Status: DISCONTINUED | OUTPATIENT
Start: 2022-05-30 | End: 2022-05-30

## 2022-05-30 RX ORDER — ONDANSETRON 4 MG/1
4 TABLET, ORALLY DISINTEGRATING ORAL EVERY 8 HOURS PRN
Status: DISCONTINUED | OUTPATIENT
Start: 2022-05-30 | End: 2022-06-02 | Stop reason: HOSPADM

## 2022-05-30 RX ORDER — LEVOTHYROXINE SODIUM 0.1 MG/1
100 TABLET ORAL DAILY
Status: DISCONTINUED | OUTPATIENT
Start: 2022-05-30 | End: 2022-05-30

## 2022-05-30 RX ORDER — SODIUM CHLORIDE 0.9 % (FLUSH) 0.9 %
5-40 SYRINGE (ML) INJECTION EVERY 12 HOURS SCHEDULED
Status: DISCONTINUED | OUTPATIENT
Start: 2022-05-30 | End: 2022-06-02 | Stop reason: HOSPADM

## 2022-05-30 RX ORDER — ERGOCALCIFEROL 1.25 MG/1
50000 CAPSULE ORAL WEEKLY
Status: DISCONTINUED | OUTPATIENT
Start: 2022-05-30 | End: 2022-05-31

## 2022-05-30 RX ORDER — METHYLPREDNISOLONE SODIUM SUCCINATE 40 MG/ML
40 INJECTION, POWDER, LYOPHILIZED, FOR SOLUTION INTRAMUSCULAR; INTRAVENOUS EVERY 6 HOURS
Status: DISCONTINUED | OUTPATIENT
Start: 2022-05-30 | End: 2022-06-02 | Stop reason: HOSPADM

## 2022-05-30 RX ORDER — AMLODIPINE BESYLATE 10 MG/1
10 TABLET ORAL DAILY
Status: DISCONTINUED | OUTPATIENT
Start: 2022-05-30 | End: 2022-05-31

## 2022-05-30 RX ORDER — ACETAMINOPHEN 325 MG/1
650 TABLET ORAL EVERY 6 HOURS PRN
Status: DISCONTINUED | OUTPATIENT
Start: 2022-05-30 | End: 2022-06-02 | Stop reason: HOSPADM

## 2022-05-30 RX ORDER — SODIUM CHLORIDE 9 MG/ML
INJECTION, SOLUTION INTRAVENOUS PRN
Status: DISCONTINUED | OUTPATIENT
Start: 2022-05-30 | End: 2022-06-02 | Stop reason: HOSPADM

## 2022-05-30 RX ORDER — ATORVASTATIN CALCIUM 40 MG/1
40 TABLET, FILM COATED ORAL NIGHTLY
Status: DISCONTINUED | OUTPATIENT
Start: 2022-05-30 | End: 2022-06-02 | Stop reason: HOSPADM

## 2022-05-30 RX ORDER — TAMSULOSIN HYDROCHLORIDE 0.4 MG/1
0.8 CAPSULE ORAL NIGHTLY
Status: DISCONTINUED | OUTPATIENT
Start: 2022-05-30 | End: 2022-06-02 | Stop reason: HOSPADM

## 2022-05-30 RX ORDER — ARFORMOTEROL TARTRATE 15 UG/2ML
15 SOLUTION RESPIRATORY (INHALATION) 2 TIMES DAILY
Status: DISCONTINUED | OUTPATIENT
Start: 2022-05-30 | End: 2022-06-02 | Stop reason: HOSPADM

## 2022-05-30 RX ORDER — BUDESONIDE 0.5 MG/2ML
0.5 INHALANT ORAL 2 TIMES DAILY
Status: DISCONTINUED | OUTPATIENT
Start: 2022-05-30 | End: 2022-06-02 | Stop reason: HOSPADM

## 2022-05-30 RX ORDER — NITROGLYCERIN 0.4 MG/1
0.4 TABLET SUBLINGUAL EVERY 5 MIN PRN
Status: DISCONTINUED | OUTPATIENT
Start: 2022-05-30 | End: 2022-06-02 | Stop reason: HOSPADM

## 2022-05-30 RX ORDER — POLYETHYLENE GLYCOL 3350 17 G/17G
17 POWDER, FOR SOLUTION ORAL DAILY PRN
Status: DISCONTINUED | OUTPATIENT
Start: 2022-05-30 | End: 2022-06-02 | Stop reason: HOSPADM

## 2022-05-30 RX ADMIN — SODIUM CHLORIDE, PRESERVATIVE FREE 10 ML: 5 INJECTION INTRAVENOUS at 21:18

## 2022-05-30 RX ADMIN — IPRATROPIUM BROMIDE AND ALBUTEROL SULFATE 1 AMPULE: 2.5; .5 SOLUTION RESPIRATORY (INHALATION) at 09:01

## 2022-05-30 RX ADMIN — FLUOXETINE 20 MG: 20 CAPSULE ORAL at 11:36

## 2022-05-30 RX ADMIN — DOXYCYCLINE 100 MG: 100 INJECTION, POWDER, LYOPHILIZED, FOR SOLUTION INTRAVENOUS at 21:34

## 2022-05-30 RX ADMIN — ERGOCALCIFEROL 50000 UNITS: 1.25 CAPSULE ORAL at 14:31

## 2022-05-30 RX ADMIN — METHYLPREDNISOLONE SODIUM SUCCINATE 40 MG: 40 INJECTION, POWDER, FOR SOLUTION INTRAMUSCULAR; INTRAVENOUS at 21:18

## 2022-05-30 RX ADMIN — AMLODIPINE BESYLATE 10 MG: 10 TABLET ORAL at 14:31

## 2022-05-30 RX ADMIN — TAMSULOSIN HYDROCHLORIDE 0.8 MG: 0.4 CAPSULE ORAL at 21:17

## 2022-05-30 RX ADMIN — WATER 1000 MG: 1 INJECTION INTRAMUSCULAR; INTRAVENOUS; SUBCUTANEOUS at 18:24

## 2022-05-30 RX ADMIN — BUDESONIDE 500 MCG: 0.5 SUSPENSION RESPIRATORY (INHALATION) at 18:46

## 2022-05-30 RX ADMIN — ARFORMOTEROL TARTRATE 15 MCG: 15 SOLUTION RESPIRATORY (INHALATION) at 18:45

## 2022-05-30 RX ADMIN — METHYLPREDNISOLONE SODIUM SUCCINATE 40 MG: 40 INJECTION, POWDER, FOR SOLUTION INTRAMUSCULAR; INTRAVENOUS at 14:31

## 2022-05-30 RX ADMIN — ATORVASTATIN CALCIUM 40 MG: 40 TABLET, FILM COATED ORAL at 21:17

## 2022-05-30 RX ADMIN — ENOXAPARIN SODIUM 40 MG: 100 INJECTION SUBCUTANEOUS at 11:36

## 2022-05-30 RX ADMIN — LEVOTHYROXINE SODIUM 100 MCG: 100 TABLET ORAL at 14:31

## 2022-05-30 RX ADMIN — METHYLPREDNISOLONE SODIUM SUCCINATE 125 MG: 125 INJECTION, POWDER, FOR SOLUTION INTRAMUSCULAR; INTRAVENOUS at 08:01

## 2022-05-30 RX ADMIN — SODIUM CHLORIDE, PRESERVATIVE FREE 10 ML: 5 INJECTION INTRAVENOUS at 11:36

## 2022-05-30 RX ADMIN — IPRATROPIUM BROMIDE AND ALBUTEROL SULFATE 1 AMPULE: .5; 3 SOLUTION RESPIRATORY (INHALATION) at 15:17

## 2022-05-30 RX ADMIN — ASPIRIN 325 MG: 325 TABLET ORAL at 11:36

## 2022-05-30 ASSESSMENT — ENCOUNTER SYMPTOMS
BLOOD IN STOOL: 0
ABDOMINAL DISTENTION: 0
RHINORRHEA: 0
CONSTIPATION: 0
DIARRHEA: 0
CHEST TIGHTNESS: 1
BACK PAIN: 0
SHORTNESS OF BREATH: 1
COLOR CHANGE: 0
COUGH: 1
COUGH: 0
NAUSEA: 0
VOMITING: 0
SORE THROAT: 0
ABDOMINAL PAIN: 0
WHEEZING: 0

## 2022-05-30 NOTE — H&P
[facility] Hospitalists      Hospitalist - History & Physical      PCP: Jennifer Gabriel MD    Date of Admission: 5/30/2022    Date of Service: 5/30/2022    Chief Complaint:  SOA    History Of Present Illness: The patient is a 74 y.o. male with a PMH of smoker, CVA, COPD, HLD and HTN who presented to [facility] ED via EMS on 5/30/2021 complaining of SOA. He was brought from SNF facility where he had increased dyspnea starting on 5/29/2022. He was placed on 4 L at the nursing facility and given 2 DuoNeb's in route by EMS with improvement of his breathing. Admitted to associated chest tightness. Does have chronic dysarthria and has difficulty communicating. No reported fever, chills, cough, N/V, or diarrhea. Further ED work-up revealed an SPO2 percent 90% on room air. He was found to have wheezing. ABG-pH 7.43, PCO2 42, PO2 51, HCO3-28. Chest x-ray showed no acute findings. Chemistries relatively unremarkable. Troponin 0.15. Mild transaminitis with an ALT 96 and AST 90. WBC 7.2, H&H 26.6/40.0 platelet [id number]. proBNP-129. COVID swab negative. Hospitalists are being requested for admission medical management of a patient with acute COPD exacerbation and shortness of breath. Past Medical History:        Diagnosis Date    Arthritis     Asthma     [address]erebral artery occlusion with cerebral infarction [address]     COPD (chronic obstructive pulmonary disease) ([facility]     Diabetes mellitus ([facility]     Hypertension     Seizures (HCC)     Pt states last seizure was in December 2015    Thyroid disease        Past Surgical History:        Procedure Laterality Date    ARM SURGERY Left     EYE SURGERY Right     KNEE SURGERY Right     MANDIBLE SURGERY      HI COLONOSCOPY FLX DX W/COLLJ SPEC WHEN PFRMD N/A 6/26/2018    Dr Diana Kidd yr recall       Home Medications:  Prior to Admission medications    Medication Sig Start Date End Date Taking?  Authorizing Provider   albuterol sulfate HFA (VENTOLIN HFA) 108 (90 Base) MCG/ACT inhaler Inhale 2 puffs into the lungs 4 times daily as needed for Wheezing 8/26/20   Farooq Arteaga MD   budesonide-formoterol [facility]) 160-4.5 MCG/ACT AERO Inhale 2 puffs into the lungs 2 times daily 8/26/20   Farooq Arteaga MD   aspirin 81 MG chewable tablet Take 1 tablet by mouth daily 2/12/20   Max Archuleta MD   [facility]) 20 MG capsule Take 1 capsule by mouth daily 2/13/20   Max Archuleta MD   atorvastatin (LIPITOR) 80 MG tablet Take 1 tablet by mouth nightly 2/12/20   Max Archuleta MD   amLODIPine (NORVASC) 10 MG tablet Take 1 tablet by mouth daily 2/12/20   Max Archuleta MD   [facility]) 0.4 MG capsule Take 2 capsules by mouth daily  Patient taking differently: Take 0.8 mg by mouth nightly  2/13/20   Max Archuleta MD       Allergies:    Codeine    Social History:    The patient currently lives at a [facility] facility  Tobacco:   reports that he has quit smoking. His smoking use included cigarettes. He has a 40.00 pack-year smoking history. He has never used smokeless tobacco.  Alcohol:   reports no history of alcohol use. Illicit Drugs: Denies     Family History:      Problem Relation Age of Onset    Breast Cancer Mother     Heart Attack Father     High Blood Pressure Sister     High Blood Pressure Brother     Colon Cancer Neg Hx     Colon Polyps Neg Hx     Liver Cancer Neg Hx     Liver Disease Neg Hx     Esophageal Cancer Neg Hx     Rectal Cancer Neg Hx     Stomach Cancer Neg Hx        Review of Systems:   Review of Systems   Constitutional: Negative for chills, diaphoresis, fatigue and fever. HENT: Negative for congestion and ear pain. Eyes: Negative for visual disturbance. Respiratory: Positive for chest tightness and shortness of breath. Negative for cough and wheezing. Cardiovascular: Positive for chest pain. Negative for palpitations and leg swelling.    Gastrointestinal: Negative for abdominal distention, abdominal pain, blood in stool, constipation, diarrhea, nausea and vomiting. Endocrine: Negative for cold intolerance and heat intolerance. Genitourinary: Negative for difficulty urinating, flank pain, frequency and urgency. Musculoskeletal: Negative for arthralgias and myalgias. Skin: Negative for color change and wound. Neurological: Negative for dizziness, syncope, weakness, light-headedness, numbness and headaches. Hematological: Does not bruise/bleed easily. Psychiatric/Behavioral: Negative for agitation, confusion and dysphoric mood. 14 point review of systems is negative except as specifically addressed above. Physical Examination:  /83   Pulse 50   Temp (!) 96.6 °F (35.9 °C)   Resp 16   Ht 6' 1\" (1.854 m)   Wt 189 lb 7 oz (85.9 kg)   SpO2 91%   BMI 24.99 kg/m²   Physical Exam  Vitals and nursing note reviewed. Constitutional:       General: He is not in acute distress. Appearance: Normal appearance. He is ill-appearing. HENT:      Head: Normocephalic and atraumatic. Right Ear: External ear normal.      Left Ear: External ear normal.      Nose: Nose normal.      Mouth/Throat:      Mouth: Mucous membranes are moist.   Eyes:      Extraocular Movements: Extraocular movements intact. Conjunctiva/sclera: Conjunctivae normal.      Pupils: Pupils are equal, round, and reactive to light. Cardiovascular:      Rate and Rhythm: Normal rate and regular rhythm. Pulses: Normal pulses. Heart sounds: Normal heart sounds. Pulmonary:      Effort: Pulmonary effort is normal. Tachypnea present. No respiratory distress. Breath sounds: Rales (Expiratory) present. No wheezing or rhonchi. Abdominal:      General: Bowel sounds are normal. There is no distension. Palpations: Abdomen is soft. Tenderness: There is no abdominal tenderness. Musculoskeletal:         General: No swelling, tenderness or deformity. Normal range of motion. Cervical back: Normal range of motion and neck supple.  No muscular tenderness. Right lower leg: No edema. Left lower leg: No edema. Skin:     General: Skin is warm and dry. Findings: No bruising or lesion. Neurological:      Mental Status: Mental status is at baseline. Motor: Weakness (Difffuse and profound. right sided weakness secondary to CVA) present. Psychiatric:         Mood and Affect: Mood normal.         Behavior: Behavior normal.         Thought Content: Thought content normal.          Diagnostic Data:  CBC:  Recent Labs     05/30/22  0739   WBC 7.2   HGB 12.6*   HCT 40.0*        BMP:  Recent Labs     05/30/22  0739 05/30/22  0908     --    K 4.2 3.7     --    CO2 30*  --    BUN 17  --    CREATININE 1.2  --    CALCIUM 9.5  --      Recent Labs     05/30/22  0739   AST 90*   ALT 96*   BILITOT 0.5   ALKPHOS 69     Coag Panel: No results for input(s): INR, PROTIME, APTT in the last 72 hours. Cardiac Enzymes:   Recent Labs     05/30/22  0739   TROPONINI 0.15*     ABGs:  Lab Results   Component Value Date    PHART 7.430 05/30/2022    PO2ART 51.0 05/30/2022    IKU8LAT 42.0 05/30/2022     Urinalysis:  Lab Results   Component Value Date    NITRU Negative 01/22/2020    BLOODU Negative 01/22/2020    SPECGRAV 1.020 01/22/2020    GLUCOSEU Negative 01/22/2020     A1C: No results for input(s): LABA1C in the last 72 hours. ABG:  Recent Labs     05/30/22  0908   PHART 7.430   ZVA8GRE 42.0   PO2ART 51.0*   MYC4BEN 27.9*   BEART 3.2*   HGBAE 11.8*   U3ETNOPN 89.3*   CARBOXHGBART 2.0       XR CHEST PORTABLE    Result Date: 5/30/2022  XR CHEST PORTABLE 5/30/2022 8:15 AM HISTORY: Shortest of breath  Technique: Single AP view of the chest COMPARISONS: Chest exam dated 8/21/2020 FINDINGS: No lung consolidation. No pleural effusion or pneumothorax. Cardiomediastinal silhouette and pulmonary vasculature are unremarkable. No acute bony abnormality. No acute cardiopulmonary process.  Signed by Dr Tank Eason Alfredo      Assessment/Plan:  Principal Problem:    COPD exacerbation ([address]   -Admit to PCU with tele-Full code   -O2 SPO2 >90%   -Hold medications that could cause increased sedation   -Steroid inhalers   -Duonebs Q4hrs while awake   -Steroids   -VTE prophylaxis with Lovenox   -Vitals per unit routine   -Strict I&O   -Daily weights   -PT/OT consult   -Consult Palliative care   -Continue to monitor and manage with supportive medical care    Active Problems:    Vitamin D deficiency   -Start Vitamin D supplementation 50,000 units      Hypothyroidism   -Today's TSH-213.5   -Start Levothyroxine 100 mcg      Elevated troponin   -consult Cardiology-recommendations appreciated    Resolved Problems:    * No resolved hospital problems. *       Signed:  KEEGAN Pace, 5/30/2022 1:42 PM       Attestation Statement     I have independently seen and examined this patient and agree with the asesment and plan by mid level provider        Objective:   Vitals: BP (!) 125/91   Pulse (!) 106   Temp 97.6 °F (36.4 °C) (Temporal)   Resp 18   Ht 6' 1\" (1.854 m)   Wt 189 lb 7 oz (85.9 kg)   SpO2 100%   BMI 24.99 kg/m²   General appearance: alert, appears stated age and cooperative  Skin: Skin color, texture, turgor normal.   HEENT: Head: Normocephalic, no lesions, without obvious abnormality.   Neck: no adenopathy, no carotid bruit, no JVD and supple, symmetrical, trachea midline  Lungs: wheezes bilaterally  Heart: regular rate and rhythm, S1, S2 normal, no murmur, click, rub or gallop  Abdomen: soft, non-tender; bowel sounds normal; no masses,  no organomegaly  Extremities: extremities normal, atraumatic, no cyanosis or edema  Lymphatic: No significant lymph node enlargement papable  Neurologic: Mental status: Alert, oriented, speech difficulty       Assessment & Plan:    · COPD AE- steroids, nebs, add ab  · Hypothyroidism, severe- start po synthroid high dose  · CP - per cardiology  · Vit d def- start replacement · History of CVA  · HTN  · BPH       Per cardiology EKG and echocardiogram, trend the troponin    Ariel Ruiz MD

## 2022-05-30 NOTE — ED PROVIDER NOTES
[facility]  eMERGENCY dEPARTMENT eNCOUnter      Pt Name: Karoline Hogan  MRN: [id number]  [place] 1955  Date of evaluation: 5/30/2022  Provider: Samara Mejia MD    CHIEF COMPLAINT       Chief Complaint   Patient presents with    Shortness of Breath     this morning pt has . recieved 2 duo nebs enroute and is doing well on RA on arrival         HISTORY OF PRESENT ILLNESS   (Location/Symptom, Timing/Onset,Context/Setting, Quality, Duration, Modifying Factors, Severity)  Note limiting factors. Karoline Hogan is a 74 y.o. male who presents to the emergency department for shortness of breath. Patient from the nursing home had increased dyspnea since last night and this morning. He tells me he has been out of his medications and had been placed on 4 L nasal cannula by the nursing home. He was given 2 duo nebs in route by EMS and had significant improvement with them and now on room air here however her oxygen saturation is bordering around 90 currently and still has some wheezing. Does admit to some associated chest tightness as well. He has had a prior stroke in the past and has chronic dysarthria at baseline making it somewhat difficult to communicate with him. HPI    NursingNotes were reviewed. REVIEW OF SYSTEMS    (2-9 systems for level 4, 10 or more for level 5)     Review of Systems   Constitutional: Negative for chills and fever. HENT: Negative for rhinorrhea and sore throat. Respiratory: Positive for cough, chest tightness and shortness of breath. Cardiovascular: Positive for chest pain. Negative for leg swelling. Gastrointestinal: Negative for abdominal pain, diarrhea, nausea and vomiting. Genitourinary: Negative for dysuria and frequency. Musculoskeletal: Negative for back pain and neck pain. Neurological: Negative for dizziness and headaches. All other systems reviewed and are negative.            PAST MEDICALHISTORY     Past Medical History:   Diagnosis Date    status: Single     Spouse name: Not on file    Number of children: 0    Years of education: 15    Highest education level: Not on file   Occupational History    Not on file   Tobacco Use    Smoking status: Former Smoker     Packs/day: 1.00     Years: 40.00     Pack years: 40.00     Types: Cigarettes    Smokeless tobacco: Never Used   Vaping Use    Vaping Use: Never used   Substance and Sexual Activity    Alcohol use: No    Drug use: No    Sexual activity: Not on file   Other Topics Concern    Not on file   Social History Narrative    Not on file     Social Determinants of Health     Financial Resource Strain:     Difficulty of Paying Living Expenses: Not on file   Food Insecurity:     Worried About Running Out of Food in the Last Year: Not on file    Carlos of Food in the Last Year: Not on file   Transportation Needs:     Lack of Transportation (Medical): Not on file    Lack of Transportation (Non-Medical):  Not on file   Physical Activity:     Days of Exercise per Week: Not on file    Minutes of Exercise per Session: Not on file   Stress:     Feeling of Stress : Not on file   Social Connections:     Frequency of Communication with Friends and Family: Not on file    Frequency of Social Gatherings with Friends and Family: Not on file    Attends [employer] Services: Not on file    Active Member of [address] [place] [address] [employer] or Organizations: Not on file    Attends Club or Organization Meetings: Not on file    Marital Status: Not on file   Intimate Partner Violence:     Fear of Current or Ex-Partner: Not on file    Emotionally Abused: Not on file    Physically Abused: Not on file    Sexually Abused: Not on file   Housing Stability:     Unable to Pay for Housing in the Last Year: Not on file    Number of [place] in the Last Year: Not on file    Unstable Housing in the Last Year: Not on file       SCREENINGS    Lisandro Coma Scale  Eye Opening: (P) Spontaneous  Best Verbal Response: (P) Oriented  Best Motor Response: (P) Obeys commands  Lisandro Coma Scale Score: (P) 15        PHYSICAL EXAM    (up to 7 for level 4, 8 or more for level 5)     ED Triage Vitals   BP Temp Temp src Pulse Resp SpO2 Height Weight   -- -- -- -- -- -- -- --       Physical Exam  Vitals and nursing note reviewed. Constitutional:       General: He is not in acute distress. Appearance: He is well-developed. He is not diaphoretic. HENT:      Head: Normocephalic and atraumatic. Nose: Nose normal.   Eyes:      Conjunctiva/sclera: Conjunctivae normal.   Neck:      Trachea: No tracheal deviation. Cardiovascular:      Rate and Rhythm: Normal rate and regular rhythm. Heart sounds: Normal heart sounds. No murmur heard. Pulmonary:      Breath sounds: Examination of the right-middle field reveals wheezing. Examination of the left-middle field reveals wheezing. Examination of the right-lower field reveals wheezing. Examination of the left-lower field reveals wheezing. Wheezing present. No rales. Abdominal:      Palpations: Abdomen is soft. There is no mass. Tenderness: There is no abdominal tenderness. Musculoskeletal:         General: Normal range of motion. Cervical back: Normal range of motion and neck supple. Right lower leg: No edema. Left lower leg: No edema. Skin:     General: Skin is warm and dry. Neurological:      Mental Status: He is alert and oriented to person, place, and time.       Comments: Chronic dysarthria    Moves all extremities         DIAGNOSTIC RESULTS     EKG: All EKG's areinterpreted by the Emergency Department Physician who either signs or Co-signs this chart in the absence of a cardiologist.    62 significant artifact likely sinus rhythm no obvious ST changes nondiagnostic EKG      RADIOLOGY:  Non-plain film images such as CT, Ultrasound and MRI are read by the radiologist. Plain radiographic images are visualized and preliminarily interpreted bythe emergency physician with the below findings:        XR CHEST PORTABLE   Final Result   No acute cardiopulmonary process. Signed by Dr Theo Hennessy:  Labs Reviewed   CBC WITH AUTO DIFFERENTIAL - Abnormal; Notable for the following components:       Result Value    RBC 4.01 (*)     Hemoglobin 12.6 (*)     Hematocrit 40.0 (*)     MCV 99.8 (*)     MCH 31.4 (*)     MCHC 31.5 (*)     RDW 15.4 (*)     All other components within normal limits   COMPREHENSIVE METABOLIC PANEL - Abnormal; Notable for the following components:    CO2 30 (*)     ALT 96 (*)     AST 90 (*)     All other components within normal limits   TROPONIN - Abnormal; Notable for the following components:    Troponin 0.15 (*)     All other components within normal limits    Narrative:     CALL  Nash  KLED tel. ,  Chemistry results called to and read back by TOBIAS [address], 05/30/2022  08:06, by [facility]  Chemistry results called to and read back by TOBIAS [address], 05/30/2022  08:06, by WALTER   BLOOD GAS, ARTERIAL - Abnormal; Notable for the following components:    pO2, Arterial 51.0 (*)     HCO3, Arterial 27.9 (*)     Base Excess, Arterial 3.2 (*)     Hemoglobin, Art, Extended 11.8 (*)     O2 Sat, Arterial 89.3 (*)     All other components within normal limits   COVID-19, RAPID   BRAIN NATRIURETIC PEPTIDE   TROPONIN   MAGNESIUM   LIPASE   TSH   VITAMIN D 25 HYDROXY   VITAMIN B12   FOLATE   IRON AND TIBC   FERRITIN   LACTIC ACID       All other labs were within normal range or not returned as of this dictation.     EMERGENCY DEPARTMENT COURSE and DIFFERENTIAL DIAGNOSIS/MDM:   Vitals:    Vitals:    05/30/22 0815 05/30/22 0830 05/30/22 0900 05/30/22 0930   BP:  122/72 119/81 136/83   Pulse: 65 63 63 50   Resp: 16 15 14 16   Temp:    (!) 96.6 °F (35.9 °C)   SpO2: (!) 89% (!) 89% 93% 91%       MDM  Number of Diagnoses or Management Options     Amount and/or Complexity of Data Reviewed  Clinical lab tests: ordered and reviewed  Tests in the radiology section of CPT®: ordered and reviewed  Discuss the patient with other providers: yes  Independent visualization of images, tracings, or specimens: yes      Patient had 2 DuoNeb's prior to arrival and has a known history of COPD much improved after those however still has some wheezing on exam and intermittent hypoxia down into the mid and upper 80s and then rebounds to the low 90s. Repeating DuoNeb here in addition to Solu-Medrol. Patient has had some intermittent chest pain denies any pain currently but troponin is 0.15 no obvious EKG changes. Continue to trend. Have discussed the case with hospitalist Dr. Suresh Lim for admission and further evaluation and treatment. CONSULTS:  IP CONSULT TO CARDIOLOGY    PROCEDURES:  Unless otherwise noted below, none     Procedures    FINAL IMPRESSION      1. COPD exacerbation (HCC)    2. Elevated troponin          DISPOSITION/PLAN   DISPOSITION Admitted 05/30/2022 08:20:39 AM      PATIENT REFERRED TO:  No follow-up provider specified.     DISCHARGE MEDICATIONS:  Current Discharge Medication List             (Please note that portions of this note were completed with a voice recognition program.  Efforts were made to edit thedictations but occasionally words are mis-transcribed.)    Pamela Interiano MD (electronically signed)  Attending Emergency Physician        Jenifer Thurston MD  05/30/22 0002

## 2022-05-30 NOTE — PROGRESS NOTES
Results for Mame Bond (MRN [id number]) as of 5/30/2022 09:09   Ref.  Range 5/30/2022 09:08   Hemoglobin, Art, Extended Latest Ref Range: 14.0 - 18.0 g/dL 11.8 (L)   pH, Arterial Latest Ref Range: 7.350 - 7.450  7.430   pCO2, Arterial Latest Ref Range: 35.0 - 45.0 mmHg 42.0   pO2, Arterial Latest Ref Range: 80.0 - 100.0 mmHg 51.0 (L)   HCO3, Arterial Latest Ref Range: 22.0 - 26.0 mmol/L 27.9 (H)   Base Excess, Arterial Latest Ref Range: -2.0 - 2.0 mmol/L 3.2 (H)   O2 Sat, Arterial Latest Ref Range: >92 % 89.3 (L)   O2 Content, Arterial Latest Ref Range: Not Established mL/dL 14.8   Methemoglobin, Arterial Latest Ref Range: <1.5 % 1.1   Carboxyhgb, Arterial Latest Ref Range: 0.0 - 5.0 % 2.0   Pt on room air, rr, AT+

## 2022-05-30 NOTE — ED NOTES
Tried to call report was on hold for 7 mins       Mauricio Soulier, RN  05/30/22 [address] [facility] [address], RN  05/30/22 1832

## 2022-05-30 NOTE — CONSULTS
[facility] ([facility]) Cardiology   Consult Note       Requesting MD:  Galina Mckeon, *   Admit Status:         Patient: Justin Samayoa  [id number]  1955         Chief Complaint: Shortness of breath  HPI: Patient is a 74 y.o. male from the nursing home admitted for shortness of breath for 2 days. He was a heavy smoker and quit 10 years ago. He was admitted to the nursing home because of stroke affecting the left hemisphere 3 years ago. He denies any history of myocardial infarct. He said that he had 1 chest pain 3 years ago following in the bathtub. He had history of hypertension and type 2 diabetes.     Review of Systems:  HENNT: normal  PULMONARY: COPD  CVS:see history of present illness  ABD: denies any abdominal pain  PERIPHERL: normal  MSK: no swelling of the lower extremities  CNS: See history of present illness  Renal: Denies any history of dysuria or frequency    Cardiac Specific Data:  Specialty Problems        Cardiology Problems    Acute ischemic stroke [facility])        Hypertension              Past Medical History:  Past Medical History:   Diagnosis Date    Arthritis     Asthma     Cerebral artery occlusion with cerebral infarction ([address]     [facility]     Diabetes mellitus ([address]     Hypertension     Seizures ([facility]     Pt states last seizure was in December 2015    Thyroid disease         Past Surgical History:  Past Surgical History:   Procedure Laterality Date    ARM SURGERY Left     EYE SURGERY Right     KNEE SURGERY Right     MANDIBLE SURGERY      AL COLONOSCOPY FLX DX W/COLLJ SPEC WHEN PFRMD N/A 6/26/2018    Dr Carlota Chávez yr recall       Past Family History:  Family History   Problem Relation Age of Onset    Breast Cancer Mother     Heart Attack Father     High Blood Pressure Sister     High Blood Pressure Brother     Colon Cancer Neg Hx     Colon Polyps Neg Hx     Liver Cancer Neg Hx     Liver Disease Neg Hx     Esophageal Cancer Neg Hx     Rectal Cancer Neg Hx     Stomach Cancer Neg Hx        Past Social History:  Social History     Socioeconomic History    Marital status: Single     Spouse name: Not on file    Number of children: 0    Years of education: 15    Highest education level: Not on file   Occupational History    Not on file   Tobacco Use    Smoking status: Former Smoker     Packs/day: 1.00     Years: 40.00     Pack years: 40.00     Types: Cigarettes    Smokeless tobacco: Never Used   Vaping Use    Vaping Use: Never used   Substance and Sexual Activity    Alcohol use: No    Drug use: No    Sexual activity: Not on file   Other Topics Concern    Not on file   Social History Narrative    Not on file     Social Determinants of Health     Financial Resource Strain:     Difficulty of Paying Living Expenses: Not on file   Food Insecurity:     Worried About Running Out of Food in the Last Year: Not on file    Carlos of Food in the Last Year: Not on file   Transportation Needs:     Lack of Transportation (Medical): Not on file    Lack of Transportation (Non-Medical):  Not on file   Physical Activity:     Days of Exercise per Week: Not on file    Minutes of Exercise per Session: Not on file   Stress:     Feeling of Stress : Not on file   Social Connections:     Frequency of Communication with Friends and Family: Not on file    Frequency of Social Gatherings with Friends and Family: Not on file    Attends [employer] Services: Not on file    Active Member of Clubs or Organizations: Not on file    Attends Club or Organization Meetings: Not on file    Marital Status: Not on file   Intimate Partner Violence:     Fear of Current or Ex-Partner: Not on file    Emotionally Abused: Not on file    Physically Abused: Not on file    Sexually Abused: Not on file   Housing Stability:     Unable to Pay for Housing in the Last Year: Not on file    Number of [place] in the Last Year: Not on file    Unstable Housing in the Last Year: Not on file       Allergies: Allergies   Allergen Reactions    Codeine        Prior to Admission medications    Medication Sig Start Date End Date Taking?  Authorizing Provider   albuterol sulfate HFA (VENTOLIN HFA) 108 (90 Base) MCG/ACT inhaler Inhale 2 puffs into the lungs 4 times daily as needed for Wheezing 8/26/20   Neri Prado MD   budesonide-formoterol [facility]) 160-4.5 MCG/ACT AERO Inhale 2 puffs into the lungs 2 times daily 8/26/20   Neri Prado MD   aspirin 81 MG chewable tablet Take 1 tablet by mouth daily 2/12/20   Bina Daley MD   FLUoxetine (PROZAC) 20 MG capsule Take 1 capsule by mouth daily 2/13/20   Bina Daley MD   atorvastatin (LIPITOR) 80 MG tablet Take 1 tablet by mouth nightly 2/12/20   Bina Daley MD   amLODIPine (NORVASC) 10 MG tablet Take 1 tablet by mouth daily 2/12/20   Bina Daley MD   tamsulosin [facility]) 0.4 MG capsule Take 2 capsules by mouth daily  Patient taking differently: Take 0.8 mg by mouth nightly  2/13/20   Bina Daley MD        Current Facility-Administered Medications   Medication Dose Route Frequency Provider Last Rate Last Admin    aspirin tablet 325 mg  325 mg Oral Once Petr Cuellar MD        sodium chloride flush 0.9 % injection 5-40 mL  5-40 mL IntraVENous 2 times per day Chloé Gibbs MD        sodium chloride flush 0.9 % injection 10 mL  10 mL IntraVENous PRN Chloé Gibbs MD        0.9 % sodium chloride infusion   IntraVENous PRN Chloé Gibbs MD        ondansetron (ZOFRAN-ODT) disintegrating tablet 4 mg  4 mg Oral Q8H PRN Chloé Gibbs MD        Or    ondansetron [facility]) injection 4 mg  4 mg IntraVENous Q6H PRN Chloé Gibbs MD        acetaminophen (TYLENOL) tablet 650 mg  650 mg Oral Q6H PRN Chloé Gibbs MD        Or    acetaminophen (TYLENOL) suppository 650 mg  650 mg Rectal Q6H PRN Chloé Gibbs MD        polyethylene glycol (GLYCOLAX) packet 17 g  17 g Oral Daily PRN Jaja Rosas MD        [START ON 5/31/2022] aspirin chewable tablet 81 mg  81 mg Oral Daily Jaja Rosas MD        enoxaparin (LOVENOX) injection 40 mg  40 mg SubCUTAneous Daily Jaja Rosas MD        atorvastatin (LIPITOR) tablet 40 mg  40 mg Oral Nightly Jaja Rosas MD        nitroGLYCERIN (NITROSTAT) SL tablet 0.4 mg  0.4 mg SubLINGual Q5 Min PRN Jaja Rosas MD        methylPREDNISolone sodium (SOLU-MEDROL) injection 40 mg  40 mg IntraVENous Q6H Jaja Rosas MD        FLUoxetine (PROZAC) capsule 20 mg  20 mg Oral Daily Jaja Rosas MD        [facility]) capsule 0.8 mg  0.8 mg Oral Nightly Jaja Rosas MD        ipratropium-albuterol (DUONEB) nebulizer solution 1 ampule  1 ampule Inhalation Q4H WA Jaja Rosas MD            Current Infused Meds:   sodium chloride         Physical Exam:  Vitals:    05/30/22 0930   BP: 136/83   Pulse: 50   Resp: 16   Temp: (!) 96.6 °F (35.9 °C)   SpO2: 91%     No intake or output data in the 24 hours ending 05/30/22 1115  Estimated body mass index is 25.54 kg/m² as calculated from the following:    Height as of 8/21/20: 5' 10\" (1.778 m). Weight as of 8/25/20: 178 lb (80.7 kg). PHYSICAL EXAM:  HENNT: normal  PULMONARY: Creased air entry with moderate degree of bronchospasm  CVS:Normal neck vein, S1 and S2 normal, no murmur  ABD: liver and spleen not palpable, nontender, bowel sound normal  PERIPHERL: all pulses are normal with no bruit  MSK: no swelling of the lower extremities  CNS: Slurred speech, weakness of the right and right leg.     Labs:  Recent Labs     05/30/22  0739   WBC 7.2   HGB 12.6*          Recent Labs     05/30/22  0739 05/30/22  0908     --    K 4.2 3.7     --    CO2 30*  --    BUN 17  --    CREATININE 1.2  --    LABGLOM >60  --    MG 2.0  --    CALCIUM 9.5  --        CK, CKMB, Troponin:   Recent Labs 05/30/22  0739   TROPONINI 0.15*       Last 3 BNP:  Recent Labs     05/30/22  0739   PROBNP 129             XR CHEST PORTABLE    Result Date: 5/30/2022  No acute cardiopulmonary process. Signed by Dr Cricket Fajardo and Plan:  1. COPD with acute exacerbation  2. Poor historian, stating he had 1 episodes of chest pain 3 years ago at home falling in the tub, troponin is slightly elevated  3. History of stroke 3 years ago unable to walk  4. Hypertension and type 2 diabetes  5. History of seizure 2015    Plan: We will perform EKG and echocardiogram.  We will trend the troponin    I have spent 60 minutes reviewing the chart, taking history, examining the patient, discussion with the patient and the family concerning the finding, diagnoses and treatment plan. This dictation was performed using [address] [place]. Mistake and misspelling may have been created without  realizing them. Please notify me for clarification.   Thank you    Jluis Bethea MD   5/30/2022, 11:15 AM

## 2022-05-30 NOTE — ED NOTES
Tried to call report again.  [address] [facility] charge RN is busy with another patient at this time and she will have her call me back as soon as she comes back to nurse' station       Vinay NolanGe[facility]  05/30/22 1693

## 2022-05-31 PROBLEM — Z51.5 PALLIATIVE CARE PATIENT: Status: ACTIVE | Noted: 2022-05-31

## 2022-05-31 LAB
ALBUMIN SERPL-MCNC: 4.1 G/DL (ref 3.5–5.2)
ALP BLD-CCNC: 65 U/L (ref 40–130)
ALT SERPL-CCNC: 73 U/L (ref 5–41)
ANION GAP SERPL CALCULATED.3IONS-SCNC: 18 MMOL/L (ref 7–19)
AST SERPL-CCNC: 58 U/L (ref 5–40)
BILIRUB SERPL-MCNC: 0.4 MG/DL (ref 0.2–1.2)
BUN BLDV-MCNC: 21 MG/DL (ref 8–23)
CALCIUM SERPL-MCNC: 9.3 MG/DL (ref 8.8–10.2)
CHLORIDE BLD-SCNC: 103 MMOL/L (ref 98–111)
CHOLESTEROL, TOTAL: 144 MG/DL (ref 160–199)
CO2: 21 MMOL/L (ref 22–29)
CREAT SERPL-MCNC: 1.2 MG/DL (ref 0.5–1.2)
EKG P AXIS: 48 DEGREES
EKG P AXIS: NORMAL DEGREES
EKG P-R INTERVAL: 120 MS
EKG P-R INTERVAL: NORMAL MS
EKG Q-T INTERVAL: 428 MS
EKG Q-T INTERVAL: 486 MS
EKG QRS DURATION: 100 MS
EKG QRS DURATION: 90 MS
EKG QTC CALCULATION (BAZETT): 427 MS
EKG QTC CALCULATION (BAZETT): 481 MS
EKG T AXIS: -174 DEGREES
EKG T AXIS: 85 DEGREES
GFR AFRICAN AMERICAN: >59
GFR NON-AFRICAN AMERICAN: >60
GLUCOSE BLD-MCNC: 151 MG/DL (ref 74–109)
HCT VFR BLD CALC: 36.2 % (ref 42–52)
HDLC SERPL-MCNC: 57 MG/DL (ref 55–121)
HEMOGLOBIN: 11.6 G/DL (ref 14–18)
LDL CHOLESTEROL CALCULATED: 70 MG/DL
LIPASE: 68 U/L (ref 13–60)
LV EF: 58 %
LVEF MODALITY: NORMAL
MCH RBC QN AUTO: 31.4 PG (ref 27–31)
MCHC RBC AUTO-ENTMCNC: 32 G/DL (ref 33–37)
MCV RBC AUTO: 98.1 FL (ref 80–94)
PDW BLD-RTO: 15.1 % (ref 11.5–14.5)
PLATELET # BLD: 223 K/UL (ref 130–400)
PMV BLD AUTO: 10.7 FL (ref 9.4–12.4)
POTASSIUM REFLEX MAGNESIUM: 4.1 MMOL/L (ref 3.5–5)
RBC # BLD: 3.69 M/UL (ref 4.7–6.1)
SODIUM BLD-SCNC: 142 MMOL/L (ref 136–145)
TOTAL CK: 244 U/L (ref 39–308)
TOTAL PROTEIN: 6.9 G/DL (ref 6.6–8.7)
TRIGL SERPL-MCNC: 84 MG/DL (ref 0–149)
TROPONIN: 0.07 NG/ML (ref 0–0.03)
WBC # BLD: 9.5 K/UL (ref 4.8–10.8)

## 2022-05-31 PROCEDURE — 97165 OT EVAL LOW COMPLEX 30 MIN: CPT

## 2022-05-31 PROCEDURE — 6370000000 HC RX 637 (ALT 250 FOR IP): Performed by: HOSPITALIST

## 2022-05-31 PROCEDURE — 6360000002 HC RX W HCPCS: Performed by: HOSPITALIST

## 2022-05-31 PROCEDURE — 85027 COMPLETE CBC AUTOMATED: CPT

## 2022-05-31 PROCEDURE — 6360000002 HC RX W HCPCS: Performed by: NURSE PRACTITIONER

## 2022-05-31 PROCEDURE — 93010 ELECTROCARDIOGRAM REPORT: CPT | Performed by: INTERNAL MEDICINE

## 2022-05-31 PROCEDURE — 93005 ELECTROCARDIOGRAM TRACING: CPT | Performed by: HOSPITALIST

## 2022-05-31 PROCEDURE — 99232 SBSQ HOSP IP/OBS MODERATE 35: CPT | Performed by: INTERNAL MEDICINE

## 2022-05-31 PROCEDURE — 93306 TTE W/DOPPLER COMPLETE: CPT

## 2022-05-31 PROCEDURE — 84484 ASSAY OF TROPONIN QUANT: CPT

## 2022-05-31 PROCEDURE — 2500000003 HC RX 250 WO HCPCS: Performed by: HOSPITALIST

## 2022-05-31 PROCEDURE — 2700000000 HC OXYGEN THERAPY PER DAY

## 2022-05-31 PROCEDURE — 2140000000 HC CCU INTERMEDIATE R&B

## 2022-05-31 PROCEDURE — 94640 AIRWAY INHALATION TREATMENT: CPT

## 2022-05-31 PROCEDURE — 2580000003 HC RX 258: Performed by: HOSPITALIST

## 2022-05-31 PROCEDURE — 83690 ASSAY OF LIPASE: CPT

## 2022-05-31 PROCEDURE — 80061 LIPID PANEL: CPT

## 2022-05-31 PROCEDURE — 36415 COLL VENOUS BLD VENIPUNCTURE: CPT

## 2022-05-31 PROCEDURE — 80053 COMPREHEN METABOLIC PANEL: CPT

## 2022-05-31 PROCEDURE — 82550 ASSAY OF CK (CPK): CPT

## 2022-05-31 PROCEDURE — 6370000000 HC RX 637 (ALT 250 FOR IP): Performed by: NURSE PRACTITIONER

## 2022-05-31 RX ORDER — TRAMADOL HYDROCHLORIDE 50 MG/1
50 TABLET ORAL 2 TIMES DAILY PRN
Status: ON HOLD | COMMUNITY
End: 2022-06-02 | Stop reason: HOSPADM

## 2022-05-31 RX ORDER — AMLODIPINE BESYLATE 5 MG/1
5 TABLET ORAL DAILY
Status: DISCONTINUED | OUTPATIENT
Start: 2022-06-01 | End: 2022-06-02 | Stop reason: HOSPADM

## 2022-05-31 RX ORDER — POLYETHYLENE GLYCOL 3350 17 G/17G
17 POWDER, FOR SOLUTION ORAL DAILY
Status: DISCONTINUED | OUTPATIENT
Start: 2022-05-31 | End: 2022-06-02 | Stop reason: HOSPADM

## 2022-05-31 RX ORDER — LEVOTHYROXINE SODIUM 0.1 MG/1
200 TABLET ORAL DAILY
Status: DISCONTINUED | OUTPATIENT
Start: 2022-06-01 | End: 2022-06-01

## 2022-05-31 RX ORDER — POTASSIUM CHLORIDE 1.5 G/1.77G
20 POWDER, FOR SOLUTION ORAL DAILY
COMMUNITY

## 2022-05-31 RX ORDER — BISACODYL 10 MG
10 SUPPOSITORY, RECTAL RECTAL DAILY PRN
COMMUNITY

## 2022-05-31 RX ORDER — ERGOCALCIFEROL 1.25 MG/1
50000 CAPSULE ORAL WEEKLY
Status: DISCONTINUED | OUTPATIENT
Start: 2022-05-31 | End: 2022-06-02 | Stop reason: HOSPADM

## 2022-05-31 RX ORDER — ACETAMINOPHEN 325 MG/1
650 TABLET ORAL EVERY 6 HOURS PRN
COMMUNITY

## 2022-05-31 RX ORDER — SODIUM PHOSPHATE, DIBASIC AND SODIUM PHOSPHATE, MONOBASIC 7; 19 G/133ML; G/133ML
1 ENEMA RECTAL DAILY PRN
COMMUNITY

## 2022-05-31 RX ADMIN — DOXYCYCLINE 100 MG: 100 INJECTION, POWDER, LYOPHILIZED, FOR SOLUTION INTRAVENOUS at 17:51

## 2022-05-31 RX ADMIN — ASPIRIN 81 MG 81 MG: 81 TABLET ORAL at 08:51

## 2022-05-31 RX ADMIN — SODIUM CHLORIDE, PRESERVATIVE FREE 10 ML: 5 INJECTION INTRAVENOUS at 21:22

## 2022-05-31 RX ADMIN — FLUOXETINE 20 MG: 20 CAPSULE ORAL at 08:50

## 2022-05-31 RX ADMIN — TAMSULOSIN HYDROCHLORIDE 0.8 MG: 0.4 CAPSULE ORAL at 21:20

## 2022-05-31 RX ADMIN — ENOXAPARIN SODIUM 40 MG: 100 INJECTION SUBCUTANEOUS at 08:50

## 2022-05-31 RX ADMIN — POLYETHYLENE GLYCOL 3350 17 G: 17 POWDER, FOR SOLUTION ORAL at 21:21

## 2022-05-31 RX ADMIN — ACETAMINOPHEN 650 MG: 325 TABLET ORAL at 12:20

## 2022-05-31 RX ADMIN — WATER 1000 MG: 1 INJECTION INTRAMUSCULAR; INTRAVENOUS; SUBCUTANEOUS at 17:51

## 2022-05-31 RX ADMIN — LEVOTHYROXINE SODIUM 150 MCG: 75 TABLET ORAL at 05:57

## 2022-05-31 RX ADMIN — ERGOCALCIFEROL 50000 UNITS: 1.25 CAPSULE ORAL at 15:07

## 2022-05-31 RX ADMIN — METHYLPREDNISOLONE SODIUM SUCCINATE 40 MG: 40 INJECTION, POWDER, FOR SOLUTION INTRAMUSCULAR; INTRAVENOUS at 08:51

## 2022-05-31 RX ADMIN — SODIUM CHLORIDE, PRESERVATIVE FREE 10 ML: 5 INJECTION INTRAVENOUS at 08:50

## 2022-05-31 RX ADMIN — DOXYCYCLINE 100 MG: 100 INJECTION, POWDER, LYOPHILIZED, FOR SOLUTION INTRAVENOUS at 05:56

## 2022-05-31 RX ADMIN — METHYLPREDNISOLONE SODIUM SUCCINATE 40 MG: 40 INJECTION, POWDER, FOR SOLUTION INTRAMUSCULAR; INTRAVENOUS at 21:21

## 2022-05-31 RX ADMIN — ARFORMOTEROL TARTRATE 15 MCG: 15 SOLUTION RESPIRATORY (INHALATION) at 09:00

## 2022-05-31 RX ADMIN — ATORVASTATIN CALCIUM 40 MG: 40 TABLET, FILM COATED ORAL at 21:20

## 2022-05-31 RX ADMIN — METHYLPREDNISOLONE SODIUM SUCCINATE 40 MG: 40 INJECTION, POWDER, FOR SOLUTION INTRAMUSCULAR; INTRAVENOUS at 02:00

## 2022-05-31 RX ADMIN — METHYLPREDNISOLONE SODIUM SUCCINATE 40 MG: 40 INJECTION, POWDER, FOR SOLUTION INTRAMUSCULAR; INTRAVENOUS at 15:07

## 2022-05-31 RX ADMIN — BUDESONIDE 500 MCG: 0.5 SUSPENSION RESPIRATORY (INHALATION) at 19:39

## 2022-05-31 RX ADMIN — BUDESONIDE 500 MCG: 0.5 SUSPENSION RESPIRATORY (INHALATION) at 09:00

## 2022-05-31 RX ADMIN — ARFORMOTEROL TARTRATE 15 MCG: 15 SOLUTION RESPIRATORY (INHALATION) at 19:39

## 2022-05-31 RX ADMIN — AMLODIPINE BESYLATE 10 MG: 10 TABLET ORAL at 08:50

## 2022-05-31 ASSESSMENT — PAIN SCALES - GENERAL
PAINLEVEL_OUTOF10: 0
PAINLEVEL_OUTOF10: 3

## 2022-05-31 NOTE — PROGRESS NOTES
Occupational Therapy  Facility/Department: [facility] PROGRESSIVE CARE  Occupational Therapy Initial Assessment    Name: Mackenzie Purcell  : 1955  MRN: [id number]  Date of Service: 2022    Discharge Recommendations:             Patient Diagnosis(es): The primary encounter diagnosis was COPD exacerbation ([facility] A diagnosis of Elevated troponin was also pertinent to this visit. Past Medical History:  has a past medical history of Arthritis, Asthma, Cerebral artery occlusion with cerebral infarction [facility]), COPD (chronic obstructive pulmonary disease) ([facility] Diabetes mellitus ([facility] Hypertension, Palliative care patient, Seizures ([facility] and Thyroid disease. Past Surgical History:  has a past surgical history that includes Arm Surgery (Left); Mandible surgery; knee surgery (Right); Eye surgery (Right); and pr colonoscopy flx dx w/collj spec when pfrmd (N/A, 2018). Treatment Diagnosis: COPD exacerbation      Assessment   Performance deficits / Impairments: Decreased functional mobility ; Decreased ADL status; Decreased endurance;Decreased balance;Decreased strength  Assessment: Pt. appears to be at similar level of self care and mobility as he was back at his facility.   No further OT required  Treatment Diagnosis: COPD exacerbation  Prognosis: Good  Decision Making: Low Complexity  REQUIRES OT FOLLOW-UP: No  Activity Tolerance  Activity Tolerance: Patient Tolerated treatment well        Plan   Plan  Times per Week: OT eval only     Restrictions       Subjective   General  Chart Reviewed: Yes  Patient assessed for rehabilitation services?: Yes  Family / Caregiver Present: No  Diagnosis: COPD exacerbation  General Comment  Comments: Pt. difficult to understand but agreed to sit up on EOB     Social/Functional History  Social/Functional History  Type of Home: Facility ([address] [place] [address],[address] SNF)  ADL Assistance: Needs assistance  Toileting: Needs assistance  Homemaking Assistance: Needs assistance  Ambulation Assistance: Non-ambulatory  Transfer Assistance: Needs assistance  Additional Comments: Previous CVA impacting ADLs and communication       Objective   Heart Rate: 61  Heart Rate Source: Monitor  BP: 104/65  BP Location: Left upper arm  BP Method: Automatic  MAP (Calculated): 78  Resp: 20  SpO2: 93 %  O2 Device: Nasal cannula  Hearing: Within functional limits                   AROM: Within functional limits  Strength:  (LUE WNL, RUE is grossly 4-/5)  Coordination:  (Decreased on RUE, reports using LUE to feed self now)  ADL  Feeding: Setup  Grooming: Minimal assistance  UE Bathing: Minimal assistance  LE Bathing: Maximum assistance  UE Dressing:  Moderate assistance  LE Dressing: Dependent/Total  Toileting: Dependent/Total        Bed mobility  Supine to Sit: Stand by assistance  Transfers  Stand Step Transfers: Minimal assistance  Sit to stand: Contact guard assistance;Minimal assistance     Cognition  Overall Cognitive Status:  (Follows simple commands)                                           G-Code     OutComes Score                                                  AM-PAC Score             Goals  Short Term Goals  Time Frame for Short term goals: OT eval only  Long Term Goals  Time Frame for Long term goals : OT eval only       Therapy Time   Individual Concurrent Group Co-treatment   Time In           Time Out           Minutes                   Hussein Goff OTR/L

## 2022-05-31 NOTE — CONSULTS
Palliative Care:   Pt is new to palliative care team.  Presents to ED from  with SOA. Pt is asleep at present time. Call made to pt sister(emergency contact), [facility], for additional information. Past Medical History:        Past Medical History:   Diagnosis Date    Arthritis     Asthma     Cerebral [facility] occlusion with [facility] infarction ([facility]     COPD (chronic obstructive pulmonary disease) ([facility]     [facility]     Hypertension     Palliative care patient 05/31/2022    Seizures ([facility]     Pt states last seizure was in December 2015    Thyroid disease        Advance Directives:   Pt does nnot have AD. Sister does not want to make decision without taking with pt. NF   Form indicated pt is a Full Code. Will make changes if needed once this nurse along with sister talk with pt. Pain/Other Symptoms:    Pt appears comfortable, asleep. Activity:   Assist          Psychological/Spiritual:   Family unsure if pt has spiritual support at . Good family support from his 3 siblings. Plan:    Cardiology has seen. Will manage pt with medications at this time. Plan for pt to return to . Patient/family discussion r/t goals:  Spoke with pt sister, [facility]. She tells me pt has been in  since his stoke 2 1/2 yrs ago. He does rec some therapies from PT/ST. Siblings in agreement that pt will continue with life at  on dc along with the therapies he is receiving. Family states pt is mostly in w/c, occasional walker with assist.  Sister, plans on seeing pt tomorrow. PC RN provided her with our contact number, asking her to reach out to us so we can support pt and family as well as talk about ACP. Contact information updated in pt chart. Palliative following for support, GO.                  Electronically signed by Julia Andino RN on 5/31/2022 at 2:03 PM

## 2022-05-31 NOTE — PROGRESS NOTES
HOSPITAL MEDICINE  - PROGRESS NOTE    Admit Date: 5/30/2022         CC: SOA    Subjective: dyspnea, wheezing, cough, no chest pain, no edema, no calf pain, no abd pain, no N/V/D/C, feels week     Objective: mid to moderate distress     75 yo male presented with dyspnea and CP, diagnosed with COPD with acute exacerbation- on steroids, nebs and ab. troponin slightly elevated. Echocardiogram normal.  EKG shows sinus bradycardia with nonspecific ST-T wave changes. Evaluated by cardiology and cleared for DC. History of stroke 3 years ago unable to walk. . Started on synthroid. Diet: ADULT DIET; Regular;  No Caffeine  Pain is:None  Nausea:None  Bowel Movement/Flatus yes    Data:   Scheduled Meds: Reviewed  Current Facility-Administered Medications   Medication Dose Route Frequency Provider Last Rate Last Admin    vitamin D (ERGOCALCIFEROL) capsule 50,000 Units  50,000 Units Oral Weekly Johnson Kerr MD   50,000 Units at 05/31/22 1507    [START ON 6/1/2022] levothyroxine (SYNTHROID) tablet 200 mcg  200 mcg Oral Daily Johnson Kerr MD        [START ON 6/1/2022] amLODIPine (NORVASC) tablet 5 mg  5 mg Oral Daily Johnson Kerr MD        sodium chloride flush 0.9 % injection 5-40 mL  5-40 mL IntraVENous 2 times per day Johnson Kerr MD   10 mL at 05/31/22 0850    sodium chloride flush 0.9 % injection 10 mL  10 mL IntraVENous PRN Johnson Kerr MD        0.9 % sodium chloride infusion   IntraVENous PRN Johnson Kerr MD        ondansetron (ZOFRAN-ODT) disintegrating tablet 4 mg  4 mg Oral Q8H PRN Johnsno Kerr MD        Or    [facility]ron [facility]) injection 4 mg  4 mg IntraVENous Q6H PRN Johnson Kerr MD        acetaminophen (TYLENOL) tablet 650 mg  650 mg Oral Q6H PRN Johnson Kerr MD   650 mg at 05/31/22 1220    Or    acetaminophen (TYLENOL) suppository 650 mg  650 mg Rectal Q6H PRN Juanna Saint, MD        polyethylene glycol [facility]) packet 17 g  17 g Oral Daily PRN Juanna Saint, MD        aspirin chewable tablet 81 mg  81 mg Oral Daily Juanna Saint, MD   81 mg at 05/31/22 0851    enoxaparin (LOVENOX) injection 40 mg  40 mg SubCUTAneous Daily Juanna Saint, MD   40 mg at 05/31/22 0850    atorvastatin (LIPITOR) tablet 40 mg  40 mg Oral Nightly Juanna Saint, MD   40 mg at 05/30/22 2117    nitroGLYCERIN (NITROSTAT) SL tablet 0.4 mg  0.4 mg SubLINGual Q5 Min PRN Juanna Saint, MD        methylPREDNISolone sodium (SOLU-MEDROL) injection 40 mg  40 mg IntraVENous Q6H Juanna Saint, MD   40 mg at 05/31/22 1507    FLUoxetine (PROZAC) capsule 20 mg  20 mg Oral Daily Juanna Saint, MD   20 mg at 05/31/22 0850    tamsulosin (FLOMAX) capsule 0.8 mg  0.8 mg Oral Nightly Juanna Saint, MD   0.8 mg at 05/30/22 2117    budesonide (PULMICORT) nebulizer suspension 500 mcg  0.5 mg Nebulization BID Mile Laceyne, APRN   500 mcg at 05/31/22 0900    And    Arformoterol Tartrate (BROVANA) nebulizer solution 15 mcg  15 mcg Nebulization BID Mile Rinne, APRN   15 mcg at 05/31/22 0900    doxycycline (VIBRAMYCIN) 100 mg in dextrose 5 % 100 mL IVPB  100 mg IntraVENous Q12H Juanna Saint,  mL/hr at 05/31/22 1751 100 mg at 05/31/22 1751    cefTRIAXone (ROCEPHIN) 1,000 mg in sterile water 10 mL IV syringe  1,000 mg IntraVENous Q24H Juanna Saint, MD   1,000 mg at 05/31/22 1751     DVT Prophylaxis: Lovenox 40 mg sq daily    Continuous Infusions:   sodium chloride         Intake/Output Summary (Last 24 hours) at 5/31/2022 1832  Last data filed at 5/31/2022 0909  Gross per 24 hour   Intake 570 ml   Output 425 ml   Net 145 ml     CBC:   Recent Labs     05/30/22  0739 05/31/22  0121   WBC 7.2 9.5   HGB 12.6* 11.6*    223     BMP:  Recent Labs     05/30/22  0739 05/30/22  0908 05/31/22  0121     --  142   K 4.2 3.7 4.1     --  103   CO2 30*  --  21*   BUN 17  --  21   CREATININE 1.2  --  1.2   GLUCOSE 80  --  151*     ABGs:   Lab Results   Component Value Date    PHART 7.430 05/30/2022    PO2ART 51.0 05/30/2022    TZJ7WWT 42.0 05/30/2022     INR: No results for input(s): INR in the last 72 hours. Objective:   Vitals: BP (!) 96/58   Pulse 86   Temp 97.4 °F (36.3 °C) (Temporal)   Resp 24   Ht 6' 1\" (1.854 m)   Wt 189 lb 6.4 oz (85.9 kg)   SpO2 95%   BMI 24.99 kg/m²   General appearance: alert, appears stated age and cooperative  Skin: Skin color, texture, turgor normal.   HEENT: Head: Normocephalic, no lesions, without obvious abnormality.   Neck: no adenopathy, no carotid bruit, no JVD and supple, symmetrical, trachea midline  Lungs: BL wheezing   Heart: regular rate and rhythm, S1, S2 normal, no murmur, click, rub or gallop  Abdomen: soft, non-tender; bowel sounds normal; no masses,  no organomegaly  Extremities: extremities normal, atraumatic, no cyanosis or edema  Lymphatic: No significant lymph node enlargement papable  Neurologic: Mental status: Alert, oriented, thought content appropriate, speech difficulty         Assessment & Plan:    · COPD AE- steroids, nebs, ab  · Hypothyroidism, severe- started on synthroid high dose  · CP - per cardiology  · Vit d def- started on replacement   · History of CVA  · HTN  · BPH        Per cardiology EKG and echocardiogram, trend the troponin      See orders   Disposition: to SNF in 1-2 days when improved, will need synthroid on Tae Mg MD

## 2022-05-31 NOTE — CARE COORDINATION
Reported resident at [facility] [place] contacted Brook Landaverde in admissions to determine if pt has a bed hold for return to the facility. Brook Landaverde reports the pt is currently receiving skilled services and will need updated referral and covid results prior to return.     [facility]  Ph: [phone]  F: [phone]  Referrals Fax: [phone]

## 2022-05-31 NOTE — PROGRESS NOTES
Cardiology Progress Note   Dougie Recinos MD      Patient: Jose Roberto Rene  [id number]  1955    Patient Active Problem List    Diagnosis Date Noted    Palliative care patient 05/31/2022     Priority: Medium    Vitamin D deficiency 05/30/2022     Priority: Medium    Hypothyroidism 05/30/2022     Priority: Medium    COPD exacerbation ([facility] 08/21/2020     Priority: Low    Hypertension      Priority: Low    ZARA (acute kidney injury) ([facility]      Priority: Low    Acute on chronic respiratory failure with hypoxia (HCC)      Priority: Low    Aspiration pneumonia ([facility] 03/07/2020     Priority: Low    History of stroke with residual deficit 03/07/2020     Priority: Low    Dysarthria 03/07/2020     Priority: Low    Elevated troponin 03/07/2020     Priority: Low    Type 2 diabetes mellitus, without long-term current use of insulin ([facility] 03/07/2020     Priority: Low    Chronic obstructive pulmonary disease with acute [facility] respiratory [facility] 03/07/2020     Priority: Low    Seizure disorder ([facility] 03/07/2020     Priority: Low    Acute ischemic stroke ([facility] 01/21/2020     Priority: Low    Impetigo 08/05/2016     Priority: Low    Skin tear of left upper arm without complication      Priority: Low       Admit Date:  5/30/2022    Admission Problem List: Present on Admission:   COPD exacerbation ([facility]   Vitamin D deficiency   Hypothyroidism   Elevated troponin       Cardiac Specific Data:  Specialty Problems        Cardiology Problems    Acute ischemic stroke [facility])        Hypertension              Subjective:  Patient denies any chest pain. EKG was not impressive.   Echocardiogram was normal.    Objective:   /65   Pulse 61   Temp 96.9 °F (36.1 °C) (Temporal)   Resp 20   Ht 6' 1\" (1.854 m)   Wt 189 lb 6.4 oz (85.9 kg)   SpO2 93%   BMI 24.99 kg/m²       Intake/Output Summary (Last 24 hours) at 5/31/2022 1238  Last data filed at 5/31/2022 0909  Gross per 24 hour   Intake 1050 ml   Output 900 ml   Net 150 ml       Current Facility-Administered Medications   Medication Dose Route Frequency Provider Last Rate Last Admin    vitamin D (ERGOCALCIFEROL) capsule 50,000 Units  50,000 Units Oral Weekly Crystal Watson MD        [START ON 6/1/2022] levothyroxine (SYNTHROID) tablet 200 mcg  200 mcg Oral Daily Crystal Watson MD        sodium chloride flush 0.9 % injection 5-40 mL  5-40 mL IntraVENous 2 times per day Crystal Watson MD   10 mL at 05/31/22 0850    sodium chloride flush 0.9 % injection 10 mL  10 mL IntraVENous PRN Crystal Watson MD        0.9 % sodium chloride infusion   IntraVENous PRN Crystal Watson MD        ondansetron (ZOFRAN-ODT) disintegrating tablet 4 mg  4 mg Oral Q8H PRN Crystal Watson MD        Or    ondansetron [facility]) injection 4 mg  4 mg IntraVENous Q6H PRN Crystal Watson MD        acetaminophen (TYLENOL) tablet 650 mg  650 mg Oral Q6H PRN Crystal Watson MD   650 mg at 05/31/22 1220    Or    acetaminophen (TYLENOL) suppository 650 mg  650 mg Rectal Q6H PRN Crystal Watson MD        polyethylene glycol (GLYCOLAX) packet 17 g  17 g Oral Daily PRN Crystal Watson MD        aspirin chewable tablet 81 mg  81 mg Oral Daily Crystal Watson MD   81 mg at 05/31/22 0851    enoxaparin (LOVENOX) injection 40 mg  40 mg SubCUTAneous Daily Crystal Watson MD   40 mg at 05/31/22 0850    atorvastatin (LIPITOR) tablet 40 mg  40 mg Oral Nightly Crystal Watson MD   40 mg at 05/30/22 2117    nitroGLYCERIN (NITROSTAT) SL tablet 0.4 mg  0.4 mg SubLINGual Q5 Min PRN Crystal Watson MD        methylPREDNISolone sodium (SOLU-MEDROL) injection 40 mg  40 mg IntraVENous Q6H Crystal Watson MD   40 mg at 05/31/22 0851    FLUoxetine (PROZAC) capsule 20 mg  20 mg Oral Daily Crystal Watson MD   20 mg at 05/31/22 0850    tamsulosin (FLOMAX) capsule 0.8 mg  0.8 mg Oral Nightly Viry Wu MD   0.8 mg at 05/30/22 2117    amLODIPine (NORVASC) tablet 10 mg  10 mg Oral Daily Coni Landin APRN   10 mg at 05/31/22 0850    budesonide (PULMICORT) nebulizer suspension 500 mcg  0.5 mg Nebulization BID Conibrennon Landin, APRN   500 mcg at 05/31/22 0900    And    Arformoterol Tartrate (BROVANA) nebulizer solution 15 mcg  15 mcg Nebulization BID Coni Landin APRN   15 mcg at 05/31/22 0900    doxycycline (VIBRAMYCIN) 100 mg in dextrose 5 % 100 mL IVPB  100 mg IntraVENous Q12H Viry Wu MD   Stopped at 05/31/22 0172    cefTRIAXone (ROCEPHIN) 1,000 mg in sterile water 10 mL IV syringe  1,000 mg IntraVENous Q24H Viry Wu MD   1,000 mg at 05/30/22 1824         vitamin D  50,000 Units Oral Weekly    [START ON 6/1/2022] levothyroxine  200 mcg Oral Daily    sodium chloride flush  5-40 mL IntraVENous 2 times per day    aspirin  81 mg Oral Daily    enoxaparin  40 mg SubCUTAneous Daily    atorvastatin  40 mg Oral Nightly    methylPREDNISolone  40 mg IntraVENous Q6H    FLUoxetine  20 mg Oral Daily    tamsulosin  0.8 mg Oral Nightly    amLODIPine  10 mg Oral Daily    budesonide  0.5 mg Nebulization BID    And    Arformoterol Tartrate  15 mcg Nebulization BID    doxycycline (VIBRAMYCIN) IV  100 mg IntraVENous Q12H    cefTRIAXone (ROCEPHIN) IV  1,000 mg IntraVENous Q24H         Physical Exam:  HENNT: normal  PULMONARY: Creased air entry with moderate degree of bronchospasm  CVS:Normal neck vein, S1 and S2 normal, no murmur  ABD: liver and spleen not palpable, nontender, bowel sound normal  PERIPHERL: all pulses are normal with no bruit  MSK: no swelling of the lower extremities  CNS: Slurred speech, weakness of the right and right leg      RECENT LABS:    Lab Data:  CBC:   Recent Labs     05/30/22  0739 05/31/22  0121   WBC 7.2 9.5   HGB 12.6* 11.6*   HCT 40.0* 36.2*   MCV 99.8* 98.1*    223     BMP:   Recent Labs     05/30/22  0739 05/30/22  0908 05/31/22  0121     --  142   K 4.2 3.7 4.1     --  103   CO2 30*  --  21*   BUN 17  --  21   CREATININE 1.2  --  1.2     LIVER PROFILE:   Recent Labs     05/30/22  0739 05/31/22  0121 05/31/22  1037   AST 90* 58*  --    ALT 96* 73*  --    LIPASE 81*  --  68*   BILITOT 0.5 0.4  --    ALKPHOS 69 65  --      PT/INR: No results for input(s): PROTIME, INR in the last 72 hours. APTT: No results for input(s): APTT in the last 72 hours. CK, CKMB, Troponin:   Recent Labs     05/30/22  0739 05/30/22  1454 05/31/22  1037   CKTOTAL  --   --  244   TROPONINI 0.15* 0.11* 0.07*       Last 3 BNP:  Recent Labs     05/30/22  0739   PROBNP 129       IMAGING:  XR CHEST PORTABLE    Result Date: 5/30/2022  No acute cardiopulmonary process. Signed by Dr Taylor Raymundo and Plan:    1. COPD with acute exacerbation  2. Poor historian, stating he had 1 episodes of chest pain 3 years ago at home falling in the tub, troponin is slightly elevated. Echocardiogram was normal.  EKG shows sinus bradycardia with nonspecific ST-T wave changes  3. History of stroke 3 years ago unable to walk  4. Hypertension and type 2 diabetes  5. History of seizure 2015    Plan: Patient can be treated medically from cardiac standpoint. No further cardiac investigation. We will sign off      I have spent 30 minutes reviewing the chart, taking history, examining the patient, discussion with the patient and the family concerning the finding, diagnoses and treatment plan. This dictation was performed using [address] [place] [place]. Mistake and misspelling may have been created without  realizing them. Please notify me for clarification.   Thank you    Jluis Bethea MD  5/31/2022 12:38 PM

## 2022-05-31 NOTE — PROGRESS NOTES
Consult received. Will place Palliative care evaluation and Palliative RN/ will initiate discussions. We will follow along and assist as needed. Thank you for allowing us to participate in the care of this patient.     Satish Ramírez PA-C

## 2022-05-31 NOTE — PLAN OF CARE
Problem: Discharge Planning  Goal: Discharge to home or other facility with appropriate resources  5/31/2022 0726 by Velma Ashton RN  Outcome: Progressing  5/31/2022 0415 by Rosa Tripp RN  Outcome: Not Progressing     Problem: Safety - Adult  Goal: Free from fall injury  5/31/2022 0726 by Velma Ashton RN  Outcome: Progressing  5/31/2022 0415 by Rosa Tripp RN  Outcome: Not Progressing     Problem: ABCDS Injury Assessment  Goal: Absence of physical injury  5/31/2022 0726 by Velma Ashton RN  Outcome: Progressing  5/31/2022 0415 by Rosa Tripp RN  Outcome: Not Progressing     Problem: Skin/Tissue Integrity  Goal: Absence of new skin breakdown  Description: 1. Monitor for areas of redness and/or skin breakdown  2. Assess vascular access sites hourly  3. Every 4-6 hours minimum:  Change oxygen saturation probe site  4. Every 4-6 hours:  If on nasal continuous positive airway pressure, respiratory therapy assess nares and determine need for appliance change or resting period.   5/31/2022 0726 by Velma Ashton RN  Outcome: Progressing  5/31/2022 0415 by Rosa Tripp RN  Outcome: Not Progressing     Problem: Pain  Goal: Verbalizes/displays adequate comfort level or baseline comfort level  Outcome: Progressing

## 2022-06-01 LAB
EKG P AXIS: 95 DEGREES
EKG P-R INTERVAL: 162 MS
EKG Q-T INTERVAL: 474 MS
EKG QRS DURATION: 100 MS
EKG QTC CALCULATION (BAZETT): 455 MS
EKG T AXIS: 148 DEGREES

## 2022-06-01 PROCEDURE — 2500000003 HC RX 250 WO HCPCS: Performed by: HOSPITALIST

## 2022-06-01 PROCEDURE — 2700000000 HC OXYGEN THERAPY PER DAY

## 2022-06-01 PROCEDURE — 97161 PT EVAL LOW COMPLEX 20 MIN: CPT

## 2022-06-01 PROCEDURE — 2580000003 HC RX 258: Performed by: HOSPITALIST

## 2022-06-01 PROCEDURE — 6370000000 HC RX 637 (ALT 250 FOR IP): Performed by: HOSPITALIST

## 2022-06-01 PROCEDURE — 6360000002 HC RX W HCPCS: Performed by: HOSPITALIST

## 2022-06-01 PROCEDURE — 93010 ELECTROCARDIOGRAM REPORT: CPT | Performed by: INTERNAL MEDICINE

## 2022-06-01 PROCEDURE — 94640 AIRWAY INHALATION TREATMENT: CPT

## 2022-06-01 PROCEDURE — 97530 THERAPEUTIC ACTIVITIES: CPT

## 2022-06-01 PROCEDURE — 6360000002 HC RX W HCPCS: Performed by: NURSE PRACTITIONER

## 2022-06-01 PROCEDURE — 2140000000 HC CCU INTERMEDIATE R&B

## 2022-06-01 RX ORDER — LEVOTHYROXINE SODIUM 137 UG/1
137 TABLET ORAL DAILY
Status: DISCONTINUED | OUTPATIENT
Start: 2022-06-02 | End: 2022-06-02 | Stop reason: HOSPADM

## 2022-06-01 RX ORDER — PANTOPRAZOLE SODIUM 40 MG/1
40 TABLET, DELAYED RELEASE ORAL
Status: DISCONTINUED | OUTPATIENT
Start: 2022-06-02 | End: 2022-06-02 | Stop reason: HOSPADM

## 2022-06-01 RX ADMIN — METHYLPREDNISOLONE SODIUM SUCCINATE 40 MG: 40 INJECTION, POWDER, FOR SOLUTION INTRAMUSCULAR; INTRAVENOUS at 08:05

## 2022-06-01 RX ADMIN — ARFORMOTEROL TARTRATE 15 MCG: 15 SOLUTION RESPIRATORY (INHALATION) at 07:23

## 2022-06-01 RX ADMIN — DOXYCYCLINE 100 MG: 100 INJECTION, POWDER, LYOPHILIZED, FOR SOLUTION INTRAVENOUS at 20:24

## 2022-06-01 RX ADMIN — LEVOTHYROXINE SODIUM 200 MCG: 100 TABLET ORAL at 05:09

## 2022-06-01 RX ADMIN — METHYLPREDNISOLONE SODIUM SUCCINATE 40 MG: 40 INJECTION, POWDER, FOR SOLUTION INTRAMUSCULAR; INTRAVENOUS at 14:12

## 2022-06-01 RX ADMIN — ASPIRIN 81 MG 81 MG: 81 TABLET ORAL at 08:05

## 2022-06-01 RX ADMIN — SODIUM CHLORIDE, PRESERVATIVE FREE 10 ML: 5 INJECTION INTRAVENOUS at 08:07

## 2022-06-01 RX ADMIN — WATER 1000 MG: 1 INJECTION INTRAMUSCULAR; INTRAVENOUS; SUBCUTANEOUS at 17:35

## 2022-06-01 RX ADMIN — TAMSULOSIN HYDROCHLORIDE 0.8 MG: 0.4 CAPSULE ORAL at 20:26

## 2022-06-01 RX ADMIN — ATORVASTATIN CALCIUM 40 MG: 40 TABLET, FILM COATED ORAL at 20:26

## 2022-06-01 RX ADMIN — AMLODIPINE BESYLATE 5 MG: 5 TABLET ORAL at 08:05

## 2022-06-01 RX ADMIN — METHYLPREDNISOLONE SODIUM SUCCINATE 40 MG: 40 INJECTION, POWDER, FOR SOLUTION INTRAMUSCULAR; INTRAVENOUS at 20:25

## 2022-06-01 RX ADMIN — ARFORMOTEROL TARTRATE 15 MCG: 15 SOLUTION RESPIRATORY (INHALATION) at 18:59

## 2022-06-01 RX ADMIN — METHYLPREDNISOLONE SODIUM SUCCINATE 40 MG: 40 INJECTION, POWDER, FOR SOLUTION INTRAMUSCULAR; INTRAVENOUS at 02:09

## 2022-06-01 RX ADMIN — BUDESONIDE 500 MCG: 0.5 SUSPENSION RESPIRATORY (INHALATION) at 07:23

## 2022-06-01 RX ADMIN — ENOXAPARIN SODIUM 40 MG: 100 INJECTION SUBCUTANEOUS at 08:06

## 2022-06-01 RX ADMIN — POLYETHYLENE GLYCOL 3350 17 G: 17 POWDER, FOR SOLUTION ORAL at 08:06

## 2022-06-01 RX ADMIN — FLUOXETINE 20 MG: 20 CAPSULE ORAL at 08:05

## 2022-06-01 RX ADMIN — DOXYCYCLINE 100 MG: 100 INJECTION, POWDER, LYOPHILIZED, FOR SOLUTION INTRAVENOUS at 05:11

## 2022-06-01 RX ADMIN — BUDESONIDE 500 MCG: 0.5 SUSPENSION RESPIRATORY (INHALATION) at 18:59

## 2022-06-01 ASSESSMENT — ENCOUNTER SYMPTOMS
COLOR CHANGE: 0
BLOOD IN STOOL: 0
COUGH: 0
ABDOMINAL PAIN: 0
SHORTNESS OF BREATH: 1
DIARRHEA: 0
ABDOMINAL DISTENTION: 0
VOMITING: 0
NAUSEA: 0
CONSTIPATION: 0
WHEEZING: 0

## 2022-06-01 NOTE — PROGRESS NOTES
Physical Therapy  Facility/Department: [facility] PROGRESSIVE CARE  Physical Therapy Initial Assessment    Name: Shareen Apley  : 1955  MRN: [id number]  Date of Service: 2022    Discharge Recommendations:  Continue to assess pending progress          Patient Diagnosis(es): The primary encounter diagnosis was COPD exacerbation ([facility] A diagnosis of Elevated troponin was also pertinent to this visit. Past Medical History:  has a past medical history of Arthritis, Asthma, Cerebral artery occlusion with cerebral infarction [facility]), COPD (chronic obstructive pulmonary disease) ([facility] Diabetes mellitus ([facility] Hypertension, Palliative care patient, Seizures ([facility] and Thyroid disease. Past Surgical History:  has a past surgical history that includes Arm Surgery (Left); Mandible surgery; knee surgery (Right); Eye surgery (Right); and pr colonoscopy flx dx w/collj spec when pfrmd (N/A, 2018). Assessment   Body Structures, Functions, Activity Limitations Requiring Skilled Therapeutic Intervention: Decreased functional mobility ; Decreased ADL status; Decreased balance  Assessment: Pt ABLE TO STAND EOB WITH ASSIST AND TAKE SMALL SIDE STEP, VERY UNSTEADY OVERALL WHEN ATTEMPTING TO MOVE FEET. WILL PROGRESS AS TOLERATED. WILL CONT THERAPY AT SNF.   Requires PT Follow-Up: Yes  Activity Tolerance  Activity Tolerance: Patient tolerated treatment well     Plan   Plan  Plan: 1 time a day 3-6 times a week  Current Treatment Recommendations: Balance training,Functional mobility training,Transfer training,Safety education & training,Patient/Caregiver education & training  Safety Devices  Type of Devices: Call light within reach,Bed alarm in place     Restrictions  Restrictions/Precautions  Restrictions/Precautions: Fall Risk     Subjective   Pain: DENIES  General  Diagnosis: COPD EXAC  Subjective  Subjective: Pt PLEASANT, TALKATIVE, WILLING TO PARTICIPATE         Social/Functional History  Social/Functional History  Type of Home: Facility  ADL Assistance: Needs assistance  Toileting: Needs assistance  Homemaking Assistance: Needs assistance  Ambulation Assistance: Non-ambulatory  Transfer Assistance: Needs assistance  Additional Comments: Pt REPORTS USING W/C MOSTLY AT SNF BUT DOES USE WALKER AT TIMES WITH STAFF. STATES TAKES HIMSELF TO BR. Vision/Hearing  Vision  Vision: Within Functional Limits  Hearing  Hearing: Within functional limits    Cognition   Orientation  Overall Orientation Status: Within Functional Limits  Cognition  Overall Cognitive Status: WFL     Objective   Heart Rate: 61  Heart Rate Source: Monitor  BP: 104/76  BP Location: Left upper arm  Patient Position: Semi fowlers  MAP (Calculated): 85.33  Resp: 20  SpO2: 97 %  O2 Device: Nasal cannula        Gross Assessment  AROM: Within functional limits  Strength: Within functional limits                    Bed mobility  Supine to Sit: Supervision  Sit to Supine: Supervision  Transfers  Sit to Stand: Minimal Assistance (X 4 EOB)  Stand to sit: Minimal Assistance  Comment: EACH TIME, STOOD EOB ~ 1 MIN WITH MIN ASSIST. TOOK 2 SMALL SIDE STEPS TOWARD HOB, LOSES BALANCE WITHOUT ASSIST.         Balance  Sitting - Dynamic: Fair;+  Standing - Dynamic: Fair;-           OutComes Score                                                  AM-PAC Score             Goals  Short Term Goals  Time Frame for Short term goals: 14 DAYS  Short term goal 1: TRANSFERS SBA  Short term goal 2: AMB 20' RW CGA       Education  Patient Education  Education Given To: Patient  Education Provided: Role of Therapy;Plan of Care      Therapy Time   Individual Concurrent Group Co-treatment   Time In           Time Out           Minutes                   Devan Martínez, PT

## 2022-06-01 NOTE — PROGRESS NOTES
Pt is resting in bed. No family present. Sister, [facility], called to talk with this RN about pt AD and spiritual care. .  Pt is resident at [facility] and [facility]. Sister reports she had care plan meeting with [facility] today. She discussed with them if pt had spiritual care. She tells me she just has to request visits for him. He is currently rec PT as well as neb tx at facility. Discussion about AD and POA. Family understands AD can be completed at  as long as pt is able to make decisions. Explained she could also explore guardianship if needed. This nurse explained [place] hierarchy of decision making. Pt does not have a spouse or children, therefore, his siblings would be contacted should any health care decisions need to be made in event pt was unable. Family voiced understanding of the above.     Electronically signed by Genie Mason RN on 6/1/2022 at 2:13 PM

## 2022-06-01 NOTE — PROGRESS NOTES
[facility]      Progress Note    Patient: Marlee Blancas  YOB: 1955  Date of Service: 6/1/2022  MRN: [id number]   Acct: [de-identified]   Primary Care Physician: Ricci Ko MD  Advance Directive: Full Code  Admit Date: 5/30/2022       Hospital Day: 2    Portions of this note have been copied forward, however, updated to reflect the most current clinical status of this patient. CHIEF COMPLAINT SOA    SUBJECTIVE:  Mr. Brigitte Johansen was resting comfortably in bed this morning. Reported SOB. CUMULATIVE HOSPITAL COURSE:   The patient is a 74 y.o. male with past medical history of CVA, COPD, HDL, and HTN who presented to [facility] ED with complaints of shortness of air. Mr. Brigitte Johansen is a poor historian. Mr. Brigitte Johansen reported having increased dyspnea at [facility] for past couple days. He was placed on 4 L O2 at [facility] and was given 2 duo nebs in route by EMS with improvement in his breathing. Reported chest tightness. Does have chronic dysarthria and has difficulty communicating. Denied fever, chills, cough, nausea, vomiting or diarrhea. Work-up in ED revealed O2 sat 90% on room air, wheezes, ABG showed pH 7.43, PCO2 42, PO2 51, HCO3 28, unremarkable chest x-ray. Chemistry unremarkable, troponin 0.15, mild transaminitis with ALT 96, and AST 90. Patient was admitted to hospital medicine for COPD exacerbation. Empiric antibiotics, steroids, and bronchodilators were initiated. TSH noted to be 213.50 and T4 free 0.10, Synthroid initiated, will need close follow-up with PCP for hypothyroidism management. Cardiology was consulted for elevated troponin, whom recommended medical management and no further cardiac intervention. ECHO showed mild mitral regurgitation, normal LV size with preserved LV function with EF of 55 to 60%, mild concentric LVH. Review of Systems   Constitutional: Negative for chills, diaphoresis, fatigue and fever. HENT: Negative for congestion and ear pain.     Eyes: Negative for visual disturbance. Respiratory: Positive for shortness of breath. Negative for cough and wheezing. Cardiovascular: Negative for chest pain, palpitations and leg swelling. Gastrointestinal: Negative for abdominal distention, abdominal pain, blood in stool, constipation, diarrhea, nausea and vomiting. Endocrine: Negative for cold intolerance and heat intolerance. Genitourinary: Negative for difficulty urinating, flank pain, frequency and urgency. Musculoskeletal: Negative for arthralgias and myalgias. Skin: Negative for color change and wound. Neurological: Negative for dizziness, syncope, weakness, light-headedness, numbness and headaches. Hematological: Does not bruise/bleed easily. Psychiatric/Behavioral: Negative for agitation, confusion and dysphoric mood. Objective:   VITALS:  /67   Pulse 54   Temp 98.1 °F (36.7 °C) (Temporal)   Resp 20   Ht 6' 1\" (1.854 m)   Wt 190 lb 3.2 oz (86.3 kg)   SpO2 94%   BMI 25.09 kg/m²   24HR INTAKE/OUTPUT:    Intake/Output Summary (Last 24 hours) at 6/1/2022 1039  Last data filed at 6/1/2022 0912  Gross per 24 hour   Intake 480 ml   Output 875 ml   Net -395 ml           Physical Exam  Constitutional:       General: He is not in acute distress. Appearance: Normal appearance. He is not ill-appearing. HENT:      Head: Normocephalic and atraumatic. Right Ear: External ear normal.      Left Ear: External ear normal.      Nose: Nose normal.      Mouth/Throat:      Mouth: Mucous membranes are moist.   Eyes:      Extraocular Movements: Extraocular movements intact. Conjunctiva/sclera: Conjunctivae normal.      Pupils: Pupils are equal, round, and reactive to light. Cardiovascular:      Rate and Rhythm: Normal rate and regular rhythm. Pulses: Normal pulses. Heart sounds: Normal heart sounds. Pulmonary:      Effort: Pulmonary effort is normal. No respiratory distress. Breath sounds: Wheezing present. No rhonchi or rales. Comments: Diminished breath sounds with bilateral wheezes   Abdominal:      General: Bowel sounds are normal. There is no distension. Palpations: Abdomen is soft. Tenderness: There is no abdominal tenderness. Musculoskeletal:         General: No swelling, tenderness or deformity. Normal range of motion. Cervical back: Normal range of motion and neck supple. No muscular tenderness. Right lower leg: No edema. Left lower leg: No edema. Skin:     General: Skin is warm and dry. Findings: No bruising or lesion. Neurological:      Mental Status: He is alert and oriented to person, place, and time. Psychiatric:         Mood and Affect: Mood normal.         Behavior: Behavior normal.         Thought Content: Thought content normal.            Medications:      sodium chloride        [START ON 6/2/2022] levothyroxine  137 mcg Oral Daily    vitamin D  50,000 Units Oral Weekly    amLODIPine  5 mg Oral Daily    polyethylene glycol  17 g Oral Daily    sodium chloride flush  5-40 mL IntraVENous 2 times per day    aspirin  81 mg Oral Daily    enoxaparin  40 mg SubCUTAneous Daily    atorvastatin  40 mg Oral Nightly    methylPREDNISolone  40 mg IntraVENous Q6H    FLUoxetine  20 mg Oral Daily    tamsulosin  0.8 mg Oral Nightly    budesonide  0.5 mg Nebulization BID    And    Arformoterol Tartrate  15 mcg Nebulization BID    doxycycline (VIBRAMYCIN) IV  100 mg IntraVENous Q12H    cefTRIAXone (ROCEPHIN) IV  1,000 mg IntraVENous Q24H     sodium chloride flush, sodium chloride, ondansetron **OR** ondansetron, acetaminophen **OR** acetaminophen, polyethylene glycol, nitroGLYCERIN  ADULT DIET; Regular;  No Caffeine     Lab and other Data:     Recent Labs     05/30/22  0739 05/31/22  0121   WBC 7.2 9.5   HGB 12.6* 11.6*    223     Recent Labs     05/30/22  0739 05/30/22  0908 05/31/22  0121     --  142   K 4.2 3.7 4.1     --  103   CO2 30*  --  21*   BUN 17  --  21 CREATININE 1.2  --  1.2   GLUCOSE 80  --  151*     Recent Labs     05/30/22  0739 05/31/22  0121   AST 90* 58*   ALT 96* 73*   BILITOT 0.5 0.4   ALKPHOS 69 65     Troponin T:   Recent Labs     05/30/22  0739 05/30/22  1454 05/31/22  1037   TROPONINI 0.15* 0.11* 0.07*     ABG:  Recent Labs     05/30/22  0908   PHART 7.430   NAU9PSJ 42.0   PO2ART 51.0*   FKD3DFX 27.9*   BEART 3.2*   HGBAE 11.8*   S6TCDLCQ 89.3*   CARBOXHGBART 2.0         RAD:     XR CHEST PORTABLE  Result Date: 5/30/2022    No acute cardiopulmonary process. Signed by Dr Mike Estrada      ECHO 2D Elias Keating  Result Date: 5/31/2022    Summary  Mitral valve leaflets are mildly thickened with preserved leaflet  mobility. Mild mitral regurgitation is present. Mildly thickened aortic valve leaflets with preserved leaflet mobility. Tricuspid valve is structurally normal.  Normal left ventricular size with preserved LV function and an estimated  ejection fraction of approximately 55-60%. Mild concentric left ventricular hypertrophy. No regional wall motion abnormalities. Signature   ----------------------------------------------------------------  Electronically signed by Yung Cox MD(Interpreting  physician) on 05/31/2022 09:53 AM         Micro:    [id number], Rapid [[id number]] Collected: 05/30/22 0758   Order Status: Completed Specimen: Nasopharyngeal Swab Updated: 05/30/22 0827    SARS-CoV-2, NAAT Not Detected         Assessment/Plan   Principal Problem:    COPD exacerbation ([facility]  Active Problems:    Vitamin D deficiency    Hypothyroidism    Elevated troponin  Resolved Problems:    * No resolved hospital problems.  *      Principal Problem:    COPD exacerbation ([facility]-    - Continue empiric antibiotics               - Continue Bronchodilators               - Continue IV steroids                - Supplemental oxygen as needed               - Incentive spirometry               - Encourage deep breathing and cough       Active Problems:    Vitamin D deficiency- continue supplement       Hypothyroidism-    - Continue Synthroid, adjusted to weight base dose    - Follow up with PCP       Elevated troponin-   - Cardiology signed off    - ECHO    - Monitor on telemetry             Antibiotic: Doxy and Rocephin      DVT Prophylaxis: Lovenox      GI prophylaxis:  Protonix      Discharge planning: Likely to DC tomorrow if he remains stable for DC       Further Orders per Clinical course/attending. Electronically signed by KEEGAN Morrow CNP on 6/1/2022 at 10:39 AM       EMR Dragon/Transcription disclaimer:   Much of this encounter note is an electronic transcription/translation of spoken language to printed text.  The electronic translation of spoken language may permit erroneous, or at times, nonsensical words or phrases to be inadvertently transcribed; although attempts have made to review the note for such errors, some may still exist.

## 2022-06-02 VITALS
HEART RATE: 61 BPM | OXYGEN SATURATION: 92 % | RESPIRATION RATE: 18 BRPM | SYSTOLIC BLOOD PRESSURE: 120 MMHG | BODY MASS INDEX: 25.21 KG/M2 | WEIGHT: 190.2 LBS | HEIGHT: 73 IN | DIASTOLIC BLOOD PRESSURE: 76 MMHG | TEMPERATURE: 98.1 F

## 2022-06-02 LAB — SARS-COV-2, NAAT: NOT DETECTED

## 2022-06-02 PROCEDURE — 87635 SARS-COV-2 COVID-19 AMP PRB: CPT

## 2022-06-02 PROCEDURE — 6370000000 HC RX 637 (ALT 250 FOR IP): Performed by: INTERNAL MEDICINE

## 2022-06-02 PROCEDURE — 6370000000 HC RX 637 (ALT 250 FOR IP): Performed by: HOSPITALIST

## 2022-06-02 PROCEDURE — 2700000000 HC OXYGEN THERAPY PER DAY

## 2022-06-02 PROCEDURE — 2580000003 HC RX 258: Performed by: HOSPITALIST

## 2022-06-02 PROCEDURE — 97530 THERAPEUTIC ACTIVITIES: CPT

## 2022-06-02 PROCEDURE — 6360000002 HC RX W HCPCS: Performed by: NURSE PRACTITIONER

## 2022-06-02 PROCEDURE — 6360000002 HC RX W HCPCS: Performed by: HOSPITALIST

## 2022-06-02 PROCEDURE — 6370000000 HC RX 637 (ALT 250 FOR IP): Performed by: NURSE PRACTITIONER

## 2022-06-02 PROCEDURE — 2500000003 HC RX 250 WO HCPCS: Performed by: HOSPITALIST

## 2022-06-02 PROCEDURE — 94640 AIRWAY INHALATION TREATMENT: CPT

## 2022-06-02 RX ORDER — PANTOPRAZOLE SODIUM 40 MG/1
40 TABLET, DELAYED RELEASE ORAL
Qty: 30 TABLET | Refills: 0 | DISCHARGE
Start: 2022-06-03

## 2022-06-02 RX ORDER — ERGOCALCIFEROL 1.25 MG/1
50000 CAPSULE ORAL WEEKLY
Qty: 5 CAPSULE | Refills: 0 | DISCHARGE
Start: 2022-06-07

## 2022-06-02 RX ORDER — DOXYCYCLINE HYCLATE 100 MG
100 TABLET ORAL 2 TIMES DAILY
Qty: 4 TABLET | Refills: 0 | DISCHARGE
Start: 2022-06-02 | End: 2022-06-04

## 2022-06-02 RX ORDER — CEFDINIR 300 MG/1
300 CAPSULE ORAL 2 TIMES DAILY
Qty: 4 CAPSULE | Refills: 0 | DISCHARGE
Start: 2022-06-02 | End: 2022-06-04

## 2022-06-02 RX ORDER — METHYLPREDNISOLONE 4 MG/1
TABLET ORAL
Qty: 1 KIT | Refills: 0 | DISCHARGE
Start: 2022-06-02

## 2022-06-02 RX ORDER — LEVOTHYROXINE SODIUM 137 UG/1
137 TABLET ORAL DAILY
Qty: 30 TABLET | Refills: 0 | DISCHARGE
Start: 2022-06-03

## 2022-06-02 RX ADMIN — LEVOTHYROXINE SODIUM 137 MCG: 137 TABLET ORAL at 05:28

## 2022-06-02 RX ADMIN — ARFORMOTEROL TARTRATE 15 MCG: 15 SOLUTION RESPIRATORY (INHALATION) at 06:18

## 2022-06-02 RX ADMIN — METHYLPREDNISOLONE SODIUM SUCCINATE 40 MG: 40 INJECTION, POWDER, FOR SOLUTION INTRAMUSCULAR; INTRAVENOUS at 01:59

## 2022-06-02 RX ADMIN — METHYLPREDNISOLONE SODIUM SUCCINATE 40 MG: 40 INJECTION, POWDER, FOR SOLUTION INTRAMUSCULAR; INTRAVENOUS at 08:39

## 2022-06-02 RX ADMIN — FLUOXETINE 20 MG: 20 CAPSULE ORAL at 08:38

## 2022-06-02 RX ADMIN — ASPIRIN 81 MG 81 MG: 81 TABLET ORAL at 08:38

## 2022-06-02 RX ADMIN — AMLODIPINE BESYLATE 5 MG: 5 TABLET ORAL at 08:37

## 2022-06-02 RX ADMIN — PANTOPRAZOLE SODIUM 40 MG: 40 TABLET, DELAYED RELEASE ORAL at 05:28

## 2022-06-02 RX ADMIN — POLYETHYLENE GLYCOL 3350 17 G: 17 POWDER, FOR SOLUTION ORAL at 08:38

## 2022-06-02 RX ADMIN — BUDESONIDE 500 MCG: 0.5 SUSPENSION RESPIRATORY (INHALATION) at 06:18

## 2022-06-02 RX ADMIN — ENOXAPARIN SODIUM 40 MG: 100 INJECTION SUBCUTANEOUS at 08:38

## 2022-06-02 RX ADMIN — DOXYCYCLINE 100 MG: 100 INJECTION, POWDER, LYOPHILIZED, FOR SOLUTION INTRAVENOUS at 05:28

## 2022-06-02 ASSESSMENT — PAIN SCALES - GENERAL: PAINLEVEL_OUTOF10: 0

## 2022-06-02 NOTE — DISCHARGE SUMMARY
[facility]    Discharge Summary      Qamar De La Cruz  :  1955  MRN:  [id number]    Admit date:  2022  Discharge date:    2022    Discharging Physician:  Dr. Jesus Austin     Advance Directive: Full Code    Consults: cardiology    Primary Care Physician:  Tu Mehta MD    Discharge Diagnoses:  Principal Problem:    COPD exacerbation [facility])  Active Problems:    Vitamin D deficiency    Hypothyroidism    Elevated troponin  Resolved Problems:    * No resolved hospital problems. *      Portions of this note have been copied forward, however, changed to reflect the most current clinical status of this patient. Hospital Course: The patient is a 74 y.o. male with past medical history of CVA, COPD, HDL, and HTN who presented to [facility] ED with complaints of shortness of air. Mr. Rosa Killian is a poor historian. Mr. Rosa Killian reported having increased dyspnea at [facility] for past couple days. He was placed on 4 L O2 at [facility] and was given 2 duo nebs in route by EMS with improvement in his breathing. Reported chest tightness. Does have chronic dysarthria and has difficulty communicating. Denied fever, chills, cough, nausea, vomiting or diarrhea. Work-up in ED revealed O2 sat 90% on room air, wheezes, ABG showed pH 7.43, PCO2 42, PO2 51, HCO3 28, unremarkable chest x-ray. Chemistry unremarkable, troponin 0.15, mild transaminitis with ALT 96, and AST 90. Patient was admitted to hospital medicine for COPD exacerbation. Empiric antibiotics, steroids, and bronchodilators were initiated. TSH noted to be 213.50 and T4 free 0.10, Synthroid initiated, will need close follow-up with PCP for hypothyroidism management. Cardiology was consulted for elevated troponin, whom recommended medical management and no further cardiac intervention. ECHO showed mild mitral regurgitation, normal LV size with preserved LV function with EF of 55 to 60%, mild concentric LVH.   Patient is being discharged on doxycycline and Omnicef for 2 more days, Medrol Dosepak, Synthroid, Protonix, and Vitamin D. He has been advised to follow-up with PCP and cardiology as recommended. Patient is currently in stable condition to be discharged back to SNF. Significant Diagnostic Studies:     XR CHEST PORTABLE  Result Date: 5/30/2022    No acute cardiopulmonary process. Signed by Dr Mike Estrada      ECHO 2D Elias Keating  Result Date: 5/31/2022    Summary  Mitral valve leaflets are mildly thickened with preserved leaflet  mobility. Mild mitral regurgitation is present. Mildly thickened aortic valve leaflets with preserved leaflet mobility. Tricuspid valve is structurally normal.  Normal left ventricular size with preserved LV function and an estimated  ejection fraction of approximately 55-60%. Mild concentric left ventricular hypertrophy. No regional wall motion abnormalities. Signature   ----------------------------------------------------------------  Electronically signed by Yung Cox MD(Interpreting  physician) on 05/31/2022 09:53 AM        Pertinent Labs:   CBC:   Recent Labs     05/31/22  0121   WBC 9.5   HGB 11.6*        BMP:    Recent Labs     05/31/22  0121      K 4.1      CO2 21*   BUN 21   CREATININE 1.2   GLUCOSE 151*       Physical Exam:   Vital Signs: /76   Pulse 61   Temp 98.1 °F (36.7 °C) (Temporal)   Resp 18   Ht 6' 1\" (1.854 m)   Wt 190 lb 3.2 oz (86.3 kg)   SpO2 92%   BMI 25.09 kg/m²   General appearance:. Alert and Cooperative   HEENT: Normocephalic. Chest: Diminished breath sounds with wheezes bilaterally   Cardiac: RRR, S1, S2 normal. No murmurs, gallops, or rubs auscultated. Abdomen: soft, non-tender; non-distended normal bowel sounds no masses, no organomegaly. Extremities: No clubbing or cyanosis. No peripheral edema. Peripheral pulses palpable. Neurologic: Grossly intact.         Discharge Medications:          Medication List      START taking these medications    cefdinir 300 MG capsule  Commonly known as: OMNICEF  Take 1 capsule by mouth 2 times daily for 2 days     doxycycline hyclate 100 MG tablet  Commonly known as: VIBRA-TABS  Take 1 tablet by mouth 2 times daily for 2 days     levothyroxine 137 MCG tablet  Commonly known as: SYNTHROID  Take 1 tablet by mouth Daily  Start taking on: Bernice 3, 2022     methylPREDNISolone 4 MG tablet  Commonly known as: MEDROL DOSEPACK  Take by mouth.     pantoprazole 40 MG tablet  Commonly known as: PROTONIX  Take 1 tablet by mouth every morning (before breakfast)  Start taking on: Bernice 3, 2022     Vitamin D (Ergocalciferol) 25890 units Caps  Take 50,000 Units by mouth once a week  Start taking on: June 7, 2022        CHANGE how you take these medications    tamsulosin 0.4 mg capsule  Commonly known as: FLOMAX  Take 2 capsules by mouth daily  What changed: when to take this        CONTINUE taking these medications    acetaminophen 325 mg tablet  Commonly known as: TYLENOL     albuterol sulfate  (90 Base) MCG/ACT inhaler  Commonly known as: Ventolin HFA  Inhale 2 puffs into the lungs 4 times daily as needed for Wheezing     amLODIPine 10 MG tablet  Commonly known as: NORVASC  Take 1 tablet by mouth daily     aspirin 81 MG chewable tablet  Take 1 tablet by mouth daily     atorvastatin 80 MG tablet  Commonly known as: LIPITOR  Take 1 tablet by mouth nightly     bisacodyl 10 MG suppository  Commonly known as: DULCOLAX     budesonide-formoterol 160-4.5 MCG/ACT Aero  Commonly known as: Symbicort  Inhale 2 puffs into the lungs 2 times daily     fleet 7-19 GM/118ML     FLUoxetine 20 MG capsule  Commonly known as: PROZAC  Take 1 capsule by mouth daily     potassium chloride 20 MEQ packet  Commonly known as: KLOR-CON        STOP taking these medications    traMADol 50 MG tablet  Commonly known as: ULTRAM           Where to Get Your Medications      Information about where to get these medications is not yet available    Ask your nurse or doctor about these medications  · cefdinir 300 MG capsule  · doxycycline hyclate 100 MG tablet  · levothyroxine 137 MCG tablet  · methylPREDNISolone 4 MG tablet  · pantoprazole 40 MG tablet  · Vitamin D (Ergocalciferol) 27925 units Caps            Discharge Instructions: Follow up with Beniat Paiz MD in 7 days. Follow-up with cardiology as recommended. Take medications as directed. Resume activity as tolerated. Diet: ADULT DIET; Regular; No Caffeine     Disposition: Patient is medically stable and will be discharged back to SNF. Time spent on discharge 36 minutes spent in assessing patient, reviewing medications, discussion with nursing, confirming safe discharge plan and preparation of discharge summary. Signed:  Electronically signed by KEEGAN Quintero CNP on 6/2/22 at 11:57 AM CDT         EMR Dragon/Transcription disclaimer:   Much of this encounter note is an electronic transcription/translation of spoken language to printed text.  The electronic translation of spoken language may permit erroneous, or at times, nonsensical words or phrases to be inadvertently transcribed; although attempts have made to review the note for such errors, some may still exist.

## 2022-06-02 NOTE — CARE COORDINATION
Faxed updated progress notes to [facility] for pts expected dc and return to the facility today pending covid results.      [facility] MENTAL [facility] SERVICES  Ph: [phone]  F: [phone]  Referrals Fax: [phone]

## 2022-06-02 NOTE — PROGRESS NOTES
Physical Therapy  Name: Marele Blancas  MRN:  [id number]  Date of service:  6/2/2022 06/02/22 1251   Restrictions/Precautions   Restrictions/Precautions Fall Risk   Subjective   Subjective Pt agreed to therapy. Pain Assessment   Pain Assessment None - Denies Pain   Bed Mobility   Supine to Sit Stand by assistance   Sit to Supine Stand by assistance   Transfers   Sit to Stand Minimal Assistance   Stand to sit Minimal Assistance   Comment stood EOB multiple times, slightly unsteady   Ambulation   WB Status side stepped to [facility] holding bed rail    Short Term Goals   Time Frame for Short term goals 14 DAYS   Short term goal 1 TRANSFERS SBA   Short term goal 2 AMB 20' RW CGA   Conditions Requiring Skilled Therapeutic Intervention   Body Structures, Functions, Activity Limitations Requiring Skilled Therapeutic Intervention Decreased functional mobility ; Decreased ADL status; Decreased balance   Assessment Pt able to sit EOB and stand EOB. Slightly unsteady but able to take side step with support of bed rail. Pt assisted back to bed with all needs in reach. Activity Tolerance   Activity Tolerance Patient tolerated treatment well   PT Plan of Care   Thursday X   Safety Devices   Type of Devices Bed alarm in place;Call light within reach; Left in bed         Electronically signed by Jessenia Jha PTA on 6/2/2022 at 12:58 PM

## 2022-06-02 NOTE — PROGRESS NOTES
Visited with pt to provide spiritual care. This  asked the pt if he would like a prayer he said, \"no, I already prayed. \" Provided spiritual care with sustaining presence, and support. Pt expressed gratitude for spiritual care.     Electronically signed by Whitney Patiño on 6/2/2022 at 11:53 AM

## 2022-06-22 NOTE — PROGRESS NOTES
Physician Progress Note      Laura Pinto  [facility] #:                  [id number]  :                       1955  ADMIT DATE:       2022 7:20 AM  DISCH DATE:        2022 4:49 PM  RESPONDING  PROVIDER #:        Herrera Edward          QUERY TEXT:    Patient admitted with COPD. Noted documentation of   Motor: Weakness   (Difffuse and profound. right sided weakness secondary to CVA) present in H&P    and No weakness under neurological in H&P. If possible, please document in progress notes and discharge summary if you   are evaluating and /or treating any of the following: The medical record reflects the following:  Risk Factors: Previous CVA  Clinical Indicators: Unsteady and loss of balance documented by rehab,  Treatment: PT/OT eval and treat  Tom Roth, BSN  [phone]  Options provided:  -- Motor: Weakness (Difffuse and profound. right sided weakness secondary to   CVA) present. confirmed and No weakness  ruled out  -- No weakness confirmed and  Motor: Weakness (Difffuse and profound. right   sided weakness secondary to CVA) present.  ruled out  -- Other - I will add my own diagnosis  -- Disagree - Not applicable / Not valid  -- Disagree - Clinically unable to determine / Unknown  -- Refer to Clinical Documentation Reviewer    PROVIDER RESPONSE TEXT:    Mild residual right sided weakness from prior CVA    Query created by: Tracy Saucedo on 2022 4:34 PM      Electronically signed by:  Herrera Edward 2022 6:27 PM

## 2022-06-29 PROBLEM — R77.8 ELEVATED TROPONIN: Status: RESOLVED | Noted: 2020-03-07 | Resolved: 2022-06-29

## 2022-06-29 PROBLEM — R79.89 ELEVATED TROPONIN: Status: RESOLVED | Noted: 2020-03-07 | Resolved: 2022-06-29

## 2022-07-20 ENCOUNTER — TELEPHONE (OUTPATIENT)
Dept: CARDIOLOGY CLINIC | Age: 67
End: 2022-07-20

## 2022-07-21 ENCOUNTER — TELEPHONE (OUTPATIENT)
Dept: CARDIOLOGY CLINIC | Age: 67
End: 2022-07-21

## 2023-08-14 ENCOUNTER — APPOINTMENT (OUTPATIENT)
Dept: CT IMAGING | Age: 68
End: 2023-08-14
Payer: MEDICARE

## 2023-08-14 ENCOUNTER — HOSPITAL ENCOUNTER (EMERGENCY)
Age: 68
Discharge: HOME | End: 2023-08-14
Attending: EMERGENCY MEDICINE
Payer: MEDICARE

## 2023-08-14 ENCOUNTER — APPOINTMENT (OUTPATIENT)
Dept: GENERAL RADIOLOGY | Age: 68
End: 2023-08-14
Payer: MEDICARE

## 2023-08-14 VITALS
DIASTOLIC BLOOD PRESSURE: 84 MMHG | RESPIRATION RATE: 19 BRPM | SYSTOLIC BLOOD PRESSURE: 101 MMHG | OXYGEN SATURATION: 94 % | TEMPERATURE: 98.1 F | HEART RATE: 58 BPM

## 2023-08-14 DIAGNOSIS — Z86.73 HISTORY OF CVA IN ADULTHOOD: ICD-10-CM

## 2023-08-14 DIAGNOSIS — Z86.69 HISTORY OF SEIZURE DISORDER: ICD-10-CM

## 2023-08-14 DIAGNOSIS — R41.82 ALTERED MENTAL STATUS, UNSPECIFIED ALTERED MENTAL STATUS TYPE: Primary | ICD-10-CM

## 2023-08-14 LAB
ALBUMIN SERPL-MCNC: 3.3 G/DL (ref 3.5–5.2)
ALP SERPL-CCNC: 117 U/L (ref 40–130)
ALT SERPL-CCNC: 58 U/L (ref 5–41)
AMMONIA PLAS-SCNC: 36 UMOL/L (ref 16–60)
ANION GAP SERPL CALCULATED.3IONS-SCNC: 9 MMOL/L (ref 7–19)
AST SERPL-CCNC: 24 U/L (ref 5–40)
BASE EXCESS VENOUS: 2 MMOL/L
BASOPHILS # BLD: 0 K/UL (ref 0–0.2)
BASOPHILS NFR BLD: 0.3 % (ref 0–1)
BILIRUB SERPL-MCNC: 0.3 MG/DL (ref 0.2–1.2)
BUN SERPL-MCNC: 8 MG/DL (ref 8–23)
CALCIUM SERPL-MCNC: 8.3 MG/DL (ref 8.8–10.2)
CARBOXYHEMOGLOBIN: 2.1 %
CHLORIDE SERPL-SCNC: 106 MMOL/L (ref 98–111)
CO2 SERPL-SCNC: 22 MMOL/L (ref 22–29)
CREAT SERPL-MCNC: 1 MG/DL (ref 0.5–1.2)
EOSINOPHIL # BLD: 0.7 K/UL (ref 0–0.6)
EOSINOPHIL NFR BLD: 5.4 % (ref 0–5)
ERYTHROCYTE [DISTWIDTH] IN BLOOD BY AUTOMATED COUNT: 14.2 % (ref 11.5–14.5)
ETHANOLAMINE SERPL-MCNC: <10 MG/DL (ref 0–0.08)
GLUCOSE SERPL-MCNC: 127 MG/DL (ref 74–109)
HCO3 VENOUS: 26 MMOL/L (ref 23–29)
HCT VFR BLD AUTO: 44.4 % (ref 42–52)
HGB BLD-MCNC: 14.6 G/DL (ref 14–18)
IMM GRANULOCYTES # BLD: 0.1 K/UL
INR PPP: 1.05 (ref 0.88–1.18)
LYMPHOCYTES # BLD: 2.8 K/UL (ref 1.1–4.5)
LYMPHOCYTES NFR BLD: 21.7 % (ref 20–40)
MCH RBC QN AUTO: 29.1 PG (ref 27–31)
MCHC RBC AUTO-ENTMCNC: 32.9 G/DL (ref 33–37)
MCV RBC AUTO: 88.4 FL (ref 80–94)
MONOCYTES # BLD: 1.6 K/UL (ref 0–0.9)
MONOCYTES NFR BLD: 12.3 % (ref 0–10)
NEUTROPHILS # BLD: 7.8 K/UL (ref 1.5–7.5)
NEUTS SEG NFR BLD: 59.6 % (ref 50–65)
O2 CONTENT, VEN: 18 ML/DL
O2 SAT, VEN: 89 %
PCO2, VEN: 37 MMHG (ref 40–50)
PH VENOUS: 7.45 (ref 7.35–7.45)
PLATELET # BLD AUTO: 241 K/UL (ref 130–400)
PMV BLD AUTO: 10 FL (ref 9.4–12.4)
PO2, VEN: 57 MMHG
POTASSIUM SERPL-SCNC: 3.6 MMOL/L (ref 3.5–5)
PROT SERPL-MCNC: 6.2 G/DL (ref 6.6–8.7)
PROTHROMBIN TIME: 13.4 SEC (ref 12–14.6)
RBC # BLD AUTO: 5.02 M/UL (ref 4.7–6.1)
SODIUM SERPL-SCNC: 137 MMOL/L (ref 136–145)
T4 FREE SERPL-MCNC: 0.56 NG/DL (ref 0.93–1.7)
TSH SERPL DL<=0.005 MIU/L-ACNC: 8.03 UIU/ML (ref 0.35–5.5)
WBC # BLD AUTO: 13 K/UL (ref 4.8–10.8)

## 2023-08-14 PROCEDURE — 93005 ELECTROCARDIOGRAM TRACING: CPT | Performed by: EMERGENCY MEDICINE

## 2023-08-14 PROCEDURE — 80053 COMPREHEN METABOLIC PANEL: CPT

## 2023-08-14 PROCEDURE — 85610 PROTHROMBIN TIME: CPT

## 2023-08-14 PROCEDURE — 84439 ASSAY OF FREE THYROXINE: CPT

## 2023-08-14 PROCEDURE — 82800 BLOOD PH: CPT

## 2023-08-14 PROCEDURE — 99285 EMERGENCY DEPT VISIT HI MDM: CPT

## 2023-08-14 PROCEDURE — 82803 BLOOD GASES ANY COMBINATION: CPT

## 2023-08-14 PROCEDURE — 85025 COMPLETE CBC W/AUTO DIFF WBC: CPT

## 2023-08-14 PROCEDURE — 84443 ASSAY THYROID STIM HORMONE: CPT

## 2023-08-14 PROCEDURE — 70498 CT ANGIOGRAPHY NECK: CPT

## 2023-08-14 PROCEDURE — 82077 ASSAY SPEC XCP UR&BREATH IA: CPT

## 2023-08-14 PROCEDURE — 82140 ASSAY OF AMMONIA: CPT

## 2023-08-14 PROCEDURE — 6360000004 HC RX CONTRAST MEDICATION: Performed by: EMERGENCY MEDICINE

## 2023-08-14 PROCEDURE — 70450 CT HEAD/BRAIN W/O DYE: CPT

## 2023-08-14 PROCEDURE — 71045 X-RAY EXAM CHEST 1 VIEW: CPT

## 2023-08-14 PROCEDURE — 36415 COLL VENOUS BLD VENIPUNCTURE: CPT

## 2023-08-14 RX ADMIN — IOPAMIDOL 90 ML: 755 INJECTION, SOLUTION INTRAVENOUS at 19:37

## 2023-08-14 ASSESSMENT — PAIN SCALES - WONG BAKER: WONGBAKER_NUMERICALRESPONSE: 0

## 2023-08-14 ASSESSMENT — LIFESTYLE VARIABLES: HOW OFTEN DO YOU HAVE A DRINK CONTAINING ALCOHOL: NEVER

## 2023-08-14 ASSESSMENT — PAIN - FUNCTIONAL ASSESSMENT: PAIN_FUNCTIONAL_ASSESSMENT: WONG-BAKER FACES

## 2023-08-15 LAB
EKG P AXIS: 60 DEGREES
EKG P-R INTERVAL: 144 MS
EKG Q-T INTERVAL: 434 MS
EKG QRS DURATION: 92 MS
EKG QTC CALCULATION (BAZETT): 431 MS
EKG T AXIS: 65 DEGREES

## 2023-08-15 PROCEDURE — 93010 ELECTROCARDIOGRAM REPORT: CPT | Performed by: INTERNAL MEDICINE

## 2023-08-15 NOTE — PROGRESS NOTES
Report to Xochitl Stafford at [facility]     Electronically signed by Marianne Whitney RN on 8/14/2023 at 9:36 PM

## 2023-08-15 NOTE — ED PROVIDER NOTES
painful stimuli. He does move his eyes back-and-forth and occasionally seems to track with his eyes but otherwise very distractible. No verbal responses. No obvious focal or lateralizing neurologic deficits, however exam severely limited [MARCELO]   1910 EKG shows sinus rhythm with a rate of 57. No findings of acute ischemia or infarction. Occasional PAC. Otherwise normal EKG [MARCELO]   2024 CTA HEAD NECK W CONTRAST [MARCELO]   2037 CT of the head shows no acute intracranial pathology. Shows evidence of right cerebellar infarct in the past.  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 In comparison to prior CTA from 2020, this appears similar and unchanged according to radiology report [MARCELO]   2059 Patient reevaluated this time and is awake, conversant. Says he feels back to normal now. Says he is not sure what happened. Asked patient if he feels like he might of had a seizure and he says he does not know. After reassuring evaluation here thus far and return to baseline mental status, feel patient is safe for discharge back to nursing facility at this time. Patient agreeable [MARCELO]      ED Course User Index  [MARCELO] Glenn Saini., MD     Evaluation and work-up here revealed no signs of emergent or life-threatening pathology that would necessitate admission for further work-up or management at this time. Patient is felt to be stable for discharge home with return precautions for worsening of the condition or development of new concerning symptoms. Patient was encouraged to follow-up with their primary care doctor in the appropriate timeframe. Necessary prescriptions and information have been provided for treatment at home. Patient voices understanding and agreement with the plan. CONSULTS:  None    PROCEDURES:  Unless otherwise notedbelow, none     Procedures      FINAL IMPRESSION     1. Altered mental status, unspecified altered mental status type    2. History of seizure disorder    3.  History of CVA in

## 2023-08-15 NOTE — ED NOTES
Called [facility]. EMS told them patient needed to be transported back to [facility] [address]. They said it might be a little bit but they could do it.      Nader Rangel  08/14/23 6741

## 2024-02-12 NOTE — PROGRESS NOTES
Speech Therapy  TIME   Time In: 11:00  Time Out: 12:00  Minutes: 60     [x]? Daily Note  []? Progress Note     Date: 20  Patient Name: Merwyn Closs        MRN: [id number]    Account #: [de-identified]  : [birth year]  (66 y.o.)  Gender: Male   Primary Provider: Loree Rose MD  Precautions: Fall  Swallowing Status/Diet: Regular consistencies with thin liquids.      Subjective:   Patient alert, in bed, upon entry. No family/support group members present. Patient participated with all therapeutic activities.     Objective:  SLP noted, at start of treatment session, that patient presents with stuttering and dysarthria primarily characterized by low volume, fast speech rate, and decreased labial and lingual movements. Dysarthria negatively impacted communication exchange and SLP ranked functional intelligibility of speech for unfamiliar listeners at just 50-60% without background noise present. Subsequently transitioned to the following lingual exercises:  a. Have patient open mouth wide, gently but firming tap finger/instrument along the side of the tongue (towards midline) 3 xs and quickly drop finger/instrument along gum line or just outside of lip. Tongue should follow pressure. If not cue patient to find finger/instrument. Repeat same exercise in reps of 3 at lower gum line, middle of cheek and upper gum on both sides of mouth.  i. Patient was 3/3, bilaterally, at the upper gum line, at the middle of the cheek, and at the lower gum line. b. Have patient extend tongue out, run instrument down middle of tongue with gentle but firm pressure. Tongue should stabilize with minimal rolling/tremors noted. As you reach the tongue tip, you should see the tongue extend and point. Repeat 3 xs.  i. Patient was 3/3.   c. Same as before, have patient extend tongue out, starting at tongue tip brush up towards midline of tongue blade. Tongue tip should curl in response and you should see the natural bowl reflex appear.   i. Patient [EKG obtained to assist in diagnosis and management of assessed problem(s)] : EKG obtained to assist in diagnosis and management of assessed problem(s) [FreeTextEntry1] : Pt is a 63 y/o M PMH non-obstructive CAD, symptomatic PVCs, family hx: father CABG 60's, mother CAD/PCI 40's p/w palpitations and IVAN  palps/PVCs: currently taking Mg supplement repeat TTE and CCTA  Advised pt to go to the nearest ED if symptoms persist or worsen   Family History: We discussed the importance of aggressive risk factor modification, including continuing medications as directed, following a healthy diet of unprocessed, low saturated fat and carbohydrate diet as close to a plant based or Mediterranean as possible, regular exercise at least 30 minutes of cardiovascular exercise daily.  Also advised to report any symptoms immediately.   TTE 06/2020 EF 55-60%, mild MR c/w statin for a goal LDL 70 c/w ASA 81mg qd No need for ischemic evaluation at this time - will continue to re-evaluate recent labs reviewed  The described plan was discussed with the pt.  All questions and concerns were addressed to the best of my knowledge.  sentence level with provision of mod cues/prompts. Added SHORT TERM GOAL #7: Patient will complete oral motor exercises, to promote increased labial and lingual movements for increased clarity of speech, with provision of min cues/prompts.      Swallowing Short Term Goals  Short-term Goals  Goal 1: The patient will tolerate a regular diet with thin liquids with minimal overt s/s of aspiration. Goal 2: The patient will complete oral care at least two times daily to prevent aspiration of oral bacteria. Goal 3: SLP and staff will monitor safety with oral intake and monitor increased congestion, overt s/s of aspiration, elevated temp and RLL infiltrates on CXR.     Comprehension: 5 - Patient understands basic needs (hungry/hot/pain)  Expression: 5 - Expresses basic ideas/needs only (hungry/hot/pain)  Social Interaction: 5 - Patient is appropriate with supervision/cues  Problem Solvin - Patient solves simple/routine tasks 75-90%+   Memory: 4 - Patient remembers 75-90%+ of the time     ASSESSMENT:  Assessment: [x]? Progressing towards goals           []? Not Progressing towards goals     Patient Tolerance of Treatment:       [x]? Tolerated well          []? Tolerated fair           []? Required rest breaks          []? Fatigued     Education:  Learner:  [x]? Patient          []? Significant Other          []? Other  Education provided regarding:  [x]? Goals and POC                               []?Diet and swallowing precautions                  [x]? Home Exercise Program                 []?Progress and/or discharge information  Method of Education:  [x]? Discussion          [x]? Demonstration          []? Handout          []? Other  Evaluation of Education:   []? Verbalized understanding                           []?Demonstrates without assistance  [x]? Demonstrates with assistance                    [x]? Needs further instruction                               []?No evidence of learning                              []?No family present                    Plan:   [x]? Continue with current plan of care                []?Modify current plan of care as follows:               []?Discharge patient                          Discharge Location:                          Services/Supervision Recommended:       [x]? Patient continues to require treatment by a licensed therapist to address functional deficits as outlined in the established plan of care.     Electronically signed by STEVE Banegas on 1/25/2020 at 11:52 AM

## 2024-06-11 ENCOUNTER — HOSPITAL ENCOUNTER (INPATIENT)
Age: 69
LOS: 2 days | Discharge: NURSING, REHAB OR LONG-TERM CARE | DRG: 313 | End: 2024-06-15
Attending: INTERNAL MEDICINE | Admitting: HOSPITALIST
Payer: MEDICARE

## 2024-06-11 ENCOUNTER — APPOINTMENT (OUTPATIENT)
Dept: GENERAL RADIOLOGY | Age: 69
DRG: 313 | End: 2024-06-11
Payer: MEDICARE

## 2024-06-11 DIAGNOSIS — R07.9 CHEST PAIN, UNSPECIFIED TYPE: Primary | ICD-10-CM

## 2024-06-11 LAB
ALBUMIN SERPL-MCNC: 4.2 G/DL (ref 3.5–5.2)
ALP SERPL-CCNC: 44 U/L (ref 40–130)
ALT SERPL-CCNC: 19 U/L (ref 5–41)
ANION GAP SERPL CALCULATED.3IONS-SCNC: 13 MMOL/L (ref 7–19)
AST SERPL-CCNC: 31 U/L (ref 5–40)
B PARAP IS1001 DNA NPH QL NAA+NON-PROBE: NOT DETECTED
B PERT.PT PRMT NPH QL NAA+NON-PROBE: NOT DETECTED
BASOPHILS # BLD: 0.1 K/UL (ref 0–0.2)
BASOPHILS NFR BLD: 1.1 % (ref 0–1)
BILIRUB SERPL-MCNC: 0.6 MG/DL (ref 0.2–1.2)
BUN SERPL-MCNC: 16 MG/DL (ref 8–23)
C PNEUM DNA NPH QL NAA+NON-PROBE: NOT DETECTED
CALCIUM SERPL-MCNC: 9 MG/DL (ref 8.8–10.2)
CHLORIDE SERPL-SCNC: 102 MMOL/L (ref 98–111)
CO2 SERPL-SCNC: 22 MMOL/L (ref 22–29)
CREAT SERPL-MCNC: 1.4 MG/DL (ref 0.5–1.2)
EOSINOPHIL # BLD: 0.2 K/UL (ref 0–0.6)
EOSINOPHIL NFR BLD: 2.7 % (ref 0–5)
ERYTHROCYTE [DISTWIDTH] IN BLOOD BY AUTOMATED COUNT: 14.3 % (ref 11.5–14.5)
FLUAV RNA NPH QL NAA+NON-PROBE: NOT DETECTED
FLUBV RNA NPH QL NAA+NON-PROBE: NOT DETECTED
GLUCOSE BLD-MCNC: 99 MG/DL (ref 70–99)
GLUCOSE SERPL-MCNC: 84 MG/DL (ref 74–109)
HADV DNA NPH QL NAA+NON-PROBE: NOT DETECTED
HBA1C MFR BLD: 5.7 % (ref 4–6)
HCOV 229E RNA NPH QL NAA+NON-PROBE: NOT DETECTED
HCOV HKU1 RNA NPH QL NAA+NON-PROBE: NOT DETECTED
HCOV NL63 RNA NPH QL NAA+NON-PROBE: NOT DETECTED
HCOV OC43 RNA NPH QL NAA+NON-PROBE: NOT DETECTED
HCT VFR BLD AUTO: 46.1 % (ref 42–52)
HGB BLD-MCNC: 15.2 G/DL (ref 14–18)
HMPV RNA NPH QL NAA+NON-PROBE: NOT DETECTED
HPIV1 RNA NPH QL NAA+NON-PROBE: NOT DETECTED
HPIV2 RNA NPH QL NAA+NON-PROBE: NOT DETECTED
HPIV3 RNA NPH QL NAA+NON-PROBE: NOT DETECTED
HPIV4 RNA NPH QL NAA+NON-PROBE: NOT DETECTED
IMM GRANULOCYTES # BLD: 0.1 K/UL
LIPASE SERPL-CCNC: 47 U/L (ref 13–60)
LYMPHOCYTES # BLD: 2.2 K/UL (ref 1.1–4.5)
LYMPHOCYTES NFR BLD: 25.4 % (ref 20–40)
M PNEUMO DNA NPH QL NAA+NON-PROBE: NOT DETECTED
MCH RBC QN AUTO: 31 PG (ref 27–31)
MCHC RBC AUTO-ENTMCNC: 33 G/DL (ref 33–37)
MCV RBC AUTO: 94.1 FL (ref 80–94)
MONOCYTES # BLD: 0.7 K/UL (ref 0–0.9)
MONOCYTES NFR BLD: 7.7 % (ref 0–10)
NEUTROPHILS # BLD: 5.3 K/UL (ref 1.5–7.5)
NEUTS SEG NFR BLD: 62.5 % (ref 50–65)
PERFORMED ON: NORMAL
PLATELET # BLD AUTO: 201 K/UL (ref 130–400)
PMV BLD AUTO: 10 FL (ref 9.4–12.4)
POTASSIUM SERPL-SCNC: 3.9 MMOL/L (ref 3.5–5)
PROT SERPL-MCNC: 7.7 G/DL (ref 6.6–8.7)
RBC # BLD AUTO: 4.9 M/UL (ref 4.7–6.1)
RSV RNA NPH QL NAA+NON-PROBE: NOT DETECTED
RV+EV RNA NPH QL NAA+NON-PROBE: NOT DETECTED
SARS-COV-2 RNA NPH QL NAA+NON-PROBE: NOT DETECTED
SODIUM SERPL-SCNC: 137 MMOL/L (ref 136–145)
TROPONIN, HIGH SENSITIVITY: 43 NG/L (ref 0–22)
TROPONIN, HIGH SENSITIVITY: 49 NG/L (ref 0–22)
TROPONIN, HIGH SENSITIVITY: 51 NG/L (ref 0–22)
TSH SERPL DL<=0.005 MIU/L-ACNC: 215.4 UIU/ML (ref 0.27–4.2)
WBC # BLD AUTO: 8.5 K/UL (ref 4.8–10.8)

## 2024-06-11 PROCEDURE — 84484 ASSAY OF TROPONIN QUANT: CPT

## 2024-06-11 PROCEDURE — 94640 AIRWAY INHALATION TREATMENT: CPT

## 2024-06-11 PROCEDURE — 2700000000 HC OXYGEN THERAPY PER DAY

## 2024-06-11 PROCEDURE — 80053 COMPREHEN METABOLIC PANEL: CPT

## 2024-06-11 PROCEDURE — 84443 ASSAY THYROID STIM HORMONE: CPT

## 2024-06-11 PROCEDURE — 83690 ASSAY OF LIPASE: CPT

## 2024-06-11 PROCEDURE — G0378 HOSPITAL OBSERVATION PER HR: HCPCS

## 2024-06-11 PROCEDURE — 83036 HEMOGLOBIN GLYCOSYLATED A1C: CPT

## 2024-06-11 PROCEDURE — 2580000003 HC RX 258: Performed by: NURSE PRACTITIONER

## 2024-06-11 PROCEDURE — 96374 THER/PROPH/DIAG INJ IV PUSH: CPT

## 2024-06-11 PROCEDURE — 6370000000 HC RX 637 (ALT 250 FOR IP)

## 2024-06-11 PROCEDURE — 85025 COMPLETE CBC W/AUTO DIFF WBC: CPT

## 2024-06-11 PROCEDURE — 36415 COLL VENOUS BLD VENIPUNCTURE: CPT

## 2024-06-11 PROCEDURE — 2580000003 HC RX 258

## 2024-06-11 PROCEDURE — 0202U NFCT DS 22 TRGT SARS-COV-2: CPT

## 2024-06-11 PROCEDURE — 96361 HYDRATE IV INFUSION ADD-ON: CPT

## 2024-06-11 PROCEDURE — 93005 ELECTROCARDIOGRAM TRACING: CPT | Performed by: STUDENT IN AN ORGANIZED HEALTH CARE EDUCATION/TRAINING PROGRAM

## 2024-06-11 PROCEDURE — 6360000002 HC RX W HCPCS

## 2024-06-11 PROCEDURE — 94150 VITAL CAPACITY TEST: CPT

## 2024-06-11 PROCEDURE — 99285 EMERGENCY DEPT VISIT HI MDM: CPT

## 2024-06-11 PROCEDURE — 71045 X-RAY EXAM CHEST 1 VIEW: CPT

## 2024-06-11 PROCEDURE — 6370000000 HC RX 637 (ALT 250 FOR IP): Performed by: NURSE PRACTITIONER

## 2024-06-11 PROCEDURE — 82962 GLUCOSE BLOOD TEST: CPT

## 2024-06-11 PROCEDURE — 94760 N-INVAS EAR/PLS OXIMETRY 1: CPT

## 2024-06-11 RX ORDER — ATORVASTATIN CALCIUM 40 MG/1
80 TABLET, FILM COATED ORAL NIGHTLY
Status: DISCONTINUED | OUTPATIENT
Start: 2024-06-11 | End: 2024-06-15 | Stop reason: HOSPADM

## 2024-06-11 RX ORDER — AMINOPHYLLINE 25 MG/ML
50 INJECTION, SOLUTION INTRAVENOUS PRN
Status: ACTIVE | OUTPATIENT
Start: 2024-06-11 | End: 2024-06-12

## 2024-06-11 RX ORDER — NITROGLYCERIN 0.4 MG/1
0.4 TABLET SUBLINGUAL EVERY 5 MIN PRN
Status: DISCONTINUED | OUTPATIENT
Start: 2024-06-11 | End: 2024-06-15 | Stop reason: HOSPADM

## 2024-06-11 RX ORDER — SODIUM CHLORIDE 9 MG/ML
500 INJECTION, SOLUTION INTRAVENOUS CONTINUOUS PRN
Status: ACTIVE | OUTPATIENT
Start: 2024-06-11 | End: 2024-06-12

## 2024-06-11 RX ORDER — ALBUTEROL SULFATE 90 UG/1
2 AEROSOL, METERED RESPIRATORY (INHALATION) PRN
Status: ACTIVE | OUTPATIENT
Start: 2024-06-11 | End: 2024-06-12

## 2024-06-11 RX ORDER — FLUOXETINE 10 MG/1
20 CAPSULE ORAL DAILY
Status: DISCONTINUED | OUTPATIENT
Start: 2024-06-12 | End: 2024-06-15 | Stop reason: HOSPADM

## 2024-06-11 RX ORDER — ACETAMINOPHEN 650 MG/1
650 SUPPOSITORY RECTAL EVERY 6 HOURS PRN
Status: DISCONTINUED | OUTPATIENT
Start: 2024-06-11 | End: 2024-06-15 | Stop reason: HOSPADM

## 2024-06-11 RX ORDER — ONDANSETRON 4 MG/1
4 TABLET, ORALLY DISINTEGRATING ORAL EVERY 8 HOURS PRN
Status: DISCONTINUED | OUTPATIENT
Start: 2024-06-11 | End: 2024-06-15 | Stop reason: HOSPADM

## 2024-06-11 RX ORDER — BUDESONIDE AND FORMOTEROL FUMARATE DIHYDRATE 160; 4.5 UG/1; UG/1
2 AEROSOL RESPIRATORY (INHALATION) 2 TIMES DAILY
Status: DISCONTINUED | OUTPATIENT
Start: 2024-06-11 | End: 2024-06-15 | Stop reason: HOSPADM

## 2024-06-11 RX ORDER — SODIUM CHLORIDE 0.9 % (FLUSH) 0.9 %
5-40 SYRINGE (ML) INJECTION EVERY 12 HOURS SCHEDULED
Status: DISCONTINUED | OUTPATIENT
Start: 2024-06-11 | End: 2024-06-15 | Stop reason: HOSPADM

## 2024-06-11 RX ORDER — IPRATROPIUM BROMIDE AND ALBUTEROL SULFATE 2.5; .5 MG/3ML; MG/3ML
1 SOLUTION RESPIRATORY (INHALATION)
Status: DISCONTINUED | OUTPATIENT
Start: 2024-06-11 | End: 2024-06-15 | Stop reason: HOSPADM

## 2024-06-11 RX ORDER — 0.9 % SODIUM CHLORIDE 0.9 %
500 INTRAVENOUS SOLUTION INTRAVENOUS ONCE
Status: COMPLETED | OUTPATIENT
Start: 2024-06-11 | End: 2024-06-11

## 2024-06-11 RX ORDER — ACETAMINOPHEN 325 MG/1
650 TABLET ORAL EVERY 6 HOURS PRN
Status: DISCONTINUED | OUTPATIENT
Start: 2024-06-11 | End: 2024-06-15 | Stop reason: HOSPADM

## 2024-06-11 RX ORDER — SODIUM CHLORIDE 0.9 % (FLUSH) 0.9 %
5-40 SYRINGE (ML) INJECTION PRN
Status: DISCONTINUED | OUTPATIENT
Start: 2024-06-11 | End: 2024-06-15 | Stop reason: HOSPADM

## 2024-06-11 RX ORDER — POTASSIUM CHLORIDE 20 MEQ/1
20 TABLET, EXTENDED RELEASE ORAL DAILY
Status: DISCONTINUED | OUTPATIENT
Start: 2024-06-12 | End: 2024-06-15 | Stop reason: HOSPADM

## 2024-06-11 RX ORDER — ONDANSETRON 2 MG/ML
4 INJECTION INTRAMUSCULAR; INTRAVENOUS EVERY 6 HOURS PRN
Status: DISCONTINUED | OUTPATIENT
Start: 2024-06-11 | End: 2024-06-15 | Stop reason: HOSPADM

## 2024-06-11 RX ORDER — MAGNESIUM SULFATE IN WATER 40 MG/ML
2000 INJECTION, SOLUTION INTRAVENOUS PRN
Status: DISCONTINUED | OUTPATIENT
Start: 2024-06-11 | End: 2024-06-15 | Stop reason: HOSPADM

## 2024-06-11 RX ORDER — ERGOCALCIFEROL 1.25 MG/1
50000 CAPSULE ORAL WEEKLY
Status: DISCONTINUED | OUTPATIENT
Start: 2024-06-11 | End: 2024-06-15 | Stop reason: HOSPADM

## 2024-06-11 RX ORDER — SODIUM CHLORIDE 0.9 % (FLUSH) 0.9 %
5-40 SYRINGE (ML) INJECTION PRN
Status: ACTIVE | OUTPATIENT
Start: 2024-06-11 | End: 2024-06-12

## 2024-06-11 RX ORDER — ASPIRIN 81 MG/1
81 TABLET, CHEWABLE ORAL DAILY
Status: DISCONTINUED | OUTPATIENT
Start: 2024-06-12 | End: 2024-06-15 | Stop reason: HOSPADM

## 2024-06-11 RX ORDER — PANTOPRAZOLE SODIUM 40 MG/1
40 TABLET, DELAYED RELEASE ORAL
Status: DISCONTINUED | OUTPATIENT
Start: 2024-06-12 | End: 2024-06-15 | Stop reason: HOSPADM

## 2024-06-11 RX ORDER — ENOXAPARIN SODIUM 100 MG/ML
40 INJECTION SUBCUTANEOUS DAILY
Status: DISCONTINUED | OUTPATIENT
Start: 2024-06-12 | End: 2024-06-12

## 2024-06-11 RX ORDER — ASPIRIN 325 MG
325 TABLET ORAL ONCE
Status: COMPLETED | OUTPATIENT
Start: 2024-06-11 | End: 2024-06-11

## 2024-06-11 RX ORDER — ATROPINE SULFATE 0.1 MG/ML
0.5 INJECTION INTRAVENOUS EVERY 5 MIN PRN
Status: ACTIVE | OUTPATIENT
Start: 2024-06-11 | End: 2024-06-12

## 2024-06-11 RX ORDER — TAMSULOSIN HYDROCHLORIDE 0.4 MG/1
0.8 CAPSULE ORAL NIGHTLY
Status: DISCONTINUED | OUTPATIENT
Start: 2024-06-11 | End: 2024-06-15 | Stop reason: HOSPADM

## 2024-06-11 RX ORDER — METOPROLOL TARTRATE 1 MG/ML
5 INJECTION, SOLUTION INTRAVENOUS EVERY 5 MIN PRN
Status: ACTIVE | OUTPATIENT
Start: 2024-06-11 | End: 2024-06-12

## 2024-06-11 RX ORDER — POTASSIUM CHLORIDE 7.45 MG/ML
10 INJECTION INTRAVENOUS PRN
Status: DISCONTINUED | OUTPATIENT
Start: 2024-06-11 | End: 2024-06-15 | Stop reason: HOSPADM

## 2024-06-11 RX ORDER — ALBUTEROL SULFATE 2.5 MG/3ML
2.5 SOLUTION RESPIRATORY (INHALATION) EVERY 6 HOURS PRN
Status: DISCONTINUED | OUTPATIENT
Start: 2024-06-11 | End: 2024-06-15 | Stop reason: HOSPADM

## 2024-06-11 RX ORDER — REGADENOSON 0.08 MG/ML
0.4 INJECTION, SOLUTION INTRAVENOUS
Status: COMPLETED | OUTPATIENT
Start: 2024-06-11 | End: 2024-06-14

## 2024-06-11 RX ORDER — NITROGLYCERIN 0.4 MG/1
0.4 TABLET SUBLINGUAL EVERY 5 MIN PRN
Status: ACTIVE | OUTPATIENT
Start: 2024-06-11 | End: 2024-06-12

## 2024-06-11 RX ORDER — AMLODIPINE BESYLATE 10 MG/1
10 TABLET ORAL DAILY
Status: DISCONTINUED | OUTPATIENT
Start: 2024-06-12 | End: 2024-06-15 | Stop reason: HOSPADM

## 2024-06-11 RX ORDER — SODIUM CHLORIDE 9 MG/ML
INJECTION, SOLUTION INTRAVENOUS CONTINUOUS
Status: ACTIVE | OUTPATIENT
Start: 2024-06-11 | End: 2024-06-13

## 2024-06-11 RX ORDER — POTASSIUM CHLORIDE 20 MEQ/1
40 TABLET, EXTENDED RELEASE ORAL PRN
Status: DISCONTINUED | OUTPATIENT
Start: 2024-06-11 | End: 2024-06-15 | Stop reason: HOSPADM

## 2024-06-11 RX ORDER — SODIUM CHLORIDE 9 MG/ML
INJECTION, SOLUTION INTRAVENOUS PRN
Status: DISCONTINUED | OUTPATIENT
Start: 2024-06-11 | End: 2024-06-15 | Stop reason: HOSPADM

## 2024-06-11 RX ORDER — POLYETHYLENE GLYCOL 3350 17 G/17G
17 POWDER, FOR SOLUTION ORAL DAILY PRN
Status: DISCONTINUED | OUTPATIENT
Start: 2024-06-11 | End: 2024-06-15 | Stop reason: HOSPADM

## 2024-06-11 RX ADMIN — NITROGLYCERIN 0.4 MG: 0.4 TABLET, ORALLY DISINTEGRATING SUBLINGUAL at 17:13

## 2024-06-11 RX ADMIN — IPRATROPIUM BROMIDE AND ALBUTEROL SULFATE 1 DOSE: 2.5; .5 SOLUTION RESPIRATORY (INHALATION) at 21:50

## 2024-06-11 RX ADMIN — TAMSULOSIN HYDROCHLORIDE 0.8 MG: 0.4 CAPSULE ORAL at 23:00

## 2024-06-11 RX ADMIN — Medication 2 PUFF: at 22:00

## 2024-06-11 RX ADMIN — ATORVASTATIN CALCIUM 80 MG: 40 TABLET, FILM COATED ORAL at 23:00

## 2024-06-11 RX ADMIN — WATER 125 MG: 1 INJECTION INTRAMUSCULAR; INTRAVENOUS; SUBCUTANEOUS at 22:28

## 2024-06-11 RX ADMIN — ASPIRIN 325 MG: 325 TABLET ORAL at 17:14

## 2024-06-11 RX ADMIN — ERGOCALCIFEROL 50000 UNITS: 1.25 CAPSULE ORAL at 23:00

## 2024-06-11 RX ADMIN — SODIUM CHLORIDE, PRESERVATIVE FREE 10 ML: 5 INJECTION INTRAVENOUS at 22:28

## 2024-06-11 RX ADMIN — SODIUM CHLORIDE: 9 INJECTION, SOLUTION INTRAVENOUS at 22:28

## 2024-06-11 RX ADMIN — SODIUM CHLORIDE 500 ML: 9 INJECTION, SOLUTION INTRAVENOUS at 15:48

## 2024-06-11 ASSESSMENT — PAIN DESCRIPTION - DESCRIPTORS: DESCRIPTORS: HEAVINESS

## 2024-06-11 ASSESSMENT — ENCOUNTER SYMPTOMS
SHORTNESS OF BREATH: 1
CHEST TIGHTNESS: 0
COUGH: 1
COLOR CHANGE: 0
WHEEZING: 0
NAUSEA: 0
ABDOMINAL DISTENTION: 0
VOMITING: 0
CONSTIPATION: 0
ABDOMINAL PAIN: 0

## 2024-06-11 ASSESSMENT — HEART SCORE
ECG: NON-SPECIFC REPOLARIZATION DISTURBANCE/LBTB/PM
ECG: NON-SPECIFC REPOLARIZATION DISTURBANCE/LBTB/PM

## 2024-06-11 ASSESSMENT — PAIN DESCRIPTION - PAIN TYPE: TYPE: ACUTE PAIN

## 2024-06-11 ASSESSMENT — PAIN DESCRIPTION - LOCATION: LOCATION: CHEST

## 2024-06-11 ASSESSMENT — PAIN DESCRIPTION - ORIENTATION: ORIENTATION: MID

## 2024-06-11 ASSESSMENT — PAIN - FUNCTIONAL ASSESSMENT: PAIN_FUNCTIONAL_ASSESSMENT: ACTIVITIES ARE NOT PREVENTED

## 2024-06-11 ASSESSMENT — PAIN DESCRIPTION - FREQUENCY: FREQUENCY: INTERMITTENT

## 2024-06-11 ASSESSMENT — PAIN SCALES - GENERAL: PAINLEVEL_OUTOF10: 4

## 2024-06-11 ASSESSMENT — PAIN DESCRIPTION - ONSET: ONSET: ON-GOING

## 2024-06-11 NOTE — ED PROVIDER NOTES
[facility] CARE  eMERGENCY dEPARTMENT eNCOUnter      Pt Name: Khris Sampson  MRN: [id number]  Birthdate 1955  Date of evaluation: 6/11/2024  Provider: KEEGAN Steve    CHIEF COMPLAINT       Chief Complaint   Patient presents with    Chest Pain     X2 days         HISTORY OF PRESENT ILLNESS   (Location/Symptom, Timing/Onset,Context/Setting, Quality, Duration, Modifying Factors, Severity)  Note limiting factors.   Khris Sampson is a 69 y.o. male who presents to the emergency department with chest pain x 2 days.  Pt here [facility] NH [facility].  Pt has had a stroke and has speech that is hard to understand.  Patient had an echo 5/22 that showed a 55 to 60% ejection fraction and mild mitral regurg.  He does have a history of COPD and smoking    The history is provided by the patient, medical records and the EMS personnel.   Chest Pain  Pain location:  Substernal area  Pain severity:  Unable to specify  Onset quality:  Unable to specify  Ineffective treatments:  None tried  Associated symptoms: cough        NursingNotes were reviewed.    REVIEW OF SYSTEMS    (2-9 systems for level 4, 10 or more for level 5)     Review of Systems   Respiratory:  Positive for cough.    Cardiovascular:  Positive for chest pain.       Except as noted above the remainder of the review of systems was reviewed and negative.       PAST MEDICAL HISTORY     Past Medical History:   Diagnosis Date    Arthritis     Asthma     Cerebral artery occlusion with cerebral infarction (HCC)     COPD (chronic obstructive pulmonary disease) (HCC)     Diabetes mellitus (HCC)     Hypertension     Palliative care patient 05/31/2022    Seizures (HCC)     Pt states last seizure was in December 2015    Thyroid disease          SURGICALHISTORY       Past Surgical History:   Procedure Laterality Date    ARM SURGERY Left     EYE SURGERY Right     KNEE SURGERY Right     MANDIBLE SURGERY      MT COLONOSCOPY FLX DX W/COLLJ SPEC WHEN PFRMD N/A 6/26/2018      back: Normal range of motion and neck supple.   Skin:     General: Skin is warm and dry.   Neurological:      Mental Status: He is alert and oriented to person, place, and time.   Psychiatric:         Behavior: Behavior normal.         RESULTS     EKG: All EKG's are interpreted by the Emergency Department Physician who either signs or Co-signsthis chart in the absence of a cardiologist.  55 sinus rhythm with a PAC and nonspecific changes with low voltage read at 1442 by Dr. Carpio  50 sinus rhythm unchanged from previous read at 1655 by Dr. Carpio      RADIOLOGY:   Non-plain filmimages such as CT, Ultrasound and MRI are read by the radiologist. Plain radiographic images are visualized and preliminarily interpreted by the emergency physician with the below findings:      Interpretation per the Radiologist below, if available at the time of this note:    XR CHEST PORTABLE   Final Result       Limited inspiration.       Central vascular congestion versus vascular crowding from technique and inspiration.       If symptoms or clinical concerns persist, consider follow-up two-view chest radiographs.               ______________________________________    Electronically signed by: SHERRY MONTEMAYOR D.O.   Date:     06/11/2024   Time:    14:51             ED BEDSIDEULTRASOUND:   Performed by ED Physician -none    LABS:  Labs Reviewed   CBC WITH AUTO DIFFERENTIAL - Abnormal; Notable for the following components:       Result Value    MCV 94.1 (*)     Basophils % 1.1 (*)     All other components within normal limits   COMPREHENSIVE METABOLIC PANEL - Abnormal; Notable for the following components:    Creatinine 1.4 (*)     Est, Glom Filt Rate 55 (*)     All other components within normal limits   TROPONIN - Abnormal; Notable for the following components:    Troponin, High Sensitivity 51 (*)     All other components within normal limits   TROPONIN - Abnormal; Notable for the following components:    Troponin, High Sensitivity 43

## 2024-06-11 NOTE — H&P
[facility]      Hospitalist - History & Physical      PCP: Nader Gordillo MD    Date of Admission: 6/11/2024    Date of Service: 6/11/2024    Chief Complaint:  chest pain     History Of Present Illness:   The patient is a 69 y.o. male with CVA, COPD, type II DM, HTN, seizure, thyroid disease, complaining of chest pain.  Patient currently at nursing home in [place] after prior CVA and does have garbled speech difficult to understand.  Patient states that he has been dyspneic on exertion, has been midsternal chest pain worse upon inspiration productive sputum green productive cough for the past few days.  Currently endorses pain upon inspiration and palpation. Denies fever, chills, nausea, vomiting, abdominal pain. Workup in ER CXR Limited inspiration,creat 1.4 BUN 16, trop 51 with repeat 43.EKG SB PVC with no acute ischemic changes.  Patient is to be observed by hospitalist service.  Past Medical History:        Diagnosis Date    Arthritis     Asthma     Cerebral artery occlusion with cerebral infarction (HCC)     COPD (chronic obstructive pulmonary disease) (HCC)     Diabetes mellitus (HCC)     Hypertension     Palliative care patient 05/31/2022    Seizures (HCC)     Pt states last seizure was in December 2015    Thyroid disease        Past Surgical History:        Procedure Laterality Date    ARM SURGERY Left     EYE SURGERY Right     KNEE SURGERY Right     MANDIBLE SURGERY      MN COLONOSCOPY FLX DX W/COLLJ SPEC WHEN PFRMD N/A 6/26/2018    Dr Vazquez-amelia,10 yr recall       Home Medications:  Prior to Admission medications    Medication Sig Start Date End Date Taking? Authorizing Provider   methylPREDNISolone (MEDROL DOSEPACK) 4 MG tablet Take by mouth. 6/2/22   Lisandra Lunsford APRN - CNP   levothyroxine (SYNTHROID) 137 MCG tablet Take 1 tablet by mouth Daily 6/3/22   Lisandra Lunsford APRN - CNP   pantoprazole (PROTONIX) 40 MG tablet Take 1 tablet by mouth every morning (before breakfast) 6/3/22    <<----- Click to add NO significant Past Surgical History

## 2024-06-12 ENCOUNTER — HOSPITAL ENCOUNTER (OUTPATIENT)
Age: 69
Setting detail: OBSERVATION
Discharge: HOME | End: 2024-06-14
Payer: MEDICARE

## 2024-06-12 VITALS
DIASTOLIC BLOOD PRESSURE: 86 MMHG | BODY MASS INDEX: 30.48 KG/M2 | SYSTOLIC BLOOD PRESSURE: 112 MMHG | HEIGHT: 73 IN | WEIGHT: 230 LBS

## 2024-06-12 LAB
ANION GAP SERPL CALCULATED.3IONS-SCNC: 14 MMOL/L (ref 7–19)
BUN SERPL-MCNC: 18 MG/DL (ref 8–23)
CALCIUM SERPL-MCNC: 9 MG/DL (ref 8.8–10.2)
CHLORIDE SERPL-SCNC: 100 MMOL/L (ref 98–111)
CHOLEST SERPL-MCNC: 348 MG/DL (ref 160–199)
CO2 SERPL-SCNC: 22 MMOL/L (ref 22–29)
CREAT SERPL-MCNC: 1.6 MG/DL (ref 0.5–1.2)
ECHO AO ASC DIAM: 3.4 CM
ECHO AO ASCENDING AORTA INDEX: 1.49 CM/M2
ECHO AO ROOT DIAM: 2 CM
ECHO AO ROOT INDEX: 0.88 CM/M2
ECHO AO SINUS VALSALVA DIAM: 3.3 CM
ECHO AO SINUS VALSALVA INDEX: 1.45 CM/M2
ECHO AO ST JNCT DIAM: 2.5 CM
ECHO AV AREA PEAK VELOCITY: 1.7 CM2
ECHO AV AREA VTI: 1.7 CM2
ECHO AV AREA/BSA PEAK VELOCITY: 0.7 CM2/M2
ECHO AV AREA/BSA VTI: 0.7 CM2/M2
ECHO AV MEAN GRADIENT: 8 MMHG
ECHO AV MEAN VELOCITY: 1.3 M/S
ECHO AV PEAK GRADIENT: 12 MMHG
ECHO AV PEAK VELOCITY: 1.7 M/S
ECHO AV VELOCITY RATIO: 0.53
ECHO AV VTI: 41.6 CM
ECHO BSA: 2.32 M2
ECHO IVC PROX: 1.2 CM
ECHO LA AREA 2C: 19.5 CM2
ECHO LA AREA 4C: 19.2 CM2
ECHO LA DIAMETER INDEX: 1.23 CM/M2
ECHO LA DIAMETER: 2.8 CM
ECHO LA MAJOR AXIS: 6.8 CM
ECHO LA MINOR AXIS: 6.5 CM
ECHO LA TO AORTIC ROOT RATIO: 1.4
ECHO LA VOL BP: 47 ML (ref 18–58)
ECHO LA VOL MOD A2C: 47 ML (ref 18–58)
ECHO LA VOL MOD A4C: 46 ML (ref 18–58)
ECHO LA VOL/BSA BIPLANE: 21 ML/M2 (ref 16–34)
ECHO LA VOLUME INDEX MOD A2C: 21 ML/M2 (ref 16–34)
ECHO LA VOLUME INDEX MOD A4C: 20 ML/M2 (ref 16–34)
ECHO LV E' LATERAL VELOCITY: 7 CM/S
ECHO LV E' SEPTAL VELOCITY: 9 CM/S
ECHO LV EDV A2C: 107 ML
ECHO LV EDV A4C: 122 ML
ECHO LV EDV INDEX A4C: 54 ML/M2
ECHO LV EDV NDEX A2C: 47 ML/M2
ECHO LV EJECTION FRACTION A2C: 65 %
ECHO LV EJECTION FRACTION A4C: 65 %
ECHO LV EJECTION FRACTION BIPLANE: 65 % (ref 55–100)
ECHO LV ESV A2C: 37 ML
ECHO LV ESV A4C: 43 ML
ECHO LV ESV INDEX A2C: 16 ML/M2
ECHO LV ESV INDEX A4C: 19 ML/M2
ECHO LV FRACTIONAL SHORTENING: 30 % (ref 28–44)
ECHO LV INTERNAL DIMENSION DIASTOLE INDEX: 1.89 CM/M2
ECHO LV INTERNAL DIMENSION DIASTOLIC: 4.3 CM (ref 4.2–5.9)
ECHO LV INTERNAL DIMENSION SYSTOLIC INDEX: 1.32 CM/M2
ECHO LV INTERNAL DIMENSION SYSTOLIC: 3 CM
ECHO LV ISOVOLUMETRIC RELAXATION TIME (IVRT): 116 MS
ECHO LV IVSD: 1.5 CM (ref 0.6–1)
ECHO LV MASS 2D: 271.6 G (ref 88–224)
ECHO LV MASS INDEX 2D: 119.1 G/M2 (ref 49–115)
ECHO LV POSTERIOR WALL DIASTOLIC: 1.6 CM (ref 0.6–1)
ECHO LV RELATIVE WALL THICKNESS RATIO: 0.74
ECHO LVOT AREA: 3.1 CM2
ECHO LVOT AV VTI INDEX: 0.53
ECHO LVOT DIAM: 2 CM
ECHO LVOT MEAN GRADIENT: 2 MMHG
ECHO LVOT PEAK GRADIENT: 3 MMHG
ECHO LVOT PEAK VELOCITY: 0.9 M/S
ECHO LVOT STROKE VOLUME INDEX: 30.6 ML/M2
ECHO LVOT SV: 69.7 ML
ECHO LVOT VTI: 22.2 CM
ECHO MV A VELOCITY: 0.63 M/S
ECHO MV ANNULUS DIAMETER: 3.2 CM
ECHO MV AREA VTI: 4.2 CM2
ECHO MV E DECELERATION TIME (DT): 339 MS
ECHO MV E VELOCITY: 0.56 M/S
ECHO MV E/A RATIO: 0.89
ECHO MV E/E' LATERAL: 8
ECHO MV E/E' RATIO (AVERAGED): 7.11
ECHO MV E/E' SEPTAL: 6.22
ECHO MV LVOT VTI INDEX: 0.75
ECHO MV MAX VELOCITY: 0.7 M/S
ECHO MV MEAN GRADIENT: 1 MMHG
ECHO MV MEAN VELOCITY: 0.4 M/S
ECHO MV PEAK GRADIENT: 2 MMHG
ECHO MV VTI: 16.7 CM
ECHO RA AREA 4C: 12 CM2
ECHO RA END SYSTOLIC VOLUME APICAL 4 CHAMBER INDEX BSA: 10 ML/M2
ECHO RA MAJOR AXIS INDEX: 2.5 CM/M2
ECHO RA MAJOR AXIS: 5.7 CM
ECHO RA MINOR AXIS INDEX: 1.23 CM/M2
ECHO RA MINOR AXIS: 2.8 CM
ECHO RA VOLUME: 23 ML
ECHO RV BASAL DIMENSION: 2.9 CM
ECHO RV INTERNAL DIMENSION: 2.7 CM
ECHO RV LONGITUDINAL DIMENSION: 8.2 CM
ECHO RV MID DIMENSION: 3.2 CM
ECHO RV TAPSE: 1.9 CM (ref 1.7–?)
EKG P AXIS: 52 DEGREES
EKG P-R INTERVAL: 180 MS
EKG Q-T INTERVAL: 418 MS
EKG QRS DURATION: 90 MS
EKG QTC CALCULATION (BAZETT): 411 MS
EKG T AXIS: 109 DEGREES
ERYTHROCYTE [DISTWIDTH] IN BLOOD BY AUTOMATED COUNT: 14.2 % (ref 11.5–14.5)
GLUCOSE SERPL-MCNC: 154 MG/DL (ref 74–109)
HCT VFR BLD AUTO: 44.3 % (ref 42–52)
HDLC SERPL-MCNC: 61 MG/DL (ref 55–121)
HGB BLD-MCNC: 14.6 G/DL (ref 14–18)
LDLC SERPL CALC-MCNC: 254 MG/DL
MCH RBC QN AUTO: 30.2 PG (ref 27–31)
MCHC RBC AUTO-ENTMCNC: 33 G/DL (ref 33–37)
MCV RBC AUTO: 91.7 FL (ref 80–94)
PLATELET # BLD AUTO: 216 K/UL (ref 130–400)
PMV BLD AUTO: 9.5 FL (ref 9.4–12.4)
POTASSIUM SERPL-SCNC: 4.2 MMOL/L (ref 3.5–5)
RBC # BLD AUTO: 4.83 M/UL (ref 4.7–6.1)
SODIUM SERPL-SCNC: 136 MMOL/L (ref 136–145)
TRIGL SERPL-MCNC: 163 MG/DL (ref 0–149)
TROPONIN, HIGH SENSITIVITY: 38 NG/L (ref 0–22)
TROPONIN, HIGH SENSITIVITY: 45 NG/L (ref 0–22)
WBC # BLD AUTO: 10.6 K/UL (ref 4.8–10.8)

## 2024-06-12 PROCEDURE — 6370000000 HC RX 637 (ALT 250 FOR IP)

## 2024-06-12 PROCEDURE — 85027 COMPLETE CBC AUTOMATED: CPT

## 2024-06-12 PROCEDURE — 6360000002 HC RX W HCPCS

## 2024-06-12 PROCEDURE — 96372 THER/PROPH/DIAG INJ SC/IM: CPT

## 2024-06-12 PROCEDURE — 84484 ASSAY OF TROPONIN QUANT: CPT

## 2024-06-12 PROCEDURE — 96361 HYDRATE IV INFUSION ADD-ON: CPT

## 2024-06-12 PROCEDURE — 05HY33Z INSERTION OF INFUSION DEVICE INTO UPPER VEIN, PERCUTANEOUS APPROACH: ICD-10-PCS | Performed by: HOSPITALIST

## 2024-06-12 PROCEDURE — C8929 TTE W OR WO FOL WCON,DOPPLER: HCPCS

## 2024-06-12 PROCEDURE — 6360000004 HC RX CONTRAST MEDICATION

## 2024-06-12 PROCEDURE — 80061 LIPID PANEL: CPT

## 2024-06-12 PROCEDURE — 80048 BASIC METABOLIC PNL TOTAL CA: CPT

## 2024-06-12 PROCEDURE — 94760 N-INVAS EAR/PLS OXIMETRY 1: CPT

## 2024-06-12 PROCEDURE — G0378 HOSPITAL OBSERVATION PER HR: HCPCS

## 2024-06-12 PROCEDURE — 94640 AIRWAY INHALATION TREATMENT: CPT

## 2024-06-12 PROCEDURE — 2700000000 HC OXYGEN THERAPY PER DAY

## 2024-06-12 PROCEDURE — 36415 COLL VENOUS BLD VENIPUNCTURE: CPT

## 2024-06-12 PROCEDURE — 2709999900 HC NON-CHARGEABLE SUPPLY

## 2024-06-12 PROCEDURE — 2580000003 HC RX 258

## 2024-06-12 PROCEDURE — 96376 TX/PRO/DX INJ SAME DRUG ADON: CPT

## 2024-06-12 PROCEDURE — 93010 ELECTROCARDIOGRAM REPORT: CPT | Performed by: INTERNAL MEDICINE

## 2024-06-12 PROCEDURE — 76937 US GUIDE VASCULAR ACCESS: CPT

## 2024-06-12 RX ORDER — ENOXAPARIN SODIUM 100 MG/ML
30 INJECTION SUBCUTANEOUS 2 TIMES DAILY
Status: DISCONTINUED | OUTPATIENT
Start: 2024-06-12 | End: 2024-06-15 | Stop reason: HOSPADM

## 2024-06-12 RX ADMIN — IPRATROPIUM BROMIDE AND ALBUTEROL SULFATE 1 DOSE: 2.5; .5 SOLUTION RESPIRATORY (INHALATION) at 14:25

## 2024-06-12 RX ADMIN — LEVOTHYROXINE SODIUM 137 MCG: 25 TABLET ORAL at 06:29

## 2024-06-12 RX ADMIN — ASPIRIN 81 MG: 81 TABLET, CHEWABLE ORAL at 09:57

## 2024-06-12 RX ADMIN — ENOXAPARIN SODIUM 30 MG: 100 INJECTION SUBCUTANEOUS at 20:13

## 2024-06-12 RX ADMIN — IPRATROPIUM BROMIDE AND ALBUTEROL SULFATE 1 DOSE: 2.5; .5 SOLUTION RESPIRATORY (INHALATION) at 10:20

## 2024-06-12 RX ADMIN — SODIUM CHLORIDE: 9 INJECTION, SOLUTION INTRAVENOUS at 20:27

## 2024-06-12 RX ADMIN — ATORVASTATIN CALCIUM 80 MG: 40 TABLET, FILM COATED ORAL at 20:13

## 2024-06-12 RX ADMIN — Medication 2 PUFF: at 19:12

## 2024-06-12 RX ADMIN — ENOXAPARIN SODIUM 30 MG: 100 INJECTION SUBCUTANEOUS at 10:04

## 2024-06-12 RX ADMIN — FLUOXETINE 20 MG: 10 CAPSULE ORAL at 09:57

## 2024-06-12 RX ADMIN — WATER 40 MG: 1 INJECTION INTRAMUSCULAR; INTRAVENOUS; SUBCUTANEOUS at 20:14

## 2024-06-12 RX ADMIN — WATER 40 MG: 1 INJECTION INTRAMUSCULAR; INTRAVENOUS; SUBCUTANEOUS at 11:21

## 2024-06-12 RX ADMIN — PANTOPRAZOLE SODIUM 40 MG: 40 TABLET, DELAYED RELEASE ORAL at 06:29

## 2024-06-12 RX ADMIN — POTASSIUM CHLORIDE 20 MEQ: 1500 TABLET, EXTENDED RELEASE ORAL at 10:05

## 2024-06-12 RX ADMIN — TAMSULOSIN HYDROCHLORIDE 0.8 MG: 0.4 CAPSULE ORAL at 20:13

## 2024-06-12 RX ADMIN — Medication 2 PUFF: at 06:37

## 2024-06-12 RX ADMIN — POTASSIUM CHLORIDE 40 MEQ: 1500 TABLET, EXTENDED RELEASE ORAL at 09:57

## 2024-06-12 RX ADMIN — ACETAMINOPHEN 650 MG: 325 TABLET ORAL at 14:59

## 2024-06-12 RX ADMIN — SODIUM CHLORIDE, PRESERVATIVE FREE 5 ML: 5 INJECTION INTRAVENOUS at 09:10

## 2024-06-12 RX ADMIN — IPRATROPIUM BROMIDE AND ALBUTEROL SULFATE 1 DOSE: 2.5; .5 SOLUTION RESPIRATORY (INHALATION) at 06:36

## 2024-06-12 RX ADMIN — SODIUM CHLORIDE, PRESERVATIVE FREE 10 ML: 5 INJECTION INTRAVENOUS at 20:14

## 2024-06-12 RX ADMIN — IPRATROPIUM BROMIDE AND ALBUTEROL SULFATE 1 DOSE: 2.5; .5 SOLUTION RESPIRATORY (INHALATION) at 19:11

## 2024-06-12 RX ADMIN — PERFLUTREN 1.5 ML: 6.52 INJECTION, SUSPENSION INTRAVENOUS at 08:15

## 2024-06-12 RX ADMIN — AMLODIPINE BESYLATE 10 MG: 10 TABLET ORAL at 09:57

## 2024-06-12 ASSESSMENT — PAIN DESCRIPTION - LOCATION: LOCATION: HEAD

## 2024-06-12 ASSESSMENT — PAIN SCALES - GENERAL: PAINLEVEL_OUTOF10: 3

## 2024-06-12 NOTE — CONSULTS
Palliative Care:    Pt known to Palliative Care.     68 y/o gentleman who presented with chest pain.  Pt resides in a skilled facility since a prior CVA.    At this time pt says chest pain is improved but still feels some discomfort.    He has had and ECHO done and is pending a Magda scan.  He is alert and oriented, but difficult to understand With slow speech and some repeating, he was able to answer questions and we reviewed his baseline since last admission.    Pt can transfer to a wheelchair. he says he does not ambulate.  He spends most of the day in his recliner.  Eats in his room.   Pt reports he wears continuous oxygen at the facility.  He is no longer smoking.          Past Medical History:        Past Medical History:   Diagnosis Date    Arthritis     Asthma     Cerebral artery occlusion with cerebral infarction (HCC)     COPD (chronic obstructive pulmonary disease) (HCC)     Diabetes mellitus (HCC)     Hypertension     Palliative care patient 05/31/2022    Seizures (HCC)     Pt states last seizure was in December 2015    Thyroid disease        Advance Directives:     full code  ACP note completed  Pt's sister Bre Rios is his POA.    Requested copy of document for our record.          Pain/Other Symptoms:   Pt is currently on oxygen and denies any shortness of breath.  Still some discomfort in his chest.                     Plan:    Continue medical treatment and complete further testing.  Monitor for improvement.        Patient/family discussion r/t goals:              Pt likes to be up in his chair and watch TV.  Enjoys visitors at the facility.  Goal is to return to [facility] at time of discharge.         Palliative Performance Scale:    50        Palliative Care team will continue to follow and support, with ongoing review of pt's goals of care.                Electronically signed by Laquita Lawrence RN on 6/12/2024 at 10:28 AM

## 2024-06-12 NOTE — PROGRESS NOTES
Khris Sampson arrived to room # 719-1.   Presented with: chest pain  Mental Status: Patient is oriented, alert, coherent, logical, thought processes intact, and able to concentrate and follow conversation.   Vitals:    06/11/24 2030   BP: (!) 147/107   Pulse: 50   Resp: 16   Temp: 97 °F (36.1 °C)   SpO2: 99%     Patient safety contract and falls prevention contract reviewed with patient Yes.  Oriented Patient to room.  Call light within reach. Yes.  Needs, issues or concerns expressed at this time: no.      Electronically signed by Tami Go RN on 6/11/2024 at 8:51 PM

## 2024-06-12 NOTE — CONSULTS
[facility] Vascular Access Team:  Consult Note    Patient: Khris Sampson  YOB: 1955   MRN: [id number]  Room: [address] [place] [address]     Attending Physician: Naty Koehler MD  Ordering Physician: Naty Koehler MD    Diagnosis:   Chest pain, unspecified [R07.9]  Chest pain, unspecified type [R07.9]    Active LDAs:  Peripheral IV 06/12/24 Left;Anterior Forearm (Active)   Number of days: 0       Reason for Consult:  [facility] vascular access team consulted for placement of Ultrasound Guided Peripheral IV.    Indication(s):  Khris Sampson is a 69 y.o. male admitted to [facility] for Chest pain, unspecified. Khris Sampson currently has no IV access following swelling at PIV site in right forearm. He is scheduled for testing today requiring IV access.    Findings:  Successfully placed a 20 gauge ultrasound guided peripheral IV in the patient's left forearm with one attempt and no complications. Patient tolerated well. Notified primary RN.     Impression/Plan:  1. Ultrasound-Guided Peripheral IV is ready to be used    Thank you for your time and consult.     Electronically Signed By: Johan De La Cruz RN on 6/12/2024 at 4:23 PM

## 2024-06-12 NOTE — ACP (ADVANCE CARE PLANNING)
Advance Care Planning     Palliative Team Advance Care Planning (ACP) Conversation    Date of Conversation: 06/12/24    Individuals present for the conversation: Patient with decision making capacity     ACP documents on file prior to discussion:  -None    Previously completed document/s not on file:  POA document was completed previously but it is not available for review. This writer requested a copy of the document for patient's chart.     Healthcare Decision Maker:    Primary Decision Maker: Bre Rios - Brother/Sister - [phone]     Conversation Summary:  Pt confirmed Full code, but would not want prolonged life support.    Resuscitation Status:   Code Status: Full Code     Documentation Completed:  -No new documents completed.    I spent 30 minutes with the patient and/or surrogate decision maker discussing the patient's wishes and goals.      Laquita Lawrence RN

## 2024-06-12 NOTE — PROGRESS NOTES
I spoke with [facility]. Pt will have study tomorrow. Pt may eat. I have notified the pt. Pt has had no complaints today. SB to SR. Sats 94% on 1L O2. Pt denies any pain. Electronically signed by Lulu Vaughan RN on 6/12/2024 at 7:26 PM

## 2024-06-12 NOTE — CARE COORDINATION
Bedhold and may return once stable. No pre-cert  [facility] Ph: [phone] F: [phone]  Referrals Fax: [phone]  [facility] [place] [address] [address] [phone] - F [phone]  Electronically signed by LAZARO GODFREY on 6/12/2024 at 3:31 PM

## 2024-06-12 NOTE — PROGRESS NOTES
Progress Note    Date:2024       Room:0719/719-01  Patient Name:Khris Sampson     YOB: 1955     Age:69 y.o.      Subjective    Subjective: 69 y.o. male with CVA with residual speech impairment, COPD, type II DM, HTN, seizure, thyroid disease, presenting with complaints of chest pain.  Patient from nursing home in [place], Workup in ER CXR Limited inspiration,creat 1.4, BUN 16, trop 51 with repeat 43.EKG SB PVC with no acute ischemic changes.  Patient admitted in-house, echocardiogram obtained and awaiting stress test to be completed.    Seen in house evaluated by present, denied any acute overnight event.  Denied any worsening of persistent chest pain.  Currently awaiting stress test.     Review of Systems: 8 point system review negative except as stated above.      Objective         Vitals Last 24 Hours:  TEMPERATURE:  Temp  Av.9 °F (36.1 °C)  Min: 96.6 °F (35.9 °C)  Max: 97.3 °F (36.3 °C)  RESPIRATIONS RANGE: Resp  Av.8  Min: 10  Max: 16  PULSE OXIMETRY RANGE: SpO2  Av.5 %  Min: 93 %  Max: 99 %  PULSE RANGE: Pulse  Av.2  Min: 49  Max: 59  BLOOD PRESSURE RANGE: Systolic (24hrs), Av , Min:112 , Max:147   ; Diastolic (24hrs), Av, Min:80, Max:107    I/O (24Hr):    Intake/Output Summary (Last 24 hours) at 2024 1609  Last data filed at 2024 0910  Gross per 24 hour   Intake 5 ml   Output --   Net 5 ml       Physical Examination:  General: Well-developed, no acute distress lying comfortably in bed.  HEENT: Atraumatic normocephalic, range of motion normal, no JVD, no tracheal deviation noted.  Cardiac: Normal S1-S2   Respiratory: clear To auscultation bilaterally, no rhonchi or rales, no wheezing  Abdomen: Soft, positive bowel sounds in all quadrants, no distention, nontender to palpation, no rebound noted.    Extremities: no tenderness, no edema, moves all extremities  Psych: Affect normal and good eye contact, behavioral normal.  Neuro:

## 2024-06-12 NOTE — PROGRESS NOTES
Automatic Dose Adjustment of                Subcutaneous Anticoagulant for Prophylaxis    Khris Samposn is a 69 y.o. male.     Recent Labs     06/11/24  1439 06/12/24  0340   CREATININE 1.4* 1.6*       Estimated Creatinine Clearance: 57 mL/min (A) (based on SCr of 1.6 mg/dL (H)).    Weight:  Wt Readings from Last 1 Encounters:   06/12/24 104.6 kg (230 lb 9.6 oz)           Pharmacy has adjusted subcutaneous anticoagulant for prophylaxis to Enoxaparin 30 mg SC twice daily based on the patient's weight and estimated CrCl per [facility] policy.               Electronically signed by Andrea Lazcano RPH on 6/12/2024 at 9:07 AM

## 2024-06-12 NOTE — PROGRESS NOTES
4 Eyes Skin Assessment     NAME:  Khris Sampson  YOB: 1955  MEDICAL RECORD NUMBER:  [id number]    The patient is being assessed for  Admission    I agree that at least one RN has performed a thorough Head to Toe Skin Assessment on the patient. ALL assessment sites listed below have been assessed.      Areas assessed by both nurses:    Head, Face, Ears, Shoulders, Back, Chest, Arms, Elbows, Hands, Sacrum. Buttock, Coccyx, Ischium, and Legs. Feet and Heels        Does the Patient have a Wound? No noted wound(s)       Parth Prevention initiated by RN: Yes  Wound Care Orders initiated by RN: No    Pressure Injury (Stage 3,4, Unstageable, DTI, NWPT, and Complex wounds) if present, place Wound referral order by RN under : No    New Ostomies, if present place, Ostomy referral order under : No     Nurse 1 eSignature: Electronically signed by Tami Go RN on 6/12/24 at 12:38 AM CDT    **SHARE this note so that the co-signing nurse can place an eSignature**    Nurse 2 eSignature: Electronically signed by Alana Guo RN on 6/12/24 at 1:32 AM CDT

## 2024-06-12 NOTE — ED NOTES
ED TO INPATIENT SBAR HANDOFF    Patient Name: Khris Sampson   : 1955  69 y.o.   Family/Caregiver Present: No  Code Status Order: Prior    C-SSRS: Risk of Suicide: No Risk  Sitter No  Restraints:         Situation  Chief Complaint:   Chief Complaint   Patient presents with    Chest Pain     X2 days     Patient Diagnosis: Chest pain, unspecified [R07.9]     Brief Description of Patient's Condition: Pt is A&Ox4, he is calm and cooperative.   Mental Status: oriented, alert, coherent, logical, thought processes intact, and able to concentrate and follow conversation  Arrived from: nursing home    Imaging:   XR CHEST PORTABLE   Final Result       Limited inspiration.       Central vascular congestion versus vascular crowding from technique and inspiration.       If symptoms or clinical concerns persist, consider follow-up two-view chest radiographs.               ______________________________________    Electronically signed by: SHERRY MONTEMAYOR D.O.   Date:     2024   Time:    14:51         COVID-19 Results:   Internal Administration   First Dose      Second Dose           Last COVID Lab SARS-CoV-2, PCR (no units)   Date Value   2024 Not Detected     SARS-CoV-2, NAAT (no units)   Date Value   2022 Not Detected     POC Glucose (mg/dl)   Date Value   2020 138 (H)           Abnormal labs:   Abnormal Labs Reviewed   CBC WITH AUTO DIFFERENTIAL - Abnormal; Notable for the following components:       Result Value    MCV 94.1 (*)     Basophils % 1.1 (*)     All other components within normal limits   COMPREHENSIVE METABOLIC PANEL - Abnormal; Notable for the following components:    Creatinine 1.4 (*)     Est, Glom Filt Rate 55 (*)     All other components within normal limits   TROPONIN - Abnormal; Notable for the following components:    Troponin, High Sensitivity 51 (*)     All other components within normal limits   TROPONIN - Abnormal; Notable for the following components:    Troponin, High  Sensitivity 43 (*)     All other components within normal limits     Background  Allergies:   Allergies   Allergen Reactions    Codeine      Current Medications:   Medications Administered         aspirin tablet 325 mg Admin Date  06/11/2024 Action  Given Dose  325 mg Rate   Route  Oral Administered By  Kathia Fernandez)KAYLEE        nitroGLYCERIN (NITROSTAT) SL tablet 0.4 mg Admin Date  06/11/2024 Action  Given Dose  0.4 mg Rate   Route  SubLINGual Administered By  Kathia Fernandez RN (Dottie)        sodium chloride 0.9 % bolus 500 mL Admin Date  06/11/2024 Action  New Bag Dose  500 mL Rate  247.9 mL/hr Route  IntraVENous Administered By  Kathia Fernandez RN (Dottie)            History:   Past Medical History:   Diagnosis Date    Arthritis     Asthma     Cerebral artery occlusion with cerebral infarction (HCC)     COPD (chronic obstructive pulmonary disease) (HCC)     Diabetes mellitus (HCC)     Hypertension     Palliative care patient 05/31/2022    Seizures (HCC)     Pt states last seizure was in December 2015    Thyroid disease        Assessment  Vitals:     Vitals:    06/11/24 1800 06/11/24 1830 06/11/24 1915 06/11/24 1930   BP: 126/83 127/84 (!) 141/94 (!) 133/92   Pulse: 58 54 56 51   Resp: 12 10 12 11   Temp:       SpO2: 93% 94% 94% 94%     Predictive Model Details          37 (Caution)  Factor Value    Calculated 6/11/2024 19:56 31% Respiratory rate 11    Deterioration Index Model 29% Supplemental oxygen Nasal cannula     29% Age 69 years old     3% Pulse 51     3% Systolic 133     2% Sodium 137 mmol/L     1% Pulse oximetry 94 %     1% WBC count 8.5 K/uL     0% Temperature 97.7 °F (36.5 °C)     0% Hematocrit 46.1 %     0% Potassium 3.9 mmol/L       NPO? Yes  O2 Flow Rate: O2 Device: Nasal cannula O2 Flow Rate (L/min): 2 L/min  Cardiac Rhythm: NSR  NIH Score: NIH     Active LDA's:   Peripheral IV 06/11/24 Left;Posterior Hand (Active)   Site Assessment Clean, dry & intact 06/11/24 1441   Line Status

## 2024-06-13 PROBLEM — R07.9 CHEST PAIN IN ADULT: Status: ACTIVE | Noted: 2024-06-13

## 2024-06-13 LAB
EKG P AXIS: NORMAL DEGREES
EKG P-R INTERVAL: NORMAL MS
EKG Q-T INTERVAL: 432 MS
EKG QRS DURATION: 100 MS
EKG QTC CALCULATION (BAZETT): 433 MS
EKG T AXIS: 96 DEGREES

## 2024-06-13 PROCEDURE — 6370000000 HC RX 637 (ALT 250 FOR IP): Performed by: HOSPITALIST

## 2024-06-13 PROCEDURE — 96376 TX/PRO/DX INJ SAME DRUG ADON: CPT

## 2024-06-13 PROCEDURE — 6370000000 HC RX 637 (ALT 250 FOR IP)

## 2024-06-13 PROCEDURE — 2580000003 HC RX 258

## 2024-06-13 PROCEDURE — 2140000000 HC CCU INTERMEDIATE R&B

## 2024-06-13 PROCEDURE — 96372 THER/PROPH/DIAG INJ SC/IM: CPT

## 2024-06-13 PROCEDURE — 94640 AIRWAY INHALATION TREATMENT: CPT

## 2024-06-13 PROCEDURE — 2700000000 HC OXYGEN THERAPY PER DAY

## 2024-06-13 PROCEDURE — 6360000002 HC RX W HCPCS

## 2024-06-13 PROCEDURE — 94760 N-INVAS EAR/PLS OXIMETRY 1: CPT

## 2024-06-13 RX ORDER — CALCIUM CARBONATE 500 MG/1
1000 TABLET, CHEWABLE ORAL
Status: DISCONTINUED | OUTPATIENT
Start: 2024-06-13 | End: 2024-06-15 | Stop reason: HOSPADM

## 2024-06-13 RX ADMIN — LEVOTHYROXINE SODIUM 137 MCG: 25 TABLET ORAL at 05:57

## 2024-06-13 RX ADMIN — IPRATROPIUM BROMIDE AND ALBUTEROL SULFATE 1 DOSE: 2.5; .5 SOLUTION RESPIRATORY (INHALATION) at 19:21

## 2024-06-13 RX ADMIN — ANTACID TABLETS 1000 MG: 500 TABLET, CHEWABLE ORAL at 17:00

## 2024-06-13 RX ADMIN — IPRATROPIUM BROMIDE AND ALBUTEROL SULFATE 1 DOSE: 2.5; .5 SOLUTION RESPIRATORY (INHALATION) at 06:37

## 2024-06-13 RX ADMIN — Medication 2 PUFF: at 19:21

## 2024-06-13 RX ADMIN — ACETAMINOPHEN 650 MG: 325 TABLET ORAL at 06:07

## 2024-06-13 RX ADMIN — TAMSULOSIN HYDROCHLORIDE 0.8 MG: 0.4 CAPSULE ORAL at 20:52

## 2024-06-13 RX ADMIN — ENOXAPARIN SODIUM 30 MG: 100 INJECTION SUBCUTANEOUS at 09:41

## 2024-06-13 RX ADMIN — IPRATROPIUM BROMIDE AND ALBUTEROL SULFATE 1 DOSE: 2.5; .5 SOLUTION RESPIRATORY (INHALATION) at 10:22

## 2024-06-13 RX ADMIN — ENOXAPARIN SODIUM 30 MG: 100 INJECTION SUBCUTANEOUS at 20:52

## 2024-06-13 RX ADMIN — SODIUM CHLORIDE: 9 INJECTION, SOLUTION INTRAVENOUS at 09:48

## 2024-06-13 RX ADMIN — PANTOPRAZOLE SODIUM 40 MG: 40 TABLET, DELAYED RELEASE ORAL at 05:57

## 2024-06-13 RX ADMIN — IPRATROPIUM BROMIDE AND ALBUTEROL SULFATE 1 DOSE: 2.5; .5 SOLUTION RESPIRATORY (INHALATION) at 14:13

## 2024-06-13 RX ADMIN — POTASSIUM CHLORIDE 20 MEQ: 1500 TABLET, EXTENDED RELEASE ORAL at 09:42

## 2024-06-13 RX ADMIN — Medication 2 PUFF: at 06:36

## 2024-06-13 RX ADMIN — ASPIRIN 81 MG: 81 TABLET, CHEWABLE ORAL at 09:42

## 2024-06-13 RX ADMIN — ATORVASTATIN CALCIUM 80 MG: 40 TABLET, FILM COATED ORAL at 20:53

## 2024-06-13 RX ADMIN — AMLODIPINE BESYLATE 10 MG: 10 TABLET ORAL at 09:42

## 2024-06-13 RX ADMIN — WATER 40 MG: 1 INJECTION INTRAMUSCULAR; INTRAVENOUS; SUBCUTANEOUS at 20:53

## 2024-06-13 RX ADMIN — FLUOXETINE 20 MG: 10 CAPSULE ORAL at 09:42

## 2024-06-13 RX ADMIN — WATER 40 MG: 1 INJECTION INTRAMUSCULAR; INTRAVENOUS; SUBCUTANEOUS at 09:42

## 2024-06-13 ASSESSMENT — PAIN DESCRIPTION - LOCATION: LOCATION: HEAD

## 2024-06-13 ASSESSMENT — PAIN SCALES - GENERAL: PAINLEVEL_OUTOF10: 6

## 2024-06-13 NOTE — PROGRESS NOTES
Patient unable to complete Lexiscan on this date due to drinking coffee this morning. When [facility] tech went to patient's room to inject, for testing purposes, patient had a cup of coffee sitting on his bedside table. When patient was asked if he had drank any of the coffee he stated \"yes, earlier this morning.\" Nursing staff notified by AKTSavi.

## 2024-06-13 NOTE — PLAN OF CARE
Problem: Discharge Planning  Goal: Discharge to home or other facility with appropriate resources  6/13/2024 0723 by Cande Mccoy RN  Outcome: Progressing  6/12/2024 2317 by Vinita Schaefer RN  Outcome: Progressing     Problem: Pain  Goal: Verbalizes/displays adequate comfort level or baseline comfort level  6/13/2024 0723 by Cande Mccoy RN  Outcome: Progressing  6/12/2024 2317 by Vinita Schaefer RN  Outcome: Progressing     Problem: Skin/Tissue Integrity  Goal: Absence of new skin breakdown  6/13/2024 0723 by Cande Mccoy RN  Outcome: Progressing  6/12/2024 2317 by Vinita Schaefer RN  Outcome: Progressing     Problem: Safety - Adult  Goal: Free from fall injury  6/13/2024 0723 by Cande Mccoy RN  Outcome: Progressing  6/12/2024 2317 by Vinita Schaefer RN  Outcome: Progressing     Problem: ABCDS Injury Assessment  Goal: Absence of physical injury  6/13/2024 0723 by Cande Mccoy RN  Outcome: Progressing  6/12/2024 2317 by Vinita Schaefer RN  Outcome: Progressing     Problem: Neurosensory - Adult  Goal: Achieves stable or improved neurological status  Outcome: Progressing     Problem: Respiratory - Adult  Goal: Achieves optimal ventilation and oxygenation  Outcome: Progressing     Problem: Cardiovascular - Adult  Goal: Maintains optimal cardiac output and hemodynamic stability  Outcome: Progressing     Problem: Skin/Tissue Integrity - Adult  Goal: Skin integrity remains intact  Outcome: Progressing     Problem: Musculoskeletal - Adult  Goal: Return mobility to safest level of function  Outcome: Progressing     Problem: Gastrointestinal - Adult  Goal: Minimal or absence of nausea and vomiting  Outcome: Progressing     Problem: Genitourinary - Adult  Goal: Absence of urinary retention  Outcome: Progressing     Problem: Infection - Adult  Goal: Absence of infection at discharge  Outcome: Progressing     Problem: Metabolic/Fluid and Electrolytes - Adult  Goal: Electrolytes maintained within

## 2024-06-13 NOTE — PROGRESS NOTES
Progress Note    Date:2024       Room:0719/719-01  Patient Name:Khris Sampson     YOB: 1955     Age:69 y.o.      Subjective    Subjective: 69 y.o. male with CVA with residual speech impairment, COPD, type II DM, HTN, seizure, thyroid disease, presenting with complaints of chest pain.  Patient from nursing home in [place], Workup in ER CXR Limited inspiration,creat 1.4, BUN 16, trop 51 with repeat 43.EKG SB PVC with no acute ischemic changes.  Patient admitted in-house, echocardiogram obtained and awaiting stress test to be completed.    Seen in house evaluated by present, denied any acute overnight event.  Denied any worsening of persistent chest pain.  Currently awaiting stress test, unfortunately had coffee this AM and study postponed till tomorrow.     Review of Systems: 8 point system review negative except as stated above.      Objective         Vitals Last 24 Hours:  TEMPERATURE:  Temp  Av.6 °F (36.4 °C)  Min: 96.8 °F (36 °C)  Max: 98.8 °F (37.1 °C)  RESPIRATIONS RANGE: Resp  Av  Min: 16  Max: 16  PULSE OXIMETRY RANGE: SpO2  Av.3 %  Min: 93 %  Max: 95 %  PULSE RANGE: Pulse  Av.3  Min: 61  Max: 74  BLOOD PRESSURE RANGE: Systolic (24hrs), Av , Min:87 , Max:139   ; Diastolic (24hrs), Av, Min:65, Max:98    I/O (24Hr):    Intake/Output Summary (Last 24 hours) at 2024 1349  Last data filed at 2024 2014  Gross per 24 hour   Intake 1108.75 ml   Output 550 ml   Net 558.75 ml       Physical Examination:  General: Well-developed, no acute distress lying comfortably in bed.  HEENT: Atraumatic normocephalic, range of motion normal, no JVD, no tracheal deviation noted.  Cardiac: Normal S1-S2   Respiratory: clear To auscultation bilaterally, no rhonchi or rales, no wheezing  Abdomen: Soft, positive bowel sounds in all quadrants, no distention, nontender to palpation, no rebound noted.    Extremities: no tenderness, no edema, moves all extremities  Psych: Affect normal and  good eye contact, behavioral normal.  Neuro: Dysarthria        Labs/Imaging/Diagnostics    Labs:  CBC:  Recent Labs     06/11/24  1439 06/12/24  0340   WBC 8.5 10.6   RBC 4.90 4.83   HGB 15.2 14.6   HCT 46.1 44.3   MCV 94.1* 91.7   RDW 14.3 14.2    216       CHEMISTRIES:  Recent Labs     06/11/24  1439 06/12/24  0340    136   K 3.9 4.2    100   CO2 22 22   BUN 16 18   CREATININE 1.4* 1.6*   GLUCOSE 84 154*       PT/INR:No results for input(s): \"PROTIME\", \"INR\" in the last 72 hours.  APTT:No results for input(s): \"APTT\" in the last 72 hours.  LIVER PROFILE:  Recent Labs     06/11/24  1439   AST 31   ALT 19   BILITOT 0.6   ALKPHOS 44         Imaging Last 24 Hours:  Echo (TTE) complete (PRN contrast/bubble/strain/3D)    Result Date: 6/12/2024    Left Ventricle: Normal left ventricular systolic function with a visually estimated EF of 60 - 65%. EF by 2D Simpsons Biplane is 65%. Left ventricle size is normal. Moderately increased wall thickness. Normal wall motion. Normal diastolic function. No mass present.   Right Ventricle: Right ventricle size is normal. Normal systolic function. TAPSE is 1.9 cm.   Left Atrium: Left atrium size is normal.   Aorta: Normal sized aortic root and ascending aorta. Aortic arch not well visualized.   Pericardium: No pericardial effusion.   IVC/SVC: IVC is normal (RAP ~3 mmHg). No clinically significant valvular regurgitation or stenosis.     XR CHEST PORTABLE    Result Date: 6/11/2024  EXAM: CHEST RADIOGRAPH (1 VIEW)  TECHNIQUE: Frontal Chest Radiograph.  HISTORY: Chest pain  COMPARISON: 08/14/2023.  FINDINGS:  Lines, Tubes, Devices:  None  Lungs and Pleura:  Limited inspiration. No dense consolidation, pleural effusion or pneumothorax.  Central vascular congestion versus vascular crowding from technique and inspiration.  Cardiac silhouette: Normal.  Bones: No acute abnormality.        Limited inspiration.  Central vascular congestion versus vascular crowding from

## 2024-06-14 ENCOUNTER — APPOINTMENT (OUTPATIENT)
Dept: NUCLEAR MEDICINE | Age: 69
DRG: 313 | End: 2024-06-14
Payer: MEDICARE

## 2024-06-14 LAB
NUC STRESS EJECTION FRACTION: 69 %
STRESS BASELINE DIAS BP: 90 MMHG
STRESS BASELINE HR: 75 BPM
STRESS BASELINE SYS BP: 131 MMHG
STRESS EXERCISE DUR MIN: 5 MIN
STRESS EXERCISE DUR SEC: 0 SEC
STRESS PEAK DIAS BP: 85 MMHG
STRESS PEAK SYS BP: 148 MMHG
STRESS PERCENT HR ACHIEVED: 58 %
STRESS POST PEAK HR: 89 BPM
STRESS RATE PRESSURE PRODUCT: NORMAL BPM*MMHG
STRESS STAGE 1 BP: NORMAL MMHG
STRESS STAGE 1 DURATION: 1 MIN:SEC
STRESS STAGE 1 HR: 82 BPM
STRESS STAGE 2 BP: NORMAL MMHG
STRESS STAGE 2 DURATION: 1 MIN:SEC
STRESS STAGE 2 HR: 89 BPM
STRESS STAGE 3 BP: NORMAL MMHG
STRESS STAGE 3 DURATION: 1 MIN:SEC
STRESS STAGE 3 HR: 89 BPM
STRESS STAGE 4 BP: NORMAL MMHG
STRESS STAGE 4 DURATION: 1 MIN:SEC
STRESS STAGE 4 HR: 88 BPM
STRESS STAGE 5 BP: NORMAL MMHG
STRESS STAGE 5 DURATION: 1 MIN:SEC
STRESS STAGE 5 HR: 81 BPM
STRESS STAGE RECOVERY 1 BP: NORMAL MMHG
STRESS STAGE RECOVERY 1 DURATION: 1 MIN:SEC
STRESS STAGE RECOVERY 1 HR: 82 BPM
STRESS TARGET HR: 153 BPM

## 2024-06-14 PROCEDURE — 2700000000 HC OXYGEN THERAPY PER DAY

## 2024-06-14 PROCEDURE — 2580000003 HC RX 258

## 2024-06-14 PROCEDURE — 6360000002 HC RX W HCPCS

## 2024-06-14 PROCEDURE — 93017 CV STRESS TEST TRACING ONLY: CPT

## 2024-06-14 PROCEDURE — A9502 TC99M TETROFOSMIN: HCPCS

## 2024-06-14 PROCEDURE — 78452 HT MUSCLE IMAGE SPECT MULT: CPT

## 2024-06-14 PROCEDURE — 2140000000 HC CCU INTERMEDIATE R&B

## 2024-06-14 PROCEDURE — 94640 AIRWAY INHALATION TREATMENT: CPT

## 2024-06-14 PROCEDURE — 6370000000 HC RX 637 (ALT 250 FOR IP): Performed by: HOSPITALIST

## 2024-06-14 PROCEDURE — 94760 N-INVAS EAR/PLS OXIMETRY 1: CPT

## 2024-06-14 PROCEDURE — 3430000000 HC RX DIAGNOSTIC RADIOPHARMACEUTICAL

## 2024-06-14 PROCEDURE — 6370000000 HC RX 637 (ALT 250 FOR IP)

## 2024-06-14 RX ADMIN — FLUOXETINE 20 MG: 10 CAPSULE ORAL at 07:27

## 2024-06-14 RX ADMIN — IPRATROPIUM BROMIDE AND ALBUTEROL SULFATE 1 DOSE: 2.5; .5 SOLUTION RESPIRATORY (INHALATION) at 19:51

## 2024-06-14 RX ADMIN — Medication 2 PUFF: at 07:12

## 2024-06-14 RX ADMIN — ANTACID TABLETS 1000 MG: 500 TABLET, CHEWABLE ORAL at 05:29

## 2024-06-14 RX ADMIN — LEVOTHYROXINE SODIUM 137 MCG: 25 TABLET ORAL at 05:29

## 2024-06-14 RX ADMIN — SODIUM CHLORIDE, PRESERVATIVE FREE 10 ML: 5 INJECTION INTRAVENOUS at 20:50

## 2024-06-14 RX ADMIN — IPRATROPIUM BROMIDE AND ALBUTEROL SULFATE 1 DOSE: 2.5; .5 SOLUTION RESPIRATORY (INHALATION) at 14:13

## 2024-06-14 RX ADMIN — AMLODIPINE BESYLATE 10 MG: 10 TABLET ORAL at 07:28

## 2024-06-14 RX ADMIN — PANTOPRAZOLE SODIUM 40 MG: 40 TABLET, DELAYED RELEASE ORAL at 05:29

## 2024-06-14 RX ADMIN — TETROFOSMIN 8 MILLICURIE: 0.23 INJECTION, POWDER, LYOPHILIZED, FOR SOLUTION INTRAVENOUS at 09:00

## 2024-06-14 RX ADMIN — IPRATROPIUM BROMIDE AND ALBUTEROL SULFATE 1 DOSE: 2.5; .5 SOLUTION RESPIRATORY (INHALATION) at 07:13

## 2024-06-14 RX ADMIN — ANTACID TABLETS 1000 MG: 500 TABLET, CHEWABLE ORAL at 12:26

## 2024-06-14 RX ADMIN — ACETAMINOPHEN 650 MG: 325 TABLET ORAL at 20:48

## 2024-06-14 RX ADMIN — POTASSIUM CHLORIDE 20 MEQ: 1500 TABLET, EXTENDED RELEASE ORAL at 07:28

## 2024-06-14 RX ADMIN — ACETAMINOPHEN 650 MG: 325 TABLET ORAL at 11:26

## 2024-06-14 RX ADMIN — TETROFOSMIN 24 MILLICURIE: 0.23 INJECTION, POWDER, LYOPHILIZED, FOR SOLUTION INTRAVENOUS at 12:59

## 2024-06-14 RX ADMIN — ACETAMINOPHEN 650 MG: 325 TABLET ORAL at 06:03

## 2024-06-14 RX ADMIN — ATORVASTATIN CALCIUM 80 MG: 40 TABLET, FILM COATED ORAL at 20:50

## 2024-06-14 RX ADMIN — ASPIRIN 81 MG: 81 TABLET, CHEWABLE ORAL at 07:28

## 2024-06-14 RX ADMIN — SODIUM CHLORIDE, PRESERVATIVE FREE 10 ML: 5 INJECTION INTRAVENOUS at 07:28

## 2024-06-14 RX ADMIN — TAMSULOSIN HYDROCHLORIDE 0.8 MG: 0.4 CAPSULE ORAL at 20:50

## 2024-06-14 RX ADMIN — Medication 2 PUFF: at 19:51

## 2024-06-14 RX ADMIN — REGADENOSON 0.4 MG: 0.08 INJECTION, SOLUTION INTRAVENOUS at 10:54

## 2024-06-14 RX ADMIN — ENOXAPARIN SODIUM 30 MG: 100 INJECTION SUBCUTANEOUS at 20:50

## 2024-06-14 RX ADMIN — ENOXAPARIN SODIUM 30 MG: 100 INJECTION SUBCUTANEOUS at 07:27

## 2024-06-14 ASSESSMENT — PAIN SCALES - GENERAL
PAINLEVEL_OUTOF10: 6
PAINLEVEL_OUTOF10: 8

## 2024-06-14 ASSESSMENT — PAIN DESCRIPTION - DESCRIPTORS: DESCRIPTORS: ACHING

## 2024-06-14 ASSESSMENT — PAIN DESCRIPTION - ORIENTATION: ORIENTATION: POSTERIOR

## 2024-06-14 ASSESSMENT — PAIN DESCRIPTION - LOCATION
LOCATION: HEAD
LOCATION: HEAD

## 2024-06-14 NOTE — PLAN OF CARE
Problem: Discharge Planning  Goal: Discharge to home or other facility with appropriate resources  Outcome: Progressing     Problem: Pain  Goal: Verbalizes/displays adequate comfort level or baseline comfort level  Outcome: Progressing     Problem: Skin/Tissue Integrity  Goal: Absence of new skin breakdown  Description: 1.  Monitor for areas of redness and/or skin breakdown  2.  Assess vascular access sites hourly  3.  Every 4-6 hours minimum:  Change oxygen saturation probe site  4.  Every 4-6 hours:  If on nasal continuous positive airway pressure, respiratory therapy assess nares and determine need for appliance change or resting period.  Outcome: Progressing     Problem: Safety - Adult  Goal: Free from fall injury  Outcome: Progressing     Problem: ABCDS Injury Assessment  Goal: Absence of physical injury  Outcome: Progressing     Problem: Respiratory - Adult  Goal: Achieves optimal ventilation and oxygenation  Outcome: Progressing     Problem: Cardiovascular - Adult  Goal: Maintains optimal cardiac output and hemodynamic stability  Outcome: Progressing     Problem: Skin/Tissue Integrity - Adult  Goal: Skin integrity remains intact  Outcome: Progressing     Problem: Musculoskeletal - Adult  Goal: Return mobility to safest level of function  Outcome: Progressing

## 2024-06-14 NOTE — PROGRESS NOTES
Progress Note    Date:2024       Room:0719/719-01  Patient Name:Khris Sampson     YOB: 1955     Age:69 y.o.      Subjective    Subjective: 69 y.o. male with CVA with residual speech impairment, COPD, type II DM, HTN, seizure, thyroid disease, presenting with complaints of chest pain.  Patient from nursing home in [place], Workup in ER CXR Limited inspiration,creat 1.4, BUN 16, trop 51 with repeat 43.EKG SB PVC with no acute ischemic changes. Patient admitted in-house, echocardiogram obtained and awaiting stress test results    Seen in house evaluated by present, denied any acute overnight event.  Denied any worsening, or persistent chest pain.      Review of Systems: 8 point system review negative except as stated above.      Objective         Vitals Last 24 Hours:  TEMPERATURE:  Temp  Av.5 °F (36.4 °C)  Min: 97.2 °F (36.2 °C)  Max: 97.9 °F (36.6 °C)  RESPIRATIONS RANGE: Resp  Av.3  Min: 16  Max: 18  PULSE OXIMETRY RANGE: SpO2  Av.9 %  Min: 94 %  Max: 97 %  PULSE RANGE: Pulse  Av.8  Min: 59  Max: 88  BLOOD PRESSURE RANGE: Systolic (24hrs), Av , Min:115 , Max:156   ; Diastolic (24hrs), Av, Min:83, Max:111    I/O (24Hr):    Intake/Output Summary (Last 24 hours) at 2024 1349  Last data filed at 2024 1333  Gross per 24 hour   Intake 100 ml   Output 2200 ml   Net -2100 ml       Physical Examination:  General: Well-developed, no acute distress lying comfortably in bed.  HEENT: Atraumatic normocephalic, range of motion normal, no JVD, no tracheal deviation noted.  Cardiac: Normal S1-S2   Respiratory: clear To auscultation bilaterally, no rhonchi or rales, no wheezing  Abdomen: Soft, positive bowel sounds in all quadrants, no distention, nontender to palpation, no rebound noted.    Extremities: no tenderness, no edema, moves all extremities  Psych: Affect normal and good eye contact, behavioral normal.  Neuro: Dysarthria        Labs/Imaging/Diagnostics   two-view chest radiographs.    ______________________________________ Electronically signed by: SHERRY MONTEMAYOR D.O. Date:     06/11/2024 Time:    14:51     Assessment//Plan           Hospital Problems             Last Modified POA    * (Principal) Chest pain, unspecified 6/11/2024 Yes    Hypothyroidism 6/11/2024 Yes    Type 2 diabetes mellitus, without long-term current use of insulin ([facility]) 6/11/2024 Yes    COPD exacerbation ([facility]) 6/11/2024 Yes    Hypertension 6/11/2024 Yes    ZARA (acute kidney injury) ([facility]) 6/11/2024 Yes    Chest pain in adult 6/13/2024 Yes       Assessment & Plan      Unstable angina  Patient complaining of chest pain on admission  Troponin noted to be elevated on admission however downtrending.  EKG with no overt acute ST-T wave abnormality.  ECHO reviewed  Stress test results pending  LDL level elevated at 254  Continue aspirin and statin    COPD exacerbation  Discontinued steroids  Continue bronchodilators  Wean oxygen as needed with goal saturation greater than 90%.    ZARA  Monitor BMP and avoid nephrotoxic agents.  Encourage oral hydration    Type 2 diabetes  Insulin regimen as ordered  Hypoglycemia protocol  Adjust as needed.    Hypertension: Amlodipine    Hyperlipidemia: Statin    Hypothyroidism: Synthroid      Disposition: Awaiting stress test result.      Electronically signed by   Naty Koehler MD   Internal Medicine Hospitalist  On 6/14/2024  At 1:49 PM    EMR Dragon/Transcription disclaimer:   Much of this encounter note is an electronic transcription/translation of spoken language to printed text. The electronic translation of spoken language may permit erroneous, or at times, nonsensical words or phrases to be inadvertently transcribed; although attempts have made to review the note for such errors, some may still exist.

## 2024-06-14 NOTE — CARE COORDINATION
06/14/24 0728   IMM Letter   IMM Letter given to Patient/Family/Significant other/Guardian/POA/by: jason stroud sw   IMM Letter date given: 06/14/24   IMM Letter time given: 0727     First IMM given and explained to patient.  All questions answered.  Patient upgraded from observation to inpatient.  Signed copy placed in patient soft chart.   Electronically signed by Jason Stroud on 6/14/2024 at 7:28 AM

## 2024-06-15 VITALS
HEIGHT: 73 IN | HEART RATE: 79 BPM | WEIGHT: 234.8 LBS | DIASTOLIC BLOOD PRESSURE: 66 MMHG | OXYGEN SATURATION: 90 % | TEMPERATURE: 97.8 F | SYSTOLIC BLOOD PRESSURE: 103 MMHG | BODY MASS INDEX: 31.12 KG/M2 | RESPIRATION RATE: 16 BRPM

## 2024-06-15 PROCEDURE — 2580000003 HC RX 258

## 2024-06-15 PROCEDURE — 6360000002 HC RX W HCPCS

## 2024-06-15 PROCEDURE — 6370000000 HC RX 637 (ALT 250 FOR IP)

## 2024-06-15 PROCEDURE — 6370000000 HC RX 637 (ALT 250 FOR IP): Performed by: HOSPITALIST

## 2024-06-15 PROCEDURE — 2700000000 HC OXYGEN THERAPY PER DAY

## 2024-06-15 PROCEDURE — 94640 AIRWAY INHALATION TREATMENT: CPT

## 2024-06-15 PROCEDURE — 94760 N-INVAS EAR/PLS OXIMETRY 1: CPT

## 2024-06-15 RX ADMIN — SODIUM CHLORIDE, PRESERVATIVE FREE 10 ML: 5 INJECTION INTRAVENOUS at 08:24

## 2024-06-15 RX ADMIN — ANTACID TABLETS 1000 MG: 500 TABLET, CHEWABLE ORAL at 06:46

## 2024-06-15 RX ADMIN — ENOXAPARIN SODIUM 30 MG: 100 INJECTION SUBCUTANEOUS at 08:24

## 2024-06-15 RX ADMIN — AMLODIPINE BESYLATE 10 MG: 10 TABLET ORAL at 08:23

## 2024-06-15 RX ADMIN — ANTACID TABLETS 1000 MG: 500 TABLET, CHEWABLE ORAL at 10:39

## 2024-06-15 RX ADMIN — Medication 2 PUFF: at 06:18

## 2024-06-15 RX ADMIN — FLUOXETINE 20 MG: 10 CAPSULE ORAL at 08:23

## 2024-06-15 RX ADMIN — PANTOPRAZOLE SODIUM 40 MG: 40 TABLET, DELAYED RELEASE ORAL at 06:47

## 2024-06-15 RX ADMIN — LEVOTHYROXINE SODIUM 137 MCG: 25 TABLET ORAL at 06:47

## 2024-06-15 RX ADMIN — ASPIRIN 81 MG: 81 TABLET, CHEWABLE ORAL at 08:23

## 2024-06-15 RX ADMIN — IPRATROPIUM BROMIDE AND ALBUTEROL SULFATE 1 DOSE: 2.5; .5 SOLUTION RESPIRATORY (INHALATION) at 06:17

## 2024-06-15 RX ADMIN — POTASSIUM CHLORIDE 20 MEQ: 1500 TABLET, EXTENDED RELEASE ORAL at 08:24

## 2024-06-15 RX ADMIN — ACETAMINOPHEN 650 MG: 325 TABLET ORAL at 12:44

## 2024-06-15 RX ADMIN — IPRATROPIUM BROMIDE AND ALBUTEROL SULFATE 1 DOSE: 2.5; .5 SOLUTION RESPIRATORY (INHALATION) at 10:10

## 2024-06-15 RX ADMIN — ACETAMINOPHEN 650 MG: 325 TABLET ORAL at 04:09

## 2024-06-15 NOTE — DISCHARGE SUMMARY
Discharge Summary    Date:6/15/2024        Patient Name:Khris Sampson     YOB: 1955     Age:69 y.o.    Admit Date:6/11/2024   Admission Condition:fair   Discharged Condition:stable  Discharge Date: 06/15/24       Discharge Diagnoses   Principal Problem:    Chest pain, unspecified  Active Problems:    Hypothyroidism    Type 2 diabetes mellitus, without long-term current use of insulin (H[facility])    COPD exacerbation ([facility])    Hypertension    ZARA (acute kidney injury) ([facility])    Chest pain in adult  Resolved Problems:    * No resolved hospital problems. *      Hospital Stay   Narrative of Hospital Course:     69 y.o. male with CVA with residual speech impairment, COPD, type II DM, HTN, seizure, thyroid disease, presenting with complaints of chest pain.  Patient from nursing home in [place], Workup in ER CXR Limited inspiration,creat 1.4, BUN 16, trop 51 with repeat 43.EKG SB PVC with no acute ischemic changes. Patient admitted in-house, echocardiogram obtained which was unremarkable, and stress test with low risk cardiac events. Patient remained stable in house, denied any recurrent CP, discharged back to NH in stable condition.      Physical Examination:  General: Well-developed, no acute distress lying comfortably in bed.  HEENT: Atraumatic normocephalic, range of motion normal, no JVD, no tracheal deviation noted.  Cardiac: Normal S1-S2   Respiratory: clear To auscultation bilaterally, no rhonchi or rales, no wheezing  Abdomen: Soft, positive bowel sounds in all quadrants, no distention, nontender to palpation, no rebound noted.    Extremities: no tenderness, no edema, moves all extremities  Psych: Affect normal and good eye contact, behavioral normal.  Neuro: Dysarthria      Consultants:   PALLIATIVE CARE EVAL  IP CONSULT TO VASCULAR ACCESS TEAM    Time Spent on Discharge:  35 minutes were spent in patient examination, evaluation, counseling as well as medication reconciliation, prescriptions for required  medications, discharge plan and follow up.      Surgeries/Procedures Performed:  Stress test       Significant Diagnostic Studies:   Recent Labs:  CBC:   Lab Results   Component Value Date/Time    WBC 10.6 06/12/2024 03:40 AM    RBC 4.83 06/12/2024 03:40 AM    HGB 14.6 06/12/2024 03:40 AM    HCT 44.3 06/12/2024 03:40 AM    MCV 91.7 06/12/2024 03:40 AM    MCH 30.2 06/12/2024 03:40 AM    MCHC 33.0 06/12/2024 03:40 AM    RDW 14.2 06/12/2024 03:40 AM     06/12/2024 03:40 AM     BMP:    Lab Results   Component Value Date/Time    GLUCOSE 154 06/12/2024 03:40 AM     06/12/2024 03:40 AM    K 4.2 06/12/2024 03:40 AM     06/12/2024 03:40 AM    CO2 22 06/12/2024 03:40 AM    ANIONGAP 14 06/12/2024 03:40 AM    BUN 18 06/12/2024 03:40 AM    CREATININE 1.6 06/12/2024 03:40 AM    CALCIUM 9.0 06/12/2024 03:40 AM    LABGLOM 47 06/12/2024 03:40 AM    LABGLOM >60 08/14/2023 06:41 PM    GFRAA >59 05/31/2022 01:21 AM       Radiology Last 7 Days:  XR CHEST PORTABLE    Result Date: 6/11/2024   Limited inspiration.  Central vascular congestion versus vascular crowding from technique and inspiration.  If symptoms or clinical concerns persist, consider follow-up two-view chest radiographs.    ______________________________________ Electronically signed by: SHERRY MONTEMAYOR D.O. Date:     06/11/2024 Time:    14:51       Discharge Plan   Disposition: To a non-[facility] facility    Provider Follow-Up:   No follow-up provider specified.       Patient Instructions   Diet: diabetic diet and renal diet    Activity: activity as tolerated      Discharge Medications         Medication List        CHANGE how you take these medications      tamsulosin 0.4 MG capsule  Commonly known as: FLOMAX  Take 2 capsules by mouth daily  What changed: when to take this            CONTINUE taking these medications      acetaminophen 325 MG tablet  Commonly known as: TYLENOL     albuterol sulfate  (90 Base) MCG/ACT inhaler  Commonly known as:

## 2024-06-17 LAB
EKG P AXIS: 43 DEGREES
EKG P-R INTERVAL: 196 MS
EKG Q-T INTERVAL: 446 MS
EKG QRS DURATION: 88 MS
EKG QTC CALCULATION (BAZETT): 431 MS
EKG T AXIS: 102 DEGREES

## 2024-06-23 ENCOUNTER — APPOINTMENT (OUTPATIENT)
Dept: GENERAL RADIOLOGY | Age: 69
End: 2024-06-23
Payer: MEDICARE

## 2024-06-23 ENCOUNTER — HOSPITAL ENCOUNTER (EMERGENCY)
Age: 69
Discharge: NURSING, REHAB OR LONG-TERM CARE | End: 2024-06-23
Attending: STUDENT IN AN ORGANIZED HEALTH CARE EDUCATION/TRAINING PROGRAM
Payer: MEDICARE

## 2024-06-23 ENCOUNTER — APPOINTMENT (OUTPATIENT)
Dept: VASCULAR LAB | Age: 69
End: 2024-06-23
Attending: STUDENT IN AN ORGANIZED HEALTH CARE EDUCATION/TRAINING PROGRAM
Payer: MEDICARE

## 2024-06-23 VITALS
DIASTOLIC BLOOD PRESSURE: 75 MMHG | SYSTOLIC BLOOD PRESSURE: 94 MMHG | TEMPERATURE: 98 F | OXYGEN SATURATION: 96 % | HEART RATE: 53 BPM | WEIGHT: 235 LBS | BODY MASS INDEX: 31 KG/M2 | RESPIRATION RATE: 16 BRPM

## 2024-06-23 DIAGNOSIS — M79.89 RIGHT LEG SWELLING: ICD-10-CM

## 2024-06-23 DIAGNOSIS — M66.0 RUPTURED BAKERS CYST: Primary | ICD-10-CM

## 2024-06-23 LAB
ALBUMIN SERPL-MCNC: 3.6 G/DL (ref 3.5–5.2)
ALP SERPL-CCNC: 52 U/L (ref 40–130)
ALT SERPL-CCNC: 15 U/L (ref 5–41)
ANION GAP SERPL CALCULATED.3IONS-SCNC: 10 MMOL/L (ref 7–19)
AST SERPL-CCNC: 21 U/L (ref 5–40)
BASOPHILS # BLD: 0.1 K/UL (ref 0–0.2)
BASOPHILS NFR BLD: 0.6 % (ref 0–1)
BILIRUB SERPL-MCNC: 1.3 MG/DL (ref 0.2–1.2)
BUN SERPL-MCNC: 21 MG/DL (ref 8–23)
CALCIUM SERPL-MCNC: 8 MG/DL (ref 8.8–10.2)
CHLORIDE SERPL-SCNC: 106 MMOL/L (ref 98–111)
CO2 SERPL-SCNC: 21 MMOL/L (ref 22–29)
CREAT SERPL-MCNC: 1.4 MG/DL (ref 0.5–1.2)
EKG P AXIS: NORMAL DEGREES
EKG P-R INTERVAL: NORMAL MS
EKG Q-T INTERVAL: 444 MS
EKG QRS DURATION: 94 MS
EKG QTC CALCULATION (BAZETT): 444 MS
EKG T AXIS: 133 DEGREES
EOSINOPHIL # BLD: 0.2 K/UL (ref 0–0.6)
EOSINOPHIL NFR BLD: 2.1 % (ref 0–5)
ERYTHROCYTE [DISTWIDTH] IN BLOOD BY AUTOMATED COUNT: 14.7 % (ref 11.5–14.5)
GLUCOSE SERPL-MCNC: 95 MG/DL (ref 74–109)
HCT VFR BLD AUTO: 34.6 % (ref 42–52)
HGB BLD-MCNC: 11 G/DL (ref 14–18)
IMM GRANULOCYTES # BLD: 0 K/UL
LYMPHOCYTES # BLD: 2 K/UL (ref 1.1–4.5)
LYMPHOCYTES NFR BLD: 18.7 % (ref 20–40)
MCH RBC QN AUTO: 30.4 PG (ref 27–31)
MCHC RBC AUTO-ENTMCNC: 31.8 G/DL (ref 33–37)
MCV RBC AUTO: 95.6 FL (ref 80–94)
MONOCYTES # BLD: 1.3 K/UL (ref 0–0.9)
MONOCYTES NFR BLD: 12.5 % (ref 0–10)
NEUTROPHILS # BLD: 7 K/UL (ref 1.5–7.5)
NEUTS SEG NFR BLD: 65.7 % (ref 50–65)
PLATELET # BLD AUTO: 224 K/UL (ref 130–400)
PMV BLD AUTO: 9.6 FL (ref 9.4–12.4)
POTASSIUM SERPL-SCNC: 3.8 MMOL/L (ref 3.5–5)
PROT SERPL-MCNC: 6.8 G/DL (ref 6.6–8.7)
RBC # BLD AUTO: 3.62 M/UL (ref 4.7–6.1)
SODIUM SERPL-SCNC: 137 MMOL/L (ref 136–145)
TROPONIN, HIGH SENSITIVITY: 43 NG/L (ref 0–22)
TROPONIN, HIGH SENSITIVITY: 43 NG/L (ref 0–22)
WBC # BLD AUTO: 10.6 K/UL (ref 4.8–10.8)

## 2024-06-23 PROCEDURE — 80053 COMPREHEN METABOLIC PANEL: CPT

## 2024-06-23 PROCEDURE — 71045 X-RAY EXAM CHEST 1 VIEW: CPT

## 2024-06-23 PROCEDURE — 93010 ELECTROCARDIOGRAM REPORT: CPT | Performed by: INTERNAL MEDICINE

## 2024-06-23 PROCEDURE — 73610 X-RAY EXAM OF ANKLE: CPT

## 2024-06-23 PROCEDURE — 85025 COMPLETE CBC W/AUTO DIFF WBC: CPT

## 2024-06-23 PROCEDURE — 99285 EMERGENCY DEPT VISIT HI MDM: CPT

## 2024-06-23 PROCEDURE — 93971 EXTREMITY STUDY: CPT

## 2024-06-23 PROCEDURE — 84484 ASSAY OF TROPONIN QUANT: CPT

## 2024-06-23 PROCEDURE — 36415 COLL VENOUS BLD VENIPUNCTURE: CPT

## 2024-06-23 PROCEDURE — 93005 ELECTROCARDIOGRAM TRACING: CPT | Performed by: STUDENT IN AN ORGANIZED HEALTH CARE EDUCATION/TRAINING PROGRAM

## 2024-06-23 RX ORDER — LISINOPRIL 10 MG/1
10 TABLET ORAL DAILY
COMMUNITY

## 2024-06-23 NOTE — PROGRESS NOTES
Vascular lab preliminary.  Right leg venous scan completed.  No evidence for DVT or SVT noted at this time.  There appears to be a ruptured baker's cyst behind right knee extending into calf.  Final report pending.

## 2024-06-23 NOTE — ED PROVIDER NOTES
[facility] EMERGENCY DEPT  eMERGENCY dEPARTMENT eNCOUnter      Pt Name: Khris Sampson  MRN: [id number]  Birthdate 1955  Date of evaluation: 6/23/2024  Provider: Vinita Carpio MD    CHIEF COMPLAINT       Chief Complaint   Patient presents with    Leg Swelling     Right lower leg swelling, possible DVT         HISTORY OF PRESENT ILLNESS   (Location/Symptom, Timing/Onset,Context/Setting, Quality, Duration, Modifying Factors, Severity)  Note limiting factors.     HPI    Khris Sampson is a 69 y.o. male with PMH of stroke, type 2 diabetes, hypothyroidism, COPD on chronic oxygen, seizures, arthritis, hypertension who presents to the emergency department with CC of right leg swelling.  Patient was sent from his nursing facility for DVT rule out.  There is no reported trauma.  Patient tells me that his right ankle has been swollen for \"a long time.\"  He has no history of DVT.  He denies any trauma.  He additionally reports some chest pain, unable to tell me how long this has been going on due to history of stroke.  He is on his baseline oxygen.  There are no other concerns per nursing facility.  He was recently admitted to our facility for chest pain rule out and had a low risk Lexiscan.        NursingNotes were reviewed.    REVIEW OF SYSTEMS    (2-9 systems for level 4, 10 or more for level 5)     Review of Systems   Constitutional:  Negative for appetite change, chills, fatigue and fever.   HENT:  Negative for congestion and sore throat.    Eyes:  Negative for pain and redness.   Respiratory:  Negative for cough, chest tightness and shortness of breath.    Cardiovascular:  Positive for chest pain and leg swelling.   Gastrointestinal:  Negative for abdominal pain, blood in stool, diarrhea, nausea and vomiting.   Genitourinary:  Negative for decreased urine volume, dysuria and frequency.   Musculoskeletal:  Negative for neck pain and neck stiffness.   Skin:  Negative for rash and wound.   Neurological:  Negative for dizziness,

## 2024-06-25 LAB — VAS RIGHT CFV RFX: 1.4 S

## 2024-06-25 PROCEDURE — 93971 EXTREMITY STUDY: CPT | Performed by: SURGERY

## 2025-04-03 ENCOUNTER — HOSPITAL ENCOUNTER (INPATIENT)
Facility: HOSPITAL | Age: 70
LOS: 6 days | Discharge: NURSING, REHAB OR LONG-TERM CARE | End: 2025-04-09
Attending: INTERNAL MEDICINE | Admitting: INTERNAL MEDICINE
Payer: MEDICARE

## 2025-04-03 DIAGNOSIS — Z74.09 IMPAIRED MOBILITY: Primary | ICD-10-CM

## 2025-04-03 RX ORDER — IPRATROPIUM BROMIDE AND ALBUTEROL SULFATE 2.5; .5 MG/3ML; MG/3ML
3 SOLUTION RESPIRATORY (INHALATION) ONCE
Status: COMPLETED | OUTPATIENT
Start: 2025-04-04 | End: 2025-04-04

## 2025-04-04 ENCOUNTER — APPOINTMENT (OUTPATIENT)
Dept: GENERAL RADIOLOGY | Facility: HOSPITAL | Age: 70
End: 2025-04-04
Payer: MEDICARE

## 2025-04-04 ENCOUNTER — APPOINTMENT (OUTPATIENT)
Dept: NUCLEAR MEDICINE | Facility: HOSPITAL | Age: 70
End: 2025-04-04
Payer: MEDICARE

## 2025-04-04 ENCOUNTER — APPOINTMENT (OUTPATIENT)
Dept: CARDIOLOGY | Facility: HOSPITAL | Age: 70
End: 2025-04-04
Payer: MEDICARE

## 2025-04-04 ENCOUNTER — APPOINTMENT (OUTPATIENT)
Dept: ULTRASOUND IMAGING | Facility: HOSPITAL | Age: 70
End: 2025-04-04
Payer: MEDICARE

## 2025-04-04 PROBLEM — J96.01 ACUTE RESPIRATORY FAILURE WITH HYPOXIA: Status: ACTIVE | Noted: 2025-04-04

## 2025-04-04 LAB
ALBUMIN SERPL-MCNC: 3.4 G/DL (ref 3.5–5.2)
ALBUMIN/GLOB SERPL: 0.9 G/DL
ALP SERPL-CCNC: 59 U/L (ref 39–117)
ALT SERPL W P-5'-P-CCNC: 8 U/L (ref 1–41)
ANION GAP SERPL CALCULATED.3IONS-SCNC: 17 MMOL/L (ref 5–15)
AORTIC DIMENSIONLESS INDEX: 0.67 (DI)
APTT PPP: 53.7 SECONDS (ref 24.5–36)
ARTERIAL PATENCY WRIST A: POSITIVE
AST SERPL-CCNC: 14 U/L (ref 1–40)
ATMOSPHERIC PRESS: 754 MMHG
AV MEAN PRESS GRAD SYS DOP V1V2: 13.1 MMHG
AV VMAX SYS DOP: 252 CM/SEC
B PARAPERT DNA SPEC QL NAA+PROBE: NOT DETECTED
B PERT DNA SPEC QL NAA+PROBE: NOT DETECTED
BACTERIA UR QL AUTO: ABNORMAL /HPF
BASE EXCESS BLDA CALC-SCNC: -2.3 MMOL/L (ref 0–2)
BASOPHILS # BLD AUTO: 0.07 10*3/MM3 (ref 0–0.2)
BASOPHILS NFR BLD AUTO: 0.2 % (ref 0–1.5)
BDY SITE: ABNORMAL
BH CV ECHO MEAS - AO MAX PG: 25.4 MMHG
BH CV ECHO MEAS - AO ROOT DIAM: 2.6 CM
BH CV ECHO MEAS - AO V2 VTI: 43.7 CM
BH CV ECHO MEAS - AVA(I,D): 2.11 CM2
BH CV ECHO MEAS - EDV(CUBED): 122.8 ML
BH CV ECHO MEAS - EDV(MOD-SP2): 112 ML
BH CV ECHO MEAS - EDV(MOD-SP4): 87.3 ML
BH CV ECHO MEAS - EF(MOD-SP2): 51.2 %
BH CV ECHO MEAS - EF(MOD-SP4): 62.1 %
BH CV ECHO MEAS - ESV(CUBED): 34.6 ML
BH CV ECHO MEAS - ESV(MOD-SP2): 54.6 ML
BH CV ECHO MEAS - ESV(MOD-SP4): 33.1 ML
BH CV ECHO MEAS - FS: 34.4 %
BH CV ECHO MEAS - IVS/LVPW: 1.46 CM
BH CV ECHO MEAS - IVSD: 1.01 CM
BH CV ECHO MEAS - LA DIMENSION: 3.2 CM
BH CV ECHO MEAS - LAT PEAK E' VEL: 7.1 CM/SEC
BH CV ECHO MEAS - LV DIASTOLIC VOL/BSA (35-75): 38.1 CM2
BH CV ECHO MEAS - LV MASS(C)D: 145.5 GRAMS
BH CV ECHO MEAS - LV MAX PG: 10.5 MMHG
BH CV ECHO MEAS - LV MEAN PG: 6 MMHG
BH CV ECHO MEAS - LV SYSTOLIC VOL/BSA (12-30): 14.5 CM2
BH CV ECHO MEAS - LV V1 MAX: 162 CM/SEC
BH CV ECHO MEAS - LV V1 VTI: 29.3 CM
BH CV ECHO MEAS - LVIDD: 5 CM
BH CV ECHO MEAS - LVIDS: 3.3 CM
BH CV ECHO MEAS - LVOT AREA: 3.1 CM2
BH CV ECHO MEAS - LVOT DIAM: 2 CM
BH CV ECHO MEAS - LVPWD: 0.69 CM
BH CV ECHO MEAS - MED PEAK E' VEL: 11.1 CM/SEC
BH CV ECHO MEAS - MV A MAX VEL: 104 CM/SEC
BH CV ECHO MEAS - MV DEC TIME: 0.21 SEC
BH CV ECHO MEAS - MV E MAX VEL: 71.3 CM/SEC
BH CV ECHO MEAS - MV E/A: 0.69
BH CV ECHO MEAS - RAP SYSTOLE: 3 MMHG
BH CV ECHO MEAS - SV(LVOT): 92.1 ML
BH CV ECHO MEAS - SV(MOD-SP2): 57.4 ML
BH CV ECHO MEAS - SV(MOD-SP4): 54.2 ML
BH CV ECHO MEAS - SVI(LVOT): 40.2 ML/M2
BH CV ECHO MEAS - SVI(MOD-SP2): 25.1 ML/M2
BH CV ECHO MEAS - SVI(MOD-SP4): 23.7 ML/M2
BH CV ECHO MEASUREMENTS AVERAGE E/E' RATIO: 7.84
BH CV XLRA - RV BASE: 4 CM
BH CV XLRA - RV LENGTH: 6.8 CM
BH CV XLRA - RV MID: 3.4 CM
BILIRUB SERPL-MCNC: 0.5 MG/DL (ref 0–1.2)
BILIRUB UR QL STRIP: NEGATIVE
BODY TEMPERATURE: 37
BUN SERPL-MCNC: 39 MG/DL (ref 8–23)
BUN/CREAT SERPL: 15.2 (ref 7–25)
C PNEUM DNA NPH QL NAA+NON-PROBE: NOT DETECTED
CALCIUM SPEC-SCNC: 8.6 MG/DL (ref 8.6–10.5)
CHLORIDE SERPL-SCNC: 102 MMOL/L (ref 98–107)
CLARITY UR: CLEAR
CO2 SERPL-SCNC: 21 MMOL/L (ref 22–29)
COLOR UR: YELLOW
CREAT SERPL-MCNC: 2.57 MG/DL (ref 0.76–1.27)
D-LACTATE SERPL-SCNC: 1.9 MMOL/L (ref 0.5–2)
D-LACTATE SERPL-SCNC: 3 MMOL/L (ref 0.5–2)
DEPRECATED RDW RBC AUTO: 47.9 FL (ref 37–54)
EGFRCR SERPLBLD CKD-EPI 2021: 26.4 ML/MIN/1.73
EOSINOPHIL # BLD AUTO: 0.04 10*3/MM3 (ref 0–0.4)
EOSINOPHIL NFR BLD AUTO: 0.1 % (ref 0.3–6.2)
ERYTHROCYTE [DISTWIDTH] IN BLOOD BY AUTOMATED COUNT: 14.6 % (ref 12.3–15.4)
FLUAV SUBTYP SPEC NAA+PROBE: NOT DETECTED
FLUBV RNA ISLT QL NAA+PROBE: NOT DETECTED
GAS FLOW AIRWAY: 12 LPM
GEN 5 1HR TROPONIN T REFLEX: 227 NG/L
GLOBULIN UR ELPH-MCNC: 3.7 GM/DL
GLUCOSE SERPL-MCNC: 170 MG/DL (ref 65–99)
GLUCOSE UR STRIP-MCNC: NEGATIVE MG/DL
HADV DNA SPEC NAA+PROBE: NOT DETECTED
HCO3 BLDA-SCNC: 23.6 MMOL/L (ref 20–26)
HCOV 229E RNA SPEC QL NAA+PROBE: NOT DETECTED
HCOV HKU1 RNA SPEC QL NAA+PROBE: NOT DETECTED
HCOV NL63 RNA SPEC QL NAA+PROBE: NOT DETECTED
HCOV OC43 RNA SPEC QL NAA+PROBE: NOT DETECTED
HCT VFR BLD AUTO: 41 % (ref 37.5–51)
HGB BLD-MCNC: 13.3 G/DL (ref 13–17.7)
HGB UR QL STRIP.AUTO: ABNORMAL
HMPV RNA NPH QL NAA+NON-PROBE: NOT DETECTED
HPIV1 RNA ISLT QL NAA+PROBE: NOT DETECTED
HPIV2 RNA SPEC QL NAA+PROBE: NOT DETECTED
HPIV3 RNA NPH QL NAA+PROBE: NOT DETECTED
HPIV4 P GENE NPH QL NAA+PROBE: NOT DETECTED
HYALINE CASTS UR QL AUTO: ABNORMAL /LPF
IMM GRANULOCYTES # BLD AUTO: 0.73 10*3/MM3 (ref 0–0.05)
IMM GRANULOCYTES NFR BLD AUTO: 2.5 % (ref 0–0.5)
INHALED O2 CONCENTRATION: <21 %
INR PPP: 1.09 (ref 0.91–1.09)
KETONES UR QL STRIP: NEGATIVE
L PNEUMO1 AG UR QL IA: NEGATIVE
LEFT ATRIUM VOLUME INDEX: 17.4 ML/M2
LEFT ATRIUM VOLUME: 39.9 ML
LEUKOCYTE ESTERASE UR QL STRIP.AUTO: NEGATIVE
LV EF BIPLANE MOD: 57.4 %
LYMPHOCYTES # BLD AUTO: 0.85 10*3/MM3 (ref 0.7–3.1)
LYMPHOCYTES NFR BLD AUTO: 2.9 % (ref 19.6–45.3)
Lab: ABNORMAL
M PNEUMO IGG SER IA-ACNC: NOT DETECTED
MAGNESIUM SERPL-MCNC: 2.2 MG/DL (ref 1.6–2.4)
MCH RBC QN AUTO: 28.8 PG (ref 26.6–33)
MCHC RBC AUTO-ENTMCNC: 32.4 G/DL (ref 31.5–35.7)
MCV RBC AUTO: 88.7 FL (ref 79–97)
MODALITY: ABNORMAL
MONOCYTES # BLD AUTO: 2.48 10*3/MM3 (ref 0.1–0.9)
MONOCYTES NFR BLD AUTO: 8.4 % (ref 5–12)
MRSA DNA SPEC QL NAA+PROBE: ABNORMAL
NEUTROPHILS NFR BLD AUTO: 25.42 10*3/MM3 (ref 1.7–7)
NEUTROPHILS NFR BLD AUTO: 85.9 % (ref 42.7–76)
NITRITE UR QL STRIP: NEGATIVE
NRBC BLD AUTO-RTO: 0 /100 WBC (ref 0–0.2)
NT-PROBNP SERPL-MCNC: 298.6 PG/ML (ref 0–900)
PCO2 BLDA: 43.9 MM HG (ref 35–45)
PCO2 TEMP ADJ BLD: 43.9 MM HG (ref 35–45)
PH BLDA: 7.34 PH UNITS (ref 7.35–7.45)
PH UR STRIP.AUTO: <=5 [PH] (ref 5–8)
PH, TEMP CORRECTED: 7.34 PH UNITS (ref 7.35–7.45)
PHOSPHATE SERPL-MCNC: 4.9 MG/DL (ref 2.5–4.5)
PLATELET # BLD AUTO: 244 10*3/MM3 (ref 140–450)
PMV BLD AUTO: 10.4 FL (ref 6–12)
PO2 BLD: >416 MM[HG] (ref 0–500)
PO2 BLDA: 87.4 MM HG (ref 83–108)
PO2 TEMP ADJ BLD: 87.4 MM HG (ref 83–108)
POTASSIUM SERPL-SCNC: 4.2 MMOL/L (ref 3.5–5.2)
PROCALCITONIN SERPL-MCNC: 0.79 NG/ML (ref 0–0.25)
PROT SERPL-MCNC: 7.1 G/DL (ref 6–8.5)
PROT UR QL STRIP: ABNORMAL
PROTHROMBIN TIME: 14.7 SECONDS (ref 11.8–14.8)
QT INTERVAL: 296 MS
QTC INTERVAL: 427 MS
RBC # BLD AUTO: 4.62 10*6/MM3 (ref 4.14–5.8)
RBC # UR STRIP: ABNORMAL /HPF
REF LAB TEST METHOD: ABNORMAL
RHINOVIRUS RNA SPEC NAA+PROBE: NOT DETECTED
RSV RNA NPH QL NAA+NON-PROBE: NOT DETECTED
S PNEUM AG SPEC QL LA: NEGATIVE
SAO2 % BLDCOA: 96.5 % (ref 94–99)
SARS-COV-2 RNA RESP QL NAA+PROBE: NOT DETECTED
SODIUM SERPL-SCNC: 140 MMOL/L (ref 136–145)
SP GR UR STRIP: 1.02 (ref 1–1.03)
SQUAMOUS #/AREA URNS HPF: ABNORMAL /HPF
TROPONIN T % DELTA: 4
TROPONIN T NUMERIC DELTA: 8 NG/L
TROPONIN T SERPL HS-MCNC: 219 NG/L
UFH PPP CHRO-ACNC: 0.31 IU/ML (ref 0.3–0.7)
UFH PPP CHRO-ACNC: 0.33 IU/ML (ref 0.3–0.7)
UFH PPP CHRO-ACNC: 0.38 IU/ML (ref 0.3–0.7)
UROBILINOGEN UR QL STRIP: ABNORMAL
VENTILATOR MODE: ABNORMAL
WBC # UR STRIP: ABNORMAL /HPF
WBC NRBC COR # BLD AUTO: 29.59 10*3/MM3 (ref 3.4–10.8)

## 2025-04-04 PROCEDURE — 85520 HEPARIN ASSAY: CPT | Performed by: NURSE PRACTITIONER

## 2025-04-04 PROCEDURE — 93306 TTE W/DOPPLER COMPLETE: CPT | Performed by: HOSPITALIST

## 2025-04-04 PROCEDURE — 25510000001 PERFLUTREN 6.52 MG/ML SUSPENSION: Performed by: INTERNAL MEDICINE

## 2025-04-04 PROCEDURE — 85610 PROTHROMBIN TIME: CPT | Performed by: NURSE PRACTITIONER

## 2025-04-04 PROCEDURE — 25810000003 SODIUM CHLORIDE 0.9 % SOLUTION: Performed by: NURSE PRACTITIONER

## 2025-04-04 PROCEDURE — 94640 AIRWAY INHALATION TREATMENT: CPT

## 2025-04-04 PROCEDURE — 87899 AGENT NOS ASSAY W/OPTIC: CPT | Performed by: NURSE PRACTITIONER

## 2025-04-04 PROCEDURE — 25010000002 HEPARIN (PORCINE) 25000-0.45 UT/250ML-% SOLUTION: Performed by: NURSE PRACTITIONER

## 2025-04-04 PROCEDURE — 93005 ELECTROCARDIOGRAM TRACING: CPT | Performed by: NURSE PRACTITIONER

## 2025-04-04 PROCEDURE — 93010 ELECTROCARDIOGRAM REPORT: CPT | Performed by: INTERNAL MEDICINE

## 2025-04-04 PROCEDURE — 71045 X-RAY EXAM CHEST 1 VIEW: CPT

## 2025-04-04 PROCEDURE — 25010000002 VANCOMYCIN PER 500 MG: Performed by: NURSE PRACTITIONER

## 2025-04-04 PROCEDURE — 84100 ASSAY OF PHOSPHORUS: CPT | Performed by: NURSE PRACTITIONER

## 2025-04-04 PROCEDURE — 93970 EXTREMITY STUDY: CPT | Performed by: STUDENT IN AN ORGANIZED HEALTH CARE EDUCATION/TRAINING PROGRAM

## 2025-04-04 PROCEDURE — 87449 NOS EACH ORGANISM AG IA: CPT | Performed by: NURSE PRACTITIONER

## 2025-04-04 PROCEDURE — 87641 MR-STAPH DNA AMP PROBE: CPT | Performed by: INTERNAL MEDICINE

## 2025-04-04 PROCEDURE — 81001 URINALYSIS AUTO W/SCOPE: CPT | Performed by: NURSE PRACTITIONER

## 2025-04-04 PROCEDURE — 36600 WITHDRAWAL OF ARTERIAL BLOOD: CPT

## 2025-04-04 PROCEDURE — 87040 BLOOD CULTURE FOR BACTERIA: CPT | Performed by: NURSE PRACTITIONER

## 2025-04-04 PROCEDURE — 94799 UNLISTED PULMONARY SVC/PX: CPT

## 2025-04-04 PROCEDURE — 0202U NFCT DS 22 TRGT SARS-COV-2: CPT | Performed by: NURSE PRACTITIONER

## 2025-04-04 PROCEDURE — 34310000005 TECHNETIUM ALBUMIN AGGREGATED: Performed by: INTERNAL MEDICINE

## 2025-04-04 PROCEDURE — 83605 ASSAY OF LACTIC ACID: CPT | Performed by: NURSE PRACTITIONER

## 2025-04-04 PROCEDURE — 80053 COMPREHEN METABOLIC PANEL: CPT | Performed by: NURSE PRACTITIONER

## 2025-04-04 PROCEDURE — 93306 TTE W/DOPPLER COMPLETE: CPT

## 2025-04-04 PROCEDURE — 25010000002 ENOXAPARIN PER 10 MG: Performed by: NURSE PRACTITIONER

## 2025-04-04 PROCEDURE — 83735 ASSAY OF MAGNESIUM: CPT | Performed by: NURSE PRACTITIONER

## 2025-04-04 PROCEDURE — 82803 BLOOD GASES ANY COMBINATION: CPT

## 2025-04-04 PROCEDURE — 25010000002 PIPERACILLIN SOD-TAZOBACTAM PER 1 G: Performed by: NURSE PRACTITIONER

## 2025-04-04 PROCEDURE — 94660 CPAP INITIATION&MGMT: CPT

## 2025-04-04 PROCEDURE — 85025 COMPLETE CBC W/AUTO DIFF WBC: CPT | Performed by: NURSE PRACTITIONER

## 2025-04-04 PROCEDURE — 84484 ASSAY OF TROPONIN QUANT: CPT | Performed by: NURSE PRACTITIONER

## 2025-04-04 PROCEDURE — 93970 EXTREMITY STUDY: CPT

## 2025-04-04 PROCEDURE — 85730 THROMBOPLASTIN TIME PARTIAL: CPT | Performed by: NURSE PRACTITIONER

## 2025-04-04 PROCEDURE — 78580 LUNG PERFUSION IMAGING: CPT

## 2025-04-04 PROCEDURE — 84145 PROCALCITONIN (PCT): CPT | Performed by: NURSE PRACTITIONER

## 2025-04-04 PROCEDURE — A9540 TC99M MAA: HCPCS | Performed by: INTERNAL MEDICINE

## 2025-04-04 PROCEDURE — 83880 ASSAY OF NATRIURETIC PEPTIDE: CPT | Performed by: NURSE PRACTITIONER

## 2025-04-04 RX ORDER — CHLORHEXIDINE GLUCONATE 500 MG/1
1 CLOTH TOPICAL ONCE
Status: COMPLETED | OUTPATIENT
Start: 2025-04-04 | End: 2025-04-04

## 2025-04-04 RX ORDER — TAMSULOSIN HYDROCHLORIDE 0.4 MG/1
2 CAPSULE ORAL DAILY
COMMUNITY

## 2025-04-04 RX ORDER — ERGOCALCIFEROL 1.25 MG/1
50000 CAPSULE, LIQUID FILLED ORAL WEEKLY
COMMUNITY

## 2025-04-04 RX ORDER — SODIUM CHLORIDE 0.9 % (FLUSH) 0.9 %
10 SYRINGE (ML) INJECTION AS NEEDED
Status: DISCONTINUED | OUTPATIENT
Start: 2025-04-04 | End: 2025-04-09 | Stop reason: HOSPADM

## 2025-04-04 RX ORDER — BUDESONIDE, GLYCOPYRROLATE, AND FORMOTEROL FUMARATE 160; 9; 4.8 UG/1; UG/1; UG/1
2 AEROSOL, METERED RESPIRATORY (INHALATION) 2 TIMES DAILY
COMMUNITY

## 2025-04-04 RX ORDER — BISACODYL 10 MG
10 SUPPOSITORY, RECTAL RECTAL DAILY PRN
COMMUNITY

## 2025-04-04 RX ORDER — ACETAMINOPHEN 325 MG/1
650 TABLET ORAL EVERY 6 HOURS PRN
Status: DISCONTINUED | OUTPATIENT
Start: 2025-04-04 | End: 2025-04-09 | Stop reason: HOSPADM

## 2025-04-04 RX ORDER — IPRATROPIUM BROMIDE AND ALBUTEROL SULFATE 2.5; .5 MG/3ML; MG/3ML
3 SOLUTION RESPIRATORY (INHALATION) 4 TIMES DAILY PRN
COMMUNITY

## 2025-04-04 RX ORDER — RISPERIDONE 0.5 MG/1
0.5 TABLET ORAL 2 TIMES DAILY
COMMUNITY

## 2025-04-04 RX ORDER — ACETAMINOPHEN 325 MG/1
650 TABLET ORAL EVERY 6 HOURS PRN
COMMUNITY

## 2025-04-04 RX ORDER — SODIUM CHLORIDE 9 MG/ML
100 INJECTION, SOLUTION INTRAVENOUS CONTINUOUS
Status: DISPENSED | OUTPATIENT
Start: 2025-04-04 | End: 2025-04-05

## 2025-04-04 RX ORDER — HEPARIN SODIUM 10000 [USP'U]/100ML
13 INJECTION, SOLUTION INTRAVENOUS
Status: DISCONTINUED | OUTPATIENT
Start: 2025-04-04 | End: 2025-04-04

## 2025-04-04 RX ORDER — RISPERIDONE 25 MG/2 ML
25 KIT INTRAMUSCULAR
COMMUNITY

## 2025-04-04 RX ORDER — SODIUM PHOSPHATE,MONO-DIBASIC 19G-7G/197
1 ENEMA (ML) RECTAL ONCE AS NEEDED
COMMUNITY
End: 2025-04-09 | Stop reason: HOSPADM

## 2025-04-04 RX ORDER — SODIUM CHLORIDE 0.9 % (FLUSH) 0.9 %
10 SYRINGE (ML) INJECTION EVERY 12 HOURS SCHEDULED
Status: DISCONTINUED | OUTPATIENT
Start: 2025-04-04 | End: 2025-04-09 | Stop reason: HOSPADM

## 2025-04-04 RX ORDER — HEPARIN SODIUM 1000 [USP'U]/ML
2000 INJECTION, SOLUTION INTRAVENOUS; SUBCUTANEOUS AS NEEDED
Status: DISCONTINUED | OUTPATIENT
Start: 2025-04-03 | End: 2025-04-04

## 2025-04-04 RX ORDER — POTASSIUM CHLORIDE 1500 MG/1
20 TABLET, EXTENDED RELEASE ORAL DAILY
COMMUNITY
End: 2025-04-09 | Stop reason: HOSPADM

## 2025-04-04 RX ORDER — SODIUM CHLORIDE 9 MG/ML
40 INJECTION, SOLUTION INTRAVENOUS AS NEEDED
Status: DISCONTINUED | OUTPATIENT
Start: 2025-04-04 | End: 2025-04-09 | Stop reason: HOSPADM

## 2025-04-04 RX ORDER — AMOXICILLIN 250 MG
2 CAPSULE ORAL 2 TIMES DAILY
Status: DISCONTINUED | OUTPATIENT
Start: 2025-04-04 | End: 2025-04-09 | Stop reason: HOSPADM

## 2025-04-04 RX ORDER — BISACODYL 5 MG/1
5 TABLET, DELAYED RELEASE ORAL DAILY PRN
Status: DISCONTINUED | OUTPATIENT
Start: 2025-04-04 | End: 2025-04-09 | Stop reason: HOSPADM

## 2025-04-04 RX ORDER — ONDANSETRON 2 MG/ML
4 INJECTION INTRAMUSCULAR; INTRAVENOUS EVERY 6 HOURS PRN
Status: DISCONTINUED | OUTPATIENT
Start: 2025-04-04 | End: 2025-04-09 | Stop reason: HOSPADM

## 2025-04-04 RX ORDER — HEPARIN SODIUM 1000 [USP'U]/ML
4000 INJECTION, SOLUTION INTRAVENOUS; SUBCUTANEOUS AS NEEDED
Status: DISCONTINUED | OUTPATIENT
Start: 2025-04-03 | End: 2025-04-04

## 2025-04-04 RX ORDER — BISACODYL 10 MG
10 SUPPOSITORY, RECTAL RECTAL DAILY PRN
Status: DISCONTINUED | OUTPATIENT
Start: 2025-04-04 | End: 2025-04-09 | Stop reason: HOSPADM

## 2025-04-04 RX ORDER — FUROSEMIDE 40 MG/1
40 TABLET ORAL 2 TIMES DAILY
COMMUNITY
End: 2025-04-09 | Stop reason: HOSPADM

## 2025-04-04 RX ORDER — VANCOMYCIN/0.9 % SOD CHLORIDE 750 MG/250
750 PLASTIC BAG, INJECTION (ML) INTRAVENOUS EVERY 24 HOURS
Status: DISCONTINUED | OUTPATIENT
Start: 2025-04-04 | End: 2025-04-05

## 2025-04-04 RX ORDER — LEVOTHYROXINE SODIUM 150 UG/1
150 TABLET ORAL
COMMUNITY

## 2025-04-04 RX ORDER — ENOXAPARIN SODIUM 100 MG/ML
30 INJECTION SUBCUTANEOUS NIGHTLY
Status: DISCONTINUED | OUTPATIENT
Start: 2025-04-04 | End: 2025-04-05

## 2025-04-04 RX ORDER — CHLORHEXIDINE GLUCONATE 500 MG/1
1 CLOTH TOPICAL EVERY 24 HOURS
Status: DISCONTINUED | OUTPATIENT
Start: 2025-04-05 | End: 2025-04-05

## 2025-04-04 RX ORDER — DIVALPROEX SODIUM 500 MG/1
500 TABLET, DELAYED RELEASE ORAL 2 TIMES DAILY
COMMUNITY

## 2025-04-04 RX ORDER — PANTOPRAZOLE SODIUM 40 MG/1
40 TABLET, DELAYED RELEASE ORAL DAILY
COMMUNITY

## 2025-04-04 RX ORDER — POLYETHYLENE GLYCOL 3350 17 G/17G
17 POWDER, FOR SOLUTION ORAL DAILY PRN
Status: DISCONTINUED | OUTPATIENT
Start: 2025-04-04 | End: 2025-04-09 | Stop reason: HOSPADM

## 2025-04-04 RX ORDER — LISINOPRIL 10 MG/1
10 TABLET ORAL 2 TIMES DAILY
COMMUNITY

## 2025-04-04 RX ADMIN — SENNOSIDES, DOCUSATE SODIUM 2 TABLET: 50; 8.6 TABLET, FILM COATED ORAL at 02:04

## 2025-04-04 RX ADMIN — ENOXAPARIN SODIUM 30 MG: 100 INJECTION SUBCUTANEOUS at 20:02

## 2025-04-04 RX ADMIN — ACETAMINOPHEN 650 MG: 325 TABLET, FILM COATED ORAL at 20:16

## 2025-04-04 RX ADMIN — PIPERACILLIN AND TAZOBACTAM 3.38 G: 3; .375 INJECTION, POWDER, FOR SOLUTION INTRAVENOUS at 16:43

## 2025-04-04 RX ADMIN — Medication 1 APPLICATION: at 02:03

## 2025-04-04 RX ADMIN — PIPERACILLIN SODIUM AND TAZOBACTAM SODIUM 3.38 G: 3; .375 INJECTION, POWDER, LYOPHILIZED, FOR SOLUTION INTRAVENOUS at 01:59

## 2025-04-04 RX ADMIN — SODIUM CHLORIDE 100 ML/HR: 9 INJECTION, SOLUTION INTRAVENOUS at 20:51

## 2025-04-04 RX ADMIN — Medication 750 MG: at 05:06

## 2025-04-04 RX ADMIN — PIPERACILLIN AND TAZOBACTAM 3.38 G: 3; .375 INJECTION, POWDER, FOR SOLUTION INTRAVENOUS at 07:50

## 2025-04-04 RX ADMIN — IPRATROPIUM BROMIDE AND ALBUTEROL SULFATE 3 ML: .5; 3 SOLUTION RESPIRATORY (INHALATION) at 00:08

## 2025-04-04 RX ADMIN — ACETAMINOPHEN 650 MG: 325 TABLET, FILM COATED ORAL at 08:00

## 2025-04-04 RX ADMIN — Medication 10 ML: at 02:04

## 2025-04-04 RX ADMIN — PERFLUTREN 9.78 MG: 6.52 INJECTION, SUSPENSION INTRAVENOUS at 08:14

## 2025-04-04 RX ADMIN — KIT FOR THE PREPARATION OF TECHNETIUM TC 99M ALBUMIN AGGREGATED 1 DOSE: 2.5 INJECTION, POWDER, FOR SOLUTION INTRAVENOUS at 11:35

## 2025-04-04 RX ADMIN — CHLORHEXIDINE GLUCONATE 1 APPLICATION: 500 CLOTH TOPICAL at 01:11

## 2025-04-04 RX ADMIN — Medication 1 APPLICATION: at 09:03

## 2025-04-04 RX ADMIN — SODIUM CHLORIDE 100 ML/HR: 9 INJECTION, SOLUTION INTRAVENOUS at 11:55

## 2025-04-04 RX ADMIN — SENNOSIDES, DOCUSATE SODIUM 2 TABLET: 50; 8.6 TABLET, FILM COATED ORAL at 09:03

## 2025-04-04 RX ADMIN — Medication 1 APPLICATION: at 20:02

## 2025-04-04 RX ADMIN — HEPARIN SODIUM 13 UNITS/KG/HR: 10000 INJECTION, SOLUTION INTRAVENOUS at 02:04

## 2025-04-04 NOTE — CASE MANAGEMENT/SOCIAL WORK
Discharge Planning Assessment   [place]     Patient Name: Reza Cruz  MRN: [id number]  Today's Date: 4/4/2025    Admit Date: 4/3/2025        Discharge Needs Assessment       Row Name 04/04/25 1125       Living Environment    People in Home facility resident    Name(s) of People in Home Thu Moreno    Current Living Arrangements extended care facility    Potentially Unsafe Housing Conditions none    In the past 12 months has the electric, gas, oil, or water company threatened to shut off services in your home? No    Able to Return to Prior Arrangements yes       Resource/Environmental Concerns    Resource/Environmental Concerns none    Transportation Concerns none       Transportation Needs    In the past 12 months, has lack of transportation kept you from medical appointments or from getting medications? no    In the past 12 months, has lack of transportation kept you from meetings, work, or from getting things needed for daily living? No       Food Insecurity    Within the past 12 months, you worried that your food would run out before you got the money to buy more. Never true    Within the past 12 months, the food you bought just didn't last and you didn't have money to get more. Never true       Transition Planning    Patient/Family Anticipates Transition to long-term care facility    Patient/Family Anticipated Services at Transition skilled nursing    Transportation Anticipated family or friend will provide;health plan transportation;public transportation       Discharge Needs Assessment    Readmission Within the Last 30 Days no previous admission in last 30 days    Current Outpatient/Agency/Support Group skilled nursing facility    Concerns to be Addressed care coordination/care conferences;discharge planning    Do you want help finding or keeping work or a job? I do not need or want help    Do you want help with school or training? For example, starting or completing job training or getting a high  school diploma, GED or equivalent No    Anticipated Changes Related to Illness none    Outpatient/Agency/Support Group Needs skilled nursing facility    Discharge Facility/Level of Care Needs nursing facility, skilled    Discharge Coordination/Progress Patient resides at [facility].  Patient has a bed hold and plans to return to this facility upon discharge.  [facility]  [phone]                   Discharge Plan    No documentation.                      Demographic Summary    No documentation.                  Functional Status    No documentation.                  Psychosocial    No documentation.                  Abuse/Neglect    No documentation.                  Legal    No documentation.                  Substance Abuse    No documentation.                  Patient Forms    No documentation.                     CAROLINA MasseyW

## 2025-04-04 NOTE — PROGRESS NOTES
"Pharmacy Dosing Service  Pharmacokinetics  Vancomycin Follow-up Evaluation    Assessment/Action/Plan:  Current Order: Vancomycin 750 mg IVPB every 24 hours  Current end date:4/8/25  Levels: 4/7 @ 0330  Additional antimicrobial agent(s): zosyn    . Pharmacy will continue to follow daily and adjust dose accordingly.     Subjective:  Reza Cruz is a 70 y.o. male currently on Vancomycin 750 mg IV every 24 hours for the treatment of pneumonia, day 1 of 5 of treatment.    AUC Model Data:  Loading dose:   Regimen: 750 mg IV every 24 hours.  Start time: 02:22 on 04/04/2025  Exposure target: AUC24 (range)400-600 mg/L.hr   AUC24,ss: 499 mg/L.hr  Probability of AUC24 > 400: 69 %  Ctrough,ss: 15.9 mg/L  Probability of Ctrough,ss > 20: 32 %  Probability of nephrotoxicity (Lodise MICHELE 2009): 11 %    Objective:  Ht: 182.9 cm (72\"); Wt: 108 kg (237 lb 3.4 oz)  Estimated Creatinine Clearance: 34.9 mL/min (A) (by C-G formula based on SCr of 2.57 mg/dL (H)).   Creatinine   Date Value Ref Range Status   04/04/2025 2.57 (H) 0.76 - 1.27 mg/dL Final   06/11/2024 1.4 (H) 0.5 - 1.2 mg/dL Final   05/30/2022 1.2 0.5 - 1.2 mg/dL Final   08/24/2020 0.8 0.5 - 1.2 mg/dL Final      Lab Results   Component Value Date    WBC 29.59 (H) 04/04/2025    WBC 10.6 06/23/2024    WBC 10.6 06/12/2024         Lab Results   Component Value Date    VANCOTROUGH 12.4 03/08/2020       Culture Results:  Microbiology Results (last 10 days)       ** No results found for the last 240 hours. **            Hira Mathews, [facility]   04/04/25 01:25 CDT    "

## 2025-04-04 NOTE — PROGRESS NOTES
Pharmacy Review Antimicrobial Stewardship     Current Antimicrobials:   Pharmacy to dose vancomycin  piperacillin-tazobactam (ZOSYN) 3.375 g IVPB in 100 mL NS MBP (CD)    Indication: pneumonia  Days of Therapy: 1 of 5    Is the therapy & duration appropriate based on evidence-based guidelines?: will determine with labs  Changes Recommended: not at this time  Action: continue to monitor    Hira Mathews RPH  04/04/25 01:19 CDT      Subjective:   Diagnosis:   No diagnosis found.     Allergies:  Allergies as of 04/03/2025 - Reviewed 04/03/2025   Allergen Reaction Noted    Codeine Itching and Rash 08/15/2018       Objective:  Lab Results   Component Value Date    WBC 29.59 (H) 04/04/2025     Temp Readings from Last 1 Encounters:   04/03/25 97.6 °F (36.4 °C) (Axillary)       Culture Results:  Lab Results   Component Value Date    BLOODCX No growth at 5 days 01/13/2020    BLOODCX No growth at 5 days 01/13/2020    URINECX >100,000 CFU/mL Mixed [place]is Isolated 01/13/2020

## 2025-04-04 NOTE — CONSULTS
20 gauge 1.75 inch USGPIV placed in left forearm with 1 attempt(s).     20 gauge 2.5 inch USGPIV placed in right upper arm with 1 attempt(s).

## 2025-04-04 NOTE — H&P
[facility]   HISTORY AND PHYSICAL    Patient Name: Reza Cruz  : 1955  MRN: [id number]  Primary Care Physician:  Lobito Trevino Jr., MD  Date of admission: 4/3/2025    Subjective   Subjective     Chief Complaint: respiratory distress    History of Present Illness    Mr. Cruz is a 70 year old with PMH of CVA with residual right sided deficits and speech difficulty, HLD, GERD, COPD on 2 liters at baseline, HTN. He resides in a SNF. He presented to [facility] ED with complaints of sudden onset shortness of breath and chest discomfort.  He was given NTG x 2 prior to arrival and a nebulizer treatment.  On arrival to ED he was in significant respiratory distress and hypotensive.  Significant findings in ED included WBC 29, glucose 232, BUN 32, creatinine 2.1, D-dimer 1390, troponin normal, proBNP normal.  CXR showed no acute findings.  There was concern for PE but CT PE was deferred due to ROSELIA and he was started on heparin drip.  He was given Zosyn.He is transferred to [facility] and admitted to the ICU for further care.    Review of Systems   Constitutional: Negative.    HENT: Negative.     Eyes: Negative.    Respiratory:  Positive for chest tightness and shortness of breath.    Cardiovascular: Negative.    Gastrointestinal: Negative.    Endocrine: Negative.    Genitourinary: Negative.    Musculoskeletal: Negative.    Skin: Negative.    Allergic/Immunologic: Negative.    Neurological: Negative.    Hematological: Negative.    Psychiatric/Behavioral: Negative.          Personal History     Past Medical History:   Diagnosis Date   • COPD (chronic obstructive pulmonary disease)    • Hyperlipidemia    • Hypertension    • Personal history of nicotine dependence 2019       Past Surgical History:   Procedure Laterality Date   • EYE SURGERY Right    • KNEE SURGERY Right        Family History: family history includes Cancer in his mother; Heart attack in his father; Peripheral vascular  disease in his father; Stroke in his mother. Otherwise pertinent FHx was reviewed and not pertinent to current issue.    Social History:  reports that he quit smoking about 15 years ago. He has never used smokeless tobacco. He reports that he does not drink alcohol and does not use drugs.    Home Medications:  amLODIPine, aspirin, atorvastatin, ipratropium-albuterol, levothyroxine, lisinopril, polyethylene glycol, and sennosides-docusate    Allergies:  Allergies   Allergen Reactions   • Codeine Itching and Rash       Objective    Objective     Vitals:   Temp:  [97.6 °F (36.4 °C)] 97.6 °F (36.4 °C)  Heart Rate:  [115-116] 116  Resp:  [26-30] 30  BP: (76)/(59) 76/59  Flow (L/min) (Oxygen Therapy):  [15] 15    Physical Exam  Vitals and nursing note reviewed.   Constitutional:       General: He is in acute distress.   HENT:      Head: Normocephalic and atraumatic.      Nose: Nose normal.      Mouth/Throat:      Mouth: Mucous membranes are moist.      Pharynx: Oropharynx is clear.   Eyes:      Extraocular Movements: Extraocular movements intact.      Conjunctiva/sclera: Conjunctivae normal.      Pupils: Pupils are equal, round, and reactive to light.   Cardiovascular:      Rate and Rhythm: Regular rhythm. Tachycardia present.      Pulses: Normal pulses.      Heart sounds: Normal heart sounds.   Pulmonary:      Effort: Respiratory distress present.      Breath sounds: Rhonchi present.   Abdominal:      General: Bowel sounds are normal.      Palpations: Abdomen is soft.   Musculoskeletal:         General: Normal range of motion.      Cervical back: Normal range of motion and neck supple.   Skin:     General: Skin is warm and dry.      Capillary Refill: Capillary refill takes less than 2 seconds.   Neurological:      Mental Status: He is oriented to person, place, and time. Mental status is at baseline.   Psychiatric:         Mood and Affect: Mood normal.         Behavior: Behavior normal.         Thought Content: Thought  content normal.         Judgment: Judgment normal.       Results for orders placed or performed during the hospital encounter of 04/03/25   Blood Gas, Arterial -    Collection Time: 04/04/25 12:03 AM    Specimen: Arterial Blood   Result Value Ref Range    Site Right Radial     Luke's Test Positive     pH, Arterial 7.340 (L) 7.350 - 7.450 pH units    pCO2, Arterial 43.9 35.0 - 45.0 mm Hg    pO2, Arterial 87.4 83.0 - 108.0 mm Hg    HCO3, Arterial 23.6 20.0 - 26.0 mmol/L    Base Excess, Arterial -2.3 (L) 0.0 - 2.0 mmol/L    O2 Saturation, Arterial 96.5 94.0 - 99.0 %    Temperature 37.0     Barometric Pressure for Blood Gas 754 mmHg    Modality NRB     FIO2 <21 %    Flow Rate 12.0 lpm    Ventilator Mode NA     Collected by [id number]     pCO2, Temperature Corrected 43.9 35 - 45 mm Hg    pH, Temp Corrected 7.340 (L) 7.350 - 7.450 pH Units    pO2, Temperature Corrected 87.4 83 - 108 mm Hg    PO2/FIO2 >416 0 - 500   ECG 12 Lead Tachycardia    Collection Time: 04/04/25 12:08 AM   Result Value Ref Range    QT Interval 296 ms    QTC Interval 427 ms   Comprehensive Metabolic Panel    Collection Time: 04/04/25 12:25 AM    Specimen: Blood   Result Value Ref Range    Glucose 170 (H) 65 - 99 mg/dL    BUN 39 (H) 8 - 23 mg/dL    Creatinine 2.57 (H) 0.76 - 1.27 mg/dL    Sodium 140 136 - 145 mmol/L    Potassium 4.2 3.5 - 5.2 mmol/L    Chloride 102 98 - 107 mmol/L    CO2 21.0 (L) 22.0 - 29.0 mmol/L    Calcium 8.6 8.6 - 10.5 mg/dL    Total Protein 7.1 6.0 - 8.5 g/dL    Albumin 3.4 (L) 3.5 - 5.2 g/dL    ALT (SGPT) 8 1 - 41 U/L    AST (SGOT) 14 1 - 40 U/L    Alkaline Phosphatase 59 39 - 117 U/L    Total Bilirubin 0.5 0.0 - 1.2 mg/dL    Globulin 3.7 gm/dL    A/G Ratio 0.9 g/dL    BUN/Creatinine Ratio 15.2 7.0 - 25.0    Anion Gap 17.0 (H) 5.0 - 15.0 mmol/L    eGFR 26.4 (L) >60.0 mL/min/1.73   Magnesium    Collection Time: 04/04/25 12:25 AM    Specimen: Blood   Result Value Ref Range    Magnesium 2.2 1.6 - 2.4 mg/dL   Phosphorus    Collection  Time: 04/04/25 12:25 AM    Specimen: Blood   Result Value Ref Range    Phosphorus 4.9 (H) 2.5 - 4.5 mg/dL   Lactic Acid, Plasma    Collection Time: 04/04/25 12:25 AM    Specimen: Blood   Result Value Ref Range    Lactate 3.0 (C) 0.5 - 2.0 mmol/L   Procalcitonin    Collection Time: 04/04/25 12:25 AM    Specimen: Blood   Result Value Ref Range    Procalcitonin 0.79 (H) 0.00 - 0.25 ng/mL   CBC Auto Differential    Collection Time: 04/04/25 12:25 AM    Specimen: Blood   Result Value Ref Range    WBC 29.59 (H) 3.40 - 10.80 10*3/mm3    RBC 4.62 4.14 - 5.80 10*6/mm3    Hemoglobin 13.3 13.0 - 17.7 g/dL    Hematocrit 41.0 37.5 - 51.0 %    MCV 88.7 79.0 - 97.0 fL    MCH 28.8 26.6 - 33.0 pg    MCHC 32.4 31.5 - 35.7 g/dL    RDW 14.6 12.3 - 15.4 %    RDW-SD 47.9 37.0 - 54.0 fl    MPV 10.4 6.0 - 12.0 fL    Platelets 244 140 - 450 10*3/mm3    Neutrophil % 85.9 (H) 42.7 - 76.0 %    Lymphocyte % 2.9 (L) 19.6 - 45.3 %    Monocyte % 8.4 5.0 - 12.0 %    Eosinophil % 0.1 (L) 0.3 - 6.2 %    Basophil % 0.2 0.0 - 1.5 %    Immature Grans % 2.5 (H) 0.0 - 0.5 %    Neutrophils, Absolute 25.42 (H) 1.70 - 7.00 10*3/mm3    Lymphocytes, Absolute 0.85 0.70 - 3.10 10*3/mm3    Monocytes, Absolute 2.48 (H) 0.10 - 0.90 10*3/mm3    Eosinophils, Absolute 0.04 0.00 - 0.40 10*3/mm3    Basophils, Absolute 0.07 0.00 - 0.20 10*3/mm3    Immature Grans, Absolute 0.73 (H) 0.00 - 0.05 10*3/mm3    nRBC 0.0 0.0 - 0.2 /100 WBC   High Sensitivity Troponin T    Collection Time: 04/04/25 12:25 AM    Specimen: Blood   Result Value Ref Range    HS Troponin T 219 (C) <22 ng/L   BNP    Collection Time: 04/04/25 12:25 AM    Specimen: Blood   Result Value Ref Range    proBNP 298.6 0.0 - 900.0 pg/mL   Protime-INR    Collection Time: 04/04/25 12:27 AM    Specimen: Blood   Result Value Ref Range    Protime 14.7 11.8 - 14.8 Seconds    INR 1.09 0.91 - 1.09   Heparin Anti-Xa    Collection Time: 04/04/25 12:27 AM    Specimen: Blood   Result Value Ref Range    Heparin Anti-Xa (UFH)  0.33 0.30 - 0.70 IU/ml   aPTT    Collection Time: 04/04/25 12:27 AM    Specimen: Blood   Result Value Ref Range    PTT 53.7 (H) 24.5 - 36.0 seconds   Respiratory Panel PCR w/COVID-19(SARS-CoV-2) RAJEEV/LORELEI/ALEKS/PAD/COR/INGRIS In-House, NP Swab in UTM/VTM, 2 HR TAT - Swab, Nasopharynx    Collection Time: 04/04/25  2:19 AM    Specimen: Nasopharynx; Swab   Result Value Ref Range    ADENOVIRUS, PCR Not Detected Not Detected    Coronavirus 229E Not Detected Not Detected    Coronavirus HKU1 Not Detected Not Detected    Coronavirus NL63 Not Detected Not Detected    Coronavirus OC43 Not Detected Not Detected    COVID19 Not Detected Not Detected - Ref. Range    Human Metapneumovirus Not Detected Not Detected    Human Rhinovirus/Enterovirus Not Detected Not Detected    Influenza A PCR Not Detected Not Detected    Influenza B PCR Not Detected Not Detected    Parainfluenza Virus 1 Not Detected Not Detected    Parainfluenza Virus 2 Not Detected Not Detected    Parainfluenza Virus 3 Not Detected Not Detected    Parainfluenza Virus 4 Not Detected Not Detected    RSV, PCR Not Detected Not Detected    Bordetella pertussis pcr Not Detected Not Detected    Bordetella parapertussis PCR Not Detected Not Detected    Chlamydophila pneumoniae PCR Not Detected Not Detected    Mycoplasma pneumo by PCR Not Detected Not Detected   Legionella Antigen, Urine - Urine, Urine, Clean Catch    Collection Time: 04/04/25  2:19 AM    Specimen: Urine, Clean Catch   Result Value Ref Range    LEGIONELLA ANTIGEN, URINE Negative Negative   S. Pneumo Ag Urine or CSF - Urine, Urine, Clean Catch    Collection Time: 04/04/25  2:19 AM    Specimen: Urine, Clean Catch   Result Value Ref Range    Strep Pneumo Ag Negative Negative   High Sensitivity Troponin T 1Hr    Collection Time: 04/04/25  3:45 AM    Specimen: Blood   Result Value Ref Range    HS Troponin T 227 (C) <22 ng/L    Troponin T Numeric Delta 8 ng/L    Troponin T % Delta 4 Abnormal if >/= 20%   STAT Lactic  Acid, Reflex    Collection Time: 04/04/25  3:45 AM    Specimen: Blood   Result Value Ref Range    Lactate 1.9 0.5 - 2.0 mmol/L        Current Facility-Administered Medications:   •  acetaminophen (TYLENOL) tablet 650 mg, 650 mg, Oral, Q6H PRN, Nilo Foremanold A, APRN  •  sennosides-docusate (PERICOLACE) 8.6-50 MG per tablet 2 tablet, 2 tablet, Oral, BID, 2 tablet at 04/04/25 0204 **AND** polyethylene glycol (MIRALAX) packet 17 g, 17 g, Oral, Daily PRN **AND** bisacodyl (DULCOLAX) EC tablet 5 mg, 5 mg, Oral, Daily PRN **AND** bisacodyl (DULCOLAX) suppository 10 mg, 10 mg, Rectal, Daily PRN, Kellen, Paul A, APRN  •  [START ON 4/5/2025] Chlorhexidine Gluconate Cloth 2 % pads 1 Application, 1 Application, Topical, Q24H, Paul Foreman A, APRN  •  heparin (porcine) injection 2,000 Units, 2,000 Units, Intravenous, PRN, Kellen, Paul A, APRN  •  heparin (porcine) injection 4,000 Units, 4,000 Units, Intravenous, PRN, Kellen Paul A, APRN  •  heparin 00877 units/250 mL (100 units/mL) in 0.45 % NaCl infusion, 13 Units/kg/hr, Intravenous, Titrated, Kellen, Paul A, APRN, Last Rate: 14.04 mL/hr at 04/04/25 0204, 13 Units/kg/hr at 04/04/25 0204  •  mupirocin (BACTROBAN) 2 % nasal ointment 1 Application, 1 Application, Each Nare, BID, Paul Foreman A, APRN, 1 Application at 04/04/25 0203  •  ondansetron (ZOFRAN) injection 4 mg, 4 mg, Intravenous, Q6H PRN, Kellen, Paul A, APRN  •  Pharmacy to dose vancomycin, , Not Applicable, Continuous PRN, Flood, Paul A, APRN  •  Pharmacy To Dose: Piperacillin-tazobactam (Zosyn), , Not Applicable, Continuous PRN, Paul Foreman APRN  •  piperacillin-tazobactam (ZOSYN) 3.375 g IVPB in 100 mL NS MBP (CD), 3.375 g, Intravenous, Q8H, Paul Foreman APRN  •  sodium chloride 0.9 % flush 10 mL, 10 mL, Intravenous, Q12H, Paul Foreman APRN, 10 mL at 04/04/25 0204  •  sodium chloride 0.9 % flush 10 mL, 10 mL, Intravenous, PRN, Paul Foreman APRN  •  sodium chloride 0.9 % infusion 40 mL, 40  mL, Intravenous, PRN, Paul Foreman APRN  •  vancomycin (VANCOCIN) 750 mg in 0.9% NaCl 250 mL, 750 mg, Intravenous, Q24H, Paul Foreman APRN     Result Review    Result Review:  I have personally reviewed the results from the time of this admission to 4/4/2025 00:14 CDT and agree with these findings:  [x]  Laboratory list / accordion  []  Microbiology  [x]  Radiology  [x]  EKG/Telemetry   []  Cardiology/Vascular   []  Pathology  [x]  Old records  []  Other:  Most notable findings include: those outlined in HPI      Assessment & Plan   Assessment / Plan     Brief Patient Summary:  Reza Cruz is a 70 y.o. male who presented to [facility] ED with complaints of shortness of air.  Found to be hypoxic with ROSELIA and elevated troponin and D-Dimer.  Transferred to [facility], [place] and admitted to ICU.    Active Hospital Problems:  Active Hospital Problems    Diagnosis    • **Acute respiratory failure with hypoxia    Diagnoses:  -Acute on Chronic Respiratory Failure with Hypoxia  -Sepsis  -Acute Exacerbation of COPD  -Acute Kidney Injury  -NSTEMI type 2  -hypothyroidism  -HTN    Plan:  -BiPAP titrate oxygen to keep saturation 88-92%, CT PE deferred secondary to ROSELIA, will obtain BLE venous US and echo, continue heparin drip for now, DuoNeb, broad spectrum antibiotics  -broad spectrum antibiotics, blood cx, hold additional fluid administration in the setting of volume overload  -as above, received steroids at OSH, not wheezing will hold additional steroids for now  -normal renal function 6/2024, monitor renal function, avoid nephrotoxic agents  -likely secondary to hypoxia and demand ischemia, continue heparin for now, trend troponin  -resume home synthroid when medications reconciled  -hold antihypertensives due to hypotension    35 minutes of critical care provided. This time excludes other billable procedures. Time does include preparation of documents, medical consultations, review of old records, and direct bedside care. Patient is  at high risk for life-threatening deterioration due to acute respiratory failure.              VTE Prophylaxis:  Pharmacologic & mechanical VTE prophylaxis orders are present.        CODE STATUS:    Code Status (Patient has no pulse and is not breathing): CPR (Attempt to Resuscitate)  Medical Interventions (Patient has pulse or is breathing): Full Support  Level Of Support Discussed With: Patient    Admission Status:  I believe this patient meets inpatient ICU status.    VINAYAK Greer

## 2025-04-04 NOTE — CASE MANAGEMENT/SOCIAL WORK
Discharge Planning Assessment   [place]     Patient Name: Reza Cruz  MRN: [id number]  Today's Date: 4/4/2025    Admit Date: 4/3/2025        Discharge Needs Assessment       Row Name 04/04/25 1125       Living Environment    People in Home facility resident    Name(s) of People in Home Thu Moreno    Current Living Arrangements extended care facility    Potentially Unsafe Housing Conditions none    In the past 12 months has the electric, gas, oil, or water company threatened to shut off services in your home? No    Able to Return to Prior Arrangements yes       Resource/Environmental Concerns    Resource/Environmental Concerns none    Transportation Concerns none       Transportation Needs    In the past 12 months, has lack of transportation kept you from medical appointments or from getting medications? no    In the past 12 months, has lack of transportation kept you from meetings, work, or from getting things needed for daily living? No       Food Insecurity    Within the past 12 months, you worried that your food would run out before you got the money to buy more. Never true    Within the past 12 months, the food you bought just didn't last and you didn't have money to get more. Never true       Transition Planning    Patient/Family Anticipates Transition to long-term care facility    Patient/Family Anticipated Services at Transition skilled nursing    Transportation Anticipated family or friend will provide;health plan transportation;public transportation       Discharge Needs Assessment    Readmission Within the Last 30 Days no previous admission in last 30 days    Current Outpatient/Agency/Support Group skilled nursing facility    Concerns to be Addressed care coordination/care conferences;discharge planning    Do you want help finding or keeping work or a job? I do not need or want help    Do you want help with school or training? For example, starting or completing job training or getting a high  school diploma, GED or equivalent No    Anticipated Changes Related to Illness none    Outpatient/Agency/Support Group Needs skilled nursing facility    Discharge Facility/Level of Care Needs nursing facility, skilled    Discharge Coordination/Progress Patient resides at [facility].  Patient has a bed hold and plans to return to this facility upon discharge.  [facility]  [phone]                   Discharge Plan    No documentation.                 Continued Care and Services - Admitted Since 4/3/2025       Destination Coordination complete.      Service Provider Request Status Services Address Phone Fax Patient Preferred    [facility] [address] [place] [id number] [phone] --                     Demographic Summary    No documentation.                  Functional Status    No documentation.                  Psychosocial    No documentation.                  Abuse/Neglect    No documentation.                  Legal    No documentation.                  Substance Abuse    No documentation.                  Patient Forms    No documentation.                     ASHER Massey

## 2025-04-04 NOTE — PROGRESS NOTES
RT EQUIPMENT DEVICE RELATED - SKIN ASSESSMENT    Jaime Score:  Jaime Score: 18     RT Medical Equipment/Device:     NIV Mask:  Under-the-nose   size:  C    Skin Assessment:      Nose:  Intact    Device Skin Pressure Protection:  Skin-to-device areas padded:  None Required    Nurse Notification:  Reema Broderick, RRT

## 2025-04-05 LAB
ANION GAP SERPL CALCULATED.3IONS-SCNC: 8 MMOL/L (ref 5–15)
BASOPHILS # BLD AUTO: 0.1 10*3/MM3 (ref 0–0.2)
BASOPHILS NFR BLD AUTO: 0.4 % (ref 0–1.5)
BUN SERPL-MCNC: 36 MG/DL (ref 8–23)
BUN/CREAT SERPL: 28.8 (ref 7–25)
CALCIUM SPEC-SCNC: 8.3 MG/DL (ref 8.6–10.5)
CHLORIDE SERPL-SCNC: 110 MMOL/L (ref 98–107)
CO2 SERPL-SCNC: 24 MMOL/L (ref 22–29)
CREAT SERPL-MCNC: 1.25 MG/DL (ref 0.76–1.27)
DEPRECATED RDW RBC AUTO: 49.1 FL (ref 37–54)
EGFRCR SERPLBLD CKD-EPI 2021: 62.7 ML/MIN/1.73
EOSINOPHIL # BLD AUTO: 2.69 10*3/MM3 (ref 0–0.4)
EOSINOPHIL NFR BLD AUTO: 11.7 % (ref 0.3–6.2)
ERYTHROCYTE [DISTWIDTH] IN BLOOD BY AUTOMATED COUNT: 15.1 % (ref 12.3–15.4)
GLUCOSE SERPL-MCNC: 99 MG/DL (ref 65–99)
HCT VFR BLD AUTO: 38.1 % (ref 37.5–51)
HGB BLD-MCNC: 12.6 G/DL (ref 13–17.7)
IMM GRANULOCYTES # BLD AUTO: 0.12 10*3/MM3 (ref 0–0.05)
IMM GRANULOCYTES NFR BLD AUTO: 0.5 % (ref 0–0.5)
LYMPHOCYTES # BLD AUTO: 3.05 10*3/MM3 (ref 0.7–3.1)
LYMPHOCYTES NFR BLD AUTO: 13.2 % (ref 19.6–45.3)
MCH RBC QN AUTO: 29.4 PG (ref 26.6–33)
MCHC RBC AUTO-ENTMCNC: 33.1 G/DL (ref 31.5–35.7)
MCV RBC AUTO: 88.8 FL (ref 79–97)
MONOCYTES # BLD AUTO: 2.16 10*3/MM3 (ref 0.1–0.9)
MONOCYTES NFR BLD AUTO: 9.4 % (ref 5–12)
NEUTROPHILS NFR BLD AUTO: 14.96 10*3/MM3 (ref 1.7–7)
NEUTROPHILS NFR BLD AUTO: 64.8 % (ref 42.7–76)
NRBC BLD AUTO-RTO: 0 /100 WBC (ref 0–0.2)
PLATELET # BLD AUTO: 251 10*3/MM3 (ref 140–450)
PMV BLD AUTO: 11.2 FL (ref 6–12)
POTASSIUM SERPL-SCNC: 4.2 MMOL/L (ref 3.5–5.2)
RBC # BLD AUTO: 4.29 10*6/MM3 (ref 4.14–5.8)
SODIUM SERPL-SCNC: 142 MMOL/L (ref 136–145)
WBC NRBC COR # BLD AUTO: 23.08 10*3/MM3 (ref 3.4–10.8)

## 2025-04-05 PROCEDURE — 25010000002 VANCOMYCIN 1.5-0.9 GM/500ML-% SOLUTION: Performed by: INTERNAL MEDICINE

## 2025-04-05 PROCEDURE — 25810000003 SODIUM CHLORIDE 0.9 % SOLUTION: Performed by: NURSE PRACTITIONER

## 2025-04-05 PROCEDURE — 85025 COMPLETE CBC W/AUTO DIFF WBC: CPT | Performed by: NURSE PRACTITIONER

## 2025-04-05 PROCEDURE — 25010000002 PIPERACILLIN SOD-TAZOBACTAM PER 1 G: Performed by: NURSE PRACTITIONER

## 2025-04-05 PROCEDURE — 94799 UNLISTED PULMONARY SVC/PX: CPT

## 2025-04-05 PROCEDURE — 94664 DEMO&/EVAL PT USE INHALER: CPT

## 2025-04-05 PROCEDURE — 80048 BASIC METABOLIC PNL TOTAL CA: CPT | Performed by: NURSE PRACTITIONER

## 2025-04-05 PROCEDURE — 25010000002 VANCOMYCIN PER 500 MG: Performed by: NURSE PRACTITIONER

## 2025-04-05 PROCEDURE — 25010000002 ENOXAPARIN PER 10 MG: Performed by: INTERNAL MEDICINE

## 2025-04-05 RX ORDER — RISPERIDONE 0.5 MG/1
0.5 TABLET ORAL 2 TIMES DAILY
Status: DISCONTINUED | OUTPATIENT
Start: 2025-04-05 | End: 2025-04-09 | Stop reason: HOSPADM

## 2025-04-05 RX ORDER — TAMSULOSIN HYDROCHLORIDE 0.4 MG/1
0.8 CAPSULE ORAL DAILY
Status: DISCONTINUED | OUTPATIENT
Start: 2025-04-05 | End: 2025-04-09 | Stop reason: HOSPADM

## 2025-04-05 RX ORDER — ATORVASTATIN CALCIUM 40 MG/1
80 TABLET, FILM COATED ORAL NIGHTLY
Status: DISCONTINUED | OUTPATIENT
Start: 2025-04-05 | End: 2025-04-09 | Stop reason: HOSPADM

## 2025-04-05 RX ORDER — ENOXAPARIN SODIUM 100 MG/ML
40 INJECTION SUBCUTANEOUS NIGHTLY
Status: DISCONTINUED | OUTPATIENT
Start: 2025-04-05 | End: 2025-04-09 | Stop reason: HOSPADM

## 2025-04-05 RX ORDER — DIVALPROEX SODIUM 125 MG/1
500 TABLET, DELAYED RELEASE ORAL 2 TIMES DAILY
Status: DISCONTINUED | OUTPATIENT
Start: 2025-04-05 | End: 2025-04-09 | Stop reason: HOSPADM

## 2025-04-05 RX ORDER — LEVOTHYROXINE SODIUM 75 UG/1
150 TABLET ORAL
Status: DISCONTINUED | OUTPATIENT
Start: 2025-04-05 | End: 2025-04-09 | Stop reason: HOSPADM

## 2025-04-05 RX ORDER — PANTOPRAZOLE SODIUM 40 MG/1
40 TABLET, DELAYED RELEASE ORAL DAILY
Status: DISCONTINUED | OUTPATIENT
Start: 2025-04-05 | End: 2025-04-09 | Stop reason: HOSPADM

## 2025-04-05 RX ORDER — IPRATROPIUM BROMIDE AND ALBUTEROL SULFATE 2.5; .5 MG/3ML; MG/3ML
3 SOLUTION RESPIRATORY (INHALATION) 4 TIMES DAILY PRN
Status: DISCONTINUED | OUTPATIENT
Start: 2025-04-05 | End: 2025-04-07

## 2025-04-05 RX ORDER — VANCOMYCIN/0.9 % SOD CHLORIDE 1.5G/250ML
1500 PLASTIC BAG, INJECTION (ML) INTRAVENOUS ONCE
Status: COMPLETED | OUTPATIENT
Start: 2025-04-05 | End: 2025-04-05

## 2025-04-05 RX ORDER — ASPIRIN 81 MG/1
81 TABLET, CHEWABLE ORAL DAILY
Status: DISCONTINUED | OUTPATIENT
Start: 2025-04-05 | End: 2025-04-09 | Stop reason: HOSPADM

## 2025-04-05 RX ORDER — BUDESONIDE AND FORMOTEROL FUMARATE DIHYDRATE 160; 4.5 UG/1; UG/1
2 AEROSOL RESPIRATORY (INHALATION)
Status: DISCONTINUED | OUTPATIENT
Start: 2025-04-05 | End: 2025-04-06

## 2025-04-05 RX ADMIN — ENOXAPARIN SODIUM 40 MG: 100 INJECTION SUBCUTANEOUS at 20:46

## 2025-04-05 RX ADMIN — IPRATROPIUM BROMIDE 0.5 MG: 0.5 SOLUTION RESPIRATORY (INHALATION) at 18:47

## 2025-04-05 RX ADMIN — ACETAMINOPHEN 650 MG: 325 TABLET, FILM COATED ORAL at 04:36

## 2025-04-05 RX ADMIN — PIPERACILLIN AND TAZOBACTAM 3.38 G: 3; .375 INJECTION, POWDER, FOR SOLUTION INTRAVENOUS at 00:00

## 2025-04-05 RX ADMIN — PIPERACILLIN AND TAZOBACTAM 3.38 G: 3; .375 INJECTION, POWDER, FOR SOLUTION INTRAVENOUS at 07:42

## 2025-04-05 RX ADMIN — RISPERIDONE 0.5 MG: 0.5 TABLET, FILM COATED ORAL at 20:43

## 2025-04-05 RX ADMIN — SENNOSIDES, DOCUSATE SODIUM 2 TABLET: 50; 8.6 TABLET, FILM COATED ORAL at 08:16

## 2025-04-05 RX ADMIN — LEVOTHYROXINE SODIUM 150 MCG: 100 TABLET ORAL at 10:01

## 2025-04-05 RX ADMIN — Medication 10 ML: at 08:22

## 2025-04-05 RX ADMIN — ACETAMINOPHEN 650 MG: 325 TABLET, FILM COATED ORAL at 20:44

## 2025-04-05 RX ADMIN — Medication 750 MG: at 04:04

## 2025-04-05 RX ADMIN — RISPERIDONE 0.5 MG: 0.5 TABLET, FILM COATED ORAL at 10:01

## 2025-04-05 RX ADMIN — Medication 1 APPLICATION: at 08:16

## 2025-04-05 RX ADMIN — BUDESONIDE AND FORMOTEROL FUMARATE DIHYDRATE 2 PUFF: 160; 4.5 AEROSOL RESPIRATORY (INHALATION) at 10:07

## 2025-04-05 RX ADMIN — Medication 1500 MG: at 15:07

## 2025-04-05 RX ADMIN — BUDESONIDE AND FORMOTEROL FUMARATE DIHYDRATE 2 PUFF: 160; 4.5 AEROSOL RESPIRATORY (INHALATION) at 18:47

## 2025-04-05 RX ADMIN — TAMSULOSIN HYDROCHLORIDE 0.8 MG: 0.4 CAPSULE ORAL at 10:01

## 2025-04-05 RX ADMIN — SODIUM CHLORIDE 100 ML/HR: 9 INJECTION, SOLUTION INTRAVENOUS at 08:14

## 2025-04-05 RX ADMIN — DIVALPROEX SODIUM 500 MG: 125 TABLET, DELAYED RELEASE ORAL at 10:01

## 2025-04-05 RX ADMIN — PIPERACILLIN AND TAZOBACTAM 3.38 G: 3; .375 INJECTION, POWDER, FOR SOLUTION INTRAVENOUS at 17:51

## 2025-04-05 RX ADMIN — Medication 1 APPLICATION: at 20:43

## 2025-04-05 RX ADMIN — DIVALPROEX SODIUM 500 MG: 125 TABLET, DELAYED RELEASE ORAL at 20:43

## 2025-04-05 RX ADMIN — IPRATROPIUM BROMIDE 0.5 MG: 0.5 SOLUTION RESPIRATORY (INHALATION) at 14:47

## 2025-04-05 RX ADMIN — Medication 10 ML: at 20:46

## 2025-04-05 RX ADMIN — ACETAMINOPHEN 650 MG: 325 TABLET, FILM COATED ORAL at 15:07

## 2025-04-05 RX ADMIN — ATORVASTATIN CALCIUM 80 MG: 40 TABLET, FILM COATED ORAL at 20:43

## 2025-04-05 RX ADMIN — CHLORHEXIDINE GLUCONATE 1 APPLICATION: 500 CLOTH TOPICAL at 04:04

## 2025-04-05 RX ADMIN — SENNOSIDES, DOCUSATE SODIUM 2 TABLET: 50; 8.6 TABLET, FILM COATED ORAL at 20:43

## 2025-04-05 RX ADMIN — ASPIRIN 81 MG: 81 TABLET, CHEWABLE ORAL at 10:01

## 2025-04-05 RX ADMIN — PIPERACILLIN AND TAZOBACTAM 3.38 G: 3; .375 INJECTION, POWDER, FOR SOLUTION INTRAVENOUS at 23:28

## 2025-04-05 RX ADMIN — PANTOPRAZOLE SODIUM 40 MG: 40 TABLET, DELAYED RELEASE ORAL at 10:01

## 2025-04-05 RX ADMIN — IPRATROPIUM BROMIDE 0.5 MG: 0.5 SOLUTION RESPIRATORY (INHALATION) at 11:03

## 2025-04-05 NOTE — PLAN OF CARE
Goal Outcome Evaluation:  Plan of Care Reviewed With: patient        Progress: improving     A/Ox4. Baseline garbled speech and weakness in RUE. 2L NC. Afebrile. ABX given. NS @100. Tylenol given x2 for leg pain. Still waiting for Tammy US of Lower extrem. To come back. UOP adequate. Safety maintained. Call light in reach.   Problem: Adult Inpatient Plan of Care  Goal: Plan of Care Review  Outcome: Progressing  Flowsheets (Taken 4/5/2025 0507)  Progress: improving  Plan of Care Reviewed With: patient  Goal: Patient-Specific Goal (Individualized)  Outcome: Progressing  Goal: Absence of Hospital-Acquired Illness or Injury  Outcome: Progressing  Intervention: Identify and Manage Fall Risk  Recent Flowsheet Documentation  Taken 4/5/2025 0400 by Bel Dozier RN  Safety Promotion/Fall Prevention: safety round/check completed  Taken 4/5/2025 0300 by Bel Dozier RN  Safety Promotion/Fall Prevention: safety round/check completed  Taken 4/5/2025 0200 by Bel Dozier RN  Safety Promotion/Fall Prevention: safety round/check completed  Taken 4/5/2025 0100 by eBl Dozier RN  Safety Promotion/Fall Prevention: safety round/check completed  Taken 4/5/2025 0000 by Bel Dozier RN  Safety Promotion/Fall Prevention: safety round/check completed  Taken 4/4/2025 2300 by Bel Dozier RN  Safety Promotion/Fall Prevention: safety round/check completed  Taken 4/4/2025 2200 by Bel Dozier RN  Safety Promotion/Fall Prevention: safety round/check completed  Taken 4/4/2025 2100 by Bel Dozier RN  Safety Promotion/Fall Prevention: safety round/check completed  Taken 4/4/2025 2000 by Bel Dozier RN  Safety Promotion/Fall Prevention: safety round/check completed  Taken 4/4/2025 1900 by Bel Dozier RN  Safety Promotion/Fall Prevention: safety round/check completed  Intervention: Prevent Skin Injury  Recent Flowsheet Documentation  Taken 4/5/2025 0400 by Bel Dozier RN  Skin  Protection:   silicone foam dressing in place   incontinence pads utilized  Taken 4/5/2025 0300 by Bel Dozier RN  Body Position:   patient/family refused   supine   position maintained  Taken 4/5/2025 0100 by Bel Dozier RN  Body Position:   turned   supine   sitting up in bed  Taken 4/5/2025 0000 by Bel Dozier RN  Skin Protection:   silicone foam dressing in place   incontinence pads utilized  Taken 4/4/2025 2300 by Bel Dozier RN  Body Position:   turned   left  Taken 4/4/2025 2100 by Bel Dozier RN  Body Position:   turned   right  Taken 4/4/2025 2000 by Bel Dozier RN  Skin Protection: silicone foam dressing in place  Taken 4/4/2025 1900 by Bel Dozier RN  Body Position:   turned   supine  Intervention: Prevent and Manage VTE (Venous Thromboembolism) Risk  Recent Flowsheet Documentation  Taken 4/5/2025 0400 by Bel Dozier RN  VTE Prevention/Management:   bilateral   SCDs (sequential compression devices) on  Taken 4/5/2025 0000 by Bel Dozier RN  VTE Prevention/Management:   bilateral   SCDs (sequential compression devices) on  Taken 4/4/2025 2000 by Bel Dozier RN  VTE Prevention/Management:   bilateral   SCDs (sequential compression devices) on  Intervention: Prevent Infection  Recent Flowsheet Documentation  Taken 4/5/2025 0000 by Bel Dozier RN  Infection Prevention: single patient room provided  Taken 4/4/2025 2000 by Bel Dozier RN  Infection Prevention: hand hygiene promoted  Goal: Optimal Comfort and Wellbeing  Outcome: Progressing  Intervention: Monitor Pain and Promote Comfort  Recent Flowsheet Documentation  Taken 4/5/2025 0436 by Bel Dozier RN  Pain Management Interventions: pain medication given  Taken 4/4/2025 2016 by Bel Dozier RN  Pain Management Interventions: pain medication given  Taken 4/4/2025 2000 by Bel Dozier RN  Pain Management Interventions: position adjusted  Intervention:  Provide Person-Centered Care  Recent Flowsheet Documentation  Taken 4/5/2025 0400 by Bel Dozier RN  Trust Relationship/Rapport:   care explained   choices provided  Taken 4/5/2025 0000 by Bel Dozier RN  Trust Relationship/Rapport:   care explained   choices provided  Taken 4/4/2025 2000 by Bel Dozier RN  Trust Relationship/Rapport:   care explained   choices provided   thoughts/feelings acknowledged  Goal: Readiness for Transition of Care  Outcome: Progressing     Problem: Noninvasive Ventilation Acute  Goal: Effective Unassisted Ventilation and Oxygenation  Outcome: Progressing     Problem: Skin Injury Risk Increased  Goal: Skin Health and Integrity  Outcome: Progressing  Intervention: Optimize Skin Protection  Recent Flowsheet Documentation  Taken 4/5/2025 0400 by Bel Dozier RN  Activity Management: bedrest  Pressure Reduction Techniques: weight shift assistance provided  Pressure Reduction Devices: pressure-redistributing mattress utilized  Skin Protection:   silicone foam dressing in place   incontinence pads utilized  Taken 4/5/2025 0300 by Bel Dozier RN  Activity Management: bedrest  Head of Bed (HOB) Positioning: HOB at 30-45 degrees  Taken 4/5/2025 0200 by Bel Dozier RN  Activity Management: bedrest  Taken 4/5/2025 0100 by Bel Dozier RN  Activity Management: bedrest  Head of Bed (HOB) Positioning: HOB at 30-45 degrees  Taken 4/5/2025 0000 by Bel Dozier RN  Activity Management: bedrest  Pressure Reduction Techniques:   weight shift assistance provided   frequent weight shift encouraged  Pressure Reduction Devices: pressure-redistributing mattress utilized  Skin Protection:   silicone foam dressing in place   incontinence pads utilized  Taken 4/4/2025 2300 by Bel Dozier RN  Activity Management: bedrest  Head of Bed (HOB) Positioning: HOB at 30-45 degrees  Taken 4/4/2025 2100 by Bel Dozier RN  Activity Management: bedrest  Head of  Bed (HOB) Positioning: HOB at 30-45 degrees  Taken 4/4/2025 2000 by Bel Dozier RN  Activity Management: bedrest  Pressure Reduction Techniques: weight shift assistance provided  Pressure Reduction Devices: pressure-redistributing mattress utilized  Skin Protection: silicone foam dressing in place  Taken 4/4/2025 1900 by Bel Dozier RN  Head of Bed (\[facility] [place] [facility]\"") Positioning: HOB elevated     Problem: Comorbidity Management  Goal: Blood Pressure in Desired Range  Outcome: Progressing  Intervention: Maintain Blood Pressure Management  Recent Flowsheet Documentation  Taken 4/5/2025 0400 by Bel Dozier RN  Medication Review/Management: medications reviewed  Taken 4/5/2025 0000 by Bel Dozier RN  Medication Review/Management: medications reviewed  Taken 4/4/2025 2000 by Bel Dozier RN  Medication Review/Management: medications reviewed     Problem: Sepsis/Septic Shock  Goal: Optimal Coping  Outcome: Progressing  Intervention: Support Patient and Family Response  Recent Flowsheet Documentation  Taken 4/5/2025 0400 by Bel Dozier RN  Family/Support System Care: support provided  Taken 4/5/2025 0000 by Bel Dozier RN  Family/Support System Care: support provided  Taken 4/4/2025 2000 by Bel Dozier RN  Family/Support System Care: support provided  Goal: Absence of Bleeding  Outcome: Progressing  Intervention: Monitor and Manage Bleeding  Recent Flowsheet Documentation  Taken 4/4/2025 2000 by Bel Dozier RN  Bleeding Precautions: blood pressure closely monitored  Goal: Blood Glucose Level Within Target Range  Outcome: Progressing  Goal: Absence of Infection Signs and Symptoms  Outcome: Progressing  Intervention: Initiate Sepsis Management  Recent Flowsheet Documentation  Taken 4/5/2025 0000 by Bel Dozier RN  Infection Prevention: single patient room provided  Taken 4/4/2025 2000 by Bel Dozier RN  Infection Prevention: hand hygiene promoted  Intervention:  Promote Stabilization  Recent Flowsheet Documentation  Taken 4/4/2025 2000 by Bel Dozier RN  Fluid/Electrolyte Management: fluids provided  Intervention: Promote Recovery  Recent Flowsheet Documentation  Taken 4/5/2025 0400 by Bel Dozier RN  Activity Management: bedrest  Taken 4/5/2025 0300 by Bel Dozier RN  Activity Management: bedrest  Taken 4/5/2025 0200 by Bel Dozier RN  Activity Management: bedrest  Taken 4/5/2025 0100 by Bel Dozier RN  Activity Management: bedrest  Taken 4/5/2025 0000 by Bel Dozier RN  Activity Management: bedrest  Taken 4/4/2025 2300 by Bel Dozier RN  Activity Management: bedrest  Taken 4/4/2025 2100 by Bel Dozier RN  Activity Management: bedrest  Taken 4/4/2025 2000 by Bel Dozier RN  Activity Management: bedrest  Goal: Optimal Nutrition Delivery  Outcome: Progressing     Problem: Fall Injury Risk  Goal: Absence of Fall and Fall-Related Injury  Outcome: Progressing  Intervention: Identify and Manage Contributors  Recent Flowsheet Documentation  Taken 4/5/2025 0400 by Bel Dozier RN  Medication Review/Management: medications reviewed  Taken 4/5/2025 0000 by Bel Dozier RN  Medication Review/Management: medications reviewed  Taken 4/4/2025 2000 by Bel Dozier RN  Medication Review/Management: medications reviewed  Intervention: Promote Injury-Free Environment  Recent Flowsheet Documentation  Taken 4/5/2025 0400 by Bel Dozier RN  Safety Promotion/Fall Prevention: safety round/check completed  Taken 4/5/2025 0300 by Bel oDzier RN  Safety Promotion/Fall Prevention: safety round/check completed  Taken 4/5/2025 0200 by Bel Dozier RN  Safety Promotion/Fall Prevention: safety round/check completed  Taken 4/5/2025 0100 by Bel Dozier RN  Safety Promotion/Fall Prevention: safety round/check completed  Taken 4/5/2025 0000 by Bel Dozier RN  Safety Promotion/Fall Prevention:  safety round/check completed  Taken 4/4/2025 2300 by Bel Dozier RN  Safety Promotion/Fall Prevention: safety round/check completed  Taken 4/4/2025 2200 by Bel Dozier RN  Safety Promotion/Fall Prevention: safety round/check completed  Taken 4/4/2025 2100 by Bel Dozier RN  Safety Promotion/Fall Prevention: safety round/check completed  Taken 4/4/2025 2000 by Bel Dozier RN  Safety Promotion/Fall Prevention: safety round/check completed  Taken 4/4/2025 1900 by Bel Dozier RN  Safety Promotion/Fall Prevention: safety round/check completed

## 2025-04-05 NOTE — PLAN OF CARE
Goal Outcome Evaluation:              Outcome Evaluation: Patient transfer from ICU. alert and oriented, garbled speech at baseline. tolerating IVF and antibx well. vss. 2LNC

## 2025-04-05 NOTE — PROGRESS NOTES
"Pharmacy Dosing Service  Pharmacokinetics  Vancomycin Follow-up Evaluation    Assessment/Action/Plan:  Current Order: Vancomycin 750 mg IVPB every 24 hours  Current end date:4/8/2025  Levels: Vancomycin trough ordered before dose due on 4/6/2025 at 1400  Additional antimicrobial agent(s): Zosyn    Vancomycin dose adjusted to 1500mg iv once followed by 1000 mg iv q12h. Pharmacy will continue to follow daily and adjust dose accordingly.     Subjective:  Reza Cruz is a 70 y.o. male currently on Vancomycin for the treatment of pneumonia, day 2 of 5 of treatment.    AUC Model Data:  Regimen: 1000 mg IV every 12 hours.  Exposure target: AUC24 (range)400-600 mg/L.hr   AUC24,ss: 551 mg/L.hr  Probability of AUC24 > 400: 82 %  Ctrough,ss: 19 mg/L  Probability of Ctrough,ss > 20: 45 %  Probability of nephrotoxicity (Lodise MICHELE 2009): 16 %    Objective:  Ht: 182.9 cm (72\"); Wt: 111 kg (244 lb 7.8 oz)  Estimated Creatinine Clearance: 72.8 mL/min (by C-G formula based on SCr of 1.25 mg/dL).   Creatinine   Date Value Ref Range Status   04/05/2025 1.25 0.76 - 1.27 mg/dL Final   04/04/2025 2.57 (H) 0.76 - 1.27 mg/dL Final   06/11/2024 1.4 (H) 0.5 - 1.2 mg/dL Final   05/30/2022 1.2 0.5 - 1.2 mg/dL Final   08/24/2020 0.8 0.5 - 1.2 mg/dL Final      Lab Results   Component Value Date    WBC 23.08 (H) 04/05/2025    WBC 29.59 (H) 04/04/2025    WBC 10.6 06/23/2024         Lab Results   Component Value Date    VANCOTROUGH 12.4 03/08/2020       Culture Results:  Microbiology Results (last 10 days)       Procedure Component Value - Date/Time    MRSA Screen, PCR (Inpatient) - Swab, Nares [[id number]]  (Abnormal) Collected: 04/04/25 0514    Lab Status: Final result Specimen: Swab from Nares Updated: 04/04/25 0633     MRSA PCR MRSA Detected    Narrative:      The negative predictive value of this diagnostic test is high and should only be used to consider de-escalating anti-MRSA therapy. A positive result may indicate colonization with MRSA " and must be correlated clinically.    Respiratory Panel PCR w/COVID-19(SARS-CoV-2) RAJEEV/LORELEI/ALEKS/PAD/COR/INGRIS In-House, NP Swab in UTM/VTM, 2 HR TAT - Swab, Nasopharynx [[id number]]  (Normal) Collected: 04/04/25 0219    Lab Status: Final result Specimen: Swab from Nasopharynx Updated: 04/04/25 0320     ADENOVIRUS, PCR Not Detected     Coronavirus 229E Not Detected     Coronavirus HKU1 Not Detected     Coronavirus NL63 Not Detected     Coronavirus OC43 Not Detected     COVID19 Not Detected     Human Metapneumovirus Not Detected     Human Rhinovirus/Enterovirus Not Detected     Influenza A PCR Not Detected     Influenza B PCR Not Detected     Parainfluenza Virus 1 Not Detected     Parainfluenza Virus 2 Not Detected     Parainfluenza Virus 3 Not Detected     Parainfluenza Virus 4 Not Detected     RSV, PCR Not Detected     Bordetella pertussis pcr Not Detected     Bordetella parapertussis PCR Not Detected     Chlamydophila pneumoniae PCR Not Detected     Mycoplasma pneumo by PCR Not Detected    Narrative:      In the setting of a positive respiratory panel with a viral infection PLUS a negative procalcitonin without other underlying concern for bacterial infection, consider observing off antibiotics or discontinuation of antibiotics and continue supportive care. If the respiratory panel is positive for atypical bacterial infection (Bordetella pertussis, Chlamydophila pneumoniae, or Mycoplasma pneumoniae), consider antibiotic de-escalation to target atypical bacterial infection.    Legionella Antigen, Urine - Urine, Urine, Clean Catch [[id number]]  (Normal) Collected: 04/04/25 0219    Lab Status: Final result Specimen: Urine, Clean Catch Updated: 04/04/25 0250     LEGIONELLA ANTIGEN, URINE Negative    S. Pneumo Ag Urine or CSF - Urine, Urine, Clean Catch [[id number]]  (Normal) Collected: 04/04/25 0219    Lab Status: Final result Specimen: Urine, Clean Catch Updated: 04/04/25 0250     Strep Pneumo Ag Negative    Blood Culture -  Blood, Hand, Left [[id number]]  (Normal) Collected: 04/04/25 0025    Lab Status: Preliminary result Specimen: Blood from Hand, Left Updated: 04/05/25 0046     Blood Culture No growth at 24 hours    Blood Culture - Blood, Arm, Right [[id number]]  (Normal) Collected: 04/04/25 0025    Lab Status: Preliminary result Specimen: Blood from Arm, Right Updated: 04/05/25 0046     Blood Culture No growth at 24 hours            Charli Rose, PharmD   04/05/25 12:32 CDT

## 2025-04-05 NOTE — PROGRESS NOTES
RT EQUIPMENT DEVICE RELATED - SKIN ASSESSMENT    Jaime Score:  Jaime Score: 16     RT Medical Equipment/Device:     NIV Mask:  Under-the-nose   size:  C    Skin Assessment:      Nares:  Intact    Device Skin Pressure Protection:  Skin-to-device areas padded:  None Required    Nurse Notification:  No    Xiomara Still, RRT

## 2025-04-05 NOTE — PROGRESS NOTES
[facility] Intensivist Services  INPATIENT PROGRESS NOTE    Patient Name: Reza Cruz  Date of Admission: 4/3/2025  Today's Date: 04/05/25  Length of Stay: 2  Primary Care Physician: Lobito Trevino Jr., MD    ICU Summary   70-year-old male admitted to the hospital from outside ER on 4/3/2025 with acute hypoxic respiratory failure, who also has a past med history of CVA with right-sided weakness and expressive aphasia, COPD on 2 L nasal cannula chronically, who resides in SNF.  Patient found to be hypoxic and hypotensive on arrival.  Was placed on BiPAP for support.  Received fluid resuscitation outside hospital.  Initial concerns for PE, but with ROSELIA could not obtain CTA chest.  VQ scan obtained secondary to ROSELIA with no perfusion deficits.  With significant leukocytosis, left lower lobe opacity on chest x-ray possible pneumonia.  Started on empiric Zosyn.    Interval update:  4/5  Alert, no distress.  Tolerating 2 L nasal cannula which is baseline.  Blood pressure stable.  Laboratories pending this morning.  VQ scan yesterday negative for perfusion defects.  Lower extremity ultrasound obtained but not officially read, veins appear compressible to my read.  Off heparin infusion.    Review of Systems   All pertinent negatives and positives are as above. All other systems have been reviewed and are negative unless otherwise stated.     Objective    Temp:  [97.2 °F (36.2 °C)-98.6 °F (37 °C)] 97.2 °F (36.2 °C)  Heart Rate:  [] 64  Resp:  [14-25] 20  BP: ()/() 117/86  Physical Exam  Vitals and nursing note reviewed.   Constitutional:       General: He is not in acute distress.  Eyes:      Pupils: Pupils are equal, round, and reactive to light.   Cardiovascular:      Rate and Rhythm: Normal rate and regular rhythm.      Pulses: Normal pulses.      Heart sounds: Normal heart sounds.   Pulmonary:      Effort: Pulmonary effort is normal.      Comments: Breathsounds  clear bilateral lobes, diminished left lower lobe, few rhonchi  Musculoskeletal:      Comments: Right sided weakness   Skin:     General: Skin is warm and dry.      Capillary Refill: Capillary refill takes less than 2 seconds.   Neurological:      Mental Status: He is alert and oriented to person, place, and time.      Comments: Baseline right-sided weakness, expressive aphasia and garbled speech         Results Review:  Lab Results (last 24 hours)       Procedure Component Value Units Date/Time    Blood Culture - Blood, Hand, Left [[id number]]  (Normal) Collected: 04/04/25 0025    Specimen: Blood from Hand, Left Updated: 04/05/25 0046     Blood Culture No growth at 24 hours    Blood Culture - Blood, Arm, Right [[id number]]  (Normal) Collected: 04/04/25 0025    Specimen: Blood from Arm, Right Updated: 04/05/25 0046     Blood Culture No growth at 24 hours    Urinalysis With Microscopic If Indicated (No Culture) - Urine, Clean Catch [[id number]]  (Abnormal) Collected: 04/04/25 1508    Specimen: Urine, Clean Catch Updated: 04/04/25 1522     Color, UA Yellow     Appearance, UA Clear     pH, UA <=5.0     Specific Gravity, UA 1.025     Glucose, UA Negative     Ketones, UA Negative     Bilirubin, UA Negative     Blood, UA Moderate (2+)     Protein, UA Trace     Leuk Esterase, UA Negative     Nitrite, UA Negative     Urobilinogen, UA 1.0 E.U./dL    Urinalysis, Microscopic Only - Urine, Clean Catch [[id number]]  (Abnormal) Collected: 04/04/25 1508    Specimen: Urine, Clean Catch Updated: 04/04/25 1522     RBC, UA 3-5 /HPF      WBC, UA 0-2 /HPF      Bacteria, UA None Seen /HPF      Squamous Epithelial Cells, UA 0-2 /HPF      Hyaline Casts, UA 0-2 /LPF      Methodology Automated Microscopy    Heparin Anti-Xa [[id number]]  (Normal) Collected: 04/04/25 1224    Specimen: Blood Updated: 04/04/25 1245     Heparin Anti-Xa (UFH) 0.38 IU/ml              NM Lung Scan Perfusion Particulate  Result Date: 4/4/2025  1. No focal perfusion  defects to suggest pulmonary embolus. Low probability. 2. Relative decreased activity in the lower lung zones compared to the upper and midlung zones. This can be seen with pulmonary venous hypertension and redistribution. There may also be seen in patients with lower lung zone predominant COPD which is not really appreciated on the chest x-ray.  This report was signed and finalized on 4/4/2025 12:19 PM by Dr. Jim Bojorquez MD.      XR Chest 1 View  Result Date: 4/4/2025   Patchy airspace opacity in the left lower lobe, which may represent an infectious process/pneumonia in the appropriate clinical setting.  This report was signed and finalized on 4/4/2025 7:03 AM by Dr. Charli Dowell MD.        Result Review:  I have personally reviewed the results from the time of this admission to 4/5/2025 08:32 CDT and agree with these findings:  [x]  Laboratory list / accordion  [x]  Microbiology  [x]  Radiology  [x]  EKG/Telemetry   []  Cardiology/Vascular   []  Pathology  []  Old records  []  Other:      Culture Data:   Blood Culture   Date Value Ref Range Status   04/04/2025 No growth at 24 hours  Preliminary   04/04/2025 No growth at 24 hours  Preliminary       I have reviewed the patient's current medications.     Assessment/Plan   Assessment  Active Hospital Problems    Diagnosis    • **Acute respiratory failure with hypoxia    70-year-old male with with acute hypoxic respiratory failure, likely in setting of pneumonia, and ICU critical care management.    Acute Hypoxic respiratory failure  -Able to wean off BiPAP, currently tolerating 2 L nasal cannula  -Start DuoNeb as needed, Symbicort  -Likely left lower lobe pneumonia, continue empiric vancomycin, Zosyn given nursing home patient, MRSA PCR positive  -PE ruled out with VQ scan and negative lower extremity ultrasound venous Doppler to my read, stopped heparin infusion  -Diuresis for dry lung strategy as indicated    Left lower lobe pneumonia  -Continue vancomycin  given MRSA positive, Zosyn empirically  -Awaiting a.m. CBC    ROSELIA  -Metabolic panel pending, will follow  -Adequate urine output, 1.1 L past 24 hours  -Received IV fluid hydration, can stop today tolerating oral diet    VTE: Lovenox  GI: PPI-Home med  NTN: Regular diet  Abx: Vancomycin, Zosyn  Lines/Tubes: Peripheral IV  CODE STATUS: Full resuscitation    Disposition: Stable for transfer to medical floor    50 minutes minimum spent on patient, MDM high     This time included obtaining a history; examining the patient; pulse oximetry; ordering and review of studies; arranging urgent treatment with development of a management plan; evaluation of patient's response to treatment; frequent reassessment; and, discussions with other providers.     Please see rest of the note for further information on patient assessment, MDM, and treatment.     Part of this note may be an electronic transcription/translation of spoken language to printed text using the Dragon dictation system    Electronically signed by VINAYAK Lima on 4/5/2025 at 08:59 CDT

## 2025-04-05 NOTE — PROGRESS NOTES
"Pharmacy Renal Dose Conversion    Reza Cruz is a 70 y.o. year old male  182.9 cm (72\") 111 kg (244 lb 7.8 oz)    Estimated Creatinine Clearance: 35.4 mL/min (A) (by C-G formula based on SCr of 2.57 mg/dL (H)).   Creatinine   Date Value Ref Range Status   04/04/2025 2.57 (H) 0.76 - 1.27 mg/dL Final   06/11/2024 1.4 (H) 0.5 - 1.2 mg/dL Final   05/30/2022 1.2 0.5 - 1.2 mg/dL Final   08/24/2020 0.8 0.5 - 1.2 mg/dL Final       PLAN  Based on prescribing information provided by the drug , Enoxaparin 30mg sq QHS,  has been changed to Enoxaparin 40mg sq QHS    Adjusted per the directives and guidelines established by [facility] and Therapeutics Committee and [facility] Medical Executive Committee.  Pharmacy will continue to monitor daily and make further adjustment(s) accordingly.    Charli Rose, PharmD  04/05/2507:22 CDT    "

## 2025-04-06 LAB
ANION GAP SERPL CALCULATED.3IONS-SCNC: 9 MMOL/L (ref 5–15)
BASOPHILS # BLD MANUAL: 0.36 10*3/MM3 (ref 0–0.2)
BASOPHILS NFR BLD MANUAL: 2 % (ref 0–1.5)
BUN SERPL-MCNC: 23 MG/DL (ref 8–23)
BUN/CREAT SERPL: 21.9 (ref 7–25)
CALCIUM SPEC-SCNC: 8 MG/DL (ref 8.6–10.5)
CHLORIDE SERPL-SCNC: 110 MMOL/L (ref 98–107)
CO2 SERPL-SCNC: 22 MMOL/L (ref 22–29)
CREAT SERPL-MCNC: 1.05 MG/DL (ref 0.76–1.27)
DEPRECATED RDW RBC AUTO: 48.5 FL (ref 37–54)
EGFRCR SERPLBLD CKD-EPI 2021: 77.3 ML/MIN/1.73
EOSINOPHIL # BLD MANUAL: 5.34 10*3/MM3 (ref 0–0.4)
EOSINOPHIL NFR BLD MANUAL: 29.3 % (ref 0.3–6.2)
ERYTHROCYTE [DISTWIDTH] IN BLOOD BY AUTOMATED COUNT: 15.1 % (ref 12.3–15.4)
GLUCOSE SERPL-MCNC: 91 MG/DL (ref 65–99)
HCT VFR BLD AUTO: 35.9 % (ref 37.5–51)
HGB BLD-MCNC: 11.8 G/DL (ref 13–17.7)
LYMPHOCYTES # BLD MANUAL: 2.75 10*3/MM3 (ref 0.7–3.1)
LYMPHOCYTES NFR BLD MANUAL: 12.1 % (ref 5–12)
MCH RBC QN AUTO: 28.7 PG (ref 26.6–33)
MCHC RBC AUTO-ENTMCNC: 32.9 G/DL (ref 31.5–35.7)
MCV RBC AUTO: 87.3 FL (ref 79–97)
MONOCYTES # BLD: 2.21 10*3/MM3 (ref 0.1–0.9)
NEUTROPHILS # BLD AUTO: 7.55 10*3/MM3 (ref 1.7–7)
NEUTROPHILS NFR BLD MANUAL: 40.4 % (ref 42.7–76)
NEUTS BAND NFR BLD MANUAL: 1 % (ref 0–5)
PLAT MORPH BLD: NORMAL
PLATELET # BLD AUTO: 254 10*3/MM3 (ref 140–450)
PMV BLD AUTO: 10.9 FL (ref 6–12)
POTASSIUM SERPL-SCNC: 4.4 MMOL/L (ref 3.5–5.2)
RBC # BLD AUTO: 4.11 10*6/MM3 (ref 4.14–5.8)
RBC MORPH BLD: NORMAL
SODIUM SERPL-SCNC: 141 MMOL/L (ref 136–145)
VANCOMYCIN TROUGH SERPL-MCNC: 12.3 MCG/ML (ref 5–20)
VARIANT LYMPHS NFR BLD MANUAL: 13.1 % (ref 19.6–45.3)
VARIANT LYMPHS NFR BLD MANUAL: 2 % (ref 0–5)
WBC MORPH BLD: NORMAL
WBC NRBC COR # BLD AUTO: 18.24 10*3/MM3 (ref 3.4–10.8)

## 2025-04-06 PROCEDURE — 80202 ASSAY OF VANCOMYCIN: CPT | Performed by: INTERNAL MEDICINE

## 2025-04-06 PROCEDURE — 25010000002 PIPERACILLIN SOD-TAZOBACTAM PER 1 G: Performed by: NURSE PRACTITIONER

## 2025-04-06 PROCEDURE — 94799 UNLISTED PULMONARY SVC/PX: CPT

## 2025-04-06 PROCEDURE — 25010000002 ENOXAPARIN PER 10 MG: Performed by: INTERNAL MEDICINE

## 2025-04-06 PROCEDURE — 25010000002 VANCOMYCIN 1 G RECONSTITUTED SOLUTION 1 EACH VIAL: Performed by: INTERNAL MEDICINE

## 2025-04-06 PROCEDURE — 80048 BASIC METABOLIC PNL TOTAL CA: CPT | Performed by: NURSE PRACTITIONER

## 2025-04-06 PROCEDURE — 25810000003 SODIUM CHLORIDE 0.9 % SOLUTION 250 ML FLEX CONT: Performed by: INTERNAL MEDICINE

## 2025-04-06 PROCEDURE — 94761 N-INVAS EAR/PLS OXIMETRY MLT: CPT

## 2025-04-06 PROCEDURE — 85007 BL SMEAR W/DIFF WBC COUNT: CPT | Performed by: NURSE PRACTITIONER

## 2025-04-06 PROCEDURE — 25010000002 VANCOMYCIN 1.25-0.9 GM/250ML-% SOLUTION: Performed by: INTERNAL MEDICINE

## 2025-04-06 PROCEDURE — 85025 COMPLETE CBC W/AUTO DIFF WBC: CPT | Performed by: NURSE PRACTITIONER

## 2025-04-06 PROCEDURE — 94664 DEMO&/EVAL PT USE INHALER: CPT

## 2025-04-06 RX ORDER — BUDESONIDE AND FORMOTEROL FUMARATE DIHYDRATE 160; 4.5 UG/1; UG/1
2 AEROSOL RESPIRATORY (INHALATION)
Status: DISCONTINUED | OUTPATIENT
Start: 2025-04-06 | End: 2025-04-09 | Stop reason: HOSPADM

## 2025-04-06 RX ORDER — VANCOMYCIN HYDROCHLORIDE
1250 EVERY 12 HOURS SCHEDULED
Status: COMPLETED | OUTPATIENT
Start: 2025-04-06 | End: 2025-04-08

## 2025-04-06 RX ADMIN — BUDESONIDE AND FORMOTEROL FUMARATE DIHYDRATE 2 PUFF: 160; 4.5 AEROSOL RESPIRATORY (INHALATION) at 07:46

## 2025-04-06 RX ADMIN — RISPERIDONE 0.5 MG: 0.5 TABLET, FILM COATED ORAL at 21:01

## 2025-04-06 RX ADMIN — PIPERACILLIN AND TAZOBACTAM 3.38 G: 3; .375 INJECTION, POWDER, FOR SOLUTION INTRAVENOUS at 17:29

## 2025-04-06 RX ADMIN — TAMSULOSIN HYDROCHLORIDE 0.8 MG: 0.4 CAPSULE ORAL at 08:50

## 2025-04-06 RX ADMIN — ACETAMINOPHEN 650 MG: 325 TABLET, FILM COATED ORAL at 21:01

## 2025-04-06 RX ADMIN — IPRATROPIUM BROMIDE 0.5 MG: 0.5 SOLUTION RESPIRATORY (INHALATION) at 18:16

## 2025-04-06 RX ADMIN — PANTOPRAZOLE SODIUM 40 MG: 40 TABLET, DELAYED RELEASE ORAL at 08:49

## 2025-04-06 RX ADMIN — Medication 1 APPLICATION: at 21:01

## 2025-04-06 RX ADMIN — BUDESONIDE AND FORMOTEROL FUMARATE DIHYDRATE 2 PUFF: 160; 4.5 AEROSOL RESPIRATORY (INHALATION) at 18:16

## 2025-04-06 RX ADMIN — LEVOTHYROXINE SODIUM 150 MCG: 100 TABLET ORAL at 05:53

## 2025-04-06 RX ADMIN — ATORVASTATIN CALCIUM 80 MG: 40 TABLET, FILM COATED ORAL at 21:01

## 2025-04-06 RX ADMIN — Medication 1 APPLICATION: at 08:49

## 2025-04-06 RX ADMIN — TIZANIDINE 4 MG: 4 TABLET ORAL at 02:52

## 2025-04-06 RX ADMIN — VANCOMYCIN HYDROCHLORIDE 1000 MG: 1 INJECTION, POWDER, LYOPHILIZED, FOR SOLUTION INTRAVENOUS at 02:25

## 2025-04-06 RX ADMIN — IPRATROPIUM BROMIDE 0.5 MG: 0.5 SOLUTION RESPIRATORY (INHALATION) at 07:46

## 2025-04-06 RX ADMIN — ACETAMINOPHEN 650 MG: 325 TABLET, FILM COATED ORAL at 02:32

## 2025-04-06 RX ADMIN — ACETAMINOPHEN 650 MG: 325 TABLET, FILM COATED ORAL at 08:48

## 2025-04-06 RX ADMIN — IPRATROPIUM BROMIDE 0.5 MG: 0.5 SOLUTION RESPIRATORY (INHALATION) at 10:40

## 2025-04-06 RX ADMIN — RISPERIDONE 0.5 MG: 0.5 TABLET, FILM COATED ORAL at 08:50

## 2025-04-06 RX ADMIN — DIVALPROEX SODIUM 500 MG: 125 TABLET, DELAYED RELEASE ORAL at 08:48

## 2025-04-06 RX ADMIN — Medication 1250 MG: at 17:30

## 2025-04-06 RX ADMIN — TIZANIDINE 4 MG: 4 TABLET ORAL at 21:01

## 2025-04-06 RX ADMIN — ENOXAPARIN SODIUM 40 MG: 100 INJECTION SUBCUTANEOUS at 21:01

## 2025-04-06 RX ADMIN — PIPERACILLIN AND TAZOBACTAM 3.38 G: 3; .375 INJECTION, POWDER, FOR SOLUTION INTRAVENOUS at 08:50

## 2025-04-06 RX ADMIN — DIVALPROEX SODIUM 500 MG: 125 TABLET, DELAYED RELEASE ORAL at 21:01

## 2025-04-06 RX ADMIN — Medication 10 ML: at 21:02

## 2025-04-06 RX ADMIN — ASPIRIN 81 MG: 81 TABLET, CHEWABLE ORAL at 08:49

## 2025-04-06 RX ADMIN — ACETAMINOPHEN 650 MG: 325 TABLET, FILM COATED ORAL at 15:00

## 2025-04-06 NOTE — PLAN OF CARE
Goal Outcome Evaluation:                 Patient alert and oriented x4. Slurred, garbled speech at baseline. Stand pivot with x1 assistance. Minimal pain being treated with prn medication. No needs at this time.

## 2025-04-06 NOTE — PLAN OF CARE
Goal Outcome Evaluation:  Plan of Care Reviewed With: patient        Progress: improving  Outcome Evaluation: Pt resting comfortably. PRN tylenol and zanaflex given. IV abx infused. A&O x4, garbled baseline speech. Voiding via urinal. Turning q2h. 2L nc baseline. Safety maintained.

## 2025-04-06 NOTE — PROGRESS NOTES
Assessment tool to be used for patients with existing breathing treatments ordered by hospitalist                               Respiratory Therapist Driven Protocol - RT to Assess and Treat Algorithm    Item 0 Points 1 Point 2 Points 3 Points 4 Points Subtotal   Mental Status Alert, orientated, cooperative Lethargic, follows commands Confused, not following commands Obtunded or Somnolent Comatose 0   Respiratory Pattern Regular RR  8-16 breaths/minute Increased RR  18-25 breaths/minute Dyspnea on exertion, irregular RR  26-30/minute Shortness of breath,  RR 31-35 breaths/minute Accessory muscle use, severe SOB  RR > 35 breath/minute 0   Breath Sounds Clear Decreased unilaterally Decreased bilaterally Basilar crackles Wheezing and/or rhonchi 4   Cough Strong, spontaneous non-productive Strong productive Weak, non-productive Weak productive or weak with rhonchi Absent or may require suctioning 0   Pulmonary Status Nonsmoker or no previous history > 1 year quit < 1 PPD  < 1 year quit >  or = 1 PPD Diagnosed pulmonary disease (severe or chronic) Severe or chronic pulmonary disease with exacerbation 0   Surgical Status None General surgery (non-abdominal or non-thoracic) Lower abdominal Thoracic or upper abdominal Thoracic with pulmonary disease 0   Chest X-ray Clear Chronic changes Infiltrates, atelectasis or pleural effusion Infiltrates > 1 lobe Diffuse infiltrates and atelectasis and/or effusions na   Activity level Ambulatory Ambulatory with assistance Non-ambulatory Paraplegic Quadriplegic 1                     Total Score 5   Score    Drug Therapy Frequency  20 or >    Q4 Duoneb & Q3 Albuterol PRN 15 - 19     Q6 Duoneb & Q4 Albuterol PRN 10 - 14    QID Duoneb & Q4 Albuterol PRN 5 - 9    TID Duoneb & Q6 Albuterol PRN 0 - 4    Q4 PRN Duoneb or Q4 PRN Albuterol    Incentive Spirometry - Initial RT instruct    Lung Expansion Therapy (PEP) Bronchopulmonary Hygiene (CPT)   Q4 & PRN - Severe  atelectasis, poor oxygenation Q4 - copious secretions, dyspnea, unable to sleep, mucus plugging   QID - High risk for persistent atelectasis, existence of atelectasis QID & Q4 PRN - Moderate secretion production   TID - At risk for developing atelectasis TID - small amounts of secretions with poor cough   BID - prevention of atelectasis BID - unable to breathe deeply and cough spontaneously   *RT Protocol patients will be re-assessed/re-evaluated every 48 hours.    *Patients who are home nebulizer treatments will be protocoled to no less than their home regimen and will remain     on their home regimen with re-evaluations as needed with changes in patient condition.    RT Comments/Recommendations: chart reviewed

## 2025-04-06 NOTE — PROGRESS NOTES
[facility] Medicine Services  INPATIENT PROGRESS NOTE    Patient Name: Reza Cruz  Date of Admission: 4/3/2025  Today's Date: 04/06/25  Length of Stay: 3  Primary Care Physician: Lobito Trevino Jr., MD    Subjective   Chief Complaint: Acute respiratory failure  HPI   Patient feels better        Review of Systems   All pertinent negatives and positives are as above. All other systems have been reviewed and are negative unless otherwise stated.     Objective    Temp:  [97.5 °F (36.4 °C)-98.4 °F (36.9 °C)] 98 °F (36.7 °C)  Heart Rate:  [48-79] 67  Resp:  [16] 16  BP: (106-145)/(58-87) 106/72  Physical Exam  General: Patient is alert, awake, oriented x 3  HEENT: Normocephalic, atraumatic, pupils are equal reactive to light,,  Neck: Supple, no JVD, no carotid bruit  CVS: S1, S2, regular rhythm and rate  Lungs: Increased respiratory effort, diminished breath sounds at bases  Abdomen: Soft, nontender, nondistended bowel sounds are present  Extremities: No cyanosis, no clubbing, no edema pulses intact  Neurologic: Expressive aphasia, right-sided weakness  Psychiatric: Normal affect      Results Review:  I have reviewed the labs, radiology results, and diagnostic studies.    Laboratory Data:   Results from last 7 days   Lab Units 04/06/25  0943 04/05/25  0854 04/04/25  0025   WBC 10*3/mm3 18.24* 23.08* 29.59*   HEMOGLOBIN g/dL 11.8* 12.6* 13.3   HEMATOCRIT % 35.9* 38.1 41.0   PLATELETS 10*3/mm3 254 251 244        Results from last 7 days   Lab Units 04/06/25  0943 04/05/25  0854 04/04/25  0025   SODIUM mmol/L 141 142 140   POTASSIUM mmol/L 4.4 4.2 4.2   CHLORIDE mmol/L 110* 110* 102   CO2 mmol/L 22.0 24.0 21.0*   BUN mg/dL 23 36* 39*   CREATININE mg/dL 1.05 1.25 2.57*   CALCIUM mg/dL 8.0* 8.3* 8.6   BILIRUBIN mg/dL  --   --  0.5   ALK PHOS U/L  --   --  59   ALT (SGPT) U/L  --   --  8   AST (SGOT) U/L  --   --  14   GLUCOSE mg/dL 91 99 170*       Culture Data:   Blood Culture   Date  Value Ref Range Status   04/04/2025 No growth at 2 days  Preliminary   04/04/2025 No growth at 2 days  Preliminary       Radiology Data:   Imaging Results (Last 24 Hours)       ** No results found for the last 24 hours. **            I have reviewed the patient's current medications.     Assessment/Plan   Assessment  Active Hospital Problems    Diagnosis    • **Acute respiratory failure with hypoxia        Treatment Plan      Acute Hypoxic respiratory failure  -On nasal cannula   -on  DuoNeb as needed, Symbicort  -left lower lobe pneumonia: on Zosyn,vancomycin  -PE ruled out with VQ scan and negative lower extremity ultrasound venous Doppler to my read, stopped heparin infusion  -Diuresis for dry lung strategy as indicated     Left lower lobe pneumonia  -Continue vancomycin given MRSA positive, Zosyn empirically  -follow up CBC      ROSELIA  -Metabolic panel pending, will follow  -Adequate urine output, 1.1 L past 24 hours  -Received IV fluid hydration, can stop today tolerating oral diet  -avoid nephrotoxic agents     PT/OT evaluation         Medical Decision Making  Number and Complexity of problems: 7  Differential Diagnosis: none    Conditions and Status        Condition is improving.     MDM Data  External documents reviewed: none  Cardiac tracing (EKG, telemetry) interpretation: sinus  Radiology interpretation: reviewed  Labs reviewed: yes  Any tests that were considered but not ordered: none     Decision rules/scores evaluated (example [id number], Wells, etc): none     Discussed with: patient, RN      Care Planning  Shared decision making: patient   Code status and discussions: Full     Disposition  Social Determinants of Health that impact treatment or disposition: needing medical management  I expect the patient to be discharged to nursing facility in 2 days.         Electronically signed by Lavell Varma MD, 04/06/25, 13:17 CDT.

## 2025-04-06 NOTE — PROGRESS NOTES
"Pharmacy Dosing Service  Pharmacokinetics  Vancomycin Follow-up Evaluation     Assessment/Action/Plan:  Current Order: Vancomycin 1000 mg IVPB every 12 hours  Current end date:4/8/2025  Levels: 4/6 1525 Vancomycin trough = 12.3 (12 hours post 1250 mg dose)  Additional antimicrobial agent(s): Zosyn     Vancomycin dose adjusted to 1250 mg iv q12h. Pharmacy will continue to follow daily and adjust dose accordingly.      Subjective:  Reza Cruz is a 70 y.o. male currently on Vancomycin for the treatment of pneumonia, day 2 of 5 of treatment.          AUC Model Data:  Regimen: 1250 mg IV every 12 hours.  Exposure target: AUC24 (range)400-600 mg/L.hr   AUC24,ss: 544 mg/L.hr  Probability of AUC24 > 400: 97 %  Ctrough,ss: 18.1 mg/L  Probability of Ctrough,ss > 20: 35 %  Probability of nephrotoxicity (Lodise MICHELE 2009): 14 %      Objective:  Ht: 182.9 cm (72\"); Wt: 113 kg (249 lb 11.2 oz)  Estimated Creatinine Clearance: 87.4 mL/min (by C-G formula based on SCr of 1.05 mg/dL).   Creatinine   Date Value Ref Range Status   04/06/2025 1.05 0.76 - 1.27 mg/dL Final   04/05/2025 1.25 0.76 - 1.27 mg/dL Final   04/04/2025 2.57 (H) 0.76 - 1.27 mg/dL Final   06/11/2024 1.4 (H) 0.5 - 1.2 mg/dL Final   05/30/2022 1.2 0.5 - 1.2 mg/dL Final   08/24/2020 0.8 0.5 - 1.2 mg/dL Final      Lab Results   Component Value Date    WBC 18.24 (H) 04/06/2025    WBC 23.08 (H) 04/05/2025    WBC 29.59 (H) 04/04/2025         Lab Results   Component Value Date    VANCOTROUGH 12.30 04/06/2025       Culture Results:  Microbiology Results (last 10 days)       Procedure Component Value - Date/Time    MRSA Screen, PCR (Inpatient) - Swab, Nares [[id number]]  (Abnormal) Collected: 04/04/25 0514    Lab Status: Final result Specimen: Swab from Nares Updated: 04/04/25 0633     MRSA PCR MRSA Detected    Narrative:      The negative predictive value of this diagnostic test is high and should only be used to consider de-escalating anti-MRSA therapy. A positive result " may indicate colonization with MRSA and must be correlated clinically.    Respiratory Panel PCR w/COVID-19(SARS-CoV-2) RAJEEV/LORELEI/ALEKS/PAD/COR/INGRIS In-House, NP Swab in UTM/VTM, 2 HR TAT - Swab, Nasopharynx [[id number]]  (Normal) Collected: 04/04/25 0219    Lab Status: Final result Specimen: Swab from Nasopharynx Updated: 04/04/25 0320     ADENOVIRUS, PCR Not Detected     Coronavirus 229E Not Detected     Coronavirus HKU1 Not Detected     Coronavirus NL63 Not Detected     Coronavirus OC43 Not Detected     COVID19 Not Detected     Human Metapneumovirus Not Detected     Human Rhinovirus/Enterovirus Not Detected     Influenza A PCR Not Detected     Influenza B PCR Not Detected     Parainfluenza Virus 1 Not Detected     Parainfluenza Virus 2 Not Detected     Parainfluenza Virus 3 Not Detected     Parainfluenza Virus 4 Not Detected     RSV, PCR Not Detected     Bordetella pertussis pcr Not Detected     Bordetella parapertussis PCR Not Detected     Chlamydophila pneumoniae PCR Not Detected     Mycoplasma pneumo by PCR Not Detected    Narrative:      In the setting of a positive respiratory panel with a viral infection PLUS a negative procalcitonin without other underlying concern for bacterial infection, consider observing off antibiotics or discontinuation of antibiotics and continue supportive care. If the respiratory panel is positive for atypical bacterial infection (Bordetella pertussis, Chlamydophila pneumoniae, or Mycoplasma pneumoniae), consider antibiotic de-escalation to target atypical bacterial infection.    Legionella Antigen, Urine - Urine, Urine, Clean Catch [[id number]]  (Normal) Collected: 04/04/25 0219    Lab Status: Final result Specimen: Urine, Clean Catch Updated: 04/04/25 0250     LEGIONELLA ANTIGEN, URINE Negative    S. Pneumo Ag Urine or CSF - Urine, Urine, Clean Catch [[id number]]  (Normal) Collected: 04/04/25 0219    Lab Status: Final result Specimen: Urine, Clean Catch Updated: 04/04/25 0250     Strep  Pneumo Ag Negative    Blood Culture - Blood, Hand, Left [[id number]]  (Normal) Collected: 04/04/25 0025    Lab Status: Preliminary result Specimen: Blood from Hand, Left Updated: 04/06/25 0046     Blood Culture No growth at 2 days    Blood Culture - Blood, Arm, Right [[id number]]  (Normal) Collected: 04/04/25 0025    Lab Status: Preliminary result Specimen: Blood from Arm, Right Updated: 04/06/25 0046     Blood Culture No growth at 2 days            Charli Rose, PharmD   04/06/25 15:06 CDT

## 2025-04-07 LAB
ANION GAP SERPL CALCULATED.3IONS-SCNC: 12 MMOL/L (ref 5–15)
BASOPHILS # BLD MANUAL: 0 10*3/MM3 (ref 0–0.2)
BASOPHILS NFR BLD MANUAL: 0 % (ref 0–1.5)
BUN SERPL-MCNC: 19 MG/DL (ref 8–23)
BUN/CREAT SERPL: 18.3 (ref 7–25)
BURR CELLS BLD QL SMEAR: ABNORMAL
CALCIUM SPEC-SCNC: 7.9 MG/DL (ref 8.6–10.5)
CHLORIDE SERPL-SCNC: 112 MMOL/L (ref 98–107)
CO2 SERPL-SCNC: 21 MMOL/L (ref 22–29)
CREAT SERPL-MCNC: 1.04 MG/DL (ref 0.76–1.27)
DEPRECATED RDW RBC AUTO: 48.1 FL (ref 37–54)
EGFRCR SERPLBLD CKD-EPI 2021: 78.2 ML/MIN/1.73
EOSINOPHIL # BLD MANUAL: 4.97 10*3/MM3 (ref 0–0.4)
EOSINOPHIL NFR BLD MANUAL: 26.5 % (ref 0.3–6.2)
ERYTHROCYTE [DISTWIDTH] IN BLOOD BY AUTOMATED COUNT: 14.9 % (ref 12.3–15.4)
GLUCOSE SERPL-MCNC: 100 MG/DL (ref 65–99)
HCT VFR BLD AUTO: 36.3 % (ref 37.5–51)
HGB BLD-MCNC: 12.2 G/DL (ref 13–17.7)
LYMPHOCYTES # BLD MANUAL: 4.41 10*3/MM3 (ref 0.7–3.1)
LYMPHOCYTES NFR BLD MANUAL: 11.8 % (ref 5–12)
MCH RBC QN AUTO: 29.4 PG (ref 26.6–33)
MCHC RBC AUTO-ENTMCNC: 33.6 G/DL (ref 31.5–35.7)
MCV RBC AUTO: 87.5 FL (ref 79–97)
MONOCYTES # BLD: 2.21 10*3/MM3 (ref 0.1–0.9)
NEUTROPHILS # BLD AUTO: 7.17 10*3/MM3 (ref 1.7–7)
NEUTROPHILS NFR BLD MANUAL: 37.3 % (ref 42.7–76)
NEUTS BAND NFR BLD MANUAL: 1 % (ref 0–5)
PLAT MORPH BLD: NORMAL
PLATELET # BLD AUTO: 256 10*3/MM3 (ref 140–450)
PMV BLD AUTO: 10.6 FL (ref 6–12)
POIKILOCYTOSIS BLD QL SMEAR: ABNORMAL
POTASSIUM SERPL-SCNC: 4.3 MMOL/L (ref 3.5–5.2)
RBC # BLD AUTO: 4.15 10*6/MM3 (ref 4.14–5.8)
SODIUM SERPL-SCNC: 145 MMOL/L (ref 136–145)
TARGETS BLD QL SMEAR: ABNORMAL
VARIANT LYMPHS NFR BLD MANUAL: 18.6 % (ref 19.6–45.3)
VARIANT LYMPHS NFR BLD MANUAL: 4.9 % (ref 0–5)
WBC MORPH BLD: NORMAL
WBC NRBC COR # BLD AUTO: 18.75 10*3/MM3 (ref 3.4–10.8)

## 2025-04-07 PROCEDURE — 94664 DEMO&/EVAL PT USE INHALER: CPT

## 2025-04-07 PROCEDURE — 63710000001 PREDNISONE PER 1 MG: Performed by: INTERNAL MEDICINE

## 2025-04-07 PROCEDURE — 97161 PT EVAL LOW COMPLEX 20 MIN: CPT | Performed by: PHYSICAL THERAPIST

## 2025-04-07 PROCEDURE — 80048 BASIC METABOLIC PNL TOTAL CA: CPT | Performed by: NURSE PRACTITIONER

## 2025-04-07 PROCEDURE — 25010000002 ENOXAPARIN PER 10 MG: Performed by: INTERNAL MEDICINE

## 2025-04-07 PROCEDURE — 25010000002 VANCOMYCIN 1.25-0.9 GM/250ML-% SOLUTION: Performed by: INTERNAL MEDICINE

## 2025-04-07 PROCEDURE — 85025 COMPLETE CBC W/AUTO DIFF WBC: CPT | Performed by: NURSE PRACTITIONER

## 2025-04-07 PROCEDURE — 85007 BL SMEAR W/DIFF WBC COUNT: CPT | Performed by: NURSE PRACTITIONER

## 2025-04-07 PROCEDURE — 94799 UNLISTED PULMONARY SVC/PX: CPT

## 2025-04-07 PROCEDURE — 25010000002 PIPERACILLIN SOD-TAZOBACTAM PER 1 G: Performed by: NURSE PRACTITIONER

## 2025-04-07 RX ORDER — IPRATROPIUM BROMIDE AND ALBUTEROL SULFATE 2.5; .5 MG/3ML; MG/3ML
3 SOLUTION RESPIRATORY (INHALATION)
Status: DISCONTINUED | OUTPATIENT
Start: 2025-04-08 | End: 2025-04-09 | Stop reason: HOSPADM

## 2025-04-07 RX ORDER — ALBUTEROL SULFATE 0.83 MG/ML
2.5 SOLUTION RESPIRATORY (INHALATION) EVERY 4 HOURS PRN
Status: DISCONTINUED | OUTPATIENT
Start: 2025-04-07 | End: 2025-04-09 | Stop reason: HOSPADM

## 2025-04-07 RX ORDER — PREDNISONE 20 MG/1
40 TABLET ORAL DAILY
Status: DISCONTINUED | OUTPATIENT
Start: 2025-04-07 | End: 2025-04-09 | Stop reason: HOSPADM

## 2025-04-07 RX ORDER — IPRATROPIUM BROMIDE AND ALBUTEROL SULFATE 2.5; .5 MG/3ML; MG/3ML
3 SOLUTION RESPIRATORY (INHALATION)
Status: DISCONTINUED | OUTPATIENT
Start: 2025-04-07 | End: 2025-04-07

## 2025-04-07 RX ADMIN — Medication 10 ML: at 08:01

## 2025-04-07 RX ADMIN — ALBUTEROL SULFATE 2.5 MG: 2.5 SOLUTION RESPIRATORY (INHALATION) at 22:35

## 2025-04-07 RX ADMIN — Medication 1250 MG: at 14:40

## 2025-04-07 RX ADMIN — IPRATROPIUM BROMIDE 0.5 MG: 0.5 SOLUTION RESPIRATORY (INHALATION) at 05:32

## 2025-04-07 RX ADMIN — Medication 1250 MG: at 03:17

## 2025-04-07 RX ADMIN — LEVOTHYROXINE SODIUM 150 MCG: 100 TABLET ORAL at 04:43

## 2025-04-07 RX ADMIN — PREDNISONE 40 MG: 20 TABLET ORAL at 14:40

## 2025-04-07 RX ADMIN — SENNOSIDES, DOCUSATE SODIUM 2 TABLET: 50; 8.6 TABLET, FILM COATED ORAL at 08:01

## 2025-04-07 RX ADMIN — IPRATROPIUM BROMIDE AND ALBUTEROL SULFATE 3 ML: .5; 3 SOLUTION RESPIRATORY (INHALATION) at 18:09

## 2025-04-07 RX ADMIN — PIPERACILLIN AND TAZOBACTAM 3.38 G: 3; .375 INJECTION, POWDER, FOR SOLUTION INTRAVENOUS at 08:00

## 2025-04-07 RX ADMIN — ATORVASTATIN CALCIUM 80 MG: 40 TABLET, FILM COATED ORAL at 20:11

## 2025-04-07 RX ADMIN — PIPERACILLIN AND TAZOBACTAM 3.38 G: 3; .375 INJECTION, POWDER, FOR SOLUTION INTRAVENOUS at 00:57

## 2025-04-07 RX ADMIN — DIVALPROEX SODIUM 500 MG: 125 TABLET, DELAYED RELEASE ORAL at 20:11

## 2025-04-07 RX ADMIN — BUDESONIDE AND FORMOTEROL FUMARATE DIHYDRATE 2 PUFF: 160; 4.5 AEROSOL RESPIRATORY (INHALATION) at 05:32

## 2025-04-07 RX ADMIN — PANTOPRAZOLE SODIUM 40 MG: 40 TABLET, DELAYED RELEASE ORAL at 08:01

## 2025-04-07 RX ADMIN — TIZANIDINE 4 MG: 4 TABLET ORAL at 14:40

## 2025-04-07 RX ADMIN — PIPERACILLIN AND TAZOBACTAM 3.38 G: 3; .375 INJECTION, POWDER, FOR SOLUTION INTRAVENOUS at 23:48

## 2025-04-07 RX ADMIN — BUDESONIDE AND FORMOTEROL FUMARATE DIHYDRATE 2 PUFF: 160; 4.5 AEROSOL RESPIRATORY (INHALATION) at 18:09

## 2025-04-07 RX ADMIN — ACETAMINOPHEN 650 MG: 325 TABLET, FILM COATED ORAL at 04:43

## 2025-04-07 RX ADMIN — PIPERACILLIN AND TAZOBACTAM 3.38 G: 3; .375 INJECTION, POWDER, FOR SOLUTION INTRAVENOUS at 16:34

## 2025-04-07 RX ADMIN — ENOXAPARIN SODIUM 40 MG: 100 INJECTION SUBCUTANEOUS at 20:11

## 2025-04-07 RX ADMIN — Medication 10 ML: at 20:12

## 2025-04-07 RX ADMIN — ACETAMINOPHEN 650 MG: 325 TABLET, FILM COATED ORAL at 20:11

## 2025-04-07 RX ADMIN — Medication 1 APPLICATION: at 20:11

## 2025-04-07 RX ADMIN — DIVALPROEX SODIUM 500 MG: 125 TABLET, DELAYED RELEASE ORAL at 08:01

## 2025-04-07 RX ADMIN — SENNOSIDES, DOCUSATE SODIUM 2 TABLET: 50; 8.6 TABLET, FILM COATED ORAL at 20:11

## 2025-04-07 RX ADMIN — TAMSULOSIN HYDROCHLORIDE 0.8 MG: 0.4 CAPSULE ORAL at 08:01

## 2025-04-07 RX ADMIN — IPRATROPIUM BROMIDE 0.5 MG: 0.5 SOLUTION RESPIRATORY (INHALATION) at 10:43

## 2025-04-07 RX ADMIN — Medication 1 APPLICATION: at 08:01

## 2025-04-07 RX ADMIN — ASPIRIN 81 MG: 81 TABLET, CHEWABLE ORAL at 08:01

## 2025-04-07 RX ADMIN — IPRATROPIUM BROMIDE AND ALBUTEROL SULFATE 3 ML: .5; 3 SOLUTION RESPIRATORY (INHALATION) at 14:27

## 2025-04-07 RX ADMIN — RISPERIDONE 0.5 MG: 0.5 TABLET, FILM COATED ORAL at 08:01

## 2025-04-07 RX ADMIN — RISPERIDONE 0.5 MG: 0.5 TABLET, FILM COATED ORAL at 20:11

## 2025-04-07 NOTE — PLAN OF CARE
Goal Outcome Evaluation:  Plan of Care Reviewed With: patient        Progress: improving  Outcome Evaluation: The patient presents alert and oriented x4 lying in bed. The patient had a stroke 5 years ago that left him with R side weakness and discoordination. He has been in a SNF every since. He typically transfers independently but requires assist for ADLs. He is still attempting to ambulate with PT. Today he demonstrates mild weakness of the R side and coordination deficit of the R side. He was able to transfer from the bed to the chair with min A. Pt will continue to work with him to maintain his progress and work on his ambulation as therapy does at the SNF. Recommend discharge back to the SNF.    Anticipated Discharge Disposition (PT): extended care facility

## 2025-04-07 NOTE — PLAN OF CARE
Problem: Adult Inpatient Plan of Care  Goal: Plan of Care Review  Outcome: Progressing  Goal: Patient-Specific Goal (Individualized)  Outcome: Progressing  Goal: Absence of Hospital-Acquired Illness or Injury  Outcome: Progressing  Intervention: Identify and Manage Fall Risk  Recent Flowsheet Documentation  Taken 4/7/2025 0801 by Hannah Joseph RN  Safety Promotion/Fall Prevention:   safety round/check completed   nonskid shoes/slippers when out of bed   clutter free environment maintained   assistive device/personal items within reach  Intervention: Prevent and Manage VTE (Venous Thromboembolism) Risk  Recent Flowsheet Documentation  Taken 4/7/2025 0801 by Hannah Joseph RN  VTE Prevention/Management:   bilateral   foot pump device on   other (see comments)  Goal: Optimal Comfort and Wellbeing  Outcome: Progressing  Goal: Readiness for Transition of Care  Outcome: Progressing     Problem: Noninvasive Ventilation Acute  Goal: Effective Unassisted Ventilation and Oxygenation  Outcome: Progressing     Problem: Skin Injury Risk Increased  Goal: Skin Health and Integrity  Outcome: Progressing     Problem: Comorbidity Management  Goal: Blood Pressure in Desired Range  Outcome: Progressing     Problem: Sepsis/Septic Shock  Goal: Optimal Coping  Outcome: Progressing  Goal: Absence of Bleeding  Outcome: Progressing  Goal: Blood Glucose Level Within Target Range  Outcome: Progressing  Goal: Absence of Infection Signs and Symptoms  Outcome: Progressing  Intervention: Initiate Sepsis Management  Recent Flowsheet Documentation  Taken 4/7/2025 0801 by Hannah Joseph RN  Isolation Precautions:   precautions maintained   contact  Goal: Optimal Nutrition Delivery  Outcome: Progressing     Problem: Fall Injury Risk  Goal: Absence of Fall and Fall-Related Injury  Outcome: Progressing  Intervention: Promote Injury-Free Environment  Recent Flowsheet Documentation  Taken 4/7/2025 0801 by Hannah Joseph RN  Safety  Promotion/Fall Prevention:   safety round/check completed   nonskid shoes/slippers when out of bed   clutter free environment maintained   assistive device/personal items within reach   Goal Outcome Evaluation:

## 2025-04-07 NOTE — CASE MANAGEMENT/SOCIAL WORK
Continued Stay Note  FRANK Rand     Patient Name: Reza Cruz  MRN: [id number]  Today's Date: 4/7/2025    Admit Date: 4/3/2025    Plan: [facility]   Discharge Plan       Row Name 04/07/25 0954       Plan    Plan [facility]    Patient/Family in Agreement with Plan yes    Plan Comments Pt has a bed at [facility] and will return there at d/c. Will follow for d/c.                   Discharge Codes    No documentation.                       TAMIKA Ribeiro

## 2025-04-07 NOTE — PLAN OF CARE
Goal Outcome Evaluation:  Plan of Care Reviewed With: patient        Progress: improving  Outcome Evaluation: Pt resting well. A&O x4 with garbled speech baseline. C/o pain, see MAR. IV abx infused. Turning q2h independently and with assistance. Voiding via urinal. O2 at 2L baseline. Safety maintained.

## 2025-04-07 NOTE — THERAPY EVALUATION
Patient Name: Reza Cruz  : 1955    MRN: [id number]                              Today's Date: 2025       Admit Date: 4/3/2025    Visit Dx:     ICD-10-CM ICD-9-CM   1. Impaired mobility [Z74.09]  Z74.09 799.89     Patient Active Problem List   Diagnosis    Chronic obstructive pulmonary disease    CVA (cerebral vascular accident)    Influenza A    Tobacco dependence    Acute encephalopathy    Hypothyroidism, non-compliant with medication     Medical non-compliance    Bradycardia    Acute respiratory failure with hypoxia     Past Medical History:   Diagnosis Date    COPD (chronic obstructive pulmonary disease)     Hyperlipidemia     Hypertension     Personal history of nicotine dependence 2019     Past Surgical History:   Procedure Laterality Date    EYE SURGERY Right     KNEE SURGERY Right       General Information       Row Name 25 4835          Physical Therapy Time and Intention    Document Type evaluation  L lower lobe pneumonia, ROSELIA, hx of stroke with SNF placement  -MS     Mode of Treatment physical therapy;individual therapy  -MS       Row Name 25 4408          General Information    Patient Profile Reviewed yes  -MS     Prior Level of Function independent:;transfer;min assist:;ADL's  working on ambulation with PT at SNF  -MS     Existing Precautions/Restrictions fall  R side weakness  -MS     Barriers to Rehab previous functional deficit;language barrier  -MS       Row Name 25 8930          Living Environment    Current Living Arrangements extended care facility  -MS     People in Home facility resident  -MS       Row Name 25 2646          Cognition    Orientation Status (Cognition) oriented x 4  -MS       Row Name 25 4577          Safety Issues/Impairments Affecting Functional Mobility    Impairments Affecting Function (Mobility) balance;coordination;endurance/activity tolerance;pain;strength  -MS               User Key  (r) = Recorded By, (t) = Taken By, (c) =  Cosigned By      Initials Name Provider Type    MS Augustin Christina AGUERO, PT, DPT, NCS Physical Therapist                   Mobility       Row Name 04/07/25 1335          Bed Mobility    Bed Mobility supine-sit  -MS     Supine-Sit [place] (Bed Mobility) minimum assist (75% patient effort);verbal cues;nonverbal cues (demo/gesture)  -MS     Assistive Device (Bed Mobility) head of bed elevated;bed rails  -MS       Row Name 04/07/25 1335          Bed-Chair Transfer    Bed-Chair [place] (Transfers) minimum assist (75% patient effort);verbal cues;nonverbal cues (demo/gesture)  -MS     Assistive Device (Bed-Chair Transfers) --  HHA  -MS       Row Name 04/07/25 1335          Sit-Stand Transfer    Sit-Stand [place] (Transfers) contact guard;verbal cues;nonverbal cues (demo/gesture)  -MS               User Key  (r) = Recorded By, (t) = Taken By, (c) = Cosigned By      Initials Name Provider Type    MS Augustin Christina AGUERO, PT, DPT, NCS Physical Therapist                   Obj/Interventions       Row Name 04/07/25 1335          Motor Skills    Motor Skills coordination  -MS     Coordination right;upper extremity;lower extremity;minimal impairment  -MS       [facility] Name 04/07/25 1335          Balance    Balance Assessment sitting static balance;sitting dynamic balance;standing static balance;standing dynamic balance  -MS     Static Sitting Balance contact guard  -MS     Dynamic Sitting Balance contact guard  -MS     Position, Sitting Balance unsupported;sitting edge of bed  -MS     Static Standing Balance contact guard  -MS     Dynamic Standing Balance minimal assist  -MS     Position/Device Used, Standing Balance supported  HHA  -MS       Row Name 04/07/25 1335          Sensory Assessment (Somatosensory)    Sensory Assessment (Somatosensory) sensation intact  -MS               User Key  (r) = Recorded By, (t) = Taken By, (c) = Cosigned By      Initials Name Provider Type    MS AugustinChristina, PT, DPT, NCS Physical  Therapist                   Goals/Plan       Row Name 04/07/25 9240          Bed Mobility Goal 1 (PT)    Activity/Assistive Device (Bed Mobility Goal 1, PT) bed mobility activities, all  -MS     [place] Level/Cues Needed (Bed Mobility Goal 1, PT) contact guard required;tactile cues required;verbal cues required  -MS     Time Frame (Bed Mobility Goal 1, PT) long term goal (LTG);by discharge  -MS     Progress/Outcomes (Bed Mobility Goal 1, PT) new goal  -MS       Row Name 04/07/25 4669          Transfer Goal 1 (PT)    Activity/Assistive Device (Transfer Goal 1, PT) sit-to-stand/stand-to-sit;bed-to-chair/chair-to-bed;walker, rolling  -MS     [place] Level/Cues Needed (Transfer Goal 1, PT) modified independence  -MS     Time Frame (Transfer Goal 1, PT) long term goal (LTG);by discharge  -MS     Progress/Outcome (Transfer Goal 1, PT) new [facility]  -MS       Row Name 04/07/25 2116          Gait Training Goal 1 (PT)    Activity/Assistive Device (Gait Training Goal 1, PT) gait (walking locomotion);assistive device use;decrease fall risk;increase endurance/gait distance;improve balance and speed;walker, rolling  -MS     [place] Level (Gait Training Goal 1, PT) minimum assist (75% or more patient effort);tactile cues required;verbal cues required  -MS     Distance (Gait Training Goal 1, PT) 25ft  -MS     Time Frame (Gait Training Goal 1, PT) long term goal (LTG);by discharge  -MS     Progress/Outcome (Gait Training Goal 1, PT) new [facility]  -MS       Row Name 04/07/25 9640          Problem Specific Goal 1 (PT)    Problem Specific Goal 1 (PT) The patient will independently implement 1 pain management technique to decrease pain in order to improve functional mobility.  -MS     Time Frame (Problem Specific Goal 1, PT) long-term goal (LTG);by discharge  -MS     Progress/Outcome (Problem Specific Goal 1, PT) new [facility]       Row Name 04/07/25 4850          Therapy Assessment/Plan (PT)    Planned Therapy Interventions  (PT) balance training;bed mobility training;gait training;patient/family education;strengthening;transfer training;neuromuscular re-education;motor coordination training  -MS               User Key  (r) = Recorded By, (t) = Taken By, (c) = Cosigned By      Initials Name Provider Type    Christina Correa R, PT, DPT, NCS Physical Therapist                   Clinical Impression       Row Name 04/07/25 1008          Pain    Pain Location extremity  -MS     Pain Side/Orientation bilateral;lower  -MS     Pain Management Interventions activity modification encouraged;exercise or physical activity utilized  -MS     Response to Pain Interventions no change per patient report  -MS     Additional Documentation Pain Scale: FACES Pre/Post-Treatment (Group)  -MS       Row Name 04/07/25 1648          Pain Scale: FACES Pre/Post-Treatment    Pain: FACES Scale, Pretreatment 2-->hurts little bit  -MS     Posttreatment Pain Rating 2-->hurts little bit  -MS       Row Name 04/07/25 9457          Plan of Care Review    Plan of Care Reviewed With patient  -MS     Progress improving  -MS     Outcome Evaluation The patient presents alert and oriented x4 lying in bed. The patient had a stroke 5 years ago that left him with R side weakness and discoordination. He has been in a SNF every since. He typically transfers independently but requires assist for ADLs. He is still attempting to ambulate with PT. Today he demonstrates mild weakness of the R side and coordination deficit of the R side. He was able to transfer from the bed to the chair with min A. Pt will continue to work with him to maintain his progress and work on his ambulation as therapy does at the SNF. Recommend discharge back to the SNF.  -MS       Row Name 04/07/25 9302          Therapy Assessment/Plan (PT)    Patient/Family Therapy Goals Statement (PT) continue working with therapy  -MS     Rehab Potential (PT) good  -MS     Criteria for Skilled Interventions Met (PT) yes;meets  criteria;skilled treatment is necessary  -MS     Therapy Frequency (PT) 2 times/day  -MS     Predicted Duration of Therapy Intervention (PT) until discharge  -MS       Row Name 04/07/25 1335          Positioning and Restraints    Post Treatment Position chair  -MS     In Chair sitting;call light within reach;encouraged to call for assist  -MS               User Key  (r) = Recorded By, (t) = Taken By, (c) = Cosigned By      Initials Name Provider Type    MS Christina Augustin, PT, DPT, NCS Physical Therapist                   Outcome Measures       Row Name 04/07/25 1335 04/07/25 0801       How much help from another person do you currently need...    Turning from your back to your side while in flat bed without using bedrails? 3  -MS 4  -LC    Moving from lying on back to sitting on the side of a flat bed without bedrails? 3  -MS 3  -LC    Moving to and from a bed to a chair (including a wheelchair)? 3  -MS 3  -LC    Standing up from a chair using your arms (e.g., wheelchair, bedside chair)? 3  -MS 2  -LC    Climbing 3-5 steps with a railing? 1  -MS 1  -LC    To walk in hospital room? 2  -MS 1  -LC    AM-PAC 6 Clicks Score (PT) 15  -MS 14  -LC    Highest Level of Mobility Goal 4 --> Transfer to chair/commode  -MS 4 --> Transfer to chair/commode  -LC      Row Name 04/07/25 1335          Functional Assessment    Outcome Measure Options AM-PAC 6 Clicks Basic Mobility (PT)  -MS               User Key  (r) = Recorded By, (t) = Taken By, (c) = Cosigned By      Initials Name Provider Type    Christina Correa, PT, DPT, NCS Physical Therapist     Hannah Joseph RN Registered Nurse                                 Physical Therapy Education       Title: PT OT SLP Therapies (In Progress)       Topic: Physical Therapy (In Progress)       Point: Mobility training (Done)       Learning Progress Summary            Patient Acceptance, NIMA VU by MS at 4/7/2025 3182    Comment: role of PT in his care                      Point:  Home exercise program (Not Started)       Learner Progress:  Not documented in this visit.              Point: Body mechanics (Not Started)       Learner Progress:  Not documented in this visit.              Point: Precautions (Not Started)       Learner Progress:  Not documented in this visit.                              User Key       Initials Effective Dates Name Provider Type Discipline    MS 07/11/23 -  Christina Augustin, PT, DPT, NCS Physical Therapist PT                  PT Recommendation and Plan  Planned Therapy Interventions (PT): balance training, bed mobility training, gait training, patient/family education, strengthening, transfer training, neuromuscular re-education, motor coordination training  Progress: improving  Outcome Evaluation: The patient presents alert and oriented x4 lying in bed. The patient had a stroke 5 years ago that left him with R side weakness and discoordination. He has been in a SNF every since. He typically transfers independently but requires assist for ADLs. He is still attempting to ambulate with PT. Today he demonstrates mild weakness of the R side and coordination deficit of the R side. He was able to transfer from the bed to the chair with min A. Pt will continue to work with him to maintain his progress and work on his ambulation as therapy does at the SNF. Recommend discharge back to the SNF.     Time Calculation:         PT Charges       Row Name 04/07/25 1335             Time Calculation    Start Time 1335  -MS      Stop Time 1420  -MS      Time Calculation (min) 45 min  -MS      PT Received On 04/07/25  -MS      PT Goal Re-Cert Due Date 04/17/25  -MS         Untimed Charges    PT Eval/Re-eval Minutes 45  -MS         Total Minutes    Untimed Charges Total Minutes 45  -MS       Total Minutes 45  -MS                User Key  (r) = Recorded By, (t) = Taken By, (c) = Cosigned By      Initials Name Provider Type    MS Charli Christina LACI, PT, DPT, NCS Physical Therapist                   Therapy Charges for Today       Code Description Service Date Service Provider Modifiers Qty    17644106045 HC PT EVAL LOW COMPLEXITY 3 4/7/2025 Christina Augustin, PT, DPT, NCS GP 1            PT G-Codes  Outcome Measure Options: AM-PAC 6 Clicks Basic Mobility (PT)  AM-PAC 6 Clicks Score (PT): 15  PT Discharge Summary  Anticipated Discharge Disposition (PT): extended care facility    Christina Augustin, PT, DPT, NCS  4/7/2025

## 2025-04-07 NOTE — PROGRESS NOTES
[facility]  INPATIENT PROGRESS NOTE    Patient Name: Reza Cruz  Date of Admission: 4/3/2025  Today's Date: 04/07/25  Length of Stay: 4  Primary Care Physician: Lobito Trevino Jr., MD    Subjective   Chief Complaint: follow up   HPI    patient is a 70-year-old man who was admitted to intensivists presenting with shortness of breath and found with acute hypoxic respiratory failure requiring BiPAP.  Record indicates he has history of COPD and was in exacerbation  Other medical issue to which patient was manage includes:  Acute kidney injury  Hypertension      Patient has severe leukocytosis which is gradually improving  Renal function likewise has improved.    Patient on antibiotic for concern of left lower lobe pneumonia      He lives at [facility] breathing is not good.         Review of Systems   All pertinent negatives and positives are as above. All other systems have been reviewed and are negative unless otherwise stated.     Objective    Temp:  [97.5 °F (36.4 °C)-98 °F (36.7 °C)] 97.7 °F (36.5 °C)  Heart Rate:  [53-73] 60  Resp:  [16-18] 18  BP: (105-139)/(63-86) 139/77  Physical Exam  He has speech impediment   No distress  Seated comfortable on recliner.   On O2 supplement  - spo2 97 % on 2 LPM   + wheezing   No crackles or wheezes  S1 s2 rrr, no gross murmur  No edema  Appropriate affect   Follows command      Results Review:  I have reviewed the labs, radiology results, and diagnostic studies.    Laboratory Data:   Results from last 7 days   Lab Units 04/07/25  0609 04/06/25  0943 04/05/25  0854   WBC 10*3/mm3 18.75* 18.24* 23.08*   HEMOGLOBIN g/dL 12.2* 11.8* 12.6*   HEMATOCRIT % 36.3* 35.9* 38.1   PLATELETS 10*3/mm3 256 254 251        Results from last 7 days   Lab Units 04/07/25  0609 04/06/25  0943 04/05/25  0854 04/04/25  0025   SODIUM mmol/L 145 141 142 140   POTASSIUM mmol/L 4.3 4.4 4.2 4.2   CHLORIDE mmol/L 112* 110* 110* 102   CO2  mmol/L 21.0* 22.0 24.0 21.0*   BUN mg/dL 19 23 36* 39*   CREATININE mg/dL 1.04 1.05 1.25 2.57*   CALCIUM mg/dL 7.9* 8.0* 8.3* 8.6   BILIRUBIN mg/dL  --   --   --  0.5   ALK PHOS U/L  --   --   --  59   ALT (SGPT) U/L  --   --   --  8   AST (SGOT) U/L  --   --   --  14   GLUCOSE mg/dL 100* 91 99 170*       Culture Data:   Blood Culture   Date Value Ref Range Status   04/04/2025 No growth at 3 days  Preliminary   04/04/2025 No growth at 3 days  Preliminary       Radiology Data:   Imaging Results (Last 24 Hours)       ** No results found for the last 24 hours. **            I have reviewed the patient's current medications.     Assessment/Plan   Assessment  Active Hospital Problems    Diagnosis    • **Acute respiratory failure with hypoxia              Medical Decision Making  Number and Complexity of problems:   Acute hypoxic respiratory failure  Left lower lobe pneumonia-continue empiric antibiotics; so far blood culture, Legionella and strep pneumoniae antigens were negative, respiratory PCR negative; patient has MRSA from nasal swab.  Acute kidney injury-resolved  Leukocytosis      Treatment Plan  Currently hemodynamically stable and now on 2 L nasal cannula  .  Incentive spirometry.  Cont empiric abx  Cont   aspirin, 81 mg, Oral, Daily  atorvastatin, 80 mg, Oral, Nightly  budesonide-formoterol, 2 puff, Inhalation, BID - RT   And  ipratropium, 0.5 mg, Nebulization, TID - RT  divalproex, 500 mg, Oral, BID  enoxaparin sodium, 40 mg, Subcutaneous, Nightly  levothyroxine, 150 mcg, Oral, Q AM  mupirocin, 1 Application, Each Nare, BID  pantoprazole, 40 mg, Oral, Daily  piperacillin-tazobactam, 3.375 g, Intravenous, Q8H  risperiDONE, 0.5 mg, Oral, BID  senna-docusate sodium, 2 tablet, Oral, BID  sodium chloride, 10 mL, Intravenous, Q12H  tamsulosin, 0.8 mg, Oral, Daily  vancomycin, 1,250 mg, Intravenous, Q12H      Reading venous ultrasound of lower extremities April Fleenor showed no evidence of DVT  Scan low probability  for PE    Conditions and Status     fair     MDM Data  External documents reviewed: reviewed  Cardiac tracing (EKG, telemetry) interpretation: -  Radiology interpretation:       Low lung volume, no gross effusion, prominent Darvin hilum.  No obvious cardiomegaly.  Radiologist described patchy airspace opacities left lower lobe which when compared to prior x-ray in 2020 has not been present.    Labs reviewed: y  Any tests that were considered but not ordered: none     Decision rules/scores evaluated (example [id number], [place], etc): none     Discussed with: patient and RT     Care Planning  Shared decision making: patient   Code status and discussions: full code    Disposition  Social Determinants of Health that impact treatment or disposition: none identified; resident of [place]  I expect the patient to be discharged to [place] in the next 1-3 days         Electronically signed by Armin Pang MD, 04/07/25, 07:53 CDT.

## 2025-04-08 LAB
ANION GAP SERPL CALCULATED.3IONS-SCNC: 10 MMOL/L (ref 5–15)
BASOPHILS # BLD AUTO: 0.08 10*3/MM3 (ref 0–0.2)
BASOPHILS NFR BLD AUTO: 0.5 % (ref 0–1.5)
BUN SERPL-MCNC: 16 MG/DL (ref 8–23)
BUN/CREAT SERPL: 17.2 (ref 7–25)
CALCIUM SPEC-SCNC: 8 MG/DL (ref 8.6–10.5)
CHLORIDE SERPL-SCNC: 109 MMOL/L (ref 98–107)
CO2 SERPL-SCNC: 22 MMOL/L (ref 22–29)
CREAT SERPL-MCNC: 0.93 MG/DL (ref 0.76–1.27)
DEPRECATED RDW RBC AUTO: 46.5 FL (ref 37–54)
EGFRCR SERPLBLD CKD-EPI 2021: 89.4 ML/MIN/1.73
EOSINOPHIL # BLD AUTO: 0.05 10*3/MM3 (ref 0–0.4)
EOSINOPHIL NFR BLD AUTO: 0.3 % (ref 0.3–6.2)
ERYTHROCYTE [DISTWIDTH] IN BLOOD BY AUTOMATED COUNT: 14.5 % (ref 12.3–15.4)
GLUCOSE SERPL-MCNC: 129 MG/DL (ref 65–99)
HCT VFR BLD AUTO: 36.1 % (ref 37.5–51)
HGB BLD-MCNC: 12 G/DL (ref 13–17.7)
IMM GRANULOCYTES # BLD AUTO: 0.25 10*3/MM3 (ref 0–0.05)
IMM GRANULOCYTES NFR BLD AUTO: 1.7 % (ref 0–0.5)
LYMPHOCYTES # BLD AUTO: 2.08 10*3/MM3 (ref 0.7–3.1)
LYMPHOCYTES NFR BLD AUTO: 14.1 % (ref 19.6–45.3)
MCH RBC QN AUTO: 28.9 PG (ref 26.6–33)
MCHC RBC AUTO-ENTMCNC: 33.2 G/DL (ref 31.5–35.7)
MCV RBC AUTO: 87 FL (ref 79–97)
MONOCYTES # BLD AUTO: 1.22 10*3/MM3 (ref 0.1–0.9)
MONOCYTES NFR BLD AUTO: 8.3 % (ref 5–12)
NEUTROPHILS NFR BLD AUTO: 11.02 10*3/MM3 (ref 1.7–7)
NEUTROPHILS NFR BLD AUTO: 75.1 % (ref 42.7–76)
NRBC BLD AUTO-RTO: 0 /100 WBC (ref 0–0.2)
PLATELET # BLD AUTO: 276 10*3/MM3 (ref 140–450)
PMV BLD AUTO: 10.2 FL (ref 6–12)
POTASSIUM SERPL-SCNC: 4.2 MMOL/L (ref 3.5–5.2)
RBC # BLD AUTO: 4.15 10*6/MM3 (ref 4.14–5.8)
SODIUM SERPL-SCNC: 141 MMOL/L (ref 136–145)
WBC NRBC COR # BLD AUTO: 14.7 10*3/MM3 (ref 3.4–10.8)

## 2025-04-08 PROCEDURE — 97116 GAIT TRAINING THERAPY: CPT

## 2025-04-08 PROCEDURE — 25010000002 ENOXAPARIN PER 10 MG: Performed by: INTERNAL MEDICINE

## 2025-04-08 PROCEDURE — 25010000002 PIPERACILLIN SOD-TAZOBACTAM PER 1 G: Performed by: NURSE PRACTITIONER

## 2025-04-08 PROCEDURE — 25010000002 VANCOMYCIN 1.25-0.9 GM/250ML-% SOLUTION: Performed by: INTERNAL MEDICINE

## 2025-04-08 PROCEDURE — 94799 UNLISTED PULMONARY SVC/PX: CPT

## 2025-04-08 PROCEDURE — 94664 DEMO&/EVAL PT USE INHALER: CPT

## 2025-04-08 PROCEDURE — 94761 N-INVAS EAR/PLS OXIMETRY MLT: CPT

## 2025-04-08 PROCEDURE — 85025 COMPLETE CBC W/AUTO DIFF WBC: CPT | Performed by: NURSE PRACTITIONER

## 2025-04-08 PROCEDURE — 80048 BASIC METABOLIC PNL TOTAL CA: CPT | Performed by: NURSE PRACTITIONER

## 2025-04-08 PROCEDURE — 63710000001 PREDNISONE PER 1 MG: Performed by: INTERNAL MEDICINE

## 2025-04-08 RX ADMIN — IPRATROPIUM BROMIDE AND ALBUTEROL SULFATE 3 ML: .5; 3 SOLUTION RESPIRATORY (INHALATION) at 06:17

## 2025-04-08 RX ADMIN — BUDESONIDE AND FORMOTEROL FUMARATE DIHYDRATE 2 PUFF: 160; 4.5 AEROSOL RESPIRATORY (INHALATION) at 18:12

## 2025-04-08 RX ADMIN — ENOXAPARIN SODIUM 40 MG: 100 INJECTION SUBCUTANEOUS at 20:50

## 2025-04-08 RX ADMIN — PANTOPRAZOLE SODIUM 40 MG: 40 TABLET, DELAYED RELEASE ORAL at 08:11

## 2025-04-08 RX ADMIN — Medication 1250 MG: at 16:34

## 2025-04-08 RX ADMIN — SENNOSIDES, DOCUSATE SODIUM 2 TABLET: 50; 8.6 TABLET, FILM COATED ORAL at 20:50

## 2025-04-08 RX ADMIN — ACETAMINOPHEN 650 MG: 325 TABLET, FILM COATED ORAL at 09:48

## 2025-04-08 RX ADMIN — IPRATROPIUM BROMIDE AND ALBUTEROL SULFATE 3 ML: .5; 3 SOLUTION RESPIRATORY (INHALATION) at 14:33

## 2025-04-08 RX ADMIN — RISPERIDONE 0.5 MG: 0.5 TABLET, FILM COATED ORAL at 08:11

## 2025-04-08 RX ADMIN — Medication 1250 MG: at 03:37

## 2025-04-08 RX ADMIN — TAMSULOSIN HYDROCHLORIDE 0.8 MG: 0.4 CAPSULE ORAL at 08:11

## 2025-04-08 RX ADMIN — ASPIRIN 81 MG: 81 TABLET, CHEWABLE ORAL at 08:11

## 2025-04-08 RX ADMIN — RISPERIDONE 0.5 MG: 0.5 TABLET, FILM COATED ORAL at 20:50

## 2025-04-08 RX ADMIN — SENNOSIDES, DOCUSATE SODIUM 2 TABLET: 50; 8.6 TABLET, FILM COATED ORAL at 08:11

## 2025-04-08 RX ADMIN — Medication 1 APPLICATION: at 08:10

## 2025-04-08 RX ADMIN — DIVALPROEX SODIUM 500 MG: 125 TABLET, DELAYED RELEASE ORAL at 08:10

## 2025-04-08 RX ADMIN — ACETAMINOPHEN 650 MG: 325 TABLET, FILM COATED ORAL at 16:34

## 2025-04-08 RX ADMIN — ATORVASTATIN CALCIUM 80 MG: 40 TABLET, FILM COATED ORAL at 20:50

## 2025-04-08 RX ADMIN — BUDESONIDE AND FORMOTEROL FUMARATE DIHYDRATE 2 PUFF: 160; 4.5 AEROSOL RESPIRATORY (INHALATION) at 06:23

## 2025-04-08 RX ADMIN — IPRATROPIUM BROMIDE AND ALBUTEROL SULFATE 3 ML: .5; 3 SOLUTION RESPIRATORY (INHALATION) at 18:12

## 2025-04-08 RX ADMIN — PREDNISONE 40 MG: 20 TABLET ORAL at 08:11

## 2025-04-08 RX ADMIN — DIVALPROEX SODIUM 500 MG: 125 TABLET, DELAYED RELEASE ORAL at 20:49

## 2025-04-08 RX ADMIN — PIPERACILLIN AND TAZOBACTAM 3.38 G: 3; .375 INJECTION, POWDER, FOR SOLUTION INTRAVENOUS at 08:12

## 2025-04-08 RX ADMIN — Medication 10 ML: at 20:50

## 2025-04-08 RX ADMIN — ACETAMINOPHEN 650 MG: 325 TABLET, FILM COATED ORAL at 03:39

## 2025-04-08 RX ADMIN — LEVOTHYROXINE SODIUM 150 MCG: 100 TABLET ORAL at 05:08

## 2025-04-08 RX ADMIN — ACETAMINOPHEN 650 MG: 325 TABLET, FILM COATED ORAL at 22:32

## 2025-04-08 RX ADMIN — PIPERACILLIN AND TAZOBACTAM 3.38 G: 3; .375 INJECTION, POWDER, FOR SOLUTION INTRAVENOUS at 16:39

## 2025-04-08 RX ADMIN — IPRATROPIUM BROMIDE AND ALBUTEROL SULFATE 3 ML: .5; 3 SOLUTION RESPIRATORY (INHALATION) at 10:26

## 2025-04-08 RX ADMIN — Medication 10 ML: at 08:12

## 2025-04-08 NOTE — PLAN OF CARE
Goal Outcome Evaluation:              Outcome Evaluation: No changes overnight. AOx4. VSS. IV abx as ordered. Bed alarm on. Safety maintained.

## 2025-04-08 NOTE — PLAN OF CARE
Goal Outcome Evaluation:   Pt up in chair with assist x 1 today. Voiding with urinal. Tylenol administered PRN. ABX administered as ordered.

## 2025-04-08 NOTE — PROGRESS NOTES
Assessment tool to be used for patients with existing breathing treatments ordered by hospitalist                               Respiratory Therapist Driven Protocol - RT to Assess and Treat Algorithm    Item 0 Points 1 Point 2 Points 3 Points 4 Points Subtotal   Mental Status Alert, orientated, cooperative Lethargic, follows commands Confused, not following commands Obtunded or Somnolent Comatose 0   Respiratory Pattern Regular RR  8-16 breaths/minute Increased RR  18-25 breaths/minute Dyspnea on exertion, irregular RR  26-30/minute Shortness of breath,  RR 31-35 breaths/minute Accessory muscle use, severe SOB  RR > 35 breath/minute 0   Breath Sounds Clear Decreased unilaterally Decreased bilaterally Basilar crackles Wheezing and/or rhonchi 4   Cough Strong, spontaneous non-productive Strong productive Weak, non-productive Weak productive or weak with rhonchi Absent or may require suctioning 0   Pulmonary Status Nonsmoker or no previous history > 1 year quit < 1 PPD  < 1 year quit >  or = 1 PPD Diagnosed pulmonary disease (severe or chronic) Severe or chronic pulmonary disease with exacerbation 3   Surgical Status None General surgery (non-abdominal or non-thoracic) Lower abdominal Thoracic or upper abdominal Thoracic with pulmonary disease 0   Chest X-ray Clear Chronic changes Infiltrates, atelectasis or pleural effusion Infiltrates > 1 lobe Diffuse infiltrates and atelectasis and/or effusions 2   Activity level Ambulatory Ambulatory with assistance Non-ambulatory Paraplegic Quadriplegic 1                     Total Score 10   Score    Drug Therapy Frequency  20 or >    Q4 Duoneb & Q3 Albuterol PRN 15 - 19     Q6 Duoneb & Q4 Albuterol PRN 10 - 14    QID Duoneb & Q4 Albuterol PRN 5 - 9    TID Duoneb & Q6 Albuterol PRN 0 - 4    Q4 PRN Duoneb or Q4 PRN Albuterol    Incentive Spirometry - Initial RT instruct    Lung Expansion Therapy (PEP) Bronchopulmonary Hygiene (CPT)   Q4 & PRN - Severe  atelectasis, poor oxygenation Q4 - copious secretions, dyspnea, unable to sleep, mucus plugging   QID - High risk for persistent atelectasis, existence of atelectasis QID & Q4 PRN - Moderate secretion production   TID - At risk for developing atelectasis TID - small amounts of secretions with poor cough   BID - prevention of atelectasis BID - unable to breathe deeply and cough spontaneously   *RT Protocol patients will be re-assessed/re-evaluated every 48 hours.    *Patients who are home nebulizer treatments will be protocoled to no less than their home regimen and will remain     on their home regimen with re-evaluations as needed with changes in patient condition.    RT Comments/Recommendations: QID duo & q4 Albut PRN per protocol

## 2025-04-08 NOTE — THERAPY TREATMENT NOTE
Acute Care - Physical Therapy Treatment Note  [facility]     Patient Name: Reza Cruz  : 1955  MRN: [id number]  Today's Date: 2025      Visit Dx:     ICD-10-CM ICD-9-CM   1. Impaired mobility [Z74.09]  Z74.09 799.89     Patient Active Problem List   Diagnosis    Chronic obstructive pulmonary disease    CVA (cerebral vascular accident)    Influenza A    Tobacco dependence    Acute encephalopathy    Hypothyroidism, non-compliant with medication     Medical non-compliance    Bradycardia    Acute respiratory failure with hypoxia     Past Medical History:   Diagnosis Date    COPD (chronic obstructive pulmonary disease)     Hyperlipidemia     Hypertension     Personal history of nicotine dependence 2019     Past Surgical History:   Procedure Laterality Date    EYE SURGERY Right     KNEE SURGERY Right      PT Assessment (Last 12 Hours)       PT Evaluation and Treatment       Row Name 25 1340          Physical Therapy Time and Intention    Subjective Information complains of;weakness  -LY     Document Type therapy note (daily note)  -LY     Mode of Treatment physical therapy  -LY       Row Name 25 1340          General Information    Existing Precautions/Restrictions fall  R side weakness  -LY       Row Name 25 1340          Pain    Pretreatment Pain Rating 0/10 - no pain  -LY     Posttreatment Pain Rating 0/10 - no pain  -LY       Row Name 25 1340          Bed Mobility    Comment, (Bed Mobility) chair  -LY       Row Name 25 1340          Transfers    Transfers sit-stand transfer;stand-sit transfer  -LY       Row Name 25 1340          Sit-Stand Transfer    Sit-Stand Diamond (Transfers) contact guard;verbal cues;nonverbal cues (demo/gesture)  -LY     Assistive Device (Sit-Stand Transfers) walker, front-wheeled  -LY       Row Name 25 1340          Stand-Sit Transfer    Stand-Sit Diamond (Transfers) verbal cues;contact guard  -LY     Assistive Device  (Stand-Sit Transfers) walker, front-wheeled  -LY       Row Name 04/08/25 1340          Gait/Stairs (Locomotion)    Grainger Level (Gait) verbal cues;contact guard;minimum assist (75% patient effort)  -LY     Assistive Device (Gait) walker, front-wheeled  -LY     Distance in Feet (Gait) 25  -LY     Pattern (Gait) step-to  -LY     Deviations/Abnormal Patterns (Gait) gait speed decreased;stride length decreased  -LY     Bilateral Gait Deviations forward flexed posture;heel strike decreased  -LY     Comment, (Gait/Stairs) required 1 standing rest break d/t weakness  -LY       Row Name 04/08/25 1340          Positioning and Restraints    Pre-Treatment Position sitting in chair/recliner  -LY     Post Treatment Position chair  -LY     In Chair sitting;call light within reach;encouraged to call for assist  -LY               User Key  (r) = Recorded By, (t) = Taken By, (c) = Cosigned By      Initials Name Provider Type    LY Ora Valles, PTA Physical Therapist Assistant                    Physical Therapy Education       Title: PT OT SLP Therapies (In Progress)       Topic: Physical Therapy (In Progress)       Point: Mobility training (Done)       Learning Progress Summary            Patient Acceptance, E, VU by MS at 4/7/2025 1432    Comment: role of PT in his care                      Point: Home exercise program (Not Started)       Learner Progress:  Not documented in this visit.              Point: Body mechanics (Not Started)       Learner Progress:  Not documented in this visit.              Point: Precautions (Not Started)       Learner Progress:  Not documented in this visit.                              User Key       Initials Effective Dates Name Provider Type Discipline    MS 07/11/23 -  Christina Augustin, PT, DPT, NCS Physical Therapist PT                  PT Recommendation and Plan  Anticipated Discharge Disposition (PT): extended care facility          Time Calculation:    PT Charges       Row Name  04/08/25 1421             Time Calculation    Start Time 1340  -LY      Stop Time 1405  -LY      Time Calculation (min) 25 min  -LY      PT Received On 04/08/25  -LY         Time Calculation- PT    Total Timed Code Minutes- PT 25 minute(s)  -LY         Timed Charges    08861 - Gait Training Minutes  25  -LY         Total Minutes    Timed Charges Total Minutes 25  -LY       Total Minutes 25  -LY                User Key  (r) = Recorded By, (t) = Taken By, (c) = Cosigned By      Initials Name Provider Type    LY Ora Valles PTA Physical Therapist Assistant                  Therapy Charges for Today       Code Description Service Date Service Provider Modifiers Qty    [id number] HC GAIT TRAINING EA 15 MIN 4/8/2025 Ora Valles PTA GP 2            PT G-Codes  Outcome Measure Options: AM-PAC 6 Clicks Basic Mobility (PT)  AM-PAC 6 Clicks Score (PT): 13    Ora Valles PTA  4/8/2025

## 2025-04-09 VITALS
BODY MASS INDEX: 33.72 KG/M2 | OXYGEN SATURATION: 98 % | HEIGHT: 72 IN | SYSTOLIC BLOOD PRESSURE: 161 MMHG | TEMPERATURE: 98.4 F | RESPIRATION RATE: 16 BRPM | HEART RATE: 62 BPM | WEIGHT: 249 LBS | DIASTOLIC BLOOD PRESSURE: 94 MMHG

## 2025-04-09 LAB
ANION GAP SERPL CALCULATED.3IONS-SCNC: 8 MMOL/L (ref 5–15)
BACTERIA SPEC AEROBE CULT: NORMAL
BACTERIA SPEC AEROBE CULT: NORMAL
BASOPHILS # BLD AUTO: 0.09 10*3/MM3 (ref 0–0.2)
BASOPHILS NFR BLD AUTO: 0.6 % (ref 0–1.5)
BUN SERPL-MCNC: 15 MG/DL (ref 8–23)
BUN/CREAT SERPL: 15.5 (ref 7–25)
CALCIUM SPEC-SCNC: 7.9 MG/DL (ref 8.6–10.5)
CHLORIDE SERPL-SCNC: 112 MMOL/L (ref 98–107)
CO2 SERPL-SCNC: 24 MMOL/L (ref 22–29)
CREAT SERPL-MCNC: 0.97 MG/DL (ref 0.76–1.27)
DEPRECATED RDW RBC AUTO: 48.3 FL (ref 37–54)
EGFRCR SERPLBLD CKD-EPI 2021: 85 ML/MIN/1.73
EOSINOPHIL # BLD AUTO: 0.15 10*3/MM3 (ref 0–0.4)
EOSINOPHIL NFR BLD AUTO: 0.9 % (ref 0.3–6.2)
ERYTHROCYTE [DISTWIDTH] IN BLOOD BY AUTOMATED COUNT: 15.1 % (ref 12.3–15.4)
GLUCOSE SERPL-MCNC: 139 MG/DL (ref 65–99)
HCT VFR BLD AUTO: 34.7 % (ref 37.5–51)
HGB BLD-MCNC: 11.3 G/DL (ref 13–17.7)
IMM GRANULOCYTES # BLD AUTO: 0.31 10*3/MM3 (ref 0–0.05)
IMM GRANULOCYTES NFR BLD AUTO: 1.9 % (ref 0–0.5)
LYMPHOCYTES # BLD AUTO: 3.45 10*3/MM3 (ref 0.7–3.1)
LYMPHOCYTES NFR BLD AUTO: 21.1 % (ref 19.6–45.3)
MCH RBC QN AUTO: 28.6 PG (ref 26.6–33)
MCHC RBC AUTO-ENTMCNC: 32.6 G/DL (ref 31.5–35.7)
MCV RBC AUTO: 87.8 FL (ref 79–97)
MONOCYTES # BLD AUTO: 1.8 10*3/MM3 (ref 0.1–0.9)
MONOCYTES NFR BLD AUTO: 11 % (ref 5–12)
NEUTROPHILS NFR BLD AUTO: 10.56 10*3/MM3 (ref 1.7–7)
NEUTROPHILS NFR BLD AUTO: 64.5 % (ref 42.7–76)
NRBC BLD AUTO-RTO: 0 /100 WBC (ref 0–0.2)
PLATELET # BLD AUTO: 261 10*3/MM3 (ref 140–450)
PMV BLD AUTO: 10.1 FL (ref 6–12)
POTASSIUM SERPL-SCNC: 3.8 MMOL/L (ref 3.5–5.2)
RBC # BLD AUTO: 3.95 10*6/MM3 (ref 4.14–5.8)
SODIUM SERPL-SCNC: 144 MMOL/L (ref 136–145)
WBC NRBC COR # BLD AUTO: 16.36 10*3/MM3 (ref 3.4–10.8)

## 2025-04-09 PROCEDURE — 63710000001 PREDNISONE PER 1 MG: Performed by: INTERNAL MEDICINE

## 2025-04-09 PROCEDURE — 85025 COMPLETE CBC W/AUTO DIFF WBC: CPT | Performed by: NURSE PRACTITIONER

## 2025-04-09 PROCEDURE — 94799 UNLISTED PULMONARY SVC/PX: CPT

## 2025-04-09 PROCEDURE — 94664 DEMO&/EVAL PT USE INHALER: CPT

## 2025-04-09 PROCEDURE — 80048 BASIC METABOLIC PNL TOTAL CA: CPT | Performed by: NURSE PRACTITIONER

## 2025-04-09 PROCEDURE — 94761 N-INVAS EAR/PLS OXIMETRY MLT: CPT

## 2025-04-09 RX ORDER — PREDNISONE 20 MG/1
40 TABLET ORAL DAILY
Qty: 6 TABLET | Refills: 0 | Status: SHIPPED | OUTPATIENT
Start: 2025-04-09 | End: 2025-04-12

## 2025-04-09 RX ADMIN — DIVALPROEX SODIUM 500 MG: 125 TABLET, DELAYED RELEASE ORAL at 09:39

## 2025-04-09 RX ADMIN — PREDNISONE 40 MG: 20 TABLET ORAL at 09:40

## 2025-04-09 RX ADMIN — PANTOPRAZOLE SODIUM 40 MG: 40 TABLET, DELAYED RELEASE ORAL at 09:40

## 2025-04-09 RX ADMIN — RISPERIDONE 0.5 MG: 0.5 TABLET, FILM COATED ORAL at 09:40

## 2025-04-09 RX ADMIN — ASPIRIN 81 MG: 81 TABLET, CHEWABLE ORAL at 09:40

## 2025-04-09 RX ADMIN — LEVOTHYROXINE SODIUM 150 MCG: 100 TABLET ORAL at 04:46

## 2025-04-09 RX ADMIN — BUDESONIDE AND FORMOTEROL FUMARATE DIHYDRATE 2 PUFF: 160; 4.5 AEROSOL RESPIRATORY (INHALATION) at 06:02

## 2025-04-09 RX ADMIN — IPRATROPIUM BROMIDE AND ALBUTEROL SULFATE 3 ML: .5; 3 SOLUTION RESPIRATORY (INHALATION) at 05:56

## 2025-04-09 RX ADMIN — TAMSULOSIN HYDROCHLORIDE 0.8 MG: 0.4 CAPSULE ORAL at 09:40

## 2025-04-09 RX ADMIN — ACETAMINOPHEN 650 MG: 325 TABLET, FILM COATED ORAL at 04:47

## 2025-04-09 RX ADMIN — IPRATROPIUM BROMIDE AND ALBUTEROL SULFATE 3 ML: .5; 3 SOLUTION RESPIRATORY (INHALATION) at 10:41

## 2025-04-09 NOTE — PLAN OF CARE
Goal Outcome Evaluation:           Progress: improving                              Patient IID. Tylenol given per patient request. IID. No distress noted.

## 2025-04-09 NOTE — CASE MANAGEMENT/SOCIAL WORK
Continued Stay Note   [facility]     Patient Name: Reza Cruz  MRN: [id number]  Today's Date: 4/9/2025    Admit Date: 4/3/2025    Plan: [facility]   Discharge Plan       Row Name 04/09/25 0948       Plan    Plan [facility]    Patient/Family in Agreement with Plan yes    Final Discharge Disposition Code 03 - skilled nursing facility (SNF)    Final Note Pt is being d/c'ed to [phone]. Faxed the d/c summary to them at [phone]. Family will need to transport.                   Discharge Codes    No documentation.                 Expected Discharge Date and Time       Expected Discharge Date Expected Discharge Time    Apr 9, 2025               TAMIKA Ribeiro

## 2025-04-09 NOTE — THERAPY DISCHARGE NOTE
Acute Care - Physical Therapy Discharge Summary  [facility]       Patient Name: Reza Cruz  : 1955  MRN: [id number]    Today's Date: 2025                 Admit Date: 4/3/2025      PT Recommendation and Plan    Visit Dx:    ICD-10-CM ICD-9-CM   1. Impaired mobility [Z74.09]  Z74.09 799.89                PT Rehab Goals       Row Name 25 1300             Bed Mobility Goal 1 (PT)    Activity/Assistive Device (Bed Mobility Goal 1, PT) bed mobility activities, all  -AB      Lorman Level/Cues Needed (Bed Mobility Goal 1, PT) contact guard required;tactile cues required;verbal cues required  -AB      Time Frame (Bed Mobility Goal 1, PT) long term goal (LTG);by discharge  -AB      Progress/Outcomes (Bed Mobility Goal 1, PT) goal not met  -AB         Transfer Goal 1 (PT)    Activity/Assistive Device (Transfer Goal 1, PT) sit-to-stand/stand-to-sit;bed-to-chair/chair-to-bed;walker, rolling  -AB      Lorman Level/Cues Needed (Transfer Goal 1, PT) modified independence  -AB      Time Frame (Transfer Goal 1, PT) long term goal (LTG);by discharge  -AB      Progress/Outcome (Transfer Goal 1, PT) goal not met  -AB         Gait Training Goal 1 (PT)    Activity/Assistive Device (Gait Training Goal 1, PT) gait (walking locomotion);assistive device use;decrease fall risk;increase endurance/gait distance;improve balance and speed;walker, rolling  -AB      Lorman Level (Gait Training Goal 1, PT) minimum assist (75% or more patient effort);tactile cues required;verbal cues required  -AB      Distance (Gait Training Goal 1, PT) 25ft  -AB      Time Frame (Gait Training Goal 1, PT) long term goal (LTG);by discharge  -AB      Progress/Outcome (Gait Training Goal 1, PT) goal met  -AB         Problem Specific Goal 1 (PT)    Problem Specific Goal 1 (PT) The patient will independently implement 1 pain management technique to decrease pain in order to improve functional mobility.  -AB      Time Frame (Problem  Specific Goal 1, PT) long-term goal (LTG);by discharge  -AB      Progress/Outcome (Problem Specific Goal 1, PT) goal not met  -AB                User Key  (r) = Recorded By, (t) = Taken By, (c) = Cosigned By      Initials Name Provider Type Discipline    Tosin Guadarrama, PTA Physical Therapist Assistant PT                        PT Discharge Summary  Anticipated Discharge Disposition (PT): extended care facility  Reason for Discharge: Discharge from facility  Outcomes Achieved: Refer to plan of care for updates on goals achieved  Discharge Destination: SNF      Tosin Garcia PTA   4/9/2025

## 2025-04-09 NOTE — DISCHARGE SUMMARY
[facility] Medicine Services  DISCHARGE SUMMARY       Date of Admission: 4/3/2025  Date of Discharge:  4/9/2025  Primary Care Physician: Lobito Trevino Jr., MD    Presenting Problem/History of Present Illness:  Respiratory distress    Final Discharge Diagnoses:  Acute hypoxic respiratory failure  Left lower lobe pneumonia-  Acute kidney injury-resolved  Leukocytosis  HTN  Hx of stroke with right residual weakness and speech impairment  Hypothyroidsim     Consults:   Intensivist    Procedures Performed: None    Pertinent Test Results:   Results for orders placed during the hospital encounter of 04/03/25    Adult Transthoracic Echo Complete W/ Cont if Necessary Per Protocol    Interpretation Summary    Technically very difficult study.    Left ventricular systolic function is low normal. Left ventricular ejection fraction appears to be 51 - 55%.    There are some views that suggest possible subtle apical and apical lateral hypokinesis but other views appear to have more normal wall motion; sensitivity/specificity reduced in the context of technically difficult images. There is also intermittent abnormal septal motion with differential including but not limited to obstructive pulmonary disease, mechanical ventilation, pericardial constriction, and artifact from technically difficult images.    The right ventricular cavity is borderline dilated with normal systolic function.    The right atrial cavity is borderline dilated.    There is no hemodynamically significant (more than mild) valve dysfunction.      Imaging Results (All)       Procedure Component Value Units Date/Time    US Venous Doppler Lower Extremity Bilateral (duplex) [[id number]] Resulted: 04/04/25 1441     Updated: 04/04/25 1442    NM Lung Scan Perfusion Particulate [[id number]] Collected: 04/04/25 1216     Updated: 04/04/25 1222    Narrative:      EXAMINATION:  NM LUNG SCAN PERFUSION PARTICULATE-  4/4/2025 10:32  AM     HISTORY: Rule out pulmonary embolus. Respiratory failure.     COMPARISON: Chest x-ray performed today.     TECHNIQUE: The patient was injected with 5.3 mCi of technetium 99 M MAA  intravenously. Multi projection images were then obtained.     FINDINGS: There are no focal perfusion defects. There is relative  decreased activity in the lower lung zones compared to the upper and mid  lung zones.          Impression:      1. No focal perfusion defects to suggest pulmonary embolus. Low  probability.  2. Relative decreased activity in the lower lung zones compared to the  upper and midlung zones. This can be seen with pulmonary venous  hypertension and redistribution. There may also be seen in patients with  lower lung zone predominant COPD which is not really appreciated on the  chest x-ray.     This report was signed and finalized on 4/4/2025 12:19 PM by Dr. Jim Bojorquez MD.       XR Chest 1 View [[id number]] Collected: 04/04/25 0702     Updated: 04/04/25 0706    Narrative:      EXAM/TECHNIQUE: XR CHEST 1 VW-     INDICATION: respiratory failure     COMPARISON: 1/13/2020     FINDINGS:     Cardiac silhouette is within normal limits.     No pleural effusion or visible pneumothorax. Patchy airspace opacities  noted in the left lower lobe.     No acute osseous finding.       Impression:         Patchy airspace opacity in the left lower lobe, which may represent an  infectious process/pneumonia in the appropriate clinical setting.     This report was signed and finalized on 4/4/2025 7:03 AM by Dr. Charli Dowell MD.             LAB RESULTS:      Lab 04/09/25  0414 04/08/25  0416 04/07/25  0609 04/06/25  0943 04/05/25  0854 04/04/25  1224 04/04/25  0541 04/04/25  0345 04/04/25  0027 04/04/25  0025   WBC 16.36* 14.70* 18.75* 18.24* 23.08*  --   --   --   --  29.59*   HEMOGLOBIN 11.3* 12.0* 12.2* 11.8* 12.6*  --   --   --   --  13.3   HEMATOCRIT 34.7* 36.1* 36.3* 35.9* 38.1  --   --   --   --  41.0   PLATELETS 261  276 256 254 251  --   --   --   --  244   NEUTROS ABS 10.56* 11.02* 7.17* 7.55* 14.96*  --   --   --   --  25.42*   IMMATURE GRANS (ABS) 0.31* 0.25*  --   --  0.12*  --   --   --   --  0.73*   LYMPHS ABS 3.45* 2.08  --   --  3.05  --   --   --   --  0.85   MONOS ABS 1.80* 1.22*  --   --  2.16*  --   --   --   --  2.48*   EOS ABS 0.15 0.05 4.97* 5.34* 2.69*  --   --   --   --  0.04   MCV 87.8 87.0 87.5 87.3 88.8  --   --   --   --  88.7   PROCALCITONIN  --   --   --   --   --   --   --   --   --  0.79*   LACTATE  --   --   --   --   --   --   --  1.9  --  3.0*   PROTIME  --   --   --   --   --   --   --   --  14.7  --    APTT  --   --   --   --   --   --   --   --  53.7*  --    HEPARIN ANTI-XA  --   --   --   --   --  0.38 0.31  --  0.33  --          Lab 04/09/25  0414 04/08/25  0416 04/07/25  0609 04/06/25  0943 04/05/25  0854 04/04/25  0025   SODIUM 144 141 145 141 142 140   POTASSIUM 3.8 4.2 4.3 4.4 4.2 4.2   CHLORIDE 112* 109* 112* 110* 110* 102   CO2 24.0 22.0 21.0* 22.0 24.0 21.0*   ANION GAP 8.0 10.0 12.0 9.0 8.0 17.0*   BUN 15 16 19 23 36* 39*   CREATININE 0.97 0.93 1.04 1.05 1.25 2.57*   EGFR 85.0 89.4 78.2 77.3 62.7 26.4*   GLUCOSE 139* 129* 100* 91 99 170*   CALCIUM 7.9* 8.0* 7.9* 8.0* 8.3* 8.6   MAGNESIUM  --   --   --   --   --  2.2   PHOSPHORUS  --   --   --   --   --  4.9*         Lab 04/04/25  0025   TOTAL PROTEIN 7.1   ALBUMIN 3.4*   GLOBULIN 3.7   ALT (SGPT) 8   AST (SGOT) 14   BILIRUBIN 0.5   ALK PHOS 59         Lab 04/04/25  0345 04/04/25  0027 04/04/25  0025   PROBNP  --   --  298.6   HSTROP T 227*  --  219*   PROTIME  --  14.7  --    INR  --  1.09  --                  Lab 04/04/25  0003   PH, ARTERIAL 7.340*   PCO2, ARTERIAL 43.9   PO2 ART 87.4   O2 SATURATION ART 96.5   FIO2 <21   HCO3 ART 23.6   BASE EXCESS ART -2.3*     Brief Urine Lab Results  (Last result in the past 365 days)        Color   Clarity   Blood   Leuk Est   Nitrite   Protein   CREAT   Urine HCG        04/04/25 1508 Yellow    Clear   Moderate (2+)   Negative   Negative   Trace                 Microbiology Results (last 10 days)       Procedure Component Value - Date/Time    MRSA Screen, PCR (Inpatient) - Swab, Nares [[id number]]  (Abnormal) Collected: 04/04/25 0514    Lab Status: Final result Specimen: Swab from Nares Updated: 04/04/25 0633     MRSA PCR MRSA Detected    Narrative:      The negative predictive value of this diagnostic test is high and should only be used to consider de-escalating anti-MRSA therapy. A positive result may indicate colonization with MRSA and must be correlated clinically.    Respiratory Panel PCR w/COVID-19(SARS-CoV-2) RAJEEV/LORELEI/ALEKS/PAD/COR/INGRIS In-House, NP Swab in UTM/VTM, 2 HR TAT - Swab, Nasopharynx [[id number]]  (Normal) Collected: 04/04/25 0219    Lab Status: Final result Specimen: Swab from Nasopharynx Updated: 04/04/25 0320     ADENOVIRUS, PCR Not Detected     Coronavirus 229E Not Detected     Coronavirus HKU1 Not Detected     Coronavirus NL63 Not Detected     Coronavirus OC43 Not Detected     COVID19 Not Detected     Human Metapneumovirus Not Detected     Human Rhinovirus/Enterovirus Not Detected     Influenza A PCR Not Detected     Influenza B PCR Not Detected     Parainfluenza Virus 1 Not Detected     Parainfluenza Virus 2 Not Detected     Parainfluenza Virus 3 Not Detected     Parainfluenza Virus 4 Not Detected     RSV, PCR Not Detected     Bordetella pertussis pcr Not Detected     Bordetella parapertussis PCR Not Detected     Chlamydophila pneumoniae PCR Not Detected     Mycoplasma pneumo by PCR Not Detected    Narrative:      In the setting of a positive respiratory panel with a viral infection PLUS a negative procalcitonin without other underlying concern for bacterial infection, consider observing off antibiotics or discontinuation of antibiotics and continue supportive care. If the respiratory panel is positive for atypical bacterial infection (Bordetella pertussis, Chlamydophila pneumoniae, or  "Mycoplasma pneumoniae), consider antibiotic de-escalation to target atypical bacterial infection.    Legionella Antigen, Urine - Urine, Urine, Clean Catch [[id number]]  (Normal) Collected: 04/04/25 0219    Lab Status: Final result Specimen: Urine, Clean Catch Updated: 04/04/25 0250     LEGIONELLA ANTIGEN, URINE Negative    S. Pneumo Ag Urine or CSF - Urine, Urine, Clean Catch [[id number]]  (Normal) Collected: 04/04/25 0219    Lab Status: Final result Specimen: Urine, Clean Catch Updated: 04/04/25 0250     Strep Pneumo Ag Negative    Blood Culture - Blood, Hand, Left [[id number]]  (Normal) Collected: 04/04/25 0025    Lab Status: Final result Specimen: Blood from Hand, Left Updated: 04/09/25 0045     Blood Culture No growth at 5 days    Blood Culture - Blood, Arm, Right [[id number]]  (Normal) Collected: 04/04/25 0025    Lab Status: Final result Specimen: Blood from Arm, Right Updated: 04/09/25 0045     Blood Culture No growth at 5 days            Hospital Course:   \"I feel good.  I am ready to go home.\"  Patient is now on 2 L nasal cannula which is baseline.  Patient presented with respiratory distress and admitted to intensive care unit.  Patient was subsequently transferred to medical floor where hospitalist assumed the care.  Record indicates that he has history of residual right-sided deficits and speech difficulty from stroke, hyperlipidemia, reflux disease, COPD on 2 L nasal cannula, hypertension.  He resides at [facility].  He came in with symptoms of shortness of breath and chest discomfort was found hypotensive.  He had leukocytosis hyperglycemia, creatinine was 2.1.  His chest x-ray showed no acute findings.  There was initial concern for his PE but CTA for  PE was deferred due to acute kidney injury.  VQ scan showed low probability for PE.  Venous ultrasound preliminarily read as no evidence of DVT.  Patient was empirically manage for left lower lobe pneumonia.  Culture had not been " "revealing.  Patient had MRSA positive PCR.  Completed this antibiotic  Leukocytosis improved.  Renal function normalized.  Patient has improved and believed to be medically stable and appropriate to go back at nursing facility.      Physical Exam on Discharge:  /94 (BP Location: Left arm, Patient Position: Sitting)   Pulse 82   Temp 98.4 °F (36.9 °C) (Oral)   Resp 16   Ht 182.9 cm (72\")   Wt 113 kg (249 lb)   SpO2 97%   BMI 33.77 kg/m²   Physical Exam  He has speech impediment   No distress  Seated comfortably on recliner.  Speaks in full sentences.  O2 saturation 97% on 2 L  Had intermittent coughing spell.  No crackles   S1 s2 rrr, no gross murmur  No edema  Appropriate affect   Follows command  Residual weakness on the right and speech impediment from prior stroke  Condition on Discharge: Stable    Discharge Disposition:  Skilled Nursing Facility (DC - External)    Discharge Medications:     Discharge Medications        New Medications        Instructions Start Date   predniSONE 20 MG tablet  Commonly known as: DELTASONE   40 mg, Oral, Daily             Continue These Medications        Instructions Start Date   acetaminophen 325 MG tablet  Commonly known as: TYLENOL   650 mg, Oral, Every 6 Hours PRN      aspirin 81 MG chewable tablet   81 mg, Oral, Daily      atorvastatin 80 MG tablet  Commonly known as: LIPITOR   80 mg, Oral, Nightly      bisacodyl 10 MG suppository  Commonly known as: DULCOLAX   10 mg, Rectal, Daily PRN      Breztri Aerosphere 160-9-4.8 MCG/ACT aerosol inhaler  Generic drug: Budeson-Glycopyrrol-Formoterol   2 puffs, Inhalation, 2 Times Daily      divalproex 500 MG DR tablet  Commonly known as: DEPAKOTE   500 mg, Oral, 2 Times Daily      ipratropium-albuterol 0.5-2.5 mg/3 ml nebulizer  Commonly known as: DUO-NEB   3 mL, Nebulization, 4 Times Daily PRN      levothyroxine 150 MCG tablet  Commonly known as: SYNTHROID, LEVOTHROID   150 mcg, Oral, Every Early Morning      lisinopril 10 " MG tablet  Commonly known as: PRINIVIL,ZESTRIL   10 mg, Oral, 2 Times Daily      magnesium hydroxide 400 MG/5ML suspension  Commonly known as: MILK OF MAGNESIA   30 mL, Oral, Daily PRN      pantoprazole 40 MG EC tablet  Commonly known as: PROTONIX   40 mg, Oral, Daily      risperiDONE 0.5 MG tablet  Commonly known as: risperDAL   0.5 mg, Oral, 2 Times Daily      risperiDONE Microspheres ER 25 MG injection  Commonly known as: risperDAL CONSTA   25 mg, Intramuscular, Every 14 Days      tamsulosin 0.4 MG capsule 24 hr capsule  Commonly known as: FLOMAX   2 capsules, Oral, Daily      tiZANidine 4 MG tablet  Commonly known as: ZANAFLEX   4 mg, Oral, 3 Times Daily PRN      vitamin D 1.25 MG (31881 UT) capsule capsule  Commonly known as: ERGOCALCIFEROL   50,000 Units, Oral, Weekly             Stop These Medications      furosemide 40 MG tablet  Commonly known as: LASIX     potassium chloride 20 MEQ CR tablet  Commonly known as: KLOR-CON M20     sodium phosphate 7-19 GM/197ML enema ADULT enema              This patient has current or prior documentation of an left ventricular ejection fraction (LVEF) of less than or equal to 40%.  Results for orders placed during the hospital encounter of 04/03/25    Adult Transthoracic Echo Complete W/ Cont if Necessary Per Protocol    Interpretation Summary    Technically very difficult study.    Left ventricular systolic function is low normal. Left ventricular ejection fraction appears to be 51 - 55%.    There are some views that suggest possible subtle apical and apical lateral hypokinesis but other views appear to have more normal wall motion; sensitivity/specificity reduced in the context of technically difficult images. There is also intermittent abnormal septal motion with differential including but not limited to obstructive pulmonary disease, mechanical ventilation, pericardial constriction, and artifact from technically difficult images.    The right ventricular cavity is  borderline dilated with normal systolic function.    The right atrial cavity is borderline dilated.    There is no hemodynamically significant (more than mild) valve dysfunction.          Discharge Diet:   Diet Instructions       Diet: Cardiac Diets; Healthy Heart (2-3 Na+); Thin (IDDSI 0)      Discharge Diet: Cardiac Diets    Cardiac Diet: Healthy Heart (2-3 Na+)    Fluid Consistency: Thin (IDDSI 0)            Activity at Discharge:   Activity Instructions       Gradually Increase Activity Until at Pre-Hospitalization Level              Follow-up Appointments:   PCP at SNF w/I 24-48H    Test Results Pending at Discharge: none    Electronically signed by Armin Pang MD, 04/09/25, 08:21 CDT.    Time: > 30  minutes.

## 2025-05-20 ENCOUNTER — APPOINTMENT (OUTPATIENT)
Dept: GENERAL RADIOLOGY | Age: 70
End: 2025-05-20
Payer: MEDICARE

## 2025-05-20 ENCOUNTER — APPOINTMENT (OUTPATIENT)
Age: 70
End: 2025-05-20
Payer: MEDICARE

## 2025-05-20 ENCOUNTER — HOSPITAL ENCOUNTER (INPATIENT)
Age: 70
LOS: 11 days | Discharge: NURSING, REHAB OR LONG-TERM CARE | End: 2025-05-31
Attending: EMERGENCY MEDICINE | Admitting: STUDENT IN AN ORGANIZED HEALTH CARE EDUCATION/TRAINING PROGRAM
Payer: MEDICARE

## 2025-05-20 ENCOUNTER — APPOINTMENT (OUTPATIENT)
Dept: CT IMAGING | Age: 70
End: 2025-05-20
Payer: MEDICARE

## 2025-05-20 DIAGNOSIS — I50.9 ACUTE CONGESTIVE HEART FAILURE, UNSPECIFIED HEART FAILURE TYPE (HCC): Primary | ICD-10-CM

## 2025-05-20 DIAGNOSIS — R06.02 SHORTNESS OF BREATH: ICD-10-CM

## 2025-05-20 DIAGNOSIS — R60.0 PERIPHERAL EDEMA: ICD-10-CM

## 2025-05-20 DIAGNOSIS — I48.91 ATRIAL FIBRILLATION, UNSPECIFIED TYPE (HCC): ICD-10-CM

## 2025-05-20 DIAGNOSIS — I50.9 CONGESTIVE HEART FAILURE, UNSPECIFIED HF CHRONICITY, UNSPECIFIED HEART FAILURE TYPE (HCC): ICD-10-CM

## 2025-05-20 PROBLEM — R65.10 SIRS (SYSTEMIC INFLAMMATORY RESPONSE SYNDROME) (HCC): Status: ACTIVE | Noted: 2025-05-20

## 2025-05-20 PROBLEM — I95.9 HYPOTENSION: Status: ACTIVE | Noted: 2025-05-20

## 2025-05-20 LAB
ALBUMIN SERPL-MCNC: 3.1 G/DL (ref 3.5–5.2)
ALBUMIN SERPL-MCNC: 3.6 G/DL (ref 3.5–5.2)
ALLENS TEST: ABNORMAL
ALP SERPL-CCNC: 35 U/L (ref 40–129)
ALP SERPL-CCNC: 47 U/L (ref 40–129)
ALT SERPL-CCNC: 16 U/L (ref 10–50)
ALT SERPL-CCNC: 17 U/L (ref 10–50)
ANION GAP SERPL CALCULATED.3IONS-SCNC: 14 MMOL/L (ref 8–16)
ANION GAP SERPL CALCULATED.3IONS-SCNC: 18 MMOL/L (ref 8–16)
AST SERPL-CCNC: 17 U/L (ref 10–50)
AST SERPL-CCNC: 45 U/L (ref 10–50)
B PARAP IS1001 DNA NPH QL NAA+NON-PROBE: NOT DETECTED
B PERT.PT PRMT NPH QL NAA+NON-PROBE: NOT DETECTED
BASE EXCESS ARTERIAL: 7.2 MMOL/L (ref -2–2)
BASOPHILS # BLD: 0 K/UL (ref 0–0.2)
BASOPHILS NFR BLD: 0.1 % (ref 0–1)
BILIRUB SERPL-MCNC: 0.5 MG/DL (ref 0.2–1.2)
BILIRUB SERPL-MCNC: 1 MG/DL (ref 0.2–1.2)
BNP BLD-MCNC: 726 PG/ML (ref 0–124)
BUN SERPL-MCNC: 31 MG/DL (ref 8–23)
BUN SERPL-MCNC: 38 MG/DL (ref 8–23)
C PNEUM DNA NPH QL NAA+NON-PROBE: NOT DETECTED
CALCIUM SERPL-MCNC: 8.4 MG/DL (ref 8.8–10.2)
CALCIUM SERPL-MCNC: 9.2 MG/DL (ref 8.8–10.2)
CARBOXYHEMOGLOBIN ARTERIAL: 1.7 % (ref 0–5)
CHLORIDE SERPL-SCNC: 101 MMOL/L (ref 98–107)
CHLORIDE SERPL-SCNC: 103 MMOL/L (ref 98–107)
CO2 SERPL-SCNC: 24 MMOL/L (ref 22–29)
CO2 SERPL-SCNC: 27 MMOL/L (ref 22–29)
CREAT SERPL-MCNC: 1.1 MG/DL (ref 0.7–1.2)
CREAT SERPL-MCNC: 1.9 MG/DL (ref 0.7–1.2)
ECHO AO ASC DIAM: 2.4 CM
ECHO AO ASCENDING AORTA INDEX: 1.03 CM/M2
ECHO AO ROOT DIAM: 1.8 CM
ECHO AO ROOT INDEX: 0.77 CM/M2
ECHO AO SINUS VALSALVA DIAM: 2.7 CM
ECHO AO SINUS VALSALVA INDEX: 1.16 CM/M2
ECHO AO ST JNCT DIAM: 2.3 CM
ECHO BSA: 2.37 M2
ECHO IVC PROX: 1.8 CM
ECHO LA AREA 2C: 11.2 CM2
ECHO LA AREA 4C: 12.5 CM2
ECHO LA DIAMETER INDEX: 1.33 CM/M2
ECHO LA DIAMETER: 3.1 CM
ECHO LA MAJOR AXIS: 4.1 CM
ECHO LA MINOR AXIS: 3.7 CM
ECHO LA TO AORTIC ROOT RATIO: 1.72
ECHO LA VOL BP: 29 ML (ref 18–58)
ECHO LA VOL MOD A2C: 27 ML (ref 18–58)
ECHO LA VOL MOD A4C: 29 ML (ref 18–58)
ECHO LA VOL/BSA BIPLANE: 12 ML/M2 (ref 16–34)
ECHO LA VOLUME INDEX MOD A2C: 12 ML/M2 (ref 16–34)
ECHO LA VOLUME INDEX MOD A4C: 12 ML/M2 (ref 16–34)
ECHO LV EDV A2C: 58 ML
ECHO LV EDV A4C: 47 ML
ECHO LV EDV INDEX A4C: 20 ML/M2
ECHO LV EDV NDEX A2C: 25 ML/M2
ECHO LV EF PHYSICIAN: 60 %
ECHO LV EJECTION FRACTION A2C: 61 %
ECHO LV EJECTION FRACTION A4C: 55 %
ECHO LV EJECTION FRACTION BIPLANE: 60 % (ref 55–100)
ECHO LV ESV A2C: 23 ML
ECHO LV ESV A4C: 21 ML
ECHO LV ESV INDEX A2C: 10 ML/M2
ECHO LV ESV INDEX A4C: 9 ML/M2
ECHO LV FRACTIONAL SHORTENING: 27 % (ref 28–44)
ECHO LV INTERNAL DIMENSION DIASTOLE INDEX: 1.76 CM/M2
ECHO LV INTERNAL DIMENSION DIASTOLIC: 4.1 CM (ref 4.2–5.9)
ECHO LV INTERNAL DIMENSION SYSTOLIC INDEX: 1.29 CM/M2
ECHO LV INTERNAL DIMENSION SYSTOLIC: 3 CM
ECHO LV IVSD: 1 CM (ref 0.6–1)
ECHO LV MASS 2D: 123 G (ref 88–224)
ECHO LV MASS INDEX 2D: 52.8 G/M2 (ref 49–115)
ECHO LV POSTERIOR WALL DIASTOLIC: 0.9 CM (ref 0.6–1)
ECHO LV RELATIVE WALL THICKNESS RATIO: 0.44
ECHO LVOT AREA: 3.1 CM2
ECHO LVOT DIAM: 2 CM
ECHO MV A VELOCITY: 1.13 M/S
ECHO MV E DECELERATION TIME (DT): 66 MS
ECHO MV E VELOCITY: 0.74 M/S
ECHO MV E/A RATIO: 0.65
ECHO MV MAX VELOCITY: 0.9 M/S
ECHO MV MEAN GRADIENT: 1 MMHG
ECHO MV MEAN VELOCITY: 0.5 M/S
ECHO MV PEAK GRADIENT: 3 MMHG
ECHO MV VTI: 13.2 CM
ECHO RA AREA 4C: 9.3 CM2
ECHO RA END SYSTOLIC VOLUME APICAL 4 CHAMBER INDEX BSA: 6 ML/M2
ECHO RA VOLUME: 15 ML
ECHO RV BASAL DIMENSION: 3.3 CM
ECHO RV LONGITUDINAL DIMENSION: 7.1 CM
ECHO RV MID DIMENSION: 2.8 CM
EKG P AXIS: 63 DEGREES
EKG P-R INTERVAL: 118 MS
EKG Q-T INTERVAL: 282 MS
EKG QRS DURATION: 84 MS
EKG QTC CALCULATION (BAZETT): 416 MS
EKG T AXIS: 75 DEGREES
EOSINOPHIL # BLD: 0 K/UL (ref 0–0.6)
EOSINOPHIL NFR BLD: 0 % (ref 0–5)
ERYTHROCYTE [DISTWIDTH] IN BLOOD BY AUTOMATED COUNT: 16 % (ref 11.5–14.5)
FLUAV RNA NPH QL NAA+NON-PROBE: NOT DETECTED
FLUBV RNA NPH QL NAA+NON-PROBE: NOT DETECTED
GLUCOSE SERPL-MCNC: 132 MG/DL (ref 70–99)
GLUCOSE SERPL-MCNC: 140 MG/DL (ref 70–99)
HADV DNA NPH QL NAA+NON-PROBE: NOT DETECTED
HCO3 ARTERIAL: 29.9 MMOL/L (ref 22–26)
HCOV 229E RNA NPH QL NAA+NON-PROBE: NOT DETECTED
HCOV HKU1 RNA NPH QL NAA+NON-PROBE: NOT DETECTED
HCOV NL63 RNA NPH QL NAA+NON-PROBE: NOT DETECTED
HCOV OC43 RNA NPH QL NAA+NON-PROBE: NOT DETECTED
HCT VFR BLD AUTO: 41.4 % (ref 42–52)
HEMOGLOBIN, ART, EXTENDED: 14 G/DL (ref 14–18)
HGB BLD-MCNC: 13.6 G/DL (ref 14–18)
HMPV RNA NPH QL NAA+NON-PROBE: NOT DETECTED
HPIV1 RNA NPH QL NAA+NON-PROBE: NOT DETECTED
HPIV2 RNA NPH QL NAA+NON-PROBE: NOT DETECTED
HPIV3 RNA NPH QL NAA+NON-PROBE: NOT DETECTED
HPIV4 RNA NPH QL NAA+NON-PROBE: NOT DETECTED
IMM GRANULOCYTES # BLD: 0.2 K/UL
LACTATE BLDV-SCNC: 3.9 MMOL/L (ref 0.5–1.9)
LACTATE BLDV-SCNC: 4.3 MMOL/L (ref 0.5–1.9)
LYMPHOCYTES # BLD: 2.2 K/UL (ref 1.1–4.5)
LYMPHOCYTES NFR BLD: 9.9 % (ref 20–40)
M PNEUMO DNA NPH QL NAA+NON-PROBE: NOT DETECTED
MCH RBC QN AUTO: 29.7 PG (ref 27–31)
MCHC RBC AUTO-ENTMCNC: 32.9 G/DL (ref 33–37)
MCV RBC AUTO: 90.4 FL (ref 80–94)
METHEMOGLOBIN ARTERIAL: 1.3 %
MONOCYTES # BLD: 1.9 K/UL (ref 0–0.9)
MONOCYTES NFR BLD: 8.5 % (ref 0–10)
NEUTROPHILS # BLD: 17.7 K/UL (ref 1.5–7.5)
NEUTS SEG NFR BLD: 80.6 % (ref 50–65)
O2 CONTENT ARTERIAL: 18.6 ML/DL
O2 DELIVERY DEVICE: ABNORMAL
O2 SAT, ARTERIAL: 94.2 %
O2 THERAPY: ABNORMAL
OXYGEN FLOW: 3
PCO2 ARTERIAL: 35 MMHG (ref 35–45)
PH ARTERIAL: 7.54 (ref 7.35–7.45)
PLATELET # BLD AUTO: 234 K/UL (ref 130–400)
PMV BLD AUTO: 10.4 FL (ref 9.4–12.4)
PO2 ARTERIAL: 76 MMHG (ref 80–100)
POTASSIUM BLD-SCNC: 2.7 MMOL/L
POTASSIUM SERPL-SCNC: 3.5 MMOL/L (ref 3.5–5.1)
POTASSIUM SERPL-SCNC: 3.8 MMOL/L (ref 3.5–5)
PROT SERPL-MCNC: 5.2 G/DL (ref 6.4–8.3)
PROT SERPL-MCNC: 5.9 G/DL (ref 6.4–8.3)
RBC # BLD AUTO: 4.58 M/UL (ref 4.7–6.1)
RSV RNA NPH QL NAA+NON-PROBE: NOT DETECTED
RV+EV RNA NPH QL NAA+NON-PROBE: NOT DETECTED
SAMPLE SOURCE: ABNORMAL
SARS-COV-2 RNA NPH QL NAA+NON-PROBE: NOT DETECTED
SODIUM SERPL-SCNC: 143 MMOL/L (ref 136–145)
SODIUM SERPL-SCNC: 144 MMOL/L (ref 136–145)
TROPONIN, HIGH SENSITIVITY: 167 NG/L (ref 0–22)
TROPONIN, HIGH SENSITIVITY: 185 NG/L (ref 0–22)
TROPONIN, HIGH SENSITIVITY: 197 NG/L (ref 0–22)
TSH SERPL DL<=0.005 MIU/L-ACNC: 0.27 UIU/ML (ref 0.27–4.2)
VALPROATE SERPL-MCNC: 34 UG/ML (ref 50–100)
WBC # BLD AUTO: 22 K/UL (ref 4.8–10.8)

## 2025-05-20 PROCEDURE — 83605 ASSAY OF LACTIC ACID: CPT

## 2025-05-20 PROCEDURE — 6360000002 HC RX W HCPCS: Performed by: STUDENT IN AN ORGANIZED HEALTH CARE EDUCATION/TRAINING PROGRAM

## 2025-05-20 PROCEDURE — P9612 CATHETERIZE FOR URINE SPEC: HCPCS

## 2025-05-20 PROCEDURE — 87040 BLOOD CULTURE FOR BACTERIA: CPT

## 2025-05-20 PROCEDURE — 71275 CT ANGIOGRAPHY CHEST: CPT

## 2025-05-20 PROCEDURE — 6360000002 HC RX W HCPCS: Performed by: EMERGENCY MEDICINE

## 2025-05-20 PROCEDURE — 6370000000 HC RX 637 (ALT 250 FOR IP)

## 2025-05-20 PROCEDURE — 83880 ASSAY OF NATRIURETIC PEPTIDE: CPT

## 2025-05-20 PROCEDURE — 6370000000 HC RX 637 (ALT 250 FOR IP): Performed by: STUDENT IN AN ORGANIZED HEALTH CARE EDUCATION/TRAINING PROGRAM

## 2025-05-20 PROCEDURE — 84484 ASSAY OF TROPONIN QUANT: CPT

## 2025-05-20 PROCEDURE — 2700000000 HC OXYGEN THERAPY PER DAY

## 2025-05-20 PROCEDURE — 2500000003 HC RX 250 WO HCPCS

## 2025-05-20 PROCEDURE — 0202U NFCT DS 22 TRGT SARS-COV-2: CPT

## 2025-05-20 PROCEDURE — 5A09357 ASSISTANCE WITH RESPIRATORY VENTILATION, LESS THAN 24 CONSECUTIVE HOURS, CONTINUOUS POSITIVE AIRWAY PRESSURE: ICD-10-PCS | Performed by: INTERNAL MEDICINE

## 2025-05-20 PROCEDURE — 84443 ASSAY THYROID STIM HORMONE: CPT

## 2025-05-20 PROCEDURE — 82803 BLOOD GASES ANY COMBINATION: CPT

## 2025-05-20 PROCEDURE — C8929 TTE W OR WO FOL WCON,DOPPLER: HCPCS

## 2025-05-20 PROCEDURE — 6360000002 HC RX W HCPCS

## 2025-05-20 PROCEDURE — 2500000003 HC RX 250 WO HCPCS: Performed by: STUDENT IN AN ORGANIZED HEALTH CARE EDUCATION/TRAINING PROGRAM

## 2025-05-20 PROCEDURE — 36415 COLL VENOUS BLD VENIPUNCTURE: CPT

## 2025-05-20 PROCEDURE — 71045 X-RAY EXAM CHEST 1 VIEW: CPT

## 2025-05-20 PROCEDURE — 80164 ASSAY DIPROPYLACETIC ACD TOT: CPT

## 2025-05-20 PROCEDURE — 51702 INSERT TEMP BLADDER CATH: CPT

## 2025-05-20 PROCEDURE — 85025 COMPLETE CBC W/AUTO DIFF WBC: CPT

## 2025-05-20 PROCEDURE — 93306 TTE W/DOPPLER COMPLETE: CPT | Performed by: INTERNAL MEDICINE

## 2025-05-20 PROCEDURE — 36600 WITHDRAWAL OF ARTERIAL BLOOD: CPT

## 2025-05-20 PROCEDURE — 6360000004 HC RX CONTRAST MEDICATION: Performed by: EMERGENCY MEDICINE

## 2025-05-20 PROCEDURE — 94640 AIRWAY INHALATION TREATMENT: CPT

## 2025-05-20 PROCEDURE — 2000000000 HC ICU R&B

## 2025-05-20 PROCEDURE — 80053 COMPREHEN METABOLIC PANEL: CPT

## 2025-05-20 PROCEDURE — 93005 ELECTROCARDIOGRAM TRACING: CPT | Performed by: EMERGENCY MEDICINE

## 2025-05-20 PROCEDURE — 2580000003 HC RX 258: Performed by: STUDENT IN AN ORGANIZED HEALTH CARE EDUCATION/TRAINING PROGRAM

## 2025-05-20 PROCEDURE — 93010 ELECTROCARDIOGRAM REPORT: CPT | Performed by: INTERNAL MEDICINE

## 2025-05-20 PROCEDURE — 6360000004 HC RX CONTRAST MEDICATION

## 2025-05-20 PROCEDURE — 99285 EMERGENCY DEPT VISIT HI MDM: CPT

## 2025-05-20 RX ORDER — DIVALPROEX SODIUM 500 MG/1
500 TABLET, DELAYED RELEASE ORAL 2 TIMES DAILY
Status: ON HOLD | COMMUNITY
End: 2025-06-12 | Stop reason: HOSPADM

## 2025-05-20 RX ORDER — SODIUM CHLORIDE 9 MG/ML
INJECTION, SOLUTION INTRAVENOUS PRN
Status: DISCONTINUED | OUTPATIENT
Start: 2025-05-20 | End: 2025-05-31 | Stop reason: HOSPADM

## 2025-05-20 RX ORDER — LISINOPRIL 10 MG/1
10 TABLET ORAL 2 TIMES DAILY
Status: DISCONTINUED | OUTPATIENT
Start: 2025-05-20 | End: 2025-05-22

## 2025-05-20 RX ORDER — SODIUM CHLORIDE 0.9 % (FLUSH) 0.9 %
5-40 SYRINGE (ML) INJECTION EVERY 12 HOURS SCHEDULED
Status: DISCONTINUED | OUTPATIENT
Start: 2025-05-20 | End: 2025-05-31 | Stop reason: HOSPADM

## 2025-05-20 RX ORDER — ACETAMINOPHEN 325 MG/1
650 TABLET ORAL EVERY 6 HOURS PRN
Status: DISCONTINUED | OUTPATIENT
Start: 2025-05-20 | End: 2025-05-31 | Stop reason: HOSPADM

## 2025-05-20 RX ORDER — TAMSULOSIN HYDROCHLORIDE 0.4 MG/1
0.8 CAPSULE ORAL DAILY
Status: DISCONTINUED | OUTPATIENT
Start: 2025-05-20 | End: 2025-05-20

## 2025-05-20 RX ORDER — BUDESONIDE AND FORMOTEROL FUMARATE DIHYDRATE 160; 4.5 UG/1; UG/1
2 AEROSOL RESPIRATORY (INHALATION) 2 TIMES DAILY
Status: DISCONTINUED | OUTPATIENT
Start: 2025-05-20 | End: 2025-05-20

## 2025-05-20 RX ORDER — ACETAMINOPHEN 650 MG/1
650 SUPPOSITORY RECTAL EVERY 6 HOURS PRN
Status: DISCONTINUED | OUTPATIENT
Start: 2025-05-20 | End: 2025-05-31 | Stop reason: HOSPADM

## 2025-05-20 RX ORDER — IPRATROPIUM BROMIDE AND ALBUTEROL SULFATE 2.5; .5 MG/3ML; MG/3ML
1 SOLUTION RESPIRATORY (INHALATION) EVERY 4 HOURS PRN
COMMUNITY

## 2025-05-20 RX ORDER — ASPIRIN 81 MG/1
81 TABLET, CHEWABLE ORAL DAILY
Status: DISCONTINUED | OUTPATIENT
Start: 2025-05-20 | End: 2025-05-31 | Stop reason: HOSPADM

## 2025-05-20 RX ORDER — BUDESONIDE 0.5 MG/2ML
0.5 INHALANT ORAL
Status: DISCONTINUED | OUTPATIENT
Start: 2025-05-20 | End: 2025-05-31 | Stop reason: HOSPADM

## 2025-05-20 RX ORDER — PANTOPRAZOLE SODIUM 40 MG/1
40 TABLET, DELAYED RELEASE ORAL
Status: DISCONTINUED | OUTPATIENT
Start: 2025-05-21 | End: 2025-05-31 | Stop reason: HOSPADM

## 2025-05-20 RX ORDER — RISPERIDONE 0.5 MG/1
0.5 TABLET ORAL 2 TIMES DAILY
COMMUNITY

## 2025-05-20 RX ORDER — MAGNESIUM SULFATE IN WATER 40 MG/ML
2000 INJECTION, SOLUTION INTRAVENOUS PRN
Status: DISCONTINUED | OUTPATIENT
Start: 2025-05-20 | End: 2025-05-31 | Stop reason: HOSPADM

## 2025-05-20 RX ORDER — TAMSULOSIN HYDROCHLORIDE 0.4 MG/1
0.4 CAPSULE ORAL 2 TIMES DAILY
Status: DISCONTINUED | OUTPATIENT
Start: 2025-05-20 | End: 2025-05-31 | Stop reason: HOSPADM

## 2025-05-20 RX ORDER — RISPERIDONE 0.25 MG/1
0.5 TABLET ORAL 2 TIMES DAILY
Status: DISCONTINUED | OUTPATIENT
Start: 2025-05-20 | End: 2025-05-31 | Stop reason: HOSPADM

## 2025-05-20 RX ORDER — DIVALPROEX SODIUM 250 MG/1
500 TABLET, DELAYED RELEASE ORAL 2 TIMES DAILY
Status: DISCONTINUED | OUTPATIENT
Start: 2025-05-20 | End: 2025-05-23 | Stop reason: ALTCHOICE

## 2025-05-20 RX ORDER — ENOXAPARIN SODIUM 100 MG/ML
30 INJECTION SUBCUTANEOUS 2 TIMES DAILY
Status: DISCONTINUED | OUTPATIENT
Start: 2025-05-20 | End: 2025-05-23

## 2025-05-20 RX ORDER — PREDNISONE 20 MG/1
60 TABLET ORAL DAILY
Status: DISCONTINUED | OUTPATIENT
Start: 2025-05-21 | End: 2025-05-26

## 2025-05-20 RX ORDER — ONDANSETRON 4 MG/1
4 TABLET, ORALLY DISINTEGRATING ORAL EVERY 8 HOURS PRN
Status: DISCONTINUED | OUTPATIENT
Start: 2025-05-20 | End: 2025-05-31 | Stop reason: HOSPADM

## 2025-05-20 RX ORDER — PREDNISONE 20 MG/1
20 TABLET ORAL DAILY
Status: DISCONTINUED | OUTPATIENT
Start: 2025-05-20 | End: 2025-05-20

## 2025-05-20 RX ORDER — PREDNISONE 20 MG/1
20 TABLET ORAL DAILY
Status: ON HOLD | COMMUNITY
End: 2025-05-31 | Stop reason: HOSPADM

## 2025-05-20 RX ORDER — NOREPINEPHRINE BITARTRATE 0.06 MG/ML
1-100 INJECTION, SOLUTION INTRAVENOUS CONTINUOUS
Status: DISCONTINUED | OUTPATIENT
Start: 2025-05-20 | End: 2025-05-21

## 2025-05-20 RX ORDER — LEVOTHYROXINE SODIUM 137 UG/1
137 TABLET ORAL DAILY
Status: DISCONTINUED | OUTPATIENT
Start: 2025-05-20 | End: 2025-05-31 | Stop reason: HOSPADM

## 2025-05-20 RX ORDER — IPRATROPIUM BROMIDE AND ALBUTEROL SULFATE 2.5; .5 MG/3ML; MG/3ML
1 SOLUTION RESPIRATORY (INHALATION)
Status: DISCONTINUED | OUTPATIENT
Start: 2025-05-20 | End: 2025-05-21

## 2025-05-20 RX ORDER — RISPERIDONE 25 MG/2 ML
25 KIT INTRAMUSCULAR
COMMUNITY

## 2025-05-20 RX ORDER — SODIUM CHLORIDE 0.9 % (FLUSH) 0.9 %
5-40 SYRINGE (ML) INJECTION PRN
Status: DISCONTINUED | OUTPATIENT
Start: 2025-05-20 | End: 2025-05-31 | Stop reason: HOSPADM

## 2025-05-20 RX ORDER — DOXYCYCLINE 100 MG/1
100 CAPSULE ORAL EVERY 12 HOURS SCHEDULED
Status: DISCONTINUED | OUTPATIENT
Start: 2025-05-20 | End: 2025-05-20

## 2025-05-20 RX ORDER — FUROSEMIDE 10 MG/ML
80 INJECTION INTRAMUSCULAR; INTRAVENOUS ONCE
Status: COMPLETED | OUTPATIENT
Start: 2025-05-20 | End: 2025-05-20

## 2025-05-20 RX ORDER — POLYETHYLENE GLYCOL 3350 17 G/17G
17 POWDER, FOR SOLUTION ORAL DAILY PRN
Status: DISCONTINUED | OUTPATIENT
Start: 2025-05-20 | End: 2025-05-31 | Stop reason: HOSPADM

## 2025-05-20 RX ORDER — DOXYCYCLINE 100 MG/1
100 CAPSULE ORAL EVERY 12 HOURS SCHEDULED
Status: COMPLETED | OUTPATIENT
Start: 2025-05-20 | End: 2025-05-24

## 2025-05-20 RX ORDER — SODIUM CHLORIDE, SODIUM LACTATE, POTASSIUM CHLORIDE, AND CALCIUM CHLORIDE .6; .31; .03; .02 G/100ML; G/100ML; G/100ML; G/100ML
500 INJECTION, SOLUTION INTRAVENOUS ONCE
Status: COMPLETED | OUTPATIENT
Start: 2025-05-20 | End: 2025-05-20

## 2025-05-20 RX ORDER — ONDANSETRON 2 MG/ML
4 INJECTION INTRAMUSCULAR; INTRAVENOUS EVERY 6 HOURS PRN
Status: DISCONTINUED | OUTPATIENT
Start: 2025-05-20 | End: 2025-05-31 | Stop reason: HOSPADM

## 2025-05-20 RX ORDER — ATORVASTATIN CALCIUM 80 MG/1
80 TABLET, FILM COATED ORAL NIGHTLY
Status: DISCONTINUED | OUTPATIENT
Start: 2025-05-20 | End: 2025-05-31 | Stop reason: HOSPADM

## 2025-05-20 RX ORDER — FUROSEMIDE 40 MG/1
40 TABLET ORAL 2 TIMES DAILY
Status: ON HOLD | COMMUNITY
End: 2025-05-31 | Stop reason: HOSPADM

## 2025-05-20 RX ORDER — IOPAMIDOL 755 MG/ML
90 INJECTION, SOLUTION INTRAVASCULAR
Status: COMPLETED | OUTPATIENT
Start: 2025-05-20 | End: 2025-05-20

## 2025-05-20 RX ADMIN — IPRATROPIUM BROMIDE AND ALBUTEROL SULFATE 1 DOSE: 2.5; .5 SOLUTION RESPIRATORY (INHALATION) at 15:03

## 2025-05-20 RX ADMIN — DOXYCYCLINE HYCLATE 100 MG: 100 CAPSULE ORAL at 21:47

## 2025-05-20 RX ADMIN — DIVALPROEX SODIUM 500 MG: 500 TABLET, DELAYED RELEASE ORAL at 13:58

## 2025-05-20 RX ADMIN — ACETAMINOPHEN 650 MG: 650 SUPPOSITORY RECTAL at 19:14

## 2025-05-20 RX ADMIN — FUROSEMIDE 80 MG: 10 INJECTION, SOLUTION INTRAMUSCULAR; INTRAVENOUS at 09:11

## 2025-05-20 RX ADMIN — SODIUM CHLORIDE, PRESERVATIVE FREE 10 ML: 5 INJECTION INTRAVENOUS at 21:48

## 2025-05-20 RX ADMIN — RISPERIDONE 0.5 MG: 0.25 TABLET, FILM COATED ORAL at 21:47

## 2025-05-20 RX ADMIN — RISPERIDONE 0.5 MG: 0.25 TABLET, FILM COATED ORAL at 13:59

## 2025-05-20 RX ADMIN — SODIUM CHLORIDE, PRESERVATIVE FREE 1000 MG: 5 INJECTION INTRAVENOUS at 12:32

## 2025-05-20 RX ADMIN — DIVALPROEX SODIUM 500 MG: 500 TABLET, DELAYED RELEASE ORAL at 21:47

## 2025-05-20 RX ADMIN — ENOXAPARIN SODIUM 30 MG: 100 INJECTION SUBCUTANEOUS at 12:33

## 2025-05-20 RX ADMIN — Medication 5 MCG/MIN: at 11:55

## 2025-05-20 RX ADMIN — IPRATROPIUM BROMIDE AND ALBUTEROL SULFATE 1 DOSE: 2.5; .5 SOLUTION RESPIRATORY (INHALATION) at 11:44

## 2025-05-20 RX ADMIN — ASPIRIN 81 MG 81 MG: 81 TABLET ORAL at 13:58

## 2025-05-20 RX ADMIN — SODIUM CHLORIDE, POTASSIUM CHLORIDE, SODIUM LACTATE AND CALCIUM CHLORIDE 500 ML: 600; 310; 30; 20 INJECTION, SOLUTION INTRAVENOUS at 11:20

## 2025-05-20 RX ADMIN — LEVOTHYROXINE SODIUM 137 MCG: 0.14 TABLET ORAL at 14:06

## 2025-05-20 RX ADMIN — BUDESONIDE 500 MCG: 0.5 INHALANT ORAL at 11:44

## 2025-05-20 RX ADMIN — WATER 125 MG: 1 INJECTION INTRAMUSCULAR; INTRAVENOUS; SUBCUTANEOUS at 11:19

## 2025-05-20 RX ADMIN — SULFUR HEXAFLUORIDE 2 ML: 60.7; .19; .19 INJECTION, POWDER, LYOPHILIZED, FOR SUSPENSION INTRAVENOUS; INTRAVESICAL at 16:08

## 2025-05-20 RX ADMIN — ATORVASTATIN CALCIUM 80 MG: 80 TABLET, FILM COATED ORAL at 21:47

## 2025-05-20 RX ADMIN — IOPAMIDOL 90 ML: 755 INJECTION, SOLUTION INTRAVENOUS at 06:31

## 2025-05-20 RX ADMIN — TAMSULOSIN HYDROCHLORIDE 0.4 MG: 0.4 CAPSULE ORAL at 21:47

## 2025-05-20 RX ADMIN — DOXYCYCLINE HYCLATE 100 MG: 100 CAPSULE ORAL at 14:10

## 2025-05-20 RX ADMIN — ENOXAPARIN SODIUM 30 MG: 100 INJECTION SUBCUTANEOUS at 21:47

## 2025-05-20 SDOH — ECONOMIC STABILITY: INCOME INSECURITY: HOW HARD IS IT FOR YOU TO PAY FOR THE VERY BASICS LIKE FOOD, HOUSING, MEDICAL CARE, AND HEATING?: NOT HARD AT ALL

## 2025-05-20 SDOH — ECONOMIC STABILITY: FOOD INSECURITY: WITHIN THE PAST 12 MONTHS, YOU WORRIED THAT YOUR FOOD WOULD RUN OUT BEFORE YOU GOT MONEY TO BUY MORE.: NEVER TRUE

## 2025-05-20 SDOH — ECONOMIC STABILITY: INCOME INSECURITY: IN THE PAST 12 MONTHS, HAS THE ELECTRIC, GAS, OIL, OR WATER COMPANY THREATENED TO SHUT OFF SERVICE IN YOUR HOME?: NO

## 2025-05-20 ASSESSMENT — PAIN SCALES - GENERAL: PAINLEVEL_OUTOF10: 0

## 2025-05-20 ASSESSMENT — PATIENT HEALTH QUESTIONNAIRE - PHQ9
SUM OF ALL RESPONSES TO PHQ QUESTIONS 1-9: 0
SUM OF ALL RESPONSES TO PHQ QUESTIONS 1-9: 0
1. LITTLE INTEREST OR PLEASURE IN DOING THINGS: NOT AT ALL
SUM OF ALL RESPONSES TO PHQ QUESTIONS 1-9: 0
SUM OF ALL RESPONSES TO PHQ QUESTIONS 1-9: 0
2. FEELING DOWN, DEPRESSED OR HOPELESS: NOT AT ALL

## 2025-05-20 NOTE — ED PROVIDER NOTES
[facility] EMERGENCY DEPARTMENT  eMERGENCY dEPARTMENT eNCOUnter      Pt Name: Khris Sampson  MRN: [id number]  Birthdate 1955  Date of evaluation: 5/20/2025  Provider: Dakota Mcneal MD    CHIEF COMPLAINT       Chief Complaint   Patient presents with    Shortness of Breath     Patient has been getting shortness of breath over the last month. Patient has swelling in his lower extremities.          HISTORY OF PRESENT ILLNESS   (Location/Symptom, Timing/Onset,Context/Setting, Quality, Duration, Modifying Factors, Severity)  Note limiting factors.   Khris Sampson is a 70 y.o. male who presents to the emergency department for evaluation regarding increasing shortness of breath.  Patient is a resident at the nursing home and notes that he has gotten progressively more short of breath over the last several weeks.  He does have a prior history of chronic respiratory failure and is maintained on 2 L nasal cannula.  He is shortness of breath has been associated with increasing swelling of his bilateral lower extremities.  States he has had quite a bit of weight gain recently.  Most recent echocardiogram noted in April 2025 revealed ejection fraction 51 to 55% with some questionable apical hypokinesis.  Patient denies prior history of pulmonary embolism.  He has not had cough or congestion symptoms.  Denies fevers, chills, vomiting or diarrhea.    HPI    NursingNotes were reviewed.    REVIEW OF SYSTEMS    (2-9 systems for level 4, 10 or more for level 5)     Review of Systems   Constitutional:  Positive for fatigue. Negative for fever.   HENT:  Negative for congestion.    Respiratory:  Positive for shortness of breath. Negative for cough and wheezing.    Cardiovascular:  Positive for leg swelling. Negative for chest pain and palpitations.   Gastrointestinal:  Negative for abdominal distention, abdominal pain, diarrhea, nausea and vomiting.   Neurological:  Negative for dizziness and syncope.   All other systems reviewed and are

## 2025-05-20 NOTE — H&P
[facility]      Hospitalist - History & Physical      PCP: Nader Gordillo MD    Date of Admission: 5/20/2025    Date of Service: 5/20/2025    Chief Complaint:  shortness of breath     History Of Present Illness:   The patient is a 70 y.o. male with CVA right sided residual, seizure disorder, chronic hypoxic respiratory failure on continuous supplemental oxygen 2L NC, HTN  complaining of shortness of breath. [facility] resident. Patient admitted to [facility] 4/3 due to acute respiratory failure due to left lower lobe pneumonia, initiated on BIPAP support. In addition found to have ZARA. Received broad spectrum antibiotics and IV hydration. Discharged in stable condition on 4/9.   Presented to [facility] ER due to shortness of breath.  Patient states that he has had ongoing dyspnea at rest for the past month.  States that he has noted bilateral lower extremity edema worsening over the past several weeks and presented today due to worsening dyspnea. Has had diffuse wheezes, generalized weakness and fatigue. Work up in ER CTA pulmonary no pulmonary emboli, small pericardial effusion, no consolidation, troponin 197, . ABG PH 7.54 PCO2 35 PO2 76 HCO3 29.9, on 3L NC. Initially received Lasix 80 mg IV while in ER. Rapid response called at 1100 due to hypotension. Patient remained alert and oriented able to follow commands.  cc fluid bolus given, initiated on Levophed. IV antibiotics Rocephin, Doxycyline initiated for concern of COPD exacerbation. Plan to monitor closely in unit. Discussed with nursing at bedside.     Past Medical History:        Diagnosis Date    Altered mental status     Arthritis     Asthma     Brief psychotic disorder (HCC)     Cerebral artery occlusion with cerebral infarction (HCC)     COPD (chronic obstructive pulmonary disease) (HCC)     Diabetes mellitus (HCC)     Epilepsy (HCC)     GERD (gastroesophageal reflux disease)     Hypertension     Palliative care patient  evaluation of the pulmonary arteries without central embolus.  Coronary ASVD.  Small pericardial effusion  Hiatal hernia  All CT scans are performed using dose optimization techniques as appropriate to the performed exam and include at least one of the following: Automated exposure control, adjustment of the mA and/or kV according to size, and the use of iterative reconstruction technique.  ______________________________________ Electronically signed by: LEIGHANN HERNANDEZ M.D. Date:     05/20/2025 Time:    06:40       Assessment/Plan:  Principal Problem    SIRS (systemic inflammatory response syndrome)/Hypotension   Suspect due to COPD exacerbation   -follow pan cx  -broad-spectrum iv abx, Rocephin & Doxycyline   -ivf 500 cc fluid bolus given, concern for volume overload  -lactate   Procal  Monitor on telemetry   Active Problems:    COPD exacerbation   - IV Abx Rocephin & Doxycyline               - Bronchodilators               - IV steroids                - Supplemental oxygen as needed               - Incentive spirometry               - Encourage deep breathing and cough   Elevated troponin    Likely secondary to demand ischemia    Prior troponin 4/4/25 trop 227   Repeat ECHO   Trend troponin 197   EKG in am     Hypothyroidism   TSH 0.273   Synthroid     Type 2 diabetes mellitus, without long-term current use of insulin  -Accucheck  -A1c  -Sliding scale insulin   -Hypoglycemia protocol as warranted     Seizure disorder   Depakote     Hypertension   Hold lisinopril     Peripheral edema   Repeat ECHO    Lasix 80 mg X 1   Strict I & O    Daily weight   Hyperlipidemia   Statin therapy      DVT prophylactic lovenox     Signed:  KEEGAN Barnett - CNP, 5/20/2025 8:38 AM    EMR Dragon/Transcription disclaimer:   Much of this encounter note is an electronic transcription/translation of spoken language to printed text. The electronic translation of spoken language may permit erroneous, or at times, nonsensical words or

## 2025-05-21 LAB
ALBUMIN SERPL-MCNC: 2.8 G/DL (ref 3.5–5.2)
ALP SERPL-CCNC: 30 U/L (ref 40–129)
ALT SERPL-CCNC: 18 U/L (ref 10–50)
ANION GAP SERPL CALCULATED.3IONS-SCNC: 16 MMOL/L (ref 8–16)
ANISOCYTOSIS BLD QL SMEAR: ABNORMAL
AST SERPL-CCNC: 85 U/L (ref 10–50)
BASOPHILS # BLD: 0 K/UL (ref 0–0.2)
BASOPHILS NFR BLD: 0 % (ref 0–1)
BILIRUB DIRECT SERPL-MCNC: 0.4 MG/DL (ref 0–0.3)
BILIRUB INDIRECT SERPL-MCNC: 0.4 MG/DL (ref 0–1)
BILIRUB SERPL-MCNC: 0.8 MG/DL (ref 0.2–1.2)
BUN SERPL-MCNC: 39 MG/DL (ref 8–23)
CALCIUM SERPL-MCNC: 8.2 MG/DL (ref 8.8–10.2)
CHLORIDE SERPL-SCNC: 102 MMOL/L (ref 98–107)
CHOLEST SERPL-MCNC: 96 MG/DL (ref 0–199)
CO2 SERPL-SCNC: 25 MMOL/L (ref 22–29)
CREAT SERPL-MCNC: 1.6 MG/DL (ref 0.7–1.2)
EKG P AXIS: 66 DEGREES
EKG P-R INTERVAL: 116 MS
EKG Q-T INTERVAL: 292 MS
EKG QRS DURATION: 86 MS
EKG QTC CALCULATION (BAZETT): 407 MS
EKG T AXIS: 83 DEGREES
EOSINOPHIL # BLD: 0 K/UL (ref 0–0.6)
EOSINOPHIL NFR BLD: 0 % (ref 0–5)
ERYTHROCYTE [DISTWIDTH] IN BLOOD BY AUTOMATED COUNT: 16.6 % (ref 11.5–14.5)
GLUCOSE SERPL-MCNC: 146 MG/DL (ref 70–99)
HCT VFR BLD AUTO: 41.6 % (ref 42–52)
HDLC SERPL-MCNC: 55 MG/DL (ref 40–60)
HGB BLD-MCNC: 13.9 G/DL (ref 14–18)
IMM GRANULOCYTES # BLD: 0.2 K/UL
LACTATE BLDV-SCNC: 2.5 MMOL/L (ref 0.5–1.9)
LDLC SERPL CALC-MCNC: 22 MG/DL
LYMPHOCYTES # BLD: 1.3 K/UL (ref 1.1–4.5)
LYMPHOCYTES NFR BLD: 9 % (ref 20–40)
MAGNESIUM SERPL-MCNC: 1.7 MG/DL (ref 1.6–2.4)
MCH RBC QN AUTO: 29.8 PG (ref 27–31)
MCHC RBC AUTO-ENTMCNC: 33.4 G/DL (ref 33–37)
MCV RBC AUTO: 89.3 FL (ref 80–94)
METAMYELOCYTES NFR BLD MANUAL: 1 %
MONOCYTES # BLD: 0.4 K/UL (ref 0–0.9)
MONOCYTES NFR BLD: 3 % (ref 0–10)
NEUTROPHILS # BLD: 12.8 K/UL (ref 1.5–7.5)
NEUTS BAND NFR BLD MANUAL: 60 % (ref 0–5)
NEUTS SEG NFR BLD: 27 % (ref 50–65)
PLATELET # BLD AUTO: 200 K/UL (ref 130–400)
PLATELET SLIDE REVIEW: ADEQUATE
PMV BLD AUTO: 10.1 FL (ref 9.4–12.4)
POTASSIUM SERPL-SCNC: 3.9 MMOL/L (ref 3.5–5.1)
POTASSIUM SERPL-SCNC: 3.9 MMOL/L (ref 3.5–5.1)
PROCALCITONIN: 24 NG/ML (ref 0–0.09)
PROT SERPL-MCNC: 5.4 G/DL (ref 6.4–8.3)
RBC # BLD AUTO: 4.66 M/UL (ref 4.7–6.1)
SODIUM SERPL-SCNC: 143 MMOL/L (ref 136–145)
TRIGL SERPL-MCNC: 96 MG/DL (ref 0–149)
WBC # BLD AUTO: 14.5 K/UL (ref 4.8–10.8)

## 2025-05-21 PROCEDURE — 2500000003 HC RX 250 WO HCPCS

## 2025-05-21 PROCEDURE — 84132 ASSAY OF SERUM POTASSIUM: CPT

## 2025-05-21 PROCEDURE — 36415 COLL VENOUS BLD VENIPUNCTURE: CPT

## 2025-05-21 PROCEDURE — 6360000002 HC RX W HCPCS: Performed by: STUDENT IN AN ORGANIZED HEALTH CARE EDUCATION/TRAINING PROGRAM

## 2025-05-21 PROCEDURE — 6370000000 HC RX 637 (ALT 250 FOR IP): Performed by: INTERNAL MEDICINE

## 2025-05-21 PROCEDURE — 36569 INSJ PICC 5 YR+ W/O IMAGING: CPT

## 2025-05-21 PROCEDURE — 2000000000 HC ICU R&B

## 2025-05-21 PROCEDURE — 93005 ELECTROCARDIOGRAM TRACING: CPT

## 2025-05-21 PROCEDURE — 94660 CPAP INITIATION&MGMT: CPT

## 2025-05-21 PROCEDURE — 6360000002 HC RX W HCPCS

## 2025-05-21 PROCEDURE — 2500000003 HC RX 250 WO HCPCS: Performed by: INTERNAL MEDICINE

## 2025-05-21 PROCEDURE — 6370000000 HC RX 637 (ALT 250 FOR IP)

## 2025-05-21 PROCEDURE — 93010 ELECTROCARDIOGRAM REPORT: CPT | Performed by: INTERNAL MEDICINE

## 2025-05-21 PROCEDURE — 84145 PROCALCITONIN (PCT): CPT

## 2025-05-21 PROCEDURE — 80061 LIPID PANEL: CPT

## 2025-05-21 PROCEDURE — 2580000003 HC RX 258: Performed by: STUDENT IN AN ORGANIZED HEALTH CARE EDUCATION/TRAINING PROGRAM

## 2025-05-21 PROCEDURE — 94760 N-INVAS EAR/PLS OXIMETRY 1: CPT

## 2025-05-21 PROCEDURE — 85025 COMPLETE CBC W/AUTO DIFF WBC: CPT

## 2025-05-21 PROCEDURE — 80048 BASIC METABOLIC PNL TOTAL CA: CPT

## 2025-05-21 PROCEDURE — 2580000003 HC RX 258: Performed by: INTERNAL MEDICINE

## 2025-05-21 PROCEDURE — 6360000002 HC RX W HCPCS: Performed by: INTERNAL MEDICINE

## 2025-05-21 PROCEDURE — 94640 AIRWAY INHALATION TREATMENT: CPT

## 2025-05-21 PROCEDURE — 83735 ASSAY OF MAGNESIUM: CPT

## 2025-05-21 PROCEDURE — 2700000000 HC OXYGEN THERAPY PER DAY

## 2025-05-21 PROCEDURE — 83605 ASSAY OF LACTIC ACID: CPT

## 2025-05-21 PROCEDURE — 76937 US GUIDE VASCULAR ACCESS: CPT

## 2025-05-21 PROCEDURE — 6370000000 HC RX 637 (ALT 250 FOR IP): Performed by: STUDENT IN AN ORGANIZED HEALTH CARE EDUCATION/TRAINING PROGRAM

## 2025-05-21 PROCEDURE — C1751 CATH, INF, PER/CENT/MIDLINE: HCPCS

## 2025-05-21 PROCEDURE — 80076 HEPATIC FUNCTION PANEL: CPT

## 2025-05-21 RX ORDER — IPRATROPIUM BROMIDE AND ALBUTEROL SULFATE 2.5; .5 MG/3ML; MG/3ML
1 SOLUTION RESPIRATORY (INHALATION)
Status: DISCONTINUED | OUTPATIENT
Start: 2025-05-21 | End: 2025-05-22

## 2025-05-21 RX ORDER — BISMUTH TRIBROMOPH/PETROLATUM 5"X9"
1 BANDAGE TOPICAL DAILY
Status: DISCONTINUED | OUTPATIENT
Start: 2025-05-21 | End: 2025-05-27

## 2025-05-21 RX ORDER — BUMETANIDE 0.25 MG/ML
0.5 INJECTION, SOLUTION INTRAMUSCULAR; INTRAVENOUS ONCE
Status: COMPLETED | OUTPATIENT
Start: 2025-05-21 | End: 2025-05-21

## 2025-05-21 RX ORDER — DIGOXIN 0.25 MG/ML
500 INJECTION INTRAMUSCULAR; INTRAVENOUS ONCE
Status: COMPLETED | OUTPATIENT
Start: 2025-05-21 | End: 2025-05-21

## 2025-05-21 RX ADMIN — TAMSULOSIN HYDROCHLORIDE 0.4 MG: 0.4 CAPSULE ORAL at 07:54

## 2025-05-21 RX ADMIN — IPRATROPIUM BROMIDE AND ALBUTEROL SULFATE 1 DOSE: 2.5; .5 SOLUTION RESPIRATORY (INHALATION) at 14:28

## 2025-05-21 RX ADMIN — RISPERIDONE 0.5 MG: 0.25 TABLET, FILM COATED ORAL at 20:48

## 2025-05-21 RX ADMIN — TAMSULOSIN HYDROCHLORIDE 0.4 MG: 0.4 CAPSULE ORAL at 20:48

## 2025-05-21 RX ADMIN — PREDNISONE 60 MG: 20 TABLET ORAL at 07:54

## 2025-05-21 RX ADMIN — BUMETANIDE 0.2 MG/HR: 0.25 INJECTION INTRAMUSCULAR; INTRAVENOUS at 09:24

## 2025-05-21 RX ADMIN — PHENYLEPHRINE HYDROCHLORIDE 125 MCG/MIN: 10 INJECTION INTRAVENOUS at 16:05

## 2025-05-21 RX ADMIN — IPRATROPIUM BROMIDE AND ALBUTEROL SULFATE 1 DOSE: 2.5; .5 SOLUTION RESPIRATORY (INHALATION) at 10:33

## 2025-05-21 RX ADMIN — DOXYCYCLINE HYCLATE 100 MG: 100 CAPSULE ORAL at 08:30

## 2025-05-21 RX ADMIN — IPRATROPIUM BROMIDE AND ALBUTEROL SULFATE 1 DOSE: 2.5; .5 SOLUTION RESPIRATORY (INHALATION) at 18:25

## 2025-05-21 RX ADMIN — PANTOPRAZOLE SODIUM 40 MG: 40 TABLET, DELAYED RELEASE ORAL at 07:55

## 2025-05-21 RX ADMIN — ASPIRIN 81 MG 81 MG: 81 TABLET ORAL at 07:55

## 2025-05-21 RX ADMIN — IPRATROPIUM BROMIDE AND ALBUTEROL SULFATE 1 DOSE: 2.5; .5 SOLUTION RESPIRATORY (INHALATION) at 06:31

## 2025-05-21 RX ADMIN — RISPERIDONE 0.5 MG: 0.25 TABLET, FILM COATED ORAL at 07:54

## 2025-05-21 RX ADMIN — DIGOXIN 500 MCG: 0.25 INJECTION INTRAMUSCULAR; INTRAVENOUS at 16:25

## 2025-05-21 RX ADMIN — Medication 1 EACH: at 12:00

## 2025-05-21 RX ADMIN — DIVALPROEX SODIUM 500 MG: 500 TABLET, DELAYED RELEASE ORAL at 08:30

## 2025-05-21 RX ADMIN — IPRATROPIUM BROMIDE AND ALBUTEROL SULFATE 1 DOSE: 2.5; .5 SOLUTION RESPIRATORY (INHALATION) at 22:15

## 2025-05-21 RX ADMIN — DOXYCYCLINE HYCLATE 100 MG: 100 CAPSULE ORAL at 20:53

## 2025-05-21 RX ADMIN — SODIUM CHLORIDE, PRESERVATIVE FREE 10 ML: 5 INJECTION INTRAVENOUS at 20:49

## 2025-05-21 RX ADMIN — ENOXAPARIN SODIUM 30 MG: 100 INJECTION SUBCUTANEOUS at 20:48

## 2025-05-21 RX ADMIN — SODIUM CHLORIDE, PRESERVATIVE FREE 1000 MG: 5 INJECTION INTRAVENOUS at 12:00

## 2025-05-21 RX ADMIN — ATORVASTATIN CALCIUM 80 MG: 80 TABLET, FILM COATED ORAL at 20:48

## 2025-05-21 RX ADMIN — BUDESONIDE 500 MCG: 0.5 INHALANT ORAL at 18:25

## 2025-05-21 RX ADMIN — BUDESONIDE 500 MCG: 0.5 INHALANT ORAL at 06:31

## 2025-05-21 RX ADMIN — LEVOTHYROXINE SODIUM 137 MCG: 0.14 TABLET ORAL at 07:54

## 2025-05-21 RX ADMIN — BUMETANIDE 0.5 MG: 0.25 INJECTION INTRAMUSCULAR; INTRAVENOUS at 08:19

## 2025-05-21 RX ADMIN — ACETAMINOPHEN 650 MG: 325 TABLET ORAL at 07:55

## 2025-05-21 RX ADMIN — DILTIAZEM HYDROCHLORIDE 5 MG/HR: 5 INJECTION, SOLUTION INTRAVENOUS at 18:36

## 2025-05-21 RX ADMIN — PHENYLEPHRINE HYDROCHLORIDE 25 MCG/MIN: 10 INJECTION INTRAVENOUS at 09:08

## 2025-05-21 RX ADMIN — SODIUM CHLORIDE, PRESERVATIVE FREE 10 ML: 5 INJECTION INTRAVENOUS at 09:00

## 2025-05-21 RX ADMIN — DIVALPROEX SODIUM 500 MG: 500 TABLET, DELAYED RELEASE ORAL at 20:48

## 2025-05-21 RX ADMIN — ENOXAPARIN SODIUM 30 MG: 100 INJECTION SUBCUTANEOUS at 08:30

## 2025-05-21 ASSESSMENT — PAIN SCALES - GENERAL: PAINLEVEL_OUTOF10: 0

## 2025-05-21 NOTE — ACP (ADVANCE CARE PLANNING)
Advance Care Planning     Advance Care Planning Activator (Inpatient)  Conversation Note      Date of ACP Conversation: 5/21/2025     Conversation Conducted with: Healthcare Decision Maker    ACP Activator: Ghislaine Perez RN      Health Care Decision Maker:     Current Designated Health Care Decision Maker:     Primary Decision Maker: Bre Rios - Brother/Sister - [phone]      Care Preferences    Ventilation:  \"If you were in your present state of health and suddenly became very ill and were unable to breathe on your own, what would your preference be about the use of a ventilator (breathing machine) if it were available to you?\"      Would the patient desire the use of ventilator (breathing machine)?: Yes    \"If your health worsens and it becomes clear that your chance of recovery is unlikely, what would your preference be about the use of a ventilator (breathing machine) if it were available to you?\"     Would the patient desire the use of ventilator (breathing machine)?: Yes      Resuscitation  \"CPR works best to restart the heart when there is a sudden event, like a heart attack, in someone who is otherwise healthy. Unfortunately, CPR does not typically restart the heart for people who have serious health conditions or who are very sick.\"    \"In the event your heart stopped as a result of an underlying serious health condition, would you want attempts to be made to restart your heart (answer \"yes\" for attempt to resuscitate) or would you prefer a natural death (answer \"no\" for do not attempt to resuscitate)?\" Yes       [] Yes   [x] No   Educated Patient / Decision Maker regarding differences between Advance Directives and portable DNR orders.    Length of ACP Conversation in minutes:  30 minutes discussing support, goals of care, and Pt's wishes.    Conversation Outcomes:  ACP discussion completed

## 2025-05-21 NOTE — CONSULTS
Palliative Care:   Pt is known to Palliative Care.  Pt presented to ED 5/20 with SOA and was admitted for hypotension, peripheral edema, and SIRS.  Upon visit with Pt was sitting up in bed with Bipap in place, eyes closed, sleeping.  Pt's sisterBre had just arrived to bedside.  Staff received Pt history and information from his sister.  Pt opened his eyes and began to respond to sisterBre.  Pt asked for water and staff cautioned Pt and sister to ask his RN before drinking/giving water.  Pt's RN arrived and stated Pt is NPO due to aspiration risk.  Pt's RN updated Bre hernandez and this staff on Pt's care since arrival to ICU.  Pt is a resident of [facility].  Prior to symptoms Pt used a wheel chair to get around and was able to do so with minimal assist.  Pt is chair bound, able to stand on occasion.  Pt had a good appetite prior to symptoms and had no difficulty eating or drinking.  Pt  was on oxygen at 2L prior to admission.      Past Medical History:        Past Medical History:   Diagnosis Date    Altered mental status     Arthritis     Asthma     Brief psychotic disorder (HCC)     Cerebral artery occlusion with cerebral infarction (HCC)     COPD (chronic obstructive pulmonary disease) (HCC)     Diabetes mellitus (HCC)     Epilepsy (HCC)     GERD (gastroesophageal reflux disease)     Hypertension     Palliative care patient 05/31/2022    Seizures (HCC)     Pt states last seizure was in December 2015    Thyroid disease     Unspecified convulsions (HCC)     Vascular dementia (HCC)        Advance Directives:     Pt has POA on file.  Pt is a Full Code.  Pt's sister, Micah, \"Bre\" Gabriel is his PDM.         Pain/Other Symptoms:   Pt is SOA requiring a Bipap.  Pt denied pain by nodding his head no.              How are symptoms affecting QOL:  Pt was chair bound but able to move about as he wanted in a wheel chair prior to symptoms.           Psychological/Spiritual:  Pt is of [employer] anish and his anish is  important to him.  Pt has support of his sister, Bre who visit's often and is available by phone.                  Plan:   Medical management and back t [place] upon discharge.        Patient/family discussion r/t goals:           (what does living well look like to you?   Goal is to return to baseline activity and to return to [place].           Palliative Performance Scale:  Baseline 50%, current 30%.           Palliative Care team will continue to follow and support, with ongoing review of pt's goals of care.                Electronically signed by Ghislaine Perez RN on 5/21/2025 at 11:51 AM

## 2025-05-21 NOTE — PROGRESS NOTES
Pt assessed. Respirations labored and tachypneic. Pt is awake and alert. Temp 102.2. Insp and Exp wheezing milan. 4+ pitting edema in legs. Wounds noted on right leg. Purple splotching noted on posterior right calf and blisters. Blisters and erythema on right lower leg and foot. Notifed Dr Boyce. Pt on levophed for bp support.

## 2025-05-21 NOTE — CONSULTS
[facility] Vascular Access Team:  PICC Line Insertion Procedure Note      Patient: Khris Sampson  YOB: 1955   MRN: [id number]  Room: [address] [place] [address]     Attending Physician - Gregg Michael DO  Ordering Physician - Gregg Michael DO    Diagnosis:   Shortness of breath [R06.02]  Hypotension [I95.9]  Acute congestive heart failure, unspecified heart failure type (HCC) [I50.9]  Congestive heart failure, unspecified HF chronicity, unspecified heart failure type (HCC) [I50.9]       Procedure(s):   Insertion of a 5.5 Prydeinig Double Lumen PICC    Indication(s):   Vesicant IV Medication(s) (Vasopressor)    Date of Procedure: 5/21/2025   Start Time: 1115  End Time: 1150    Performed by: Johan De La Cruz RN    Anesthesia: Local Injection 3 mL 1% Lidocaine without Epinephrine    Estimated Blood Loss (mL): None    Complications: None    PICC 05/21/25 Right Basilic (Active)   Central Line Being Utilized Yes 05/21/25 1150   Criteria for Appropriate Use Hemodynamically unstable, requiring monitoring lines, vasopressors, or volume resuscitation 05/21/25 1150   Site Assessment Clean, dry & intact 05/21/25 1150   Phlebitis Assessment No symptoms 05/21/25 1150   Infiltration Assessment 0 05/21/25 1150   Extremity Circumference (cm) 34 cm 05/21/25 1150   External Catheter Length (cm) 2 cm 05/21/25 1150   Lumen #1 Color/Status White;Flushed;Brisk blood return;Normal saline locked;Alcohol cap applied 05/21/25 1150   Lumen #2 Color/Status Pink;Flushed;Brisk blood return;Normal saline locked;Alcohol cap applied 05/21/25 1150   Alcohol Cap Used Yes 05/21/25 1150   Date of Last Dressing Change 05/21/25 05/21/25 1150   Dressing Type Securing device;Gauze;Transparent 05/21/25 1150   Dressing Status Clean, dry & intact;New dressing applied 05/21/25 1150   Dressing Intervention New 05/21/25 1150         Findings:   1. Successful insertion of PICC line.  2. PICC Line is ready to be used.   3. Please change tubing prior to using new

## 2025-05-21 NOTE — PROGRESS NOTES
Hospitalist Progress Note    Patient:  Khrsi Sampson  YOB: 1955  Date of Service: 5/21/2025  MRN: [id number]   Acct: [id number]   Primary Care Physician: Nader Gordillo MD  Advance Directive: Full Code  Admit Date: 5/20/2025       Hospital Day: 1  Referring Provider: Gregg Michael DO    Patient Seen, Chart, Consults, Notes, Labs, Radiology studies reviewed.    Subjective:  Khris Sampson is a 70 y.o. male  whom we are following for hypoxemic respiratory failure, decompensated diastolic heart failure, prior ischemic stroke.  He was admitted last evening with hypoxemic respiratory failure, exacerbation of COPD, volume overload.  He required BiPAP this morning.  We also began a Bumex infusion.  He has had out greater than 2 L with his diuresis thus far.  He is much more comfortable.  He has developed some paroxysmal atrial fibrillation.  We will give him a loading dose of digoxin at this time.  He is requiring some Mumtaz-Synephrine for blood pressure support.    Allergies:  Codeine    Medicines:  Current Facility-Administered Medications   Medication Dose Route Frequency Provider Last Rate Last Admin    ipratropium 0.5 mg-albuterol 2.5 mg (DUONEB) nebulizer solution 1 Dose  1 Dose Inhalation Q4H RT Gregg Michael DO        bumetanide (BUMEX) 12.5 mg in sodium chloride 0.9 % 125 mL infusion  0.2 mg/hr IntraVENous Continuous Gregg Michael DO        phenylephrine (MUMTAZ-SYNEPHRINE) 50 mg in sodium chloride 0.9 % 250 mL infusion ([id number])   mcg/min IntraVENous Continuous Gregg Michael DO        aspirin chewable tablet 81 mg  81 mg Oral Daily Gaurav Wild APRN - CNP   81 mg at 05/21/25 0755    atorvastatin (LIPITOR) tablet 80 mg  80 mg Oral Nightly Gaurav Wild APRN - CNP   80 mg at 05/20/25 2147    divalproex (DEPAKOTE) DR tablet 500 mg  500 mg Oral BID Gaurav Wild APRN - CNP   500 mg at 05/21/25 0830    levothyroxine (SYNTHROID) tablet 137 mcg  137 mcg Oral Daily  hyclate (VIBRAMYCIN) capsule 100 mg  100 mg Oral 2 times per day Aaron Alvarado MD   100 mg at 05/21/25 0830    cefTRIAXone (ROCEPHIN) 1,000 mg in sodium chloride (PF) 0.9 % 10 mL IV syringe  1,000 mg IntraVENous Q24H Aaron Alvarado MD   1,000 mg at 05/20/25 1232    norepinephrine (LEVOPHED) 16 mg in sodium chloride 0.9 % 250 mL infusion (premix)  1-100 mcg/min IntraVENous Continuous Aaron Alvarado MD 6.6 mL/hr at 05/21/25 0500 7 mcg/min at 05/21/25 0500       Past Medical History:  Past Medical History:   Diagnosis Date    Altered mental status     Arthritis     Asthma     Brief psychotic disorder (HCC)     Cerebral artery occlusion with cerebral infarction (HCC)     COPD (chronic obstructive pulmonary disease) (HCC)     Diabetes mellitus (HCC)     Epilepsy (HCC)     GERD (gastroesophageal reflux disease)     Hypertension     Palliative care patient 05/31/2022    Seizures (HCC)     Pt states last seizure was in December 2015    Thyroid disease     Unspecified convulsions (HCC)     Vascular dementia (HCC)        Past Surgical History:  Past Surgical History:   Procedure Laterality Date    ARM SURGERY Left     EYE SURGERY Right     KNEE SURGERY Right     MANDIBLE SURGERY      NH COLONOSCOPY FLX DX W/COLLJ SPEC WHEN PFRMD N/A 6/26/2018    Dr Vazquez-amelia,10 yr recall       Family History  Family History   Problem Relation Age of Onset    Breast Cancer Mother     Heart Attack Father     High Blood Pressure Sister     High Blood Pressure Brother     Colon Cancer Neg Hx     Colon Polyps Neg Hx     Liver Cancer Neg Hx     Liver Disease Neg Hx     Esophageal Cancer Neg Hx     Rectal Cancer Neg Hx     Stomach Cancer Neg Hx        Social History  Social History     Socioeconomic History    Marital status: Single     Spouse name: Not on file    Number of children: 0    Years of education: 12    Highest education level: Not on file   Occupational History    Not on file   Tobacco Use    Smoking status: Former

## 2025-05-21 NOTE — CONSULTS
Comprehensive Nutrition Assessment    Type and Reason for Visit:  Initial, Consult    Nutrition Recommendations/Plan:   Continue POC     Malnutrition Assessment:  Malnutrition Status:  At risk for malnutrition (05/21/25 1202)    Context:  Acute Illness     Findings of the 6 clinical characteristics of malnutrition:  Energy Intake:  No decrease in energy intake  Weight Loss:  No weight loss     Body Fat Loss:  No body fat loss     Muscle Mass Loss:  No muscle mass loss    Fluid Accumulation:  Moderate to Severe Generalized   Strength:  Not Performed    Nutrition Assessment:    Pt is able to tolerate a regular textured diet. Sodium diet restriction is in place d/t +4 pitting BLE edema, CHF, and  SOB. Last BM noted 5/20. Will monitor intakes and determine if nutrition intervention is needed.    Nutrition Related Findings:      Wound Type: Venous Stasis       Current Nutrition Intake & Therapies:    ADULT DIET; Regular; Low Sodium (2 gm)    Anthropometric Measures:  Height: 185.4 cm (6' 0.99\")  Ideal Body Weight (IBW): 184 lbs (84 kg)    Current Body Weight: 117 kg (257 lb 15 oz)  Current BMI (kg/m2): 34  BMI Categories: Obese Class 1 (BMI 30.0-34.9)    Estimated Daily Nutrient Needs:  Energy Requirements Based On: Kcal/kg  Weight Used for Energy Requirements: Current  Energy (kcal/day): 0571-2747 kcals/day  Weight Used for Protein Requirements: Ideal  Protein (g/day): 100-167 g/protein/day  Method Used for Fluid Requirements: 1 ml/kcal  Fluid (ml/day): 4506-8315 mL/day    Nutrition Diagnosis:   Overweight/obese related to cardiac dysfunction as evidenced by BMI    Nutrition Interventions:   Food and/or Nutrient Delivery: Continue Current Diet  Coordination of Nutrition Care: Continue to monitor while inpatient    Goals:  Goals: PO intake 50% or greater    Nutrition Monitoring and Evaluation:   Food/Nutrient Intake Outcomes: Food and Nutrient Intake  Physical Signs/Symptoms Outcomes: Biochemical Data, Nutrition  Focused Physical Findings, Weight, Skin, Fluid Status or Edema    Chely Stringer, MS, RD, LD  Contact: [phone]

## 2025-05-21 NOTE — PROGRESS NOTES
[facility]  Nurse  Consult Note       NAME:  Khris Sampson  MEDICAL RECORD NUMBER:  [id number]  AGE: 70 y.o.   GENDER: male  : 1955  TODAY'S DATE:  2025    Subjective   Reason for Wound Nurse Evaluation and Assessment: multiple blisters to RLE      Khris Sampson is a 70 y.o. male referred by:   [] Physician  [x] Nursing  [] Other:     Wound Identification:  Wound Type: venous  Contributing Factors: edema and venous stasis    Wound History: Wound care has been consulted for multiple ruptured and non-ruptured blisters to the patients right leg. Both lower extremities have 4+ pitting edema with redness and warmth noted. The anterior leg have open areas where blisters have ruptured with a moderate amount of drainage noted.     The posterior right leg has a purple colored area present. This does not appear to be from pressure but unable to determine the cause. The patient is on pressors but those have only recently been started and it is believed that the discolored area was present before that time.  Will monitor closely with daily photos and offloading.       Current Wound Care Treatment:        RIGHT LOWER LEG: Cleanse areas of ruptured blister gently with soap and water. Apply Opticell Ag to the area where blisters have ruptured and are draining and then apply Xeroform to cover the opticell ag and surrounding skin. Cover with ABD and loosely secure with roll gauze. Please allow blisters to rupture on their own and then treat as ordered.     Elevate BLE above the level of the heart at all times    Please take daily photos of the posterior right leg      Patient Goal of Care:  [x] Wound Healing  [] Odor Control  [] Palliative Care  [] Pain Control   [] Other:         PAST MEDICAL HISTORY        Diagnosis Date    Altered mental status     Arthritis     Asthma     Brief psychotic disorder (HCC)     Cerebral artery occlusion with cerebral infarction (HCC)     COPD (chronic obstructive pulmonary disease) ([facility])         INR 1.05 08/14/2023 06:41 PM     HgBA1c:    Lab Results   Component Value Date/Time    LABA1C 5.7 06/11/2024 10:00 PM       Assessment   Parth Risk Score: Parth Scale Score: 14    Patient Active Problem List   Diagnosis Code    Impetigo L01.00    Skin tear of left upper arm without complication S41.112A    Acute ischemic stroke ([facility]) I63.9    Aspiration pneumonia ([facility]) J69.0    History of stroke with residual deficit I69.30    Dysarthria R47.1    Type 2 diabetes mellitus, without long-term current use of insulin ([facility]) E11.9    Chronic obstructive pulmonary disease with acute lower respiratory infection ([facility]) J44.0    Seizure disorder ([facility]) G40.909    COPD exacerbation ([facility]) J44.1    Hypertension I10    ZARA (acute kidney injury) N17.9    Acute on chronic respiratory failure with hypoxia ([facility]) J96.21    Vitamin D deficiency E55.9    Hypothyroidism E03.9    Palliative care patient Z51.5    Chest pain, unspecified R07.9    Chest pain in adult R07.9    Peripheral edema R60.0    Hypotension I95.9    SIRS (systemic inflammatory response syndrome) ([facility]) R65.10       Measurements:  Wound 08/05/16 Skin tear Arm Right Reference 1 Right Arm MULTIPLE SCATTERED AREAS (Active)   Number of days: 3211       Wound 08/05/16 Skin tear Arm Left REFERENCE 2 LEFT ARM MULTIPLE SCATTERED AREAS (Active)   Number of days: 3211       Wound 05/21/25 Tibial Posterior;Right purple and blisters (Active)   Wound Image     05/21/25 0730   Wound Etiology Venous 05/21/25 0730   Dressing Status Other (Comment) 05/21/25 0730   Number of days: 0       Wound 05/21/25 Pretibial Right;Anterior;Lateral red and blisters, weeping (Active)   Wound Etiology Venous 05/21/25 0730   Number of days: 0            Response to treatment:  With complaints of pain.     Pain Assessment:  Severity:  6 / 10  Quality of pain: aching  Wound Pain Timing/Severity: intermittent  Premedicated: Yes    Plan   Plan of Care:      Specialty Bed Required : Yes   [x] Low Air Loss

## 2025-05-22 LAB
ANION GAP SERPL CALCULATED.3IONS-SCNC: 13 MMOL/L (ref 8–16)
BASOPHILS # BLD: 0 K/UL (ref 0–0.2)
BASOPHILS NFR BLD: 0 % (ref 0–1)
BNP BLD-MCNC: 2470 PG/ML (ref 0–124)
BUN SERPL-MCNC: 44 MG/DL (ref 8–23)
CALCIUM SERPL-MCNC: 8.5 MG/DL (ref 8.8–10.2)
CHLORIDE SERPL-SCNC: 102 MMOL/L (ref 98–107)
CO2 SERPL-SCNC: 28 MMOL/L (ref 22–29)
CREAT SERPL-MCNC: 1.7 MG/DL (ref 0.7–1.2)
EOSINOPHIL # BLD: 0 K/UL (ref 0–0.6)
EOSINOPHIL NFR BLD: 0 % (ref 0–5)
ERYTHROCYTE [DISTWIDTH] IN BLOOD BY AUTOMATED COUNT: 16.5 % (ref 11.5–14.5)
GLUCOSE SERPL-MCNC: 174 MG/DL (ref 70–99)
HCT VFR BLD AUTO: 39.6 % (ref 42–52)
HGB BLD-MCNC: 13 G/DL (ref 14–18)
IMM GRANULOCYTES # BLD: 0.2 K/UL
LYMPHOCYTES # BLD: 1.4 K/UL (ref 1.1–4.5)
LYMPHOCYTES NFR BLD: 9 % (ref 20–40)
MAGNESIUM SERPL-MCNC: 2.2 MG/DL (ref 1.6–2.4)
MCH RBC QN AUTO: 29.8 PG (ref 27–31)
MCHC RBC AUTO-ENTMCNC: 32.8 G/DL (ref 33–37)
MCV RBC AUTO: 90.8 FL (ref 80–94)
MONOCYTES # BLD: 2.1 K/UL (ref 0–0.9)
MONOCYTES NFR BLD: 14 % (ref 0–10)
NEUTROPHILS # BLD: 11.7 K/UL (ref 1.5–7.5)
NEUTS BAND NFR BLD MANUAL: 19 % (ref 0–5)
NEUTS SEG NFR BLD: 58 % (ref 50–65)
PLATELET # BLD AUTO: 174 K/UL (ref 130–400)
PLATELET SLIDE REVIEW: ADEQUATE
PMV BLD AUTO: 10.5 FL (ref 9.4–12.4)
POTASSIUM SERPL-SCNC: 3.7 MMOL/L (ref 3.5–5.1)
RBC # BLD AUTO: 4.36 M/UL (ref 4.7–6.1)
SODIUM SERPL-SCNC: 143 MMOL/L (ref 136–145)
WBC # BLD AUTO: 15.2 K/UL (ref 4.8–10.8)

## 2025-05-22 PROCEDURE — 6360000002 HC RX W HCPCS: Performed by: INTERNAL MEDICINE

## 2025-05-22 PROCEDURE — 6370000000 HC RX 637 (ALT 250 FOR IP): Performed by: STUDENT IN AN ORGANIZED HEALTH CARE EDUCATION/TRAINING PROGRAM

## 2025-05-22 PROCEDURE — 6370000000 HC RX 637 (ALT 250 FOR IP)

## 2025-05-22 PROCEDURE — 36415 COLL VENOUS BLD VENIPUNCTURE: CPT

## 2025-05-22 PROCEDURE — 2500000003 HC RX 250 WO HCPCS: Performed by: INTERNAL MEDICINE

## 2025-05-22 PROCEDURE — 94760 N-INVAS EAR/PLS OXIMETRY 1: CPT

## 2025-05-22 PROCEDURE — 83735 ASSAY OF MAGNESIUM: CPT

## 2025-05-22 PROCEDURE — 6370000000 HC RX 637 (ALT 250 FOR IP): Performed by: INTERNAL MEDICINE

## 2025-05-22 PROCEDURE — 6360000002 HC RX W HCPCS

## 2025-05-22 PROCEDURE — 2580000003 HC RX 258: Performed by: INTERNAL MEDICINE

## 2025-05-22 PROCEDURE — 1200000000 HC SEMI PRIVATE

## 2025-05-22 PROCEDURE — 83880 ASSAY OF NATRIURETIC PEPTIDE: CPT

## 2025-05-22 PROCEDURE — 94660 CPAP INITIATION&MGMT: CPT

## 2025-05-22 PROCEDURE — 80048 BASIC METABOLIC PNL TOTAL CA: CPT

## 2025-05-22 PROCEDURE — 92522 EVALUATE SPEECH PRODUCTION: CPT

## 2025-05-22 PROCEDURE — 6360000002 HC RX W HCPCS: Performed by: STUDENT IN AN ORGANIZED HEALTH CARE EDUCATION/TRAINING PROGRAM

## 2025-05-22 PROCEDURE — 2700000000 HC OXYGEN THERAPY PER DAY

## 2025-05-22 PROCEDURE — 2580000003 HC RX 258: Performed by: STUDENT IN AN ORGANIZED HEALTH CARE EDUCATION/TRAINING PROGRAM

## 2025-05-22 PROCEDURE — 94640 AIRWAY INHALATION TREATMENT: CPT

## 2025-05-22 PROCEDURE — 92610 EVALUATE SWALLOWING FUNCTION: CPT

## 2025-05-22 PROCEDURE — 85025 COMPLETE CBC W/AUTO DIFF WBC: CPT

## 2025-05-22 RX ORDER — BUMETANIDE 1 MG/1
0.5 TABLET ORAL 2 TIMES DAILY
Status: DISCONTINUED | OUTPATIENT
Start: 2025-05-22 | End: 2025-05-23

## 2025-05-22 RX ORDER — LISINOPRIL 10 MG/1
5 TABLET ORAL DAILY
Status: DISCONTINUED | OUTPATIENT
Start: 2025-05-22 | End: 2025-05-31 | Stop reason: HOSPADM

## 2025-05-22 RX ORDER — CARVEDILOL 6.25 MG/1
6.25 TABLET ORAL 2 TIMES DAILY WITH MEALS
Status: DISCONTINUED | OUTPATIENT
Start: 2025-05-22 | End: 2025-05-31 | Stop reason: HOSPADM

## 2025-05-22 RX ADMIN — IPRATROPIUM BROMIDE 0.5 MG: 0.5 SOLUTION RESPIRATORY (INHALATION) at 19:08

## 2025-05-22 RX ADMIN — PANTOPRAZOLE SODIUM 40 MG: 40 TABLET, DELAYED RELEASE ORAL at 07:30

## 2025-05-22 RX ADMIN — BUDESONIDE 500 MCG: 0.5 INHALANT ORAL at 19:08

## 2025-05-22 RX ADMIN — DIVALPROEX SODIUM 500 MG: 500 TABLET, DELAYED RELEASE ORAL at 21:58

## 2025-05-22 RX ADMIN — Medication 1 EACH: at 07:30

## 2025-05-22 RX ADMIN — TAMSULOSIN HYDROCHLORIDE 0.4 MG: 0.4 CAPSULE ORAL at 07:29

## 2025-05-22 RX ADMIN — ATORVASTATIN CALCIUM 80 MG: 80 TABLET, FILM COATED ORAL at 21:59

## 2025-05-22 RX ADMIN — ASPIRIN 81 MG 81 MG: 81 TABLET ORAL at 08:52

## 2025-05-22 RX ADMIN — IPRATROPIUM BROMIDE 0.5 MG: 0.5 SOLUTION RESPIRATORY (INHALATION) at 14:10

## 2025-05-22 RX ADMIN — CARVEDILOL 6.25 MG: 6.25 TABLET, FILM COATED ORAL at 18:32

## 2025-05-22 RX ADMIN — ENOXAPARIN SODIUM 30 MG: 100 INJECTION SUBCUTANEOUS at 07:30

## 2025-05-22 RX ADMIN — IPRATROPIUM BROMIDE AND ALBUTEROL SULFATE 1 DOSE: 2.5; .5 SOLUTION RESPIRATORY (INHALATION) at 06:14

## 2025-05-22 RX ADMIN — PHENYLEPHRINE HYDROCHLORIDE 50 MCG/MIN: 10 INJECTION INTRAVENOUS at 01:09

## 2025-05-22 RX ADMIN — DOXYCYCLINE HYCLATE 100 MG: 100 CAPSULE ORAL at 21:59

## 2025-05-22 RX ADMIN — CARVEDILOL 6.25 MG: 6.25 TABLET, FILM COATED ORAL at 08:52

## 2025-05-22 RX ADMIN — RISPERIDONE 0.5 MG: 0.25 TABLET, FILM COATED ORAL at 21:59

## 2025-05-22 RX ADMIN — DOXYCYCLINE HYCLATE 100 MG: 100 CAPSULE ORAL at 07:30

## 2025-05-22 RX ADMIN — DIVALPROEX SODIUM 500 MG: 500 TABLET, DELAYED RELEASE ORAL at 07:29

## 2025-05-22 RX ADMIN — IPRATROPIUM BROMIDE AND ALBUTEROL SULFATE 1 DOSE: 2.5; .5 SOLUTION RESPIRATORY (INHALATION) at 02:06

## 2025-05-22 RX ADMIN — RISPERIDONE 0.5 MG: 0.25 TABLET, FILM COATED ORAL at 07:29

## 2025-05-22 RX ADMIN — IPRATROPIUM BROMIDE 0.5 MG: 0.5 SOLUTION RESPIRATORY (INHALATION) at 10:35

## 2025-05-22 RX ADMIN — ENOXAPARIN SODIUM 30 MG: 100 INJECTION SUBCUTANEOUS at 21:54

## 2025-05-22 RX ADMIN — LEVOTHYROXINE SODIUM 137 MCG: 0.14 TABLET ORAL at 07:29

## 2025-05-22 RX ADMIN — TAMSULOSIN HYDROCHLORIDE 0.4 MG: 0.4 CAPSULE ORAL at 21:59

## 2025-05-22 RX ADMIN — PREDNISONE 60 MG: 20 TABLET ORAL at 07:29

## 2025-05-22 RX ADMIN — BUMETANIDE 0.5 MG: 1 TABLET ORAL at 08:52

## 2025-05-22 RX ADMIN — SODIUM CHLORIDE, PRESERVATIVE FREE 1000 MG: 5 INJECTION INTRAVENOUS at 11:58

## 2025-05-22 RX ADMIN — SODIUM CHLORIDE, PRESERVATIVE FREE 10 ML: 5 INJECTION INTRAVENOUS at 21:54

## 2025-05-22 RX ADMIN — BUMETANIDE 0.5 MG: 1 TABLET ORAL at 18:32

## 2025-05-22 RX ADMIN — DILTIAZEM HYDROCHLORIDE 10 MG/HR: 5 INJECTION, SOLUTION INTRAVENOUS at 04:12

## 2025-05-22 RX ADMIN — BUDESONIDE 500 MCG: 0.5 INHALANT ORAL at 10:37

## 2025-05-22 NOTE — PROGRESS NOTES
Facility/Department: [facility] ICU   CLINICAL BEDSIDE SWALLOW EVALUATION  SPEECH PRODUCTION EVALUATION     NAME: Khris Sampson  : 1955  MRN: [id number]    ADMISSION DATE: 2025  ADMITTING DIAGNOSIS: has Impetigo; Skin tear of left upper arm without complication; Acute ischemic stroke (HCC); Aspiration pneumonia (HCC); History of stroke with residual deficit; Dysarthria; Type 2 diabetes mellitus, without long-term current use of insulin (HCC); Chronic obstructive pulmonary disease with acute lower respiratory infection (HCC); Seizure disorder (HCC); COPD exacerbation (HCC); Hypertension; ZARA (acute kidney injury); Acute on chronic respiratory failure with hypoxia (HCC); Vitamin D deficiency; Hypothyroidism; Palliative care patient; Chest pain, unspecified; Chest pain in adult; Peripheral edema; Hypotension; and SIRS (systemic inflammatory response syndrome) ([facility]) on their problem list.    Date of Eval: 2025  Evaluating Therapist: STEVE Jensen    Current Diet level:  Regular solid consistency with thin liquids    Pain:  Pain Assessment  Pain Assessment: None - Denies Pain  Pain Level: 0  Faces, Legs, Activity, Cry, and Consolability (FLACC)  Face (F): no particular expression or smile  Legs (L): normal position or relaxed  Activity (A): lying quietly, normal position, moves easily  Cry (C): no cry (awake or asleep)  Consolability (C): content, relaxed  FLACC Score : 0    Reason for Referral  Khris Sampson was referred for a bedside swallow evaluation to assess the efficiency of his swallow function, identify signs and symptoms of aspiration and make recommendations regarding safe dietary consistencies, effective compensatory strategies, and safe eating environment.    Impression  Assessed patient's swallowing function. Patient exhibited slow and decreased oral prep of more solid consistencies, slow oral transit of even blended food consistency, inconsistently fast oral transit with even thickened liquid  with provision of mod cues/prompts.     General  Chart Reviewed: Yes  Behavior/Cognition: Cooperative;Patient appeared lethargic within short period of time.  O2 Device: Nasal Cannula   Communication Observation: (Assessed patient's speech production. Patient exhibits slow, decreased labial and lingual movements with weak, mildly hoarse vocal quality. SLP ranked functional intelligibility of speech for unfamiliar listeners at 50-60% in utterances with background noise present.)  Follows Directions: Delayed and decreased in modeling simple 1 step commands.  Dentition: No upper dentition/dentures; some missing bottom dentition  Patient Positioning: Upright in bed  Consistencies Administered: Ice chips;Dysphagia Pureed (Dysphagia I);Honey - straw;Nectar - straw     Oral Motor Examination   Labial ROM: (Decreased, bilaterally, during labial retraction trials and labial protrusion trials.)  Labial Strength: (Decreased during labial compression trials.)  Labial Coordination: (Slowed movements.)  Lingual ROM: (Decreased during lingual extension trials with no full point achieved; decreased during lingual elevation trials without use of accessory jaw movement; decreased movements bilaterally.)  Lingual Strength: (Decreased)  Lingual Coordination: (Slowed movements.)     Assessed patient's swallowing function with the following observations noted:     Oral Phase  Mastication: Ice chips (Patient exhibited slow, decreased vertical jaw movement at the front of the mouth during oral prep of ice chip trials presented by SLP.)  Suspected Premature Bolus Loss: Ice chips;Puree;Honey - straw;Nectar - straw (Patient exhibited inconsistently fast oral transit of ice chip trials. Patient exhibited slow oral transit of puree consistency trials presented by SLP. Patient exhibited inconsistently fast oral transit of honey thick liquid trials and nectar thick liquid trials presented via straw by SLP.)  Oral Phase - Comment: Min puree

## 2025-05-22 NOTE — PROGRESS NOTES
Khris Sampson received from ICU to room # 429 .  Mental Status: Patient is oriented, alert, coherent, thought processes intact, and able to concentrate and follow conversation.   Vitals:    05/22/25 1700   BP: 113/68   Pulse: 93   Resp: 20   Temp: 99.3 °F (37.4 °C)   SpO2: 92%     Placed on cardiac monitor: Yes. Box # MX- .  Belongings: Glasses with patient at bedside .  Family at bedside Yes.  Oriented Patient and Family to room.  Call light within reach. Yes.  Transfer was: Well tolerated by patient..    Electronically signed by Eron Bender LPN on 5/22/2025 at 5:35 PM

## 2025-05-22 NOTE — PLAN OF CARE
Problem: Safety - Adult  Goal: Free from fall injury  Outcome: Progressing  Flowsheets (Taken 5/22/2025 0900)  Free From Fall Injury: Instruct family/caregiver on patient safety     Problem: Chronic Conditions and Co-morbidities  Goal: Patient's chronic conditions and co-morbidity symptoms are monitored and maintained or improved  Outcome: Progressing  Flowsheets (Taken 5/22/2025 0800)  Care Plan - Patient's Chronic Conditions and Co-Morbidity Symptoms are Monitored and Maintained or Improved: Monitor and assess patient's chronic conditions and comorbid symptoms for stability, deterioration, or improvement     Problem: Discharge Planning  Goal: Discharge to home or other facility with appropriate resources  5/22/2025 0958 by Prudence Johnson, RN  Outcome: Progressing  Flowsheets (Taken 5/22/2025 0800)  Discharge to home or other facility with appropriate resources: Identify barriers to discharge with patient and caregiver  5/22/2025 0419 by Mona Alex RN  Outcome: Progressing     Problem: Skin/Tissue Integrity  Goal: Skin integrity remains intact  Description: 1.  Monitor for areas of redness and/or skin breakdown2.  Assess vascular access sites hourly3.  Every 4-6 hours minimum:  Change oxygen saturation probe site4.  Every 4-6 hours:  If on nasal continuous positive airway pressure, respiratory therapy assess nares and determine need for appliance change or resting period  5/22/2025 0958 by Prudence Johnson, RN  Outcome: Progressing  Flowsheets  Taken 5/22/2025 0900  Skin Integrity Remains Intact: Monitor for areas of redness and/or skin breakdown  Taken 5/22/2025 0800  Skin Integrity Remains Intact: Monitor for areas of redness and/or skin breakdown  5/22/2025 0419 by Mona Alex RN  Outcome: Not Progressing  Flowsheets (Taken 5/21/2025 2000)  Skin Integrity Remains Intact:   Monitor for areas of redness and/or skin breakdown   Turn and reposition as indicated     Problem: Nutrition Deficit:  Goal:

## 2025-05-22 NOTE — PROGRESS NOTES
Patient sent with bag of belongings.  Sister has his glasses and she is at bedside. Telemetry applied to patient

## 2025-05-22 NOTE — PROGRESS NOTES
Hospitalist Progress Note    Patient:  Khris Sampson  YOB: 1955  Date of Service: 5/22/2025  MRN: [id number]   Acct: [id number]   Primary Care Physician: Nader Gordillo MD  Advance Directive: Full Code  Admit Date: 5/20/2025       Hospital Day: 2  Referring Provider: Gregg Michael DO    Patient Seen, Chart, Consults, Notes, Labs, Radiology studies reviewed.    Subjective:  Khris Sampson is a 70 y.o. male  whom we are following for hypoxemic respiratory failure, decompensated diastolic heart failure, prior ischemic stroke.  He was able to come off BiPAP overnight.  He remains in a negative fluid balance.  Hemodynamics are stable.  He is stable for transfer out of ICU.    Allergies:  Codeine    Medicines:  Current Facility-Administered Medications   Medication Dose Route Frequency Provider Last Rate Last Admin    carvedilol (COREG) tablet 6.25 mg  6.25 mg Oral BID WC Gregg Michael DO        lisinopril (PRINIVIL;ZESTRIL) tablet 5 mg  5 mg Oral Daily Gregg Michael DO        bumetanide (BUMEX) tablet 0.5 mg  0.5 mg Oral BID Gregg Michael DO        ipratropium (ATROVENT) 0.02 % nebulizer solution 0.5 mg  0.5 mg Nebulization 4x Daily RT Gregg Michael DO        xeroform petrolatum gauze 5\"X9\" external pads 1 each  1 each Topical Daily Gregg Michael DO   1 each at 05/22/25 0730    aspirin chewable tablet 81 mg  81 mg Oral Daily Gaurav Wild APRN - CNP   81 mg at 05/21/25 0755    atorvastatin (LIPITOR) tablet 80 mg  80 mg Oral Nightly Gaurav Wild APRN - CNP   80 mg at 05/21/25 2048    divalproex (DEPAKOTE) DR tablet 500 mg  500 mg Oral BID Gaurav Wild APRN - CNP   500 mg at 05/22/25 0729    levothyroxine (SYNTHROID) tablet 137 mcg  137 mcg Oral Daily Gaurav Wild APRN - CNP   137 mcg at 05/22/25 0729    pantoprazole (PROTONIX) tablet 40 mg  40 mg Oral QAM AC Wild, Gaurav M, APRN - CNP   40 mg at 05/22/25 0730    risperiDONE (RisperDAL) tablet 0.5 mg   hours.  Ionized Calcium:Invalid input(s): \"IONCA\"  Magnesium:  Recent Labs     05/21/25  0149 05/22/25  0254   MG 1.7 2.2     Phosphorus:No results for input(s): \"PHOS\" in the last 72 hours.  HgbA1C: No results for input(s): \"LABA1C\" in the last 72 hours.  Hepatic:   Recent Labs     05/20/25  0315 05/20/25  1700 05/21/25  0149   ALKPHOS 47 35* 30*   ALT 17 16 18   AST 17 45 85*   BILITOT 0.5 1.0 0.8   BILIDIR  --   --  0.4*     Lactic Acid:   Recent Labs     05/21/25  0758   LACTA 2.5*     Troponin: No results for input(s): \"CKTOTAL\", \"CKMB\", \"TROPONINT\" in the last 72 hours.  ABGs: No results for input(s): \"PH\", \"PCO2\", \"PO2\", \"HCO3\", \"O2SAT\" in the last 72 hours.  CRP:  No results for input(s): \"CRP\" in the last 72 hours.  Sed Rate:  No results for input(s): \"SEDRATE\" in the last 72 hours.    Cultures:   No results for input(s): \"CULTURE\" in the last 72 hours.  Recent Labs     05/20/25  1650 05/20/25  1700   BC No Growth to date.  Any change in status will be called.  --    BLOODCULT2  --  No Growth to date.  Any change in status will be called.     No results for input(s): \"CXSURG\" in the last 72 hours.    Radiology reports as per the Radiologist  Radiology: Echo (TTE) complete (PRN contrast/bubble/strain/3D)  Result Date: 5/20/2025    Left Ventricle: Normal left ventricular systolic function. EF by visual approximation is 60%. Left ventricle size is normal. Normal wall thickness. Normal wall motion.   Right Ventricle: Right ventricle size is normal. Unable to assess systolic function.   Pericardium: Small (<1 cm) pericardial effusion present. No indication of cardiac tamponade.   Image quality is technically difficult. Contrast used: Lumason. Technically difficult study.     XR CHEST PORTABLE  Result Date: 5/20/2025  EXAM: XR CHEST PORTABLE  TECHNIQUE: Single frontal chest radiograph.  HISTORY: shortness of breath  COMPARISON: June 23, 2024.  FINDINGS: Atelectasis and/or infiltrate is demonstrated at the left

## 2025-05-22 NOTE — CARE COORDINATION
Pt is from [facility], has a bed hold and can return when medically stable.  No precert required.  [facility]  Ph: [phone]  F: [phone]  Referrals Fax: [phone]  Pharmacy:  [facility]  Electronically signed by Tami Trinidad RN on 5/22/2025 at 11:14 AM

## 2025-05-23 ENCOUNTER — HOSPITAL ENCOUNTER (INPATIENT)
Dept: VASCULAR LAB | Age: 70
Discharge: HOME | End: 2025-05-25
Payer: MEDICARE

## 2025-05-23 PROBLEM — I95.9 HYPOTENSION: Status: RESOLVED | Noted: 2025-05-20 | Resolved: 2025-05-23

## 2025-05-23 PROBLEM — I50.31 ACUTE HEART FAILURE WITH PRESERVED EJECTION FRACTION (HCC): Status: ACTIVE | Noted: 2025-05-23

## 2025-05-23 LAB
ALBUMIN SERPL-MCNC: 2.6 G/DL (ref 3.5–5.2)
ALP SERPL-CCNC: 75 U/L (ref 40–129)
ALT SERPL-CCNC: 24 U/L (ref 10–50)
ANION GAP SERPL CALCULATED.3IONS-SCNC: 14 MMOL/L (ref 8–16)
AST SERPL-CCNC: 42 U/L (ref 10–50)
BASOPHILS # BLD: 0 K/UL (ref 0–0.2)
BASOPHILS NFR BLD: 0.2 % (ref 0–1)
BILIRUB SERPL-MCNC: 0.6 MG/DL (ref 0.2–1.2)
BUN SERPL-MCNC: 48 MG/DL (ref 8–23)
CALCIUM SERPL-MCNC: 9 MG/DL (ref 8.8–10.2)
CHLORIDE SERPL-SCNC: 99 MMOL/L (ref 98–107)
CO2 SERPL-SCNC: 28 MMOL/L (ref 22–29)
CREAT SERPL-MCNC: 1.4 MG/DL (ref 0.7–1.2)
EOSINOPHIL # BLD: 0 K/UL (ref 0–0.6)
EOSINOPHIL NFR BLD: 0.2 % (ref 0–5)
ERYTHROCYTE [DISTWIDTH] IN BLOOD BY AUTOMATED COUNT: 16.3 % (ref 11.5–14.5)
GLUCOSE SERPL-MCNC: 128 MG/DL (ref 70–99)
HCT VFR BLD AUTO: 37.4 % (ref 42–52)
HGB BLD-MCNC: 12.4 G/DL (ref 14–18)
IMM GRANULOCYTES # BLD: 0.2 K/UL
LYMPHOCYTES # BLD: 1.7 K/UL (ref 1.1–4.5)
LYMPHOCYTES NFR BLD: 7.8 % (ref 20–40)
MAGNESIUM SERPL-MCNC: 2.5 MG/DL (ref 1.6–2.4)
MCH RBC QN AUTO: 29.8 PG (ref 27–31)
MCHC RBC AUTO-ENTMCNC: 33.2 G/DL (ref 33–37)
MCV RBC AUTO: 89.9 FL (ref 80–94)
MONOCYTES # BLD: 2 K/UL (ref 0–0.9)
MONOCYTES NFR BLD: 9 % (ref 0–10)
NEUTROPHILS # BLD: 18.2 K/UL (ref 1.5–7.5)
NEUTS SEG NFR BLD: 82 % (ref 50–65)
PHOSPHATE SERPL-MCNC: 2.3 MG/DL (ref 2.5–4.5)
PLATELET # BLD AUTO: 159 K/UL (ref 130–400)
PMV BLD AUTO: 11.5 FL (ref 9.4–12.4)
POTASSIUM SERPL-SCNC: 3.6 MMOL/L (ref 3.5–5.1)
PROT SERPL-MCNC: 6 G/DL (ref 6.4–8.3)
RBC # BLD AUTO: 4.16 M/UL (ref 4.7–6.1)
SODIUM SERPL-SCNC: 141 MMOL/L (ref 136–145)
WBC # BLD AUTO: 22.2 K/UL (ref 4.8–10.8)

## 2025-05-23 PROCEDURE — 85025 COMPLETE CBC W/AUTO DIFF WBC: CPT

## 2025-05-23 PROCEDURE — 6360000002 HC RX W HCPCS: Performed by: INTERNAL MEDICINE

## 2025-05-23 PROCEDURE — 94640 AIRWAY INHALATION TREATMENT: CPT

## 2025-05-23 PROCEDURE — 93971 EXTREMITY STUDY: CPT

## 2025-05-23 PROCEDURE — 94660 CPAP INITIATION&MGMT: CPT

## 2025-05-23 PROCEDURE — 6370000000 HC RX 637 (ALT 250 FOR IP): Performed by: INTERNAL MEDICINE

## 2025-05-23 PROCEDURE — 6370000000 HC RX 637 (ALT 250 FOR IP)

## 2025-05-23 PROCEDURE — 84100 ASSAY OF PHOSPHORUS: CPT

## 2025-05-23 PROCEDURE — 83735 ASSAY OF MAGNESIUM: CPT

## 2025-05-23 PROCEDURE — 94760 N-INVAS EAR/PLS OXIMETRY 1: CPT

## 2025-05-23 PROCEDURE — 6360000002 HC RX W HCPCS

## 2025-05-23 PROCEDURE — 2580000003 HC RX 258: Performed by: INTERNAL MEDICINE

## 2025-05-23 PROCEDURE — 80053 COMPREHEN METABOLIC PANEL: CPT

## 2025-05-23 PROCEDURE — 1200000000 HC SEMI PRIVATE

## 2025-05-23 PROCEDURE — 2500000003 HC RX 250 WO HCPCS: Performed by: INTERNAL MEDICINE

## 2025-05-23 PROCEDURE — 2700000000 HC OXYGEN THERAPY PER DAY

## 2025-05-23 RX ORDER — BUMETANIDE 0.25 MG/ML
1 INJECTION, SOLUTION INTRAMUSCULAR; INTRAVENOUS 2 TIMES DAILY
Status: DISCONTINUED | OUTPATIENT
Start: 2025-05-23 | End: 2025-05-25

## 2025-05-23 RX ORDER — ENOXAPARIN SODIUM 150 MG/ML
1 INJECTION SUBCUTANEOUS 2 TIMES DAILY
Status: DISCONTINUED | OUTPATIENT
Start: 2025-05-23 | End: 2025-05-28

## 2025-05-23 RX ORDER — DIVALPROEX SODIUM 125 MG/1
125 CAPSULE, COATED PELLETS ORAL EVERY 8 HOURS SCHEDULED
Status: DISCONTINUED | OUTPATIENT
Start: 2025-05-23 | End: 2025-05-31 | Stop reason: HOSPADM

## 2025-05-23 RX ADMIN — ENOXAPARIN SODIUM 30 MG: 100 INJECTION SUBCUTANEOUS at 10:35

## 2025-05-23 RX ADMIN — ASPIRIN 81 MG 81 MG: 81 TABLET ORAL at 10:36

## 2025-05-23 RX ADMIN — DIVALPROEX SODIUM 125 MG: 125 CAPSULE, COATED PELLETS ORAL at 21:08

## 2025-05-23 RX ADMIN — DIVALPROEX SODIUM 125 MG: 125 CAPSULE, COATED PELLETS ORAL at 16:06

## 2025-05-23 RX ADMIN — TAMSULOSIN HYDROCHLORIDE 0.4 MG: 0.4 CAPSULE ORAL at 10:37

## 2025-05-23 RX ADMIN — PREDNISONE 60 MG: 20 TABLET ORAL at 10:36

## 2025-05-23 RX ADMIN — TAMSULOSIN HYDROCHLORIDE 0.4 MG: 0.4 CAPSULE ORAL at 21:08

## 2025-05-23 RX ADMIN — BUMETANIDE 0.5 MG: 1 TABLET ORAL at 10:36

## 2025-05-23 RX ADMIN — DOXYCYCLINE HYCLATE 100 MG: 100 CAPSULE ORAL at 21:08

## 2025-05-23 RX ADMIN — CARVEDILOL 6.25 MG: 6.25 TABLET, FILM COATED ORAL at 10:46

## 2025-05-23 RX ADMIN — CARVEDILOL 6.25 MG: 6.25 TABLET, FILM COATED ORAL at 16:14

## 2025-05-23 RX ADMIN — RISPERIDONE 0.5 MG: 0.25 TABLET, FILM COATED ORAL at 10:36

## 2025-05-23 RX ADMIN — BUDESONIDE 500 MCG: 0.5 INHALANT ORAL at 19:50

## 2025-05-23 RX ADMIN — IPRATROPIUM BROMIDE 0.5 MG: 0.5 SOLUTION RESPIRATORY (INHALATION) at 16:06

## 2025-05-23 RX ADMIN — RISPERIDONE 0.5 MG: 0.25 TABLET, FILM COATED ORAL at 21:08

## 2025-05-23 RX ADMIN — LISINOPRIL 5 MG: 10 TABLET ORAL at 10:36

## 2025-05-23 RX ADMIN — ATORVASTATIN CALCIUM 80 MG: 80 TABLET, FILM COATED ORAL at 21:08

## 2025-05-23 RX ADMIN — ENOXAPARIN SODIUM 120 MG: 120 INJECTION SUBCUTANEOUS at 21:08

## 2025-05-23 RX ADMIN — Medication 1 EACH: at 16:06

## 2025-05-23 RX ADMIN — IPRATROPIUM BROMIDE 0.5 MG: 0.5 SOLUTION RESPIRATORY (INHALATION) at 12:11

## 2025-05-23 RX ADMIN — IPRATROPIUM BROMIDE 0.5 MG: 0.5 SOLUTION RESPIRATORY (INHALATION) at 08:24

## 2025-05-23 RX ADMIN — PANTOPRAZOLE SODIUM 40 MG: 40 TABLET, DELAYED RELEASE ORAL at 05:44

## 2025-05-23 RX ADMIN — POLYETHYLENE GLYCOL 3350 17 G: 17 POWDER, FOR SOLUTION ORAL at 11:14

## 2025-05-23 RX ADMIN — SODIUM CHLORIDE, PRESERVATIVE FREE 10 ML: 5 INJECTION INTRAVENOUS at 10:37

## 2025-05-23 RX ADMIN — SODIUM CHLORIDE, PRESERVATIVE FREE 10 ML: 5 INJECTION INTRAVENOUS at 21:09

## 2025-05-23 RX ADMIN — BUMETANIDE 1 MG: 0.25 INJECTION INTRAMUSCULAR; INTRAVENOUS at 11:14

## 2025-05-23 RX ADMIN — BUMETANIDE 1 MG: 0.25 INJECTION INTRAMUSCULAR; INTRAVENOUS at 16:13

## 2025-05-23 RX ADMIN — DOXYCYCLINE HYCLATE 100 MG: 100 CAPSULE ORAL at 10:36

## 2025-05-23 RX ADMIN — BUDESONIDE 500 MCG: 0.5 INHALANT ORAL at 08:24

## 2025-05-23 RX ADMIN — LEVOTHYROXINE SODIUM 137 MCG: 0.14 TABLET ORAL at 05:44

## 2025-05-23 RX ADMIN — IPRATROPIUM BROMIDE 0.5 MG: 0.5 SOLUTION RESPIRATORY (INHALATION) at 19:50

## 2025-05-23 RX ADMIN — TIZANIDINE 4 MG: 4 TABLET ORAL at 16:13

## 2025-05-23 RX ADMIN — SODIUM CHLORIDE, PRESERVATIVE FREE 1000 MG: 5 INJECTION INTRAVENOUS at 10:35

## 2025-05-23 NOTE — PROGRESS NOTES
[facility]      Patient:  Khris Sampson  YOB: 1955  Date of Service: 5/23/2025  MRN: [id number]   Acct: [id number]   Primary Care Physician: Nader Gordillo MD  Advance Directive: Full Code  Admit Date: 5/20/2025       Hospital Day: 3  Portions of this note have been copied forward, however, changed to reflect the most current clinical status of this patient    CHIEF COMPLAINT shortness of breath     SUBJECTIVE:  continues to endorse shortness of breath, lower extremity edema     Cumulative hospital course   The patient is a 70 y.o. male with CVA right sided residual, seizure disorder, chronic hypoxic respiratory failure on continuous supplemental oxygen 2L NC, CKD 3a, HTN  complaining of shortness of breath. [facility] resident. Patient admitted to [facility] 4/3 due to acute respiratory failure due to left lower lobe pneumonia, initiated on BIPAP support. In addition found to have ZARA. Received broad spectrum antibiotics and IV hydration. Discharged in stable condition on 4/9.   Presented to [facility] ER due to shortness of breath.  Patient states that he has had ongoing dyspnea at rest for the past month.  States that he has noted bilateral lower extremity edema worsening over the past several weeks and presented today due to worsening dyspnea. Has had diffuse wheezes, generalized weakness and fatigue. Work up in ER CTA pulmonary no pulmonary emboli, small pericardial effusion, no consolidation, troponin 197, . ABG PH 7.54 PCO2 35 PO2 76 HCO3 29.9, on 3L NC. Initially received Lasix 80 mg IV while in ER. Rapid response called at 1100 due to hypotension. Patient remained alert and oriented able to follow commands.  cc fluid bolus given, initiated on Levophed. IV antibiotics Rocephin, Doxycyline initiated for concern of COPD exacerbation. Plan to monitor closely in unit. Overnight required BIPAP support. Initiated on Bumex drip, has had net 7L removed. PAF  converted to NSR added  input(s): \"BNP\" in the last 72 hours.  INR: No results for input(s): \"INR\" in the last 72 hours.  UA:No results for input(s): \"NITRITE\", \"COLORU\", \"PHUR\", \"LABCAST\", \"WBCUA\", \"RBCUA\", \"MUCUS\", \"TRICHOMONAS\", \"YEAST\", \"BACTERIA\", \"CLARITYU\", \"SPECGRAV\", \"LEUKOCYTESUR\", \"UROBILINOGEN\", \"BILIRUBINUR\", \"BLOODU\", \"GLUCOSEU\", \"AMORPHOUS\" in the last 72 hours.    Invalid input(s): \"KETONESU\"  A1C: No results for input(s): \"LABA1C\" in the last 72 hours.  ABG:  Recent Labs     05/20/25  1150   PHART 7.540*   HCI7WTZ 35.0   PO2ART 76.0*   DSK2XXK 29.9*   BEART 7.2*   HGBAE 14.0   Z2BANGPY 94.2   CARBOXHGBART 1.7       RAD:   XR CHEST PORTABLE  Result Date: 5/20/2025   atelectasis and/or infiltrate at the left lung base   ______________________________________ Electronically signed by: STEPHANIE BARRETT M.D. Date:     05/20/2025 Time:    06:59     CTA PULMONARY W CONTRAST  Result Date: 5/20/2025   Limited evaluation of the pulmonary arteries without central embolus.  Coronary ASVD.  Small pericardial effusion  Hiatal hernia  All CT scans are performed using dose optimization techniques as appropriate to the performed exam and include at least one of the following: Automated exposure control, adjustment of the mA and/or kV according to size, and the use of iterative reconstruction technique.  ______________________________________ Electronically signed by: LEIGHANN HERNANDEZ M.D. Date:     05/20/2025 Time:    06:40        Echo: Left Ventricle: Normal left ventricular systolic function. EF by visual approximation is 60%. Left ventricle size is normal. Normal wall thickness. Normal wall motion.    Right Ventricle: Right ventricle size is normal. Unable to assess systolic function.    Pericardium: Small (<1 cm) pericardial effusion present. No indication of cardiac tamponade.    Image quality is technically difficult. Contrast used: Lumason. Technically difficult study.  Assessment/Plan   Principal Problem:   Acute hypoxemic respiratory  failure/ COPD exacerbation   - IV Abx Rocephin, Doxycycline               - Bronchodilators               -prednisone                - Supplemental oxygen as needed               - Incentive spirometry               - Encourage deep breathing and cough   Active Problems:  Acute heart failure with preserved ejection fraction   - ECHO EF 60%, normal wall motion, small pericardial effusion              - IV bumex twice daily    Net output 7L               - Low-sodium diet              - Monitor I's and O's closely              - Daily weights              - Monitor on telemetry  Paroxysmal atrial fibrillation    Currently NSR    Coreg twice daily    Monitor on telemetry    CHADs-VASc 6   Full dose lovenox   CKD 3a   Currently at baseline               - Monitor I's and O's closely              - Monitor labs closely              - Avoid hypotension              - Avoid nephrotoxic agents    Hypothyroidism   TSH 0.273    Synthroid     Type 2 diabetes mellitus, without long-term current use of insulin  -Accucheck  -A1c  -Sliding scale insulin   -Hypoglycemia protocol as warranted      Seizure disorder   Depakote     Hypertension   Currently 114/74   Lisinopril    Monitor vital signs   Hyperlipidemia   Statin therapy    Worsening right lower extremity edema    Venous duplex Rule out DVT     Antibiotic: Rocephin & Doxycycline      DVT Prophylaxis: Lovenox       KEEGAN Barnett - CNP, 5/23/2025 11:47 AM

## 2025-05-23 NOTE — PROGRESS NOTES
DIS Nurse Note  SUBJECTIVE: Patient assessed secondary to elevated Deterioration Index Score.      Deterioration Index Score:  Predictive Model Details          53 (Warning)  Factor Value    Calculated 5/23/2025 10:24 21% Age 70 years old    Deterioration Index Model 21% Supplemental oxygen Oxygen     17% Neurological exam X     12% [place] coma scale 14     10% Respiratory rate 20     7% WBC count abnormal (22.2 K/uL)     4% Blood pH abnormal (7.540)     2% Hematocrit abnormal (37.4 %)     2% BUN abnormal (48 mg/dL)     1% Pulse 90     1% Systolic 107     1% Potassium 3.6 mmol/L     0% Sodium 141 mmol/L     0% Temperature 98 °F (36.7 °C)     0% Pulse oximetry 96 %        Vital Signs:  Vitals:    05/22/25 2130 05/23/25 0113 05/23/25 0515 05/23/25 0825   BP: 107/76 99/64 107/69    Pulse: 83 91 90    Resp: 20 18 20    Temp: 98.1 °F (36.7 °C) 98.6 °F (37 °C) 98 °F (36.7 °C)    TempSrc: Temporal  Temporal    SpO2: 95% 93% 91% 96%   Weight:       Height:            Provider Notified: Reviewed patient status  & DIS score with Provider Gaurav Wild    ASSESSMENT:  Transfer from ICU prior    Plan of Care: TBD    Electronically signed by Shell Matute RN

## 2025-05-23 NOTE — CARE COORDINATION
05/23/25 0914   IMM Letter   IMM Letter given to Patient/Family/Significant other/Guardian/POA/by: Abraham Palencia   IMM Letter date given: 05/23/25   IMM Letter time given: 0913     Patient declined waiting 4 hr period prior to discharge.  Second IMM given to patient and explained with patient verbalizing understanding.  All questions and concerns addressed     Signed letter placed in pt soft chart  Electronically signed by LAZARO GODFREY on 5/23/2025 at 9:14 AM

## 2025-05-23 NOTE — PLAN OF CARE
Problem: Safety - Adult  Goal: Free from fall injury  5/22/2025 2251 by Tami Go RN  Outcome: Progressing  5/22/2025 0958 by Prudence Johnson RN  Outcome: Progressing  Flowsheets (Taken 5/22/2025 0900)  Free From Fall Injury: Instruct family/caregiver on patient safety     Problem: Chronic Conditions and Co-morbidities  Goal: Patient's chronic conditions and co-morbidity symptoms are monitored and maintained or improved  5/22/2025 2251 by Tami Go RN  Outcome: Progressing  5/22/2025 0958 by Prudence Johnson RN  Outcome: Progressing  Flowsheets (Taken 5/22/2025 0800)  Care Plan - Patient's Chronic Conditions and Co-Morbidity Symptoms are Monitored and Maintained or Improved: Monitor and assess patient's chronic conditions and comorbid symptoms for stability, deterioration, or improvement     Problem: Discharge Planning  Goal: Discharge to home or other facility with appropriate resources  5/22/2025 2251 by Tami Go RN  Outcome: Progressing  5/22/2025 0958 by Prudence Johnson RN  Outcome: Progressing  Flowsheets (Taken 5/22/2025 0800)  Discharge to home or other facility with appropriate resources: Identify barriers to discharge with patient and caregiver     Problem: Skin/Tissue Integrity  Goal: Skin integrity remains intact  5/22/2025 2251 by Tami Go RN  Outcome: Progressing  5/22/2025 0958 by Prudence Johnson RN  Outcome: Progressing  Flowsheets  Taken 5/22/2025 0900  Skin Integrity Remains Intact: Monitor for areas of redness and/or skin breakdown  Taken 5/22/2025 0800  Skin Integrity Remains Intact: Monitor for areas of redness and/or skin breakdown     Problem: Nutrition Deficit:  Goal: Optimize nutritional status  5/22/2025 2251 by Tami Go RN  Outcome: Progressing  5/22/2025 0958 by Prudence Johnson RN  Outcome: Progressing

## 2025-05-23 NOTE — PROGRESS NOTES
Nutrition Assessment     Type and Reason for Visit: Reassess    Nutrition Recommendations/Plan:   Gelatein at breakfast, Magic Cup at lunch and dinner     Malnutrition Assessment:  Malnutrition Status: At risk for malnutrition    Nutrition Assessment:  Intakes noted to be ranging from 1-25%. Weight noted to fluctuate and edema noted to be decreased. Pt is on diuretic. Will add Magic Cup w/ lunch and dinner and Gelatein at breakfast to help promote improved intake.    Estimated Daily Nutrient Needs:  Energy (kcal):  9990-7265 kcals/day Weight Used for Energy Requirements: Current     Protein (g):  100-167 g/protein/day Weight Used for Protein Requirements: Ideal        Fluid (ml/day):  4952-6829 mL/day Method Used for Fluid Requirements: 1 ml/kcal    Nutrition Related Findings:   BUN 48, Cr 1.4, GFR 55, Glu 128-174, Phos 2.3, BM 5-22, +2 BLE edema Wound Type: Venous Stasis    Current Nutrition Therapies:    ADULT DIET; Dysphagia - Pureed; Low Sodium (2 gm); Moderately Thick (Honey)    Anthropometric Measures:  Height: 185.4 cm (6' 0.99\")  Current Body Wt: 117 kg (257 lb 15 oz)   BMI: 34      Nutrition Diagnosis:   in context of acute illness or injury related to inadequate protein-energy intake as evidenced by swallow study results, intake 0-25%    Nutrition Interventions:   Food and/or Nutrient Delivery: Continue Current Diet, Start Oral Nutrition Supplement  Nutrition Education/Counseling: No recommendation at this time  Coordination of Nutrition Care: Continue to monitor while inpatient     Goals:  Goals: PO intake 50% or greater  Type of Goal: Continue current goal  Previous Goal Met: No Progress toward Goal(s)    Nutrition Monitoring and Evaluation:   Behavioral-Environmental Outcomes: None Identified  Food/Nutrient Intake Outcomes: Food and Nutrient Intake, Supplement Intake  Physical Signs/Symptoms Outcomes: Biochemical Data, Chewing or Swallowing, Fluid Status or Edema, Skin, Weight    Discharge Planning:

## 2025-05-24 LAB
ALBUMIN SERPL-MCNC: 2.6 G/DL (ref 3.5–5.2)
ALP SERPL-CCNC: 52 U/L (ref 40–129)
ALT SERPL-CCNC: 25 U/L (ref 10–50)
ANION GAP SERPL CALCULATED.3IONS-SCNC: 13 MMOL/L (ref 8–16)
AST SERPL-CCNC: 39 U/L (ref 10–50)
BACTERIA URNS QL MICRO: NEGATIVE /HPF
BASOPHILS # BLD: 0 K/UL (ref 0–0.2)
BASOPHILS NFR BLD: 0.3 % (ref 0–1)
BILIRUB SERPL-MCNC: 0.5 MG/DL (ref 0.2–1.2)
BILIRUB UR QL STRIP: NEGATIVE
BNP BLD-MCNC: 1027 PG/ML (ref 0–124)
BUN SERPL-MCNC: 47 MG/DL (ref 8–23)
CALCIUM SERPL-MCNC: 9 MG/DL (ref 8.8–10.2)
CHLORIDE SERPL-SCNC: 96 MMOL/L (ref 98–107)
CLARITY UR: ABNORMAL
CO2 SERPL-SCNC: 27 MMOL/L (ref 22–29)
COLOR UR: ABNORMAL
CREAT SERPL-MCNC: 1.1 MG/DL (ref 0.7–1.2)
CRYSTALS URNS MICRO: ABNORMAL /HPF
EOSINOPHIL # BLD: 0 K/UL (ref 0–0.6)
EOSINOPHIL NFR BLD: 0.1 % (ref 0–5)
EPI CELLS #/AREA URNS AUTO: 0 /HPF (ref 0–5)
ERYTHROCYTE [DISTWIDTH] IN BLOOD BY AUTOMATED COUNT: 16.5 % (ref 11.5–14.5)
GLUCOSE SERPL-MCNC: 162 MG/DL (ref 70–99)
GLUCOSE UR STRIP.AUTO-MCNC: NEGATIVE MG/DL
HCT VFR BLD AUTO: 36.7 % (ref 42–52)
HCT VFR BLD AUTO: 36.9 % (ref 42–52)
HCT VFR BLD AUTO: 37 % (ref 42–52)
HGB BLD-MCNC: 12.2 G/DL (ref 14–18)
HGB BLD-MCNC: 12.5 G/DL (ref 14–18)
HGB BLD-MCNC: 12.8 G/DL (ref 14–18)
HGB UR STRIP.AUTO-MCNC: ABNORMAL MG/L
HYALINE CASTS #/AREA URNS AUTO: 1 /HPF (ref 0–8)
IMM GRANULOCYTES # BLD: 0.2 K/UL
KETONES UR STRIP.AUTO-MCNC: NEGATIVE MG/DL
LEUKOCYTE ESTERASE UR QL STRIP.AUTO: ABNORMAL
LYMPHOCYTES # BLD: 1.7 K/UL (ref 1.1–4.5)
LYMPHOCYTES NFR BLD: 10.5 % (ref 20–40)
MAGNESIUM SERPL-MCNC: 2.5 MG/DL (ref 1.6–2.4)
MAGNESIUM SERPL-MCNC: 2.6 MG/DL (ref 1.6–2.4)
MCH RBC QN AUTO: 30 PG (ref 27–31)
MCHC RBC AUTO-ENTMCNC: 34.6 G/DL (ref 33–37)
MCV RBC AUTO: 86.7 FL (ref 80–94)
MONOCYTES # BLD: 2 K/UL (ref 0–0.9)
MONOCYTES NFR BLD: 12.5 % (ref 0–10)
NEUTROPHILS # BLD: 11.9 K/UL (ref 1.5–7.5)
NEUTS SEG NFR BLD: 75.3 % (ref 50–65)
NITRITE UR QL STRIP.AUTO: NEGATIVE
PH UR STRIP.AUTO: 5.5 [PH] (ref 5–8)
PLATELET # BLD AUTO: 155 K/UL (ref 130–400)
PMV BLD AUTO: 10.8 FL (ref 9.4–12.4)
POTASSIUM SERPL-SCNC: 3.2 MMOL/L (ref 3.5–5)
PROCALCITONIN: 6.11 NG/ML (ref 0–0.09)
PROT SERPL-MCNC: 6.2 G/DL (ref 6.4–8.3)
PROT UR STRIP.AUTO-MCNC: 30 MG/DL
RBC # BLD AUTO: 4.27 M/UL (ref 4.7–6.1)
RBC #/AREA URNS AUTO: >900 /HPF (ref 0–4)
SODIUM SERPL-SCNC: 136 MMOL/L (ref 136–145)
SP GR UR STRIP.AUTO: 1.01 (ref 1–1.03)
UROBILINOGEN UR STRIP.AUTO-MCNC: 1 E.U./DL
WBC # BLD AUTO: 15.8 K/UL (ref 4.8–10.8)
WBC #/AREA URNS AUTO: 4 /HPF (ref 0–5)

## 2025-05-24 PROCEDURE — 2700000000 HC OXYGEN THERAPY PER DAY

## 2025-05-24 PROCEDURE — 85025 COMPLETE CBC W/AUTO DIFF WBC: CPT

## 2025-05-24 PROCEDURE — 2500000003 HC RX 250 WO HCPCS: Performed by: INTERNAL MEDICINE

## 2025-05-24 PROCEDURE — 36415 COLL VENOUS BLD VENIPUNCTURE: CPT

## 2025-05-24 PROCEDURE — 6360000002 HC RX W HCPCS: Performed by: INTERNAL MEDICINE

## 2025-05-24 PROCEDURE — 6370000000 HC RX 637 (ALT 250 FOR IP): Performed by: INTERNAL MEDICINE

## 2025-05-24 PROCEDURE — 85014 HEMATOCRIT: CPT

## 2025-05-24 PROCEDURE — 2580000003 HC RX 258: Performed by: INTERNAL MEDICINE

## 2025-05-24 PROCEDURE — 83880 ASSAY OF NATRIURETIC PEPTIDE: CPT

## 2025-05-24 PROCEDURE — 6370000000 HC RX 637 (ALT 250 FOR IP)

## 2025-05-24 PROCEDURE — 94760 N-INVAS EAR/PLS OXIMETRY 1: CPT

## 2025-05-24 PROCEDURE — 94660 CPAP INITIATION&MGMT: CPT

## 2025-05-24 PROCEDURE — 81001 URINALYSIS AUTO W/SCOPE: CPT

## 2025-05-24 PROCEDURE — 84145 PROCALCITONIN (PCT): CPT

## 2025-05-24 PROCEDURE — 51798 US URINE CAPACITY MEASURE: CPT

## 2025-05-24 PROCEDURE — 80053 COMPREHEN METABOLIC PANEL: CPT

## 2025-05-24 PROCEDURE — 83735 ASSAY OF MAGNESIUM: CPT

## 2025-05-24 PROCEDURE — 6360000002 HC RX W HCPCS

## 2025-05-24 PROCEDURE — 1200000000 HC SEMI PRIVATE

## 2025-05-24 PROCEDURE — 94640 AIRWAY INHALATION TREATMENT: CPT

## 2025-05-24 PROCEDURE — 85018 HEMOGLOBIN: CPT

## 2025-05-24 RX ORDER — SENNA AND DOCUSATE SODIUM 50; 8.6 MG/1; MG/1
1 TABLET, FILM COATED ORAL 2 TIMES DAILY
Status: DISCONTINUED | OUTPATIENT
Start: 2025-05-24 | End: 2025-05-31 | Stop reason: HOSPADM

## 2025-05-24 RX ORDER — ARFORMOTEROL TARTRATE 15 UG/2ML
15 SOLUTION RESPIRATORY (INHALATION)
Status: DISCONTINUED | OUTPATIENT
Start: 2025-05-24 | End: 2025-05-31 | Stop reason: HOSPADM

## 2025-05-24 RX ORDER — POLYETHYLENE GLYCOL 3350 17 G/17G
17 POWDER, FOR SOLUTION ORAL DAILY
Status: DISCONTINUED | OUTPATIENT
Start: 2025-05-24 | End: 2025-05-31 | Stop reason: HOSPADM

## 2025-05-24 RX ADMIN — SENNOSIDES AND DOCUSATE SODIUM 1 TABLET: 50; 8.6 TABLET ORAL at 12:42

## 2025-05-24 RX ADMIN — DOXYCYCLINE HYCLATE 100 MG: 100 CAPSULE ORAL at 09:02

## 2025-05-24 RX ADMIN — PANTOPRAZOLE SODIUM 40 MG: 40 TABLET, DELAYED RELEASE ORAL at 05:50

## 2025-05-24 RX ADMIN — IPRATROPIUM BROMIDE 0.5 MG: 0.5 SOLUTION RESPIRATORY (INHALATION) at 14:43

## 2025-05-24 RX ADMIN — SODIUM CHLORIDE, PRESERVATIVE FREE 1000 MG: 5 INJECTION INTRAVENOUS at 11:18

## 2025-05-24 RX ADMIN — RISPERIDONE 0.5 MG: 0.25 TABLET, FILM COATED ORAL at 20:26

## 2025-05-24 RX ADMIN — BUMETANIDE 1 MG: 0.25 INJECTION INTRAMUSCULAR; INTRAVENOUS at 17:29

## 2025-05-24 RX ADMIN — SODIUM CHLORIDE, PRESERVATIVE FREE 10 ML: 5 INJECTION INTRAVENOUS at 20:26

## 2025-05-24 RX ADMIN — GLYCERIN 2 G: 2 SUPPOSITORY RECTAL at 13:45

## 2025-05-24 RX ADMIN — LEVOTHYROXINE SODIUM 137 MCG: 0.14 TABLET ORAL at 05:50

## 2025-05-24 RX ADMIN — ENOXAPARIN SODIUM 120 MG: 120 INJECTION SUBCUTANEOUS at 09:01

## 2025-05-24 RX ADMIN — DIVALPROEX SODIUM 125 MG: 125 CAPSULE, COATED PELLETS ORAL at 13:45

## 2025-05-24 RX ADMIN — PREDNISONE 60 MG: 20 TABLET ORAL at 09:01

## 2025-05-24 RX ADMIN — IPRATROPIUM BROMIDE 0.5 MG: 0.5 SOLUTION RESPIRATORY (INHALATION) at 10:37

## 2025-05-24 RX ADMIN — BUMETANIDE 1 MG: 0.25 INJECTION INTRAMUSCULAR; INTRAVENOUS at 09:02

## 2025-05-24 RX ADMIN — POLYETHYLENE GLYCOL 3350 17 G: 17 POWDER, FOR SOLUTION ORAL at 12:42

## 2025-05-24 RX ADMIN — RISPERIDONE 0.5 MG: 0.25 TABLET, FILM COATED ORAL at 11:20

## 2025-05-24 RX ADMIN — Medication 1 EACH: at 09:09

## 2025-05-24 RX ADMIN — TAMSULOSIN HYDROCHLORIDE 0.4 MG: 0.4 CAPSULE ORAL at 20:26

## 2025-05-24 RX ADMIN — TAMSULOSIN HYDROCHLORIDE 0.4 MG: 0.4 CAPSULE ORAL at 09:01

## 2025-05-24 RX ADMIN — ARFORMOTEROL TARTRATE 15 MCG: 15 SOLUTION RESPIRATORY (INHALATION) at 18:50

## 2025-05-24 RX ADMIN — CARVEDILOL 6.25 MG: 6.25 TABLET, FILM COATED ORAL at 09:01

## 2025-05-24 RX ADMIN — ATORVASTATIN CALCIUM 80 MG: 80 TABLET, FILM COATED ORAL at 20:26

## 2025-05-24 RX ADMIN — SODIUM CHLORIDE, PRESERVATIVE FREE 10 ML: 5 INJECTION INTRAVENOUS at 09:07

## 2025-05-24 RX ADMIN — DOXYCYCLINE HYCLATE 100 MG: 100 CAPSULE ORAL at 20:26

## 2025-05-24 RX ADMIN — LISINOPRIL 5 MG: 10 TABLET ORAL at 09:01

## 2025-05-24 RX ADMIN — IPRATROPIUM BROMIDE 0.5 MG: 0.5 SOLUTION RESPIRATORY (INHALATION) at 18:50

## 2025-05-24 RX ADMIN — DIVALPROEX SODIUM 125 MG: 125 CAPSULE, COATED PELLETS ORAL at 05:50

## 2025-05-24 RX ADMIN — IPRATROPIUM BROMIDE 0.5 MG: 0.5 SOLUTION RESPIRATORY (INHALATION) at 07:12

## 2025-05-24 RX ADMIN — BUDESONIDE 500 MCG: 0.5 INHALANT ORAL at 07:12

## 2025-05-24 RX ADMIN — ASPIRIN 81 MG 81 MG: 81 TABLET ORAL at 09:01

## 2025-05-24 RX ADMIN — BUDESONIDE 500 MCG: 0.5 INHALANT ORAL at 18:50

## 2025-05-24 RX ADMIN — CARVEDILOL 6.25 MG: 6.25 TABLET, FILM COATED ORAL at 17:29

## 2025-05-24 RX ADMIN — SENNOSIDES AND DOCUSATE SODIUM 1 TABLET: 50; 8.6 TABLET ORAL at 20:26

## 2025-05-24 NOTE — PROGRESS NOTES
[facility]      Patient:  Khris Sampson  YOB: 1955  Date of Service: 5/24/2025  MRN: [id number]   Acct: [id number]   Primary Care Physician: Nader Gordillo MD  Advance Directive: Full Code  Admit Date: 5/20/2025       Hospital Day: 4  Portions of this note have been copied forward, however, changed to reflect the most current clinical status of this patient    CHIEF COMPLAINT shortness of breath     SUBJECTIVE:  hematuria     Cumulative hospital course   The patient is a 70 y.o. male with CVA right sided residual, seizure disorder, chronic hypoxic respiratory failure on continuous supplemental oxygen 2L NC, CKD 3a, HTN  complaining of shortness of breath. [facility] resident. Patient admitted to [facility] 4/3 due to acute respiratory failure due to left lower lobe pneumonia, initiated on BIPAP support. In addition found to have ZARA. Received broad spectrum antibiotics and IV hydration. Discharged in stable condition on 4/9.   Presented to [facility] ER due to shortness of breath.  Patient states that he has had ongoing dyspnea at rest for the past month.  States that he has noted bilateral lower extremity edema worsening over the past several weeks and presented today due to worsening dyspnea. Has had diffuse wheezes, generalized weakness and fatigue. Work up in ER CTA pulmonary no pulmonary emboli, small pericardial effusion, no consolidation, troponin 197, . ABG PH 7.54 PCO2 35 PO2 76 HCO3 29.9, on 3L NC. Initially received Lasix 80 mg IV while in ER. Rapid response called at 1100 due to hypotension. Patient remained alert and oriented able to follow commands.  cc fluid bolus given, initiated on Levophed. IV antibiotics Rocephin, Doxycyline initiated for concern of COPD exacerbation. Plan to monitor closely in unit. Overnight required BIPAP support. Initiated on Bumex drip, has had net 7L removed. PAF  converted to NSR added carvedilol will initiate on full dose lovenox.

## 2025-05-24 NOTE — PLAN OF CARE
Problem: Safety - Adult  Goal: Free from fall injury  Outcome: Progressing     Problem: Chronic Conditions and Co-morbidities  Goal: Patient's chronic conditions and co-morbidity symptoms are monitored and maintained or improved  Outcome: Progressing  Flowsheets (Taken 5/23/2025 1545 by Shell Matute, RN)  Care Plan - Patient's Chronic Conditions and Co-Morbidity Symptoms are Monitored and Maintained or Improved: Monitor and assess patient's chronic conditions and comorbid symptoms for stability, deterioration, or improvement     Problem: Safety - Adult  Goal: Free from fall injury  Outcome: Progressing

## 2025-05-25 LAB
ALBUMIN SERPL-MCNC: 2.4 G/DL (ref 3.5–5.2)
ALP SERPL-CCNC: 48 U/L (ref 40–129)
ALT SERPL-CCNC: 24 U/L (ref 10–50)
ANION GAP SERPL CALCULATED.3IONS-SCNC: 14 MMOL/L (ref 8–16)
AST SERPL-CCNC: 40 U/L (ref 10–50)
BACTERIA BLD CULT ORG #2: NORMAL
BACTERIA BLD CULT: NORMAL
BASOPHILS # BLD: 0 K/UL (ref 0–0.2)
BASOPHILS NFR BLD: 0.1 % (ref 0–1)
BILIRUB SERPL-MCNC: 0.5 MG/DL (ref 0.2–1.2)
BUN SERPL-MCNC: 47 MG/DL (ref 8–23)
CALCIUM SERPL-MCNC: 8.7 MG/DL (ref 8.8–10.2)
CHLORIDE SERPL-SCNC: 98 MMOL/L (ref 98–107)
CO2 SERPL-SCNC: 26 MMOL/L (ref 22–29)
CREAT SERPL-MCNC: 1 MG/DL (ref 0.7–1.2)
CRP SERPL-MCNC: 188 MG/L (ref 0–5)
EOSINOPHIL # BLD: 0 K/UL (ref 0–0.6)
EOSINOPHIL NFR BLD: 0 % (ref 0–5)
ERYTHROCYTE [DISTWIDTH] IN BLOOD BY AUTOMATED COUNT: 16.4 % (ref 11.5–14.5)
ERYTHROCYTE [SEDIMENTATION RATE] IN BLOOD BY WESTERGREN METHOD: 95 MM/HR (ref 0–15)
GLUCOSE SERPL-MCNC: 184 MG/DL (ref 70–99)
HCT VFR BLD AUTO: 35.5 % (ref 42–52)
HCT VFR BLD AUTO: 37 % (ref 42–52)
HCT VFR BLD AUTO: 37.3 % (ref 42–52)
HGB BLD-MCNC: 11.8 G/DL (ref 14–18)
HGB BLD-MCNC: 12.2 G/DL (ref 14–18)
HGB BLD-MCNC: 12.3 G/DL (ref 14–18)
IMM GRANULOCYTES # BLD: 0.3 K/UL
LYMPHOCYTES # BLD: 1.5 K/UL (ref 1.1–4.5)
LYMPHOCYTES NFR BLD: 9.4 % (ref 20–40)
MAGNESIUM SERPL-MCNC: 2.5 MG/DL (ref 1.6–2.4)
MCH RBC QN AUTO: 29.5 PG (ref 27–31)
MCHC RBC AUTO-ENTMCNC: 33.2 G/DL (ref 33–37)
MCV RBC AUTO: 88.8 FL (ref 80–94)
MONOCYTES # BLD: 2 K/UL (ref 0–0.9)
MONOCYTES NFR BLD: 12.8 % (ref 0–10)
NEUTROPHILS # BLD: 11.7 K/UL (ref 1.5–7.5)
NEUTS SEG NFR BLD: 75.8 % (ref 50–65)
PLATELET # BLD AUTO: 154 K/UL (ref 130–400)
PMV BLD AUTO: 11 FL (ref 9.4–12.4)
POTASSIUM SERPL-SCNC: 3.5 MMOL/L (ref 3.5–5)
PROT SERPL-MCNC: 5.8 G/DL (ref 6.4–8.3)
RBC # BLD AUTO: 4 M/UL (ref 4.7–6.1)
SODIUM SERPL-SCNC: 138 MMOL/L (ref 136–145)
WBC # BLD AUTO: 15.5 K/UL (ref 4.8–10.8)

## 2025-05-25 PROCEDURE — 6360000002 HC RX W HCPCS

## 2025-05-25 PROCEDURE — 94150 VITAL CAPACITY TEST: CPT

## 2025-05-25 PROCEDURE — 87076 CULTURE ANAEROBE IDENT EACH: CPT

## 2025-05-25 PROCEDURE — 85018 HEMOGLOBIN: CPT

## 2025-05-25 PROCEDURE — 2580000003 HC RX 258: Performed by: INTERNAL MEDICINE

## 2025-05-25 PROCEDURE — 2500000003 HC RX 250 WO HCPCS: Performed by: INTERNAL MEDICINE

## 2025-05-25 PROCEDURE — 2580000003 HC RX 258

## 2025-05-25 PROCEDURE — 6370000000 HC RX 637 (ALT 250 FOR IP): Performed by: INTERNAL MEDICINE

## 2025-05-25 PROCEDURE — 36415 COLL VENOUS BLD VENIPUNCTURE: CPT

## 2025-05-25 PROCEDURE — 99221 1ST HOSP IP/OBS SF/LOW 40: CPT | Performed by: UROLOGY

## 2025-05-25 PROCEDURE — 94760 N-INVAS EAR/PLS OXIMETRY 1: CPT

## 2025-05-25 PROCEDURE — 80053 COMPREHEN METABOLIC PANEL: CPT

## 2025-05-25 PROCEDURE — 94640 AIRWAY INHALATION TREATMENT: CPT

## 2025-05-25 PROCEDURE — 2700000000 HC OXYGEN THERAPY PER DAY

## 2025-05-25 PROCEDURE — 87075 CULTR BACTERIA EXCEPT BLOOD: CPT

## 2025-05-25 PROCEDURE — 6360000002 HC RX W HCPCS: Performed by: INTERNAL MEDICINE

## 2025-05-25 PROCEDURE — 87205 SMEAR GRAM STAIN: CPT

## 2025-05-25 PROCEDURE — 83735 ASSAY OF MAGNESIUM: CPT

## 2025-05-25 PROCEDURE — 87070 CULTURE OTHR SPECIMN AEROBIC: CPT

## 2025-05-25 PROCEDURE — 85025 COMPLETE CBC W/AUTO DIFF WBC: CPT

## 2025-05-25 PROCEDURE — 85014 HEMATOCRIT: CPT

## 2025-05-25 PROCEDURE — 87186 SC STD MICRODIL/AGAR DIL: CPT

## 2025-05-25 PROCEDURE — 6370000000 HC RX 637 (ALT 250 FOR IP)

## 2025-05-25 PROCEDURE — 87077 CULTURE AEROBIC IDENTIFY: CPT

## 2025-05-25 PROCEDURE — 86140 C-REACTIVE PROTEIN: CPT

## 2025-05-25 PROCEDURE — 85652 RBC SED RATE AUTOMATED: CPT

## 2025-05-25 PROCEDURE — 1200000000 HC SEMI PRIVATE

## 2025-05-25 RX ORDER — BUMETANIDE 1 MG/1
1 TABLET ORAL 2 TIMES DAILY
Status: DISCONTINUED | OUTPATIENT
Start: 2025-05-26 | End: 2025-05-31 | Stop reason: HOSPADM

## 2025-05-25 RX ADMIN — SENNOSIDES AND DOCUSATE SODIUM 1 TABLET: 50; 8.6 TABLET ORAL at 20:15

## 2025-05-25 RX ADMIN — PANTOPRAZOLE SODIUM 40 MG: 40 TABLET, DELAYED RELEASE ORAL at 06:27

## 2025-05-25 RX ADMIN — DIVALPROEX SODIUM 125 MG: 125 CAPSULE, COATED PELLETS ORAL at 20:15

## 2025-05-25 RX ADMIN — TAMSULOSIN HYDROCHLORIDE 0.4 MG: 0.4 CAPSULE ORAL at 20:15

## 2025-05-25 RX ADMIN — CEFEPIME 2000 MG: 2 INJECTION, POWDER, FOR SOLUTION INTRAVENOUS at 16:33

## 2025-05-25 RX ADMIN — DIVALPROEX SODIUM 125 MG: 125 CAPSULE, COATED PELLETS ORAL at 16:34

## 2025-05-25 RX ADMIN — DIVALPROEX SODIUM 125 MG: 125 CAPSULE, COATED PELLETS ORAL at 00:30

## 2025-05-25 RX ADMIN — CEFEPIME 2000 MG: 2 INJECTION, POWDER, FOR SOLUTION INTRAVENOUS at 20:32

## 2025-05-25 RX ADMIN — BUDESONIDE 500 MCG: 0.5 INHALANT ORAL at 18:43

## 2025-05-25 RX ADMIN — IPRATROPIUM BROMIDE 0.5 MG: 0.5 SOLUTION RESPIRATORY (INHALATION) at 06:34

## 2025-05-25 RX ADMIN — DIVALPROEX SODIUM 125 MG: 125 CAPSULE, COATED PELLETS ORAL at 06:27

## 2025-05-25 RX ADMIN — BUMETANIDE 1 MG: 0.25 INJECTION INTRAMUSCULAR; INTRAVENOUS at 09:47

## 2025-05-25 RX ADMIN — SENNOSIDES AND DOCUSATE SODIUM 1 TABLET: 50; 8.6 TABLET ORAL at 09:47

## 2025-05-25 RX ADMIN — GLYCERIN 2 G: 2 SUPPOSITORY RECTAL at 16:34

## 2025-05-25 RX ADMIN — IPRATROPIUM BROMIDE 0.5 MG: 0.5 SOLUTION RESPIRATORY (INHALATION) at 18:43

## 2025-05-25 RX ADMIN — SODIUM CHLORIDE, PRESERVATIVE FREE 1000 MG: 5 INJECTION INTRAVENOUS at 11:39

## 2025-05-25 RX ADMIN — Medication 1 EACH: at 09:47

## 2025-05-25 RX ADMIN — CARVEDILOL 6.25 MG: 6.25 TABLET, FILM COATED ORAL at 16:34

## 2025-05-25 RX ADMIN — CARVEDILOL 6.25 MG: 6.25 TABLET, FILM COATED ORAL at 09:47

## 2025-05-25 RX ADMIN — TAMSULOSIN HYDROCHLORIDE 0.4 MG: 0.4 CAPSULE ORAL at 09:47

## 2025-05-25 RX ADMIN — ATORVASTATIN CALCIUM 80 MG: 80 TABLET, FILM COATED ORAL at 20:15

## 2025-05-25 RX ADMIN — RISPERIDONE 0.5 MG: 0.25 TABLET, FILM COATED ORAL at 20:15

## 2025-05-25 RX ADMIN — IPRATROPIUM BROMIDE 0.5 MG: 0.5 SOLUTION RESPIRATORY (INHALATION) at 10:28

## 2025-05-25 RX ADMIN — RISPERIDONE 0.5 MG: 0.25 TABLET, FILM COATED ORAL at 09:46

## 2025-05-25 RX ADMIN — LEVOTHYROXINE SODIUM 137 MCG: 0.14 TABLET ORAL at 06:27

## 2025-05-25 RX ADMIN — SODIUM CHLORIDE, PRESERVATIVE FREE 10 ML: 5 INJECTION INTRAVENOUS at 20:15

## 2025-05-25 RX ADMIN — POLYETHYLENE GLYCOL 3350 17 G: 17 POWDER, FOR SOLUTION ORAL at 09:48

## 2025-05-25 RX ADMIN — PREDNISONE 60 MG: 20 TABLET ORAL at 09:47

## 2025-05-25 RX ADMIN — ARFORMOTEROL TARTRATE 15 MCG: 15 SOLUTION RESPIRATORY (INHALATION) at 06:34

## 2025-05-25 RX ADMIN — IPRATROPIUM BROMIDE 0.5 MG: 0.5 SOLUTION RESPIRATORY (INHALATION) at 14:42

## 2025-05-25 RX ADMIN — BUDESONIDE 500 MCG: 0.5 INHALANT ORAL at 06:34

## 2025-05-25 RX ADMIN — ARFORMOTEROL TARTRATE 15 MCG: 15 SOLUTION RESPIRATORY (INHALATION) at 18:43

## 2025-05-25 RX ADMIN — LISINOPRIL 5 MG: 10 TABLET ORAL at 09:47

## 2025-05-25 NOTE — PROGRESS NOTES
Pharmacy Adjustment per [facility] protocol    Khris Sampson is a 70 y.o. male. Pharmacy has adjusted medications per [facility] protocol.    Recent Labs     05/24/25  0946 05/25/25  0155   BUN 47* 47*       Recent Labs     05/24/25  0946 05/25/25  0155   CREATININE 1.1 1.0       Estimated Creatinine Clearance: 93 mL/min (based on SCr of 1 mg/dL).    Height:   Ht Readings from Last 1 Encounters:   05/20/25 1.854 m (6' 0.99\")     Weight:  Wt Readings from Last 1 Encounters:   05/25/25 113.3 kg (249 lb 12.5 oz)    BMI:  BMI Readings from Last 1 Encounters:   05/25/25 32.96 kg/m²         Plan: Adjust the following medications based on [facility] protocol:           Cefepime to 2000 mg IV once over 30 minutes followed by 2000 mg IV every 8 hours extended infusion over 240 minutes for treatment of skin and soft tissue infection of RLE (including below the ankle) in a patient with DM2      Electronically signed by Judy Archer RPH on 5/25/2025 at 12:41 PM

## 2025-05-25 NOTE — CONSULTS
Chief complaint: Feels better today  Reason for consult: Gross hematuria, urinary retention  History of present illness patient is a 70-year-old  male who was admitted for COPD exacerbation required Levophed and Bumex and anticoagulation.  He went to urinary retention and was noted to have gross hematuria.  Urology was consulted for the latter.   Physical exam : three-way Sharma catheter is in place with continuous bladder irrigation.  The urine is somewhat clear with a hint of urine.  Continuous bladder irrigation is very low if not stopped.  There is some small amount of debris in the tubing.  Impression urinary retention with gross hematuria and current three-way Sharma catheter  Plan:  I have repositioned his securing device to the leg and will recommend manual irrigation.  Will hold his continuous bladder irrigation if his urine remains clear and then recommend Sharma catheter removal with trial of void and bladder scan residuals once his Bumex has been weaned.

## 2025-05-25 NOTE — PROGRESS NOTES
[facility]      Patient:  Khris Sampson  YOB: 1955  Date of Service: 5/25/2025  MRN: [id number]   Acct: [id number]   Primary Care Physician: Nader Gordillo MD  Advance Directive: Full Code  Admit Date: 5/20/2025       Hospital Day: 5  Portions of this note have been copied forward, however, changed to reflect the most current clinical status of this patient    CHIEF COMPLAINT shortness of breath     SUBJECTIVE:  CBI stopped, stated improvement of breathing    Cumulative hospital course   The patient is a 70 y.o. male with CVA right sided residual, seizure disorder, chronic hypoxic respiratory failure on continuous supplemental oxygen 2L NC, CKD 3a, HTN  complaining of shortness of breath. [facility] resident. Patient admitted to [facility] 4/3 due to acute respiratory failure due to left lower lobe pneumonia, initiated on BIPAP support. In addition found to have ZARA. Received broad spectrum antibiotics and IV hydration. Discharged in stable condition on 4/9.   Presented to [facility] ER due to shortness of breath.  Patient states that he has had ongoing dyspnea at rest for the past month.  States that he has noted bilateral lower extremity edema worsening over the past several weeks and presented today due to worsening dyspnea. Has had diffuse wheezes, generalized weakness and fatigue. Work up in ER CTA pulmonary no pulmonary emboli, small pericardial effusion, no consolidation, troponin 197, . ABG PH 7.54 PCO2 35 PO2 76 HCO3 29.9, on 3L NC. Initially received Lasix 80 mg IV while in ER. Rapid response called at 1100 due to hypotension. Patient remained alert and oriented able to follow commands.  cc fluid bolus given, initiated on Levophed. IV antibiotics Rocephin, Doxycyline initiated for concern of COPD exacerbation. Plan to monitor closely in unit. Overnight required BIPAP support. Initiated on Bumex drip, has had net 7L removed. PAF  converted to NSR added carvedilol will  Pureed; Low Sodium (2 gm); Moderately Thick (Honey)  ADULT ORAL NUTRITION SUPPLEMENT; Breakfast; Fortified Gelatin Oral Supplement  ADULT ORAL NUTRITION SUPPLEMENT; Lunch, Dinner; Frozen Oral Supplement     Lab and other Data:     Recent Labs     05/23/25  0600 05/24/25  0946 05/24/25  1628 05/25/25  0155 05/25/25  0748 05/25/25  1323   WBC 22.2* 15.8*  --  15.5*  --   --    HGB 12.4* 12.8*   < > 11.8* 12.2* 12.3*    155  --  154  --   --     < > = values in this interval not displayed.     Recent Labs     05/23/25  0600 05/24/25  0946 05/25/25  0155    136 138   K 3.6 3.2* 3.5   CL 99 96* 98   CO2 28 27 26   BUN 48* 47* 47*   CREATININE 1.4* 1.1 1.0   GLUCOSE 128* 162* 184*     Recent Labs     05/23/25  0600 05/24/25  0946 05/25/25  0155   AST 42 39 40   ALT 24 25 24   BILITOT 0.6 0.5 0.5   ALKPHOS 75 52 48     Troponin T: No results for input(s): \"TROPONINI\" in the last 72 hours.  Pro-BNP: No results for input(s): \"BNP\" in the last 72 hours.  INR: No results for input(s): \"INR\" in the last 72 hours.  UA:  Recent Labs     05/24/25  1513   COLORU ORANGE*   PHUR 5.5   WBCUA 4   RBCUA >900*   BACTERIA Negative   CLARITYU CLOUDY*   LEUKOCYTESUR TRACE*   UROBILINOGEN 1.0   BILIRUBINUR Negative   BLOODU LARGE*   GLUCOSEU Negative     A1C: No results for input(s): \"LABA1C\" in the last 72 hours.  ABG:  No results for input(s): \"PHART\", \"MHI7KGC\", \"PO2ART\", \"INK1LTM\", \"BEART\", \"HGBAE\", \"H5PWMEXV\", \"CARBOXHGBART\" in the last 72 hours.      RAD:   XR CHEST PORTABLE  Result Date: 5/20/2025   atelectasis and/or infiltrate at the left lung base   ______________________________________ Electronically signed by: STEPHANIE BARRETT M.D. Date:     05/20/2025 Time:    06:59     CTA PULMONARY W CONTRAST  Result Date: 5/20/2025   Limited evaluation of the pulmonary arteries without central embolus.  Coronary ASVD.  Small pericardial effusion  Hiatal hernia  All CT scans are performed using dose optimization techniques as

## 2025-05-26 LAB
ALBUMIN SERPL-MCNC: 2.5 G/DL (ref 3.5–5.2)
ALP SERPL-CCNC: 51 U/L (ref 40–129)
ALT SERPL-CCNC: 23 U/L (ref 10–50)
ANION GAP SERPL CALCULATED.3IONS-SCNC: 13 MMOL/L (ref 8–16)
AST SERPL-CCNC: 29 U/L (ref 10–50)
BASOPHILS # BLD: 0.1 K/UL (ref 0–0.2)
BASOPHILS NFR BLD: 0.5 % (ref 0–1)
BILIRUB SERPL-MCNC: 0.4 MG/DL (ref 0.2–1.2)
BNP BLD-MCNC: 1186 PG/ML (ref 0–124)
BUN SERPL-MCNC: 44 MG/DL (ref 8–23)
CALCIUM SERPL-MCNC: 8.8 MG/DL (ref 8.8–10.2)
CHLORIDE SERPL-SCNC: 97 MMOL/L (ref 98–107)
CO2 SERPL-SCNC: 27 MMOL/L (ref 22–29)
CREAT SERPL-MCNC: 0.9 MG/DL (ref 0.7–1.2)
ECHO BSA: 2.47 M2
EOSINOPHIL # BLD: 0 K/UL (ref 0–0.6)
EOSINOPHIL NFR BLD: 0 % (ref 0–5)
ERYTHROCYTE [DISTWIDTH] IN BLOOD BY AUTOMATED COUNT: 16.4 % (ref 11.5–14.5)
GLUCOSE SERPL-MCNC: 243 MG/DL (ref 70–99)
HCT VFR BLD AUTO: 35.1 % (ref 42–52)
HGB BLD-MCNC: 11.9 G/DL (ref 14–18)
IMM GRANULOCYTES # BLD: 0.7 K/UL
LYMPHOCYTES # BLD: 1.7 K/UL (ref 1.1–4.5)
LYMPHOCYTES NFR BLD: 13.5 % (ref 20–40)
MCH RBC QN AUTO: 30.1 PG (ref 27–31)
MCHC RBC AUTO-ENTMCNC: 33.9 G/DL (ref 33–37)
MCV RBC AUTO: 88.9 FL (ref 80–94)
MONOCYTES # BLD: 1.7 K/UL (ref 0–0.9)
MONOCYTES NFR BLD: 13.5 % (ref 0–10)
NEUTROPHILS # BLD: 8.1 K/UL (ref 1.5–7.5)
NEUTS SEG NFR BLD: 66.4 % (ref 50–65)
PLATELET # BLD AUTO: 191 K/UL (ref 130–400)
PMV BLD AUTO: 11 FL (ref 9.4–12.4)
POTASSIUM SERPL-SCNC: 3.7 MMOL/L (ref 3.5–5)
PROT SERPL-MCNC: 5.8 G/DL (ref 6.4–8.3)
RBC # BLD AUTO: 3.95 M/UL (ref 4.7–6.1)
SODIUM SERPL-SCNC: 137 MMOL/L (ref 136–145)
WBC # BLD AUTO: 12.2 K/UL (ref 4.8–10.8)

## 2025-05-26 PROCEDURE — 6360000002 HC RX W HCPCS: Performed by: INTERNAL MEDICINE

## 2025-05-26 PROCEDURE — 6370000000 HC RX 637 (ALT 250 FOR IP): Performed by: INTERNAL MEDICINE

## 2025-05-26 PROCEDURE — 80053 COMPREHEN METABOLIC PANEL: CPT

## 2025-05-26 PROCEDURE — 2580000003 HC RX 258

## 2025-05-26 PROCEDURE — 1200000000 HC SEMI PRIVATE

## 2025-05-26 PROCEDURE — 93971 EXTREMITY STUDY: CPT | Performed by: SURGERY

## 2025-05-26 PROCEDURE — 83880 ASSAY OF NATRIURETIC PEPTIDE: CPT

## 2025-05-26 PROCEDURE — 6370000000 HC RX 637 (ALT 250 FOR IP)

## 2025-05-26 PROCEDURE — 94150 VITAL CAPACITY TEST: CPT

## 2025-05-26 PROCEDURE — 6360000002 HC RX W HCPCS

## 2025-05-26 PROCEDURE — 94669 MECHANICAL CHEST WALL OSCILL: CPT

## 2025-05-26 PROCEDURE — 94660 CPAP INITIATION&MGMT: CPT

## 2025-05-26 PROCEDURE — 2700000000 HC OXYGEN THERAPY PER DAY

## 2025-05-26 PROCEDURE — 94640 AIRWAY INHALATION TREATMENT: CPT

## 2025-05-26 PROCEDURE — 85025 COMPLETE CBC W/AUTO DIFF WBC: CPT

## 2025-05-26 PROCEDURE — 97530 THERAPEUTIC ACTIVITIES: CPT

## 2025-05-26 PROCEDURE — 2500000003 HC RX 250 WO HCPCS: Performed by: INTERNAL MEDICINE

## 2025-05-26 PROCEDURE — 94761 N-INVAS EAR/PLS OXIMETRY MLT: CPT

## 2025-05-26 PROCEDURE — 36415 COLL VENOUS BLD VENIPUNCTURE: CPT

## 2025-05-26 PROCEDURE — 97162 PT EVAL MOD COMPLEX 30 MIN: CPT

## 2025-05-26 RX ORDER — PREDNISONE 20 MG/1
40 TABLET ORAL DAILY
Status: COMPLETED | OUTPATIENT
Start: 2025-05-27 | End: 2025-05-29

## 2025-05-26 RX ADMIN — BUMETANIDE 1 MG: 1 TABLET ORAL at 16:28

## 2025-05-26 RX ADMIN — CARVEDILOL 6.25 MG: 6.25 TABLET, FILM COATED ORAL at 16:28

## 2025-05-26 RX ADMIN — ATORVASTATIN CALCIUM 80 MG: 80 TABLET, FILM COATED ORAL at 21:03

## 2025-05-26 RX ADMIN — BUDESONIDE 500 MCG: 0.5 INHALANT ORAL at 19:00

## 2025-05-26 RX ADMIN — IPRATROPIUM BROMIDE 0.5 MG: 0.5 SOLUTION RESPIRATORY (INHALATION) at 19:00

## 2025-05-26 RX ADMIN — LEVOTHYROXINE SODIUM 137 MCG: 0.14 TABLET ORAL at 05:27

## 2025-05-26 RX ADMIN — SENNOSIDES AND DOCUSATE SODIUM 1 TABLET: 50; 8.6 TABLET ORAL at 09:30

## 2025-05-26 RX ADMIN — Medication 1 EACH: at 16:05

## 2025-05-26 RX ADMIN — PREDNISONE 60 MG: 20 TABLET ORAL at 09:31

## 2025-05-26 RX ADMIN — CEFEPIME 2000 MG: 2 INJECTION, POWDER, FOR SOLUTION INTRAVENOUS at 15:32

## 2025-05-26 RX ADMIN — LISINOPRIL 5 MG: 10 TABLET ORAL at 09:30

## 2025-05-26 RX ADMIN — IPRATROPIUM BROMIDE 0.5 MG: 0.5 SOLUTION RESPIRATORY (INHALATION) at 15:25

## 2025-05-26 RX ADMIN — TAMSULOSIN HYDROCHLORIDE 0.4 MG: 0.4 CAPSULE ORAL at 21:03

## 2025-05-26 RX ADMIN — IPRATROPIUM BROMIDE 0.5 MG: 0.5 SOLUTION RESPIRATORY (INHALATION) at 07:18

## 2025-05-26 RX ADMIN — DIVALPROEX SODIUM 125 MG: 125 CAPSULE, COATED PELLETS ORAL at 15:36

## 2025-05-26 RX ADMIN — RISPERIDONE 0.5 MG: 0.25 TABLET, FILM COATED ORAL at 21:03

## 2025-05-26 RX ADMIN — CARVEDILOL 6.25 MG: 6.25 TABLET, FILM COATED ORAL at 09:31

## 2025-05-26 RX ADMIN — BUDESONIDE 500 MCG: 0.5 INHALANT ORAL at 07:18

## 2025-05-26 RX ADMIN — TAMSULOSIN HYDROCHLORIDE 0.4 MG: 0.4 CAPSULE ORAL at 09:30

## 2025-05-26 RX ADMIN — ARFORMOTEROL TARTRATE 15 MCG: 15 SOLUTION RESPIRATORY (INHALATION) at 19:00

## 2025-05-26 RX ADMIN — RISPERIDONE 0.5 MG: 0.25 TABLET, FILM COATED ORAL at 09:31

## 2025-05-26 RX ADMIN — DIVALPROEX SODIUM 125 MG: 125 CAPSULE, COATED PELLETS ORAL at 05:27

## 2025-05-26 RX ADMIN — SODIUM CHLORIDE, PRESERVATIVE FREE 10 ML: 5 INJECTION INTRAVENOUS at 21:03

## 2025-05-26 RX ADMIN — SODIUM CHLORIDE, PRESERVATIVE FREE 10 ML: 5 INJECTION INTRAVENOUS at 09:56

## 2025-05-26 RX ADMIN — DIVALPROEX SODIUM 125 MG: 125 CAPSULE, COATED PELLETS ORAL at 21:03

## 2025-05-26 RX ADMIN — POLYETHYLENE GLYCOL 3350 17 G: 17 POWDER, FOR SOLUTION ORAL at 09:32

## 2025-05-26 RX ADMIN — SENNOSIDES AND DOCUSATE SODIUM 1 TABLET: 50; 8.6 TABLET ORAL at 21:03

## 2025-05-26 RX ADMIN — CEFEPIME 2000 MG: 2 INJECTION, POWDER, FOR SOLUTION INTRAVENOUS at 21:09

## 2025-05-26 RX ADMIN — CEFEPIME 2000 MG: 2 INJECTION, POWDER, FOR SOLUTION INTRAVENOUS at 05:28

## 2025-05-26 RX ADMIN — ARFORMOTEROL TARTRATE 15 MCG: 15 SOLUTION RESPIRATORY (INHALATION) at 07:18

## 2025-05-26 RX ADMIN — IPRATROPIUM BROMIDE 0.5 MG: 0.5 SOLUTION RESPIRATORY (INHALATION) at 10:46

## 2025-05-26 RX ADMIN — BUMETANIDE 1 MG: 1 TABLET ORAL at 09:31

## 2025-05-26 NOTE — PROGRESS NOTES
Urology Attending Progress Note      Subjective: patient admitted with exacerbation of COPD is doing better. Frederick placed for urinary retention. Had hematuria afterwards, was on CBI. Currently stopped as there is no bleeding.   History of LUTS. On flomax. Nocturia 3-4x.    Vitals:  /68   Pulse 74   Temp 97.3 °F (36.3 °C) (Temporal)   Resp 18   Ht 1.854 m (6' 0.99\")   Wt 113.3 kg (249 lb 12.5 oz)   SpO2 92%   BMI 32.96 kg/m²   Temp  Av.9 °F (36.6 °C)  Min: 97 °F (36.1 °C)  Max: 100 °F (37.8 °C)    Intake/Output Summary (Last 24 hours) at 2025 1226  Last data filed at 2025 0956  Gross per 24 hour   Intake 10 ml   Output 1400 ml   Net -1390 ml       Exam: Patient not in distress.  Abdomen: soft, non-tender. No mass, bladder not palpable  External genitalia normal. Frederick catheter - clear urine off CBI    Labs:  WBC:    Lab Results   Component Value Date/Time    WBC 12.2 2025 01:39 AM     Hemoglobin/Hematocrit:    Lab Results   Component Value Date/Time    HGB 11.9 2025 01:39 AM    HCT 35.1 2025 01:39 AM     BMP:    Lab Results   Component Value Date/Time     2025 01:39 AM    K 3.7 2025 01:39 AM    CL 97 2025 01:39 AM    CO2 27 2025 01:39 AM    BUN 44 2025 01:39 AM    CREATININE 0.9 2025 01:39 AM    CALCIUM 8.8 2025 01:39 AM    GFRAA >59 2022 01:21 AM    LABGLOM >90 2025 01:39 AM    LABGLOM >60 2023 06:41 PM     PT/INR:    Lab Results   Component Value Date/Time    PROTIME 13.4 2023 06:41 PM    INR 1.05 2023 06:41 PM     PTT:  No results found for: \"APTT\"[APTT        Impression/Plan:   Admitted for exacerbation of COPD. Doing better.  Patient had urinary retention. History of LUTS on flomax.  Had CBI for hematuria. Clear urine off CBI.   As patient not mobile with knee pain, advised to keep the frederick catheter until ambulant or out of bed and for trial of voiding in 2-5 days.  Needs cystoscopy

## 2025-05-26 NOTE — PLAN OF CARE
Problem: Safety - Adult  Goal: Free from fall injury  Outcome: Progressing     Problem: Chronic Conditions and Co-morbidities  Goal: Patient's chronic conditions and co-morbidity symptoms are monitored and maintained or improved  Outcome: Progressing     Problem: Discharge Planning  Goal: Discharge to home or other facility with appropriate resources  Outcome: Progressing     Problem: Skin/Tissue Integrity  Goal: Skin integrity remains intact  Description: 1.  Monitor for areas of redness and/or skin breakdown2.  Assess vascular access sites hourly3.  Every 4-6 hours minimum:  Change oxygen saturation probe site4.  Every 4-6 hours:  If on nasal continuous positive airway pressure, respiratory therapy assess nares and determine need for appliance change or resting period  Outcome: Progressing     Problem: Nutrition Deficit:  Goal: Optimize nutritional status  Outcome: Progressing

## 2025-05-26 NOTE — PROGRESS NOTES
[facility]    Progress Note    Patient:  Khris Sampson  YOB: 1955  Date of Service: 5/26/2025  MRN: [id number]   Acct: [id number]   Primary Care Physician: Nader Gordillo MD  Advance Directive: Full Code  Admit Date: 5/20/2025       Hospital Day: 6    Portions of this note have been copied forward, however, updated to reflect the most current clinical status of this patient.     CHIEF COMPLAINT: SOB    SUBJECTIVE: CBI stopped, SOB baseline    CUMULATIVE HOSPITAL COURSE:     This patient is a 70-year-old male with PMH CVA with right-sided residual weakness, seizure disorder, chronic hypoxic respiratory failure on 2 LPM at baseline, CKD stage IIIa, hypertension who presented to [facility] ED from [facility] on 5/20 for evaluation of shortness of breath and worsening BLE edema.  Of note, patient was recently admitted to [facility] on 4/3 and treated for ZARA and acute respiratory failure due to LLL pneumonia requiring IV antibiotics and IVF, discharged in stable condition on 4/9.  In ED, concern for volume overload initially and he received Lasix 80 mg IV x 1.  He subsequently became hypotensive, requiring Levophed briefly.  Initiated on Rocephin, doxycycline, and steroids due to concern for COPD exacerbation.  Upon arrival to unit, was placed on Bumex gtt with good urine output and improvement in symptoms.  Developed new onset atrial fibrillation and received digoxin, added Coreg conversion to NSR and initiated on full dose Lovenox.  Echo indicates EF 60% with normal wall motion, technically difficult study.  He was downgraded to MedSurg. RLE edema worsened, venous ultrasound obtained and negative for DVT.  On 5/24, developed hematuria and urinary retention and was initiated on CBI with urology consult.  Anticoagulation was held.  CBI since discontinued.  Will attempt voiding trial when okay with urology.  Bumex has since been transition to p.o.  Cefepime was initiated due to      Consults Made:   IP CONSULT TO HEART FAILURE NURSE/COORDINATOR  PALLIATIVE CARE EVAL  IP CONSULT TO VASCULAR ACCESS TEAM  IP CONSULT TO DIETITIAN  IP CONSULT TO SOCIAL WORK  IP CONSULT TO UROLOGY    Further orders per clinical course/attending physician.    EMR Dragon/Transcription disclaimer:   Much of this encounter note is an electronic transcription/translation of spoken language to printed text. The electronic translation of spoken language may permit erroneous words or phrases to be inadvertently transcribed; although attempts have made to review the note for such errors, some may still exist.

## 2025-05-26 NOTE — PROGRESS NOTES
Physical Therapy  Facility/Department: [facility] ONCOLOGY UNIT  Physical Therapy Initial Assessment    Name: Khris Sampson  : 1955  MRN: [id number]  Date of Service: 2025    Discharge Recommendations:  Continue to assess pending progress, 24 hour supervision or assist, Patient would benefit from continued therapy after discharge          Patient Diagnosis(es): The primary encounter diagnosis was Acute congestive heart failure, unspecified heart failure type (HCC). Diagnoses of Congestive heart failure, unspecified HF chronicity, unspecified heart failure type (HCC), Shortness of breath, and Peripheral edema were also pertinent to this visit.  Past Medical History:  has a past medical history of Altered mental status, Arthritis, Asthma, Brief psychotic disorder (HCC), Cerebral artery occlusion with cerebral infarction (HCC), COPD (chronic obstructive pulmonary disease) (HCC), Diabetes mellitus (HCC), Epilepsy (HCC), GERD (gastroesophageal reflux disease), Hypertension, Palliative care patient, Seizures (HCC), Thyroid disease, Unspecified convulsions (HCC), and Vascular dementia (HCC).  Past Surgical History:  has a past surgical history that includes Arm Surgery (Left); Mandible surgery; knee surgery (Right); Eye surgery (Right); and pr colonoscopy flx dx w/collj spec when pfrmd (N/A, 2018).    Assessment  Body Structures, Functions, Activity Limitations Requiring Skilled Therapeutic Intervention: Decreased functional mobility ;Decreased ADL status;Decreased ROM;Decreased cognition;Decreased safe awareness;Decreased balance;Decreased strength;Decreased sensation;Decreased posture;Decreased fine motor control;Decreased endurance  Assessment: Pt. will benefit from cont. PT to decrease impairments. Pt. a fall risk and should not attempt mobility on his own. Pt. amb. very short distances with RW and PT at SNF. Pt. encouraged to sit on EOB today, but he declined due to swelling in BLEs and RLE weeping. Will  protector boot LLE (foot and LE appears very thin where boot removed, above that area appears edematous), RLE edematous and weeping  Gross Assessment  Sensation: Impaired  Pain  Pre-Pain: 0  Post-Pain: 0    AROM RLE (degrees)  RLE AROM: Exceptions  RLE General AROM: AAROM WFLs  AROM LLE (degrees)  LLE AROM : Exceptions  LLE General AROM: 0-90 knee and hip, ankle WFLs  Strength RLE  Strength RLE: Exception  Comment: 1/5  Strength LLE  Strength LLE: Exception  Comment: 3-/5           Bed mobility  Rolling to Left: Partial/Moderate assistance  Rolling to Right: Substantial/Maximal assistance  Supine to Sit: Unable to assess  Sit to Supine: Unable to assess  Scooting: Substantial/Maximal assistance;Dependent/Total;2 Person assistance  Bed Mobility Comments: pt had difficulty scooting up in bed, declined sitting EOB today due to swelling  Transfers  Sit to Stand: Unable to assess  Stand to Sit: Unable to assess  Ambulation  Comments: did not attempt due to swelling     Balance  Posture: Poor  A/AROM Exercises: A/AROM BLEs x 10 reps in supine: hip abd/add, HS, AP       OutComes Score                                                  AM-PAC - Mobility              Tinneti Score       Goals  Short Term Goals  Time Frame for Short Term Goals: 2 wks  Short Term Goal 1: supine to sit indep  Short Term Goal 2: sit to stand Min A  Short Term Goal 3: bed to chair Min A with RW  Short Term Goal 4: amb. 25' with RW SBA  Patient Goals   Patient Goals : get better, stronger       Education  Patient Education  Education Given To: Patient;Family  Education Provided: Role of Therapy;Plan of Care;Family Education  Education Provided Comments: use of call light, staff A, pt's family hesitant at 1st to allow ther ex, but allowed ROM and repositioning of pt.  Education Method: Demonstration;Verbal  Barriers to Learning: Cognition  Education Outcome: Continued education needed      Therapy Time   Individual Concurrent Group Co-treatment

## 2025-05-27 ENCOUNTER — APPOINTMENT (OUTPATIENT)
Dept: GENERAL RADIOLOGY | Age: 70
End: 2025-05-27
Payer: MEDICARE

## 2025-05-27 LAB
ALBUMIN SERPL-MCNC: 2.5 G/DL (ref 3.5–5.2)
ALP SERPL-CCNC: 48 U/L (ref 40–129)
ALT SERPL-CCNC: 21 U/L (ref 10–50)
ANION GAP SERPL CALCULATED.3IONS-SCNC: 11 MMOL/L (ref 8–16)
AST SERPL-CCNC: 23 U/L (ref 10–50)
BASOPHILS # BLD: 0 K/UL (ref 0–0.2)
BASOPHILS NFR BLD: 0 % (ref 0–1)
BILIRUB SERPL-MCNC: 0.6 MG/DL (ref 0.2–1.2)
BUN SERPL-MCNC: 37 MG/DL (ref 8–23)
BURR CELLS BLD QL SMEAR: ABNORMAL
CALCIUM SERPL-MCNC: 8.7 MG/DL (ref 8.8–10.2)
CHLORIDE SERPL-SCNC: 100 MMOL/L (ref 98–107)
CO2 SERPL-SCNC: 28 MMOL/L (ref 22–29)
CREAT SERPL-MCNC: 0.8 MG/DL (ref 0.7–1.2)
DACRYOCYTES BLD QL SMEAR: ABNORMAL
EOSINOPHIL # BLD: 0.28 K/UL (ref 0–0.6)
EOSINOPHIL NFR BLD: 2 % (ref 0–5)
ERYTHROCYTE [DISTWIDTH] IN BLOOD BY AUTOMATED COUNT: 16 % (ref 11.5–14.5)
ERYTHROCYTE [SEDIMENTATION RATE] IN BLOOD BY WESTERGREN METHOD: 75 MM/HR (ref 0–15)
GLUCOSE SERPL-MCNC: 191 MG/DL (ref 70–99)
HCT VFR BLD AUTO: 39.6 % (ref 42–52)
HGB BLD-MCNC: 13.1 G/DL (ref 14–18)
HOWELL-JOLLY BODIES: ABNORMAL
HYPOCHROMIA BLD QL SMEAR: ABNORMAL
IMM GRANULOCYTES # BLD: 1.3 K/UL
LYMPHOCYTES # BLD: 3.8 K/UL (ref 1.1–4.5)
LYMPHOCYTES NFR BLD: 23 % (ref 20–40)
MCH RBC QN AUTO: 29.6 PG (ref 27–31)
MCHC RBC AUTO-ENTMCNC: 33.1 G/DL (ref 33–37)
MCV RBC AUTO: 89.6 FL (ref 80–94)
MONOCYTES # BLD: 0.4 K/UL (ref 0–0.9)
MONOCYTES NFR BLD: 3 % (ref 0–10)
NEUTROPHILS # BLD: 9.7 K/UL (ref 1.5–7.5)
NEUTS BAND NFR BLD MANUAL: 12 % (ref 0–5)
NEUTS SEG NFR BLD: 56 % (ref 50–65)
NEUTS VAC BLD QL SMEAR: ABNORMAL
PLATELET # BLD AUTO: 264 K/UL (ref 130–400)
PLATELET SLIDE REVIEW: ADEQUATE
PMV BLD AUTO: 10.3 FL (ref 9.4–12.4)
POTASSIUM SERPL-SCNC: 3.9 MMOL/L (ref 3.5–5)
PROT SERPL-MCNC: 5.8 G/DL (ref 6.4–8.3)
RBC # BLD AUTO: 4.42 M/UL (ref 4.7–6.1)
SMUDGE CELLS BLD QL SMEAR: ABNORMAL
SODIUM SERPL-SCNC: 139 MMOL/L (ref 136–145)
TOXIC GRANULATION: ABNORMAL
VARIANT LYMPHS NFR BLD: 4 % (ref 0–8)
WBC # BLD AUTO: 14.2 K/UL (ref 4.8–10.8)

## 2025-05-27 PROCEDURE — 71045 X-RAY EXAM CHEST 1 VIEW: CPT

## 2025-05-27 PROCEDURE — 94761 N-INVAS EAR/PLS OXIMETRY MLT: CPT

## 2025-05-27 PROCEDURE — 94760 N-INVAS EAR/PLS OXIMETRY 1: CPT

## 2025-05-27 PROCEDURE — 6370000000 HC RX 637 (ALT 250 FOR IP)

## 2025-05-27 PROCEDURE — 6370000000 HC RX 637 (ALT 250 FOR IP): Performed by: INTERNAL MEDICINE

## 2025-05-27 PROCEDURE — 2580000003 HC RX 258

## 2025-05-27 PROCEDURE — 2700000000 HC OXYGEN THERAPY PER DAY

## 2025-05-27 PROCEDURE — 6360000002 HC RX W HCPCS

## 2025-05-27 PROCEDURE — 85025 COMPLETE CBC W/AUTO DIFF WBC: CPT

## 2025-05-27 PROCEDURE — 6360000002 HC RX W HCPCS: Performed by: INTERNAL MEDICINE

## 2025-05-27 PROCEDURE — 80053 COMPREHEN METABOLIC PANEL: CPT

## 2025-05-27 PROCEDURE — 94660 CPAP INITIATION&MGMT: CPT

## 2025-05-27 PROCEDURE — 94150 VITAL CAPACITY TEST: CPT

## 2025-05-27 PROCEDURE — 94669 MECHANICAL CHEST WALL OSCILL: CPT

## 2025-05-27 PROCEDURE — 92526 ORAL FUNCTION THERAPY: CPT

## 2025-05-27 PROCEDURE — 36415 COLL VENOUS BLD VENIPUNCTURE: CPT

## 2025-05-27 PROCEDURE — 85652 RBC SED RATE AUTOMATED: CPT

## 2025-05-27 PROCEDURE — 1200000000 HC SEMI PRIVATE

## 2025-05-27 PROCEDURE — 94640 AIRWAY INHALATION TREATMENT: CPT

## 2025-05-27 RX ORDER — BISMUTH TRIBROMOPH/PETROLATUM 5"X9"
3 BANDAGE TOPICAL DAILY
Status: DISCONTINUED | OUTPATIENT
Start: 2025-05-28 | End: 2025-05-31 | Stop reason: HOSPADM

## 2025-05-27 RX ADMIN — PANTOPRAZOLE SODIUM 40 MG: 40 TABLET, DELAYED RELEASE ORAL at 05:45

## 2025-05-27 RX ADMIN — CARVEDILOL 6.25 MG: 6.25 TABLET, FILM COATED ORAL at 17:40

## 2025-05-27 RX ADMIN — CARVEDILOL 6.25 MG: 6.25 TABLET, FILM COATED ORAL at 09:30

## 2025-05-27 RX ADMIN — PIPERACILLIN AND TAZOBACTAM 3375 MG: 3; .375 INJECTION, POWDER, LYOPHILIZED, FOR SOLUTION INTRAVENOUS at 21:28

## 2025-05-27 RX ADMIN — SENNOSIDES AND DOCUSATE SODIUM 1 TABLET: 50; 8.6 TABLET ORAL at 21:13

## 2025-05-27 RX ADMIN — TAMSULOSIN HYDROCHLORIDE 0.4 MG: 0.4 CAPSULE ORAL at 09:31

## 2025-05-27 RX ADMIN — IPRATROPIUM BROMIDE 0.5 MG: 0.5 SOLUTION RESPIRATORY (INHALATION) at 11:49

## 2025-05-27 RX ADMIN — DIVALPROEX SODIUM 125 MG: 125 CAPSULE, COATED PELLETS ORAL at 15:03

## 2025-05-27 RX ADMIN — BUMETANIDE 1 MG: 1 TABLET ORAL at 09:31

## 2025-05-27 RX ADMIN — BUDESONIDE 500 MCG: 0.5 INHALANT ORAL at 18:34

## 2025-05-27 RX ADMIN — POLYETHYLENE GLYCOL 3350 17 G: 17 POWDER, FOR SOLUTION ORAL at 09:27

## 2025-05-27 RX ADMIN — TAMSULOSIN HYDROCHLORIDE 0.4 MG: 0.4 CAPSULE ORAL at 21:13

## 2025-05-27 RX ADMIN — BUMETANIDE 1 MG: 1 TABLET ORAL at 17:40

## 2025-05-27 RX ADMIN — IPRATROPIUM BROMIDE 0.5 MG: 0.5 SOLUTION RESPIRATORY (INHALATION) at 08:01

## 2025-05-27 RX ADMIN — COLLAGENASE SANTYL: 250 OINTMENT TOPICAL at 16:21

## 2025-05-27 RX ADMIN — ATORVASTATIN CALCIUM 80 MG: 80 TABLET, FILM COATED ORAL at 21:13

## 2025-05-27 RX ADMIN — BUDESONIDE 500 MCG: 0.5 INHALANT ORAL at 08:01

## 2025-05-27 RX ADMIN — DIVALPROEX SODIUM 125 MG: 125 CAPSULE, COATED PELLETS ORAL at 05:45

## 2025-05-27 RX ADMIN — ENOXAPARIN SODIUM 120 MG: 120 INJECTION SUBCUTANEOUS at 21:13

## 2025-05-27 RX ADMIN — SENNOSIDES AND DOCUSATE SODIUM 1 TABLET: 50; 8.6 TABLET ORAL at 09:31

## 2025-05-27 RX ADMIN — Medication 1 EACH: at 16:21

## 2025-05-27 RX ADMIN — DIVALPROEX SODIUM 125 MG: 125 CAPSULE, COATED PELLETS ORAL at 21:18

## 2025-05-27 RX ADMIN — LISINOPRIL 5 MG: 10 TABLET ORAL at 09:31

## 2025-05-27 RX ADMIN — CEFEPIME 2000 MG: 2 INJECTION, POWDER, FOR SOLUTION INTRAVENOUS at 05:45

## 2025-05-27 RX ADMIN — ARFORMOTEROL TARTRATE 15 MCG: 15 SOLUTION RESPIRATORY (INHALATION) at 08:01

## 2025-05-27 RX ADMIN — IPRATROPIUM BROMIDE 0.5 MG: 0.5 SOLUTION RESPIRATORY (INHALATION) at 18:33

## 2025-05-27 RX ADMIN — LEVOTHYROXINE SODIUM 137 MCG: 0.14 TABLET ORAL at 05:45

## 2025-05-27 RX ADMIN — RISPERIDONE 0.5 MG: 0.25 TABLET, FILM COATED ORAL at 21:13

## 2025-05-27 RX ADMIN — RISPERIDONE 0.5 MG: 0.25 TABLET, FILM COATED ORAL at 09:30

## 2025-05-27 RX ADMIN — PIPERACILLIN SODIUM AND TAZOBACTAM SODIUM 4500 MG: 4; .5 INJECTION, POWDER, LYOPHILIZED, FOR SOLUTION INTRAVENOUS at 17:23

## 2025-05-27 RX ADMIN — ARFORMOTEROL TARTRATE 15 MCG: 15 SOLUTION RESPIRATORY (INHALATION) at 18:33

## 2025-05-27 RX ADMIN — COLLAGENASE SANTYL: 250 OINTMENT TOPICAL at 21:14

## 2025-05-27 RX ADMIN — IPRATROPIUM BROMIDE 0.5 MG: 0.5 SOLUTION RESPIRATORY (INHALATION) at 14:25

## 2025-05-27 RX ADMIN — PREDNISONE 40 MG: 20 TABLET ORAL at 09:31

## 2025-05-27 NOTE — PROGRESS NOTES
Facility/Department: [facility] ONCOLOGY UNIT  SWALLOW THERAPY    NAME: Khris Sampson  : 1955  MRN: [id number]    ADMISSION DATE: 2025  ADMITTING DIAGNOSIS: has Impetigo; Skin tear of left upper arm without complication; Acute ischemic stroke (HCC); Aspiration pneumonia (HCC); History of stroke with residual deficit; Dysarthria; Type 2 diabetes mellitus, without long-term current use of insulin (HCC); Chronic obstructive pulmonary disease with acute lower respiratory infection (HCC); Seizure disorder (HCC); COPD exacerbation (HCC); Hypertension; ZARA (acute kidney injury); Acute on chronic respiratory failure with hypoxia (HCC); Vitamin D deficiency; Hypothyroidism; Palliative care patient; Chest pain, unspecified; Chest pain in adult; Peripheral edema; SIRS (systemic inflammatory response syndrome) (HCC); and Acute heart failure with preserved ejection fraction (HCC) on their problem list.    Date of Treat: 2025  Evaluating Therapist: STEVE Jensen    Current Diet level:  Puree consistency with moderately thick/honey thick liquids    Pain:  Pain Assessment  Pain Assessment: None - Denies Pain  Pain Level: 0  Non-Pharmaceutical Pain Intervention(s): Emotional support, Environmental changes, Family support  Faces, Legs, Activity, Cry, and Consolability (FLACC)  Face (F): occasional grimace or frown, withdrawn, disinterested  Legs (L): uneasy, restless, tense  Activity (A): squirming, shifting back and forth, tense  Cry (C): moans or whimpers, occasional complaint  Consolability (C): reassured by occasional touch, hug or being talked to  FLACC Score : 5    Reason for Referral  Khris Sampson was referred for a bedside swallow evaluation to assess the efficiency of his swallow function, identify signs and symptoms of aspiration and make recommendations regarding safe dietary consistencies, effective compensatory strategies, and safe eating environment.    Impression  Targeted patient's swallowing function. Patient  (Patient exhibited sluggish, inconsistently mildly decreased laryngeal elevation for swallow airway protection.)  Wet Vocal Quality: Honey - straw   Pharyngeal Phase - Comment: No outward S/S penetration/aspiration was noted with any puree consistency presentation or honey thick liquid presentation administered via cup. Wet vocal quality and drop on O2 was observed with honey thick liquid presentations administered via straw.     At this time, do suspect delayed epiglottic inversion and degree of residue in the throat post swallows. As patient is a full code, would continue puree consistency with moderately thick/honey thick liquids. NO STRAWS. Recommend meds crushed in pudding/applesauce. If patient receives mouth care prior to intake, okay for ice chips IN BETWEEN MEALS for comfort. Will continue to follow.    Electronically signed by STEVE Jensen on 5/27/2025 at 1:32 PM

## 2025-05-27 NOTE — PROGRESS NOTES
Pharmacy Adjustment per [facility] protocol    Khris Sampson is a 70 y.o. male. Pharmacy has adjusted medications per [facility] protocol.    Recent Labs     05/26/25  0139 05/27/25  0202   BUN 44* 37*       Recent Labs     05/26/25  0139 05/27/25  0202   CREATININE 0.9 0.8       Estimated Creatinine Clearance: 117 mL/min (based on SCr of 0.8 mg/dL).    Height:   Ht Readings from Last 1 Encounters:   05/20/25 1.854 m (6' 0.99\")     Weight:  Wt Readings from Last 1 Encounters:   05/27/25 114.2 kg (251 lb 12.3 oz)    BMI:  BMI Readings from Last 1 Encounters:   05/27/25 33.22 kg/m²         Plan: Adjust the following medications based on [facility] protocol:           Piperacillin-tazobactam to 4500 mg IV once over 30 minutes followed by 3375 mg IV every 8 hours extended infusion over 240 minutes    Electronically signed by Vinita Tate RPH on 5/27/2025 at 2:59 PM

## 2025-05-27 NOTE — PROGRESS NOTES
Nutrition Assessment     Type and Reason for Visit: Reassess    Nutrition Recommendations/Plan:   Continue POC     Malnutrition Assessment:  Malnutrition Status: At risk for malnutrition    Nutrition Assessment:  Pt remains on pureed diet per SLP, intake is decreased. Remains endematous to BLE. Intake is \"fair\" per pt.    Estimated Daily Nutrient Needs:  Energy (kcal):  4455-4818 kcals/day Weight Used for Energy Requirements: Current     Protein (g):  100-167 g/protein/day Weight Used for Protein Requirements: Ideal        Fluid (ml/day):  4250-9128 mL/day Method Used for Fluid Requirements: 1 ml/kcal    Nutrition Related Findings:   BUN 37, Gluc 191. +4 edema RLE, +2 edema LLE. Wound Type: Venous Stasis    Current Nutrition Therapies:    ADULT ORAL NUTRITION SUPPLEMENT; Breakfast; Fortified Gelatin Oral Supplement  ADULT ORAL NUTRITION SUPPLEMENT; Lunch, Dinner; Frozen Oral Supplement  ADULT DIET; Dysphagia - Pureed; Low Sodium (2 gm); Moderately Thick (Honey); No Drinking Straws    Anthropometric Measures:  Height: 185.4 cm (6' 0.99\")  Current Body Wt: 113.9 kg (251 lb)   BMI: 33.1        Nutrition Diagnosis:   Inadequate oral intake related to inadequate protein-energy intake as evidenced by swallow study results, intake 0-25%    Nutrition Interventions:   Food and/or Nutrient Delivery: Continue Current Diet, Continue Oral Nutrition Supplement  Nutrition Education/Counseling: No recommendation at this time  Coordination of Nutrition Care: Continue to monitor while inpatient       Goals:  Goals: PO intake 50% or greater  Type of Goal: Continue current goal  Previous Goal Met: No Progress toward Goal(s)    Nutrition Monitoring and Evaluation:   Behavioral-Environmental Outcomes: None Identified  Food/Nutrient Intake Outcomes: Food and Nutrient Intake, Supplement Intake  Physical Signs/Symptoms Outcomes: Biochemical Data, Chewing or Swallowing, Fluid Status or Edema, Skin, Weight    Discharge Planning:    Too soon  to determine     Eileen E Summerlin, MS, RD, LD  Contact: [phone]

## 2025-05-27 NOTE — PROGRESS NOTES
Urology Attending Progress Note      Subjective: patient admitted with exacerbation of COPD is doing better. Frederick placed for urinary retention. Had hematuria afterwards, was on CBI. Currently stopped as there is no bleeding.   History of LUTS. On flomax. Nocturia 3-4x.    Vitals:  /84   Pulse 70   Temp 97.2 °F (36.2 °C) (Temporal)   Resp 18   Ht 1.854 m (6' 0.99\")   Wt 114.2 kg (251 lb 12.3 oz)   SpO2 96%   BMI 33.22 kg/m²   Temp  Av.8 °F (36.6 °C)  Min: 97.2 °F (36.2 °C)  Max: 98.4 °F (36.9 °C)    Intake/Output Summary (Last 24 hours) at 2025 0933  Last data filed at 2025 0548  Gross per 24 hour   Intake 130 ml   Output 2475 ml   Net -2345 ml       Exam: Patient not in distress.  Abdomen: soft, non-tender. No mass, bladder not palpable  External genitalia normal. Frederick catheter - clear urine off CBI    Labs:  WBC:    Lab Results   Component Value Date/Time    WBC 14.2 2025 09:17 AM     Hemoglobin/Hematocrit:    Lab Results   Component Value Date/Time    HGB 13.1 2025 09:17 AM    HCT 39.6 2025 09:17 AM     BMP:    Lab Results   Component Value Date/Time     2025 02:02 AM    K 3.9 2025 02:02 AM     2025 02:02 AM    CO2 28 2025 02:02 AM    BUN 37 2025 02:02 AM    CREATININE 0.8 2025 02:02 AM    CALCIUM 8.7 2025 02:02 AM    GFRAA >59 2022 01:21 AM    LABGLOM >90 2025 02:02 AM    LABGLOM >60 2023 06:41 PM     PT/INR:    Lab Results   Component Value Date/Time    PROTIME 13.4 2023 06:41 PM    INR 1.05 2023 06:41 PM     PTT:  No results found for: \"APTT\"[APTT        Impression/Plan:   Admitted for exacerbation of COPD. Doing better.  Patient had urinary retention. History of LUTS on flomax.  Had CBI for hematuria. Clear urine off CBI. To stop CBI. Hematuria likely frederick catheter related.  As patient not mobile with knee pain, advised to keep the frederick catheter until ambulant or out of bed  and for trial of voiding in 2-5 days.  Needs cystoscopy as outpatient in the office in 4-6 weeks    Klevin Villatoro MD

## 2025-05-27 NOTE — PROGRESS NOTES
hours.    ABG:No results for input(s): \"PHART\", \"BMR4PRS\", \"PO2ART\", \"WZW9JTZ\", \"BEART\", \"HGBAE\", \"W8SOLYQU\", \"CARBOXHGBART\" in the last 72 hours.    Radiology:   No results found.    Culture Results:    No results for input(s): \"CXSURG\" in the last 720 hours.    Blood Culture Recent:   Recent Labs     05/20/25  1650   BC No growth after 5 days of incubation.       Recent Labs     05/25/25  1300   ORG Pseudomonas aeruginosa*  Staphylococcus hominis*  Staphylococcus cohnii ssp urealyticum*  Staphylococcus epidermidis*  Pseudomonas aeruginosa*  Staphylococcus epidermidis*  Enterococcus faecalis*       Assessment/Plan:   COPD exacerbation (HCC)  -Weaning prednisone  -Received doxycycline, rocephin initially   -On 2LPM    Volume overload   Acute diastolic heart failure with preserved ejection fraction (HCC)  -Placed on bumex gtt initially with good UOP  -Transitioned to IVP BID, then to PO BID  -Has had documented 17L output since admission  -Strict I/Os  -Daily BMP  -Noted improvement in SOB, still requiring supplemental oxygen at baseline 2LPM    RLE cellulitis  Peripheral edema  -Wound care nurse consulted  -Continue cefepime  -CRP elevated at 188, procal 6.11  -Wound cx growing staph hominis and pseudomonas  -Venous u/s negative for DVT    New onset atrial fibrillation  -Lovenox 1mg/kg held initially due to hematuria, restarted on 5/27  -Continue coreg  -Telemetry    Hematuria  Urinary retention, LUTS  -Urology consulted  -Continue flomax  -Received CBI initially, since dc'd  -Voiding trial when okay with urology  -Lovenox resumed    Hypothyroidism  -Continue synthroid    Type 2 diabetes mellitus, without long-term current use of insulin  -A1C 5.7, no SSI currently    Seizure disorder (HCC)  -Continue depakote    Hypertension  -Normotensive currently  -Continue lisinopril    Hypotension  -Resolved  -Required vasopressors briefly    Antibiotic: Cefepime     DVT Prophylaxis: Lovenox    Discharge planning: TBD

## 2025-05-28 ENCOUNTER — APPOINTMENT (OUTPATIENT)
Dept: GENERAL RADIOLOGY | Age: 70
End: 2025-05-28
Payer: MEDICARE

## 2025-05-28 LAB
ALBUMIN SERPL-MCNC: 2.5 G/DL (ref 3.5–5.2)
ALP SERPL-CCNC: 48 U/L (ref 40–129)
ALT SERPL-CCNC: 18 U/L (ref 10–50)
ANION GAP SERPL CALCULATED.3IONS-SCNC: 12 MMOL/L (ref 8–16)
ANISOCYTOSIS BLD QL SMEAR: ABNORMAL
AST SERPL-CCNC: 23 U/L (ref 10–50)
BASOPHILS # BLD: 0 K/UL (ref 0–0.2)
BASOPHILS NFR BLD: 0 % (ref 0–1)
BILIRUB SERPL-MCNC: 0.7 MG/DL (ref 0.2–1.2)
BUN SERPL-MCNC: 33 MG/DL (ref 8–23)
BURR CELLS BLD QL SMEAR: ABNORMAL
CALCIUM SERPL-MCNC: 8.6 MG/DL (ref 8.8–10.2)
CHLORIDE SERPL-SCNC: 102 MMOL/L (ref 98–107)
CO2 SERPL-SCNC: 28 MMOL/L (ref 22–29)
CREAT SERPL-MCNC: 1 MG/DL (ref 0.7–1.2)
DACRYOCYTES BLD QL SMEAR: ABNORMAL
EOSINOPHIL # BLD: 0.33 K/UL (ref 0–0.6)
EOSINOPHIL NFR BLD: 2 % (ref 0–5)
ERYTHROCYTE [DISTWIDTH] IN BLOOD BY AUTOMATED COUNT: 16 % (ref 11.5–14.5)
GLUCOSE SERPL-MCNC: 142 MG/DL (ref 70–99)
HCT VFR BLD AUTO: 38.7 % (ref 42–52)
HGB BLD-MCNC: 12.9 G/DL (ref 14–18)
IMM GRANULOCYTES # BLD: 1.6 K/UL
LYMPHOCYTES # BLD: 5.3 K/UL (ref 1.1–4.5)
LYMPHOCYTES NFR BLD: 32 % (ref 20–40)
MAGNESIUM SERPL-MCNC: 2.2 MG/DL (ref 1.6–2.4)
MCH RBC QN AUTO: 29.6 PG (ref 27–31)
MCHC RBC AUTO-ENTMCNC: 33.3 G/DL (ref 33–37)
MCV RBC AUTO: 88.8 FL (ref 80–94)
MONOCYTES # BLD: 0.7 K/UL (ref 0–0.9)
MONOCYTES NFR BLD: 4 % (ref 0–10)
MRSA DNA SPEC QL NAA+PROBE: NOT DETECTED
NEUTROPHILS # BLD: 10.3 K/UL (ref 1.5–7.5)
NEUTS BAND NFR BLD MANUAL: 8 % (ref 0–5)
NEUTS SEG NFR BLD: 54 % (ref 50–65)
PLATELET # BLD AUTO: 339 K/UL (ref 130–400)
PLATELET SLIDE REVIEW: ABNORMAL
PMV BLD AUTO: 10.1 FL (ref 9.4–12.4)
POTASSIUM SERPL-SCNC: 3.5 MMOL/L (ref 3.5–5)
PROCALCITONIN: 0.82 NG/ML (ref 0–0.09)
PROT SERPL-MCNC: 5.7 G/DL (ref 6.4–8.3)
RBC # BLD AUTO: 4.36 M/UL (ref 4.7–6.1)
SODIUM SERPL-SCNC: 142 MMOL/L (ref 136–145)
STOMATOCYTES BLD QL SMEAR: ABNORMAL
WBC # BLD AUTO: 16.6 K/UL (ref 4.8–10.8)

## 2025-05-28 PROCEDURE — 6370000000 HC RX 637 (ALT 250 FOR IP)

## 2025-05-28 PROCEDURE — 6370000000 HC RX 637 (ALT 250 FOR IP): Performed by: INTERNAL MEDICINE

## 2025-05-28 PROCEDURE — 94660 CPAP INITIATION&MGMT: CPT

## 2025-05-28 PROCEDURE — 2580000003 HC RX 258

## 2025-05-28 PROCEDURE — 6360000002 HC RX W HCPCS: Performed by: INTERNAL MEDICINE

## 2025-05-28 PROCEDURE — 94669 MECHANICAL CHEST WALL OSCILL: CPT

## 2025-05-28 PROCEDURE — 36415 COLL VENOUS BLD VENIPUNCTURE: CPT

## 2025-05-28 PROCEDURE — 2500000003 HC RX 250 WO HCPCS: Performed by: INTERNAL MEDICINE

## 2025-05-28 PROCEDURE — 74018 RADEX ABDOMEN 1 VIEW: CPT

## 2025-05-28 PROCEDURE — 97110 THERAPEUTIC EXERCISES: CPT

## 2025-05-28 PROCEDURE — 6360000002 HC RX W HCPCS

## 2025-05-28 PROCEDURE — 97530 THERAPEUTIC ACTIVITIES: CPT

## 2025-05-28 PROCEDURE — 2700000000 HC OXYGEN THERAPY PER DAY

## 2025-05-28 PROCEDURE — 94640 AIRWAY INHALATION TREATMENT: CPT

## 2025-05-28 PROCEDURE — 80053 COMPREHEN METABOLIC PANEL: CPT

## 2025-05-28 PROCEDURE — 83735 ASSAY OF MAGNESIUM: CPT

## 2025-05-28 PROCEDURE — 94150 VITAL CAPACITY TEST: CPT

## 2025-05-28 PROCEDURE — 87641 MR-STAPH DNA AMP PROBE: CPT

## 2025-05-28 PROCEDURE — 94761 N-INVAS EAR/PLS OXIMETRY MLT: CPT

## 2025-05-28 PROCEDURE — 84145 PROCALCITONIN (PCT): CPT

## 2025-05-28 PROCEDURE — 85025 COMPLETE CBC W/AUTO DIFF WBC: CPT

## 2025-05-28 PROCEDURE — 1200000000 HC SEMI PRIVATE

## 2025-05-28 RX ORDER — BISACODYL 10 MG
10 SUPPOSITORY, RECTAL RECTAL DAILY
Status: DISCONTINUED | OUTPATIENT
Start: 2025-05-28 | End: 2025-05-31 | Stop reason: HOSPADM

## 2025-05-28 RX ORDER — ENOXAPARIN SODIUM 150 MG/ML
1 INJECTION SUBCUTANEOUS 2 TIMES DAILY
Status: DISCONTINUED | OUTPATIENT
Start: 2025-05-28 | End: 2025-05-31 | Stop reason: HOSPADM

## 2025-05-28 RX ORDER — ENOXAPARIN SODIUM 150 MG/ML
1 INJECTION SUBCUTANEOUS 2 TIMES DAILY
Status: DISCONTINUED | OUTPATIENT
Start: 2025-05-28 | End: 2025-05-28

## 2025-05-28 RX ORDER — LACTULOSE 10 G/15ML
20 SOLUTION ORAL 2 TIMES DAILY
Status: DISCONTINUED | OUTPATIENT
Start: 2025-05-28 | End: 2025-05-31 | Stop reason: HOSPADM

## 2025-05-28 RX ADMIN — ENOXAPARIN SODIUM 105 MG: 150 INJECTION SUBCUTANEOUS at 21:56

## 2025-05-28 RX ADMIN — IPRATROPIUM BROMIDE 0.5 MG: 0.5 SOLUTION RESPIRATORY (INHALATION) at 14:22

## 2025-05-28 RX ADMIN — SODIUM CHLORIDE, PRESERVATIVE FREE 10 ML: 5 INJECTION INTRAVENOUS at 09:11

## 2025-05-28 RX ADMIN — BUDESONIDE 500 MCG: 0.5 INHALANT ORAL at 07:30

## 2025-05-28 RX ADMIN — ENOXAPARIN SODIUM 120 MG: 120 INJECTION SUBCUTANEOUS at 09:09

## 2025-05-28 RX ADMIN — TAMSULOSIN HYDROCHLORIDE 0.4 MG: 0.4 CAPSULE ORAL at 09:10

## 2025-05-28 RX ADMIN — BUMETANIDE 1 MG: 1 TABLET ORAL at 17:20

## 2025-05-28 RX ADMIN — PIPERACILLIN AND TAZOBACTAM 3375 MG: 3; .375 INJECTION, POWDER, LYOPHILIZED, FOR SOLUTION INTRAVENOUS at 05:33

## 2025-05-28 RX ADMIN — LEVOTHYROXINE SODIUM 137 MCG: 0.14 TABLET ORAL at 05:34

## 2025-05-28 RX ADMIN — IPRATROPIUM BROMIDE 0.5 MG: 0.5 SOLUTION RESPIRATORY (INHALATION) at 07:30

## 2025-05-28 RX ADMIN — BUMETANIDE 1 MG: 1 TABLET ORAL at 09:10

## 2025-05-28 RX ADMIN — PREDNISONE 40 MG: 20 TABLET ORAL at 09:10

## 2025-05-28 RX ADMIN — LISINOPRIL 5 MG: 10 TABLET ORAL at 09:11

## 2025-05-28 RX ADMIN — DIVALPROEX SODIUM 125 MG: 125 CAPSULE, COATED PELLETS ORAL at 13:45

## 2025-05-28 RX ADMIN — CARVEDILOL 6.25 MG: 6.25 TABLET, FILM COATED ORAL at 17:20

## 2025-05-28 RX ADMIN — ACETAMINOPHEN 650 MG: 325 TABLET ORAL at 11:03

## 2025-05-28 RX ADMIN — TAMSULOSIN HYDROCHLORIDE 0.4 MG: 0.4 CAPSULE ORAL at 21:56

## 2025-05-28 RX ADMIN — SODIUM CHLORIDE, PRESERVATIVE FREE 10 ML: 5 INJECTION INTRAVENOUS at 22:02

## 2025-05-28 RX ADMIN — BISACODYL 10 MG: 10 SUPPOSITORY RECTAL at 17:20

## 2025-05-28 RX ADMIN — COLLAGENASE SANTYL: 250 OINTMENT TOPICAL at 11:45

## 2025-05-28 RX ADMIN — SENNOSIDES AND DOCUSATE SODIUM 1 TABLET: 50; 8.6 TABLET ORAL at 09:10

## 2025-05-28 RX ADMIN — BUDESONIDE 500 MCG: 0.5 INHALANT ORAL at 18:53

## 2025-05-28 RX ADMIN — SENNOSIDES AND DOCUSATE SODIUM 1 TABLET: 50; 8.6 TABLET ORAL at 21:56

## 2025-05-28 RX ADMIN — RISPERIDONE 0.5 MG: 0.25 TABLET, FILM COATED ORAL at 09:10

## 2025-05-28 RX ADMIN — LACTULOSE 20 G: 20 SOLUTION ORAL at 11:03

## 2025-05-28 RX ADMIN — PIPERACILLIN AND TAZOBACTAM 3375 MG: 3; .375 INJECTION, POWDER, LYOPHILIZED, FOR SOLUTION INTRAVENOUS at 22:01

## 2025-05-28 RX ADMIN — ARFORMOTEROL TARTRATE 15 MCG: 15 SOLUTION RESPIRATORY (INHALATION) at 18:53

## 2025-05-28 RX ADMIN — DIVALPROEX SODIUM 125 MG: 125 CAPSULE, COATED PELLETS ORAL at 05:34

## 2025-05-28 RX ADMIN — POLYETHYLENE GLYCOL 3350 17 G: 17 POWDER, FOR SOLUTION ORAL at 09:10

## 2025-05-28 RX ADMIN — Medication 3 EACH: at 11:45

## 2025-05-28 RX ADMIN — CARVEDILOL 6.25 MG: 6.25 TABLET, FILM COATED ORAL at 09:11

## 2025-05-28 RX ADMIN — ARFORMOTEROL TARTRATE 15 MCG: 15 SOLUTION RESPIRATORY (INHALATION) at 07:30

## 2025-05-28 RX ADMIN — PANTOPRAZOLE SODIUM 40 MG: 40 TABLET, DELAYED RELEASE ORAL at 05:34

## 2025-05-28 RX ADMIN — IPRATROPIUM BROMIDE 0.5 MG: 0.5 SOLUTION RESPIRATORY (INHALATION) at 11:30

## 2025-05-28 RX ADMIN — RISPERIDONE 0.5 MG: 0.25 TABLET, FILM COATED ORAL at 21:56

## 2025-05-28 RX ADMIN — DIVALPROEX SODIUM 125 MG: 125 CAPSULE, COATED PELLETS ORAL at 21:56

## 2025-05-28 RX ADMIN — TIZANIDINE 4 MG: 4 TABLET ORAL at 11:03

## 2025-05-28 RX ADMIN — PIPERACILLIN AND TAZOBACTAM 3375 MG: 3; .375 INJECTION, POWDER, LYOPHILIZED, FOR SOLUTION INTRAVENOUS at 13:47

## 2025-05-28 RX ADMIN — ATORVASTATIN CALCIUM 80 MG: 80 TABLET, FILM COATED ORAL at 21:56

## 2025-05-28 RX ADMIN — IPRATROPIUM BROMIDE 0.5 MG: 0.5 SOLUTION RESPIRATORY (INHALATION) at 18:53

## 2025-05-28 RX ADMIN — COLLAGENASE SANTYL: 250 OINTMENT TOPICAL at 22:02

## 2025-05-28 ASSESSMENT — PAIN DESCRIPTION - LOCATION: LOCATION: GENERALIZED

## 2025-05-28 ASSESSMENT — PAIN SCALES - GENERAL: PAINLEVEL_OUTOF10: 6

## 2025-05-28 ASSESSMENT — PAIN DESCRIPTION - DESCRIPTORS: DESCRIPTORS: DISCOMFORT

## 2025-05-28 NOTE — PROGRESS NOTES
This  visited with pt and pt's sister to follow up and provide spiritual care. Pt responded with \"I'm good\" when asked about spiritual needs or distress. Pt says he is Baptism and said yes when asked about his belle. This  provided spiritual care with sustaining presence, support, and prayer. Pt and his sister expressed gratitude for spiritual care.         Spiritual Health History and Assessment/Progress Note  [facility]    Spiritual/Emotional Needs,  , Life Adjustments,      Name: Khris Sampson MRN: [id number]    Age: 70 y.o.     Sex: male   Language: English   Holiness: None   COPD exacerbation (HCC)     Date: 5/28/2025            Total Time Calculated: 10 min              Spiritual Assessment continued in [facility] 4 ONCOLOGY UNIT        Referral/Consult From: Palliative Care   Encounter Overview/Reason: Spiritual/Emotional Needs  Service Provided For: Patient, Family    Belle, Belief, Meaning:   Patient identifies as spiritual and is connected with a belle tradition or spiritual practice  Family/Friends identify as spiritual and are connected with a belle tradition or spiritual practice      Importance and Influence:  Patient has spiritual/personal beliefs that influence decisions regarding their health  Family/Friends have spiritual/personal beliefs that influence decisions regarding the patient's health    Community:  Patient is connected with a spiritual community  Family/Friends are connected with a spiritual community:    Assessment and Plan of Care:     Patient Interventions include: Provided sacramental/Samaritan ritual  Family/Friends Interventions include: Provided sacramental/Samaritan ritual    Patient Plan of Care: Spiritual Care available upon further referral  Family/Friends Plan of Care: Spiritual Care available upon further referral    Electronically signed by Mary Behrens, Chaplain on 5/28/2025 at 3:27 PM

## 2025-05-28 NOTE — PROGRESS NOTES
Physical Therapy    Name: Khris Sampson  MRN: [id number]  Date of service: 5/28/2025 05/28/25 1330   Restrictions/Precautions   Restrictions/Precautions General Precautions;Contact Precautions   General   Diagnosis acute CHF, SIRS, COPD   General   General Comments Pt in bed, sister present   Subjective   Subjective Pt agreed to therapy and wants to try getting in a chair sometime soon   Vitals   O2 Device Nasal cannula   Pain   Pain Location Right;Leg   Observation/Palpation   Observation telemetry, IV, O2, external urinary cath   Edema difficult to understand   Bed mobility   Rolling to Left Minimal assistance   Rolling to Right Minimal assistance   Bed Mobility Comments pt rolled, did not attempt sitting EOB   PT Exercises   Exercise Treatment ankle pumps, Heel slides,  hip ABD, and quad sets x 10 bilateral. AAROM on RLE due to previous stroke. UE elbow flexion, and cane presses toward ceiling x 10. Slight uncoordination with RUE but able to perform   Patient Goals    Patient Goals  get better, stronger   Short Term Goals   Time Frame for Short Term Goals 2 wks   Short Term Goal 1 supine to sit indep   Short Term Goal 2 sit to stand Min A   Short Term Goal 3 bed to chair Min A with RW   Short Term Goal 4 amb. 25' with RW SBA   Activity Tolerance   Activity Tolerance Patient tolerated treatment well   Assessment   Assessment Pt did well this session, has improved. Discussed getting to chair at next session either with lift or SS depending on sitting EOB. Pt will also need different chair to be lifted to. Pt performed rolling and bed exercises this session. Sister present during session and notified nursing.   Discharge Recommendations Continue to assess pending progress;24 hour supervision or assist;Patient would benefit from continued therapy after discharge   Physical Therapy Plan   General Plan 3-5 times per week   Therapy Duration 2 Weeks   Current Treatment Recommendations Strengthening;ROM;Balance  training;Functional mobility training;Transfer training;Gait training;Endurance training;Patient/Caregiver education & training;Safety education & training;Positioning;Equipment evaluation, education, & procurement;Therapeutic activities   PT Plan of Care   Wednesday X   Safety Devices   Type of Devices Left in bed;Bed alarm in place;Call light within reach;Nurse notified   PT Whiteboard Notes   Therapy Whiteboard RE 6/9 COPD, CHF, lift vs. SS?   Recommendation   Requires PT Follow-Up Yes           Electronically signed by Julian Braga PTA on 5/28/2025 at 3:17 PM

## 2025-05-28 NOTE — PROGRESS NOTES
[facility]    [facility]    Patient:  Khris Sampson  YOB: 1955  Date of Service: 5/28/2025  MRN: [id number]   Acct: [id number]   Primary Care Physician: Nader Gordillo MD  Advance Directive: Full Code  Admit Date: 5/20/2025       Hospital Day: 8    Portions of this note have been copied forward, however, updated to reflect the most current clinical status of this patient.     CHIEF COMPLAINT: SOB    SUBJECTIVE: CBI stopped, SOB baseline    CUMULATIVE HOSPITAL COURSE:     This patient is a 70-year-old male with PMH CVA with right-sided residual weakness, seizure disorder, chronic hypoxic respiratory failure on 2 LPM at baseline, CKD stage IIIa, hypertension who presented to [facility] ED from [facility] on 5/20 for evaluation of shortness of breath and worsening BLE edema.  Of note, patient was recently admitted to [facility] on 4/3 and treated for ZARA and acute respiratory failure due to LLL pneumonia requiring IV antibiotics and IVF, discharged in stable condition on 4/9.  In ED, concern for volume overload initially and he received Lasix 80 mg IV x 1.  He subsequently became hypotensive, requiring Levophed briefly.  Initiated on Rocephin, doxycycline, and steroids due to concern for COPD exacerbation.  Upon arrival to unit, was placed on Bumex gtt with good urine output and improvement in symptoms.  Developed new onset atrial fibrillation and received digoxin, added Coreg with conversion to NSR and initiated on full dose Lovenox.  Echo indicates EF 60% with normal wall motion, technically difficult study.  He was downgraded to MedSurg. RLE edema worsened, venous ultrasound obtained and negative for DVT.  On 5/24, developed hematuria and urinary retention and was initiated on CBI with urology consult.  Anticoagulation was held. Urine has cleared and CBI discontinued. Lovenox since resumed. Will attempt voiding trial this afternoon. Bumex has since been transition to PO.

## 2025-05-29 LAB
ALBUMIN SERPL-MCNC: 2.4 G/DL (ref 3.5–5.2)
ALP SERPL-CCNC: 50 U/L (ref 40–129)
ALT SERPL-CCNC: 19 U/L (ref 10–50)
ANION GAP SERPL CALCULATED.3IONS-SCNC: 11 MMOL/L (ref 8–16)
AST SERPL-CCNC: 21 U/L (ref 10–50)
BACTERIA URNS QL MICRO: NEGATIVE /HPF
BASOPHILS # BLD: 0.1 K/UL (ref 0–0.2)
BASOPHILS NFR BLD: 0.5 % (ref 0–1)
BILIRUB SERPL-MCNC: 0.6 MG/DL (ref 0.2–1.2)
BILIRUB UR QL STRIP: NEGATIVE
BUN SERPL-MCNC: 32 MG/DL (ref 8–23)
CALCIUM SERPL-MCNC: 8.1 MG/DL (ref 8.8–10.2)
CHLORIDE SERPL-SCNC: 101 MMOL/L (ref 98–107)
CLARITY UR: CLEAR
CO2 SERPL-SCNC: 28 MMOL/L (ref 22–29)
COLOR UR: YELLOW
CREAT SERPL-MCNC: 1 MG/DL (ref 0.7–1.2)
CRYSTALS URNS MICRO: ABNORMAL /HPF
EOSINOPHIL # BLD: 0 K/UL (ref 0–0.6)
EOSINOPHIL NFR BLD: 0.2 % (ref 0–5)
EPI CELLS #/AREA URNS AUTO: 0 /HPF (ref 0–5)
ERYTHROCYTE [DISTWIDTH] IN BLOOD BY AUTOMATED COUNT: 16.2 % (ref 11.5–14.5)
GLUCOSE BLD-MCNC: 166 MG/DL (ref 70–99)
GLUCOSE SERPL-MCNC: 171 MG/DL (ref 70–99)
GLUCOSE UR STRIP.AUTO-MCNC: NEGATIVE MG/DL
HCT VFR BLD AUTO: 39.3 % (ref 42–52)
HGB BLD-MCNC: 13.3 G/DL (ref 14–18)
HGB UR STRIP.AUTO-MCNC: ABNORMAL MG/L
HYALINE CASTS #/AREA URNS AUTO: 0 /HPF (ref 0–8)
IMM GRANULOCYTES # BLD: 1.2 K/UL
KETONES UR STRIP.AUTO-MCNC: NEGATIVE MG/DL
LEUKOCYTE ESTERASE UR QL STRIP.AUTO: NEGATIVE
LYMPHOCYTES # BLD: 2.8 K/UL (ref 1.1–4.5)
LYMPHOCYTES NFR BLD: 16.9 % (ref 20–40)
MAGNESIUM SERPL-MCNC: 2 MG/DL (ref 1.6–2.4)
MCH RBC QN AUTO: 30.2 PG (ref 27–31)
MCHC RBC AUTO-ENTMCNC: 33.8 G/DL (ref 33–37)
MCV RBC AUTO: 89.1 FL (ref 80–94)
MONOCYTES # BLD: 1.3 K/UL (ref 0–0.9)
MONOCYTES NFR BLD: 7.7 % (ref 0–10)
NEUTROPHILS # BLD: 11.2 K/UL (ref 1.5–7.5)
NEUTS SEG NFR BLD: 67.3 % (ref 50–65)
NITRITE UR QL STRIP.AUTO: NEGATIVE
PERFORMED ON: ABNORMAL
PH UR STRIP.AUTO: 7.5 [PH] (ref 5–8)
PLATELET # BLD AUTO: 356 K/UL (ref 130–400)
PMV BLD AUTO: 10.1 FL (ref 9.4–12.4)
POTASSIUM SERPL-SCNC: 3.5 MMOL/L (ref 3.5–5)
PROT SERPL-MCNC: 5.9 G/DL (ref 6.4–8.3)
PROT UR STRIP.AUTO-MCNC: NEGATIVE MG/DL
RBC # BLD AUTO: 4.41 M/UL (ref 4.7–6.1)
RBC #/AREA URNS AUTO: 40 /HPF (ref 0–4)
SODIUM SERPL-SCNC: 140 MMOL/L (ref 136–145)
SP GR UR STRIP.AUTO: 1.01 (ref 1–1.03)
UROBILINOGEN UR STRIP.AUTO-MCNC: 1 E.U./DL
WBC # BLD AUTO: 16.7 K/UL (ref 4.8–10.8)
WBC #/AREA URNS AUTO: 1 /HPF (ref 0–5)

## 2025-05-29 PROCEDURE — 6370000000 HC RX 637 (ALT 250 FOR IP): Performed by: INTERNAL MEDICINE

## 2025-05-29 PROCEDURE — 6370000000 HC RX 637 (ALT 250 FOR IP)

## 2025-05-29 PROCEDURE — 2700000000 HC OXYGEN THERAPY PER DAY

## 2025-05-29 PROCEDURE — 94669 MECHANICAL CHEST WALL OSCILL: CPT

## 2025-05-29 PROCEDURE — 6360000002 HC RX W HCPCS: Performed by: INTERNAL MEDICINE

## 2025-05-29 PROCEDURE — 6360000002 HC RX W HCPCS

## 2025-05-29 PROCEDURE — 94640 AIRWAY INHALATION TREATMENT: CPT

## 2025-05-29 PROCEDURE — 94660 CPAP INITIATION&MGMT: CPT

## 2025-05-29 PROCEDURE — 85025 COMPLETE CBC W/AUTO DIFF WBC: CPT

## 2025-05-29 PROCEDURE — 81001 URINALYSIS AUTO W/SCOPE: CPT

## 2025-05-29 PROCEDURE — 94761 N-INVAS EAR/PLS OXIMETRY MLT: CPT

## 2025-05-29 PROCEDURE — 1200000000 HC SEMI PRIVATE

## 2025-05-29 PROCEDURE — 36415 COLL VENOUS BLD VENIPUNCTURE: CPT

## 2025-05-29 PROCEDURE — 82962 GLUCOSE BLOOD TEST: CPT

## 2025-05-29 PROCEDURE — 2580000003 HC RX 258

## 2025-05-29 PROCEDURE — 83735 ASSAY OF MAGNESIUM: CPT

## 2025-05-29 PROCEDURE — 2500000003 HC RX 250 WO HCPCS: Performed by: INTERNAL MEDICINE

## 2025-05-29 PROCEDURE — 80053 COMPREHEN METABOLIC PANEL: CPT

## 2025-05-29 RX ORDER — PREDNISONE 20 MG/1
10 TABLET ORAL DAILY
Status: DISCONTINUED | OUTPATIENT
Start: 2025-06-05 | End: 2025-05-31 | Stop reason: HOSPADM

## 2025-05-29 RX ORDER — PREDNISONE 20 MG/1
30 TABLET ORAL DAILY
Status: DISCONTINUED | OUTPATIENT
Start: 2025-05-30 | End: 2025-05-31 | Stop reason: HOSPADM

## 2025-05-29 RX ORDER — PREDNISONE 5 MG/1
5 TABLET ORAL DAILY
Status: DISCONTINUED | OUTPATIENT
Start: 2025-06-08 | End: 2025-05-31 | Stop reason: HOSPADM

## 2025-05-29 RX ORDER — PREDNISONE 20 MG/1
20 TABLET ORAL DAILY
Status: DISCONTINUED | OUTPATIENT
Start: 2025-06-02 | End: 2025-05-31 | Stop reason: HOSPADM

## 2025-05-29 RX ADMIN — ENOXAPARIN SODIUM 105 MG: 150 INJECTION SUBCUTANEOUS at 21:24

## 2025-05-29 RX ADMIN — ARFORMOTEROL TARTRATE 15 MCG: 15 SOLUTION RESPIRATORY (INHALATION) at 19:32

## 2025-05-29 RX ADMIN — BUDESONIDE 500 MCG: 0.5 INHALANT ORAL at 06:53

## 2025-05-29 RX ADMIN — CARVEDILOL 6.25 MG: 6.25 TABLET, FILM COATED ORAL at 17:21

## 2025-05-29 RX ADMIN — TIZANIDINE 4 MG: 4 TABLET ORAL at 21:24

## 2025-05-29 RX ADMIN — Medication 3 EACH: at 09:53

## 2025-05-29 RX ADMIN — COLLAGENASE SANTYL: 250 OINTMENT TOPICAL at 09:54

## 2025-05-29 RX ADMIN — TAMSULOSIN HYDROCHLORIDE 0.4 MG: 0.4 CAPSULE ORAL at 21:24

## 2025-05-29 RX ADMIN — BUMETANIDE 1 MG: 1 TABLET ORAL at 09:52

## 2025-05-29 RX ADMIN — ENOXAPARIN SODIUM 105 MG: 150 INJECTION SUBCUTANEOUS at 09:56

## 2025-05-29 RX ADMIN — RISPERIDONE 0.5 MG: 0.25 TABLET, FILM COATED ORAL at 09:53

## 2025-05-29 RX ADMIN — DIVALPROEX SODIUM 125 MG: 125 CAPSULE, COATED PELLETS ORAL at 21:34

## 2025-05-29 RX ADMIN — IPRATROPIUM BROMIDE 0.5 MG: 0.5 SOLUTION RESPIRATORY (INHALATION) at 11:00

## 2025-05-29 RX ADMIN — RISPERIDONE 0.5 MG: 0.25 TABLET, FILM COATED ORAL at 21:24

## 2025-05-29 RX ADMIN — SODIUM CHLORIDE, PRESERVATIVE FREE 10 ML: 5 INJECTION INTRAVENOUS at 09:54

## 2025-05-29 RX ADMIN — PREDNISONE 40 MG: 20 TABLET ORAL at 09:51

## 2025-05-29 RX ADMIN — ACETAMINOPHEN 650 MG: 325 TABLET ORAL at 17:24

## 2025-05-29 RX ADMIN — LEVOTHYROXINE SODIUM 137 MCG: 0.14 TABLET ORAL at 05:47

## 2025-05-29 RX ADMIN — IPRATROPIUM BROMIDE 0.5 MG: 0.5 SOLUTION RESPIRATORY (INHALATION) at 19:32

## 2025-05-29 RX ADMIN — DIVALPROEX SODIUM 125 MG: 125 CAPSULE, COATED PELLETS ORAL at 05:47

## 2025-05-29 RX ADMIN — COLLAGENASE SANTYL: 250 OINTMENT TOPICAL at 21:24

## 2025-05-29 RX ADMIN — ARFORMOTEROL TARTRATE 15 MCG: 15 SOLUTION RESPIRATORY (INHALATION) at 06:53

## 2025-05-29 RX ADMIN — DIVALPROEX SODIUM 125 MG: 125 CAPSULE, COATED PELLETS ORAL at 17:21

## 2025-05-29 RX ADMIN — BUMETANIDE 1 MG: 1 TABLET ORAL at 17:21

## 2025-05-29 RX ADMIN — PIPERACILLIN AND TAZOBACTAM 3375 MG: 3; .375 INJECTION, POWDER, LYOPHILIZED, FOR SOLUTION INTRAVENOUS at 17:22

## 2025-05-29 RX ADMIN — IPRATROPIUM BROMIDE 0.5 MG: 0.5 SOLUTION RESPIRATORY (INHALATION) at 06:52

## 2025-05-29 RX ADMIN — CARVEDILOL 6.25 MG: 6.25 TABLET, FILM COATED ORAL at 09:52

## 2025-05-29 RX ADMIN — PIPERACILLIN AND TAZOBACTAM 3375 MG: 3; .375 INJECTION, POWDER, LYOPHILIZED, FOR SOLUTION INTRAVENOUS at 05:48

## 2025-05-29 RX ADMIN — PIPERACILLIN AND TAZOBACTAM 3375 MG: 3; .375 INJECTION, POWDER, LYOPHILIZED, FOR SOLUTION INTRAVENOUS at 21:38

## 2025-05-29 RX ADMIN — IPRATROPIUM BROMIDE 0.5 MG: 0.5 SOLUTION RESPIRATORY (INHALATION) at 14:37

## 2025-05-29 RX ADMIN — LISINOPRIL 5 MG: 10 TABLET ORAL at 09:51

## 2025-05-29 RX ADMIN — BUDESONIDE 500 MCG: 0.5 INHALANT ORAL at 19:32

## 2025-05-29 RX ADMIN — ATORVASTATIN CALCIUM 80 MG: 80 TABLET, FILM COATED ORAL at 21:24

## 2025-05-29 RX ADMIN — TAMSULOSIN HYDROCHLORIDE 0.4 MG: 0.4 CAPSULE ORAL at 09:51

## 2025-05-29 RX ADMIN — PANTOPRAZOLE SODIUM 40 MG: 40 TABLET, DELAYED RELEASE ORAL at 05:47

## 2025-05-29 ASSESSMENT — PAIN DESCRIPTION - DESCRIPTORS: DESCRIPTORS: PATIENT UNABLE TO DESCRIBE

## 2025-05-29 ASSESSMENT — PAIN DESCRIPTION - ORIENTATION: ORIENTATION: RIGHT

## 2025-05-29 ASSESSMENT — PAIN - FUNCTIONAL ASSESSMENT: PAIN_FUNCTIONAL_ASSESSMENT: PREVENTS OR INTERFERES SOME ACTIVE ACTIVITIES AND ADLS

## 2025-05-29 ASSESSMENT — PAIN DESCRIPTION - LOCATION: LOCATION: LEG

## 2025-05-29 NOTE — PROGRESS NOTES
[facility]    Progress Note    Patient:  Khris Sampson  YOB: 1955  Date of Service: 5/29/2025  MRN: [id number]   Acct: [id number]   Primary Care Physician: Nader Gordillo MD  Advance Directive: Full Code  Admit Date: 5/20/2025       Hospital Day: 9    Portions of this note have been copied forward, however, updated to reflect the most current clinical status of this patient.     CHIEF COMPLAINT: SOB    SUBJECTIVE: No complaints    CUMULATIVE HOSPITAL COURSE:     This patient is a 70-year-old male with PMH CVA with right-sided residual weakness, seizure disorder, chronic hypoxic respiratory failure on 2 LPM at baseline, CKD stage IIIa, hypertension who presented to [facility] ED from [facility] on 5/20 for evaluation of shortness of breath and worsening BLE edema.  Of note, patient was recently admitted to [facility] on 4/3 and treated for ZARA and acute respiratory failure due to LLL pneumonia requiring IV antibiotics and IVF, discharged in stable condition on 4/9.  In ED, concern for volume overload initially and he received Lasix 80 mg IV x 1.  He subsequently became hypotensive, requiring Levophed briefly.  Initiated on Rocephin, doxycycline, and steroids due to concern for COPD exacerbation.  Upon arrival to unit, was placed on Bumex gtt with good urine output and improvement in symptoms.  Developed new onset atrial fibrillation and received digoxin, added Coreg with conversion to NSR and initiated on full dose Lovenox.  Echo indicates EF 60% with normal wall motion, technically difficult study.  He was downgraded to MedSurg. RLE edema worsened, venous ultrasound obtained and negative for DVT. Patient came to the facility with significant BLE edema, subsequently developed blisters that have ruptured and wound care is following with recommendations in chart. On 5/24, developed hematuria and urinary retention and was initiated on CBI with urology consult.  Anticoagulation was  held. Urine has cleared and CBI discontinued. Lovenox since resumed. Will attempt voiding trial this afternoon. Bumex has since been transition to PO. Cefepime was initiated due to concern for RLE cellulitis.  Wound culture obtained and growing Pseudomonas and Staphylococcus hominis, however this was not a sterile collection. Abx transitioned to Zosyn per pharmacy recommendation. CXR obtained due to ongoing SOB and now indicates RLL pneumonia, resolution of LLL pna. This should be covered by Zosyn and MRSA swab was negative.  SLP evaluated the patient, concern for aspiration and placed on modified diet.  Procalcitonin has improved to 0.2, however has persistent leukocytosis with WBC 16. Continuing wound care per wound care nurse recommendations in chart. He is now experiencing some hematuria/bleeding around urethra. Will notify urology. H&H is stable.     Plan to discharge tomorrow back to [facility] if stable.     Objective   VITALS: BP (!) 106/56   Pulse 83   Temp 97.3 °F (36.3 °C) (Temporal)   Resp 18   Ht 1.854 m (6' 0.99\")   Wt 110.5 kg (243 lb 9.7 oz)   SpO2 95%   BMI 32.15 kg/m²     24HR INTAKE/OUTPUT:   Intake/Output Summary (Last 24 hours) at 5/29/2025 1224  Last data filed at 5/29/2025 0459  Gross per 24 hour   Intake --   Output 650 ml   Net -650 ml     Physical Exam  Constitutional:       Appearance: He is ill-appearing (Chronic).   Cardiovascular:      Rate and Rhythm: Normal rate and regular rhythm.      Pulses: Normal pulses.   Pulmonary:      Effort: Pulmonary effort is normal.      Breath sounds: Normal breath sounds.      Comments: On 2LPM  Abdominal:      General: Abdomen is flat.      Palpations: Abdomen is soft.   Musculoskeletal:      Right lower leg: Edema present.      Left lower leg: Edema present.      Comments: RLE wrapped with dressing  Unna boot in place to LLE  Multiple lower extremity wounds, see photos below   Skin:     General: Skin is warm and dry.      Capillary

## 2025-05-30 ENCOUNTER — APPOINTMENT (OUTPATIENT)
Dept: CT IMAGING | Age: 70
End: 2025-05-30
Payer: MEDICARE

## 2025-05-30 PROBLEM — E43 SEVERE MALNUTRITION: Status: ACTIVE | Noted: 2025-05-30

## 2025-05-30 LAB
ALBUMIN SERPL-MCNC: 2.2 G/DL (ref 3.5–5.2)
ALP SERPL-CCNC: 45 U/L (ref 40–129)
ALT SERPL-CCNC: 15 U/L (ref 10–50)
ANION GAP SERPL CALCULATED.3IONS-SCNC: 11 MMOL/L (ref 8–16)
AST SERPL-CCNC: 19 U/L (ref 10–50)
BACTERIA SPEC ANAEROBE CULT: ABNORMAL
BACTERIA SPEC ANAEROBE CULT: ABNORMAL
BACTERIA SPEC ANAEROBE+AEROBE CULT: ABNORMAL
BASOPHILS # BLD: 0.1 K/UL (ref 0–0.2)
BASOPHILS NFR BLD: 0.3 % (ref 0–1)
BILIRUB SERPL-MCNC: 0.7 MG/DL (ref 0.2–1.2)
BUN SERPL-MCNC: 31 MG/DL (ref 8–23)
CALCIUM SERPL-MCNC: 7.9 MG/DL (ref 8.8–10.2)
CHLORIDE SERPL-SCNC: 101 MMOL/L (ref 98–107)
CO2 SERPL-SCNC: 28 MMOL/L (ref 22–29)
CREAT SERPL-MCNC: 1.2 MG/DL (ref 0.7–1.2)
EOSINOPHIL # BLD: 0 K/UL (ref 0–0.6)
EOSINOPHIL NFR BLD: 0.2 % (ref 0–5)
ERYTHROCYTE [DISTWIDTH] IN BLOOD BY AUTOMATED COUNT: 16.1 % (ref 11.5–14.5)
GLUCOSE SERPL-MCNC: 145 MG/DL (ref 70–99)
GRAM STN SPEC: ABNORMAL
GRAM STN SPEC: ABNORMAL
HCT VFR BLD AUTO: 37.7 % (ref 42–52)
HGB BLD-MCNC: 12.4 G/DL (ref 14–18)
IMM GRANULOCYTES # BLD: 0.8 K/UL
LYMPHOCYTES # BLD: 2.8 K/UL (ref 1.1–4.5)
LYMPHOCYTES NFR BLD: 16.7 % (ref 20–40)
MCH RBC QN AUTO: 29.2 PG (ref 27–31)
MCHC RBC AUTO-ENTMCNC: 32.9 G/DL (ref 33–37)
MCV RBC AUTO: 88.9 FL (ref 80–94)
MONOCYTES # BLD: 1.2 K/UL (ref 0–0.9)
MONOCYTES NFR BLD: 7.1 % (ref 0–10)
NEUTROPHILS # BLD: 11.8 K/UL (ref 1.5–7.5)
NEUTS SEG NFR BLD: 71 % (ref 50–65)
ORGANISM: ABNORMAL
PLATELET # BLD AUTO: 397 K/UL (ref 130–400)
PMV BLD AUTO: 10.3 FL (ref 9.4–12.4)
POTASSIUM SERPL-SCNC: 3.8 MMOL/L (ref 3.5–5)
PROT SERPL-MCNC: 5.5 G/DL (ref 6.4–8.3)
RBC # BLD AUTO: 4.24 M/UL (ref 4.7–6.1)
SODIUM SERPL-SCNC: 140 MMOL/L (ref 136–145)
WBC # BLD AUTO: 16.6 K/UL (ref 4.8–10.8)

## 2025-05-30 PROCEDURE — 6370000000 HC RX 637 (ALT 250 FOR IP): Performed by: INTERNAL MEDICINE

## 2025-05-30 PROCEDURE — 51798 US URINE CAPACITY MEASURE: CPT

## 2025-05-30 PROCEDURE — 6370000000 HC RX 637 (ALT 250 FOR IP)

## 2025-05-30 PROCEDURE — 1200000000 HC SEMI PRIVATE

## 2025-05-30 PROCEDURE — 6360000002 HC RX W HCPCS

## 2025-05-30 PROCEDURE — 97530 THERAPEUTIC ACTIVITIES: CPT

## 2025-05-30 PROCEDURE — 80053 COMPREHEN METABOLIC PANEL: CPT

## 2025-05-30 PROCEDURE — 94760 N-INVAS EAR/PLS OXIMETRY 1: CPT

## 2025-05-30 PROCEDURE — 36415 COLL VENOUS BLD VENIPUNCTURE: CPT

## 2025-05-30 PROCEDURE — 74176 CT ABD & PELVIS W/O CONTRAST: CPT

## 2025-05-30 PROCEDURE — 6360000002 HC RX W HCPCS: Performed by: INTERNAL MEDICINE

## 2025-05-30 PROCEDURE — 85025 COMPLETE CBC W/AUTO DIFF WBC: CPT

## 2025-05-30 PROCEDURE — 94669 MECHANICAL CHEST WALL OSCILL: CPT

## 2025-05-30 PROCEDURE — 2700000000 HC OXYGEN THERAPY PER DAY

## 2025-05-30 PROCEDURE — 94640 AIRWAY INHALATION TREATMENT: CPT

## 2025-05-30 PROCEDURE — 97165 OT EVAL LOW COMPLEX 30 MIN: CPT

## 2025-05-30 PROCEDURE — 2580000003 HC RX 258

## 2025-05-30 PROCEDURE — 51702 INSERT TEMP BLADDER CATH: CPT

## 2025-05-30 PROCEDURE — 2500000003 HC RX 250 WO HCPCS: Performed by: INTERNAL MEDICINE

## 2025-05-30 RX ADMIN — SODIUM CHLORIDE, PRESERVATIVE FREE 10 ML: 5 INJECTION INTRAVENOUS at 11:05

## 2025-05-30 RX ADMIN — IPRATROPIUM BROMIDE 0.5 MG: 0.5 SOLUTION RESPIRATORY (INHALATION) at 18:55

## 2025-05-30 RX ADMIN — CARVEDILOL 6.25 MG: 6.25 TABLET, FILM COATED ORAL at 11:12

## 2025-05-30 RX ADMIN — IPRATROPIUM BROMIDE 0.5 MG: 0.5 SOLUTION RESPIRATORY (INHALATION) at 10:46

## 2025-05-30 RX ADMIN — BUDESONIDE 500 MCG: 0.5 INHALANT ORAL at 06:45

## 2025-05-30 RX ADMIN — TAMSULOSIN HYDROCHLORIDE 0.4 MG: 0.4 CAPSULE ORAL at 11:03

## 2025-05-30 RX ADMIN — ARFORMOTEROL TARTRATE 15 MCG: 15 SOLUTION RESPIRATORY (INHALATION) at 06:45

## 2025-05-30 RX ADMIN — ARFORMOTEROL TARTRATE 15 MCG: 15 SOLUTION RESPIRATORY (INHALATION) at 18:55

## 2025-05-30 RX ADMIN — RISPERIDONE 0.5 MG: 0.25 TABLET, FILM COATED ORAL at 21:49

## 2025-05-30 RX ADMIN — DIVALPROEX SODIUM 125 MG: 125 CAPSULE, COATED PELLETS ORAL at 05:56

## 2025-05-30 RX ADMIN — IPRATROPIUM BROMIDE 0.5 MG: 0.5 SOLUTION RESPIRATORY (INHALATION) at 06:45

## 2025-05-30 RX ADMIN — PREDNISONE 30 MG: 20 TABLET ORAL at 11:03

## 2025-05-30 RX ADMIN — PIPERACILLIN AND TAZOBACTAM 3375 MG: 3; .375 INJECTION, POWDER, LYOPHILIZED, FOR SOLUTION INTRAVENOUS at 05:56

## 2025-05-30 RX ADMIN — COLLAGENASE SANTYL: 250 OINTMENT TOPICAL at 17:33

## 2025-05-30 RX ADMIN — PIPERACILLIN AND TAZOBACTAM 3375 MG: 3; .375 INJECTION, POWDER, LYOPHILIZED, FOR SOLUTION INTRAVENOUS at 22:15

## 2025-05-30 RX ADMIN — TAMSULOSIN HYDROCHLORIDE 0.4 MG: 0.4 CAPSULE ORAL at 21:49

## 2025-05-30 RX ADMIN — BUDESONIDE 500 MCG: 0.5 INHALANT ORAL at 18:56

## 2025-05-30 RX ADMIN — LACTULOSE 20 G: 20 SOLUTION ORAL at 21:49

## 2025-05-30 RX ADMIN — BUMETANIDE 1 MG: 1 TABLET ORAL at 17:48

## 2025-05-30 RX ADMIN — CARVEDILOL 6.25 MG: 6.25 TABLET, FILM COATED ORAL at 17:48

## 2025-05-30 RX ADMIN — RISPERIDONE 0.5 MG: 0.25 TABLET, FILM COATED ORAL at 11:03

## 2025-05-30 RX ADMIN — IPRATROPIUM BROMIDE 0.5 MG: 0.5 SOLUTION RESPIRATORY (INHALATION) at 14:37

## 2025-05-30 RX ADMIN — PIPERACILLIN AND TAZOBACTAM 3375 MG: 3; .375 INJECTION, POWDER, LYOPHILIZED, FOR SOLUTION INTRAVENOUS at 17:33

## 2025-05-30 RX ADMIN — COLLAGENASE SANTYL: 250 OINTMENT TOPICAL at 21:48

## 2025-05-30 RX ADMIN — Medication 3 EACH: at 17:30

## 2025-05-30 RX ADMIN — DIVALPROEX SODIUM 125 MG: 125 CAPSULE, COATED PELLETS ORAL at 17:30

## 2025-05-30 RX ADMIN — PANTOPRAZOLE SODIUM 40 MG: 40 TABLET, DELAYED RELEASE ORAL at 05:56

## 2025-05-30 RX ADMIN — SENNOSIDES AND DOCUSATE SODIUM 1 TABLET: 50; 8.6 TABLET ORAL at 21:49

## 2025-05-30 RX ADMIN — DIVALPROEX SODIUM 125 MG: 125 CAPSULE, COATED PELLETS ORAL at 21:49

## 2025-05-30 RX ADMIN — BUMETANIDE 1 MG: 1 TABLET ORAL at 11:04

## 2025-05-30 RX ADMIN — SODIUM CHLORIDE, PRESERVATIVE FREE 10 ML: 5 INJECTION INTRAVENOUS at 21:50

## 2025-05-30 RX ADMIN — TIZANIDINE 4 MG: 4 TABLET ORAL at 21:49

## 2025-05-30 RX ADMIN — ACETAMINOPHEN 650 MG: 325 TABLET ORAL at 21:53

## 2025-05-30 RX ADMIN — LISINOPRIL 5 MG: 10 TABLET ORAL at 11:03

## 2025-05-30 RX ADMIN — ATORVASTATIN CALCIUM 80 MG: 80 TABLET, FILM COATED ORAL at 21:49

## 2025-05-30 RX ADMIN — LEVOTHYROXINE SODIUM 137 MCG: 0.14 TABLET ORAL at 05:56

## 2025-05-30 ASSESSMENT — PAIN DESCRIPTION - LOCATION: LOCATION: GENERALIZED

## 2025-05-30 ASSESSMENT — PAIN - FUNCTIONAL ASSESSMENT: PAIN_FUNCTIONAL_ASSESSMENT: ACTIVITIES ARE NOT PREVENTED

## 2025-05-30 ASSESSMENT — PAIN DESCRIPTION - DESCRIPTORS: DESCRIPTORS: ACHING

## 2025-05-30 NOTE — PROGRESS NOTES
Physical Therapy    Name: Khris Sampson  MRN: [id number]  Date of service: 5/30/2025 05/30/25 0900   Restrictions/Precautions   Restrictions/Precautions General Precautions;Contact Precautions   General   Diagnosis acute CHF, SIRS, COPD   General   General Comments Pt in bed   Subjective   Subjective agreed to therapy, pt is motivated   Vitals   O2 Device Nasal cannula   Pain   Pre-Pain   (Pt stated the leg is okay today)   Pain Location Right;Leg   Observation/Palpation   Observation LE bleeding through dressing, nurse stated it was okay for therapy and they she will change dressing later   Edema difficult to understand   Bed mobility   Supine to Sit Partial/Moderate assistance   Sit to Supine Partial/Moderate assistance   Scooting Partial/Moderate assistance   Bed Mobility Comments able to slightly scoot at EOB to get bilateral rails up   Transfers   Sit to Stand Minimal Assistance;2 Person Assistance   Stand to Sit Minimal Assistance;2 Person Assistance   Comment stood with bilateral bedrails 3x   Patient Goals    Patient Goals  get better, stronger   Short Term Goals   Time Frame for Short Term Goals 2 wks   Short Term Goal 1 supine to sit indep   Short Term Goal 2 sit to stand Min A   Short Term Goal 3 bed to chair Min A with RW   Short Term Goal 4 amb. 25' with RW SBA   Activity Tolerance   Activity Tolerance Patient tolerated treatment well   Assessment   Assessment Pt did very well this session. Able to perform bed mobility, sit EOB, scoot EOB to get rails up. Pt then stood with bilateral rails 3x. Returned to bed with all needs in reach. Nurse notified of soiled dressing on RLE   Discharge Recommendations Continue to assess pending progress;24 hour supervision or assist;Patient would benefit from continued therapy after discharge   Physical Therapy Plan   General Plan 3-5 times per week   Therapy Duration 2 Weeks   Current Treatment Recommendations Strengthening;ROM;Balance training;Functional mobility  training;Transfer training;Gait training;Endurance training;Patient/Caregiver education & training;Safety education & training;Positioning;Equipment evaluation, education, & procurement;Therapeutic activities   PT Plan of Care   Friday X   Safety Devices   Type of Devices Left in bed;Bed alarm in place;Call light within reach   PT Whiteboard Notes   Therapy Whiteboard RE 6/9 COPD, CHF, SS when pt gets new chair?   Recommendation   Requires PT Follow-Up Yes           Electronically signed by Julian Braga PTA on 5/30/2025 at 9:55 AM

## 2025-05-30 NOTE — PROGRESS NOTES
Comprehensive Nutrition Assessment    Type and Reason for Visit:  Reassess    Nutrition Recommendations/Plan:   Continue POC, encourage po intake.     Malnutrition Assessment:  Malnutrition Status:  Severe malnutrition (05/30/25 1458)    Context:  Acute Illness     Findings of the 6 clinical characteristics of malnutrition:  Energy Intake:  50% or less of estimated energy requirements for 5 or more days  Weight Loss:  Greater than 2% over 1 week     Body Fat Loss:  Unable to assess     Muscle Mass Loss:  Unable to assess    Fluid Accumulation:  Moderate to Severe Generalized, Extremities   Strength:  Not Performed    Nutrition Assessment:    Pt is severely malnourished AEB poor intake and wt loss. Pt eating bites of meals and ONS. Encouraged po intake. Pt has had some wt decrease, edema has decreased somewhat, but still significant.    Nutrition Related Findings:    Gluc 145-166. +2, +3 edema, weeping. Wound Type: Multiple, Open Wounds       Current Nutrition Intake & Therapies:    Average Meal Intake: 1-25%  Average Supplements Intake: Unable to assess  ADULT ORAL NUTRITION SUPPLEMENT; Breakfast; Fortified Gelatin Oral Supplement  ADULT ORAL NUTRITION SUPPLEMENT; Lunch, Dinner; Frozen Oral Supplement  ADULT DIET; Dysphagia - Pureed; Low Sodium (2 gm); Moderately Thick (Honey); No Drinking Straws    Anthropometric Measures:  Height: 185.4 cm (6' 0.99\")  Ideal Body Weight (IBW): 184 lbs (84 kg)    Admission Body Weight: 117.9 kg (260 lb)  Current Body Weight: 111.1 kg (245 lb),   IBW.    Current BMI (kg/m2): 32.3  Usual Body Weight: 108.9 kg (240 lb)     % Weight Change (Calculated): 4.6                    BMI Categories: Obese Class 1 (BMI 30.0-34.9)    Estimated Daily Nutrient Needs:  Energy Requirements Based On: Kcal/kg  Weight Used for Energy Requirements: Current  Energy (kcal/day): 1223-1476 kcals/day  Weight Used for Protein Requirements: Ideal  Protein (g/day): 100-167 g/protein/day  Method Used for  Fluid Requirements: 1 ml/kcal  Fluid (ml/day): 1534-6440 mL/day    Nutrition Diagnosis:   Severe malnutrition related to inadequate protein-energy intake as evidenced by criteria as identified in malnutrition assessment    Nutrition Interventions:   Food and/or Nutrient Delivery: Continue Current Diet, Modify Oral Nutrition Supplement  Nutrition Education/Counseling: No recommendation at this time  Coordination of Nutrition Care: Continue to monitor while inpatient       Goals:  Goals: PO intake 50% or greater  Type of Goal: Continue current goal  Previous Goal Met: No Progress toward Goal(s)    Nutrition Monitoring and Evaluation:   Behavioral-Environmental Outcomes: None Identified  Food/Nutrient Intake Outcomes: Food and Nutrient Intake, Supplement Intake  Physical Signs/Symptoms Outcomes: Biochemical Data, Chewing or Swallowing, Fluid Status or Edema, Skin, Weight    Discharge Planning:    Too soon to determine     Eileen E Summerlin, MS, RD, LD  Contact: [phone]

## 2025-05-30 NOTE — PROGRESS NOTES
[facility]    Progress Note    Patient:  Khris aSmpson  YOB: 1955  Date of Service: 5/30/2025  MRN: [id number]   Acct: [id number]   Primary Care Physician: Nader Gordillo MD  Advance Directive: Full Code  Admit Date: 5/20/2025       Hospital Day: 10    Portions of this note have been copied forward, however, updated to reflect the most current clinical status of this patient.     CHIEF COMPLAINT: SOB    SUBJECTIVE: No complaints    CUMULATIVE HOSPITAL COURSE:     This patient is a 70-year-old male with PMH CVA with right-sided residual weakness, seizure disorder, chronic hypoxic respiratory failure on 2 LPM at baseline, CKD stage IIIa, hypertension who presented to [facility] ED from [facility] on 5/20 for evaluation of shortness of breath and worsening BLE edema.  Of note, patient was recently admitted to [facility] on 4/3 and treated for ZARA and acute respiratory failure due to LLL pneumonia requiring IV antibiotics and IVF, discharged in stable condition on 4/9.  In ED, concern for volume overload initially and he received Lasix 80 mg IV x 1.  He subsequently became hypotensive, requiring Levophed briefly.  Upon arrival to unit, was placed on Bumex gtt with good urine output and improvement in symptoms.  He received Rocephin, doxycycline, and steroids due to concern for COPD exacerbation. Developed new onset atrial fibrillation and received digoxin, added Coreg with conversion to NSR and initiated on full dose Lovenox. On 5/24, developed hematuria and urinary retention and was initiated on CBI with urology consult.  Anticoagulation was held. Urine has cleared and CBI discontinued, f/c removed and lovenox resumed. 5/30 began having hematuria again. Urology to re-evaluate. Stat CT AP is pending.     RLE edema worsened, venous ultrasound obtained and negative for DVT. Patient came to the facility with significant BLE edema, subsequently developed blisters that have ruptured and  spoken language to printed text. The electronic translation of spoken language may permit erroneous words or phrases to be inadvertently transcribed; although attempts have made to review the note for such errors, some may still exist.

## 2025-05-30 NOTE — PLAN OF CARE
Problem: Safety - Adult  Goal: Free from fall injury  Outcome: Progressing     Problem: Chronic Conditions and Co-morbidities  Goal: Patient's chronic conditions and co-morbidity symptoms are monitored and maintained or improved  Outcome: Progressing     Problem: Discharge Planning  Goal: Discharge to home or other facility with appropriate resources  Outcome: Progressing     Problem: Skin/Tissue Integrity  Goal: Skin integrity remains intact  Description: 1.  Monitor for areas of redness and/or skin breakdown2.  Assess vascular access sites hourly3.  Every 4-6 hours minimum:  Change oxygen saturation probe site4.  Every 4-6 hours:  If on nasal continuous positive airway pressure, respiratory therapy assess nares and determine need for appliance change or resting period  Outcome: Progressing     Problem: Nutrition Deficit:  Goal: Optimize nutritional status  Outcome: Progressing     Problem: ABCDS Injury Assessment  Goal: Absence of physical injury  Outcome: Progressing

## 2025-05-30 NOTE — CARE COORDINATION
05/30/25 1340   IMM Letter   IMM Letter given to Patient/Family/Significant other/Guardian/POA/by: Abraham Palencia   IMM Letter date given: 05/30/25   IMM Letter time given: 0140     Patient declined waiting 4 hr period prior to discharge.  Second IMM given to patient and explained with patient verbalizing understanding.  All questions and concerns addressed     Signed letter placed in pt soft chart  Electronically signed by LAZARO GODFREY on 5/30/2025 at 1:41 PM

## 2025-05-30 NOTE — PROGRESS NOTES
This  visited with pt and pt's sister to follow up with pt and provide spiritual care. Pt did not specify a spiritual needs or distress. Pt previously said he was Anabaptism and his belle is important to him. This  provided spiritual care with sustaining presence, support, and prayer. Pt and his sister expressed gratitude for spiritual care.         Spiritual Health History and Assessment/Progress Note  [facility]    Spiritual/Emotional Needs,  , Life Adjustments,      Name: Khris Sampson MRN: [id number]    Age: 70 y.o.     Sex: male   Language: English   Jainism: None   COPD exacerbation (HCC)     Date: 5/30/2025            Total Time Calculated: 10 min              Spiritual Assessment continued in [facility] ONCOLOGY UNIT        Referral/Consult From: Palliative Care   Encounter Overview/Reason: Spiritual/Emotional Needs  Service Provided For: Patient, Family    Belle, Belief, Meaning:   Patient identifies as spiritual and is connected with a belle tradition or spiritual practice  Family/Friends Other: Pt's sister did not discuss her belle.      Importance and Influence:  Patient has spiritual/personal beliefs that influence decisions regarding their health  Family/Friends Other: Did not discuss    Community:  Patient is connected with a spiritual community  Family/Friends Other: Did not discuss    Assessment and Plan of Care:     Patient Interventions include: Provided sacramental/Pentecostalism ritual  Family/Friends Interventions include: Other: Pt's sister was present during visit and prayer.     Patient Plan of Care: Spiritual Care available upon further referral  Family/Friends Plan of Care: Spiritual Care available upon further referral    Electronically signed by Mary Behrens, Chaplain on 5/30/2025 at 2:32 PM

## 2025-05-30 NOTE — PROGRESS NOTES
Occupational Therapy  Facility/Department: [facility] 4 ONCOLOGY UNIT  Occupational Therapy Initial Assessment    Name: Khris Sampson  : 1955  MRN: [id number]  Date of Service: 2025    Discharge Recommendations:  Patient would benefit from continued therapy after discharge, Subacute/Skilled Nursing Facility          Patient Diagnosis(es): The primary encounter diagnosis was Acute congestive heart failure, unspecified heart failure type (HCC). Diagnoses of Congestive heart failure, unspecified HF chronicity, unspecified heart failure type (HCC), Shortness of breath, and Peripheral edema were also pertinent to this visit.  Past Medical History:  has a past medical history of Altered mental status, Arthritis, Asthma, Brief psychotic disorder (HCC), Cerebral artery occlusion with cerebral infarction (HCC), COPD (chronic obstructive pulmonary disease) (HCC), Diabetes mellitus (HCC), Epilepsy (HCC), GERD (gastroesophageal reflux disease), Hypertension, Palliative care patient, Seizures (HCC), Thyroid disease, Unspecified convulsions (HCC), and Vascular dementia (HCC).  Past Surgical History:  has a past surgical history that includes Arm Surgery (Left); Mandible surgery; knee surgery (Right); Eye surgery (Right); and pr colonoscopy flx dx w/collj spec when pfrmd (N/A, 2018).    Treatment Diagnosis: COPD exacerbation      Assessment  Performance deficits / Impairments: Decreased functional mobility ;Decreased endurance;Decreased coordination;Decreased ADL status;Decreased posture;Decreased balance;Decreased ROM;Decreased strength;Decreased high-level IADLs  Assessment: Evaluation completed and treatment initiated. Pt would benefit from further skilled occupational therapy services to upgrade safety and functional independence.  Treatment Diagnosis: COPD exacerbation  History: Previous CVA with residual R sided weakness  REQUIRES OT FOLLOW-UP: Yes  Activity Tolerance  Activity Tolerance: Patient Tolerated treatment well

## 2025-05-30 NOTE — PROGRESS NOTES
Urology Attending Progress Note      Subjective: patient admitted with exacerbation of COPD is doing better. Sharma placed for urinary retention. Had hematuria afterwards, was on CBI. Currently has a Purewick catheter. Gross hematuria since this morning. No clots in the canister. On aspirin and lovenox.    History of LUTS. On flomax. Nocturia 3-4x.    Vitals:  /77   Pulse 74   Temp 97.7 °F (36.5 °C) (Temporal)   Resp 18   Ht 1.854 m (6' 0.99\")   Wt 111.5 kg (245 lb 13 oz)   SpO2 96%   BMI 32.44 kg/m²   Temp  Av.5 °F (36.4 °C)  Min: 97 °F (36.1 °C)  Max: 97.8 °F (36.6 °C)    Intake/Output Summary (Last 24 hours) at 2025 1031  Last data filed at 2025 2138  Gross per 24 hour   Intake 360 ml   Output 600 ml   Net -240 ml       Exam: Patient not in distress.  Abdomen: soft, non-tender. No mass, bladder not palpable  External genitalia normal. Purewick catheter with gross hematuria in the canister    Labs:  WBC:    Lab Results   Component Value Date/Time    WBC 16.6 2025 01:59 AM     Hemoglobin/Hematocrit:    Lab Results   Component Value Date/Time    HGB 12.4 2025 01:59 AM    HCT 37.7 2025 01:59 AM     BMP:    Lab Results   Component Value Date/Time     2025 01:59 AM    K 3.8 2025 01:59 AM     2025 01:59 AM    CO2 28 2025 01:59 AM    BUN 31 2025 01:59 AM    CREATININE 1.2 2025 01:59 AM    CALCIUM 7.9 2025 01:59 AM    GFRAA >59 2022 01:21 AM    LABGLOM 66 2025 01:59 AM    LABGLOM >60 2023 06:41 PM     PT/INR:    Lab Results   Component Value Date/Time    PROTIME 13.4 2023 06:41 PM    INR 1.05 2023 06:41 PM     PTT:  No results found for: \"APTT\"[APTT    CT abd pelvis without contrast today - No hydronephrosis. Bladder distended, no clots. Moderate sized prostate.     Impression/Plan:      RLE cellulitis    Constipation     New onset atrial fibrillation - on lovenox (held this am because of  hematuria), to be transitioned to eliquis once hematuria resolves.    Hypothyroidism  -Continue synthroid     Type 2 diabetes mellitus     Seizure disorder (HCC)  -Continue depakote     Admitted for exacerbation of COPD. Doing better.  Patient had urinary retention. History of LUTS on flomax. Urinalysis yesterday no infection, blood +  History of recurrent gross hematuria. Recommended CT abd pelvis to see for any clots in the bladder.     CT abd and pelvis - this morning showed distended bladder, no clots in the bladder. To place a 18 or 20fr 2-way frederick catheter.    Needs cystoscopy as outpatient in the office in 4-6 weeks as part of hematuria workup.    Kelvin Villatoro MD

## 2025-05-31 VITALS
SYSTOLIC BLOOD PRESSURE: 121 MMHG | HEART RATE: 69 BPM | BODY MASS INDEX: 33.75 KG/M2 | RESPIRATION RATE: 18 BRPM | TEMPERATURE: 97 F | DIASTOLIC BLOOD PRESSURE: 71 MMHG | OXYGEN SATURATION: 92 % | WEIGHT: 254.63 LBS | HEIGHT: 73 IN

## 2025-05-31 LAB
ALBUMIN SERPL-MCNC: 2.4 G/DL (ref 3.5–5.2)
ALP SERPL-CCNC: 45 U/L (ref 40–129)
ALT SERPL-CCNC: 17 U/L (ref 10–50)
ANION GAP SERPL CALCULATED.3IONS-SCNC: 11 MMOL/L (ref 8–16)
AST SERPL-CCNC: 17 U/L (ref 10–50)
BASOPHILS # BLD: 0.1 K/UL (ref 0–0.2)
BASOPHILS NFR BLD: 0.3 % (ref 0–1)
BILIRUB SERPL-MCNC: 0.6 MG/DL (ref 0.2–1.2)
BUN SERPL-MCNC: 31 MG/DL (ref 8–23)
CALCIUM SERPL-MCNC: 7.8 MG/DL (ref 8.8–10.2)
CHLORIDE SERPL-SCNC: 101 MMOL/L (ref 98–107)
CO2 SERPL-SCNC: 27 MMOL/L (ref 22–29)
CREAT SERPL-MCNC: 1.2 MG/DL (ref 0.7–1.2)
EOSINOPHIL # BLD: 0 K/UL (ref 0–0.6)
EOSINOPHIL NFR BLD: 0.3 % (ref 0–5)
ERYTHROCYTE [DISTWIDTH] IN BLOOD BY AUTOMATED COUNT: 16 % (ref 11.5–14.5)
GLUCOSE SERPL-MCNC: 166 MG/DL (ref 70–99)
HCT VFR BLD AUTO: 35.7 % (ref 42–52)
HGB BLD-MCNC: 12.1 G/DL (ref 14–18)
IMM GRANULOCYTES # BLD: 0.5 K/UL
LYMPHOCYTES # BLD: 2.5 K/UL (ref 1.1–4.5)
LYMPHOCYTES NFR BLD: 16.4 % (ref 20–40)
MCH RBC QN AUTO: 29.9 PG (ref 27–31)
MCHC RBC AUTO-ENTMCNC: 33.9 G/DL (ref 33–37)
MCV RBC AUTO: 88.1 FL (ref 80–94)
MONOCYTES # BLD: 1.3 K/UL (ref 0–0.9)
MONOCYTES NFR BLD: 8.3 % (ref 0–10)
NEUTROPHILS # BLD: 10.9 K/UL (ref 1.5–7.5)
NEUTS SEG NFR BLD: 71.3 % (ref 50–65)
PLATELET # BLD AUTO: 390 K/UL (ref 130–400)
PMV BLD AUTO: 9.9 FL (ref 9.4–12.4)
POTASSIUM SERPL-SCNC: 3.6 MMOL/L (ref 3.5–5)
PROCALCITONIN: 0.33 NG/ML (ref 0–0.09)
PROT SERPL-MCNC: 5.4 G/DL (ref 6.4–8.3)
RBC # BLD AUTO: 4.05 M/UL (ref 4.7–6.1)
SODIUM SERPL-SCNC: 139 MMOL/L (ref 136–145)
WBC # BLD AUTO: 15.3 K/UL (ref 4.8–10.8)

## 2025-05-31 PROCEDURE — 6360000002 HC RX W HCPCS

## 2025-05-31 PROCEDURE — 80053 COMPREHEN METABOLIC PANEL: CPT

## 2025-05-31 PROCEDURE — 6370000000 HC RX 637 (ALT 250 FOR IP)

## 2025-05-31 PROCEDURE — 6360000002 HC RX W HCPCS: Performed by: INTERNAL MEDICINE

## 2025-05-31 PROCEDURE — 2500000003 HC RX 250 WO HCPCS: Performed by: INTERNAL MEDICINE

## 2025-05-31 PROCEDURE — 36415 COLL VENOUS BLD VENIPUNCTURE: CPT

## 2025-05-31 PROCEDURE — 2580000003 HC RX 258

## 2025-05-31 PROCEDURE — 6370000000 HC RX 637 (ALT 250 FOR IP): Performed by: INTERNAL MEDICINE

## 2025-05-31 PROCEDURE — 84145 PROCALCITONIN (PCT): CPT

## 2025-05-31 PROCEDURE — 94760 N-INVAS EAR/PLS OXIMETRY 1: CPT

## 2025-05-31 PROCEDURE — 94669 MECHANICAL CHEST WALL OSCILL: CPT

## 2025-05-31 PROCEDURE — 94640 AIRWAY INHALATION TREATMENT: CPT

## 2025-05-31 PROCEDURE — 85025 COMPLETE CBC W/AUTO DIFF WBC: CPT

## 2025-05-31 PROCEDURE — 94150 VITAL CAPACITY TEST: CPT

## 2025-05-31 RX ORDER — POLYETHYLENE GLYCOL 3350 17 G/17G
17 POWDER, FOR SOLUTION ORAL DAILY
Qty: 527 G | Refills: 0 | Status: SHIPPED | OUTPATIENT
Start: 2025-06-01 | End: 2025-07-01

## 2025-05-31 RX ORDER — PREDNISONE 5 MG/1
TABLET ORAL
Qty: 27 TABLET | Refills: 0 | Status: ON HOLD | OUTPATIENT
Start: 2025-06-01 | End: 2025-06-12 | Stop reason: HOSPADM

## 2025-05-31 RX ORDER — LEVOFLOXACIN 750 MG/1
750 TABLET, FILM COATED ORAL DAILY
Qty: 2 TABLET | Refills: 0 | Status: SHIPPED | OUTPATIENT
Start: 2025-05-31 | End: 2025-06-02

## 2025-05-31 RX ORDER — LACTULOSE 10 G/15ML
20 SOLUTION ORAL 2 TIMES DAILY
Qty: 237 ML | Refills: 1 | Status: SHIPPED | OUTPATIENT
Start: 2025-05-31

## 2025-05-31 RX ORDER — BISMUTH TRIBROMOPH/PETROLATUM 5"X9"
3 BANDAGE TOPICAL DAILY
Qty: 30 EACH | Refills: 0 | Status: SHIPPED | OUTPATIENT
Start: 2025-06-01

## 2025-05-31 RX ORDER — BUMETANIDE 1 MG/1
1 TABLET ORAL 2 TIMES DAILY
Qty: 60 TABLET | Refills: 0 | Status: ON HOLD | OUTPATIENT
Start: 2025-05-31 | End: 2025-06-12 | Stop reason: HOSPADM

## 2025-05-31 RX ORDER — CARVEDILOL 6.25 MG/1
6.25 TABLET ORAL 2 TIMES DAILY WITH MEALS
Qty: 60 TABLET | Refills: 1 | Status: ON HOLD | OUTPATIENT
Start: 2025-05-31 | End: 2025-06-12 | Stop reason: HOSPADM

## 2025-05-31 RX ADMIN — PANTOPRAZOLE SODIUM 40 MG: 40 TABLET, DELAYED RELEASE ORAL at 07:37

## 2025-05-31 RX ADMIN — TAMSULOSIN HYDROCHLORIDE 0.4 MG: 0.4 CAPSULE ORAL at 09:37

## 2025-05-31 RX ADMIN — ATORVASTATIN CALCIUM 80 MG: 80 TABLET, FILM COATED ORAL at 20:46

## 2025-05-31 RX ADMIN — PIPERACILLIN AND TAZOBACTAM 3375 MG: 3; .375 INJECTION, POWDER, LYOPHILIZED, FOR SOLUTION INTRAVENOUS at 14:49

## 2025-05-31 RX ADMIN — PIPERACILLIN AND TAZOBACTAM 3375 MG: 3; .375 INJECTION, POWDER, LYOPHILIZED, FOR SOLUTION INTRAVENOUS at 07:28

## 2025-05-31 RX ADMIN — BISACODYL 10 MG: 10 SUPPOSITORY RECTAL at 09:38

## 2025-05-31 RX ADMIN — COLLAGENASE SANTYL: 250 OINTMENT TOPICAL at 09:43

## 2025-05-31 RX ADMIN — CARVEDILOL 6.25 MG: 6.25 TABLET, FILM COATED ORAL at 09:37

## 2025-05-31 RX ADMIN — PREDNISONE 30 MG: 20 TABLET ORAL at 09:37

## 2025-05-31 RX ADMIN — RISPERIDONE 0.5 MG: 0.25 TABLET, FILM COATED ORAL at 09:37

## 2025-05-31 RX ADMIN — ARFORMOTEROL TARTRATE 15 MCG: 15 SOLUTION RESPIRATORY (INHALATION) at 19:03

## 2025-05-31 RX ADMIN — TAMSULOSIN HYDROCHLORIDE 0.4 MG: 0.4 CAPSULE ORAL at 20:46

## 2025-05-31 RX ADMIN — LEVOTHYROXINE SODIUM 137 MCG: 0.14 TABLET ORAL at 07:37

## 2025-05-31 RX ADMIN — BUDESONIDE 500 MCG: 0.5 INHALANT ORAL at 19:03

## 2025-05-31 RX ADMIN — IPRATROPIUM BROMIDE 0.5 MG: 0.5 SOLUTION RESPIRATORY (INHALATION) at 14:42

## 2025-05-31 RX ADMIN — SODIUM CHLORIDE, PRESERVATIVE FREE 10 ML: 5 INJECTION INTRAVENOUS at 09:43

## 2025-05-31 RX ADMIN — LISINOPRIL 5 MG: 10 TABLET ORAL at 09:37

## 2025-05-31 RX ADMIN — DIVALPROEX SODIUM 125 MG: 125 CAPSULE, COATED PELLETS ORAL at 07:38

## 2025-05-31 RX ADMIN — BUMETANIDE 1 MG: 1 TABLET ORAL at 09:37

## 2025-05-31 RX ADMIN — DIVALPROEX SODIUM 125 MG: 125 CAPSULE, COATED PELLETS ORAL at 14:49

## 2025-05-31 RX ADMIN — RISPERIDONE 0.5 MG: 0.25 TABLET, FILM COATED ORAL at 20:46

## 2025-05-31 RX ADMIN — IPRATROPIUM BROMIDE 0.5 MG: 0.5 SOLUTION RESPIRATORY (INHALATION) at 07:12

## 2025-05-31 RX ADMIN — ARFORMOTEROL TARTRATE 15 MCG: 15 SOLUTION RESPIRATORY (INHALATION) at 07:12

## 2025-05-31 RX ADMIN — POLYETHYLENE GLYCOL 3350 17 G: 17 POWDER, FOR SOLUTION ORAL at 09:38

## 2025-05-31 RX ADMIN — BUDESONIDE 500 MCG: 0.5 INHALANT ORAL at 07:11

## 2025-05-31 RX ADMIN — IPRATROPIUM BROMIDE 0.5 MG: 0.5 SOLUTION RESPIRATORY (INHALATION) at 19:03

## 2025-05-31 RX ADMIN — IPRATROPIUM BROMIDE 0.5 MG: 0.5 SOLUTION RESPIRATORY (INHALATION) at 10:48

## 2025-05-31 RX ADMIN — SENNOSIDES AND DOCUSATE SODIUM 1 TABLET: 50; 8.6 TABLET ORAL at 09:37

## 2025-05-31 RX ADMIN — Medication 3 EACH: at 09:43

## 2025-05-31 RX ADMIN — LACTULOSE 20 G: 20 SOLUTION ORAL at 09:37

## 2025-05-31 NOTE — PROGRESS NOTES
Urology Attending Progress Note      Subjective: patient admitted with exacerbation of COPD is doing better. Frederick placed for urinary retention. Had hematuria afterwards, was on CBI. Gross hematuria resolved, frederick 2-way showed clear urine today. since  On aspirin and lovenox.    History of LUTS. On flomax. Nocturia 3-4x.    Vitals:  /64   Pulse 80   Temp 98.6 °F (37 °C) (Temporal)   Resp 18   Ht 1.854 m (6' 0.99\")   Wt 115.5 kg (254 lb 10.1 oz)   SpO2 94%   BMI 33.60 kg/m²   Temp  Av.8 °F (36.6 °C)  Min: 97.1 °F (36.2 °C)  Max: 98.6 °F (37 °C)    Intake/Output Summary (Last 24 hours) at 2025 1220  Last data filed at 2025 1151  Gross per 24 hour   Intake 560 ml   Output 2525 ml   Net -1965 ml       Exam: Patient not in distress.  Abdomen: soft, non-tender. No mass, bladder not palpable  External genitalia normal. Purewick catheter with gross hematuria in the canister    Labs:  WBC:    Lab Results   Component Value Date/Time    WBC 15.3 2025 02:39 AM     Hemoglobin/Hematocrit:    Lab Results   Component Value Date/Time    HGB 12.1 2025 02:39 AM    HCT 35.7 2025 02:39 AM     BMP:    Lab Results   Component Value Date/Time     2025 02:39 AM    K 3.6 2025 02:39 AM     2025 02:39 AM    CO2 27 2025 02:39 AM    BUN 31 2025 02:39 AM    CREATININE 1.2 2025 02:39 AM    CALCIUM 7.8 2025 02:39 AM    GFRAA >59 2022 01:21 AM    LABGLOM 66 2025 02:39 AM    LABGLOM >60 2023 06:41 PM     PT/INR:    Lab Results   Component Value Date/Time    PROTIME 13.4 2023 06:41 PM    INR 1.05 2023 06:41 PM     PTT:  No results found for: \"APTT\"[APTT    CT abd pelvis without contrast today - No hydronephrosis. Bladder distended, no clots. Moderate sized prostate.   CT ABDOMEN PELVIS WO CONTRAST Additional Contrast? None  Order: [id number]   Status: Final result       Next appt: None    Test Result Released: No    0  MD

## 2025-05-31 NOTE — DISCHARGE SUMMARY
[facility]    Discharge Summary      Khris Sampson  :  1955  MRN:  [id number]    Admit date:  2025  Discharge date:    2025    Discharging Physician:  Dr. Koehler    Advance Directive: Full Code    Consults: urology    Primary Care Physician:  Nader Gordillo MD    Discharge Diagnoses:  Principal Problem:    COPD exacerbation (HCC)  Active Problems:    Hypothyroidism    Type 2 diabetes mellitus, without long-term current use of insulin (HCC)    Seizure disorder (HCC)    Hypertension    Peripheral edema    Acute heart failure with preserved ejection fraction (HCC)    Severe malnutrition  Resolved Problems:    Hypotension      Portions of this note have been copied forward, however, changed to reflect the most current clinical status of this patient.    Hospital Course:    This patient is a 70-year-old male with PMH CVA with right-sided residual weakness, seizure disorder, chronic hypoxic respiratory failure on 2 LPM at baseline, CKD stage IIIa, hypertension who presented to [facility] ED from [facility] on  for evaluation of shortness of breath and worsening BLE edema.  Of note, patient was recently admitted to [facility] on 4/3 and treated for ZARA and acute respiratory failure due to L pneumonia requiring IV antibiotics and IVF, discharged in stable condition on .  In ED, concern for volume overload initially and he received Lasix 80 mg IV x 1.  He subsequently became hypotensive, requiring Levophed briefly.  Upon arrival to unit, was placed on Bumex gtt with good urine output and improvement in symptoms.  He received Rocephin, doxycycline, and steroids due to concern for COPD exacerbation. Developed new onset atrial fibrillation and received digoxin, added Coreg with conversion to NSR and initiated on full dose Lovenox. On , developed hematuria and urinary retention and was initiated on CBI with urology consult.  Anticoagulation was held. Urine has cleared and CBI  discontinued, f/c removed and lovenox resumed. 5/30 began having hematuria again. Stat CT AP was obtained which shows no clots in the bladder.  Sharma catheter was reinserted and Lovenox was held.  His hematuria has since resolved. Per urology, recommend voiding trial in 2-3 weeks at the nursing home. Will need to follow up in 4-6 weeks for outpatient cystoscopy. Given this recurrent gross hematuria, recommend to hold anticoagulation discharge based on risk vs benefit.  Will need to be reevaluated by cardiology on discharge. Referral was sent to [facility] SNF will need to arrange both this appt and his follow up urology appt with [facility]. Recommend SCDs and mobility if possible at [facility] to prevent DVT.     RLE edema worsened, venous ultrasound obtained and negative for DVT. Patient came to the facility with significant BLE edema, subsequently developed blisters that have ruptured and wound care is following with recommendations in chart. Cefepime was initiated due to concern for RLE cellulitis around his wound. Wound culture obtained and growing Pseudomonas and Staphylococcus hominis, however this was not a sterile collection. Abx transitioned to Zosyn per pharmacy recommendation. Recommend that patient be evaluated by wound care APRN or physician weekly on discharge. Bumex was transitioned to PO and he has had good urine output throughout admission. Will continue Bumex on dc as he has tolerated it well inpatient. CXR obtained on 5/27 due to SOB and now indicates RLL pneumonia. This was covered by Zosyn and MRSA swab was negative. Will discharge on 2 days of Levaquin to complete total 7 day course of antibiotics. SLP evaluated the patient, concern for aspiration and placed on modified diet per recommendations below.  Procalcitonin has improved, however has persistent leukocytosis with WBC 16, likely from his prednisone taper. His CT AP on 5/30 showed mildly distended gallbladder with some sludge  [facility] [place] - [address] - P [phone] - F [phone]  [address], [place] [address]      Phone: [phone]   bumetanide 1 MG tablet  carvedilol 6.25 MG tablet  collagenase 250 UNIT/GM ointment  lactulose 10 GM/15ML solution  levoFLOXacin 750 MG tablet  polyethylene glycol 17 g packet  predniSONE 5 MG tablet  xeroform petrolatum gauze 5\"X9\" Misc external pads          Discharge Instructions:   Follow up with Naedr Gordillo MD in 3 days.    Follow-up with cardiology and urology upon discharge.    Take medications as directed.      Resume activity as tolerated.    Followup Appointments Scheduled at Time of Discharge:  No future appointments.     Diet: ADULT ORAL NUTRITION SUPPLEMENT; Lunch, Dinner, Breakfast; Frozen Oral Supplement  ADULT DIET; Dysphagia - Pureed; Low Sodium (2 gm); Moderately Thick (Honey); No Drinking Straws     Disposition: Patient is medically stable and will be discharged today.    Time spent on discharge 45 minutes spent in assessing patient, reviewing medications, discussion with nursing, confirming safe discharge plan and preparation of discharge summary.    Signed:  Electronically signed by KEEGAN Garcia CNP on 5/31/25 at 12:36 PM CDT     EMR Dragon/Transcription disclaimer:   Much of this encounter note is an electronic transcription/translation of spoken language to printed text. The electronic translation of spoken language may permit erroneous, or at times, nonsensical words or phrases to be inadvertently transcribed; although attempts have made to review the note for such errors, some may still exist.

## 2025-06-01 NOTE — PLAN OF CARE
Problem: Safety - Adult  Goal: Free from fall injury  5/31/2025 2101 by Leena Archer RN  Outcome: Completed  5/31/2025 0751 by Leena Archer RN  Outcome: Progressing     Problem: Chronic Conditions and Co-morbidities  Goal: Patient's chronic conditions and co-morbidity symptoms are monitored and maintained or improved  5/31/2025 2101 by Leena Archer RN  Outcome: Completed  Flowsheets (Taken 5/31/2025 1030 by Elizabeth Box RN)  Care Plan - Patient's Chronic Conditions and Co-Morbidity Symptoms are Monitored and Maintained or Improved:   Monitor and assess patient's chronic conditions and comorbid symptoms for stability, deterioration, or improvement   Collaborate with multidisciplinary team to address chronic and comorbid conditions and prevent exacerbation or deterioration   Update acute care plan with appropriate goals if chronic or comorbid symptoms are exacerbated and prevent overall improvement and discharge  5/31/2025 0751 by Leena Archer RN  Outcome: Progressing     Problem: Discharge Planning  Goal: Discharge to home or other facility with appropriate resources  5/31/2025 2101 by Leena Archer RN  Outcome: Completed  Flowsheets (Taken 5/31/2025 1030 by Elizabeth Box RN)  Discharge to home or other facility with appropriate resources:   Identify barriers to discharge with patient and caregiver   Arrange for needed discharge resources and transportation as appropriate   Identify discharge learning needs (meds, wound care, etc)   Refer to discharge planning if patient needs post-hospital services based on physician order or complex needs related to functional status, cognitive ability or social support system  5/31/2025 0751 by Leena Archer RN  Outcome: Progressing     Problem: Nutrition Deficit:  Goal: Optimize nutritional status  5/31/2025 2101 by Leena Archer RN  Outcome: Completed  5/31/2025 0751 by Leena Archer RN  Outcome: Progressing     Problem: ABCDS

## 2025-06-02 ENCOUNTER — TELEPHONE (OUTPATIENT)
Dept: UROLOGY | Age: 70
End: 2025-06-02

## 2025-06-02 NOTE — TELEPHONE ENCOUNTER
Ingris from [facility] called to schedule a  hospital f/u for Urology.    Please call  [phone] to get her scheduled.    Thank you

## 2025-06-04 ENCOUNTER — APPOINTMENT (OUTPATIENT)
Dept: GENERAL RADIOLOGY | Age: 70
DRG: 871 | End: 2025-06-04
Payer: MEDICARE

## 2025-06-04 ENCOUNTER — HOSPITAL ENCOUNTER (INPATIENT)
Age: 70
LOS: 8 days | Discharge: HOSPICE | DRG: 871 | End: 2025-06-12
Attending: PEDIATRICS | Admitting: STUDENT IN AN ORGANIZED HEALTH CARE EDUCATION/TRAINING PROGRAM
Payer: MEDICARE

## 2025-06-04 ENCOUNTER — APPOINTMENT (OUTPATIENT)
Dept: CT IMAGING | Age: 70
DRG: 871 | End: 2025-06-04
Payer: MEDICARE

## 2025-06-04 DIAGNOSIS — J44.1 COPD EXACERBATION (HCC): ICD-10-CM

## 2025-06-04 DIAGNOSIS — Z51.5 HOSPICE CARE: ICD-10-CM

## 2025-06-04 DIAGNOSIS — R07.9 CHEST PAIN, UNSPECIFIED TYPE: ICD-10-CM

## 2025-06-04 DIAGNOSIS — J18.9 PNEUMONIA DUE TO INFECTIOUS ORGANISM, UNSPECIFIED LATERALITY, UNSPECIFIED PART OF LUNG: ICD-10-CM

## 2025-06-04 DIAGNOSIS — J96.21 ACUTE ON CHRONIC RESPIRATORY FAILURE WITH HYPOXIA (HCC): Primary | ICD-10-CM

## 2025-06-04 LAB
ALBUMIN SERPL-MCNC: 3 G/DL (ref 3.5–5.2)
ALLENS TEST: ABNORMAL
ALP SERPL-CCNC: 49 U/L (ref 40–129)
ALT SERPL-CCNC: 16 U/L (ref 10–50)
ANION GAP SERPL CALCULATED.3IONS-SCNC: 12 MMOL/L (ref 8–16)
AST SERPL-CCNC: 19 U/L (ref 10–50)
B PARAP IS1001 DNA NPH QL NAA+NON-PROBE: NOT DETECTED
B PERT.PT PRMT NPH QL NAA+NON-PROBE: NOT DETECTED
BACTERIA URNS QL MICRO: NEGATIVE /HPF
BASE EXCESS ARTERIAL: 8.5 MMOL/L (ref -2–2)
BASOPHILS # BLD: 0 K/UL (ref 0–0.2)
BASOPHILS NFR BLD: 0.1 % (ref 0–1)
BILIRUB SERPL-MCNC: 0.5 MG/DL (ref 0.2–1.2)
BILIRUB UR QL STRIP: NEGATIVE
BNP BLD-MCNC: 643 PG/ML (ref 0–124)
BUN SERPL-MCNC: 22 MG/DL (ref 8–23)
C PNEUM DNA NPH QL NAA+NON-PROBE: NOT DETECTED
CALCIUM SERPL-MCNC: 8.7 MG/DL (ref 8.8–10.2)
CARBOXYHEMOGLOBIN ARTERIAL: 1.8 % (ref 0–5)
CHLORIDE SERPL-SCNC: 102 MMOL/L (ref 98–107)
CLARITY UR: ABNORMAL
CO2 SERPL-SCNC: 27 MMOL/L (ref 22–29)
COLOR UR: YELLOW
CREAT SERPL-MCNC: 1.2 MG/DL (ref 0.7–1.2)
CRYSTALS URNS MICRO: ABNORMAL /HPF
EOSINOPHIL # BLD: 0 K/UL (ref 0–0.6)
EOSINOPHIL NFR BLD: 0.2 % (ref 0–5)
EPI CELLS #/AREA URNS AUTO: 0 /HPF (ref 0–5)
ERYTHROCYTE [DISTWIDTH] IN BLOOD BY AUTOMATED COUNT: 16.6 % (ref 11.5–14.5)
FLUAV RNA NPH QL NAA+NON-PROBE: NOT DETECTED
FLUBV RNA NPH QL NAA+NON-PROBE: NOT DETECTED
GLUCOSE SERPL-MCNC: 137 MG/DL (ref 70–99)
GLUCOSE UR STRIP.AUTO-MCNC: NEGATIVE MG/DL
HADV DNA NPH QL NAA+NON-PROBE: NOT DETECTED
HCO3 ARTERIAL: 31.8 MMOL/L (ref 22–26)
HCOV 229E RNA NPH QL NAA+NON-PROBE: NOT DETECTED
HCOV HKU1 RNA NPH QL NAA+NON-PROBE: NOT DETECTED
HCOV NL63 RNA NPH QL NAA+NON-PROBE: NOT DETECTED
HCOV OC43 RNA NPH QL NAA+NON-PROBE: NOT DETECTED
HCT VFR BLD AUTO: 38.5 % (ref 42–52)
HEMOGLOBIN, ART, EXTENDED: 13.2 G/DL (ref 14–18)
HGB BLD-MCNC: 12.7 G/DL (ref 14–18)
HGB UR STRIP.AUTO-MCNC: ABNORMAL MG/L
HMPV RNA NPH QL NAA+NON-PROBE: NOT DETECTED
HPIV1 RNA NPH QL NAA+NON-PROBE: NOT DETECTED
HPIV2 RNA NPH QL NAA+NON-PROBE: NOT DETECTED
HPIV3 RNA NPH QL NAA+NON-PROBE: NOT DETECTED
HPIV4 RNA NPH QL NAA+NON-PROBE: NOT DETECTED
HYALINE CASTS #/AREA URNS AUTO: 3 /HPF (ref 0–8)
IMM GRANULOCYTES # BLD: 0.3 K/UL
KETONES UR STRIP.AUTO-MCNC: NEGATIVE MG/DL
LACTATE BLDV-SCNC: 1 MG/DL (ref 0.5–1.9)
LACTATE BLDV-SCNC: 1.1 MG/DL (ref 0.5–1.9)
LEUKOCYTE ESTERASE UR QL STRIP.AUTO: NEGATIVE
LIPASE SERPL-CCNC: 49 U/L (ref 13–60)
LYMPHOCYTES # BLD: 2.3 K/UL (ref 1.1–4.5)
LYMPHOCYTES NFR BLD: 12.3 % (ref 20–40)
M PNEUMO DNA NPH QL NAA+NON-PROBE: NOT DETECTED
MAGNESIUM SERPL-MCNC: 2.1 MG/DL (ref 1.6–2.4)
MCH RBC QN AUTO: 29.3 PG (ref 27–31)
MCHC RBC AUTO-ENTMCNC: 33 G/DL (ref 33–37)
MCV RBC AUTO: 88.9 FL (ref 80–94)
METHEMOGLOBIN ARTERIAL: 1.2 %
MONOCYTES # BLD: 1.4 K/UL (ref 0–0.9)
MONOCYTES NFR BLD: 7.3 % (ref 0–10)
NEUTROPHILS # BLD: 14.9 K/UL (ref 1.5–7.5)
NEUTS SEG NFR BLD: 78.7 % (ref 50–65)
NITRITE UR QL STRIP.AUTO: NEGATIVE
O2 CONTENT ARTERIAL: 17.5 ML/DL
O2 DELIVERY DEVICE: ABNORMAL
O2 SAT, ARTERIAL: 94.2 %
O2 THERAPY: ABNORMAL
OXYGEN FLOW: 5
PCO2 ARTERIAL: 38 MMHG (ref 35–45)
PH ARTERIAL: 7.53 (ref 7.35–7.45)
PH UR STRIP.AUTO: 6.5 [PH] (ref 5–8)
PLATELET # BLD AUTO: 367 K/UL (ref 130–400)
PMV BLD AUTO: 10.6 FL (ref 9.4–12.4)
PO2 ARTERIAL: 68 MMHG (ref 80–100)
POTASSIUM BLD-SCNC: 3.9 MMOL/L
POTASSIUM SERPL-SCNC: 4 MMOL/L (ref 3.5–5.1)
PROCALCITONIN: 0.08 NG/ML (ref 0–0.09)
PROT SERPL-MCNC: 6.2 G/DL (ref 6.4–8.3)
PROT UR STRIP.AUTO-MCNC: NEGATIVE MG/DL
RBC # BLD AUTO: 4.33 M/UL (ref 4.7–6.1)
RBC #/AREA URNS AUTO: 5 /HPF (ref 0–4)
RSV RNA NPH QL NAA+NON-PROBE: NOT DETECTED
RV+EV RNA NPH QL NAA+NON-PROBE: NOT DETECTED
SAMPLE SOURCE: ABNORMAL
SARS-COV-2 RNA NPH QL NAA+NON-PROBE: NOT DETECTED
SODIUM SERPL-SCNC: 141 MMOL/L (ref 136–145)
SP GR UR STRIP.AUTO: 1.01 (ref 1–1.03)
TROPONIN, HIGH SENSITIVITY: 123 NG/L (ref 0–22)
TROPONIN, HIGH SENSITIVITY: 127 NG/L (ref 0–22)
UROBILINOGEN UR STRIP.AUTO-MCNC: 1 E.U./DL
VALPROATE SERPL-MCNC: 43.3 UG/ML (ref 50–100)
WBC # BLD AUTO: 19 K/UL (ref 4.8–10.8)
WBC #/AREA URNS AUTO: 1 /HPF (ref 0–5)

## 2025-06-04 PROCEDURE — 96365 THER/PROPH/DIAG IV INF INIT: CPT

## 2025-06-04 PROCEDURE — 2500000003 HC RX 250 WO HCPCS: Performed by: PEDIATRICS

## 2025-06-04 PROCEDURE — 6370000000 HC RX 637 (ALT 250 FOR IP): Performed by: PEDIATRICS

## 2025-06-04 PROCEDURE — 0202U NFCT DS 22 TRGT SARS-COV-2: CPT

## 2025-06-04 PROCEDURE — 6360000002 HC RX W HCPCS: Performed by: PEDIATRICS

## 2025-06-04 PROCEDURE — 87040 BLOOD CULTURE FOR BACTERIA: CPT

## 2025-06-04 PROCEDURE — 96367 TX/PROPH/DG ADDL SEQ IV INF: CPT

## 2025-06-04 PROCEDURE — 71045 X-RAY EXAM CHEST 1 VIEW: CPT

## 2025-06-04 PROCEDURE — 93005 ELECTROCARDIOGRAM TRACING: CPT

## 2025-06-04 PROCEDURE — 1200000000 HC SEMI PRIVATE

## 2025-06-04 PROCEDURE — 83605 ASSAY OF LACTIC ACID: CPT

## 2025-06-04 PROCEDURE — 96375 TX/PRO/DX INJ NEW DRUG ADDON: CPT

## 2025-06-04 PROCEDURE — 80164 ASSAY DIPROPYLACETIC ACD TOT: CPT

## 2025-06-04 PROCEDURE — 99291 CRITICAL CARE FIRST HOUR: CPT

## 2025-06-04 PROCEDURE — 80053 COMPREHEN METABOLIC PANEL: CPT

## 2025-06-04 PROCEDURE — 96366 THER/PROPH/DIAG IV INF ADDON: CPT

## 2025-06-04 PROCEDURE — 36415 COLL VENOUS BLD VENIPUNCTURE: CPT

## 2025-06-04 PROCEDURE — 83880 ASSAY OF NATRIURETIC PEPTIDE: CPT

## 2025-06-04 PROCEDURE — 84145 PROCALCITONIN (PCT): CPT

## 2025-06-04 PROCEDURE — 82803 BLOOD GASES ANY COMBINATION: CPT

## 2025-06-04 PROCEDURE — 84484 ASSAY OF TROPONIN QUANT: CPT

## 2025-06-04 PROCEDURE — 94640 AIRWAY INHALATION TREATMENT: CPT

## 2025-06-04 PROCEDURE — 83690 ASSAY OF LIPASE: CPT

## 2025-06-04 PROCEDURE — 85025 COMPLETE CBC W/AUTO DIFF WBC: CPT

## 2025-06-04 PROCEDURE — 71250 CT THORAX DX C-: CPT

## 2025-06-04 PROCEDURE — 83735 ASSAY OF MAGNESIUM: CPT

## 2025-06-04 PROCEDURE — 36600 WITHDRAWAL OF ARTERIAL BLOOD: CPT

## 2025-06-04 PROCEDURE — 99285 EMERGENCY DEPT VISIT HI MDM: CPT

## 2025-06-04 PROCEDURE — 81001 URINALYSIS AUTO W/SCOPE: CPT

## 2025-06-04 PROCEDURE — 2580000003 HC RX 258: Performed by: PEDIATRICS

## 2025-06-04 RX ORDER — ONDANSETRON 2 MG/ML
4 INJECTION INTRAMUSCULAR; INTRAVENOUS EVERY 6 HOURS PRN
Status: DISCONTINUED | OUTPATIENT
Start: 2025-06-04 | End: 2025-06-12 | Stop reason: HOSPADM

## 2025-06-04 RX ORDER — IPRATROPIUM BROMIDE AND ALBUTEROL SULFATE 2.5; .5 MG/3ML; MG/3ML
1 SOLUTION RESPIRATORY (INHALATION) ONCE
Status: COMPLETED | OUTPATIENT
Start: 2025-06-04 | End: 2025-06-04

## 2025-06-04 RX ORDER — ASPIRIN 325 MG
325 TABLET ORAL ONCE
Status: COMPLETED | OUTPATIENT
Start: 2025-06-04 | End: 2025-06-04

## 2025-06-04 RX ORDER — ACETAMINOPHEN 325 MG/1
650 TABLET ORAL EVERY 6 HOURS PRN
Status: DISCONTINUED | OUTPATIENT
Start: 2025-06-04 | End: 2025-06-12 | Stop reason: HOSPADM

## 2025-06-04 RX ORDER — SODIUM CHLORIDE 0.9 % (FLUSH) 0.9 %
5-40 SYRINGE (ML) INJECTION EVERY 12 HOURS SCHEDULED
Status: DISCONTINUED | OUTPATIENT
Start: 2025-06-04 | End: 2025-06-12 | Stop reason: HOSPADM

## 2025-06-04 RX ORDER — ONDANSETRON 4 MG/1
4 TABLET, ORALLY DISINTEGRATING ORAL EVERY 8 HOURS PRN
Status: DISCONTINUED | OUTPATIENT
Start: 2025-06-04 | End: 2025-06-12 | Stop reason: HOSPADM

## 2025-06-04 RX ORDER — POTASSIUM CHLORIDE 7.45 MG/ML
10 INJECTION INTRAVENOUS PRN
Status: DISCONTINUED | OUTPATIENT
Start: 2025-06-04 | End: 2025-06-12 | Stop reason: HOSPADM

## 2025-06-04 RX ORDER — POLYETHYLENE GLYCOL 3350 17 G/17G
17 POWDER, FOR SOLUTION ORAL DAILY PRN
Status: DISCONTINUED | OUTPATIENT
Start: 2025-06-04 | End: 2025-06-12 | Stop reason: HOSPADM

## 2025-06-04 RX ORDER — POTASSIUM CHLORIDE 1500 MG/1
40 TABLET, EXTENDED RELEASE ORAL PRN
Status: DISCONTINUED | OUTPATIENT
Start: 2025-06-04 | End: 2025-06-12 | Stop reason: HOSPADM

## 2025-06-04 RX ORDER — ALBUTEROL SULFATE 0.83 MG/ML
2.5 SOLUTION RESPIRATORY (INHALATION)
Status: DISCONTINUED | OUTPATIENT
Start: 2025-06-04 | End: 2025-06-05

## 2025-06-04 RX ORDER — 0.9 % SODIUM CHLORIDE 0.9 %
250 INTRAVENOUS SOLUTION INTRAVENOUS ONCE
Status: COMPLETED | OUTPATIENT
Start: 2025-06-04 | End: 2025-06-05

## 2025-06-04 RX ORDER — SODIUM CHLORIDE 0.9 % (FLUSH) 0.9 %
5-40 SYRINGE (ML) INJECTION PRN
Status: DISCONTINUED | OUTPATIENT
Start: 2025-06-04 | End: 2025-06-12 | Stop reason: HOSPADM

## 2025-06-04 RX ORDER — MAGNESIUM SULFATE IN WATER 40 MG/ML
2000 INJECTION, SOLUTION INTRAVENOUS PRN
Status: DISCONTINUED | OUTPATIENT
Start: 2025-06-04 | End: 2025-06-12 | Stop reason: HOSPADM

## 2025-06-04 RX ORDER — SODIUM CHLORIDE 9 MG/ML
INJECTION, SOLUTION INTRAVENOUS PRN
Status: DISCONTINUED | OUTPATIENT
Start: 2025-06-04 | End: 2025-06-12 | Stop reason: HOSPADM

## 2025-06-04 RX ADMIN — ASPIRIN 325 MG: 325 TABLET ORAL at 23:10

## 2025-06-04 RX ADMIN — SODIUM CHLORIDE 250 ML: 0.9 INJECTION, SOLUTION INTRAVENOUS at 23:09

## 2025-06-04 RX ADMIN — WATER 60 MG: 1 INJECTION INTRAMUSCULAR; INTRAVENOUS; SUBCUTANEOUS at 20:19

## 2025-06-04 RX ADMIN — VANCOMYCIN HYDROCHLORIDE 2500 MG: 5 INJECTION, POWDER, LYOPHILIZED, FOR SOLUTION INTRAVENOUS at 20:53

## 2025-06-04 RX ADMIN — CEFEPIME 2000 MG: 2 INJECTION, POWDER, FOR SOLUTION INTRAVENOUS at 20:21

## 2025-06-04 RX ADMIN — IPRATROPIUM BROMIDE AND ALBUTEROL SULFATE 1 DOSE: 2.5; .5 SOLUTION RESPIRATORY (INHALATION) at 20:39

## 2025-06-04 ASSESSMENT — PAIN - FUNCTIONAL ASSESSMENT: PAIN_FUNCTIONAL_ASSESSMENT: 0-10

## 2025-06-04 ASSESSMENT — PAIN SCALES - GENERAL
PAINLEVEL_OUTOF10: 8
PAINLEVEL_OUTOF10: 0

## 2025-06-04 ASSESSMENT — HEART SCORE: ECG: NON-SPECIFC REPOLARIZATION DISTURBANCE/LBTB/PM

## 2025-06-05 LAB
ANION GAP SERPL CALCULATED.3IONS-SCNC: 10 MMOL/L (ref 8–16)
BUN SERPL-MCNC: 21 MG/DL (ref 8–23)
CALCIUM SERPL-MCNC: 8.4 MG/DL (ref 8.8–10.2)
CHLORIDE SERPL-SCNC: 104 MMOL/L (ref 98–107)
CO2 SERPL-SCNC: 27 MMOL/L (ref 22–29)
CREAT SERPL-MCNC: 1 MG/DL (ref 0.7–1.2)
ERYTHROCYTE [DISTWIDTH] IN BLOOD BY AUTOMATED COUNT: 16.4 % (ref 11.5–14.5)
GLUCOSE BLD-MCNC: 163 MG/DL (ref 70–99)
GLUCOSE BLD-MCNC: 191 MG/DL (ref 70–99)
GLUCOSE SERPL-MCNC: 156 MG/DL (ref 70–99)
HBA1C MFR BLD: 7.3 % (ref 4–5.6)
HCT VFR BLD AUTO: 37 % (ref 42–52)
HGB BLD-MCNC: 12.1 G/DL (ref 14–18)
MCH RBC QN AUTO: 29.2 PG (ref 27–31)
MCHC RBC AUTO-ENTMCNC: 32.7 G/DL (ref 33–37)
MCV RBC AUTO: 89.2 FL (ref 80–94)
MRSA DNA SPEC QL NAA+PROBE: NOT DETECTED
PERFORMED ON: ABNORMAL
PERFORMED ON: ABNORMAL
PLATELET # BLD AUTO: 329 K/UL (ref 130–400)
PMV BLD AUTO: 11.1 FL (ref 9.4–12.4)
POTASSIUM SERPL-SCNC: 4 MMOL/L (ref 3.5–5)
RBC # BLD AUTO: 4.15 M/UL (ref 4.7–6.1)
SODIUM SERPL-SCNC: 141 MMOL/L (ref 136–145)
TROPONIN, HIGH SENSITIVITY: 109 NG/L (ref 0–22)
TROPONIN, HIGH SENSITIVITY: 116 NG/L (ref 0–22)
TROPONIN, HIGH SENSITIVITY: 30 NG/L (ref 0–22)
VANCOMYCIN TROUGH SERPL-MCNC: 15.3 UG/ML (ref 10–20)
WBC # BLD AUTO: 17.5 K/UL (ref 4.8–10.8)

## 2025-06-05 PROCEDURE — 92522 EVALUATE SPEECH PRODUCTION: CPT

## 2025-06-05 PROCEDURE — 6360000002 HC RX W HCPCS: Performed by: PEDIATRICS

## 2025-06-05 PROCEDURE — 82962 GLUCOSE BLOOD TEST: CPT

## 2025-06-05 PROCEDURE — 36415 COLL VENOUS BLD VENIPUNCTURE: CPT

## 2025-06-05 PROCEDURE — 87641 MR-STAPH DNA AMP PROBE: CPT

## 2025-06-05 PROCEDURE — 94760 N-INVAS EAR/PLS OXIMETRY 1: CPT

## 2025-06-05 PROCEDURE — 2580000003 HC RX 258: Performed by: NURSE PRACTITIONER

## 2025-06-05 PROCEDURE — 80202 ASSAY OF VANCOMYCIN: CPT

## 2025-06-05 PROCEDURE — 6370000000 HC RX 637 (ALT 250 FOR IP): Performed by: NURSE PRACTITIONER

## 2025-06-05 PROCEDURE — 6360000002 HC RX W HCPCS: Performed by: NURSE PRACTITIONER

## 2025-06-05 PROCEDURE — 85027 COMPLETE CBC AUTOMATED: CPT

## 2025-06-05 PROCEDURE — 6370000000 HC RX 637 (ALT 250 FOR IP): Performed by: INTERNAL MEDICINE

## 2025-06-05 PROCEDURE — 80048 BASIC METABOLIC PNL TOTAL CA: CPT

## 2025-06-05 PROCEDURE — 2580000003 HC RX 258: Performed by: PEDIATRICS

## 2025-06-05 PROCEDURE — 94640 AIRWAY INHALATION TREATMENT: CPT

## 2025-06-05 PROCEDURE — 2500000003 HC RX 250 WO HCPCS: Performed by: NURSE PRACTITIONER

## 2025-06-05 PROCEDURE — 84484 ASSAY OF TROPONIN QUANT: CPT

## 2025-06-05 PROCEDURE — 1200000000 HC SEMI PRIVATE

## 2025-06-05 PROCEDURE — 51702 INSERT TEMP BLADDER CATH: CPT

## 2025-06-05 PROCEDURE — 2700000000 HC OXYGEN THERAPY PER DAY

## 2025-06-05 PROCEDURE — 92610 EVALUATE SWALLOWING FUNCTION: CPT

## 2025-06-05 PROCEDURE — 83036 HEMOGLOBIN GLYCOSYLATED A1C: CPT

## 2025-06-05 PROCEDURE — 94669 MECHANICAL CHEST WALL OSCILL: CPT

## 2025-06-05 PROCEDURE — 94150 VITAL CAPACITY TEST: CPT

## 2025-06-05 RX ORDER — LEVOTHYROXINE SODIUM 75 UG/1
150 TABLET ORAL DAILY
Status: DISCONTINUED | OUTPATIENT
Start: 2025-06-05 | End: 2025-06-12 | Stop reason: HOSPADM

## 2025-06-05 RX ORDER — LACTULOSE 10 G/15ML
20 SOLUTION ORAL 2 TIMES DAILY
Status: DISCONTINUED | OUTPATIENT
Start: 2025-06-05 | End: 2025-06-08

## 2025-06-05 RX ORDER — CARVEDILOL 3.12 MG/1
6.25 TABLET ORAL 2 TIMES DAILY WITH MEALS
Status: DISCONTINUED | OUTPATIENT
Start: 2025-06-05 | End: 2025-06-10

## 2025-06-05 RX ORDER — BUMETANIDE 1 MG/1
1 TABLET ORAL 2 TIMES DAILY
Status: DISCONTINUED | OUTPATIENT
Start: 2025-06-05 | End: 2025-06-11

## 2025-06-05 RX ORDER — BUDESONIDE AND FORMOTEROL FUMARATE DIHYDRATE 160; 4.5 UG/1; UG/1
2 AEROSOL RESPIRATORY (INHALATION) 2 TIMES DAILY
Status: DISCONTINUED | OUTPATIENT
Start: 2025-06-05 | End: 2025-06-12 | Stop reason: HOSPADM

## 2025-06-05 RX ORDER — GUAIFENESIN 200 MG/1
400 TABLET ORAL
Status: DISCONTINUED | OUTPATIENT
Start: 2025-06-05 | End: 2025-06-12 | Stop reason: HOSPADM

## 2025-06-05 RX ORDER — DIVALPROEX SODIUM 250 MG/1
500 TABLET, DELAYED RELEASE ORAL 2 TIMES DAILY
Status: DISCONTINUED | OUTPATIENT
Start: 2025-06-05 | End: 2025-06-06

## 2025-06-05 RX ORDER — TAMSULOSIN HYDROCHLORIDE 0.4 MG/1
0.8 CAPSULE ORAL DAILY
Status: DISCONTINUED | OUTPATIENT
Start: 2025-06-05 | End: 2025-06-12 | Stop reason: HOSPADM

## 2025-06-05 RX ORDER — SODIUM HYPOCHLORITE 1.25 MG/ML
SOLUTION TOPICAL 2 TIMES DAILY
Status: DISCONTINUED | OUTPATIENT
Start: 2025-06-05 | End: 2025-06-12 | Stop reason: HOSPADM

## 2025-06-05 RX ORDER — ENOXAPARIN SODIUM 100 MG/ML
40 INJECTION SUBCUTANEOUS DAILY
Status: DISCONTINUED | OUTPATIENT
Start: 2025-06-05 | End: 2025-06-05 | Stop reason: DRUGHIGH

## 2025-06-05 RX ORDER — RISPERIDONE 1 MG/1
0.5 TABLET ORAL 2 TIMES DAILY
Status: DISCONTINUED | OUTPATIENT
Start: 2025-06-05 | End: 2025-06-12 | Stop reason: HOSPADM

## 2025-06-05 RX ORDER — LISINOPRIL 5 MG/1
10 TABLET ORAL 2 TIMES DAILY
Status: DISCONTINUED | OUTPATIENT
Start: 2025-06-05 | End: 2025-06-09

## 2025-06-05 RX ORDER — PREDNISONE 5 MG/1
10 TABLET ORAL DAILY
Status: COMPLETED | OUTPATIENT
Start: 2025-06-05 | End: 2025-06-06

## 2025-06-05 RX ORDER — GUAIFENESIN 600 MG/1
600 TABLET, EXTENDED RELEASE ORAL
Status: DISCONTINUED | OUTPATIENT
Start: 2025-06-05 | End: 2025-06-05 | Stop reason: DRUGHIGH

## 2025-06-05 RX ORDER — PANTOPRAZOLE SODIUM 40 MG/1
40 TABLET, DELAYED RELEASE ORAL
Status: DISCONTINUED | OUTPATIENT
Start: 2025-06-05 | End: 2025-06-12 | Stop reason: HOSPADM

## 2025-06-05 RX ORDER — ASPIRIN 81 MG/1
81 TABLET, CHEWABLE ORAL DAILY
Status: DISCONTINUED | OUTPATIENT
Start: 2025-06-05 | End: 2025-06-12 | Stop reason: HOSPADM

## 2025-06-05 RX ORDER — ENOXAPARIN SODIUM 100 MG/ML
30 INJECTION SUBCUTANEOUS 2 TIMES DAILY
Status: DISCONTINUED | OUTPATIENT
Start: 2025-06-05 | End: 2025-06-09

## 2025-06-05 RX ORDER — GLUCAGON 1 MG/ML
1 KIT INJECTION PRN
Status: DISCONTINUED | OUTPATIENT
Start: 2025-06-05 | End: 2025-06-12 | Stop reason: HOSPADM

## 2025-06-05 RX ORDER — IPRATROPIUM BROMIDE AND ALBUTEROL SULFATE 2.5; .5 MG/3ML; MG/3ML
1 SOLUTION RESPIRATORY (INHALATION)
Status: DISCONTINUED | OUTPATIENT
Start: 2025-06-05 | End: 2025-06-12 | Stop reason: HOSPADM

## 2025-06-05 RX ORDER — BISACODYL 10 MG
10 SUPPOSITORY, RECTAL RECTAL DAILY PRN
Status: DISCONTINUED | OUTPATIENT
Start: 2025-06-05 | End: 2025-06-12 | Stop reason: HOSPADM

## 2025-06-05 RX ORDER — INSULIN LISPRO 100 [IU]/ML
0-4 INJECTION, SOLUTION INTRAVENOUS; SUBCUTANEOUS
Status: DISCONTINUED | OUTPATIENT
Start: 2025-06-05 | End: 2025-06-12 | Stop reason: HOSPADM

## 2025-06-05 RX ORDER — PREDNISONE 5 MG/1
5 TABLET ORAL DAILY
Status: COMPLETED | OUTPATIENT
Start: 2025-06-07 | End: 2025-06-09

## 2025-06-05 RX ORDER — DEXTROSE MONOHYDRATE 100 MG/ML
INJECTION, SOLUTION INTRAVENOUS CONTINUOUS PRN
Status: DISCONTINUED | OUTPATIENT
Start: 2025-06-05 | End: 2025-06-12 | Stop reason: HOSPADM

## 2025-06-05 RX ORDER — ACETYLCYSTEINE 200 MG/ML
600 SOLUTION ORAL; RESPIRATORY (INHALATION)
Status: COMPLETED | OUTPATIENT
Start: 2025-06-05 | End: 2025-06-07

## 2025-06-05 RX ORDER — ATORVASTATIN CALCIUM 80 MG/1
80 TABLET, FILM COATED ORAL NIGHTLY
Status: DISCONTINUED | OUTPATIENT
Start: 2025-06-05 | End: 2025-06-12 | Stop reason: HOSPADM

## 2025-06-05 RX ORDER — MELATONIN 10 MG
10 CAPSULE ORAL NIGHTLY
Status: DISCONTINUED | OUTPATIENT
Start: 2025-06-05 | End: 2025-06-12 | Stop reason: HOSPADM

## 2025-06-05 RX ADMIN — IPRATROPIUM BROMIDE AND ALBUTEROL SULFATE 1 DOSE: 2.5; .5 SOLUTION RESPIRATORY (INHALATION) at 14:30

## 2025-06-05 RX ADMIN — CEFEPIME 2000 MG: 2 INJECTION, POWDER, FOR SOLUTION INTRAVENOUS at 04:26

## 2025-06-05 RX ADMIN — RISPERIDONE 0.5 MG: 1 TABLET, FILM COATED ORAL at 22:31

## 2025-06-05 RX ADMIN — RISPERIDONE 0.5 MG: 1 TABLET, FILM COATED ORAL at 10:35

## 2025-06-05 RX ADMIN — IPRATROPIUM BROMIDE 0.5 MG: 0.5 SOLUTION RESPIRATORY (INHALATION) at 00:45

## 2025-06-05 RX ADMIN — LACTULOSE 20 G: 20 SOLUTION ORAL at 10:35

## 2025-06-05 RX ADMIN — DAKIN'S SOLUTION 0.125% (QUARTER STRENGTH): 0.12 SOLUTION at 22:37

## 2025-06-05 RX ADMIN — COLLAGENASE SANTYL: 250 OINTMENT TOPICAL at 22:37

## 2025-06-05 RX ADMIN — ENOXAPARIN SODIUM 30 MG: 100 INJECTION SUBCUTANEOUS at 18:31

## 2025-06-05 RX ADMIN — CEFEPIME 2000 MG: 2 INJECTION, POWDER, FOR SOLUTION INTRAVENOUS at 14:46

## 2025-06-05 RX ADMIN — ACETYLCYSTEINE 600 MG: 200 SOLUTION ORAL; RESPIRATORY (INHALATION) at 18:45

## 2025-06-05 RX ADMIN — LISINOPRIL 10 MG: 5 TABLET ORAL at 22:42

## 2025-06-05 RX ADMIN — LISINOPRIL 10 MG: 5 TABLET ORAL at 10:35

## 2025-06-05 RX ADMIN — IPRATROPIUM BROMIDE AND ALBUTEROL SULFATE 1 DOSE: 2.5; .5 SOLUTION RESPIRATORY (INHALATION) at 18:45

## 2025-06-05 RX ADMIN — VANCOMYCIN HYDROCHLORIDE 1000 MG: 1 INJECTION, POWDER, LYOPHILIZED, FOR SOLUTION INTRAVENOUS at 22:30

## 2025-06-05 RX ADMIN — PREDNISONE 10 MG: 5 TABLET ORAL at 10:35

## 2025-06-05 RX ADMIN — DIVALPROEX SODIUM 500 MG: 250 TABLET, DELAYED RELEASE ORAL at 10:36

## 2025-06-05 RX ADMIN — PANTOPRAZOLE SODIUM 40 MG: 40 TABLET, DELAYED RELEASE ORAL at 06:26

## 2025-06-05 RX ADMIN — TAMSULOSIN HYDROCHLORIDE 0.8 MG: 0.4 CAPSULE ORAL at 10:36

## 2025-06-05 RX ADMIN — COLLAGENASE SANTYL: 250 OINTMENT TOPICAL at 13:19

## 2025-06-05 RX ADMIN — ACETYLCYSTEINE 600 MG: 200 SOLUTION ORAL; RESPIRATORY (INHALATION) at 14:30

## 2025-06-05 RX ADMIN — IPRATROPIUM BROMIDE AND ALBUTEROL SULFATE 1 DOSE: 2.5; .5 SOLUTION RESPIRATORY (INHALATION) at 10:33

## 2025-06-05 RX ADMIN — BUMETANIDE 1 MG: 1 TABLET ORAL at 10:35

## 2025-06-05 RX ADMIN — DIVALPROEX SODIUM 500 MG: 250 TABLET, DELAYED RELEASE ORAL at 22:31

## 2025-06-05 RX ADMIN — IPRATROPIUM BROMIDE 0.5 MG: 0.5 SOLUTION RESPIRATORY (INHALATION) at 06:14

## 2025-06-05 RX ADMIN — CEFEPIME 2000 MG: 2 INJECTION, POWDER, FOR SOLUTION INTRAVENOUS at 22:29

## 2025-06-05 RX ADMIN — ALBUTEROL SULFATE 2.5 MG: 2.5 SOLUTION RESPIRATORY (INHALATION) at 06:14

## 2025-06-05 RX ADMIN — LEVOTHYROXINE SODIUM 150 MCG: 0.07 TABLET ORAL at 06:26

## 2025-06-05 RX ADMIN — ALBUTEROL SULFATE 2.5 MG: 2.5 SOLUTION RESPIRATORY (INHALATION) at 00:45

## 2025-06-05 RX ADMIN — GUAIFENESIN 400 MG: 200 TABLET ORAL at 18:31

## 2025-06-05 RX ADMIN — ASPIRIN 81 MG: 81 TABLET, CHEWABLE ORAL at 10:35

## 2025-06-05 RX ADMIN — VANCOMYCIN HYDROCHLORIDE 1000 MG: 1 INJECTION, POWDER, LYOPHILIZED, FOR SOLUTION INTRAVENOUS at 10:41

## 2025-06-05 RX ADMIN — SODIUM CHLORIDE, PRESERVATIVE FREE 10 ML: 5 INJECTION INTRAVENOUS at 22:51

## 2025-06-05 RX ADMIN — CARVEDILOL 6.25 MG: 3.12 TABLET, FILM COATED ORAL at 10:35

## 2025-06-05 RX ADMIN — Medication 10 MG: at 22:31

## 2025-06-05 RX ADMIN — ATORVASTATIN CALCIUM 80 MG: 80 TABLET, FILM COATED ORAL at 22:31

## 2025-06-05 RX ADMIN — INSULIN LISPRO 1 UNITS: 100 INJECTION, SOLUTION INTRAVENOUS; SUBCUTANEOUS at 22:30

## 2025-06-05 SDOH — ECONOMIC STABILITY: FOOD INSECURITY: WITHIN THE PAST 12 MONTHS, YOU WORRIED THAT YOUR FOOD WOULD RUN OUT BEFORE YOU GOT MONEY TO BUY MORE.: NEVER TRUE

## 2025-06-05 SDOH — ECONOMIC STABILITY: INCOME INSECURITY: HOW HARD IS IT FOR YOU TO PAY FOR THE VERY BASICS LIKE FOOD, HOUSING, MEDICAL CARE, AND HEATING?: NOT HARD AT ALL

## 2025-06-05 SDOH — ECONOMIC STABILITY: INCOME INSECURITY: IN THE PAST 12 MONTHS, HAS THE ELECTRIC, GAS, OIL, OR WATER COMPANY THREATENED TO SHUT OFF SERVICE IN YOUR HOME?: NO

## 2025-06-05 ASSESSMENT — PATIENT HEALTH QUESTIONNAIRE - PHQ9
SUM OF ALL RESPONSES TO PHQ QUESTIONS 1-9: 0
2. FEELING DOWN, DEPRESSED OR HOPELESS: NOT AT ALL
1. LITTLE INTEREST OR PLEASURE IN DOING THINGS: NOT AT ALL
SUM OF ALL RESPONSES TO PHQ QUESTIONS 1-9: 0

## 2025-06-05 ASSESSMENT — PAIN SCALES - GENERAL
PAINLEVEL_OUTOF10: 0
PAINLEVEL_OUTOF10: 0
PAINLEVEL_OUTOF10: 6

## 2025-06-05 ASSESSMENT — PAIN - FUNCTIONAL ASSESSMENT
PAIN_FUNCTIONAL_ASSESSMENT: 0-10
PAIN_FUNCTIONAL_ASSESSMENT: 0-10

## 2025-06-05 ASSESSMENT — PAIN DESCRIPTION - ORIENTATION: ORIENTATION: RIGHT

## 2025-06-05 ASSESSMENT — PAIN DESCRIPTION - LOCATION: LOCATION: ABDOMEN

## 2025-06-05 NOTE — CONSULTS
Palliative Care:    Pt had recent admission 5/20 for SOA and was discharged 5/31.  Pt returned to ED 6/4 from SNF with complaints of chest pain.  Upon visit with Pt is alert and oriented.  Pt states he doesn't feel much better at time of visit than he did upon admission.  Pt does not appear to be in distress.  Pt is able to have conversation and answer questions appropriately but does close his eyes intermittently as if he is sleepy although he denies being sleepy or tired.  Pt states he is bed bound, relies on SNF staff for turning/repositioning, and is full assist of all ADLs.  Pt's sister Bre is his POA/PDM.  Pt not sure if Bre was contacted as she has not visited Pt since admission.  Call was placed to Bre but received busy signal x 2.       Past Medical History:        Past Medical History:   Diagnosis Date    Altered mental status     Arthritis     Asthma     Brief psychotic disorder (HCC)     Cerebral artery occlusion with cerebral infarction (HCC)     COPD (chronic obstructive pulmonary disease) (HCC)     Diabetes mellitus (HCC)     Epilepsy (HCC)     GERD (gastroesophageal reflux disease)     Hypertension     Palliative care patient 05/31/2022    Seizures (HCC)     Pt states last seizure was in December 2015    Thyroid disease     Unspecified convulsions (HCC)     Vascular dementia (HCC)        Advance Directives:  POA on file with Bre, sister and Deshawn, brother as decision makers.  Pt is aware of difference in DNR and Full Code and confirms his current Full Code status.               Pain/Other Symptoms:  Pt states he continues to have discomfort to chest, denies SOA, and other symptoms. Pt is NPO.             How are symptoms affecting QOL:  Pt is full assist of all ADLs, is bed bound and dependent on others for all care.          Psychological/Spiritual:  Pt states he is of [employer] anish and his anish is important to him.  Pt denies spiritual support at SNF.  Pt is supported by his sister, Bre.  Pt  states she visits him regularly.                     Plan:  Medical management and return to SNF upon discharge.            Patient/family discussion r/t goals:           (what does living well look like to you?    Pt states his goal is to get better and to return to SNF upon discharge with support of his sister.           Palliative Performance Scale:   30%         Palliative Care team will continue to follow and support, with ongoing review of pt's goals of care.                Electronically signed by Ghislaine Perez RN on 6/5/2025 at 10:50 AM

## 2025-06-05 NOTE — PROGRESS NOTES
[facility]  N[facility]  Consult Note       NAME:  Khris Sampson  MEDICAL RECORD NUMBER:  [id number]  AGE: 70 y.o.   GENDER: male  : 1955  TODAY'S DATE:  2025    Subjective   Reason for Wound Nurse Evaluation and Assessment: right leg venous wounds      Khris Sampson is a 70 y.o. male referred by:   [] Physician  [x] Nursing  [] Other:     Wound Identification:  Wound Type: venous and pressure  Contributing Factors: venous stasis, chronic pressure, and decreased mobility    Wound History: Wound care has been consulted for wounds to the patients right lower extremity. The wounds on the anterior right leg are slough covered and dry at this time. The bryson wound skin has erythema noted. No drainage or odor noted. The posterior lower leg wound has some moist eschar and slough present. There is a mild odor noted. The patients family states that this was debrided at bedside recently at the SNF.     Offloading the posterior right leg is priority for proper wound healing    Current Wound Care Treatment:      POSTERIOR RIGHT LEG: Cleanse wound with soap and water with each dressing change. Apply Santyl nickel thick to areas with slough and moist eschar. Apply Dakins moistened gauze (to the wound bed only). Cover with ABD and secure with roll gauze. Change twice daily and PRN. THIS AREA SHOULD BE OFFLOADED AT ALL TIMES    RIGHT ANTERIOR LEG: Cleanse wounds with soap and water with each dressing change. Apply Santyl, nickel thick, to the wound bed. Apply saline moistened gauze (wound bed only). Cover with dry 4x4 fluff and secure with roll gauze. Change twice daily and PRN       Patient Goal of Care:  [x] Wound Healing  [] Odor Control  [] Palliative Care  [] Pain Control   [] Other:         PAST MEDICAL HISTORY        Diagnosis Date    Altered mental status     Arthritis     Asthma     Brief psychotic disorder (HCC)     Cerebral artery occlusion with cerebral infarction (HCC)     COPD (chronic obstructive pulmonary disease)

## 2025-06-05 NOTE — H&P
[facility]      Hospitalist - History & Physical      PCP: Nader Gordillo MD    Date of Admission: 6/4/2025    Date of Service: 6/4/2025    Chief Complaint:  Chest pain and shortness of breath     History Of Present Illness:   The patient is a 70 y.o. male who presented to chest pain and shortness of breath. Pt has history of copd with chronic respiratory failure with hypoxia requiring 2L per nc, diabetes, seizure disorder, stroke and right sided weakness, HFpEF, hypothyroidism, atrial fibrillation, chronic wounds to right leg, chronic frederick catheter requirement due to urinary retention and malnutrition.     Patient with dysarthric speech at baseline. He had previously reported sharp quality chest pain associated with shortness of breath while at nursing home. He received breathing treatment at nursing home with some improvement in symptoms. He has had no fevers, cough, nausea or vomiting. Pt was last discharged from this facility to nursing home 5/31/2025 having had treatment of copd exacerbation, CHF exacerbation and chronic wound right lower extremities. He was continued on bumex, levaquin and prednisone at discharge.     In ED,  pt required 5L supplemental oxygen on presentation to ED due to hypoxia, weaned to 3L per nc currently. cxr-Upper lobe bilateral interstitial edema versus pulmonary infiltrates.  This is a change from previous study pill. Creatinine 1.2, bun 22, troponin 127, bnp 643,, wbc 19k, hgb 12.7, platelets 367k. Pt is admitted inpatient to hospitalist.    Past Medical History:        Diagnosis Date    Altered mental status     Arthritis     Asthma     Brief psychotic disorder (HCC)     Cerebral artery occlusion with cerebral infarction (HCC)     COPD (chronic obstructive pulmonary disease) (HCC)     Diabetes mellitus (HCC)     Epilepsy (HCC)     GERD (gastroesophageal reflux disease)     Hypertension     Palliative care patient 05/31/2022    Seizures (HCC)     Pt states last seizure

## 2025-06-05 NOTE — PLAN OF CARE
Care plan reviewed        Problem: Chronic Conditions and Co-morbidities  Goal: Patient's chronic conditions and co-morbidity symptoms are monitored and maintained or improved  Outcome: Progressing     Problem: Pain  Goal: Verbalizes/displays adequate comfort level or baseline comfort level  Outcome: Progressing     Problem: Safety - Adult  Goal: Free from fall injury  Outcome: Progressing     Problem: Skin/Tissue Integrity  Goal: Skin integrity remains intact  Description: 1.  Monitor for areas of redness and/or skin breakdown2.  Assess vascular access sites hourly3.  Every 4-6 hours minimum:  Change oxygen saturation probe site4.  Every 4-6 hours:  If on nasal continuous positive airway pressure, respiratory therapy assess nares and determine need for appliance change or resting period  Outcome: Progressing

## 2025-06-05 NOTE — CARE COORDINATION
06/05/25 0615   Readmission Assessment   Number of Days since last admission? 1-7 days   Previous Disposition SNF   Who is being Interviewed Caregiver   What was the patient's/caregiver's perception as to why they think they needed to return back to the hospital? Other (Comment)  (SOB)   Did you visit your Primary Care Physician after you left the hospital, before you returned this time? Yes   Did you see a specialist, such as Cardiac, Pulmonary, Orthopedic Physician, etc. after you left the hospital? Yes   Who advised the patient to return to the hospital? Skilled Unit   Does the patient report anything that got in the way of taking their medications? No   In our efforts to provide the best possible care to you and others like you, can you think of anything that we could have done to help you after you left the hospital the first time, so that you might not have needed to return so soon? Identify patient's health literacy needs;Teach back instructions regarding management of illness     Electronically signed by LETICIA Gómez on 6/5/2025 at 6:15 AM

## 2025-06-05 NOTE — ED PROVIDER NOTES
[facility] 4 ONCOLOGY UNIT  eMERGENCY dEPARTMENT eNCOUnter      Pt Name: Khris Sampson  MRN: [id number]  Birthdate 1955  Date of evaluation: 6/4/2025  Provider: Janet Abraham MD    CHIEF COMPLAINT       Chief Complaint   Patient presents with    Chest Pain     Recently discharged from this facility with afib, chest pain today per [facility] staff          HISTORY OF PRESENT ILLNESS   (Location/Symptom, Timing/Onset,Context/Setting, Quality, Duration, Modifying Factors, Severity)  Note limiting factors.   Khris Sampson is a 70 y.o. male with history of COPD, vascular dementia, stroke, diaphragmatic hernia, urinary retention, atrial fibrillation, pneumonia, history of acute and chronic respiratory failure, oxygen dependent on 2 L per nasal cannula, chest pain, GERD, cognitive impairment, depression, hypothyroidism, vitamin D deficiency, history of urinary tract infection, osteoarthritis, impetigo, type 2 diabetes, epilepsy, hypertension, persistent dysarthria status post stroke, asthma, and anxiety disorder who presents to the emergency department with chest pain.  History given by patient and KAYLEE Juarez.  Patient describes chest pain as \"stabbing\" but described \"pressure\" earlier to RN at [facility].  Patient points to his right lower chest as source of pain.  Patient also points to right upper abdomen when asked where his pain is located.  Patient states that he has been short of breath.  Patient states that he has had chest pain and shortness of breath \"all day.\"  Patient denies fever, cough, nausea, vomiting.  Ann states there has been no documentation of fever or black or bloody stools.  Patient had recent bout of congestive heart failure and is currently on Bumex (changed from Lasix).  Patient also had recent A-fib and now has lower extremity wounds on his right lower extremity.  Patient is on a prednisone taper and took four 5 mg tablets today.  Patient may have been on Levaquin until yesterday-nursing home is    budesonide-formoterol (SYMBICORT) 160-4.5 MCG/ACT AERO Inhale 2 puffs into the lungs 2 times daily  Qty: 1 Inhaler, Refills: 0      aspirin 81 MG chewable tablet Take 1 tablet by mouth daily  Qty: 30 tablet, Refills: 3      atorvastatin (LIPITOR) 80 MG tablet Take 1 tablet by mouth nightly  Qty: 30 tablet, Refills: 3      tamsulosin (FLOMAX) 0.4 MG capsule Take 2 capsules by mouth daily  Qty: 60 capsule, Refills: 3             ALLERGIES     Codeine    FAMILY HISTORY       Family History   Problem Relation Age of Onset    Breast Cancer Mother     Heart Attack Father     High Blood Pressure Sister     High Blood Pressure Brother     Colon Cancer Neg Hx     Colon Polyps Neg Hx     Liver Cancer Neg Hx     Liver Disease Neg Hx     Esophageal Cancer Neg Hx     Rectal Cancer Neg Hx     Stomach Cancer Neg Hx           SOCIAL HISTORY       Social History     Socioeconomic History    Marital status: Single     Spouse name: None    Number of children: 0    Years of education: 12    Highest education level: None   Tobacco Use    Smoking status: Former     Current packs/day: 1.00     Average packs/day: 1 pack/day for 40.0 years (40.0 ttl pk-yrs)     Types: Cigarettes    Smokeless tobacco: Never   Vaping Use    Vaping status: Never Used   Substance and Sexual Activity    Alcohol use: No    Drug use: No     Social Drivers of Health     Financial Resource Strain: Low Risk  (6/5/2025)    Overall Financial Resource Strain (CARDIA)     Difficulty of Paying Living Expenses: Not hard at all   Food Insecurity: No Food Insecurity (6/5/2025)    Hunger Vital Sign     Worried About Running Out of Food in the Last Year: Never true     Ran Out of Food in the Last Year: Never true   Transportation Needs: No Transportation Needs (6/5/2025)    Transportation Problems ([facility] HRSN)     In the past 12 months, has lack of reliable transportation kept you from medical appointments, meetings, work or from getting things needed for daily living?: No  6 hrs:   BP Temp Pulse Resp SpO2 Height Weight Weight Method Percent Weight Change   06/05/25 0002 131/85 -- 60 17 94 % -- -- -- --   06/05/25 0031 108/64 -- 56 18 95 % -- -- -- --   06/05/25 0040 -- -- 55 16 95 % -- -- -- --   06/05/25 0045 -- -- 60 16 94 % -- -- -- --   06/05/25 0101 125/87 -- 56 17 94 % -- -- -- --   06/05/25 0120 -- -- 60 19 95 % -- -- -- --   06/05/25 0124 -- 98.6 °F (37 °C) -- -- -- -- -- -- --   06/05/25 0232 130/82 -- 63 18 94 % -- -- -- --   06/05/25 0248 -- -- 61 15 95 % -- -- -- --   06/05/25 0348 -- 97.7 °F (36.5 °C) -- -- -- -- -- -- --   06/05/25 0419 131/80 97.3 °F (36.3 °C) 66 18 95 % 1.854 m (6' 0.99\") 110.8 kg (244 lb 4.3 oz) Bed scale 0      Recent Labs     06/04/25  1915 06/05/25  0421   WBC 19.0* 17.5*   CREATININE 1.2 1.0   BILITOT 0.5  --     329        Infection (sepsis)  related to pneumonia (fill in source of infection) lung identified date: 6/4/2025 time: 20:14    Fluid Resuscitation Rationale: less than 30mL/kg because of a history of CHF NYHA III or IV with symptoms with minimal exertion/at rest.  Instead, 250cc was ordered.  More fluid initially would be potentially detrimental to the patient    Repeat lactate level: not indicated due to initial lactate < 2    Reassessment Exam: Not applicable. Patient does not have septic shock.    Due to the immediate potential for life-threatening deterioration due to hypoxia/acute respiratory failure/chest pain, I spent 60 minutes providing critical care.  This time is excluding time spent performing procedures.      PROCEDURES:  Unless otherwise noted below, none     Procedures    FINAL IMPRESSION      1. Acute on chronic respiratory failure with hypoxia (HCC)    2. Chest pain, unspecified type    3. COPD exacerbation (HCC)    4. Pneumonia due to infectious organism, unspecified laterality, unspecified part of lung          DISPOSITION/PLAN   DISPOSITION Admitted 06/04/2025 11:22:48 PM           (Please note that portions of

## 2025-06-05 NOTE — PROGRESS NOTES
Pharmacy Automatic Dose Adjustment                Subcutaneous Anticoagulant for Prophylaxis    Khris Sampson is a 70 y.o. male.     Recent Labs     06/04/25  1915 06/05/25  0421   CREATININE 1.2 1.0       Estimated Creatinine Clearance: 92 mL/min (based on SCr of 1 mg/dL).    Weight:  Wt Readings from Last 1 Encounters:   06/05/25 110.8 kg (244 lb 4.3 oz)           Pharmacy has adjusted subcutaneous anticoagulant for prophylaxis to Enoxaparin 30 mg SC twice daily based on the patient's weight and estimated CrCl per [facility] policy.               Electronically signed by Kj Kaplan RPH on 6/5/2025 at 2:47 PM

## 2025-06-05 NOTE — CARE COORDINATION
Case Management Assessment  Initial Evaluation    Date/Time of Evaluation: 6/5/2025 6:18 AM  Assessment Completed by: LETICIA Gómez    If patient is discharged prior to next notation, then this note serves as note for discharge by case management.    Patient Name: Khris Sampson                   YOB: 1955  Diagnosis: COPD exacerbation (HCC) [J44.1]  Acute on chronic respiratory failure with hypoxia (HCC) [J96.21]  Chest pain in adult [R07.9]  Chest pain, unspecified type [R07.9]  Pneumonia due to infectious organism, unspecified laterality, unspecified part of lung [J18.9]                   Date / Time: 6/4/2025  6:55 PM    Patient Admission Status: Inpatient   Readmission Risk (Low < 19, Mod (19-27), High > 27): Readmission Risk Score: 22.4    Current PCP: Nader Gordillo MD  PCP verified by ? Yes    Chart Reviewed: Yes      History Provided by: Medical Record  Patient Orientation: Alert and Oriented    Patient Cognition: Alert    Hospitalization in the last 30 days (Readmission):  Yes    If yes, Readmission Assessment in  Navigator will be completed.    Advance Directives:      Code Status: Full Code   Patient's Primary Decision Maker is: Legal Next of Kin    Primary Decision Maker: Bre Rios - Brother/Sister - [phone]    Discharge Planning:    Patient lives with: Other (Comment) (from snf) Type of Home: Skilled Nursing Facility  Primary Care Giver: Other (Comment) (SNF staff)  Patient Support Systems include: Family Members, Other (Comment) (from snf)   Current Financial resources: Medicare, Medicaid  Current community resources: None  Current services prior to admission: Skilled Nursing Facility            Current DME:              Type of Home Care services:  None    ADLS  Prior functional level: Assistance with the following:, Other (see comment) (from snf)  Current functional level: Assistance with the following:, Other (see comment) (from snf)    PT AM-PAC:   /24  OT AM-PAC:    Patient and/or Patient Representative Agree with the Discharge Plan? Yes    LETICIA Gómez  Case Management Department  Ph: [phone] Fax: [phone]

## 2025-06-05 NOTE — PROGRESS NOTES
[facility]   Pharmacy Pharmacokinetic Monitoring Service - Vancomycin     Khris Sampson is a 70 y.o. male starting on vancomycin therapy for sepsis. Pharmacy consulted by Dr Abraham for monitoring and adjustment.    Target Concentration: Goal AUC/DAYSI 400-600 mg*hr/L    Additional Antimicrobials: cefepime    Pertinent Laboratory Values:   Wt Readings from Last 1 Encounters:   06/04/25 115.2 kg (254 lb)     Temp Readings from Last 1 Encounters:   06/04/25 98.7 °F (37.1 °C) (Oral)     Estimated Creatinine Clearance: 78 mL/min (based on SCr of 1.2 mg/dL).  Recent Labs     06/04/25 1915   CREATININE 1.2   BUN 22   WBC 19.0*       Pertinent Cultures:  Culture Date Source Results   6/4/25 Blood x2 Sent    6/4/25 Blood, molecular  Nothing detected   MRSA Nasal Swab: N/A. Non-respiratory infection.    Plan:  Dosing recommendations based on Bayesian software  Start vancomycin 2500mg IV load followed by 1000mg IV q12h  Anticipated AUC of 501 and trough concentration of 15.9 at steady state  Renal labs as indicated   Vancomycin concentration ordered for 6/5 @ 2030   Pharmacy will continue to monitor patient and adjust therapy as indicated    Thank you for the consult,  Judy Archer RPH  6/4/2025 8:22 PM

## 2025-06-05 NOTE — PROGRESS NOTES
Pharmacy Renal Adjustment    Khris Sampson is a 70 y.o. male. Pharmacy has renally adjusted medications per protocol.    Recent Labs     06/04/25 1915   BUN 22       Recent Labs     06/04/25 1915   CREATININE 1.2       Estimated Creatinine Clearance: 78 mL/min (based on SCr of 1.2 mg/dL).    Height:   Ht Readings from Last 1 Encounters:   06/04/25 1.854 m (6' 1\")     Weight:  Wt Readings from Last 1 Encounters:   06/04/25 115.2 kg (254 lb)     Plan: Adjust the following medications based on renal function:           Cefepime to 2000 mg IV every 8 hours extended infusion over 240 minutes     Electronically signed by LANCE FLOREZ RPH on 6/4/2025 at 11:21 PM

## 2025-06-05 NOTE — ED NOTES
ED TO INPATIENT SBAR HANDOFF    Patient Name: Khris Sampson   : 1955  70 y.o.   Family/Caregiver Present: No  Code Status Order: Full Code    C-SSRS: Risk of Suicide: No Risk  Sitter No  Restraints:         Situation  Chief Complaint:   Chief Complaint   Patient presents with    Chest Pain     Recently discharged from this facility with afib, chest pain today per snf staff      Patient Diagnosis: Chest pain in adult [R07.9]     Brief Description of Patient's Condition: RESP FAILURE / PNEUMONIA  Mental Status: oriented, alert, and coherent  Arrived from: home    Imaging:   CT CHEST WO CONTRAST   Final Result   1. Groundglass infiltrate within the anterior right upper lobe most compatible with pneumonia.   2. Coronary artery disease.   3. Increased density within the lumen of the gallbladder could represent sludge or vicarious excretion of contrast if there has been a recent IV contrast enhanced study.   4. Posterior subluxation of the proximal right clavicle at the sternal clavicular joint. Correlate with any recent trauma of trauma        All CT scans are performed using dose optimization techniques as appropriate to the performed exam and include    at least one of the following: Automated exposure control, adjustment of the mA and/or kV according to size, and the use of iterative reconstruction technique.        ______________________________________    Electronically signed by: DINO VELÁSQUEZ M.D.   Date:     2025   Time:    23:43       XR CHEST PORTABLE   Final Result       1. Upper lobe bilateral interstitial edema versus pulmonary infiltrates.  This is a change from previous study pill                       ______________________________________    Electronically signed by: LAY VILLELA M.D.   Date:     2025   Time:    21:14         COVID-19 Results:   Internal Administration   First Dose      Second Dose           Last COVID Lab SARS-CoV-2, PCR (no units)   Date Value   2025 Not  Device: Nasal cannula O2 Flow Rate (L/min): 2 L/min  Cardiac Rhythm: NSR  NIH Score: NIH     Active LDA's:   Peripheral IV 06/04/25 Posterior;Right Hand (Active)     Pertinent or High Risk Medications/Drips: no   If Yes, please provide details: NA  Blood Product Administration: no  If Yes, please provide details: N/A  Sepsis Risk Score      Admitted with Sepsis? No    Recommendation  Incomplete orders: A.M. LABS /MEDS  Patient Belongings: WITH PT  Additional Comments: N/A  If any further questions, please call Sending RN at [phone]    Electronically signed by: Electronically signed by Rcici Rae RN on 6/5/2025 at 3:42 AM

## 2025-06-05 NOTE — PROGRESS NOTES
Facility/Department: [facility] ONCOLOGY UNIT   BEDSIDE SWALLOW EVALUATION   SPEECH PRODUCTION EVALUATION     NAME: Khris Sampson  : 1955  MRN: [id number]    ADMISSION DATE: 2025  ADMITTING DIAGNOSIS: has Impetigo; Skin tear of left upper arm without complication; Acute ischemic stroke (HCC); Aspiration pneumonia (HCC); History of stroke with residual deficit; Dysarthria; Diabetes (HCC); Chronic obstructive pulmonary disease with acute lower respiratory infection (HCC); Seizure disorder (HCC); COPD exacerbation (HCC); Hypertension; ZARA (acute kidney injury); Acute on chronic respiratory failure with hypoxia (HCC); Vitamin D deficiency; Hypothyroidism; Palliative care patient; Chest pain, unspecified; Chest pain in adult; Peripheral edema; SIRS (systemic inflammatory response syndrome) (HCC); Acute heart failure with preserved ejection fraction (HCC); Severe malnutrition; and Bilateral pneumonia on their problem list.    Date of Eval: 2025  Evaluating Therapist: STEVE Jensen    Current Diet level:  NPO    Pain:  Pain Assessment  Pain Assessment: 0-10  Pain Level: 6  Patient's Stated Pain Goal: 0 - No pain  Pain Location: Abdomen  Pain Orientation: Right    Reason for Referral  Khris Sampson was referred for a bedside swallow evaluation to assess the efficiency of his swallow function, identify signs and symptoms of aspiration and make recommendations regarding safe dietary consistencies, effective compensatory strategies, and safe eating environment.    Impression  Assessed patient's swallowing function. Patient exhibited decreased oral prep of more solid consistencies, slow oral transit of even blended food consistency, fast oral transit with even thickened liquid consistencies, and sluggish, mild-moderately decreased laryngeal elevation for swallow airway protection. No outward S/S penetration/aspiration was noted with any puree consistency trial or honey thick liquid trial presented during evaluation this

## 2025-06-05 NOTE — PROGRESS NOTES
[facility]      Progress Note    Patient:  Khris Sampson  YOB: 1955  Date of Service: 6/5/2025  MRN: [id number]   Acct: [id number]   Primary Care Physician: Nader Gordillo MD  Advance Directive: Full Code  Admit Date: 6/4/2025       Hospital Day: 1    Portions of this note have been copied forward, however, updated to reflect the most current clinical status of this patient.     CHIEF COMPLAINT chest pain shortness of breath    SUBJECTIVE: Feeling somewhat better today      CUMULATIVE HOSPITAL COURSE:   The patient is a 70 y.o. male who presented to chest pain and shortness of breath. Pt has history of copd with chronic respiratory failure with hypoxia requiring 2L per nc, diabetes, seizure disorder, stroke and right sided weakness, HFpEF, hypothyroidism, atrial fibrillation, chronic wounds to right leg, chronic frederick catheter requirement due to urinary retention and malnutrition.      Patient with dysarthric speech at baseline. He had previously reported sharp quality chest pain associated with shortness of breath while at nursing home. He received breathing treatment at nursing home with some improvement in symptoms. He has had no fevers, cough, nausea or vomiting. Pt was last discharged from this facility to nursing home 5/31/2025 having had treatment of copd exacerbation, CHF exacerbation and chronic wound right lower extremities. He was continued on bumex, levaquin and prednisone at discharge.      In ED,  pt required 5L supplemental oxygen on presentation to ED due to hypoxia, weaned to 3L per nc currently. cxr-Upper lobe bilateral interstitial edema versus pulmonary infiltrates.  This is a change from previous study pill. Creatinine 1.2, bun 22, troponin 127, bnp 643,, wbc 19k, hgb 12.7, platelets 367k. Pt is admitted inpatient to hospitalist.     Of note patient was here recently for 11 days and was on puréed diet.  When he returned to the nursing home they put him on regular diet with

## 2025-06-06 LAB
ANION GAP SERPL CALCULATED.3IONS-SCNC: 9 MMOL/L (ref 8–16)
BUN SERPL-MCNC: 20 MG/DL (ref 8–23)
CALCIUM SERPL-MCNC: 7.8 MG/DL (ref 8.8–10.2)
CHLORIDE SERPL-SCNC: 105 MMOL/L (ref 98–107)
CO2 SERPL-SCNC: 26 MMOL/L (ref 22–29)
CREAT SERPL-MCNC: 0.9 MG/DL (ref 0.7–1.2)
ERYTHROCYTE [DISTWIDTH] IN BLOOD BY AUTOMATED COUNT: 16.7 % (ref 11.5–14.5)
GLUCOSE BLD-MCNC: 131 MG/DL (ref 70–99)
GLUCOSE BLD-MCNC: 208 MG/DL (ref 70–99)
GLUCOSE BLD-MCNC: 213 MG/DL (ref 70–99)
GLUCOSE BLD-MCNC: 218 MG/DL (ref 70–99)
GLUCOSE SERPL-MCNC: 131 MG/DL (ref 70–99)
HCT VFR BLD AUTO: 34 % (ref 42–52)
HGB BLD-MCNC: 11.2 G/DL (ref 14–18)
MAGNESIUM SERPL-MCNC: 2.1 MG/DL (ref 1.6–2.4)
MCH RBC QN AUTO: 29.8 PG (ref 27–31)
MCHC RBC AUTO-ENTMCNC: 32.9 G/DL (ref 33–37)
MCV RBC AUTO: 90.4 FL (ref 80–94)
PERFORMED ON: ABNORMAL
PLATELET # BLD AUTO: 283 K/UL (ref 130–400)
PMV BLD AUTO: 10.7 FL (ref 9.4–12.4)
POTASSIUM SERPL-SCNC: 3.5 MMOL/L (ref 3.5–5)
RBC # BLD AUTO: 3.76 M/UL (ref 4.7–6.1)
SODIUM SERPL-SCNC: 140 MMOL/L (ref 136–145)
WBC # BLD AUTO: 15.7 K/UL (ref 4.8–10.8)

## 2025-06-06 PROCEDURE — 2580000003 HC RX 258: Performed by: PEDIATRICS

## 2025-06-06 PROCEDURE — 2580000003 HC RX 258: Performed by: NURSE PRACTITIONER

## 2025-06-06 PROCEDURE — 6370000000 HC RX 637 (ALT 250 FOR IP): Performed by: NURSE PRACTITIONER

## 2025-06-06 PROCEDURE — 97530 THERAPEUTIC ACTIVITIES: CPT

## 2025-06-06 PROCEDURE — 6360000002 HC RX W HCPCS: Performed by: NURSE PRACTITIONER

## 2025-06-06 PROCEDURE — 85027 COMPLETE CBC AUTOMATED: CPT

## 2025-06-06 PROCEDURE — 94640 AIRWAY INHALATION TREATMENT: CPT

## 2025-06-06 PROCEDURE — 97161 PT EVAL LOW COMPLEX 20 MIN: CPT

## 2025-06-06 PROCEDURE — 6370000000 HC RX 637 (ALT 250 FOR IP): Performed by: INTERNAL MEDICINE

## 2025-06-06 PROCEDURE — 36415 COLL VENOUS BLD VENIPUNCTURE: CPT

## 2025-06-06 PROCEDURE — 2500000003 HC RX 250 WO HCPCS: Performed by: NURSE PRACTITIONER

## 2025-06-06 PROCEDURE — 94760 N-INVAS EAR/PLS OXIMETRY 1: CPT

## 2025-06-06 PROCEDURE — 97165 OT EVAL LOW COMPLEX 30 MIN: CPT

## 2025-06-06 PROCEDURE — 6360000002 HC RX W HCPCS: Performed by: PEDIATRICS

## 2025-06-06 PROCEDURE — 2700000000 HC OXYGEN THERAPY PER DAY

## 2025-06-06 PROCEDURE — 1200000000 HC SEMI PRIVATE

## 2025-06-06 PROCEDURE — 82962 GLUCOSE BLOOD TEST: CPT

## 2025-06-06 PROCEDURE — 94669 MECHANICAL CHEST WALL OSCILL: CPT

## 2025-06-06 PROCEDURE — 80048 BASIC METABOLIC PNL TOTAL CA: CPT

## 2025-06-06 PROCEDURE — 83735 ASSAY OF MAGNESIUM: CPT

## 2025-06-06 RX ORDER — TRAZODONE HYDROCHLORIDE 50 MG/1
50 TABLET ORAL NIGHTLY
Status: DISCONTINUED | OUTPATIENT
Start: 2025-06-06 | End: 2025-06-07

## 2025-06-06 RX ORDER — DIVALPROEX SODIUM 125 MG/1
500 CAPSULE, COATED PELLETS ORAL 2 TIMES DAILY
Status: DISCONTINUED | OUTPATIENT
Start: 2025-06-06 | End: 2025-06-10

## 2025-06-06 RX ADMIN — BUDESONIDE AND FORMOTEROL FUMARATE DIHYDRATE 2 PUFF: 160; 4.5 AEROSOL RESPIRATORY (INHALATION) at 18:54

## 2025-06-06 RX ADMIN — IPRATROPIUM BROMIDE AND ALBUTEROL SULFATE 1 DOSE: 2.5; .5 SOLUTION RESPIRATORY (INHALATION) at 10:33

## 2025-06-06 RX ADMIN — ATORVASTATIN CALCIUM 80 MG: 80 TABLET, FILM COATED ORAL at 22:56

## 2025-06-06 RX ADMIN — LEVOTHYROXINE SODIUM 150 MCG: 0.07 TABLET ORAL at 05:43

## 2025-06-06 RX ADMIN — VANCOMYCIN HYDROCHLORIDE 1000 MG: 1 INJECTION, POWDER, LYOPHILIZED, FOR SOLUTION INTRAVENOUS at 12:14

## 2025-06-06 RX ADMIN — IPRATROPIUM BROMIDE AND ALBUTEROL SULFATE 1 DOSE: 2.5; .5 SOLUTION RESPIRATORY (INHALATION) at 18:42

## 2025-06-06 RX ADMIN — TRAZODONE HYDROCHLORIDE 50 MG: 50 TABLET ORAL at 23:00

## 2025-06-06 RX ADMIN — CEFEPIME 2000 MG: 2 INJECTION, POWDER, FOR SOLUTION INTRAVENOUS at 22:54

## 2025-06-06 RX ADMIN — LISINOPRIL 10 MG: 5 TABLET ORAL at 22:56

## 2025-06-06 RX ADMIN — DAKIN'S SOLUTION 0.125% (QUARTER STRENGTH): 0.12 SOLUTION at 23:00

## 2025-06-06 RX ADMIN — ACETYLCYSTEINE 600 MG: 200 SOLUTION ORAL; RESPIRATORY (INHALATION) at 18:42

## 2025-06-06 RX ADMIN — ACETAMINOPHEN 650 MG: 325 TABLET ORAL at 13:03

## 2025-06-06 RX ADMIN — CEFEPIME 2000 MG: 2 INJECTION, POWDER, FOR SOLUTION INTRAVENOUS at 12:17

## 2025-06-06 RX ADMIN — PANTOPRAZOLE SODIUM 40 MG: 40 TABLET, DELAYED RELEASE ORAL at 05:43

## 2025-06-06 RX ADMIN — SODIUM CHLORIDE, PRESERVATIVE FREE 10 ML: 5 INJECTION INTRAVENOUS at 22:55

## 2025-06-06 RX ADMIN — LACTULOSE 20 G: 20 SOLUTION ORAL at 13:03

## 2025-06-06 RX ADMIN — INSULIN LISPRO 1 UNITS: 100 INJECTION, SOLUTION INTRAVENOUS; SUBCUTANEOUS at 18:16

## 2025-06-06 RX ADMIN — ENOXAPARIN SODIUM 30 MG: 100 INJECTION SUBCUTANEOUS at 13:04

## 2025-06-06 RX ADMIN — ASPIRIN 81 MG: 81 TABLET, CHEWABLE ORAL at 13:04

## 2025-06-06 RX ADMIN — ACETYLCYSTEINE 600 MG: 200 SOLUTION ORAL; RESPIRATORY (INHALATION) at 06:46

## 2025-06-06 RX ADMIN — BUMETANIDE 1 MG: 1 TABLET ORAL at 13:03

## 2025-06-06 RX ADMIN — IPRATROPIUM BROMIDE AND ALBUTEROL SULFATE 1 DOSE: 2.5; .5 SOLUTION RESPIRATORY (INHALATION) at 06:46

## 2025-06-06 RX ADMIN — TAMSULOSIN HYDROCHLORIDE 0.8 MG: 0.4 CAPSULE ORAL at 13:04

## 2025-06-06 RX ADMIN — CEFEPIME 2000 MG: 2 INJECTION, POWDER, FOR SOLUTION INTRAVENOUS at 05:43

## 2025-06-06 RX ADMIN — RISPERIDONE 0.5 MG: 1 TABLET, FILM COATED ORAL at 13:04

## 2025-06-06 RX ADMIN — GUAIFENESIN 400 MG: 200 TABLET ORAL at 13:03

## 2025-06-06 RX ADMIN — RISPERIDONE 0.5 MG: 1 TABLET, FILM COATED ORAL at 22:57

## 2025-06-06 RX ADMIN — DIVALPROEX SODIUM 500 MG: 125 CAPSULE, COATED PELLETS ORAL at 13:03

## 2025-06-06 RX ADMIN — BUDESONIDE AND FORMOTEROL FUMARATE DIHYDRATE 2 PUFF: 160; 4.5 AEROSOL RESPIRATORY (INHALATION) at 10:29

## 2025-06-06 RX ADMIN — DIVALPROEX SODIUM 500 MG: 125 CAPSULE, COATED PELLETS ORAL at 22:56

## 2025-06-06 RX ADMIN — Medication 10 MG: at 22:57

## 2025-06-06 RX ADMIN — PREDNISONE 10 MG: 5 TABLET ORAL at 13:03

## 2025-06-06 RX ADMIN — GUAIFENESIN 400 MG: 200 TABLET ORAL at 05:43

## 2025-06-06 RX ADMIN — LISINOPRIL 10 MG: 5 TABLET ORAL at 13:03

## 2025-06-06 RX ADMIN — IPRATROPIUM BROMIDE AND ALBUTEROL SULFATE 1 DOSE: 2.5; .5 SOLUTION RESPIRATORY (INHALATION) at 14:43

## 2025-06-06 RX ADMIN — CARVEDILOL 6.25 MG: 3.12 TABLET, FILM COATED ORAL at 13:08

## 2025-06-06 NOTE — PROGRESS NOTES
This  visited with pt and his brother in law to follow up with pt and provide spiritual care. Pt wasn't real talkative and was resting his eyes. Pt says he is Yazdanism and his belle is important to him.This  provided spiritual care with sustaining presence, support, and prayer. Pt expressed gratitude for spiritual care.         Spiritual Health History and Assessment/Progress Note  [facility]    Spiritual/Emotional Needs,  ,  ,      Name: Khris Sampson MRN: [id number]    Age: 70 y.o.     Sex: male   Language: English   Temple: None   Chest pain in adult     Date: 6/6/2025            Total Time Calculated: 8 min              Spiritual Assessment began in [facility] ONCOLOGY UNIT        Referral/Consult From: Palliative Care   Encounter Overview/Reason: Spiritual/Emotional Needs  Service Provided For: Patient    Belle, Belief, Meaning:   Patient identifies as spiritual and is connected with a belle tradition or spiritual practice  Family/Friends identify as spiritual and are connected with a belle tradition or spiritual practice      Importance and Influence:  Patient has spiritual/personal beliefs that influence decisions regarding their health  Family/Friends Other: Pt's brother in law did not discuss influence.     Community:  Patient is connected with a spiritual community  Family/Friends are connected with a spiritual community:    Assessment and Plan of Care:     Patient Interventions include: Provided sacramental/Shinto ritual  Family/Friends Interventions include: Provided sacramental/Shinto ritual    Patient Plan of Care: Spiritual Care available upon further referral  Family/Friends Plan of Care: Spiritual Care available upon further referral    Electronically signed by Mary Behrens, Chaplain on 6/6/2025 at 10:42 AM

## 2025-06-06 NOTE — PROGRESS NOTES
[facility]      Progress Note    Patient:  Khris Sampson  YOB: 1955  Date of Service: 6/6/2025  MRN: [id number]   Acct: [id number]   Primary Care Physician: Nader Gordillo MD  Advance Directive: Full Code  Admit Date: 6/4/2025       Hospital Day: 2    Portions of this note have been copied forward, however, updated to reflect the most current clinical status of this patient.     CHIEF COMPLAINT chest pain shortness of breath    SUBJECTIVE: .Not sleeping well    CUMULATIVE HOSPITAL COURSE:   The patient is a 70 y.o. male who presented to chest pain and shortness of breath. Pt has history of copd with chronic respiratory failure with hypoxia requiring 2L per nc, diabetes, seizure disorder, stroke and right sided weakness, HFpEF, hypothyroidism, atrial fibrillation, chronic wounds to right leg, chronic frederick catheter requirement due to urinary retention and malnutrition.      Patient with dysarthric speech at baseline. He had previously reported sharp quality chest pain associated with shortness of breath while at nursing home. He received breathing treatment at nursing home with some improvement in symptoms. He has had no fevers, cough, nausea or vomiting. Pt was last discharged from this facility to nursing home 5/31/2025 having had treatment of copd exacerbation, CHF exacerbation and chronic wound right lower extremities. He was continued on bumex, levaquin and prednisone at discharge.      In ED,  pt required 5L supplemental oxygen on presentation to ED due to hypoxia, weaned to 3L per nc currently. cxr-Upper lobe bilateral interstitial edema versus pulmonary infiltrates.  This is a change from previous study pill. Creatinine 1.2, bun 22, troponin 127, bnp 643,, wbc 19k, hgb 12.7, platelets 367k. Pt is admitted inpatient to hospitalist.     Of note patient was here recently for 11 days and was on puréed diet.  When he returned to the nursing home they put him on regular diet with thickened  liquids.  His chest x-ray was concerning for aspiration so SLP was reconsulted and recommend puréed diet spoonfed honey thickened liquids and staff to feed at all times.    Vancomycin de-escalated since MRSA nasal swabs negative.  Repeat chest x-ray scheduled for in the a.m. PT OT consulted to increase his activity and get him out of bed.       Objective:   VITALS:  /74   Pulse 69   Temp 97.2 °F (36.2 °C) (Temporal)   Resp 16   Ht 1.854 m (6' 0.99\")   Wt 110.8 kg (244 lb 4.3 oz)   SpO2 90%   BMI 32.23 kg/m²   24HR INTAKE/OUTPUT:    Intake/Output Summary (Last 24 hours) at 6/6/2025 1328  Last data filed at 6/6/2025 0543  Gross per 24 hour   Intake 10 ml   Output 400 ml   Net -390 ml       Physical Exam  Vitals and nursing note reviewed.   Constitutional:       Appearance: He is ill-appearing.   HENT:      Head: Normocephalic.      Nose: Nose normal.      Mouth/Throat:      Mouth: Mucous membranes are moist.   Eyes:      Extraocular Movements: Extraocular movements intact.   Cardiovascular:      Rate and Rhythm: Normal rate and regular rhythm.   Pulmonary:      Breath sounds: Rales present.      Comments: Occasional wheezes  Abdominal:      General: Bowel sounds are normal.      Palpations: Abdomen is soft.   Musculoskeletal:         General: Normal range of motion.      Cervical back: Normal range of motion and neck supple.   Skin:     General: Skin is warm and dry.   Neurological:      Mental Status: He is alert. Mental status is at baseline.   Psychiatric:         Mood and Affect: Mood normal.            Medications:      dextrose      sodium chloride        divalproex  500 mg Oral BID    aspirin  81 mg Oral Daily    atorvastatin  80 mg Oral Nightly    budesonide-formoterol  2 puff Inhalation BID    bumetanide  1 mg Oral BID    carvedilol  6.25 mg Oral BID WC    lactulose  20 g Oral BID    levothyroxine  150 mcg Oral Daily    lisinopril  10 mg Oral BID    pantoprazole  40 mg Oral QAM AC    [START ON  06/04/25 1926   PHART 7.530*   ABC2TPH 38.0   PO2ART 68.0*   WSH4FPO 31.8*   BEART 8.5*   HGBAE 13.2*   U7LRXZKD 94.2   CARBOXHGBART 1.8       RAD:   CT CHEST WO CONTRAST  Result Date: 6/4/2025  EXAM:  CT OF THE CHEST WITHOUT CONTRAST   HISTORY:   Abnormal chest radiograph   COMPARISON:  Chest radiograph 6/4/2025   TECHNIQUE:  Multiplanar CT images through the chest were obtained without the administration of IV contrast.   FINDINGS:   Heart size is normal. Coronary calcifications. No thoracic aortic aneurysm. Trace pericardial fluid. no pathologic adenopathy. Groundglass infiltrate within the anterior right upper lobe. No pleural fluid and no pneumothorax. No suspicious lung masses or lung nodules.  Within the visualized upper abdomen, increased density within the lumen of the gallbladder. There is posterior subluxation of the proximal right clavicle at the sternal clavicular joint.       1. Groundglass infiltrate within the anterior right upper lobe most compatible with pneumonia. 2. Coronary artery disease. 3. Increased density within the lumen of the gallbladder could represent sludge or vicarious excretion of contrast if there has been a recent IV contrast enhanced study. 4. Posterior subluxation of the proximal right clavicle at the sternal clavicular joint. Correlate with any recent trauma of trauma  All CT scans are performed using dose optimization techniques as appropriate to the performed exam and include at least one of the following: Automated exposure control, adjustment of the mA and/or kV according to size, and the use of iterative reconstruction technique.  ______________________________________ Electronically signed by: DINO VELÁSQUEZ M.D. Date:     06/04/2025 Time:    23:43     XR CHEST PORTABLE  Result Date: 6/4/2025  EXAM:  FRONTAL VIEW OF THE CHEST.  HISTORY:  Shortness of breath.  COMPARISON:  05/27/2025.  FINDINGS: Interstitial markings are increased in the upper lobes bilaterally when

## 2025-06-06 NOTE — PROGRESS NOTES
[facility]   Pharmacy Pharmacokinetic Monitoring Service - Vancomycin    Consulting Provider: Dr Abraham   Indication: Sepsis  Target Concentration: Goal AUC/DAYSI 400-600 mg*hr/L  Day of Therapy: 3  Additional Antimicrobials: Maxipime    Pertinent Laboratory Values:   Wt Readings from Last 1 Encounters:   06/05/25 110.8 kg (244 lb 4.3 oz)     Temp Readings from Last 1 Encounters:   06/05/25 97.6 °F (36.4 °C)     Estimated Creatinine Clearance: 103 mL/min (based on SCr of 0.9 mg/dL).  Recent Labs     06/05/25  0421 06/06/25  0156   CREATININE 1.0 0.9   BUN 21 20   WBC 17.5* 15.7*     Procalcitonin: 05/28= 0.82   05/31= 0.33   06/04= 0.08    Pertinent Cultures:  Culture Date Source Results   06/04/25 Blood No growth   MRSA Nasal Swab: was negative on 6/4/25, ordered for respiratory indication, pharmacy to contact provider about discontinuing vancomycin    Recent vancomycin administrations                     vancomycin (VANCOCIN) 1,000 mg in sodium chloride 0.9 % 250 mL IVPB ([id number]) (mg) 1,000 mg New Bag 06/05/25 2230     1,000 mg New Bag  1041    vancomycin (VANCOCIN) 2,500 mg in sodium chloride 0.9 % 500 mL IVPB (mg) 2,500 mg New Bag 06/04/25 2053                    Assessment:  Date/Time Current Dose Concentration Timing of Concentration (h) AUC   06/05/25 1000mg Q12hr 15.3 9hr 23min 499   Note: Serum concentrations collected for AUC dosing may appear elevated if collected in close proximity to the dose administered, this is not necessarily an indication of toxicity    Plan:  Current dosing regimen is therapeutic  Continue current dose   Pharmacy will continue to monitor patient and adjust therapy as indicated    Thank you for the consult,  Graham Orantes [facility]  6/6/2025 5:24 AM    Please follow up with Neurology.  You may call the following clinic to schedule an appointment with Dr. Benjamin if you do not have a Neurologist.    [facility]  [address] , [address]  [place] [address]  Appointments:  [phone]    You may follow up with Neurosurgery for your lumbar spine if needed.  [facility]  [facility]  [address]  [address]  [place] [address]     Tel [phone]

## 2025-06-06 NOTE — PLAN OF CARE
Problem: Chronic Conditions and Co-morbidities  Goal: Patient's chronic conditions and co-morbidity symptoms are monitored and maintained or improved  Outcome: Progressing     Problem: Pain  Goal: Verbalizes/displays adequate comfort level or baseline comfort level  Outcome: Progressing     Problem: Safety - Adult  Goal: Free from fall injury  Outcome: Progressing     Problem: Skin/Tissue Integrity  Goal: Skin integrity remains intact  Outcome: Progressing

## 2025-06-06 NOTE — PROGRESS NOTES
Physical Therapy  Facility/Department: [facility] 4 ONCOLOGY UNIT  Physical Therapy Initial Assessment    Name: Khris Sampson  : 1955  MRN: [id number]  Date of Service: 2025    Discharge Recommendations:  Patient would benefit from continued therapy after discharge, Continue to assess pending progress (PLANS TO RETURN TO FACILITY)          Patient Diagnosis(es): The primary encounter diagnosis was Acute on chronic respiratory failure with hypoxia (HCC). Diagnoses of Chest pain, unspecified type, COPD exacerbation (HCC), and Pneumonia due to infectious organism, unspecified laterality, unspecified part of lung were also pertinent to this visit.  Past Medical History:  has a past medical history of Altered mental status, Arthritis, Asthma, Brief psychotic disorder (HCC), Cerebral artery occlusion with cerebral infarction (HCC), COPD (chronic obstructive pulmonary disease) (HCC), Diabetes mellitus (HCC), Epilepsy (HCC), GERD (gastroesophageal reflux disease), Hypertension, Palliative care patient, Seizures (HCC), Thyroid disease, Unspecified convulsions (HCC), and Vascular dementia (HCC).  Past Surgical History:  has a past surgical history that includes Arm Surgery (Left); Mandible surgery; knee surgery (Right); Eye surgery (Right); and pr colonoscopy flx dx w/collj spec when pfrmd (N/A, 2018).    Assessment  Body Structures, Functions, Activity Limitations Requiring Skilled Therapeutic Intervention: Decreased functional mobility ;Decreased ROM;Decreased strength;Decreased endurance;Increased pain;Decreased balance  Assessment: pt WOULD BENEFIT FROM SKILLED PT IN THIS SETTING TO ADDRESS HIS MOBILITY DEFICTS  Therapy Prognosis: Good  Decision Making: Low Complexity  Requires PT Follow-Up: Yes  Activity Tolerance  Activity Tolerance: Patient tolerated evaluation without incident    Plan  Physical Therapy Plan  General Plan: 5-7 times per week  Therapy Duration: 2 Weeks  Current Treatment Recommendations:  intact  Initiation: Requires cues for some  Sequencing: Requires cues for some  Cognition Comment: Awake, alert, follows simple commands with increased time    Objective  Temp: 97.2 °F (36.2 °C)  Pulse: 69  Heart Rate Source: Monitor  Respirations: 16  SpO2: 93 %  O2 Device: Nasal cannula  BP: 108/74  MAP (Calculated): 85  BP Location: Left upper arm  Patient Position: Sitting     Observation/Palpation  Observation: R LE wound dressing; IV, Sharma. has a heel protector device on R LE. this was removed for therapy and then returned to ankle to \"float\" heel          AROM RLE (degrees)  RLE AROM: WFL  AROM LLE (degrees)  LLE AROM : WFL  Strength RLE  Comment: grossly 2  Strength LLE  Comment: grossly 3- to 3/5           Bed mobility  Rolling to Left: Partial/Moderate assistance  Rolling to Right: Partial/Moderate assistance  Supine to Sit: Partial/Moderate assistance  Sit to Supine: Partial/Moderate assistance  Transfers  Sit to Stand: Substantial/Maximal assistance;2 Person Assistance  Comment: attempted to stand from sitting EOB by utilizing bedrails. Pt performed x3 and was able to lift buttock off bed but not obtain fully upright posture.        Balance  Sitting - Static: Fair  Sitting - Dynamic: Fair               Goals  Short Term Goals  Time Frame for Short Term Goals: 2 wks  Short Term Goal 1: SUP<>SIT, MIN A  Short Term Goal 2: SIT<>STAND, MIN/MOD A  Short Term Goal 3: PIVOT TF'S, MOD A       Education  Patient Education  Education Given To: Patient;Family  Education Provided: Role of Therapy;Precautions;Transfer Training;Mobility Training  Education Provided Comments: pt INSTRUCTED IN THE USE OF THE RW  Education Method: Demonstration;Verbal  Barriers to Learning: None  Education Outcome: Verbalized understanding      Therapy Time   Individual Concurrent Group Co-treatment   Time In           Time Out           Minutes                   Tracie Dickinson PT     Electronically signed by Tracie Dickinson PT on  6/6/2025 at 3:21 PM

## 2025-06-07 ENCOUNTER — APPOINTMENT (OUTPATIENT)
Dept: GENERAL RADIOLOGY | Age: 70
DRG: 871 | End: 2025-06-07
Payer: MEDICARE

## 2025-06-07 LAB
ANION GAP SERPL CALCULATED.3IONS-SCNC: 10 MMOL/L (ref 8–16)
BUN SERPL-MCNC: 16 MG/DL (ref 8–23)
CALCIUM SERPL-MCNC: 8.3 MG/DL (ref 8.8–10.2)
CHLORIDE SERPL-SCNC: 105 MMOL/L (ref 98–107)
CO2 SERPL-SCNC: 26 MMOL/L (ref 22–29)
CREAT SERPL-MCNC: 0.9 MG/DL (ref 0.7–1.2)
ERYTHROCYTE [DISTWIDTH] IN BLOOD BY AUTOMATED COUNT: 16.8 % (ref 11.5–14.5)
GLUCOSE BLD-MCNC: 170 MG/DL (ref 70–99)
GLUCOSE BLD-MCNC: 172 MG/DL (ref 70–99)
GLUCOSE BLD-MCNC: 175 MG/DL (ref 70–99)
GLUCOSE BLD-MCNC: 186 MG/DL (ref 70–99)
GLUCOSE SERPL-MCNC: 126 MG/DL (ref 70–99)
HCT VFR BLD AUTO: 37.2 % (ref 42–52)
HGB BLD-MCNC: 12.2 G/DL (ref 14–18)
MCH RBC QN AUTO: 29.6 PG (ref 27–31)
MCHC RBC AUTO-ENTMCNC: 32.8 G/DL (ref 33–37)
MCV RBC AUTO: 90.3 FL (ref 80–94)
PERFORMED ON: ABNORMAL
PLATELET # BLD AUTO: 278 K/UL (ref 130–400)
PMV BLD AUTO: 10.9 FL (ref 9.4–12.4)
POTASSIUM SERPL-SCNC: 3.8 MMOL/L (ref 3.5–5)
RBC # BLD AUTO: 4.12 M/UL (ref 4.7–6.1)
SODIUM SERPL-SCNC: 141 MMOL/L (ref 136–145)
WBC # BLD AUTO: 15.9 K/UL (ref 4.8–10.8)

## 2025-06-07 PROCEDURE — 1200000000 HC SEMI PRIVATE

## 2025-06-07 PROCEDURE — 6370000000 HC RX 637 (ALT 250 FOR IP): Performed by: NURSE PRACTITIONER

## 2025-06-07 PROCEDURE — 6370000000 HC RX 637 (ALT 250 FOR IP): Performed by: INTERNAL MEDICINE

## 2025-06-07 PROCEDURE — 71046 X-RAY EXAM CHEST 2 VIEWS: CPT

## 2025-06-07 PROCEDURE — 94669 MECHANICAL CHEST WALL OSCILL: CPT

## 2025-06-07 PROCEDURE — 94760 N-INVAS EAR/PLS OXIMETRY 1: CPT

## 2025-06-07 PROCEDURE — 2700000000 HC OXYGEN THERAPY PER DAY

## 2025-06-07 PROCEDURE — 94640 AIRWAY INHALATION TREATMENT: CPT

## 2025-06-07 PROCEDURE — 2580000003 HC RX 258: Performed by: NURSE PRACTITIONER

## 2025-06-07 PROCEDURE — 6360000002 HC RX W HCPCS: Performed by: NURSE PRACTITIONER

## 2025-06-07 PROCEDURE — 36415 COLL VENOUS BLD VENIPUNCTURE: CPT

## 2025-06-07 PROCEDURE — 85027 COMPLETE CBC AUTOMATED: CPT

## 2025-06-07 PROCEDURE — 80048 BASIC METABOLIC PNL TOTAL CA: CPT

## 2025-06-07 PROCEDURE — 82962 GLUCOSE BLOOD TEST: CPT

## 2025-06-07 PROCEDURE — 51702 INSERT TEMP BLADDER CATH: CPT

## 2025-06-07 RX ORDER — TRAZODONE HYDROCHLORIDE 100 MG/1
100 TABLET ORAL NIGHTLY
Status: DISCONTINUED | OUTPATIENT
Start: 2025-06-07 | End: 2025-06-12 | Stop reason: HOSPADM

## 2025-06-07 RX ADMIN — GUAIFENESIN 400 MG: 200 TABLET ORAL at 06:19

## 2025-06-07 RX ADMIN — ENOXAPARIN SODIUM 30 MG: 100 INJECTION SUBCUTANEOUS at 20:47

## 2025-06-07 RX ADMIN — DIVALPROEX SODIUM 500 MG: 125 CAPSULE, COATED PELLETS ORAL at 20:46

## 2025-06-07 RX ADMIN — DIVALPROEX SODIUM 500 MG: 125 CAPSULE, COATED PELLETS ORAL at 09:51

## 2025-06-07 RX ADMIN — COLLAGENASE SANTYL: 250 OINTMENT TOPICAL at 20:47

## 2025-06-07 RX ADMIN — ACETYLCYSTEINE 600 MG: 200 SOLUTION ORAL; RESPIRATORY (INHALATION) at 18:50

## 2025-06-07 RX ADMIN — LEVOTHYROXINE SODIUM 150 MCG: 0.07 TABLET ORAL at 06:17

## 2025-06-07 RX ADMIN — IPRATROPIUM BROMIDE AND ALBUTEROL SULFATE 1 DOSE: 2.5; .5 SOLUTION RESPIRATORY (INHALATION) at 10:50

## 2025-06-07 RX ADMIN — CEFEPIME 2000 MG: 2 INJECTION, POWDER, FOR SOLUTION INTRAVENOUS at 16:43

## 2025-06-07 RX ADMIN — PANTOPRAZOLE SODIUM 40 MG: 40 TABLET, DELAYED RELEASE ORAL at 06:17

## 2025-06-07 RX ADMIN — DAKIN'S SOLUTION 0.125% (QUARTER STRENGTH): 0.12 SOLUTION at 20:47

## 2025-06-07 RX ADMIN — RISPERIDONE 0.5 MG: 1 TABLET, FILM COATED ORAL at 09:51

## 2025-06-07 RX ADMIN — GUAIFENESIN 400 MG: 200 TABLET ORAL at 09:57

## 2025-06-07 RX ADMIN — IPRATROPIUM BROMIDE AND ALBUTEROL SULFATE 1 DOSE: 2.5; .5 SOLUTION RESPIRATORY (INHALATION) at 14:34

## 2025-06-07 RX ADMIN — GUAIFENESIN 400 MG: 200 TABLET ORAL at 16:09

## 2025-06-07 RX ADMIN — LACTULOSE 20 G: 20 SOLUTION ORAL at 09:51

## 2025-06-07 RX ADMIN — BUMETANIDE 1 MG: 1 TABLET ORAL at 09:51

## 2025-06-07 RX ADMIN — CEFEPIME 2000 MG: 2 INJECTION, POWDER, FOR SOLUTION INTRAVENOUS at 23:44

## 2025-06-07 RX ADMIN — PREDNISONE 5 MG: 5 TABLET ORAL at 09:52

## 2025-06-07 RX ADMIN — ENOXAPARIN SODIUM 30 MG: 100 INJECTION SUBCUTANEOUS at 09:52

## 2025-06-07 RX ADMIN — TAMSULOSIN HYDROCHLORIDE 0.8 MG: 0.4 CAPSULE ORAL at 09:51

## 2025-06-07 RX ADMIN — LISINOPRIL 10 MG: 5 TABLET ORAL at 20:45

## 2025-06-07 RX ADMIN — COLLAGENASE SANTYL: 250 OINTMENT TOPICAL at 10:07

## 2025-06-07 RX ADMIN — BUMETANIDE 1 MG: 1 TABLET ORAL at 16:09

## 2025-06-07 RX ADMIN — LISINOPRIL 10 MG: 5 TABLET ORAL at 09:51

## 2025-06-07 RX ADMIN — LACTULOSE 20 G: 20 SOLUTION ORAL at 20:46

## 2025-06-07 RX ADMIN — TRAZODONE HYDROCHLORIDE 100 MG: 100 TABLET ORAL at 20:46

## 2025-06-07 RX ADMIN — Medication 10 MG: at 20:45

## 2025-06-07 RX ADMIN — IPRATROPIUM BROMIDE AND ALBUTEROL SULFATE 1 DOSE: 2.5; .5 SOLUTION RESPIRATORY (INHALATION) at 07:03

## 2025-06-07 RX ADMIN — CARVEDILOL 6.25 MG: 3.12 TABLET, FILM COATED ORAL at 09:51

## 2025-06-07 RX ADMIN — ATORVASTATIN CALCIUM 80 MG: 80 TABLET, FILM COATED ORAL at 20:46

## 2025-06-07 RX ADMIN — ACETYLCYSTEINE 600 MG: 200 SOLUTION ORAL; RESPIRATORY (INHALATION) at 07:03

## 2025-06-07 RX ADMIN — RISPERIDONE 0.5 MG: 1 TABLET, FILM COATED ORAL at 20:46

## 2025-06-07 RX ADMIN — BUDESONIDE AND FORMOTEROL FUMARATE DIHYDRATE 2 PUFF: 160; 4.5 AEROSOL RESPIRATORY (INHALATION) at 07:27

## 2025-06-07 RX ADMIN — ASPIRIN 81 MG: 81 TABLET, CHEWABLE ORAL at 09:51

## 2025-06-07 RX ADMIN — BUDESONIDE AND FORMOTEROL FUMARATE DIHYDRATE 2 PUFF: 160; 4.5 AEROSOL RESPIRATORY (INHALATION) at 19:00

## 2025-06-07 RX ADMIN — IPRATROPIUM BROMIDE AND ALBUTEROL SULFATE 1 DOSE: 2.5; .5 SOLUTION RESPIRATORY (INHALATION) at 18:50

## 2025-06-07 RX ADMIN — CEFEPIME 2000 MG: 2 INJECTION, POWDER, FOR SOLUTION INTRAVENOUS at 06:53

## 2025-06-07 RX ADMIN — INSULIN LISPRO 1 UNITS: 100 INJECTION, SOLUTION INTRAVENOUS; SUBCUTANEOUS at 20:46

## 2025-06-07 RX ADMIN — DAKIN'S SOLUTION 0.125% (QUARTER STRENGTH): 0.12 SOLUTION at 10:08

## 2025-06-07 NOTE — PROGRESS NOTES
[facility]      [facility]    Patient:  Khris Sampson  YOB: 1955  Date of Service: 6/7/2025  MRN: [id number]   Acct: [id number]   Primary Care Physician: Nader Gordillo MD  Advance Directive: Full Code  Admit Date: 6/4/2025       Hospital Day: 3    Portions of this note have been copied forward, however, updated to reflect the most current clinical status of this patient.     CHIEF COMPLAINT chest pain shortness of breath    SUBJECTIVE: .Not sleeping well    CUMULATIVE HOSPITAL COURSE:   The patient is a 70 y.o. male who presented to chest pain and shortness of breath. Pt has history of copd with chronic respiratory failure with hypoxia requiring 2L per nc, diabetes, seizure disorder, stroke and right sided weakness, HFpEF, hypothyroidism, atrial fibrillation, chronic wounds to right leg, chronic frederick catheter requirement due to urinary retention and malnutrition.      Patient with dysarthric speech at baseline. He had previously reported sharp quality chest pain associated with shortness of breath while at nursing home. He received breathing treatment at nursing home with some improvement in symptoms. He has had no fevers, cough, nausea or vomiting. Pt was last discharged from this facility to nursing home 5/31/2025 having had treatment of copd exacerbation, CHF exacerbation and chronic wound right lower extremities. He was continued on bumex, levaquin and prednisone at discharge.      In ED,  pt required 5L supplemental oxygen on presentation to ED due to hypoxia, weaned to 3L per nc currently. cxr-Upper lobe bilateral interstitial edema versus pulmonary infiltrates.  This is a change from previous study pill. Creatinine 1.2, bun 22, troponin 127, bnp 643,, wbc 19k, hgb 12.7, platelets 367k. Pt is admitted inpatient to hospitalist.     Of note patient was here recently for 11 days and was on puréed diet.  When he returned to the nursing home they put him on regular diet with thickened

## 2025-06-07 NOTE — PLAN OF CARE
Problem: Chronic Conditions and Co-morbidities  Goal: Patient's chronic conditions and co-morbidity symptoms are monitored and maintained or improved  6/7/2025 1051 by Amalia Hadley RN  Outcome: Progressing  6/7/2025 0232 by Leena Archer RN  Outcome: Progressing     Problem: Pain  Goal: Verbalizes/displays adequate comfort level or baseline comfort level  6/7/2025 1051 by Amalia Hadley RN  Outcome: Progressing  6/7/2025 0232 by Leena Archer RN  Outcome: Progressing     Problem: Safety - Adult  Goal: Free from fall injury  6/7/2025 1051 by Amalia Hadley RN  Outcome: Progressing  6/7/2025 0232 by Leena Archer RN  Outcome: Progressing     Problem: Skin/Tissue Integrity  Goal: Skin integrity remains intact  Description: 1.  Monitor for areas of redness and/or skin breakdown2.  Assess vascular access sites hourly3.  Every 4-6 hours minimum:  Change oxygen saturation probe site4.  Every 4-6 hours:  If on nasal continuous positive airway pressure, respiratory therapy assess nares and determine need for appliance change or resting period  6/7/2025 1051 by Amalia Hadley RN  Outcome: Progressing  6/7/2025 0232 by Leena Archer RN  Outcome: Progressing     Problem: ABCDS Injury Assessment  Goal: Absence of physical injury  Outcome: Progressing

## 2025-06-08 ENCOUNTER — APPOINTMENT (OUTPATIENT)
Dept: GENERAL RADIOLOGY | Age: 70
DRG: 871 | End: 2025-06-08
Payer: MEDICARE

## 2025-06-08 LAB
ANION GAP SERPL CALCULATED.3IONS-SCNC: 10 MMOL/L (ref 8–16)
BASOPHILS # BLD: 0.1 K/UL (ref 0–0.2)
BASOPHILS NFR BLD: 0.6 % (ref 0–1)
BUN SERPL-MCNC: 19 MG/DL (ref 8–23)
CALCIUM SERPL-MCNC: 8.1 MG/DL (ref 8.8–10.2)
CHLORIDE SERPL-SCNC: 101 MMOL/L (ref 98–107)
CK SERPL-CCNC: 22 U/L (ref 39–308)
CO2 SERPL-SCNC: 30 MMOL/L (ref 22–29)
CREAT SERPL-MCNC: 1 MG/DL (ref 0.7–1.2)
D DIMER PPP FEU-MCNC: 2.32 UG/ML FEU (ref 0–0.48)
EOSINOPHIL # BLD: 0.3 K/UL (ref 0–0.6)
EOSINOPHIL NFR BLD: 2.2 % (ref 0–5)
ERYTHROCYTE [DISTWIDTH] IN BLOOD BY AUTOMATED COUNT: 16.7 % (ref 11.5–14.5)
GLUCOSE BLD-MCNC: 116 MG/DL (ref 70–99)
GLUCOSE BLD-MCNC: 145 MG/DL (ref 70–99)
GLUCOSE BLD-MCNC: 148 MG/DL (ref 70–99)
GLUCOSE BLD-MCNC: 207 MG/DL (ref 70–99)
GLUCOSE SERPL-MCNC: 125 MG/DL (ref 70–99)
HCT VFR BLD AUTO: 36.3 % (ref 42–52)
HGB BLD-MCNC: 12.2 G/DL (ref 14–18)
IMM GRANULOCYTES # BLD: 0.3 K/UL
LYMPHOCYTES # BLD: 2.5 K/UL (ref 1.1–4.5)
LYMPHOCYTES NFR BLD: 19.3 % (ref 20–40)
MCH RBC QN AUTO: 29.9 PG (ref 27–31)
MCHC RBC AUTO-ENTMCNC: 33.6 G/DL (ref 33–37)
MCV RBC AUTO: 89 FL (ref 80–94)
MONOCYTES # BLD: 1.3 K/UL (ref 0–0.9)
MONOCYTES NFR BLD: 10.3 % (ref 0–10)
NEUTROPHILS # BLD: 8.3 K/UL (ref 1.5–7.5)
NEUTS SEG NFR BLD: 65.4 % (ref 50–65)
PERFORMED ON: ABNORMAL
PLATELET # BLD AUTO: 248 K/UL (ref 130–400)
PMV BLD AUTO: 10.4 FL (ref 9.4–12.4)
POTASSIUM SERPL-SCNC: 3.8 MMOL/L (ref 3.5–5.1)
RBC # BLD AUTO: 4.08 M/UL (ref 4.7–6.1)
SODIUM SERPL-SCNC: 141 MMOL/L (ref 136–145)
TROPONIN, HIGH SENSITIVITY: 210 NG/L (ref 0–22)
TROPONIN, HIGH SENSITIVITY: 215 NG/L (ref 0–22)
WBC # BLD AUTO: 12.8 K/UL (ref 4.8–10.8)

## 2025-06-08 PROCEDURE — 2500000003 HC RX 250 WO HCPCS: Performed by: NURSE PRACTITIONER

## 2025-06-08 PROCEDURE — 80048 BASIC METABOLIC PNL TOTAL CA: CPT

## 2025-06-08 PROCEDURE — 85379 FIBRIN DEGRADATION QUANT: CPT

## 2025-06-08 PROCEDURE — 94760 N-INVAS EAR/PLS OXIMETRY 1: CPT

## 2025-06-08 PROCEDURE — 6370000000 HC RX 637 (ALT 250 FOR IP): Performed by: NURSE PRACTITIONER

## 2025-06-08 PROCEDURE — 71045 X-RAY EXAM CHEST 1 VIEW: CPT

## 2025-06-08 PROCEDURE — 94669 MECHANICAL CHEST WALL OSCILL: CPT

## 2025-06-08 PROCEDURE — 82962 GLUCOSE BLOOD TEST: CPT

## 2025-06-08 PROCEDURE — 82550 ASSAY OF CK (CPK): CPT

## 2025-06-08 PROCEDURE — 94640 AIRWAY INHALATION TREATMENT: CPT

## 2025-06-08 PROCEDURE — 6370000000 HC RX 637 (ALT 250 FOR IP): Performed by: INTERNAL MEDICINE

## 2025-06-08 PROCEDURE — 85025 COMPLETE CBC W/AUTO DIFF WBC: CPT

## 2025-06-08 PROCEDURE — 36415 COLL VENOUS BLD VENIPUNCTURE: CPT

## 2025-06-08 PROCEDURE — 1200000000 HC SEMI PRIVATE

## 2025-06-08 PROCEDURE — 2700000000 HC OXYGEN THERAPY PER DAY

## 2025-06-08 PROCEDURE — 6360000002 HC RX W HCPCS: Performed by: NURSE PRACTITIONER

## 2025-06-08 PROCEDURE — 84484 ASSAY OF TROPONIN QUANT: CPT

## 2025-06-08 RX ADMIN — IPRATROPIUM BROMIDE AND ALBUTEROL SULFATE 1 DOSE: 2.5; .5 SOLUTION RESPIRATORY (INHALATION) at 06:59

## 2025-06-08 RX ADMIN — RISPERIDONE 0.5 MG: 1 TABLET, FILM COATED ORAL at 08:51

## 2025-06-08 RX ADMIN — TAMSULOSIN HYDROCHLORIDE 0.8 MG: 0.4 CAPSULE ORAL at 08:50

## 2025-06-08 RX ADMIN — CARVEDILOL 6.25 MG: 3.12 TABLET, FILM COATED ORAL at 08:50

## 2025-06-08 RX ADMIN — BUDESONIDE AND FORMOTEROL FUMARATE DIHYDRATE 2 PUFF: 160; 4.5 AEROSOL RESPIRATORY (INHALATION) at 18:36

## 2025-06-08 RX ADMIN — PREDNISONE 5 MG: 5 TABLET ORAL at 08:51

## 2025-06-08 RX ADMIN — LEVOTHYROXINE SODIUM 150 MCG: 0.07 TABLET ORAL at 06:28

## 2025-06-08 RX ADMIN — GUAIFENESIN 400 MG: 200 TABLET ORAL at 16:00

## 2025-06-08 RX ADMIN — GUAIFENESIN 400 MG: 200 TABLET ORAL at 11:40

## 2025-06-08 RX ADMIN — ENOXAPARIN SODIUM 30 MG: 100 INJECTION SUBCUTANEOUS at 08:57

## 2025-06-08 RX ADMIN — ASPIRIN 81 MG: 81 TABLET, CHEWABLE ORAL at 08:51

## 2025-06-08 RX ADMIN — BUMETANIDE 1 MG: 1 TABLET ORAL at 08:51

## 2025-06-08 RX ADMIN — TRAZODONE HYDROCHLORIDE 100 MG: 100 TABLET ORAL at 20:06

## 2025-06-08 RX ADMIN — IPRATROPIUM BROMIDE AND ALBUTEROL SULFATE 1 DOSE: 2.5; .5 SOLUTION RESPIRATORY (INHALATION) at 18:35

## 2025-06-08 RX ADMIN — DIVALPROEX SODIUM 500 MG: 125 CAPSULE, COATED PELLETS ORAL at 20:05

## 2025-06-08 RX ADMIN — SODIUM CHLORIDE, PRESERVATIVE FREE 10 ML: 5 INJECTION INTRAVENOUS at 20:06

## 2025-06-08 RX ADMIN — DIVALPROEX SODIUM 500 MG: 125 CAPSULE, COATED PELLETS ORAL at 08:50

## 2025-06-08 RX ADMIN — ATORVASTATIN CALCIUM 80 MG: 80 TABLET, FILM COATED ORAL at 20:06

## 2025-06-08 RX ADMIN — IPRATROPIUM BROMIDE AND ALBUTEROL SULFATE 1 DOSE: 2.5; .5 SOLUTION RESPIRATORY (INHALATION) at 14:13

## 2025-06-08 RX ADMIN — SODIUM CHLORIDE, PRESERVATIVE FREE 10 ML: 5 INJECTION INTRAVENOUS at 08:58

## 2025-06-08 RX ADMIN — Medication 10 MG: at 20:06

## 2025-06-08 RX ADMIN — DAKIN'S SOLUTION 0.125% (QUARTER STRENGTH): 0.12 SOLUTION at 08:58

## 2025-06-08 RX ADMIN — INSULIN LISPRO 1 UNITS: 100 INJECTION, SOLUTION INTRAVENOUS; SUBCUTANEOUS at 17:52

## 2025-06-08 RX ADMIN — BUDESONIDE AND FORMOTEROL FUMARATE DIHYDRATE 2 PUFF: 160; 4.5 AEROSOL RESPIRATORY (INHALATION) at 07:45

## 2025-06-08 RX ADMIN — ACETAMINOPHEN 650 MG: 325 TABLET ORAL at 16:00

## 2025-06-08 RX ADMIN — ENOXAPARIN SODIUM 30 MG: 100 INJECTION SUBCUTANEOUS at 20:05

## 2025-06-08 RX ADMIN — GUAIFENESIN 400 MG: 200 TABLET ORAL at 06:28

## 2025-06-08 RX ADMIN — COLLAGENASE SANTYL: 250 OINTMENT TOPICAL at 08:58

## 2025-06-08 RX ADMIN — LISINOPRIL 10 MG: 5 TABLET ORAL at 08:50

## 2025-06-08 RX ADMIN — RISPERIDONE 0.5 MG: 1 TABLET, FILM COATED ORAL at 20:06

## 2025-06-08 RX ADMIN — IPRATROPIUM BROMIDE AND ALBUTEROL SULFATE 1 DOSE: 2.5; .5 SOLUTION RESPIRATORY (INHALATION) at 10:37

## 2025-06-08 ASSESSMENT — PAIN DESCRIPTION - LOCATION: LOCATION: CHEST

## 2025-06-08 ASSESSMENT — PAIN DESCRIPTION - ORIENTATION: ORIENTATION: MID

## 2025-06-08 ASSESSMENT — PAIN SCALES - GENERAL: PAINLEVEL_OUTOF10: 10

## 2025-06-08 ASSESSMENT — PAIN DESCRIPTION - DESCRIPTORS: DESCRIPTORS: SHARP

## 2025-06-08 ASSESSMENT — PAIN - FUNCTIONAL ASSESSMENT: PAIN_FUNCTIONAL_ASSESSMENT: ACTIVITIES ARE NOT PREVENTED

## 2025-06-08 NOTE — PLAN OF CARE
Problem: Chronic Conditions and Co-morbidities  Goal: Patient's chronic conditions and co-morbidity symptoms are monitored and maintained or improved  Outcome: Progressing     Problem: Skin/Tissue Integrity  Goal: Skin integrity remains intact  Description: 1.  Monitor for areas of redness and/or skin breakdown2.  Assess vascular access sites hourly3.  Every 4-6 hours minimum:  Change oxygen saturation probe site4.  Every 4-6 hours:  If on nasal continuous positive airway pressure, respiratory therapy assess nares and determine need for appliance change or resting period  Outcome: Progressing     Problem: ABCDS Injury Assessment  Goal: Absence of physical injury  Outcome: Progressing

## 2025-06-08 NOTE — PROGRESS NOTES
[facility]      Progress Note    Patient:  Khris Sampson  YOB: 1955  Date of Service: 6/8/2025  MRN: [id number]   Acct: [id number]   Primary Care Physician: Nader Gordillo MD  Advance Directive: Full Code  Admit Date: 6/4/2025       Hospital Day: 4    Portions of this note have been copied forward, however, updated to reflect the most current clinical status of this patient.     CHIEF COMPLAINT chest pain shortness of breath    SUBJECTIVE: .Not sleeping well    CUMULATIVE HOSPITAL COURSE:   The patient is a 70 y.o. male who presented to chest pain and shortness of breath. Pt has history of copd with chronic respiratory failure with hypoxia requiring 2L per nc, diabetes, seizure disorder, stroke and right sided weakness, HFpEF, hypothyroidism, atrial fibrillation, chronic wounds to right leg, chronic frederick catheter requirement due to urinary retention and malnutrition.      Patient with dysarthric speech at baseline. He had previously reported sharp quality chest pain associated with shortness of breath while at nursing home. He received breathing treatment at nursing home with some improvement in symptoms. He has had no fevers, cough, nausea or vomiting. Pt was last discharged from this facility to nursing home 5/31/2025 having had treatment of copd exacerbation, CHF exacerbation and chronic wound right lower extremities. He was continued on bumex, levaquin and prednisone at discharge.      In ED,  pt required 5L supplemental oxygen on presentation to ED due to hypoxia, weaned to 3L per nc currently. cxr-Upper lobe bilateral interstitial edema versus pulmonary infiltrates.  This is a change from previous study pill. Creatinine 1.2, bun 22, troponin 127, bnp 643,, wbc 19k, hgb 12.7, platelets 367k. Pt is admitted inpatient to hospitalist.     Of note patient was here recently for 11 days and was on puréed diet.  When he returned to the nursing home they put him on regular diet with thickened  liquids.  His chest x-ray was concerning for aspiration so SLP was reconsulted and recommend puréed diet spoonfed honey thickened liquids and staff to feed at all times.    Vancomycin de-escalated since MRSA nasal swabs negative.  Repeat chest x-ray scheduled for in the a.m. PT OT consulted to increase his activity and get him out of bed.     Repeat CXR showed bronchitis , poss bonchiolitis.  Will observe off abx.  Needs re eval by slp  Objective:   VITALS:  /67   Pulse 74   Temp 97.5 °F (36.4 °C) (Temporal)   Resp 18   Ht 1.854 m (6' 0.99\")   Wt 100.6 kg (221 lb 12.5 oz)   SpO2 97%   BMI 29.27 kg/m²   24HR INTAKE/OUTPUT:    Intake/Output Summary (Last 24 hours) at 6/8/2025 1208  Last data filed at 6/8/2025 1119  Gross per 24 hour   Intake --   Output 2100 ml   Net -2100 ml       Physical Exam  Vitals and nursing note reviewed.   Constitutional:       Appearance: He is ill-appearing.   HENT:      Head: Normocephalic.      Nose: Nose normal.      Mouth/Throat:      Mouth: Mucous membranes are moist.   Eyes:      Extraocular Movements: Extraocular movements intact.   Cardiovascular:      Rate and Rhythm: Normal rate and regular rhythm.   Pulmonary:      Effort: Pulmonary effort is normal.      Breath sounds: No rales.      Comments: Occasional wheezes  Abdominal:      General: Bowel sounds are normal.      Palpations: Abdomen is soft.   Musculoskeletal:         General: Normal range of motion.      Cervical back: Normal range of motion and neck supple.   Skin:     General: Skin is warm and dry.   Neurological:      Mental Status: He is alert. Mental status is at baseline.   Psychiatric:         Mood and Affect: Mood normal.            Medications:      dextrose      sodium chloride        traZODone  100 mg Oral Nightly    divalproex  500 mg Oral BID    aspirin  81 mg Oral Daily    atorvastatin  80 mg Oral Nightly    budesonide-formoterol  2 puff Inhalation BID    bumetanide  1 mg Oral BID    carvedilol

## 2025-06-08 NOTE — PLAN OF CARE
Problem: Chronic Conditions and Co-morbidities  Goal: Patient's chronic conditions and co-morbidity symptoms are monitored and maintained or improved  6/8/2025 1042 by Kathryn Chery RN  Outcome: Progressing  6/8/2025 0222 by Amita Rock RN  Outcome: Progressing     Problem: Pain  Goal: Verbalizes/displays adequate comfort level or baseline comfort level  Outcome: Progressing     Problem: Safety - Adult  Goal: Free from fall injury  Outcome: Progressing     Problem: Skin/Tissue Integrity  Goal: Skin integrity remains intact  Description: 1.  Monitor for areas of redness and/or skin breakdown2.  Assess vascular access sites hourly3.  Every 4-6 hours minimum:  Change oxygen saturation probe site4.  Every 4-6 hours:  If on nasal continuous positive airway pressure, respiratory therapy assess nares and determine need for appliance change or resting period  6/8/2025 1042 by Kathryn Chery RN  Outcome: Progressing  6/8/2025 0222 by Amita Rock RN  Outcome: Progressing     Problem: ABCDS Injury Assessment  Goal: Absence of physical injury  6/8/2025 1042 by Kathryn Chery RN  Outcome: Progressing  6/8/2025 0222 by Amita Rock RN  Outcome: Progressing

## 2025-06-09 LAB
ALBUMIN SERPL-MCNC: 2.7 G/DL (ref 3.5–5.2)
ALP SERPL-CCNC: 44 U/L (ref 40–129)
ALT SERPL-CCNC: 13 U/L (ref 10–50)
ANION GAP SERPL CALCULATED.3IONS-SCNC: 9 MMOL/L (ref 8–16)
AST SERPL-CCNC: 18 U/L (ref 10–50)
BACTERIA BLD CULT ORG #2: NORMAL
BACTERIA BLD CULT: NORMAL
BASOPHILS # BLD: 0.1 K/UL (ref 0–0.2)
BASOPHILS NFR BLD: 0.4 % (ref 0–1)
BILIRUB SERPL-MCNC: 0.6 MG/DL (ref 0.2–1.2)
BUN SERPL-MCNC: 21 MG/DL (ref 8–23)
CALCIUM SERPL-MCNC: 8.6 MG/DL (ref 8.8–10.2)
CHLORIDE SERPL-SCNC: 101 MMOL/L (ref 98–107)
CO2 SERPL-SCNC: 31 MMOL/L (ref 22–29)
CREAT SERPL-MCNC: 1 MG/DL (ref 0.7–1.2)
EKG P AXIS: 56 DEGREES
EKG P AXIS: NORMAL DEGREES
EKG P-R INTERVAL: 134 MS
EKG P-R INTERVAL: NORMAL MS
EKG Q-T INTERVAL: 398 MS
EKG Q-T INTERVAL: 424 MS
EKG QRS DURATION: 92 MS
EKG QRS DURATION: 98 MS
EKG QTC CALCULATION (BAZETT): 424 MS
EKG QTC CALCULATION (BAZETT): 426 MS
EKG T AXIS: 165 DEGREES
EKG T AXIS: 38 DEGREES
EOSINOPHIL # BLD: 0.3 K/UL (ref 0–0.6)
EOSINOPHIL NFR BLD: 1.8 % (ref 0–5)
ERYTHROCYTE [DISTWIDTH] IN BLOOD BY AUTOMATED COUNT: 16.7 % (ref 11.5–14.5)
GLUCOSE BLD-MCNC: 137 MG/DL (ref 70–99)
GLUCOSE BLD-MCNC: 140 MG/DL (ref 70–99)
GLUCOSE BLD-MCNC: 187 MG/DL (ref 70–99)
GLUCOSE BLD-MCNC: 210 MG/DL (ref 70–99)
GLUCOSE SERPL-MCNC: 105 MG/DL (ref 70–99)
HCT VFR BLD AUTO: 37.9 % (ref 42–52)
HGB BLD-MCNC: 12.3 G/DL (ref 14–18)
IMM GRANULOCYTES # BLD: 0.4 K/UL
LYMPHOCYTES # BLD: 2.9 K/UL (ref 1.1–4.5)
LYMPHOCYTES NFR BLD: 21.5 % (ref 20–40)
MCH RBC QN AUTO: 29.4 PG (ref 27–31)
MCHC RBC AUTO-ENTMCNC: 32.5 G/DL (ref 33–37)
MCV RBC AUTO: 90.7 FL (ref 80–94)
MONOCYTES # BLD: 1.5 K/UL (ref 0–0.9)
MONOCYTES NFR BLD: 11.3 % (ref 0–10)
NEUTROPHILS # BLD: 8.5 K/UL (ref 1.5–7.5)
NEUTS SEG NFR BLD: 62.4 % (ref 50–65)
PERFORMED ON: ABNORMAL
PLATELET # BLD AUTO: 241 K/UL (ref 130–400)
PMV BLD AUTO: 11.3 FL (ref 9.4–12.4)
POTASSIUM SERPL-SCNC: 3.7 MMOL/L (ref 3.5–5)
PROT SERPL-MCNC: 5.6 G/DL (ref 6.4–8.3)
RBC # BLD AUTO: 4.18 M/UL (ref 4.7–6.1)
SODIUM SERPL-SCNC: 141 MMOL/L (ref 136–145)
WBC # BLD AUTO: 13.6 K/UL (ref 4.8–10.8)

## 2025-06-09 PROCEDURE — 6370000000 HC RX 637 (ALT 250 FOR IP): Performed by: NURSE PRACTITIONER

## 2025-06-09 PROCEDURE — 80053 COMPREHEN METABOLIC PANEL: CPT

## 2025-06-09 PROCEDURE — 82962 GLUCOSE BLOOD TEST: CPT

## 2025-06-09 PROCEDURE — 94640 AIRWAY INHALATION TREATMENT: CPT

## 2025-06-09 PROCEDURE — 97530 THERAPEUTIC ACTIVITIES: CPT

## 2025-06-09 PROCEDURE — 92526 ORAL FUNCTION THERAPY: CPT

## 2025-06-09 PROCEDURE — 6370000000 HC RX 637 (ALT 250 FOR IP): Performed by: INTERNAL MEDICINE

## 2025-06-09 PROCEDURE — 1200000000 HC SEMI PRIVATE

## 2025-06-09 PROCEDURE — 94760 N-INVAS EAR/PLS OXIMETRY 1: CPT

## 2025-06-09 PROCEDURE — 36415 COLL VENOUS BLD VENIPUNCTURE: CPT

## 2025-06-09 PROCEDURE — 99222 1ST HOSP IP/OBS MODERATE 55: CPT | Performed by: INTERNAL MEDICINE

## 2025-06-09 PROCEDURE — 2500000003 HC RX 250 WO HCPCS: Performed by: NURSE PRACTITIONER

## 2025-06-09 PROCEDURE — 92507 TX SP LANG VOICE COMM INDIV: CPT

## 2025-06-09 PROCEDURE — 94669 MECHANICAL CHEST WALL OSCILL: CPT

## 2025-06-09 PROCEDURE — 85025 COMPLETE CBC W/AUTO DIFF WBC: CPT

## 2025-06-09 PROCEDURE — 2700000000 HC OXYGEN THERAPY PER DAY

## 2025-06-09 PROCEDURE — 94150 VITAL CAPACITY TEST: CPT

## 2025-06-09 RX ORDER — LISINOPRIL 5 MG/1
5 TABLET ORAL 2 TIMES DAILY
Status: DISCONTINUED | OUTPATIENT
Start: 2025-06-09 | End: 2025-06-09

## 2025-06-09 RX ORDER — LISINOPRIL 5 MG/1
5 TABLET ORAL 2 TIMES DAILY
Status: DISCONTINUED | OUTPATIENT
Start: 2025-06-09 | End: 2025-06-10

## 2025-06-09 RX ORDER — ISOSORBIDE MONONITRATE 30 MG/1
30 TABLET, EXTENDED RELEASE ORAL 2 TIMES DAILY
Status: DISCONTINUED | OUTPATIENT
Start: 2025-06-09 | End: 2025-06-12 | Stop reason: HOSPADM

## 2025-06-09 RX ADMIN — BUMETANIDE 1 MG: 1 TABLET ORAL at 16:15

## 2025-06-09 RX ADMIN — INSULIN LISPRO 1 UNITS: 100 INJECTION, SOLUTION INTRAVENOUS; SUBCUTANEOUS at 22:17

## 2025-06-09 RX ADMIN — LISINOPRIL 5 MG: 5 TABLET ORAL at 22:06

## 2025-06-09 RX ADMIN — GUAIFENESIN 400 MG: 200 TABLET ORAL at 16:14

## 2025-06-09 RX ADMIN — INSULIN LISPRO 1 UNITS: 100 INJECTION, SOLUTION INTRAVENOUS; SUBCUTANEOUS at 16:15

## 2025-06-09 RX ADMIN — PANTOPRAZOLE SODIUM 40 MG: 40 TABLET, DELAYED RELEASE ORAL at 05:58

## 2025-06-09 RX ADMIN — DIVALPROEX SODIUM 500 MG: 125 CAPSULE, COATED PELLETS ORAL at 10:27

## 2025-06-09 RX ADMIN — PREDNISONE 5 MG: 5 TABLET ORAL at 10:27

## 2025-06-09 RX ADMIN — BUDESONIDE AND FORMOTEROL FUMARATE DIHYDRATE 2 PUFF: 160; 4.5 AEROSOL RESPIRATORY (INHALATION) at 18:58

## 2025-06-09 RX ADMIN — APIXABAN 5 MG: 5 TABLET, FILM COATED ORAL at 22:06

## 2025-06-09 RX ADMIN — BUDESONIDE AND FORMOTEROL FUMARATE DIHYDRATE 2 PUFF: 160; 4.5 AEROSOL RESPIRATORY (INHALATION) at 07:10

## 2025-06-09 RX ADMIN — LISINOPRIL 5 MG: 5 TABLET ORAL at 16:14

## 2025-06-09 RX ADMIN — BUMETANIDE 1 MG: 1 TABLET ORAL at 10:28

## 2025-06-09 RX ADMIN — DAKIN'S SOLUTION 0.125% (QUARTER STRENGTH): 0.12 SOLUTION at 16:16

## 2025-06-09 RX ADMIN — CARVEDILOL 6.25 MG: 3.12 TABLET, FILM COATED ORAL at 16:14

## 2025-06-09 RX ADMIN — IPRATROPIUM BROMIDE AND ALBUTEROL SULFATE 1 DOSE: 2.5; .5 SOLUTION RESPIRATORY (INHALATION) at 16:01

## 2025-06-09 RX ADMIN — DIVALPROEX SODIUM 500 MG: 125 CAPSULE, COATED PELLETS ORAL at 22:06

## 2025-06-09 RX ADMIN — CARVEDILOL 6.25 MG: 3.12 TABLET, FILM COATED ORAL at 10:37

## 2025-06-09 RX ADMIN — ISOSORBIDE MONONITRATE 30 MG: 30 TABLET, EXTENDED RELEASE ORAL at 22:06

## 2025-06-09 RX ADMIN — TAMSULOSIN HYDROCHLORIDE 0.8 MG: 0.4 CAPSULE ORAL at 10:27

## 2025-06-09 RX ADMIN — RISPERIDONE 0.5 MG: 1 TABLET, FILM COATED ORAL at 22:06

## 2025-06-09 RX ADMIN — LEVOTHYROXINE SODIUM 150 MCG: 0.07 TABLET ORAL at 05:58

## 2025-06-09 RX ADMIN — IPRATROPIUM BROMIDE AND ALBUTEROL SULFATE 1 DOSE: 2.5; .5 SOLUTION RESPIRATORY (INHALATION) at 18:49

## 2025-06-09 RX ADMIN — IPRATROPIUM BROMIDE AND ALBUTEROL SULFATE 1 DOSE: 2.5; .5 SOLUTION RESPIRATORY (INHALATION) at 07:10

## 2025-06-09 RX ADMIN — ATORVASTATIN CALCIUM 80 MG: 80 TABLET, FILM COATED ORAL at 22:06

## 2025-06-09 RX ADMIN — IPRATROPIUM BROMIDE AND ALBUTEROL SULFATE 1 DOSE: 2.5; .5 SOLUTION RESPIRATORY (INHALATION) at 10:50

## 2025-06-09 RX ADMIN — APIXABAN 5 MG: 5 TABLET, FILM COATED ORAL at 10:37

## 2025-06-09 RX ADMIN — ACETAMINOPHEN 650 MG: 325 TABLET ORAL at 10:27

## 2025-06-09 RX ADMIN — COLLAGENASE SANTYL: 250 OINTMENT TOPICAL at 16:15

## 2025-06-09 RX ADMIN — SODIUM CHLORIDE, PRESERVATIVE FREE 10 ML: 5 INJECTION INTRAVENOUS at 16:15

## 2025-06-09 RX ADMIN — ISOSORBIDE MONONITRATE 30 MG: 30 TABLET, EXTENDED RELEASE ORAL at 10:37

## 2025-06-09 RX ADMIN — Medication 10 MG: at 22:06

## 2025-06-09 RX ADMIN — GUAIFENESIN 400 MG: 200 TABLET ORAL at 10:27

## 2025-06-09 RX ADMIN — TRAZODONE HYDROCHLORIDE 100 MG: 100 TABLET ORAL at 22:18

## 2025-06-09 RX ADMIN — SODIUM CHLORIDE, PRESERVATIVE FREE 10 ML: 5 INJECTION INTRAVENOUS at 22:17

## 2025-06-09 RX ADMIN — GUAIFENESIN 400 MG: 200 TABLET ORAL at 05:58

## 2025-06-09 RX ADMIN — RISPERIDONE 0.5 MG: 1 TABLET, FILM COATED ORAL at 10:27

## 2025-06-09 RX ADMIN — ASPIRIN 81 MG: 81 TABLET, CHEWABLE ORAL at 10:28

## 2025-06-09 ASSESSMENT — ENCOUNTER SYMPTOMS
COUGH: 1
SHORTNESS OF BREATH: 1
BLOOD IN STOOL: 0
COLOR CHANGE: 0
ABDOMINAL PAIN: 0
NAUSEA: 0
CONSTIPATION: 0
DIARRHEA: 0
ABDOMINAL DISTENTION: 0
VOMITING: 0
WHEEZING: 0

## 2025-06-09 ASSESSMENT — PAIN DESCRIPTION - FREQUENCY: FREQUENCY: CONTINUOUS

## 2025-06-09 ASSESSMENT — PAIN - FUNCTIONAL ASSESSMENT
PAIN_FUNCTIONAL_ASSESSMENT: PREVENTS OR INTERFERES SOME ACTIVE ACTIVITIES AND ADLS
PAIN_FUNCTIONAL_ASSESSMENT: ACTIVITIES ARE NOT PREVENTED

## 2025-06-09 ASSESSMENT — PAIN DESCRIPTION - ORIENTATION: ORIENTATION: LEFT

## 2025-06-09 ASSESSMENT — PAIN DESCRIPTION - LOCATION
LOCATION: GENERALIZED
LOCATION: KNEE

## 2025-06-09 ASSESSMENT — PAIN DESCRIPTION - DESCRIPTORS
DESCRIPTORS: ACHING;DISCOMFORT
DESCRIPTORS: DISCOMFORT

## 2025-06-09 ASSESSMENT — PAIN SCALES - GENERAL
PAINLEVEL_OUTOF10: 6
PAINLEVEL_OUTOF10: 6

## 2025-06-09 ASSESSMENT — PAIN DESCRIPTION - PAIN TYPE: TYPE: CHRONIC PAIN

## 2025-06-09 ASSESSMENT — PAIN DESCRIPTION - ONSET: ONSET: ON-GOING

## 2025-06-09 NOTE — PLAN OF CARE
Problem: Chronic Conditions and Co-morbidities  Goal: Patient's chronic conditions and co-morbidity symptoms are monitored and maintained or improved  6/8/2025 1938 by Leena Archer RN  Outcome: Progressing  Flowsheets (Taken 6/8/2025 1809)  Care Plan - Patient's Chronic Conditions and Co-Morbidity Symptoms are Monitored and Maintained or Improved: Monitor and assess patient's chronic conditions and comorbid symptoms for stability, deterioration, or improvement  6/8/2025 1042 by Kathryn Chery RN  Outcome: Progressing     Problem: Pain  Goal: Verbalizes/displays adequate comfort level or baseline comfort level  6/8/2025 1938 by Leena Archer RN  Outcome: Progressing  6/8/2025 1042 by Kathryn Chery RN  Outcome: Progressing     Problem: Safety - Adult  Goal: Free from fall injury  6/8/2025 1938 by Leena Archer RN  Outcome: Progressing  6/8/2025 1042 by Kathryn Chery RN  Outcome: Progressing     Problem: Skin/Tissue Integrity  Goal: Skin integrity remains intact  Description: 1.  Monitor for areas of redness and/or skin breakdown2.  Assess vascular access sites hourly3.  Every 4-6 hours minimum:  Change oxygen saturation probe site4.  Every 4-6 hours:  If on nasal continuous positive airway pressure, respiratory therapy assess nares and determine need for appliance change or resting period  6/8/2025 1938 by Leena Archer RN  Outcome: Progressing  6/8/2025 1042 by Kathryn Chery RN  Outcome: Progressing     Problem: ABCDS Injury Assessment  Goal: Absence of physical injury  6/8/2025 1938 by Leena Archer RN  Outcome: Progressing  6/8/2025 1042 by Kathryn Chery RN  Outcome: Progressing

## 2025-06-09 NOTE — PROGRESS NOTES
06/09/25 1500   Subjective   Subjective I hurt.   Pain Patient C/O pain in R LE can not rate.   Cognition   Overall Cognitive Status WFL   Orientation   Overall Orientation Status WFL   Vitals   O2 Device Nasal cannula   Bed Mobility Training   Bed Mobility Training Yes   Supine to Sit Minimal assistance   Sit to Supine Minimal assistance   Balance   Sitting Intact  (Sat EOB X 10 minutes returned to supine 2 fatigue.)   PT Exercises   A/AROM Exercises BL LE LAQ's  X 10 reps sitting EOB.   Patient Education   Education Given To Patient;Family   Education Provided Role of Therapy;Plan of Care;Home Exercise Program   Education Provided Comments Use of call light and staff assist.   Education Method Demonstration;Verbal   Education Outcome Continued education needed   Other Specialty Interventions   Other Treatments/Modalities BTB positioned for comfort call light all needs in reach .  Bed alarm set.  Family present.   Assessment   Activity Tolerance Patient limited by endurance       Electronically signed by Easton Gordillo PTA on 6/9/2025 at 3:39 PM

## 2025-06-09 NOTE — PROGRESS NOTES
Occupational Therapy     06/09/25 1400   Subjective   Subjective Pt in bed upon arrival for therapy. Pt agreeable to participate.   Pain Assessment   Pain Assessment 0-10   Pain Level 6   Pain Location Knee   Pain Orientation Left   Pain Descriptors Aching;Discomfort   Functional Pain Assessment Activities are not prevented   Pain Type Chronic pain   Pain Frequency Continuous   Pain Onset On-going   Non-Pharmaceutical Pain Intervention(s) Ambulation/Increased Activity;Repositioned;Rest   Response to Pain Intervention Patient satisfied   Vitals   O2 Device Nasal cannula   Cognition   Overall Cognitive Status Exceptions   Initiation Requires cues for all   Cognition Comment Awake, alert, follows simple commands with increased time   Orientation   Overall Orientation Status WFL   Bed Mobility Training   Bed Mobility Training Yes   Overall Level of Assistance Minimal assistance;Partial/Moderate assistance   Interventions Verbal cues;Tactile cues;Manual cues   Supine to Sit Minimal assistance;Partial/Moderate assistance   Sit to Supine Minimal assistance;Partial/Moderate assistance   Scooting Partial/Moderate assistance;2 Person assistance   Transfer Training   Transfer Training No   Balance   Sitting Intact   Standing Impaired   Standing - Static Not tested   Standing - Dynamic Not tested   ADL   Feeding Thickened liquids;Pureed diet;Stand by assistance   Grooming Stand by assistance   UE Bathing Minimal assistance   LE Bathing Maximum assistance   UE Dressing Minimal assistance   LE Dressing Maximum assistance   Putting On/Taking Off Footwear Maximum assistance   Toileting Maximum assistance   Assessment   Assessment Tx focused on sitting EOB. Sister present. Pt encouraged to sit up EOB to work on unsupported sitting balance and activity tolerance. Pt instructed on attempts to scoot to HOB but was unable. Pt assisted to HOB in supine with Mod assist x2.   Activity Tolerance Treatment limited secondary to decreased  cognition;Patient limited by endurance   Discharge Recommendations 24 hour supervision or assist;Patient would benefit from continued therapy after discharge   Occupational Therapy Plan   Times Per Week 3-5   Times Per Day Once a day   OT Plan of Care   Monday X     Electronically signed by AZEB Barrientos on 6/9/2025 at 2:41 PM

## 2025-06-09 NOTE — CONSULTS
[facility]  Cardiology Consult      Requesting MD:  Yamile Sy MD   Admit Status:         History obtained from:   [] Patient  [] Other (specify):     PROBLEM LIST:    Patient Active Problem List    Diagnosis Date Noted    Palliative care patient 05/31/2022     Priority: Medium    Vitamin D deficiency 05/30/2022     Priority: Medium    Hypothyroidism 05/30/2022     Priority: Medium    Bilateral pneumonia 06/04/2025    Severe malnutrition 05/30/2025    Acute heart failure with preserved ejection fraction (HCC) 05/23/2025    Peripheral edema 05/20/2025    SIRS (systemic inflammatory response syndrome) ([facility]) 05/20/2025    Chest pain in adult 06/13/2024    Chest pain, unspecified 06/11/2024    COPD exacerbation ([facility]) 08/21/2020    Hypertension     ZARA (acute kidney injury)     Acute on chronic respiratory failure with hypoxia ([facility])     Aspiration pneumonia ([facility]) 03/07/2020    History of stroke with residual deficit 03/07/2020    Dysarthria 03/07/2020    Diabetes ([facility]) 03/07/2020    Chronic obstructive pulmonary disease with acute lower respiratory infection ([facility]) 03/07/2020    Seizure disorder ([facility]) 03/07/2020    Acute ischemic stroke ([facility]) 01/21/2020    Impetigo 08/05/2016    Skin tear of left upper arm without complication      1.  Acute coronary syndrome with elevated troponins consistent with NSTEMI with recurrent troponin elevations, normal LV ejection fraction, negative Lexiscan study 6/14/2024.  2.  Paroxysmal atrial fibrillation noted 6/20/2024 and 5/20/2025, not on anticoagulation due to hematuria.  3.  History of CVA 1/13/2020 involving right Bica with right vertebral occlusion, nursing home patient with bedridden status and limited verbalization/seizure disorder.  4.  Severe COPD on 2 L home O2 with prior tobacco use.  5.  Non-insulin-dependent diabetes mellitus.  6.  Chronic left leg wounds with chronic Sharma.    PRESENTATION: Khris Sampson is a 70 y.o. year old male who  previous study.  Differential would include pulmonary edema or interstitial infiltrates.  Cardiac silhouette is normal.  No organized infiltrate/consolidation.  No visible effusion or pneumothorax.  No acute osseous abnormality.       1. Upper lobe bilateral interstitial edema versus pulmonary infiltrates.  This is a change from previous study pill      ______________________________________ Electronically signed by: LAY VILLELA M.D. Date:     06/04/2025 Time:    21:14     CT ABDOMEN PELVIS WO CONTRAST Additional Contrast? None  Result Date: 5/30/2025  CT ABDOMEN AND PELVIS WITHOUT CONTRAST  INDICATION: 70-year-old male patient with hematuria  COMPARISON: Ultrasound renal complete 08/22/2020 CT angiogram pulmonary 5/20/2025  TECHNIQUE: Axial CT from the lung bases to the proximal femurs without intravenous contrast administration. Coronal and sagittal reformatted images were performed. One or more of the following dose reduction techniques were used: Automated exposure control, adjustment of the mA and/or kV according to patient size, use of iterative reconstruction technique. Oral Contrast: None.  FINDINGS: Exam is mildly limited to moderately limited by motion artifact and artifact from the patient's upper extremities in the field of view. Dependent and scattered subsegmental atelectasis in the lung bases. Small left pleural effusion with probable trace right pleural effusion. Cardiac size is within normal limits. Small pericardial effusion. Dense material within the gallbladder which is mildly distended. This dense material represents either fine cholelithiasis or biliary sludge/debris. Liver spleen pancreas adrenal glands are unremarkable. No nephrolithiasis. Small amount of gas which appears to be located within the anterior wall of the urinary bladder. No hydronephrosis or hydroureter bilaterally. No pneumoperitoneum. Small hiatal hernia. No small bowel obstruction. The appendix is normal. Trace fluid in the  right paracolic gutter. Atheromatous calcifications. No lymphadenopathy. Mild gynecomastia right greater than left. Diastases recti. Small fat-containing umbilical hernia. Small fat-containing inguinal hernias. Chronic left rib fractures. Postoperative changes left radius. Osseous degenerative changes. Multiple thoracolumbar spine compression deformities. Is probably chronic.       1.  Small amount of gas within the anterior wall of the urinary bladder consistent with emphysematous cystitis. 2. Mildly distended gallbladder without additional CT evidence of acute cholecystitis. Fine cholelithiasis versus biliary sludge/ debris within the gallbladder lumen. 3. Small left pleural effusion and trace right pleural effusion. Small pericardial effusion. 4. Ancillary findings as above.  All CT scans are performed using dose optimization techniques as appropriate to the performed exam and include at least one of the following: Automated exposure control, adjustment of the mA and/or kV according to size, and the use of iterative reconstruction technique.  ______________________________________ Electronically signed by: ISAK KING M.D. Date:     05/30/2025 Time:    12:17     XR ABDOMEN (KUB) (SINGLE AP VIEW)  Result Date: 5/28/2025  ABDOMEN (KUB) (SINGLE AP VIEW)  INDICATION: Constipation  COMPARISON: None  TECHNIQUE: Frontal pelvis and frontal abdomen radiographs. 2 views total.  FINDINGS: There is a paucity of gas and small. Aging changes of the spine. Mild stool in the rectum.       1.  Nonspecific bowel gas pattern. 2. Mild stool within the rectum. No significant stool burden.    ______________________________________ Electronically signed by: ISAK KING M.D. Date:     05/28/2025 Time:    14:49     XR CHEST PORTABLE  Result Date: 5/28/2025  CHEST RADIOGRAPH PORTABLE  INDICATION: Left lower lobe pneumonia  COMPARISON: Chest radiograph 5/20/2025 at 03: 31  TECHNIQUE: AP portable upright chest radiograph(s). 1 views total.   ARNOLD VOGT Date:     05/20/2025 Time:    06:59     CTA PULMONARY W CONTRAST  Result Date: 5/20/2025  EXAM:  CTA CHEST  HISTORY:  Shortness of breath  COMPARISON:  None.  FINDINGS:  Postcontrast helical imaging was obtained through the thorax utilizing 1.2-mm collimation.  Sagittal and coronal reconstructions were imaged and reviewed. The thoracic inlet is unremarkable. The ascending aorta measures 3.1 cm..  The cardiac silhouette is normal in size with coronary ASVD.  There is a pericardial effusion measuring 1.4 cm.. There is no evidence of central pulmonary embolus Evaluation of the segmental and subsegmental branches is limited by motion artifact.  No consolidation or effusion.  Minimal atelectasis or scarring is seen within the lingula. There is a small hiatal hernia.  Bilateral gynecomastia. Chronic compression deformities are seen in the the mid and lower dorsal spine.       Limited evaluation of the pulmonary arteries without central embolus.  Coronary ASVD.  Small pericardial effusion  Hiatal hernia  All CT scans are performed using dose optimization techniques as appropriate to the performed exam and include at least one of the following: Automated exposure control, adjustment of the mA and/or kV according to size, and the use of iterative reconstruction technique.  ______________________________________ Electronically signed by: LEIGHANN HERNANDEZ M.D. Date:     05/20/2025 Time:    06:40           Assessment and Plan:    This is a 70 y.o. year old male with past medical history of longstanding prior tobacco use, COPD on 2 L O2, non-insulin-dependent diabetes mellitus, seizure disorder, prior CVA 1/2020 with right peak involvement and right vertebral occlusion, bedridden status, nursing home patient, limited verbalization, paroxysmal atrial fibrillation currently not on anticoagulation, chronic leg wounds, chronic Sharma, presenting with chest pain type symptoms and shortness of breath now improved, elevated

## 2025-06-09 NOTE — PROGRESS NOTES
[place] [facility]      Progress Note    Patient:  Khris Sampson  YOB: 1955  Date of Service: 6/9/2025  MRN: [id number]   Acct: [id number]   Primary Care Physician: Nader Gordillo MD  Advance Directive: Full Code  Admit Date: 6/4/2025       Hospital Day: 5    Portions of this note have been copied forward, however, updated to reflect the most current clinical status of this patient.     CHIEF COMPLAINT chest pain, shortness of breath     SUBJECTIVE:  Mr. Sampson was resting in bed this morning. Reports SOB and chest pain. Denies nausea or vomiting. Denies fever or chills. A&O to self and place, disoriented to time.       CUMULATIVE HOSPITAL COURSE:   The patient is a 70 y.o. male with PMH of copd with chronic respiratory failure with hypoxia requiring 2L per nc, diabetes, seizure disorder, stroke and right sided weakness, HFpEF, hypothyroidism, atrial fibrillation, chronic wounds to right leg, chronic frederick catheter requirement due to urinary retention and malnutrition who [facility] ED from SNF for evaluation of shortness of breath and chest pain. Patient has dysarthric speech at baseline. Has had previously reported sharp quality chest pain associated with shortness of breath while at nursing home. Received breathing treatment at SNF with some improvement in symptoms. Of note, patient was last discharged from this facility to nursing home 5/31/2025 having had treatment of copd exacerbation, CHF exacerbation and chronic wound right lower extremities. He was continued on bumex, levaquin and prednisone at discharge.     Workup in ED revealed required 5L supplemental oxygen on presentation to ED due to hypoxia, weaned to 3L per nc currently. cxr-Upper lobe bilateral interstitial edema versus pulmonary infiltrates.  This is a change from previous study pill. Creatinine 1.2, bun 22, troponin 127, bnp 643,, wbc 19k, hgb 12.7, platelets 367k. Patient was admitted to hospital medicine for further  Detected     Respiratory Syncytial Virus by PCR Not Detected    Culture, Respiratory [[id number]] Collected: 05/28/25 2300    Order Status: Canceled Specimen: Sputum Expectorated     MRSA DNA Probe, Nasal [[id number]] Collected: 05/28/25 0900    Order Status: Completed Specimen: Nares Updated: 05/28/25 1244     MRSA SCREEN RT-PCR Not Detected              Assessment/Plan   Principal Problem:    Chest pain in adult  Active Problems:    Hypothyroidism    History of stroke with residual deficit    Diabetes ([facility])    Hypertension    Acute on chronic respiratory failure with hypoxia ([facility])    Bilateral pneumonia  Resolved Problems:    * No resolved hospital problems. *    Principal Problem:    Chest pain in adult-    - Cardiology following    - Cardiac medications per cardiology    - Troponin elevated but trended down   - Monitor on telemetry       Active Problems:    Hypothyroidism-    - Continue Synthroid       History of stroke with residual deficit-    - SLP recommended pureed diet with thickened liquids and patient to be fed and witnessed feeding at all times       Diabetes ([facility])-    - A1c 7.3    - Low dose SSI   - Accu-Checks    - Hypoglycemia treatment protocol in place       Hypertension-    - Continue current medications     - Monitor BP      Acute on chronic respiratory failure with hypoxia ([facility])-    - Continue supplemental oxygen as needed       Bilateral pneumonia likely aspiration-    - Off of Vancomycin and Cefepime   - Monitor off of antibiotics               DVT Prophylaxis: Eliquis       GI prophylaxis:  Protonix      Discharge planning: TBD        Further Orders per Clinical course/attending.     Electronically signed by KEEGAN Multani CNP on 6/9/2025 at 3:35 PM       EMR Dragon/Transcription disclaimer:   Much of this encounter note is an electronic transcription/translation of spoken language to printed text. The electronic translation of spoken language may permit erroneous, or at times,  nonsensical words or phrases to be inadvertently transcribed; although attempts have made to review the note for such errors, some may still exist.

## 2025-06-09 NOTE — PROGRESS NOTES
Facility/Department: [facility] ONCOLOGY UNIT  SWALLOW THERAPY  SPEECH THERAPY     NAME: Khris Sampson  : 1955  MRN: [id number]    ADMISSION DATE: 2025  ADMITTING DIAGNOSIS: has Impetigo; Skin tear of left upper arm without complication; Acute ischemic stroke (HCC); Aspiration pneumonia (HCC); History of stroke with residual deficit; Dysarthria; Diabetes (HCC); Chronic obstructive pulmonary disease with acute lower respiratory infection (HCC); Seizure disorder (HCC); COPD exacerbation (HCC); Hypertension; ZARA (acute kidney injury); Acute on chronic respiratory failure with hypoxia (HCC); Vitamin D deficiency; Hypothyroidism; Palliative care patient; Chest pain, unspecified; Chest pain in adult; Peripheral edema; SIRS (systemic inflammatory response syndrome) (HCC); Acute heart failure with preserved ejection fraction (HCC); Severe malnutrition; and Bilateral pneumonia on their problem list.    Date of Treat: 2025  Evaluating Therapist: STEVE Jensen    Current Diet level:  Puree consistency with moderately thick/honey thick liquids    Pain:  Pain Assessment  Pain Assessment: None - Denies Pain  Pain Level: 10  Patient's Stated Pain Goal: 0 - No pain  Pain Location: Chest  Pain Orientation: Mid  Pain Descriptors: Sharp  Functional Pain Assessment: Activities are not prevented    Reason for Referral  Khris Sampson was referred for a bedside swallow evaluation to assess the efficiency of his swallow function, identify signs and symptoms of aspiration and make recommendations regarding safe dietary consistencies, effective compensatory strategies, and safe eating environment.    Impression  Re-assessed patient's swallowing function. Patient exhibited slow oral transit of even blended food consistency, fast oral transit and suspected swallow delay with even thickened liquid consistencies, sluggish and mild-moderately decreased laryngeal elevation for swallow airway protection, and frequent double swallows. No

## 2025-06-10 ENCOUNTER — APPOINTMENT (OUTPATIENT)
Dept: GENERAL RADIOLOGY | Age: 70
DRG: 871 | End: 2025-06-10
Payer: MEDICARE

## 2025-06-10 LAB
ALBUMIN SERPL-MCNC: 2.6 G/DL (ref 3.5–5.2)
ALLENS TEST: ABNORMAL
ALP SERPL-CCNC: 42 U/L (ref 40–129)
ALT SERPL-CCNC: 12 U/L (ref 10–50)
ANION GAP SERPL CALCULATED.3IONS-SCNC: 11 MMOL/L (ref 8–16)
AST SERPL-CCNC: 17 U/L (ref 10–50)
BASE EXCESS ARTERIAL: 10.9 MMOL/L (ref -2–2)
BASOPHILS # BLD: 0 K/UL (ref 0–0.2)
BASOPHILS NFR BLD: 0.2 % (ref 0–1)
BILIRUB SERPL-MCNC: 0.6 MG/DL (ref 0.2–1.2)
BUN SERPL-MCNC: 24 MG/DL (ref 8–23)
CALCIUM SERPL-MCNC: 8.4 MG/DL (ref 8.8–10.2)
CARBOXYHEMOGLOBIN ARTERIAL: 1.8 % (ref 0–5)
CHLORIDE SERPL-SCNC: 100 MMOL/L (ref 98–107)
CO2 SERPL-SCNC: 29 MMOL/L (ref 22–29)
CREAT SERPL-MCNC: 1.2 MG/DL (ref 0.7–1.2)
EOSINOPHIL # BLD: 0.2 K/UL (ref 0–0.6)
EOSINOPHIL NFR BLD: 1.9 % (ref 0–5)
ERYTHROCYTE [DISTWIDTH] IN BLOOD BY AUTOMATED COUNT: 16.4 % (ref 11.5–14.5)
GLUCOSE BLD-MCNC: 107 MG/DL (ref 70–99)
GLUCOSE BLD-MCNC: 107 MG/DL (ref 70–99)
GLUCOSE BLD-MCNC: 110 MG/DL (ref 70–99)
GLUCOSE BLD-MCNC: 141 MG/DL (ref 70–99)
GLUCOSE BLD-MCNC: 156 MG/DL (ref 70–99)
GLUCOSE BLD-MCNC: 98 MG/DL (ref 70–99)
GLUCOSE SERPL-MCNC: 107 MG/DL (ref 70–99)
HCO3 ARTERIAL: 35.1 MMOL/L (ref 22–26)
HCT VFR BLD AUTO: 32.7 % (ref 42–52)
HEMOGLOBIN, ART, EXTENDED: 11.1 G/DL (ref 14–18)
HGB BLD-MCNC: 10.7 G/DL (ref 14–18)
IMM GRANULOCYTES # BLD: 0.3 K/UL
LACTATE BLDV-SCNC: 1 MMOL/L (ref 0.5–1.9)
LYMPHOCYTES # BLD: 3.2 K/UL (ref 1.1–4.5)
LYMPHOCYTES NFR BLD: 24.5 % (ref 20–40)
MCH RBC QN AUTO: 29.6 PG (ref 27–31)
MCHC RBC AUTO-ENTMCNC: 32.7 G/DL (ref 33–37)
MCV RBC AUTO: 90.3 FL (ref 80–94)
METHEMOGLOBIN ARTERIAL: 1 %
MONOCYTES # BLD: 1.6 K/UL (ref 0–0.9)
MONOCYTES NFR BLD: 12.4 % (ref 0–10)
NEUTROPHILS # BLD: 7.6 K/UL (ref 1.5–7.5)
NEUTS SEG NFR BLD: 58.4 % (ref 50–65)
O2 CONTENT ARTERIAL: 14.8 ML/DL
O2 DELIVERY DEVICE: ABNORMAL
O2 SAT, ARTERIAL: 94.6 %
O2 THERAPY: ABNORMAL
OXYGEN FLOW: 3.5
PCO2 ARTERIAL: 44 MMHG (ref 35–45)
PERFORMED ON: ABNORMAL
PERFORMED ON: NORMAL
PH ARTERIAL: 7.51 (ref 7.35–7.45)
PLATELET # BLD AUTO: 225 K/UL (ref 130–400)
PMV BLD AUTO: 11.2 FL (ref 9.4–12.4)
PO2 ARTERIAL: 70 MMHG (ref 80–100)
POTASSIUM BLD-SCNC: 3.5 MMOL/L
POTASSIUM SERPL-SCNC: 3.8 MMOL/L (ref 3.5–5)
PROT SERPL-MCNC: 5.3 G/DL (ref 6.4–8.3)
RBC # BLD AUTO: 3.62 M/UL (ref 4.7–6.1)
SAMPLE SOURCE: ABNORMAL
SODIUM SERPL-SCNC: 140 MMOL/L (ref 136–145)
WBC # BLD AUTO: 12.9 K/UL (ref 4.8–10.8)

## 2025-06-10 PROCEDURE — 99232 SBSQ HOSP IP/OBS MODERATE 35: CPT | Performed by: INTERNAL MEDICINE

## 2025-06-10 PROCEDURE — 6360000002 HC RX W HCPCS: Performed by: HOSPITALIST

## 2025-06-10 PROCEDURE — 6370000000 HC RX 637 (ALT 250 FOR IP): Performed by: INTERNAL MEDICINE

## 2025-06-10 PROCEDURE — 85025 COMPLETE CBC W/AUTO DIFF WBC: CPT

## 2025-06-10 PROCEDURE — 94760 N-INVAS EAR/PLS OXIMETRY 1: CPT

## 2025-06-10 PROCEDURE — 94640 AIRWAY INHALATION TREATMENT: CPT

## 2025-06-10 PROCEDURE — 6370000000 HC RX 637 (ALT 250 FOR IP): Performed by: NURSE PRACTITIONER

## 2025-06-10 PROCEDURE — P9047 ALBUMIN (HUMAN), 25%, 50ML: HCPCS | Performed by: HOSPITALIST

## 2025-06-10 PROCEDURE — 82962 GLUCOSE BLOOD TEST: CPT

## 2025-06-10 PROCEDURE — 83605 ASSAY OF LACTIC ACID: CPT

## 2025-06-10 PROCEDURE — 82803 BLOOD GASES ANY COMBINATION: CPT

## 2025-06-10 PROCEDURE — 2700000000 HC OXYGEN THERAPY PER DAY

## 2025-06-10 PROCEDURE — 80053 COMPREHEN METABOLIC PANEL: CPT

## 2025-06-10 PROCEDURE — 74230 X-RAY XM SWLNG FUNCJ C+: CPT

## 2025-06-10 PROCEDURE — 94669 MECHANICAL CHEST WALL OSCILL: CPT

## 2025-06-10 PROCEDURE — 2580000003 HC RX 258: Performed by: NURSE PRACTITIONER

## 2025-06-10 PROCEDURE — 36415 COLL VENOUS BLD VENIPUNCTURE: CPT

## 2025-06-10 PROCEDURE — 1200000000 HC SEMI PRIVATE

## 2025-06-10 PROCEDURE — 94150 VITAL CAPACITY TEST: CPT

## 2025-06-10 PROCEDURE — 36600 WITHDRAWAL OF ARTERIAL BLOOD: CPT

## 2025-06-10 PROCEDURE — 2500000003 HC RX 250 WO HCPCS: Performed by: NURSE PRACTITIONER

## 2025-06-10 PROCEDURE — 92611 MOTION FLUOROSCOPY/SWALLOW: CPT

## 2025-06-10 RX ORDER — CARVEDILOL 3.12 MG/1
3.12 TABLET ORAL 2 TIMES DAILY WITH MEALS
Status: DISCONTINUED | OUTPATIENT
Start: 2025-06-10 | End: 2025-06-12 | Stop reason: HOSPADM

## 2025-06-10 RX ORDER — SODIUM CHLORIDE 9 MG/ML
INJECTION, SOLUTION INTRAVENOUS CONTINUOUS
Status: DISCONTINUED | OUTPATIENT
Start: 2025-06-10 | End: 2025-06-12

## 2025-06-10 RX ORDER — DIVALPROEX SODIUM 125 MG/1
125 CAPSULE, COATED PELLETS ORAL 2 TIMES DAILY
Status: DISCONTINUED | OUTPATIENT
Start: 2025-06-10 | End: 2025-06-11

## 2025-06-10 RX ORDER — 0.9 % SODIUM CHLORIDE 0.9 %
500 INTRAVENOUS SOLUTION INTRAVENOUS ONCE
Status: COMPLETED | OUTPATIENT
Start: 2025-06-10 | End: 2025-06-10

## 2025-06-10 RX ORDER — LISINOPRIL 2.5 MG/1
2.5 TABLET ORAL 2 TIMES DAILY
Status: DISCONTINUED | OUTPATIENT
Start: 2025-06-10 | End: 2025-06-12 | Stop reason: HOSPADM

## 2025-06-10 RX ORDER — ALBUMIN (HUMAN) 12.5 G/50ML
25 SOLUTION INTRAVENOUS ONCE
Status: COMPLETED | OUTPATIENT
Start: 2025-06-10 | End: 2025-06-10

## 2025-06-10 RX ADMIN — GUAIFENESIN 400 MG: 200 TABLET ORAL at 14:17

## 2025-06-10 RX ADMIN — ACETAMINOPHEN 650 MG: 325 TABLET ORAL at 14:17

## 2025-06-10 RX ADMIN — RISPERIDONE 0.5 MG: 1 TABLET, FILM COATED ORAL at 21:43

## 2025-06-10 RX ADMIN — BUDESONIDE AND FORMOTEROL FUMARATE DIHYDRATE 2 PUFF: 160; 4.5 AEROSOL RESPIRATORY (INHALATION) at 18:29

## 2025-06-10 RX ADMIN — IPRATROPIUM BROMIDE AND ALBUTEROL SULFATE 1 DOSE: 2.5; .5 SOLUTION RESPIRATORY (INHALATION) at 07:07

## 2025-06-10 RX ADMIN — SODIUM CHLORIDE 500 ML: 0.9 INJECTION, SOLUTION INTRAVENOUS at 07:37

## 2025-06-10 RX ADMIN — IPRATROPIUM BROMIDE AND ALBUTEROL SULFATE 1 DOSE: 2.5; .5 SOLUTION RESPIRATORY (INHALATION) at 10:37

## 2025-06-10 RX ADMIN — COLLAGENASE SANTYL: 250 OINTMENT TOPICAL at 14:18

## 2025-06-10 RX ADMIN — BUDESONIDE AND FORMOTEROL FUMARATE DIHYDRATE 2 PUFF: 160; 4.5 AEROSOL RESPIRATORY (INHALATION) at 07:07

## 2025-06-10 RX ADMIN — IPRATROPIUM BROMIDE AND ALBUTEROL SULFATE 1 DOSE: 2.5; .5 SOLUTION RESPIRATORY (INHALATION) at 14:31

## 2025-06-10 RX ADMIN — TRAZODONE HYDROCHLORIDE 100 MG: 100 TABLET ORAL at 21:42

## 2025-06-10 RX ADMIN — ALBUMIN (HUMAN) 25 G: 0.25 INJECTION, SOLUTION INTRAVENOUS at 09:24

## 2025-06-10 RX ADMIN — IPRATROPIUM BROMIDE AND ALBUTEROL SULFATE 1 DOSE: 2.5; .5 SOLUTION RESPIRATORY (INHALATION) at 18:29

## 2025-06-10 RX ADMIN — SODIUM CHLORIDE: 0.9 INJECTION, SOLUTION INTRAVENOUS at 21:51

## 2025-06-10 RX ADMIN — COLLAGENASE SANTYL: 250 OINTMENT TOPICAL at 03:18

## 2025-06-10 RX ADMIN — COLLAGENASE SANTYL: 250 OINTMENT TOPICAL at 10:00

## 2025-06-10 RX ADMIN — ASPIRIN 81 MG: 81 TABLET, CHEWABLE ORAL at 10:32

## 2025-06-10 RX ADMIN — DAKIN'S SOLUTION 0.125% (QUARTER STRENGTH): 0.12 SOLUTION at 10:02

## 2025-06-10 RX ADMIN — SODIUM CHLORIDE, PRESERVATIVE FREE 10 ML: 5 INJECTION INTRAVENOUS at 14:22

## 2025-06-10 RX ADMIN — SODIUM CHLORIDE, PRESERVATIVE FREE 10 ML: 5 INJECTION INTRAVENOUS at 21:50

## 2025-06-10 RX ADMIN — Medication 10 MG: at 21:43

## 2025-06-10 RX ADMIN — DAKIN'S SOLUTION 0.125% (QUARTER STRENGTH): 0.12 SOLUTION at 03:19

## 2025-06-10 RX ADMIN — APIXABAN 5 MG: 5 TABLET, FILM COATED ORAL at 21:43

## 2025-06-10 RX ADMIN — GUAIFENESIN 400 MG: 200 TABLET ORAL at 10:32

## 2025-06-10 RX ADMIN — APIXABAN 5 MG: 5 TABLET, FILM COATED ORAL at 10:32

## 2025-06-10 RX ADMIN — ATORVASTATIN CALCIUM 80 MG: 80 TABLET, FILM COATED ORAL at 21:42

## 2025-06-10 RX ADMIN — DAKIN'S SOLUTION 0.125% (QUARTER STRENGTH): 0.12 SOLUTION at 14:23

## 2025-06-10 RX ADMIN — DIVALPROEX SODIUM 125 MG: 125 CAPSULE, COATED PELLETS ORAL at 16:48

## 2025-06-10 ASSESSMENT — ENCOUNTER SYMPTOMS
SHORTNESS OF BREATH: 1
BLOOD IN STOOL: 0
COLOR CHANGE: 0
WHEEZING: 0
ABDOMINAL DISTENTION: 0
VOMITING: 0
NAUSEA: 0
CONSTIPATION: 0
DIARRHEA: 0
COUGH: 1
ABDOMINAL PAIN: 0

## 2025-06-10 NOTE — PROGRESS NOTES
This  visited with pt and his sister to follow up and provide spiritual care. Pt was resting and sister engaged in conversation. Pt is a Orthodoxy/Christian and he previously said he belle was important to him; his sister agreed. This  provided spiritual care with sustaining presence, support, and prayer. Pt's sister expressed gratitude for spiritual care.       Spiritual Health History and Assessment/Progress Note  [facility]    Spiritual/Emotional Needs,  ,  ,      Name: Khris Sampson MRN: [id number]    Age: 70 y.o.     Sex: male   Language: English   Alevism: None   Chest pain in adult     Date: 6/10/2025            Total Time Calculated: 10 min              Spiritual Assessment continued in [facility] ONCOLOGY UNIT        Referral/Consult From: Palliative Care   Encounter Overview/Reason: Spiritual/Emotional Needs  Service Provided For: Family    Belle, Belief, Meaning:   Patient identifies as spiritual and is connected with a belle tradition or spiritual practice  Family/Friends Other: sister did not discuss her belle.      Importance and Influence:  Patient has spiritual/personal beliefs that influence decisions regarding their health  Family/Friends Other: Did not discuss    Community:  Patient is connected with a spiritual community  Family/Friends Other: did not discuss    Assessment and Plan of Care:     Patient Interventions include: Provided sacramental/Judaism ritual Pt did not respond  Family/Friends Interventions include: Other: Pt's sister was present during visit and prayer.     Patient Plan of Care: Spiritual Care available upon further referral  Family/Friends Plan of Care: Spiritual Care available upon further referral    Electronically signed by Mary Behrens, Chaplain on 6/10/2025 at 3:07 PM

## 2025-06-10 NOTE — PLAN OF CARE
Problem: Chronic Conditions and Co-morbidities  Goal: Patient's chronic conditions and co-morbidity symptoms are monitored and maintained or improved  Outcome: Progressing     Problem: Pain  Goal: Verbalizes/displays adequate comfort level or baseline comfort level  Outcome: Progressing     Problem: Safety - Adult  Goal: Free from fall injury  Outcome: Progressing     Problem: Skin/Tissue Integrity  Goal: Skin integrity remains intact  Description: 1.  Monitor for areas of redness and/or skin breakdown2.  Assess vascular access sites hourly3.  Every 4-6 hours minimum:  Change oxygen saturation probe site4.  Every 4-6 hours:  If on nasal continuous positive airway pressure, respiratory therapy assess nares and determine need for appliance change or resting period  Outcome: Progressing  Flowsheets  Taken 6/10/2025 1014  Skin Integrity Remains Intact: Monitor for areas of redness and/or skin breakdown  Taken 6/10/2025 0732  Skin Integrity Remains Intact: Monitor for areas of redness and/or skin breakdown     Problem: ABCDS Injury Assessment  Goal: Absence of physical injury  Outcome: Progressing  Flowsheets (Taken 6/10/2025 1014)  Absence of Physical Injury: Implement safety measures based on patient assessment

## 2025-06-10 NOTE — PROCEDURES
INSTRUMENTAL SWALLOW REPORT  MODIFIED BARIUM SWALLOW    NAME: Khris Sampson   : [birth year]  MRN: [id number]       Date of Eval: 06/10/25       Referring Diagnosis(es): Dysphagia     Past Medical History:  has a past medical history of Altered mental status, Arthritis, Asthma, Brief psychotic disorder (HCC), Cerebral artery occlusion with cerebral infarction (HCC), COPD (chronic obstructive pulmonary disease) (HCC), Diabetes mellitus (HCC), Epilepsy (HCC), GERD (gastroesophageal reflux disease), Hypertension, Palliative care patient, Seizures (HCC), Thyroid disease, Unspecified convulsions (HCC), and Vascular dementia (HCC).  Past Surgical History:  has a past surgical history that includes Arm Surgery (Left); Mandible surgery; knee surgery (Right); Eye surgery (Right); and pr colonoscopy flx dx w/collj spec when pfrmd (N/A, 2018).    Patient Complaints/Reason for Referral:  Khris Sapmson was referred for a MBS to assess the efficiency of his/her swallow function, assess for aspiration, and to make recommendations regarding safe dietary consistencies, effective compensatory strategies, and safe eating environment.    Impressions:  Completed assessment. Patient exhibited premature spillage and subsequent severe swallow delay with every food/drink consistency. Epiglottic inversion during swallow initiation was considered to be delayed. Patient exhibited penetration during the swallows with mildly thick/nectar thick barium without detection. With every food/drink consistency, patient exhibited consistent oral cavity post swallows. Patient cleared mildly thick/nectar thick barium, moderately thick/honey thick barium, and puree consistency residue in the oral cavity with additional dry swallows. Patient had to expectorate minced and moist consistency residue from the mouth. With every food/drink consistency, patient exhibited consistent pharyngeal residue post swallows; all pharyngeal residue cleared with additional dry

## 2025-06-10 NOTE — PROGRESS NOTES
Cardiology Progress Note Romero Calvillo MD      Patient:  Khris Sampson  [id number]    Patient Active Problem List    Diagnosis Date Noted    Palliative care patient 05/31/2022     Priority: Medium    Vitamin D deficiency 05/30/2022     Priority: Medium    Hypothyroidism 05/30/2022     Priority: Medium    Bilateral pneumonia 06/04/2025    Severe malnutrition 05/30/2025    Acute heart failure with preserved ejection fraction (HCC) 05/23/2025    Peripheral edema 05/20/2025    SIRS (systemic inflammatory response syndrome) ([facility]) 05/20/2025    Chest pain in adult 06/13/2024    Chest pain, unspecified 06/11/2024    COPD exacerbation ([facility]) 08/21/2020    Hypertension     ZARA (acute kidney injury)     Acute on chronic respiratory failure with hypoxia ([facility])     Aspiration pneumonia ([facility]) 03/07/2020    History of stroke with residual deficit 03/07/2020    Dysarthria 03/07/2020    Diabetes ([facility]) 03/07/2020    Chronic obstructive pulmonary disease with acute lower respiratory infection ([facility]) 03/07/2020    Seizure disorder ([facility]) 03/07/2020    Acute ischemic stroke ([facility]) 01/21/2020    Impetigo 08/05/2016    Skin tear of left upper arm without complication        Admit Date:  6/4/2025    Admission Problem List: Present on Admission:   Chest pain in adult   Hypertension   Diabetes (HCC)   Acute on chronic respiratory failure with hypoxia (HCC)   Bilateral pneumonia   History of stroke with residual deficit   Hypothyroidism      Cardiac Specific Data:  Specialty Problems          Cardiology Problems    Acute ischemic stroke (HCC)        Hypertension        Chest pain, unspecified        * (Principal) Chest pain in adult        Acute heart failure with preserved ejection fraction (HCC)         1.  Acute coronary syndrome with elevated troponins consistent with NSTEMI with recurrent troponin elevations, normal LV ejection fraction, negative Lexiscan study 6/14/2024.  2.  Paroxysmal atrial fibrillation noted 6/20/2024 and 5/20/2025, not on  ______________________________________ Electronically signed by: JONY JOVEL M.D. Date:     06/08/2025 Time:    19:32     XR CHEST (2 VW)  Result Date: 6/7/2025    EXAM: PA AND LATERAL VIEWS OF THE CHEST  HISTORY:  Pneumonia  COMPARISON:  CT chest 6/2025 and priors  FINDINGS:  The cardiomediastinal silhouette is normal.  There is no pneumothorax or pleural effusion.  There is no consolidation, nodule or mass. There is mild central airway thickening and groundglass. The osseous structures are unremarkable.      Central airway thickening and ground-glass suggestive of bronchitis bronchiolitis. There is no consolidative pneumonia.    ______________________________________ Electronically signed by: JARED BROWN M.D. Date:     06/07/2025 Time:    16:37     CT CHEST WO CONTRAST  Result Date: 6/4/2025  EXAM:  CT OF THE CHEST WITHOUT CONTRAST   HISTORY:   Abnormal chest radiograph   COMPARISON:  Chest radiograph 6/4/2025   TECHNIQUE:  Multiplanar CT images through the chest were obtained without the administration of IV contrast.   FINDINGS:   Heart size is normal. Coronary calcifications. No thoracic aortic aneurysm. Trace pericardial fluid. no pathologic adenopathy. Groundglass infiltrate within the anterior right upper lobe. No pleural fluid and no pneumothorax. No suspicious lung masses or lung nodules.  Within the visualized upper abdomen, increased density within the lumen of the gallbladder. There is posterior subluxation of the proximal right clavicle at the sternal clavicular joint.       1. Groundglass infiltrate within the anterior right upper lobe most compatible with pneumonia. 2. Coronary artery disease. 3. Increased density within the lumen of the gallbladder could represent sludge or vicarious excretion of contrast if there has been a recent IV contrast enhanced study. 4. Posterior subluxation of the proximal right clavicle at the sternal clavicular joint. Correlate with any recent trauma of trauma  All

## 2025-06-10 NOTE — PLAN OF CARE
Problem: Chronic Conditions and Co-morbidities  Goal: Patient's chronic conditions and co-morbidity symptoms are monitored and maintained or improved  6/10/2025 1626 by Cata Bustillos RN  Outcome: Progressing  6/10/2025 1042 by Cata Bustillos RN  Outcome: Progressing     Problem: Pain  Goal: Verbalizes/displays adequate comfort level or baseline comfort level  6/10/2025 1626 by Cata Bustillos RN  Outcome: Progressing  6/10/2025 1042 by Cata Bustillos RN  Outcome: Progressing     Problem: Safety - Adult  Goal: Free from fall injury  6/10/2025 1626 by Cata Bustillos RN  Outcome: Progressing  6/10/2025 1042 by Cata Bustillos RN  Outcome: Progressing     Problem: Skin/Tissue Integrity  Goal: Skin integrity remains intact  Description: 1.  Monitor for areas of redness and/or skin breakdown2.  Assess vascular access sites hourly3.  Every 4-6 hours minimum:  Change oxygen saturation probe site4.  Every 4-6 hours:  If on nasal continuous positive airway pressure, respiratory therapy assess nares and determine need for appliance change or resting period  6/10/2025 1626 by Cata Bustillos RN  Outcome: Progressing  6/10/2025 1042 by Cata Bustillos RN  Outcome: Progressing  Flowsheets  Taken 6/10/2025 1014  Skin Integrity Remains Intact: Monitor for areas of redness and/or skin breakdown  Taken 6/10/2025 0732  Skin Integrity Remains Intact: Monitor for areas of redness and/or skin breakdown     Problem: ABCDS Injury Assessment  Goal: Absence of physical injury  6/10/2025 1626 by Cata Bustillos RN  Outcome: Progressing  6/10/2025 1042 by Cata Bustillos RN  Outcome: Progressing  Flowsheets (Taken 6/10/2025 1014)  Absence of Physical Injury: Implement safety measures based on patient assessment

## 2025-06-10 NOTE — PROGRESS NOTES
Provider notified of pt. Lethargy and low BP. Manual 70/50, pt. Did not appear well.   Provider seen pt at bedside.   STAT labs received and reviewed. 500 mL bolus given. Albumin being given.     As of 1000, pt is more awake/alert. Close monitoring ongoing.   1035: Pt aware/alert. More coherent. BP 80/62, provider aware.

## 2025-06-10 NOTE — PROGRESS NOTES
[facility]      Progress Note    Patient:  Khris Sampson  YOB: 1955  Date of Service: 6/10/2025  MRN: [id number]   Acct: [id number]   Primary Care Physician: Nader Gordillo MD  Advance Directive: Full Code  Admit Date: 6/4/2025       Hospital Day: 6    Portions of this note have been copied forward, however, updated to reflect the most current clinical status of this patient.     CHIEF COMPLAINT chest pain, shortness of breath     SUBJECTIVE:  Mr. Sampson was resting in bed this morning. Nursing concerned with hypotension and lethargy this morning. Patient noted to have delayed responses and somnolent appearing this morning. States chest pain has improved some. Reports SOB at times.        CUMULATIVE HOSPITAL COURSE:   The patient is a 70 y.o. male with PMH of copd with chronic respiratory failure with hypoxia requiring 2L per nc, diabetes, seizure disorder, stroke and right sided weakness, HFpEF, hypothyroidism, atrial fibrillation, chronic wounds to right leg, chronic frederick catheter requirement due to urinary retention and malnutrition who presented to [facility] ED from SNF for evaluation of shortness of breath and chest pain. Patient has dysarthric speech at baseline. Has had previously reported sharp quality chest pain associated with shortness of breath while at nursing home. Received breathing treatment at SNF with some improvement in symptoms. Of note, patient was last discharged from this facility to nursing home 5/31/2025 having had treatment of copd exacerbation, CHF exacerbation and chronic wound right lower extremities. He was continued on bumex, levaquin and prednisone at discharge.     Workup in ED revealed required 5L supplemental oxygen on presentation to ED due to hypoxia, weaned to 3L per nc currently. cxr-Upper lobe bilateral interstitial edema versus pulmonary infiltrates.  This is a change from previous study pill. Creatinine 1.2, bun 22, troponin 127, bnp 643,, wbc 19k, hgb 12.7,  Dragon/Transcription disclaimer:   Much of this encounter note is an electronic transcription/translation of spoken language to printed text. The electronic translation of spoken language may permit erroneous, or at times, nonsensical words or phrases to be inadvertently transcribed; although attempts have made to review the note for such errors, some may still exist.

## 2025-06-11 LAB
ALBUMIN SERPL-MCNC: 2.9 G/DL (ref 3.5–5.2)
ALP SERPL-CCNC: 45 U/L (ref 40–129)
ALT SERPL-CCNC: 12 U/L (ref 10–50)
ANION GAP SERPL CALCULATED.3IONS-SCNC: 9 MMOL/L (ref 8–16)
AST SERPL-CCNC: 18 U/L (ref 10–50)
BASOPHILS # BLD: 0.1 K/UL (ref 0–0.2)
BASOPHILS NFR BLD: 0.5 % (ref 0–1)
BILIRUB SERPL-MCNC: 0.8 MG/DL (ref 0.2–1.2)
BUN SERPL-MCNC: 22 MG/DL (ref 8–23)
CALCIUM SERPL-MCNC: 8.6 MG/DL (ref 8.8–10.2)
CHLORIDE SERPL-SCNC: 100 MMOL/L (ref 98–107)
CO2 SERPL-SCNC: 30 MMOL/L (ref 22–29)
CREAT SERPL-MCNC: 0.9 MG/DL (ref 0.7–1.2)
EOSINOPHIL # BLD: 0.2 K/UL (ref 0–0.6)
EOSINOPHIL NFR BLD: 2 % (ref 0–5)
ERYTHROCYTE [DISTWIDTH] IN BLOOD BY AUTOMATED COUNT: 16.4 % (ref 11.5–14.5)
GLUCOSE BLD-MCNC: 113 MG/DL (ref 70–99)
GLUCOSE BLD-MCNC: 115 MG/DL (ref 70–99)
GLUCOSE BLD-MCNC: 122 MG/DL (ref 70–99)
GLUCOSE BLD-MCNC: 124 MG/DL (ref 70–99)
GLUCOSE SERPL-MCNC: 99 MG/DL (ref 70–99)
HCT VFR BLD AUTO: 34.5 % (ref 42–52)
HGB BLD-MCNC: 11.3 G/DL (ref 14–18)
IMM GRANULOCYTES # BLD: 0.3 K/UL
LYMPHOCYTES # BLD: 2.2 K/UL (ref 1.1–4.5)
LYMPHOCYTES NFR BLD: 18.6 % (ref 20–40)
MCH RBC QN AUTO: 29.3 PG (ref 27–31)
MCHC RBC AUTO-ENTMCNC: 32.8 G/DL (ref 33–37)
MCV RBC AUTO: 89.4 FL (ref 80–94)
MONOCYTES # BLD: 1.8 K/UL (ref 0–0.9)
MONOCYTES NFR BLD: 15.4 % (ref 0–10)
NEUTROPHILS # BLD: 7.1 K/UL (ref 1.5–7.5)
NEUTS SEG NFR BLD: 61.3 % (ref 50–65)
PERFORMED ON: ABNORMAL
PLATELET # BLD AUTO: 225 K/UL (ref 130–400)
PMV BLD AUTO: 10.7 FL (ref 9.4–12.4)
POTASSIUM SERPL-SCNC: 3.7 MMOL/L (ref 3.5–5)
PROT SERPL-MCNC: 5.8 G/DL (ref 6.4–8.3)
RBC # BLD AUTO: 3.86 M/UL (ref 4.7–6.1)
SODIUM SERPL-SCNC: 139 MMOL/L (ref 136–145)
WBC # BLD AUTO: 11.5 K/UL (ref 4.8–10.8)

## 2025-06-11 PROCEDURE — 6370000000 HC RX 637 (ALT 250 FOR IP): Performed by: INTERNAL MEDICINE

## 2025-06-11 PROCEDURE — 99232 SBSQ HOSP IP/OBS MODERATE 35: CPT | Performed by: INTERNAL MEDICINE

## 2025-06-11 PROCEDURE — 2500000003 HC RX 250 WO HCPCS: Performed by: NURSE PRACTITIONER

## 2025-06-11 PROCEDURE — 2580000003 HC RX 258: Performed by: NURSE PRACTITIONER

## 2025-06-11 PROCEDURE — 36415 COLL VENOUS BLD VENIPUNCTURE: CPT

## 2025-06-11 PROCEDURE — 80053 COMPREHEN METABOLIC PANEL: CPT

## 2025-06-11 PROCEDURE — 94640 AIRWAY INHALATION TREATMENT: CPT

## 2025-06-11 PROCEDURE — 2700000000 HC OXYGEN THERAPY PER DAY

## 2025-06-11 PROCEDURE — 6370000000 HC RX 637 (ALT 250 FOR IP): Performed by: NURSE PRACTITIONER

## 2025-06-11 PROCEDURE — 94150 VITAL CAPACITY TEST: CPT

## 2025-06-11 PROCEDURE — 94669 MECHANICAL CHEST WALL OSCILL: CPT

## 2025-06-11 PROCEDURE — 1200000000 HC SEMI PRIVATE

## 2025-06-11 PROCEDURE — 85025 COMPLETE CBC W/AUTO DIFF WBC: CPT

## 2025-06-11 PROCEDURE — 82962 GLUCOSE BLOOD TEST: CPT

## 2025-06-11 PROCEDURE — 94760 N-INVAS EAR/PLS OXIMETRY 1: CPT

## 2025-06-11 RX ORDER — BUMETANIDE 1 MG/1
1 TABLET ORAL DAILY
Status: DISCONTINUED | OUTPATIENT
Start: 2025-06-12 | End: 2025-06-12 | Stop reason: HOSPADM

## 2025-06-11 RX ORDER — DIVALPROEX SODIUM 125 MG/1
125 CAPSULE, COATED PELLETS ORAL EVERY EVENING
Status: DISCONTINUED | OUTPATIENT
Start: 2025-06-12 | End: 2025-06-12 | Stop reason: HOSPADM

## 2025-06-11 RX ORDER — 0.9 % SODIUM CHLORIDE 0.9 %
500 INTRAVENOUS SOLUTION INTRAVENOUS ONCE
Status: COMPLETED | OUTPATIENT
Start: 2025-06-11 | End: 2025-06-11

## 2025-06-11 RX ADMIN — BUDESONIDE AND FORMOTEROL FUMARATE DIHYDRATE 2 PUFF: 160; 4.5 AEROSOL RESPIRATORY (INHALATION) at 06:40

## 2025-06-11 RX ADMIN — ASPIRIN 81 MG: 81 TABLET, CHEWABLE ORAL at 10:01

## 2025-06-11 RX ADMIN — SODIUM CHLORIDE 500 ML: 0.9 INJECTION, SOLUTION INTRAVENOUS at 13:56

## 2025-06-11 RX ADMIN — RISPERIDONE 0.5 MG: 1 TABLET, FILM COATED ORAL at 23:40

## 2025-06-11 RX ADMIN — LEVOTHYROXINE SODIUM 150 MCG: 0.07 TABLET ORAL at 05:41

## 2025-06-11 RX ADMIN — APIXABAN 5 MG: 5 TABLET, FILM COATED ORAL at 23:40

## 2025-06-11 RX ADMIN — IPRATROPIUM BROMIDE AND ALBUTEROL SULFATE 1 DOSE: 2.5; .5 SOLUTION RESPIRATORY (INHALATION) at 14:30

## 2025-06-11 RX ADMIN — Medication 10 MG: at 23:40

## 2025-06-11 RX ADMIN — SODIUM CHLORIDE: 0.9 INJECTION, SOLUTION INTRAVENOUS at 16:00

## 2025-06-11 RX ADMIN — COLLAGENASE SANTYL: 250 OINTMENT TOPICAL at 17:39

## 2025-06-11 RX ADMIN — IPRATROPIUM BROMIDE AND ALBUTEROL SULFATE 1 DOSE: 2.5; .5 SOLUTION RESPIRATORY (INHALATION) at 06:25

## 2025-06-11 RX ADMIN — DAKIN'S SOLUTION 0.125% (QUARTER STRENGTH): 0.12 SOLUTION at 05:37

## 2025-06-11 RX ADMIN — SODIUM CHLORIDE, PRESERVATIVE FREE 10 ML: 5 INJECTION INTRAVENOUS at 23:44

## 2025-06-11 RX ADMIN — RISPERIDONE 0.5 MG: 1 TABLET, FILM COATED ORAL at 10:01

## 2025-06-11 RX ADMIN — IPRATROPIUM BROMIDE AND ALBUTEROL SULFATE 1 DOSE: 2.5; .5 SOLUTION RESPIRATORY (INHALATION) at 10:20

## 2025-06-11 RX ADMIN — ISOSORBIDE MONONITRATE 30 MG: 30 TABLET, EXTENDED RELEASE ORAL at 10:09

## 2025-06-11 RX ADMIN — IPRATROPIUM BROMIDE AND ALBUTEROL SULFATE 1 DOSE: 2.5; .5 SOLUTION RESPIRATORY (INHALATION) at 19:15

## 2025-06-11 RX ADMIN — ACETAMINOPHEN 650 MG: 325 TABLET ORAL at 11:24

## 2025-06-11 RX ADMIN — BUDESONIDE AND FORMOTEROL FUMARATE DIHYDRATE 2 PUFF: 160; 4.5 AEROSOL RESPIRATORY (INHALATION) at 19:14

## 2025-06-11 RX ADMIN — ATORVASTATIN CALCIUM 80 MG: 80 TABLET, FILM COATED ORAL at 23:40

## 2025-06-11 RX ADMIN — DAKIN'S SOLUTION 0.125% (QUARTER STRENGTH): 0.12 SOLUTION at 17:38

## 2025-06-11 RX ADMIN — DIVALPROEX SODIUM 125 MG: 125 CAPSULE, COATED PELLETS ORAL at 10:01

## 2025-06-11 RX ADMIN — BUMETANIDE 1 MG: 1 TABLET ORAL at 10:01

## 2025-06-11 RX ADMIN — GUAIFENESIN 400 MG: 200 TABLET ORAL at 05:41

## 2025-06-11 RX ADMIN — APIXABAN 5 MG: 5 TABLET, FILM COATED ORAL at 10:01

## 2025-06-11 RX ADMIN — ISOSORBIDE MONONITRATE 30 MG: 30 TABLET, EXTENDED RELEASE ORAL at 23:40

## 2025-06-11 RX ADMIN — CARVEDILOL 3.12 MG: 3.12 TABLET, FILM COATED ORAL at 10:01

## 2025-06-11 RX ADMIN — TRAZODONE HYDROCHLORIDE 100 MG: 100 TABLET ORAL at 23:40

## 2025-06-11 RX ADMIN — GUAIFENESIN 400 MG: 200 TABLET ORAL at 10:01

## 2025-06-11 ASSESSMENT — ENCOUNTER SYMPTOMS
VOMITING: 0
BLOOD IN STOOL: 0
COUGH: 0
COLOR CHANGE: 0
NAUSEA: 0
SHORTNESS OF BREATH: 0
WHEEZING: 0
CONSTIPATION: 0
DIARRHEA: 0
ABDOMINAL DISTENTION: 0
ABDOMINAL PAIN: 0

## 2025-06-11 NOTE — PROGRESS NOTES
Cardiology Progress Note Romero Calvillo MD      Patient:  Khris Sampson  [id number]    Patient Active Problem List    Diagnosis Date Noted    Palliative care patient 05/31/2022     Priority: Medium    Vitamin D deficiency 05/30/2022     Priority: Medium    Hypothyroidism 05/30/2022     Priority: Medium    Bilateral pneumonia 06/04/2025    Severe malnutrition 05/30/2025    Acute heart failure with preserved ejection fraction (HCC) 05/23/2025    Peripheral edema 05/20/2025    SIRS (systemic inflammatory response syndrome) ([facility]) 05/20/2025    Chest pain in adult 06/13/2024    Chest pain, unspecified 06/11/2024    COPD exacerbation ([facility]) 08/21/2020    Hypertension     ZARA (acute kidney injury)     Acute on chronic respiratory failure with hypoxia ([facility])     Aspiration pneumonia ([facility]) 03/07/2020    History of stroke with residual deficit 03/07/2020    Dysarthria 03/07/2020    Diabetes ([facility]) 03/07/2020    Chronic obstructive pulmonary disease with acute lower respiratory infection ([facility]) 03/07/2020    Seizure disorder ([facility]) 03/07/2020    Acute ischemic stroke ([facility]) 01/21/2020    Impetigo 08/05/2016    Skin tear of left upper arm without complication        Admit Date:  6/4/2025    Admission Problem List: Present on Admission:   Chest pain in adult   Hypertension   Diabetes (HCC)   Acute on chronic respiratory failure with hypoxia (HCC)   Bilateral pneumonia   History of stroke with residual deficit   Hypothyroidism      Cardiac Specific Data:  Specialty Problems          Cardiology Problems    Acute ischemic stroke (HCC)        Hypertension        Chest pain, unspecified        * (Principal) Chest pain in adult        Acute heart failure with preserved ejection fraction (HCC)         1.  Acute coronary syndrome with elevated troponins consistent with NSTEMI with recurrent troponin elevations, normal LV ejection fraction, negative Lexiscan study 6/14/2024.  2.  Paroxysmal atrial fibrillation noted 6/20/2024 and 5/20/2025, previously  transcription that are not intended.

## 2025-06-11 NOTE — PROGRESS NOTES
[facility]      Progress Note    Patient:  Khris Sampson  YOB: 1955  Date of Service: 6/11/2025  MRN: [id number]   Acct: [id number]   Primary Care Physician: Nader Gordillo MD  Advance Directive: Full Code  Admit Date: 6/4/2025       Hospital Day: 7    Portions of this note have been copied forward, however, updated to reflect the most current clinical status of this patient.     CHIEF COMPLAINT chest pain, shortness of breath     SUBJECTIVE:  Mr. Sampson was resting in bed this morning. Reports feeling better this morning. Denies SOB or chest pain this morning. A&O to self and place, disoriented to time.     CUMULATIVE HOSPITAL COURSE:   The patient is a 70 y.o. male with PMH of copd with chronic respiratory failure with hypoxia requiring 2L per nc, diabetes, seizure disorder, stroke and right sided weakness, HFpEF, hypothyroidism, atrial fibrillation, chronic wounds to right leg, chronic frederick catheter requirement due to urinary retention and malnutrition who presented to [facility] ED from SNF for evaluation of shortness of breath and chest pain. Patient has dysarthric speech at baseline. Has had previously reported sharp quality chest pain associated with shortness of breath while at nursing home. Received breathing treatment at SNF with some improvement in symptoms. Of note, patient was last discharged from this facility to nursing home 5/31/2025 having had treatment of copd exacerbation, CHF exacerbation and chronic wound right lower extremities. He was continued on bumex, levaquin and prednisone at discharge.     Workup in ED revealed required 5L supplemental oxygen on presentation to ED due to hypoxia, weaned to 3L per nc currently. cxr-Upper lobe bilateral interstitial edema versus pulmonary infiltrates.  This is a change from previous study pill. Creatinine 1.2, bun 22, troponin 127, bnp 643,, wbc 19k, hgb 12.7, platelets 367k. Patient was admitted to hospital medicine for further evaluation. Of   Parainfluenza Virus 2 by PCR Not Detected     Parainfluenza Virus 3 by PCR Not Detected     Parainfluenza Virus 4 by PCR Not Detected     Respiratory Syncytial Virus by PCR Not Detected    Culture, Respiratory [[id number]] Collected: 05/28/25 2300    Order Status: Canceled Specimen: Sputum Expectorated               Assessment/Plan   Principal Problem:    Chest pain in adult  Active Problems:    Hypothyroidism    History of stroke with residual deficit    Diabetes (HCC)    Hypertension    Acute on chronic respiratory failure with hypoxia (HCC)    Bilateral pneumonia  Resolved Problems:    * No resolved hospital problems. *    Principal Problem:    Chest pain in adult-    - Cardiology following    - Cardiac medications on HOLD given hypotension    - Troponin elevated but trended down   - Monitor on telemetry       Active Problems:    Hypothyroidism-    - Continue Synthroid       History of stroke with residual deficit-    - SLP recommended pureed diet with moderately thickened liquids/honey thick via spoon and patient to be fed and witnessed feeding at all times    - Barium swallow study completed, results reviewed    - Increased risk of aspiration       Diabetes (HCC)-    - A1c 7.3    - Low dose SSI   - Accu-Checks    - Hypoglycemia treatment protocol in place       Hypertension-    - currently hypotensive   - cardiac medications resumed per cardiology   - 500 cc NS bolus ordered   - continue gentle IV fluids    - decreased Bumex to 1 mg daily    - Monitor BP closely       Acute on chronic respiratory failure with hypoxia (HCC)-    - Continue supplemental oxygen as needed       Bilateral pneumonia likely aspiration-    - Off of Vancomycin and Cefepime   - Monitor off of antibiotics    - Lactic acid 1.0            DVT Prophylaxis: Eliquis       GI prophylaxis:  Protonix      Discharge planning: TBD        Further Orders per Clinical course/attending.     Electronically signed by KEEGAN Multani - TASHI on  6/11/2025 at 1:58 PM       EMR Dragon/Transcription disclaimer:   Much of this encounter note is an electronic transcription/translation of spoken language to printed text. The electronic translation of spoken language may permit erroneous, or at times, nonsensical words or phrases to be inadvertently transcribed; although attempts have made to review the note for such errors, some may still exist.

## 2025-06-11 NOTE — PLAN OF CARE
Problem: Chronic Conditions and Co-morbidities  Goal: Patient's chronic conditions and co-morbidity symptoms are monitored and maintained or improved  Outcome: Progressing     Problem: Pain  Goal: Verbalizes/displays adequate comfort level or baseline comfort level  Outcome: Progressing     Problem: Safety - Adult  Goal: Free from fall injury  Outcome: Progressing     Problem: Skin/Tissue Integrity  Goal: Skin integrity remains intact  Description: 1.  Monitor for areas of redness and/or skin breakdown2.  Assess vascular access sites hourly3.  Every 4-6 hours minimum:  Change oxygen saturation probe site4.  Every 4-6 hours:  If on nasal continuous positive airway pressure, respiratory therapy assess nares and determine need for appliance change or resting period  Outcome: Progressing     Problem: ABCDS Injury Assessment  Goal: Absence of physical injury  Outcome: Progressing

## 2025-06-11 NOTE — PROGRESS NOTES
DIS Nurse Note  SUBJECTIVE: Patient assessed secondary to elevated Deterioration Index Score.      Deterioration Index Score:  Predictive Model Details          70 (Warning)  Factor Value    Calculated 6/11/2025 16:28 16% Age 70 years old    Deterioration Index Model 16% Supplemental oxygen Nasal cannula     15% Systolic 84     14% Pulse 134     13% Neurological exam X     9% [place] coma scale 14     8% Respiratory rate 20     4% WBC count abnormal (11.5 K/uL)     3% Hematocrit abnormal (34.5 %)     3% Blood pH abnormal (7.510)     1% Sodium 139 mmol/L     1% Potassium 3.7 mmol/L     0% Temperature 96.6 °F (35.9 °C)     0% Pulse oximetry 96 %        Vital Signs:  Vitals:    06/11/25 1308 06/11/25 1430 06/11/25 1526 06/11/25 1530   BP: (!) 85/53   (!) 84/58   Pulse: 81 78 (!) 134    Resp:  20     Temp: 97.3 °F (36.3 °C)  (!) 96.6 °F (35.9 °C)    TempSrc: Temporal  Temporal    SpO2: 95% 95% 96%    Weight:       Height:            Provider Notified: Reviewed patient status  & DIS score with Provider Lyssa Lunsford NP     ASSESSMENT: hypotensive received bolus today.     Plan of Care: Charge RN notified & provider notified.     Electronically signed by Maria Isabel Boyd RN

## 2025-06-12 VITALS
RESPIRATION RATE: 20 BRPM | OXYGEN SATURATION: 96 % | SYSTOLIC BLOOD PRESSURE: 86 MMHG | TEMPERATURE: 98.3 F | HEART RATE: 82 BPM | DIASTOLIC BLOOD PRESSURE: 66 MMHG | BODY MASS INDEX: 29.39 KG/M2 | HEIGHT: 73 IN | WEIGHT: 221.78 LBS

## 2025-06-12 PROBLEM — R13.10 DYSPHAGIA: Status: ACTIVE | Noted: 2025-06-12

## 2025-06-12 PROBLEM — E43 SEVERE MALNUTRITION: Status: ACTIVE | Noted: 2025-06-12

## 2025-06-12 LAB
ALBUMIN SERPL-MCNC: 3 G/DL (ref 3.5–5.2)
ALP SERPL-CCNC: 49 U/L (ref 40–129)
ALT SERPL-CCNC: 12 U/L (ref 10–50)
ANION GAP SERPL CALCULATED.3IONS-SCNC: 9 MMOL/L (ref 8–16)
AST SERPL-CCNC: 19 U/L (ref 10–50)
BASOPHILS # BLD: 0.1 K/UL (ref 0–0.2)
BASOPHILS NFR BLD: 0.5 % (ref 0–1)
BILIRUB SERPL-MCNC: 1 MG/DL (ref 0.2–1.2)
BUN SERPL-MCNC: 16 MG/DL (ref 8–23)
CALCIUM SERPL-MCNC: 8.7 MG/DL (ref 8.8–10.2)
CHLORIDE SERPL-SCNC: 102 MMOL/L (ref 98–107)
CO2 SERPL-SCNC: 28 MMOL/L (ref 22–29)
CREAT SERPL-MCNC: 0.9 MG/DL (ref 0.7–1.2)
EOSINOPHIL # BLD: 0.2 K/UL (ref 0–0.6)
EOSINOPHIL NFR BLD: 1.4 % (ref 0–5)
ERYTHROCYTE [DISTWIDTH] IN BLOOD BY AUTOMATED COUNT: 16 % (ref 11.5–14.5)
GLUCOSE BLD-MCNC: 108 MG/DL (ref 70–99)
GLUCOSE BLD-MCNC: 114 MG/DL (ref 70–99)
GLUCOSE SERPL-MCNC: 120 MG/DL (ref 70–99)
HCT VFR BLD AUTO: 34.7 % (ref 42–52)
HGB BLD-MCNC: 11.2 G/DL (ref 14–18)
IMM GRANULOCYTES # BLD: 0.2 K/UL
LYMPHOCYTES # BLD: 1.8 K/UL (ref 1.1–4.5)
LYMPHOCYTES NFR BLD: 14.7 % (ref 20–40)
MCH RBC QN AUTO: 29.2 PG (ref 27–31)
MCHC RBC AUTO-ENTMCNC: 32.3 G/DL (ref 33–37)
MCV RBC AUTO: 90.4 FL (ref 80–94)
MONOCYTES # BLD: 2 K/UL (ref 0–0.9)
MONOCYTES NFR BLD: 15.9 % (ref 0–10)
NEUTROPHILS # BLD: 8.2 K/UL (ref 1.5–7.5)
NEUTS SEG NFR BLD: 66 % (ref 50–65)
PERFORMED ON: ABNORMAL
PERFORMED ON: ABNORMAL
PLATELET # BLD AUTO: 218 K/UL (ref 130–400)
PMV BLD AUTO: 10.5 FL (ref 9.4–12.4)
POTASSIUM SERPL-SCNC: 3.8 MMOL/L (ref 3.5–5)
PROT SERPL-MCNC: 6 G/DL (ref 6.4–8.3)
RBC # BLD AUTO: 3.84 M/UL (ref 4.7–6.1)
SODIUM SERPL-SCNC: 139 MMOL/L (ref 136–145)
WBC # BLD AUTO: 12.5 K/UL (ref 4.8–10.8)

## 2025-06-12 PROCEDURE — 6370000000 HC RX 637 (ALT 250 FOR IP): Performed by: NURSE PRACTITIONER

## 2025-06-12 PROCEDURE — 94669 MECHANICAL CHEST WALL OSCILL: CPT

## 2025-06-12 PROCEDURE — 99223 1ST HOSP IP/OBS HIGH 75: CPT

## 2025-06-12 PROCEDURE — 92526 ORAL FUNCTION THERAPY: CPT

## 2025-06-12 PROCEDURE — 2580000003 HC RX 258: Performed by: NURSE PRACTITIONER

## 2025-06-12 PROCEDURE — 36415 COLL VENOUS BLD VENIPUNCTURE: CPT

## 2025-06-12 PROCEDURE — 82962 GLUCOSE BLOOD TEST: CPT

## 2025-06-12 PROCEDURE — 6370000000 HC RX 637 (ALT 250 FOR IP): Performed by: INTERNAL MEDICINE

## 2025-06-12 PROCEDURE — 85025 COMPLETE CBC W/AUTO DIFF WBC: CPT

## 2025-06-12 PROCEDURE — 92523 SPEECH SOUND LANG COMPREHEN: CPT

## 2025-06-12 PROCEDURE — 2500000003 HC RX 250 WO HCPCS: Performed by: NURSE PRACTITIONER

## 2025-06-12 PROCEDURE — 94640 AIRWAY INHALATION TREATMENT: CPT

## 2025-06-12 PROCEDURE — 80053 COMPREHEN METABOLIC PANEL: CPT

## 2025-06-12 PROCEDURE — 99497 ADVNCD CARE PLAN 30 MIN: CPT

## 2025-06-12 PROCEDURE — 94760 N-INVAS EAR/PLS OXIMETRY 1: CPT

## 2025-06-12 PROCEDURE — 2700000000 HC OXYGEN THERAPY PER DAY

## 2025-06-12 RX ORDER — ATROPINE SULFATE 10 MG/ML
2 SOLUTION/ DROPS OPHTHALMIC EVERY 4 HOURS PRN
Status: DISCONTINUED | OUTPATIENT
Start: 2025-06-12 | End: 2025-06-12 | Stop reason: HOSPADM

## 2025-06-12 RX ORDER — OXYCODONE HCL 20 MG/ML
5 CONCENTRATE, ORAL ORAL EVERY 4 HOURS PRN
Refills: 0 | Status: DISCONTINUED | OUTPATIENT
Start: 2025-06-12 | End: 2025-06-12 | Stop reason: HOSPADM

## 2025-06-12 RX ORDER — OXYCODONE HCL 20 MG/ML
5 CONCENTRATE, ORAL ORAL EVERY 4 HOURS PRN
Qty: 4.5 ML | Refills: 0 | Status: SHIPPED | OUTPATIENT
Start: 2025-06-12 | End: 2025-06-15

## 2025-06-12 RX ORDER — LEVOTHYROXINE SODIUM 150 UG/1
150 TABLET ORAL DAILY
Qty: 30 TABLET | Refills: 0 | Status: SHIPPED | OUTPATIENT
Start: 2025-06-13

## 2025-06-12 RX ORDER — HYDROMORPHONE HYDROCHLORIDE 1 MG/ML
0.25 INJECTION, SOLUTION INTRAMUSCULAR; INTRAVENOUS; SUBCUTANEOUS
Status: DISCONTINUED | OUTPATIENT
Start: 2025-06-12 | End: 2025-06-12 | Stop reason: HOSPADM

## 2025-06-12 RX ORDER — DIVALPROEX SODIUM 125 MG/1
125 CAPSULE, COATED PELLETS ORAL EVERY EVENING
Qty: 30 CAPSULE | Refills: 0 | Status: SHIPPED | OUTPATIENT
Start: 2025-06-12

## 2025-06-12 RX ORDER — SODIUM HYPOCHLORITE 1.25 MG/ML
SOLUTION TOPICAL 2 TIMES DAILY
Qty: 1 EACH | Refills: 0 | Status: SHIPPED | OUTPATIENT
Start: 2025-06-12

## 2025-06-12 RX ORDER — TRAZODONE HYDROCHLORIDE 100 MG/1
100 TABLET ORAL NIGHTLY
Qty: 30 TABLET | Refills: 0 | Status: SHIPPED | OUTPATIENT
Start: 2025-06-12

## 2025-06-12 RX ORDER — ATROPINE SULFATE 10 MG/ML
2 SOLUTION/ DROPS OPHTHALMIC EVERY 4 HOURS PRN
Qty: 10 ML | Refills: 0 | Status: SHIPPED | OUTPATIENT
Start: 2025-06-12

## 2025-06-12 RX ADMIN — SODIUM CHLORIDE: 0.9 INJECTION, SOLUTION INTRAVENOUS at 05:45

## 2025-06-12 RX ADMIN — DAKIN'S SOLUTION 0.125% (QUARTER STRENGTH): 0.12 SOLUTION at 05:21

## 2025-06-12 RX ADMIN — PANTOPRAZOLE SODIUM 40 MG: 40 TABLET, DELAYED RELEASE ORAL at 05:40

## 2025-06-12 RX ADMIN — IPRATROPIUM BROMIDE AND ALBUTEROL SULFATE 1 DOSE: 2.5; .5 SOLUTION RESPIRATORY (INHALATION) at 14:12

## 2025-06-12 RX ADMIN — RISPERIDONE 0.5 MG: 1 TABLET, FILM COATED ORAL at 09:32

## 2025-06-12 RX ADMIN — GUAIFENESIN 400 MG: 200 TABLET ORAL at 05:40

## 2025-06-12 RX ADMIN — IPRATROPIUM BROMIDE AND ALBUTEROL SULFATE 1 DOSE: 2.5; .5 SOLUTION RESPIRATORY (INHALATION) at 10:10

## 2025-06-12 RX ADMIN — ASPIRIN 81 MG: 81 TABLET, CHEWABLE ORAL at 09:32

## 2025-06-12 RX ADMIN — LEVOTHYROXINE SODIUM 150 MCG: 0.07 TABLET ORAL at 05:40

## 2025-06-12 RX ADMIN — SODIUM CHLORIDE, PRESERVATIVE FREE 10 ML: 5 INJECTION INTRAVENOUS at 09:32

## 2025-06-12 RX ADMIN — IPRATROPIUM BROMIDE AND ALBUTEROL SULFATE 1 DOSE: 2.5; .5 SOLUTION RESPIRATORY (INHALATION) at 06:20

## 2025-06-12 RX ADMIN — APIXABAN 5 MG: 5 TABLET, FILM COATED ORAL at 09:32

## 2025-06-12 RX ADMIN — BUDESONIDE AND FORMOTEROL FUMARATE DIHYDRATE 2 PUFF: 160; 4.5 AEROSOL RESPIRATORY (INHALATION) at 06:32

## 2025-06-12 NOTE — PROGRESS NOTES
DIS Nurse Note  SUBJECTIVE: Patient assessed secondary to elevated Deterioration Index Score.      Deterioration Index Score:  Predictive Model Details          53 (Warning)  Factor Value    Calculated 6/12/2025 08:33 21% Age 70 years old    Deterioration Index Model 21% Supplemental oxygen Nasal cannula     17% Neurological exam X     14% Systolic 90     10% Respiratory rate 20     6% WBC count abnormal (12.5 K/uL)     4% Hematocrit abnormal (34.7 %)     3% Blood pH abnormal (7.510)     3% Pulse 96     1% Sodium 139 mmol/L     0% Potassium 3.8 mmol/L     0% Pulse oximetry 96 %     0% Temperature 98.3 °F (36.8 °C)        Vital Signs:  Vitals:    06/11/25 2345 06/12/25 0402 06/12/25 0620 06/12/25 0745   BP:  (!) 99/55  90/60   Pulse: (!) 117 99 96    Resp:  22 20    Temp:  98.3 °F (36.8 °C)     TempSrc:       SpO2:  95% 96%    Weight:       Height:            Provider Notified: Not Indicated    ASSESSMENT:   Pt is not in distress at this time. Sitter at bedside.    Plan of Care: Care ongoing.     Electronically signed by BETH CRUZ RN

## 2025-06-12 NOTE — CONSULTS
Palliative Care Consult Note    6/12/2025 7:26 AM  Subjective:  Admit Date: 6/4/2025  PCP: Nader Gordillo MD    Date of Service: 6/12/2025    Reason for Consultation:  Goals of Care, Code Status, Family Support     History Obtained From: EMR/Patient and their Family    HISTORY OF PRESENT ILLNESS:   The patient is a 70 y.o. male with PMH HTN, DM, GERD, COPD with chronic hypoxic respiratory failure on 2 LPM at baseline, HFpEF, CVA with right sided residual weakness and dysarthria, dysphagia, seizures, CKD III, afib, hypothyroidism, urinary retention vascular dementia, and other comorbidities who presented to [facility] ED 6/4/2025 from SNF complaining of chest pain. He has had multiple recent hospitalizations including an admission to [facility] in April where he was treated for ZARA and acute respiratory failure due to LLL pneumonia requiring IV antibiotics and IVF. Patient was then most recently admitted to this facility 5/20/2025-5/31/2025 having had treatment of copd exacerbation, CHF exacerbation, new onset afib, and chronic wound right lower extremities. During this hospitalization he also developed hematuria and urinary retention and was initiated on CBI with urology consult. Anticoagulation was held. Urine has cleared and CBI discontinued, f/c removed and lovenox resumed. 5/30 began having hematuria again. Stat CT AP obtained which did not reveal clots.  Anticoagulation was again held and hematuria resolved.  Urology recommended outpatient follow-up for potential cystoscopy.  He was discharged back to SNF in stable condition with plans to complete p.o. antibiotic course for lower extremity wounds and prednisone taper for COPD exacerbation. He presented back on day of this admission with  complaints of chest pain and SOB.  His imaging workup was concerning for pneumonia potentially related to aspiration. On chart review, it is reported that patient was placed on a regular diet instead of continued puréed as recommended by  speech once he returned to the facility. He was admitted under hospitalist services and resumed on IV antibiotics.  SLP was re-consulted and recommended pureed diet, trial moderately thick/honey thick liquids via spoon, staff feed as suspect impulsivity. Repeat CXR 6/7/2025 indicated central airway thickening and groundglass suggestive of bronchitis bronchiolitis with no consolidative pneumonia.  IV antibiotics were then discontinued by hospitalist team. Repeat CXR 6/8/2025 indicated improved aeration of the lungs with decreased vascular congestion. Cardiology was asked to evaluate 6/9/2025 for persistent elevated troponins. He has been shown to have ormal LV systolic function with prior negative Lexiscan study 6/14/2024.  It is felt that he likely had a NSTEMI.  D-dimer was also elevated and he was recommended CTA, defer orders to primary team. It was felt that he was a poor candidate for further invasive cardiac workup and he was recommended medical management and patient was initiated on Imdur, reducing lisinopril, and restarting anticoagulation with Eliquis. On 6/10/2025 he did develop hypotension with addition of new cardiac drugs and lethargy.  These were held as well as Bumex and patient given gentle IVF bolus and albumin.  Symptoms did improve and cardiology felt this was related to a net diuresis since admission and recommended holding diuretic and reintroducing Imdur and beta-blocker withheld lisinopril.  He underwent a barium swallow study with ongoing high aspiration risk of even the safest/[place] moving food and drink consistencies.  Patient is likely to aspirate with p.o. intake.  Diet has been adjusted per SLP recommendations.  Patient has been seen by PC RN/ who initiated supportive visits this admission.  Palliative medicine is now consulted for further goals of care discussions.    Past Medical History:        Diagnosis Date    Altered mental status     Arthritis     Asthma     Brief  care  Mouth care only  Drowsy/coma  [] 0% Death    ECOG:(4) Completely disabled, unable to carry out self-care and confined to bed or chair    CLINICAL PAIN ASSESSMENT:   Pain Assessment in Advanced Dementia (PAINAD) Scale     Breathing independent of vocalization  [x] 0   Normal  [] 1   Occasional labored breathing/ short period of hyperventilation  [] 2   Noisy labored breathing/ long period of hyperventilation/ Cheyene-Tam respirations     Negative Vocalization  [x] 0   None  [] 1   Occasional moan or groan/ low-level speech with a negative for disapproving quality  [] 2   Repeated trouble calling out/ loud moaning or groaning/ crying     Facial expression  [x] 0   Smiling or inexpressive  [] 1   Sad/ frightened/ frowning  [] 2   Facial grimacing     Body language  [x] 0   Relaxed  [] 1   Tense. Distressed pacing/Fidgeting  [] 2   Rigid.  Fists clenched/ knees pulled up/ pulling or pushing away/ striking out     Consolability  [x] 0   No need to console  [] 1   Distracted or reassured by voice or touch  [] 2  Unable to console or distract or reassure    Total score 0-10:  0      SUMMARY AND RECOMMENDATIONS:     Principal Problem:    Chest pain in adult  Active Problems:    Hypothyroidism    History of stroke with residual deficit    Diabetes (HCC)    Hypertension    Acute on chronic respiratory failure with hypoxia (HCC)    Bilateral pneumonia  Resolved Problems:    * No resolved hospital problems. *       Visit Summary:  Chart reviewed, patient discussed with nursing staff. Reviewed health issues, work up and treatment plan as well as factors that lead to hospitalization. Mr. Sampson is seen at bedside this morning with his sister/POA, Stephani, and his brother in law present.  Patient is awake and communicative but does remain confused with baseline AMS and dementia from prior CVA.  He did complete a cognitive evaluation today scoring 9 out of 30 and does not have capacity for medical decision-making.  He is

## 2025-06-12 NOTE — DISCHARGE SUMMARY
[facility]    Discharge Summary      Khris Sampson  :  1955  MRN:  [id number]    Admit date:  2025  Discharge date:    2025    Discharging Physician:  Dr. Yamile Sy     Advance Directive: DNR    Consults: cardiology and Palliative care     Primary Care Physician:  Nader Gordillo MD    Discharge Diagnoses:  Principal Problem:    Chest pain in adult  Active Problems:    Hypothyroidism    Palliative care patient    History of stroke with residual deficit    Diabetes (HCC)    Hypertension    Acute on chronic respiratory failure with hypoxia (HCC)    Severe malnutrition    Bilateral pneumonia  Resolved Problems:    * No resolved hospital problems. *      Portions of this note have been copied forward, however, changed to reflect the most current clinical status of this patient.    Hospital Course:   The patient is a 70 y.o. male with PMH of copd with chronic respiratory failure with hypoxia requiring 2L per nc, diabetes, seizure disorder, stroke and right sided weakness, HFpEF, hypothyroidism, atrial fibrillation, chronic wounds to right leg, chronic frederick catheter requirement due to urinary retention and malnutrition who presented to [facility] ED from SNF for evaluation of shortness of breath and chest pain. Patient has dysarthric speech at baseline. Has had previously reported sharp quality chest pain associated with shortness of breath while at nursing home. Received breathing treatment at SNF with some improvement in symptoms. Of note, patient was last discharged from this facility to nursing home 2025 having had treatment of copd exacerbation, CHF exacerbation and chronic wound right lower extremities. He was continued on bumex, levaquin and prednisone at discharge.      Workup in ED revealed required 5L supplemental oxygen on presentation to ED due to hypoxia, weaned to 3L per nc currently. cxr-Upper lobe bilateral interstitial edema versus pulmonary infiltrates.  This is a change  06/04/2025 Time:    21:14       Pertinent Labs:  CBC:   Recent Labs     06/10/25  0200 06/11/25  0145 06/12/25  0248   WBC 12.9* 11.5* 12.5*   HGB 10.7* 11.3* 11.2*    225 218     BMP:    Recent Labs     06/10/25  0200 06/10/25  0824 06/11/25  0145 06/12/25  0248     --  139 139   K 3.8 3.5 3.7 3.8     --  100 102   CO2 29  --  30* 28   BUN 24*  --  22 16   CREATININE 1.2  --  0.9 0.9   GLUCOSE 107*  --  99 120*     INR: No results for input(s): \"INR\" in the last 72 hours.    Physical Exam:   Vital Signs: BP (!) 86/66   Pulse 88   Temp 98.3 °F (36.8 °C)   Resp 20   Ht 1.854 m (6' 0.99\")   Wt 100.6 kg (221 lb 12.5 oz)   SpO2 95%   BMI 29.27 kg/m²   General appearance:.Alert and Cooperative   HEENT: Normocephalic.  Chest: Diminished breath sounds bilaterally without wheezes or rhonchi.  Cardiac: RRR, S1, S2 normal. No murmurs, gallops, or rubs auscultated.   Abdomen: soft, non-tender; non-distended normal bowel sounds no masses, no organomegaly.  Extremities: No clubbing or cyanosis. No peripheral edema. Peripheral pulses palpable.  Neurologic: Grossly intact. A&O x 2        Discharge Medications:          Medication List        START taking these medications      atropine 1 % ophthalmic solution  Place 2 drops under the tongue every 4 hours as needed for Secretions     divalproex 125 MG DR capsule  Commonly known as: DEPAKOTE SPRINKLE  Take 1 capsule by mouth every evening  Replaces: divalproex 500 MG DR tablet     oxyCODONE 100 MG/5ML concentrated solution  Commonly known as: ROXICODONE INTENSOL  Place 0.25 mLs under the tongue every 4 hours as needed for Pain for up to 3 days. Max Daily Amount: 30 mg     sodium hypochlorite 0.125 % Soln external solution  Commonly known as: DAKINS  Apply topically in the morning and at bedtime     traZODone 100 MG tablet  Commonly known as: DESYREL  Take 1 tablet by mouth nightly            CHANGE how you take these medications      levothyroxine 150 MCG  tablet  Commonly known as: SYNTHROID  Take 1 tablet by mouth Daily  Start taking on: June 13, 2025  What changed:   medication strength  how much to take            CONTINUE taking these medications      acetaminophen 325 MG tablet  Commonly known as: TYLENOL     aspirin 81 MG chewable tablet  Take 1 tablet by mouth daily     atorvastatin 80 MG tablet  Commonly known as: LIPITOR  Take 1 tablet by mouth nightly     bisacodyl 10 MG suppository  Commonly known as: DULCOLAX     budesonide-formoterol 160-4.5 MCG/ACT Aero  Commonly known as: Symbicort  Inhale 2 puffs into the lungs 2 times daily     collagenase 250 UNIT/GM ointment  Apply topically daily.     ipratropium 0.5 mg-albuterol 2.5 mg 0.5-2.5 (3) MG/3ML Soln nebulizer solution  Commonly known as: DUONEB     lactulose 10 GM/15ML solution  Commonly known as: CHRONULAC  Take 30 mLs by mouth 2 times daily     pantoprazole 40 MG tablet  Commonly known as: PROTONIX  Take 1 tablet by mouth every morning (before breakfast)     polyethylene glycol 17 g packet  Commonly known as: GLYCOLAX  Take 1 packet by mouth daily     RisperDAL Consta 25 MG Srer injection  Generic drug: risperiDONE microspheres ER     risperiDONE 0.5 MG tablet  Commonly known as: RISPERDAL     sodium phosphate 7-19 GM/118ML     tiZANidine 4 MG tablet  Commonly known as: ZANAFLEX     Vitamin D (Ergocalciferol) 92034 units Caps  Take 50,000 Units by mouth once a week     xeroform petrolatum gauze 5\"X9\" Misc external pads  Apply 3 each topically daily            STOP taking these medications      bumetanide 1 MG tablet  Commonly known as: BUMEX     carvedilol 6.25 MG tablet  Commonly known as: COREG     divalproex 500 MG DR tablet  Commonly known as: DEPAKOTE  Replaced by: divalproex 125 MG DR capsule     lisinopril 10 MG tablet  Commonly known as: PRINIVIL;ZESTRIL     predniSONE 5 MG tablet  Commonly known as: DELTASONE     tamsulosin 0.4 MG capsule  Commonly known as: FLOMAX               Where to Get

## 2025-06-12 NOTE — CARE COORDINATION
WARREN spoke with Liasion for [facility] Alysa Miguel, she stated hospice will do the intake at [facility] as the daughter wanted to be present. Spoke with Yahaira at [facility] and informed her the pt would be returning on hospice, pt can admit when medically stable  Electronically signed by LETICIA Gómez on 6/12/2025 at 12:19 PM

## 2025-06-12 NOTE — PROGRESS NOTES
Facility/Department: [facility] ONCOLOGY UNIT  Initial Speech/Language/Cognitive Assessment  Swallow Therapy     NAME: Khris Sampson  : 1955   MRN: [id number]  ADMISSION DATE: 2025  ADMITTING DIAGNOSIS: has Impetigo; Skin tear of left upper arm without complication; Acute ischemic stroke (HCC); Aspiration pneumonia (HCC); History of stroke with residual deficit; Dysarthria; Diabetes (HCC); Chronic obstructive pulmonary disease with acute lower respiratory infection (HCC); Seizure disorder (HCC); COPD exacerbation (HCC); Hypertension; ZARA (acute kidney injury); Acute on chronic respiratory failure with hypoxia (HCC); Vitamin D deficiency; Hypothyroidism; Palliative care patient; Chest pain, unspecified; Chest pain in adult; Peripheral edema; SIRS (systemic inflammatory response syndrome) (HCC); Acute heart failure with preserved ejection fraction (HCC); Severe malnutrition; and Bilateral pneumonia on their problem list.    Date of Eval/Treat: 2025   Evaluating Therapist: STEVE Jensen    Pain:  Pain Assessment  Pain Assessment: None - Denies Pain  Pain Level: 6  Patient's Stated Pain Goal: 0 - No pain  Pain Location: Knee  Pain Orientation: Left  Pain Descriptors: Aching, Discomfort  Functional Pain Assessment: Activities are not prevented  Pain Type: Chronic pain  Pain Frequency: Continuous  Pain Onset: On-going  Non-Pharmaceutical Pain Intervention(s): Ambulation/Increased Activity, Repositioned, Rest  Response to Pain Intervention: Patient satisfied  Faces, Legs, Activity, Cry, and Consolability (FLACC)  Face (F): occasional grimace or frown, withdrawn, disinterested  Legs (L): uneasy, restless, tense  Activity (A): lying quietly, normal position, moves easily  Cry (C): moans or whimpers, occasional complaint    Assessment:  Completed MINI MENTAL STATE EXAMINATION and patient obtained 9 out of 30 possible points, indicative of severe cognitive-linguistic impairment (patient did not verbalize season, date,  penetration/aspiration during PO intake.  Goal 3: Patient will receive daily oral care to decrease bacteria from the oral cavity.  Goal 4: Patient will complete breath support, oral motor, lingual, and pharyngeal exercises with provision of mod cues/prompts.   Goal 5: Re-assessment of speech production.  Goal 6: Patient will utilize tools/strategies to promote increased clarity of speech at word, phrase, and sentence level with provision of mod cues/prompts.     Objective   Auditory Comprehension  Comprehension:  (Delays were noted and mild-moderate break down was observed during simple yes/no questions regarding immediate environment and current state of being at independent level. Delays were noted and significant break down was observed during yes/no questions of increased complexity independently. Patient was 1/3 multi-step command on MINI at independent level.)     Expression  Primary Mode of Expression:  (Patient was 2/2 confrontation naming on MINI independently. Structured responsive speech was considered to be impaired with mild-moderate perseveration noted and moderate instances where no responses were observed.)    Overall Orientation Status:  (Patient verbalized name, birthday, year, state, city, and hospital at independent level. Patient did not verbalize season, date, TJ, month, county, or floor of hospital independently.)    Memory:  (Patient was 1/3 registration, 0/3 recall, and 0/1 repeat phrase on MINI at independent level.)     Problem Solving:  (During evaluation, patient did not verbalize conditions in which he takes medications independently. Patient did not verbalize appropriate simple solutions to situations that could occur during activities of daily living at independent level (ie allergies, cough, fever, headache, nausea/upset stomach, 911).    Additional Assessment:   Targeted patient's swallowing function. Patient exhibited slow oral transit of honey thick liquid presentations  administered via spoon by SLP. Laryngeal elevation during swallow initiation was considered to be sluggish and mild-moderately decreased for swallow airway protection. Patient produced inconsistent audible swallows with honey thick liquid presentations. Even so, no outward S/S penetration/aspiration was noted with any honey thick liquid presentation administered via spoon by SLP.    At this time, do suspect delayed epiglottic inversion and degree of residue in the throat post swallows. IF PO INTAKE IS PURSUED, would continue puree consistency with moderately thick/honey thick liquids. Staff feed with drinks by spoon. Recommend meds crushed in pudding/applesauce. If patient receives mouth care prior to intake, okay for ice chips and swabs water IN BETWEEN MEALS for comfort. Will continue to follow.    Electronically signed by STEVE Jensen on 6/12/2025 at 12:48 PM

## 2025-06-12 NOTE — PROGRESS NOTES
Comprehensive Nutrition Assessment    Type and Reason for Visit:  LOS    Nutrition Recommendations/Plan:   Gelatein at breakfast; Magic Cup w/ lunch and dinner  Add severe malnutrition to problem list     Malnutrition Assessment:  Malnutrition Status:  Severe malnutrition (06/12/25 1216)    Context:  Acute Illness     Findings of the 6 clinical characteristics of malnutrition:  Energy Intake:  50% or less of estimated energy requirements for 5 or more days  Weight Loss:  Greater than 5% over 1 month     Body Fat Loss:  Unable to assess     Muscle Mass Loss:  Unable to assess    Fluid Accumulation:  Mild Extremities   Strength:  Not Performed    Nutrition Assessment:    LOS day 8. Pt reported to have prolonged poor intake and dysphagia. Pt has significant weight loss of 7.6% in past 30 days. Pt has poor prognosis and hospice consult ordered. Will add ONS to meals to possibly help with intake.    Nutrition Related Findings:    Glu 114-124, BM 6-10, +1 BLE edema         Current Nutrition Intake & Therapies:     ADULT DIET; Dysphagia - Pureed; Moderately Thick (Honey); No drinking straws    Anthropometric Measures:  Height: 185.4 cm (6' 0.99\")  Ideal Body Weight (IBW): 184 lbs (84 kg)    Admission Body Weight: 115.2 kg (253 lb 15.5 oz) (Stated)  Current Body Weight: 100.6 kg (221 lb 12.5 oz), 120.5 % IBW. Weight Source: Bed scale  Current BMI (kg/m2): 29.3  Usual Body Weight: 108.9 kg (240 lb 1.3 oz) (5-20-25)   % Weight Change (Calculated): -7.6  Weight Adjustment For: No Adjustment   BMI Categories: Overweight (BMI 25.0-29.9)    Estimated Daily Nutrient Needs:  Energy Requirements Based On: Kcal/kg  Weight Used for Energy Requirements: Current  Energy (kcal/day): 2012-2515 (20-25/kg)  Weight Used for Protein Requirements: Ideal  Protein (g/day): 109-168 (1.3-2/kg)  Method Used for Fluid Requirements: 1 ml/kcal  Fluid (ml/day): 2012-2515 (20-25/kg)    Nutrition Diagnosis:   Severe malnutrition related to inadequate  protein-energy intake as evidenced by criteria as identified in malnutrition assessment    Nutrition Interventions:   Food and/or Nutrient Delivery: Continue Current Diet, Start Oral Nutrition Supplement  Nutrition Education/Counseling: No recommendation at this time  Coordination of Nutrition Care: Continue to monitor while inpatient     Goals:  Goals: PO intake 50% or greater  Type of Goal: New goal  Previous Goal Met: New Goal    Nutrition Monitoring and Evaluation:   Behavioral-Environmental Outcomes: None Identified  Food/Nutrient Intake Outcomes: Food and Nutrient Intake, Supplement Intake  Physical Signs/Symptoms Outcomes: Biochemical Data, Chewing or Swallowing, Fluid Status or Edema, Nutrition Focused Physical Findings, Skin, Weight    Discharge Planning:    Too soon to determine     Meredith Morfin RD  Contact: [phone]

## 2025-06-12 NOTE — ACP (ADVANCE CARE PLANNING)
Advance Care Planning      Palliative Medicine Provider (MD/NP)  Advance Care Planning (ACP) Conversation      Date of Conversation: 06/12/25  The patient and/or authorized decision maker consented to a voluntary Advance Care Planning conversation.   Individuals present for the conversation:   Legal healthcare agent named below    Legal Healthcare Agent(s):    Primary Decision Maker (Active): Bre Rios (POA) - Brother/Sister - [phone]    ACP documents available in EMR prior to discussion:  -Power of  for Healthcare    Primary Palliative Diagnosis(es):  Prior CVA with residual  Dysphagia with recurrent aspiration PNA  HFrEF    Conversation Summary: Met with pts sister/Bre ALICEA, at bedside to discuss current status and plan of care. Patient is more awake and communicative today but remains alerted as per baseline and does not have capacity for decision making. Extensive discussions today with family regarding goals and options of care  moving forward. They know patient would not want to have life prolonging measures or artifical means of living. They would not want to pursue PEG placement. We discussed code status and reviewed meaning of \"full code\" vs \"DNR\" and educated on typical process of aggressive resuscitation. At this time, family would like to transition to DNR to help reduce suffering in the setting pts multiple advanced chronic illnesses and diminished quality of life. Code status order updated. Initiated discussions about hospice care and they are agreeable to meet with [facility] hospice team today for more information. Hospice consult ordered. Discussed above with nursing staff and hospitalist NP.     Resuscitation Status:    Code Status: DNR    I spent 17 minutes providing separately identifiable ACP services with the patient and/or surrogate decision maker in a voluntary, in-person conversation discussing the patient's wishes and goals as detailed in the above note.       Ashia Solomon,  APRN - CNP

## 2025-06-14 NOTE — PROGRESS NOTES
Physician Progress Note      PATIENT:               BALDEMAR MAY  CSN #:                  [id number]  :                       1955  ADMIT DATE:       2025 7:55 PM  DISCH DATE:        2025 5:30 PM  RESPONDING  PROVIDER #:        MJ NEWELL          QUERY TEXT:    Pneumonia is documented in the medical record H & P and discharge summary.    Please clarify any associated diagnoses:    The clinical indicators include:  WBC 17.5 PNA, Pulse 117, 99, Acute respiratory failure  B/P 86/66, 84/58    IVF bolus 500 ml, IVF Maxpime 2 g IVPB,  Options provided:  -- Sepsis, present on admission  -- Sepsis, present on admission, now resolved  -- Sepsis, developed following admission  -- Localized infection without sepsis  -- Other - I will add my own diagnosis  -- Disagree - Not applicable / Not valid  -- Disagree - Clinically unable to determine / Unknown  -- Refer to Clinical Documentation Reviewer    PROVIDER RESPONSE TEXT:    This patient was treated for sepsis this admission which was present on   admission and is currently resolved.    Query created by: Diana Lezama on 2025 10:16 AM      Electronically signed by:  MJ NEWELL 2025 10:04 AM

## 2025-06-23 NOTE — PROGRESS NOTES
Physician Progress Note      PATIENT:               BALDEMAR MAY  CSN #:                  [id number]  :                       1955  ADMIT DATE:       2025 7:55 PM  DISCH DATE:        2025 5:30 PM  RESPONDING  PROVIDER #:        MJ NEWELL          QUERY TEXT:    Pneumonia is documented in the medical record in H&P. Please specify the type   of pneumonia and the causative organism:    The clinical indicators include:  --DC Summary: Bilateral pneumonia. Workup in ED revealed required 5L   supplemental oxygen on presentation to ED due to hypoxia, weaned to 3L per nc   currently. cxr-Upper lobe bilateral interstitial edema versus pulmonary   infiltrates. Chest xray concerning for aspiration so SLP was re-consulted and   recommended pureed diet, trial moderately thick/honey thick liquids via spoon,   staff feed as suspect impulsivity. MRSA swab negative, Vancomycin   de-escalated. Repeat Cxray (25) indicated central airway thickening and   ground-glass suggestive of bronchitis bronchiolitis, there is n  --WBC 19.0  Options provided:  -- Aspiration pneumonia  -- Pneumonia ruled out.  -- Bacterial Pneumonia., please specify type and provide additional clinical   evidence.  -- Other - I will add my own diagnosis  -- Disagree - Not applicable / Not valid  -- Disagree - Clinically unable to determine / Unknown  -- Refer to Clinical Documentation Reviewer    PROVIDER RESPONSE TEXT:    This patient has aspiration pneumonia.    Query created by: Wellington Larson on 2025 1:40 PM      Electronically signed by:  MJ NEWELL 2025 8:17 AM

## 2025-06-25 NOTE — PROGRESS NOTES
Physician Progress Note      PATIENT:               BALDEMAR MAY  [facility] #:                  [id number]  :                       1955  ADMIT DATE:       2025 7:55 PM  DISCH DATE:        2025 5:30 PM  RESPONDING  PROVIDER #:        MJ NEWELL          QUERY TEXT:    Acute heart failure with preserved ejection fraction is documented in the   medical record on  by Dr. Calvillo. Please provide additional clinical   indicators supportive of your documentation. Or please document if the   diagnosis of Acute heart failure with preserved ejection fraction has been   ruled out after study.    The clinical indicators include:  --Acute heart failure with preserved ejection fraction. (- Rajinder)- noted   under Cardiology problems.  --Coreg 6.25 bid  --Bumex 1 mg bid  --  --CXR-Upper lobe bilateral interstitial edema versus pulmonary infiltrates.     --Echo- Left Ventricle: Normal left ventricular systolic function. EF by   visual approximation is 60%. Left ventricle size is normal. Normal wall   thickness. Normal wall motion.  -  Right Ventricle: Right ventricle size is normal. Unable to assess systolic   function.  --No peripheral edema.(DC Summary )  Options provided:  -- Acute heart failure with preserved ejection fraction present as evidenced   by, Please document evidence.  -- Acute heart failure with preserved ejection fraction was ruled out after   study, chronic diastolic heart failure confirmed.  -- Other - I will add my own diagnosis  -- Disagree - Not applicable / Not valid  -- Disagree - Clinically unable to determine / Unknown  -- Refer to Clinical Documentation Reviewer    PROVIDER RESPONSE TEXT:    Acute heart failure with preserved ejection fraction was ruled out after   study, chronic diastolic heart failure confirmed..    Query created by: Wellington Larson on 2025 1:23 PM      Electronically signed by:  MJ NEWELL 2025 10:42 AM

## 2025-07-14 ENCOUNTER — TELEPHONE (OUTPATIENT)
Dept: UROLOGY | Age: 70
End: 2025-07-14

## 2025-07-14 NOTE — TELEPHONE ENCOUNTER
Ingris with [facility] called to reschedule the Cystoscopy with dr alvares. Please return Ingris's call at [phone].

## 2025-07-31 ENCOUNTER — PROCEDURE VISIT (OUTPATIENT)
Dept: UROLOGY | Age: 70
End: 2025-07-31
Payer: MEDICARE

## 2025-07-31 VITALS — HEIGHT: 73 IN | WEIGHT: 192 LBS | BODY MASS INDEX: 25.45 KG/M2

## 2025-07-31 DIAGNOSIS — R31.0 GROSS HEMATURIA: ICD-10-CM

## 2025-07-31 DIAGNOSIS — R33.9 URINARY RETENTION: Primary | ICD-10-CM

## 2025-07-31 PROCEDURE — 52000 CYSTOURETHROSCOPY: CPT | Performed by: UROLOGY

## 2025-07-31 RX ORDER — DIVALPROEX SODIUM 500 MG/1
500 TABLET, DELAYED RELEASE ORAL 2 TIMES DAILY
COMMUNITY
Start: 2025-03-20

## 2025-07-31 RX ORDER — BUDESONIDE, GLYCOPYRROLATE, AND FORMOTEROL FUMARATE 160; 9; 4.8 UG/1; UG/1; UG/1
2 AEROSOL, METERED RESPIRATORY (INHALATION) 2 TIMES DAILY
COMMUNITY

## 2025-07-31 RX ORDER — RISPERIDONE 25 MG/2 ML
25 KIT INTRAMUSCULAR
COMMUNITY

## 2025-07-31 RX ORDER — IPRATROPIUM BROMIDE AND ALBUTEROL SULFATE 2.5; .5 MG/3ML; MG/3ML
3 SOLUTION RESPIRATORY (INHALATION) 4 TIMES DAILY PRN
COMMUNITY

## 2025-07-31 RX ORDER — RISPERIDONE 0.5 MG/1
0.5 TABLET ORAL 2 TIMES DAILY
COMMUNITY
Start: 2025-04-03

## 2025-07-31 RX ORDER — FUROSEMIDE 40 MG/1
40 TABLET ORAL
COMMUNITY
Start: 2025-03-20

## 2025-07-31 RX ORDER — LACTULOSE 10 G/15ML
SOLUTION ORAL; RECTAL
COMMUNITY
Start: 2025-06-12

## 2025-07-31 NOTE — PROGRESS NOTES
Gross Hematuria:  Patient is here today for gross hematuria which occurred approximately 3 month(s) ago.  Patient was seen in the hospital back in May by [facility]'s urologist Dr. Mckeon and Dr. Villatoro.  Patient in the hospital at that time had gross hematuria associated with urinary retention.  Hematuria occurred after Sharma catheter decompression.  Patient was also anticoagulated at that time.  Hematuria eventually cleared with CBI and catheter was removed for voiding trial if he again developed hematuria.  A CT without contrast of the abdomen pelvis was done which was unremarkable.  Patient was then dismissed from the hospital on 5/31/2025 with Sharma catheter in place and sent to the nursing home.  He now comes in for follow-up cystoscopy and voiding trial.  His sister who is the YOAVA is with him today.  Since last office visit the patient's gross hematuria is: gone.  Previous imaging results: CT scan of the abdomen pelvis without contrast 5/30/2025 showed normal kidneys  Previous cystoscopy? no  Lower urinary tract symptoms: urine retention: He has residual when he developed the retention back in May is not documented.  The plan according to Dr. Villatoro's note was for him to have a trial of voiding in 2 or 3 weeks at the nursing home and follow-up to see us for cystoscopy.  He now comes in today for cystoscopy.  According to the patient Sister POA he has not had his catheter removed.  He is wheelchair-bound and has a wound on his lower leg.      Cystoscopy Procedure Note    Indications: Diagnosis    Pre-operative Diagnosis: Hematuria and retention    Post-operative Diagnosis: Same    Surgeon: Bennie Calloway MD     Assistants: staff    Anesthesia: Local anesthesia topical 2% lidocaine gel    Procedure Details   The risks, benefits, complications, treatment options, and expected outcomes were discussed with the patient. The patient concurred with the proposed plan, giving informed consent.    Cystoscopy was

## 2025-08-14 ENCOUNTER — OFFICE VISIT (OUTPATIENT)
Dept: UROLOGY | Age: 70
End: 2025-08-14
Payer: MEDICARE

## 2025-08-14 DIAGNOSIS — R31.0 GROSS HEMATURIA: ICD-10-CM

## 2025-08-14 DIAGNOSIS — R82.71 CHRONIC BACTERIURIA: ICD-10-CM

## 2025-08-14 DIAGNOSIS — R33.9 URINARY RETENTION: Primary | ICD-10-CM

## 2025-08-14 LAB
BACTERIA URINE, POC: NORMAL
BILIRUBIN URINE: 0 MG/DL
BLOOD, URINE: POSITIVE
CASTS URINE, POC: 0
CLARITY, UA: CLEAR
COLOR, UA: YELLOW
CRYSTALS URINE, POC: 0
EPI CELLS URINE, POC: NORMAL
GLUCOSE URINE: NORMAL
KETONES, URINE: NEGATIVE
LEUKOCYTE EST, POC: NORMAL
NITRITE, URINE: POSITIVE
PH UA: 5.5 (ref 4.5–8)
PROTEIN UA: NEGATIVE
RBC URINE, POC: 0
SPECIFIC GRAVITY UA: 1.01 (ref 1–1.03)
UROBILINOGEN, URINE: NORMAL
WBC URINE, POC: NORMAL
YEAST URINE, POC: 0

## 2025-08-14 PROCEDURE — 1123F ACP DISCUSS/DSCN MKR DOCD: CPT | Performed by: NURSE PRACTITIONER

## 2025-08-14 PROCEDURE — G8419 CALC BMI OUT NRM PARAM NOF/U: HCPCS | Performed by: NURSE PRACTITIONER

## 2025-08-14 PROCEDURE — G8427 DOCREV CUR MEDS BY ELIG CLIN: HCPCS | Performed by: NURSE PRACTITIONER

## 2025-08-14 PROCEDURE — 99214 OFFICE O/P EST MOD 30 MIN: CPT | Performed by: NURSE PRACTITIONER

## 2025-08-14 PROCEDURE — 81001 URINALYSIS AUTO W/SCOPE: CPT | Performed by: NURSE PRACTITIONER

## 2025-08-14 PROCEDURE — 51798 US URINE CAPACITY MEASURE: CPT | Performed by: NURSE PRACTITIONER

## 2025-08-14 PROCEDURE — 1159F MED LIST DOCD IN RCRD: CPT | Performed by: NURSE PRACTITIONER

## 2025-08-14 PROCEDURE — 1036F TOBACCO NON-USER: CPT | Performed by: NURSE PRACTITIONER

## 2025-08-14 PROCEDURE — G9710 PT PROV HOSP SRV MSMT PER: HCPCS | Performed by: NURSE PRACTITIONER

## 2025-08-14 RX ORDER — HYDROXYZINE HYDROCHLORIDE 25 MG/1
TABLET, FILM COATED ORAL
COMMUNITY
Start: 2025-08-12

## 2025-08-14 RX ORDER — TAMSULOSIN HYDROCHLORIDE 0.4 MG/1
0.4 CAPSULE ORAL DAILY
Qty: 90 CAPSULE | Refills: 3 | Status: SHIPPED | OUTPATIENT
Start: 2025-08-14

## 2025-08-14 RX ORDER — TAMSULOSIN HYDROCHLORIDE 0.4 MG/1
0.4 CAPSULE ORAL DAILY
Qty: 90 CAPSULE | Refills: 3 | Status: SHIPPED | OUTPATIENT
Start: 2025-08-14 | End: 2025-08-14

## 2025-08-14 RX ORDER — DEXAMETHASONE 4 MG/1
TABLET ORAL
COMMUNITY
Start: 2025-08-12

## 2025-08-14 RX ORDER — APIXABAN 5 MG/1
TABLET, FILM COATED ORAL
COMMUNITY
Start: 2025-08-11

## 2025-08-14 RX ORDER — BUMETANIDE 1 MG/1
TABLET ORAL
COMMUNITY
Start: 2025-08-11

## 2025-08-14 ASSESSMENT — ENCOUNTER SYMPTOMS
VOMITING: 0
ABDOMINAL DISTENTION: 0
ABDOMINAL PAIN: 0
NAUSEA: 0
BACK PAIN: 0

## (undated) DEVICE — ENDO KIT,LOURDES HOSPITAL: Brand: MEDLINE INDUSTRIES, INC.